# Patient Record
Sex: FEMALE | Race: WHITE | NOT HISPANIC OR LATINO | Employment: OTHER | URBAN - METROPOLITAN AREA
[De-identification: names, ages, dates, MRNs, and addresses within clinical notes are randomized per-mention and may not be internally consistent; named-entity substitution may affect disease eponyms.]

---

## 2018-10-22 ENCOUNTER — TELEPHONE (OUTPATIENT)
Dept: INTERNAL MEDICINE | Facility: CLINIC | Age: 58
End: 2018-10-22

## 2018-10-22 NOTE — TELEPHONE ENCOUNTER
Can you let the patient know I can't do blood work on her until I have seen her.  I wouldn't know what needed to be drawn.

## 2018-10-22 NOTE — TELEPHONE ENCOUNTER
Pt stated she has an appointment and wants to know if she can come in earlier for blood work. Explained that she is a new patient and Maryellen would not know what blood work she needed until the visit was over. Pt stated that she is 58 years old, and did not want to come in twice if she didn't have too. Wants you or Maryellen to call her back.    Thank you

## 2018-10-25 ENCOUNTER — RESULTS ENCOUNTER (OUTPATIENT)
Dept: INTERNAL MEDICINE | Facility: CLINIC | Age: 58
End: 2018-10-25

## 2018-10-25 ENCOUNTER — OFFICE VISIT (OUTPATIENT)
Dept: INTERNAL MEDICINE | Facility: CLINIC | Age: 58
End: 2018-10-25

## 2018-10-25 VITALS
SYSTOLIC BLOOD PRESSURE: 134 MMHG | RESPIRATION RATE: 20 BRPM | HEIGHT: 62 IN | OXYGEN SATURATION: 99 % | DIASTOLIC BLOOD PRESSURE: 78 MMHG | BODY MASS INDEX: 26.5 KG/M2 | HEART RATE: 89 BPM | WEIGHT: 144 LBS

## 2018-10-25 DIAGNOSIS — Z23 NEED FOR TDAP VACCINATION: ICD-10-CM

## 2018-10-25 DIAGNOSIS — Z23 NEED FOR INFLUENZA VACCINATION: ICD-10-CM

## 2018-10-25 DIAGNOSIS — Z00.00 ANNUAL PHYSICAL EXAM: Primary | ICD-10-CM

## 2018-10-25 DIAGNOSIS — Z11.59 NEED FOR HEPATITIS C SCREENING TEST: ICD-10-CM

## 2018-10-25 DIAGNOSIS — Z72.0 TOBACCO ABUSE: ICD-10-CM

## 2018-10-25 DIAGNOSIS — R79.89 ELEVATED TSH: ICD-10-CM

## 2018-10-25 DIAGNOSIS — Z00.00 ANNUAL PHYSICAL EXAM: ICD-10-CM

## 2018-10-25 DIAGNOSIS — Z12.31 SCREENING MAMMOGRAM, ENCOUNTER FOR: Primary | ICD-10-CM

## 2018-10-25 PROBLEM — K21.9 GASTROESOPHAGEAL REFLUX DISEASE WITHOUT ESOPHAGITIS: Status: ACTIVE | Noted: 2018-10-25

## 2018-10-25 LAB
25(OH)D3 SERPL-MCNC: <4.2 NG/ML
ALBUMIN SERPL-MCNC: 4.55 G/DL (ref 3.2–4.8)
ALBUMIN/GLOB SERPL: 2.1 G/DL (ref 1.5–2.5)
ALP SERPL-CCNC: 105 U/L (ref 25–100)
ALT SERPL W P-5'-P-CCNC: 16 U/L (ref 7–40)
ANION GAP SERPL CALCULATED.3IONS-SCNC: 9 MMOL/L (ref 3–11)
ARTICHOKE IGE QN: 154 MG/DL (ref 0–130)
AST SERPL-CCNC: 21 U/L (ref 0–33)
BASOPHILS # BLD AUTO: 0.06 10*3/MM3 (ref 0–0.2)
BASOPHILS NFR BLD AUTO: 0.7 % (ref 0–1)
BILIRUB SERPL-MCNC: 0.3 MG/DL (ref 0.3–1.2)
BUN BLD-MCNC: 15 MG/DL (ref 9–23)
BUN/CREAT SERPL: 16.9 (ref 7–25)
CALCIUM SPEC-SCNC: 9.1 MG/DL (ref 8.7–10.4)
CHLORIDE SERPL-SCNC: 105 MMOL/L (ref 99–109)
CHOLEST SERPL-MCNC: 239 MG/DL (ref 0–200)
CO2 SERPL-SCNC: 26 MMOL/L (ref 20–31)
CREAT BLD-MCNC: 0.89 MG/DL (ref 0.6–1.3)
DEPRECATED RDW RBC AUTO: 48.1 FL (ref 37–54)
EOSINOPHIL # BLD AUTO: 0.16 10*3/MM3 (ref 0–0.3)
EOSINOPHIL NFR BLD AUTO: 1.8 % (ref 0–3)
ERYTHROCYTE [DISTWIDTH] IN BLOOD BY AUTOMATED COUNT: 14.2 % (ref 11.3–14.5)
GFR SERPL CREATININE-BSD FRML MDRD: 65 ML/MIN/1.73
GLOBULIN UR ELPH-MCNC: 2.2 GM/DL
GLUCOSE BLD-MCNC: 102 MG/DL (ref 70–100)
HBA1C MFR BLD: 6.2 % (ref 4.8–5.6)
HCT VFR BLD AUTO: 43.6 % (ref 34.5–44)
HCV AB SER DONR QL: NORMAL
HDLC SERPL-MCNC: 79 MG/DL (ref 40–60)
HGB BLD-MCNC: 14.2 G/DL (ref 11.5–15.5)
IMM GRANULOCYTES # BLD: 0.04 10*3/MM3 (ref 0–0.03)
IMM GRANULOCYTES NFR BLD: 0.5 % (ref 0–0.6)
LYMPHOCYTES # BLD AUTO: 2.63 10*3/MM3 (ref 0.6–4.8)
LYMPHOCYTES NFR BLD AUTO: 29.9 % (ref 24–44)
MCH RBC QN AUTO: 30.1 PG (ref 27–31)
MCHC RBC AUTO-ENTMCNC: 32.6 G/DL (ref 32–36)
MCV RBC AUTO: 92.4 FL (ref 80–99)
MONOCYTES # BLD AUTO: 0.68 10*3/MM3 (ref 0–1)
MONOCYTES NFR BLD AUTO: 7.7 % (ref 0–12)
NEUTROPHILS # BLD AUTO: 5.26 10*3/MM3 (ref 1.5–8.3)
NEUTROPHILS NFR BLD AUTO: 59.9 % (ref 41–71)
PLATELET # BLD AUTO: 385 10*3/MM3 (ref 150–450)
PMV BLD AUTO: 9.9 FL (ref 6–12)
POTASSIUM BLD-SCNC: 4.5 MMOL/L (ref 3.5–5.5)
PROT SERPL-MCNC: 6.7 G/DL (ref 5.7–8.2)
RBC # BLD AUTO: 4.72 10*6/MM3 (ref 3.89–5.14)
SODIUM BLD-SCNC: 140 MMOL/L (ref 132–146)
TRIGL SERPL-MCNC: 89 MG/DL (ref 0–150)
TSH SERPL DL<=0.05 MIU/L-ACNC: 18.2 MIU/ML (ref 0.35–5.35)
VIT B12 BLD-MCNC: 445 PG/ML (ref 211–911)
WBC NRBC COR # BLD: 8.79 10*3/MM3 (ref 3.5–10.8)

## 2018-10-25 PROCEDURE — 80053 COMPREHEN METABOLIC PANEL: CPT | Performed by: NURSE PRACTITIONER

## 2018-10-25 PROCEDURE — 90686 IIV4 VACC NO PRSV 0.5 ML IM: CPT | Performed by: NURSE PRACTITIONER

## 2018-10-25 PROCEDURE — 84439 ASSAY OF FREE THYROXINE: CPT | Performed by: NURSE PRACTITIONER

## 2018-10-25 PROCEDURE — 84443 ASSAY THYROID STIM HORMONE: CPT | Performed by: NURSE PRACTITIONER

## 2018-10-25 PROCEDURE — 82607 VITAMIN B-12: CPT | Performed by: NURSE PRACTITIONER

## 2018-10-25 PROCEDURE — 86803 HEPATITIS C AB TEST: CPT | Performed by: NURSE PRACTITIONER

## 2018-10-25 PROCEDURE — 99386 PREV VISIT NEW AGE 40-64: CPT | Performed by: NURSE PRACTITIONER

## 2018-10-25 PROCEDURE — 90471 IMMUNIZATION ADMIN: CPT | Performed by: NURSE PRACTITIONER

## 2018-10-25 PROCEDURE — 83036 HEMOGLOBIN GLYCOSYLATED A1C: CPT | Performed by: NURSE PRACTITIONER

## 2018-10-25 PROCEDURE — 85025 COMPLETE CBC W/AUTO DIFF WBC: CPT | Performed by: NURSE PRACTITIONER

## 2018-10-25 PROCEDURE — 80061 LIPID PANEL: CPT | Performed by: NURSE PRACTITIONER

## 2018-10-25 PROCEDURE — 82306 VITAMIN D 25 HYDROXY: CPT | Performed by: NURSE PRACTITIONER

## 2018-10-25 RX ORDER — CLONAZEPAM 1 MG/1
1 TABLET ORAL 2 TIMES DAILY PRN
COMMUNITY
Start: 2018-10-24

## 2018-10-25 RX ORDER — ZOLPIDEM TARTRATE 10 MG/1
10 TABLET ORAL
Refills: 2 | COMMUNITY
Start: 2018-10-09

## 2018-10-25 NOTE — PROGRESS NOTES
Subjective   Sydnie Gamble is a 58 y.o. female here today for annual physical.    Chief Complaint   Patient presents with   • Annual Exam     Her overall health is: good  She exercises by: not really  Last colon screening was colonoscopy colo  Immunizations are overdue- fear of needles  She does see his dentist - has dentures- ill fitting- going to go get them fixed  Herdiet is poor- a lot of junk  She describes his alcohol intake as none  Her cardiovascular risk is: high- family history, poor diet, smoker  She is not sexually active.   She does wear a seatbelt regularly.  She does not wear sunscreen regularly when outdoors.  PAP- not had since she was 28- due refuses today  Review of Systems   Constitutional: Negative for chills, fever, unexpected weight gain and unexpected weight loss.   HENT: Negative.    Eyes: Negative.    Respiratory: Positive for shortness of breath and wheezing. Negative for cough.    Cardiovascular: Negative for chest pain and palpitations.   Gastrointestinal: Negative for blood in stool, diarrhea, nausea and vomiting.   Endocrine: Negative for polydipsia, polyphagia and polyuria.   Genitourinary: Negative.    Musculoskeletal: Negative for arthralgias and myalgias.   Skin: Negative for rash and skin lesions.   Allergic/Immunologic: Negative.    Neurological: Negative for dizziness, seizures, weakness, headache, memory problem and confusion.   Psychiatric/Behavioral: Negative for self-injury, sleep disturbance, suicidal ideas and depressed mood. The patient is not nervous/anxious.        Past Medical History:   Diagnosis Date   • Acid reflux    • Acid reflux    • Ovarian cyst    • Ovarian cyst      Family History   Problem Relation Age of Onset   • Cancer Mother    • Hypertension Mother    • Mental illness Mother    • Cancer Father    • Heart attack Maternal Grandfather      Past Surgical History:   Procedure Laterality Date   • OOPHORECTOMY       Social History     Social History   • Marital  "status:      Spouse name: N/A   • Number of children: N/A   • Years of education: N/A     Occupational History   • Not on file.     Social History Main Topics   • Smoking status: Current Every Day Smoker     Packs/day: 1.50   • Smokeless tobacco: Never Used   • Alcohol use No   • Drug use: No   • Sexual activity: Not on file     Other Topics Concern   • Not on file     Social History Narrative   • No narrative on file         Current Outpatient Prescriptions:   •  clonazePAM (KlonoPIN) 1 MG tablet, Take 1 mg by mouth At Night As Needed., Disp: , Rfl:   •  zolpidem (AMBIEN) 10 MG tablet, TAKE 1 TABLET BY MOUTH EVERYDAY AT BEDTIME, Disp: , Rfl: 2  •  Tdap (ADACEL) 5-2-15.5 LF-MCG/0.5 injection, Inject 0.5 mL into the appropriate muscle as directed by prescriber 1 (One) Time for 1 dose., Disp: 0.5 mL, Rfl: 0    Objective   Vitals:    10/25/18 1031   BP: 134/78   Pulse: 89   Resp: 20   SpO2: 99%   Weight: 65.3 kg (144 lb)   Height: 157.5 cm (62\")     Body mass index is 26.34 kg/m².    Physical Exam   Constitutional: She is oriented to person, place, and time. She appears well-developed and well-nourished. She is cooperative. No distress.   HENT:   Head: Normocephalic.   Right Ear: Tympanic membrane and external ear normal.   Left Ear: Tympanic membrane and external ear normal.   Nose: Nose normal. Right sinus exhibits no maxillary sinus tenderness and no frontal sinus tenderness. Left sinus exhibits no maxillary sinus tenderness and no frontal sinus tenderness.   Mouth/Throat: Oropharynx is clear and moist and mucous membranes are normal. No oropharyngeal exudate.   Eyes: Pupils are equal, round, and reactive to light. Conjunctivae and EOM are normal. No scleral icterus.   Neck: Normal range of motion. Neck supple. Carotid bruit is not present. No neck rigidity. No tracheal deviation present. No thyroid mass and no thyromegaly present.   Cardiovascular: Normal rate, regular rhythm, normal heart sounds and intact " distal pulses.  Exam reveals no gallop and no friction rub.    No murmur heard.  Pulmonary/Chest: Effort normal and breath sounds normal. No respiratory distress. She has no wheezes. She has no rales.   Abdominal: Soft. Normal appearance and bowel sounds are normal. She exhibits no distension and no mass. There is no hepatosplenomegaly. There is no tenderness. There is no rebound and no guarding. No hernia.   Musculoskeletal: Normal range of motion. She exhibits no edema, tenderness or deformity.   ROM normal with all major joints   Lymphadenopathy:        Head (right side): No submental, no submandibular, no tonsillar, no preauricular, no posterior auricular and no occipital adenopathy present.        Head (left side): No submental, no submandibular, no tonsillar, no preauricular, no posterior auricular and no occipital adenopathy present.     She has no cervical adenopathy.        Right cervical: No superficial cervical, no deep cervical and no posterior cervical adenopathy present.       Left cervical: No superficial cervical, no deep cervical and no posterior cervical adenopathy present.   Neurological: She is alert and oriented to person, place, and time. She displays no atrophy and no tremor. No cranial nerve deficit or sensory deficit. She exhibits normal muscle tone. Coordination and gait normal. GCS eye subscore is 4. GCS verbal subscore is 5. GCS motor subscore is 6.   Skin: Skin is warm and dry. Capillary refill takes 2 to 3 seconds. No rash noted. She is not diaphoretic. No cyanosis. Nails show no clubbing.   Psychiatric: She has a normal mood and affect. Her speech is normal and behavior is normal. Judgment and thought content normal. Cognition and memory are normal.   Nursing note and vitals reviewed.      Assessment/Plan   Problem List Items Addressed This Visit     Tobacco abuse    Relevant Orders    Fluarix/Fluzone/Afluria Quad/FluLaval Quad    Spirometry With Bronchodilator      Other Visit  Diagnoses     Annual physical exam    -  Primary    Relevant Orders    Mammo Screening Digital Tomosynthesis Bilateral With CAD    Cologuard - Stool, Per Rectum    Hemoglobin A1c    Comprehensive Metabolic Panel    CBC & Differential    TSH    Vitamin B12    Vitamin D 25 Hydroxy    Lipid Panel    Spirometry With Bronchodilator    CBC Auto Differential    Need for influenza vaccination        Relevant Orders    Fluarix/Fluzone/Afluria Quad/FluLaval Quad    Need for Tdap vaccination        Relevant Medications    Tdap (ADACEL) 5-2-15.5 LF-MCG/0.5 injection          Sydnie was seen today for annual exam.    Diagnoses and all orders for this visit:    Annual physical exam  -     Mammo Screening Digital Tomosynthesis Bilateral With CAD; Future  -     Cologuard - Stool, Per Rectum; Future  -     Hemoglobin A1c  -     Comprehensive Metabolic Panel  -     CBC & Differential  -     TSH  -     Vitamin B12  -     Vitamin D 25 Hydroxy  -     Lipid Panel  -     Spirometry With Bronchodilator  -     CBC Auto Differential    Need for influenza vaccination  -     Fluarix/Fluzone/Afluria Quad/FluLaval Quad    Tobacco abuse  -     Fluarix/Fluzone/Afluria Quad/FluLaval Quad  -     Spirometry With Bronchodilator    Need for Tdap vaccination  -     Tdap (ADACEL) 5-2-15.5 LF-MCG/0.5 injection; Inject 0.5 mL into the appropriate muscle as directed by prescriber 1 (One) Time for 1 dose.      Counseled regarding: age-appropriate screening labs and tests, wearing seatbelt and sunscreen regularly, need for regular screenings stressed  Discussed: regular exercise and diet changes to promote weight loss, seeing a dermatologist for annual skin exams         Plan of care reviewed with the patient at the conclusion of today's visit.  Education was provided regarding diagnosis, management, and any prescribed or recommended OTC medications.  Patient verbalized understanding of and agreement with management plan.     Return in about 4 weeks (around  11/22/2018), or for pap.      Maryellen Mendosa, APRN

## 2018-10-26 ENCOUNTER — TELEPHONE (OUTPATIENT)
Dept: INTERNAL MEDICINE | Facility: CLINIC | Age: 58
End: 2018-10-26

## 2018-10-26 DIAGNOSIS — E03.9 ACQUIRED HYPOTHYROIDISM: ICD-10-CM

## 2018-10-26 DIAGNOSIS — R79.89 ELEVATED TSH: Primary | ICD-10-CM

## 2018-10-26 DIAGNOSIS — E55.9 VITAMIN D DEFICIENCY: ICD-10-CM

## 2018-10-26 DIAGNOSIS — E03.9 HYPOTHYROIDISM, UNSPECIFIED TYPE: Primary | ICD-10-CM

## 2018-10-26 PROBLEM — R73.03 PREDIABETES: Status: ACTIVE | Noted: 2018-10-26

## 2018-10-26 LAB — T4 FREE SERPL-MCNC: 0.87 NG/DL (ref 0.89–1.76)

## 2018-10-26 RX ORDER — LEVOTHYROXINE SODIUM 0.03 MG/1
25 TABLET ORAL DAILY
Qty: 30 TABLET | Refills: 2 | Status: SHIPPED | OUTPATIENT
Start: 2018-10-26 | End: 2018-12-13 | Stop reason: SDUPTHER

## 2018-10-26 RX ORDER — ERGOCALCIFEROL 1.25 MG/1
50000 CAPSULE ORAL WEEKLY
Qty: 12 CAPSULE | Refills: 0 | Status: SHIPPED | OUTPATIENT
Start: 2018-10-26 | End: 2019-01-17 | Stop reason: SDUPTHER

## 2018-10-26 NOTE — TELEPHONE ENCOUNTER
Discussed in detail lab results and necessary dietary changes.  Synthroid initiated and nature of disease and treatment discussed.  Vid called to pharm.  Recheck levels at scheduled FU

## 2018-11-02 ENCOUNTER — OFFICE VISIT (OUTPATIENT)
Dept: PULMONOLOGY | Facility: CLINIC | Age: 58
End: 2018-11-02

## 2018-11-02 DIAGNOSIS — R06.02 SHORTNESS OF BREATH: Primary | ICD-10-CM

## 2018-11-02 PROCEDURE — 94060 EVALUATION OF WHEEZING: CPT | Performed by: INTERNAL MEDICINE

## 2018-11-02 PROCEDURE — 94729 DIFFUSING CAPACITY: CPT | Performed by: INTERNAL MEDICINE

## 2018-11-02 PROCEDURE — 94726 PLETHYSMOGRAPHY LUNG VOLUMES: CPT | Performed by: INTERNAL MEDICINE

## 2018-11-02 RX ORDER — ALBUTEROL SULFATE 90 UG/1
4 AEROSOL, METERED RESPIRATORY (INHALATION) ONCE
Status: COMPLETED | OUTPATIENT
Start: 2018-11-02 | End: 2018-11-02

## 2018-11-02 RX ADMIN — ALBUTEROL SULFATE 4 PUFF: 90 AEROSOL, METERED RESPIRATORY (INHALATION) at 15:15

## 2018-11-02 NOTE — PROGRESS NOTES
Out patient Complete PFT with Bronchodilator. Patient was administered 4 puffs Ventolin HFA. Patient tolerated procedure well. Ordered by Maryellen Mendosa.

## 2018-12-03 ENCOUNTER — TELEPHONE (OUTPATIENT)
Dept: INTERNAL MEDICINE | Facility: CLINIC | Age: 58
End: 2018-12-03

## 2018-12-05 ENCOUNTER — APPOINTMENT (OUTPATIENT)
Dept: MAMMOGRAPHY | Facility: HOSPITAL | Age: 58
End: 2018-12-05
Attending: NURSE PRACTITIONER

## 2018-12-12 ENCOUNTER — OFFICE VISIT (OUTPATIENT)
Dept: INTERNAL MEDICINE | Facility: CLINIC | Age: 58
End: 2018-12-12

## 2018-12-12 VITALS
WEIGHT: 140 LBS | DIASTOLIC BLOOD PRESSURE: 72 MMHG | BODY MASS INDEX: 25.61 KG/M2 | HEART RATE: 89 BPM | SYSTOLIC BLOOD PRESSURE: 120 MMHG | RESPIRATION RATE: 20 BRPM | OXYGEN SATURATION: 96 % | TEMPERATURE: 98.2 F

## 2018-12-12 DIAGNOSIS — F41.9 ANXIETY: ICD-10-CM

## 2018-12-12 DIAGNOSIS — J41.0 SIMPLE CHRONIC BRONCHITIS (HCC): ICD-10-CM

## 2018-12-12 DIAGNOSIS — Z72.0 TOBACCO ABUSE: ICD-10-CM

## 2018-12-12 DIAGNOSIS — K21.9 GASTROESOPHAGEAL REFLUX DISEASE WITHOUT ESOPHAGITIS: Primary | ICD-10-CM

## 2018-12-12 DIAGNOSIS — E03.9 ACQUIRED HYPOTHYROIDISM: ICD-10-CM

## 2018-12-12 DIAGNOSIS — R73.03 PREDIABETES: ICD-10-CM

## 2018-12-12 LAB
ANION GAP SERPL CALCULATED.3IONS-SCNC: 6 MMOL/L (ref 3–11)
BUN BLD-MCNC: 12 MG/DL (ref 9–23)
BUN/CREAT SERPL: 13.3 (ref 7–25)
CALCIUM SPEC-SCNC: 9.7 MG/DL (ref 8.7–10.4)
CHLORIDE SERPL-SCNC: 104 MMOL/L (ref 99–109)
CO2 SERPL-SCNC: 29 MMOL/L (ref 20–31)
CREAT BLD-MCNC: 0.9 MG/DL (ref 0.6–1.3)
GFR SERPL CREATININE-BSD FRML MDRD: 64 ML/MIN/1.73
GLUCOSE BLD-MCNC: 99 MG/DL (ref 70–100)
POTASSIUM BLD-SCNC: 4.4 MMOL/L (ref 3.5–5.5)
SODIUM BLD-SCNC: 139 MMOL/L (ref 132–146)
T4 FREE SERPL-MCNC: 1.14 NG/DL (ref 0.89–1.76)
TSH SERPL DL<=0.05 MIU/L-ACNC: 7.49 MIU/ML (ref 0.35–5.35)

## 2018-12-12 PROCEDURE — 99214 OFFICE O/P EST MOD 30 MIN: CPT | Performed by: NURSE PRACTITIONER

## 2018-12-12 PROCEDURE — 84443 ASSAY THYROID STIM HORMONE: CPT | Performed by: NURSE PRACTITIONER

## 2018-12-12 PROCEDURE — 84439 ASSAY OF FREE THYROXINE: CPT | Performed by: NURSE PRACTITIONER

## 2018-12-12 PROCEDURE — 80048 BASIC METABOLIC PNL TOTAL CA: CPT | Performed by: NURSE PRACTITIONER

## 2018-12-12 RX ORDER — PANTOPRAZOLE SODIUM 40 MG/1
40 TABLET, DELAYED RELEASE ORAL DAILY
Qty: 30 TABLET | Refills: 2 | Status: SHIPPED | OUTPATIENT
Start: 2018-12-12 | End: 2019-02-22 | Stop reason: SDUPTHER

## 2018-12-12 RX ORDER — ALBUTEROL SULFATE 90 UG/1
2 AEROSOL, METERED RESPIRATORY (INHALATION) EVERY 4 HOURS PRN
Qty: 1 INHALER | Refills: 4 | Status: SHIPPED | OUTPATIENT
Start: 2018-12-12 | End: 2020-03-23 | Stop reason: SDUPTHER

## 2018-12-12 RX ORDER — ESCITALOPRAM OXALATE 5 MG/1
5 TABLET ORAL DAILY
Qty: 30 TABLET | Refills: 2 | Status: SHIPPED | OUTPATIENT
Start: 2018-12-12 | End: 2019-02-22 | Stop reason: SDUPTHER

## 2018-12-12 NOTE — PROGRESS NOTES
Subjective   Sydnie Gamble is a 58 y.o. female here today for hypothyroidism.    Chief Complaint   Patient presents with   • Follow-up     Sydnie is here today for follow-up on hypothyroidism.  She has been compliant with her Synthroid and denies adverse side effects.     Sydnie sees a psychiatrist for her severe anxiety.  She is curious about alternative methods to deal with her anxiety.  She reports her anxiety symptoms are mostly excessive worry and always thinking of worse case scenario.  Driving leaving the house cause her excessive anxiety.  She also reports having a very  little appetite all during the day and eating junk at night.  She was found last visit to be prediabetic.      Sydnie is also very heavy smoker and complains of orthopnea and a chronic cough.  She is interested in quitting smoking and would like to try Chantix once her anxiety is under control.   Review of Systems   Constitutional: Negative for chills, fever, unexpected weight gain and unexpected weight loss.   HENT: Negative.    Eyes: Negative.    Respiratory: Positive for shortness of breath and wheezing. Negative for cough.    Cardiovascular: Negative for chest pain and palpitations.   Gastrointestinal: Positive for GERD. Negative for blood in stool, diarrhea, nausea and vomiting.   Endocrine: Negative for polydipsia, polyphagia and polyuria.   Genitourinary: Negative.    Musculoskeletal: Negative for arthralgias and myalgias.   Skin: Negative for rash and skin lesions.   Allergic/Immunologic: Negative.    Neurological: Negative for dizziness, seizures, weakness, headache, memory problem and confusion.   Psychiatric/Behavioral: Negative for self-injury, sleep disturbance, suicidal ideas and depressed mood. The patient is nervous/anxious.        Past Medical History:   Diagnosis Date   • Acid reflux    • Acid reflux    • Anxiety 1976   • COPD (chronic obstructive pulmonary disease) (CMS/[facility]) breathing problems   • Hypothyroidism    • Ovarian  cyst    • Ovarian cyst    • Visual impairment corrected with glasses     Family History   Problem Relation Age of Onset   • Cancer Mother    • Hypertension Mother    • Mental illness Mother    • Anxiety disorder Mother    • Asthma Mother    • COPD Mother    • Depression Mother    • Cancer Father    • Heart attack Maternal Grandfather      Past Surgical History:   Procedure Laterality Date   • OOPHORECTOMY     • PARTIAL HYSTERECTOMY  1987?    ovary/fallopian tube removed     Social History     Socioeconomic History   • Marital status:      Spouse name: Not on file   • Number of children: Not on file   • Years of education: Not on file   • Highest education level: Not on file   Social Needs   • Financial resource strain: Not on file   • Food insecurity - worry: Not on file   • Food insecurity - inability: Not on file   • Transportation needs - medical: Not on file   • Transportation needs - non-medical: Not on file   Occupational History   • Not on file   Tobacco Use   • Smoking status: Current Every Day Smoker     Packs/day: 1.50     Years: 45.00     Pack years: 67.50   • Smokeless tobacco: Never Used   Substance and Sexual Activity   • Alcohol use: No   • Drug use: No   • Sexual activity: No   Other Topics Concern   • Not on file   Social History Narrative   • Not on file         Current Outpatient Medications:   •  clonazePAM (KlonoPIN) 1 MG tablet, Take 1 mg by mouth At Night As Needed., Disp: , Rfl:   •  vitamin D (ERGOCALCIFEROL) 66120 units capsule capsule, Take 1 capsule by mouth 1 (One) Time Per Week., Disp: 12 capsule, Rfl: 0  •  zolpidem (AMBIEN) 10 MG tablet, TAKE 1 TABLET BY MOUTH EVERYDAY AT BEDTIME, Disp: , Rfl: 2  •  albuterol 108 (90 Base) MCG/ACT inhaler, Inhale 2 puffs Every 4 (Four) Hours As Needed for Wheezing., Disp: 1 inhaler, Rfl: 4  •  escitalopram (LEXAPRO) 5 MG tablet, Take 1 tablet by mouth Daily., Disp: 30 tablet, Rfl: 2  •  levothyroxine (SYNTHROID, LEVOTHROID) 50 MCG tablet, Take  1 tablet by mouth Daily., Disp: 30 tablet, Rfl: 2  •  pantoprazole (PROTONIX) 40 MG EC tablet, Take 1 tablet by mouth Daily., Disp: 30 tablet, Rfl: 2  •  tiotropium bromide-olodaterol (STIOLTO RESPIMAT) 2.5-2.5 MCG/ACT aerosol solution inhaler, Inhale 1 puff Daily., Disp: 1 inhaler, Rfl: 5    Objective   Vitals:    12/12/18 1500   BP: 120/72   BP Location: Left arm   Patient Position: Sitting   Cuff Size: Adult   Pulse: 89   Resp: 20   Temp: 98.2 °F (36.8 °C)   TempSrc: Temporal   SpO2: 96%   Weight: 63.5 kg (140 lb)   PainSc: 0-No pain     Body mass index is 25.61 kg/m².  Physical Exam   Constitutional: She is oriented to person, place, and time. She appears well-developed and well-nourished. No distress.   Eyes: Pupils are equal, round, and reactive to light.   Neck: Neck supple. No thyromegaly present.   Cardiovascular: Normal rate and regular rhythm.   Pulmonary/Chest: Effort normal and breath sounds normal.   Neurological: She is alert and oriented to person, place, and time.   Skin: Skin is warm and dry. Capillary refill takes 2 to 3 seconds. She is not diaphoretic.   Psychiatric: She has a normal mood and affect. Her behavior is normal. Judgment and thought content normal.   Nursing note and vitals reviewed.      Assessment/Plan   Problem List Items Addressed This Visit     Tobacco abuse    Relevant Orders    Basic Metabolic Panel (Completed)    Gastroesophageal reflux disease without esophagitis - Primary    Relevant Medications    pantoprazole (PROTONIX) 40 MG EC tablet    Other Relevant Orders    Basic Metabolic Panel (Completed)    Acquired hypothyroidism    Relevant Medications    levothyroxine (SYNTHROID, LEVOTHROID) 50 MCG tablet    Other Relevant Orders    TSH (Completed)    T4, Free (Completed)    Basic Metabolic Panel (Completed)    Prediabetes    Relevant Orders    Basic Metabolic Panel (Completed)      Other Visit Diagnoses     Simple chronic bronchitis (CMS/HCC)        Relevant Medications     albuterol 108 (90 Base) MCG/ACT inhaler    tiotropium bromide-olodaterol (STIOLTO RESPIMAT) 2.5-2.5 MCG/ACT aerosol solution inhaler    Anxiety        Relevant Medications    escitalopram (LEXAPRO) 5 MG tablet        Sydnie was seen today for follow-up.    Diagnoses and all orders for this visit:    Gastroesophageal reflux disease without esophagitis  -     pantoprazole (PROTONIX) 40 MG EC tablet; Take 1 tablet by mouth Daily.  -     Basic Metabolic Panel        -     Lack of appetite may be related to acid reflux- will do a trial of protonix and FU in 2 weeks for a recheck  Acquired hypothyroidism  -     TSH  -     T4, Free  -     Basic Metabolic Panel           Prediabetes  -     Basic Metabolic Panel       -     Patient reports she has made many dietary changes, nutritional information discussed and hand out given  Tobacco abuse  -     Basic Metabolic Panel        -   Once anxiety improved- will consider wellbutrin or chantix  Simple chronic bronchitis (CMS/HCC)  -     albuterol 108 (90 Base) MCG/ACT inhaler; Inhale 2 puffs Every 4 (Four) Hours As Needed for Wheezing.  -     tiotropium bromide-olodaterol (STIOLTO RESPIMAT) 2.5-2.5 MCG/ACT aerosol solution inhaler; Inhale 1 puff Daily.        -    FU in 2 weeks for a recheck  Anxiety  -     escitalopram (LEXAPRO) 5 MG tablet; Take 1 tablet by mouth Daily. Dicussed deep breathing exercises and referred to counseling.  Patient is unsure if she wants to take a daily medication for this will speak with her  and rtc in 2 weeks for a recheck        -      FU in 2 weeks for  recheck           The patient was counseled regarding diagnostic results, impressions, prognosis, instructions for management, risk factor reductions, education, and importance of treatment compliance.  The patient verbalized understanding of and agreement with the plan of care.    Advised patient to call with any further questions and any new or worsening symptoms.     No Follow-up on  file.      Maryellen Mendosa, APRN

## 2018-12-13 ENCOUNTER — TELEPHONE (OUTPATIENT)
Dept: INTERNAL MEDICINE | Facility: CLINIC | Age: 58
End: 2018-12-13

## 2018-12-13 DIAGNOSIS — E03.9 HYPOTHYROIDISM, UNSPECIFIED TYPE: ICD-10-CM

## 2018-12-13 RX ORDER — LEVOTHYROXINE SODIUM 0.05 MG/1
50 TABLET ORAL DAILY
Qty: 90 TABLET | Refills: 0 | Status: SHIPPED | OUTPATIENT
Start: 2018-12-13 | End: 2019-02-25 | Stop reason: SDUPTHER

## 2018-12-13 RX ORDER — LEVOTHYROXINE SODIUM 0.05 MG/1
50 TABLET ORAL DAILY
Qty: 30 TABLET | Refills: 2 | Status: SHIPPED | OUTPATIENT
Start: 2018-12-13 | End: 2018-12-13 | Stop reason: SDUPTHER

## 2018-12-13 NOTE — TELEPHONE ENCOUNTER
PT LEVOTHYROXINE NEEDS TO BE RESENT TO THE [facility].   PT IS ALSO WANTING TO KNOW IF SHE CAN JUST KEEP THE 25MG AND TAKE TWO PILLS INSTEAD OF GETTING THE 50 MG?

## 2019-01-17 DIAGNOSIS — E55.9 VITAMIN D DEFICIENCY: ICD-10-CM

## 2019-01-17 RX ORDER — ERGOCALCIFEROL 1.25 MG/1
50000 CAPSULE ORAL WEEKLY
Qty: 12 CAPSULE | Refills: 0 | Status: SHIPPED | OUTPATIENT
Start: 2019-01-17 | End: 2019-02-22 | Stop reason: SDUPTHER

## 2019-01-18 ENCOUNTER — HOSPITAL ENCOUNTER (OUTPATIENT)
Dept: MAMMOGRAPHY | Facility: HOSPITAL | Age: 59
Discharge: HOME | End: 2019-01-18
Attending: NURSE PRACTITIONER | Admitting: NURSE PRACTITIONER

## 2019-01-18 DIAGNOSIS — Z12.31 SCREENING MAMMOGRAM, ENCOUNTER FOR: ICD-10-CM

## 2019-01-18 PROCEDURE — 77063 BREAST TOMOSYNTHESIS BI: CPT | Performed by: RADIOLOGY

## 2019-01-18 PROCEDURE — 77063 BREAST TOMOSYNTHESIS BI: CPT

## 2019-01-18 PROCEDURE — 77067 SCR MAMMO BI INCL CAD: CPT

## 2019-01-18 PROCEDURE — 77067 SCR MAMMO BI INCL CAD: CPT | Performed by: RADIOLOGY

## 2019-02-22 ENCOUNTER — OFFICE VISIT (OUTPATIENT)
Dept: INTERNAL MEDICINE | Facility: CLINIC | Age: 59
End: 2019-02-22

## 2019-02-22 VITALS
HEART RATE: 90 BPM | TEMPERATURE: 98.7 F | DIASTOLIC BLOOD PRESSURE: 80 MMHG | OXYGEN SATURATION: 99 % | SYSTOLIC BLOOD PRESSURE: 118 MMHG | HEIGHT: 62 IN | RESPIRATION RATE: 18 BRPM | WEIGHT: 139 LBS | BODY MASS INDEX: 25.58 KG/M2

## 2019-02-22 DIAGNOSIS — R73.03 PREDIABETES: ICD-10-CM

## 2019-02-22 DIAGNOSIS — F17.210 CIGARETTE SMOKER: ICD-10-CM

## 2019-02-22 DIAGNOSIS — E55.9 VITAMIN D DEFICIENCY: ICD-10-CM

## 2019-02-22 DIAGNOSIS — E03.9 ACQUIRED HYPOTHYROIDISM: Primary | ICD-10-CM

## 2019-02-22 DIAGNOSIS — J42 CHRONIC BRONCHITIS, UNSPECIFIED CHRONIC BRONCHITIS TYPE (HCC): ICD-10-CM

## 2019-02-22 DIAGNOSIS — K21.9 GASTROESOPHAGEAL REFLUX DISEASE WITHOUT ESOPHAGITIS: ICD-10-CM

## 2019-02-22 DIAGNOSIS — F41.1 GAD (GENERALIZED ANXIETY DISORDER): ICD-10-CM

## 2019-02-22 DIAGNOSIS — J41.0 SIMPLE CHRONIC BRONCHITIS (HCC): ICD-10-CM

## 2019-02-22 PROBLEM — J44.9 COPD (CHRONIC OBSTRUCTIVE PULMONARY DISEASE) (HCC): Status: ACTIVE | Noted: 2019-02-22

## 2019-02-22 LAB
25(OH)D3 SERPL-MCNC: 71.7 NG/ML
ALBUMIN SERPL-MCNC: 4.72 G/DL (ref 3.2–4.8)
ALBUMIN/GLOB SERPL: 2.1 G/DL (ref 1.5–2.5)
ALP SERPL-CCNC: 115 U/L (ref 25–100)
ALT SERPL W P-5'-P-CCNC: 14 U/L (ref 7–40)
ANION GAP SERPL CALCULATED.3IONS-SCNC: 8 MMOL/L (ref 3–11)
AST SERPL-CCNC: 20 U/L (ref 0–33)
BASOPHILS # BLD AUTO: 0.06 10*3/MM3 (ref 0–0.2)
BASOPHILS NFR BLD AUTO: 0.7 % (ref 0–1)
BILIRUB SERPL-MCNC: 0.3 MG/DL (ref 0.3–1.2)
BUN BLD-MCNC: 19 MG/DL (ref 9–23)
BUN/CREAT SERPL: 20.7 (ref 7–25)
CALCIUM SPEC-SCNC: 9.6 MG/DL (ref 8.7–10.4)
CHLORIDE SERPL-SCNC: 105 MMOL/L (ref 99–109)
CO2 SERPL-SCNC: 26 MMOL/L (ref 20–31)
CREAT BLD-MCNC: 0.92 MG/DL (ref 0.6–1.3)
DEPRECATED RDW RBC AUTO: 45.7 FL (ref 37–54)
EOSINOPHIL # BLD AUTO: 0.11 10*3/MM3 (ref 0–0.3)
EOSINOPHIL NFR BLD AUTO: 1.3 % (ref 0–3)
ERYTHROCYTE [DISTWIDTH] IN BLOOD BY AUTOMATED COUNT: 13.9 % (ref 11.3–14.5)
GFR SERPL CREATININE-BSD FRML MDRD: 62 ML/MIN/1.73
GLOBULIN UR ELPH-MCNC: 2.3 GM/DL
GLUCOSE BLD-MCNC: 106 MG/DL (ref 70–100)
HBA1C MFR BLD: 5.7 % (ref 4.8–5.6)
HCT VFR BLD AUTO: 42.4 % (ref 34.5–44)
HGB BLD-MCNC: 13.9 G/DL (ref 11.5–15.5)
IMM GRANULOCYTES # BLD AUTO: 0.02 10*3/MM3 (ref 0–0.05)
IMM GRANULOCYTES NFR BLD AUTO: 0.2 % (ref 0–0.6)
LYMPHOCYTES # BLD AUTO: 2.64 10*3/MM3 (ref 0.6–4.8)
LYMPHOCYTES NFR BLD AUTO: 31.7 % (ref 24–44)
MCH RBC QN AUTO: 29.8 PG (ref 27–31)
MCHC RBC AUTO-ENTMCNC: 32.8 G/DL (ref 32–36)
MCV RBC AUTO: 91 FL (ref 80–99)
MONOCYTES # BLD AUTO: 0.67 10*3/MM3 (ref 0–1)
MONOCYTES NFR BLD AUTO: 8 % (ref 0–12)
NEUTROPHILS # BLD AUTO: 4.83 10*3/MM3 (ref 1.5–8.3)
NEUTROPHILS NFR BLD AUTO: 58.1 % (ref 41–71)
PLATELET # BLD AUTO: 391 10*3/MM3 (ref 150–450)
PMV BLD AUTO: 9.6 FL (ref 6–12)
POTASSIUM BLD-SCNC: 4.3 MMOL/L (ref 3.5–5.5)
PROT SERPL-MCNC: 7 G/DL (ref 5.7–8.2)
RBC # BLD AUTO: 4.66 10*6/MM3 (ref 3.89–5.14)
SODIUM BLD-SCNC: 139 MMOL/L (ref 132–146)
TSH SERPL DL<=0.05 MIU/L-ACNC: 6.22 MIU/ML (ref 0.35–5.35)
WBC NRBC COR # BLD: 8.33 10*3/MM3 (ref 3.5–10.8)

## 2019-02-22 PROCEDURE — 99214 OFFICE O/P EST MOD 30 MIN: CPT | Performed by: NURSE PRACTITIONER

## 2019-02-22 PROCEDURE — 84443 ASSAY THYROID STIM HORMONE: CPT | Performed by: NURSE PRACTITIONER

## 2019-02-22 PROCEDURE — 80053 COMPREHEN METABOLIC PANEL: CPT | Performed by: NURSE PRACTITIONER

## 2019-02-22 PROCEDURE — 83036 HEMOGLOBIN GLYCOSYLATED A1C: CPT | Performed by: NURSE PRACTITIONER

## 2019-02-22 PROCEDURE — 85025 COMPLETE CBC W/AUTO DIFF WBC: CPT | Performed by: NURSE PRACTITIONER

## 2019-02-22 PROCEDURE — 82306 VITAMIN D 25 HYDROXY: CPT | Performed by: NURSE PRACTITIONER

## 2019-02-22 RX ORDER — ESCITALOPRAM OXALATE 10 MG/1
10 TABLET ORAL DAILY
Qty: 30 TABLET | Refills: 2 | Status: SHIPPED | OUTPATIENT
Start: 2019-02-22 | End: 2019-04-08 | Stop reason: SDUPTHER

## 2019-02-22 RX ORDER — ERGOCALCIFEROL 1.25 MG/1
50000 CAPSULE ORAL WEEKLY
Qty: 12 CAPSULE | Refills: 0 | Status: SHIPPED | OUTPATIENT
Start: 2019-02-22 | End: 2019-07-08

## 2019-02-22 RX ORDER — PANTOPRAZOLE SODIUM 40 MG/1
40 TABLET, DELAYED RELEASE ORAL DAILY
Qty: 30 TABLET | Refills: 2 | Status: SHIPPED | OUTPATIENT
Start: 2019-02-22 | End: 2019-05-30 | Stop reason: SDUPTHER

## 2019-02-22 NOTE — PROGRESS NOTES
Subjective   Sydnie Gamble is a 58 y.o. female here today for hypothyroidism    Chief Complaint   Patient presents with   • Anxiety   • Heartburn     Sydnie is here today to follow-up on her hypothyroidism, COPD, ALESIA, and vit d def. she has been compliant with her Synthroid and vitamin D.  She has been making dietary changes and cutting back on concentrated sweets.  She has lost a pound.  She is curious to see if she is still prediabetic.      She reports when she got home she threw away all of her information from our visit and has been confused about how to use her inhalers.  She has been using this Stiolto for rescue on accident.    Anxiety: reports she is not sure if the medication is working or not but denies adverse SA.       She is still working on smoking cessation.  She is looking into hypnosis programs.  She has not done the CT Low dose cancer screen- would like it reordered.  Cologuard is at her house - she just needs to send it in.  Review of Systems   Constitutional: Negative for chills, fever, unexpected weight gain and unexpected weight loss.   HENT: Negative.    Eyes: Negative.    Respiratory: Positive for shortness of breath and wheezing. Negative for cough.    Cardiovascular: Negative for chest pain and palpitations.   Gastrointestinal: Negative for blood in stool, diarrhea, nausea, vomiting and GERD.   Endocrine: Negative for polydipsia, polyphagia and polyuria.   Genitourinary: Negative.    Musculoskeletal: Negative for arthralgias and myalgias.   Skin: Negative for rash and skin lesions.   Allergic/Immunologic: Negative.    Neurological: Negative for dizziness, seizures, weakness, headache, memory problem and confusion.   Psychiatric/Behavioral: Negative for self-injury, sleep disturbance, suicidal ideas and depressed mood. The patient is nervous/anxious.        Past Medical History:   Diagnosis Date   • Acid reflux    • Acid reflux    • Anxiety 1976   • COPD (chronic obstructive pulmonary  disease) (CMS/HCC) breathing problems   • Hypothyroidism    • Ovarian cyst    • Ovarian cyst    • Visual impairment corrected with glasses     Family History   Problem Relation Age of Onset   • Cancer Mother    • Hypertension Mother    • Mental illness Mother    • Anxiety disorder Mother    • Asthma Mother    • COPD Mother    • Depression Mother    • Cancer Father    • Heart attack Maternal Grandfather    • Breast cancer Neg Hx    • Ovarian cancer Neg Hx      History reviewed. No pertinent surgical history.  Social History     Socioeconomic History   • Marital status:      Spouse name: Not on file   • Number of children: Not on file   • Years of education: Not on file   • Highest education level: Not on file   Social Needs   • Financial resource strain: Not on file   • Food insecurity - worry: Not on file   • Food insecurity - inability: Not on file   • Transportation needs - medical: Not on file   • Transportation needs - non-medical: Not on file   Occupational History   • Not on file   Tobacco Use   • Smoking status: Current Every Day Smoker     Packs/day: 1.50     Years: 45.00     Pack years: 67.50   • Smokeless tobacco: Never Used   Substance and Sexual Activity   • Alcohol use: No   • Drug use: No   • Sexual activity: No   Other Topics Concern   • Not on file   Social History Narrative   • Not on file         Current Outpatient Medications:   •  albuterol 108 (90 Base) MCG/ACT inhaler, Inhale 2 puffs Every 4 (Four) Hours As Needed for Wheezing., Disp: 1 inhaler, Rfl: 4  •  clonazePAM (KlonoPIN) 1 MG tablet, Take 1 mg by mouth At Night As Needed., Disp: , Rfl:   •  escitalopram (LEXAPRO) 10 MG tablet, Take 1 tablet by mouth Daily., Disp: 30 tablet, Rfl: 2  •  levothyroxine (SYNTHROID, LEVOTHROID) 50 MCG tablet, Take 1 tablet by mouth Daily., Disp: 90 tablet, Rfl: 0  •  pantoprazole (PROTONIX) 40 MG EC tablet, Take 1 tablet by mouth Daily., Disp: 30 tablet, Rfl: 2  •  tiotropium bromide-olodaterol (STIOLTO  "RESPIMAT) 2.5-2.5 MCG/ACT aerosol solution inhaler, Inhale 1 puff Daily., Disp: 1 inhaler, Rfl: 5  •  vitamin D (ERGOCALCIFEROL) 67668 units capsule capsule, Take 1 capsule by mouth 1 (One) Time Per Week., Disp: 12 capsule, Rfl: 0  •  zolpidem (AMBIEN) 10 MG tablet, TAKE 1 TABLET BY MOUTH EVERYDAY AT BEDTIME, Disp: , Rfl: 2    Objective   Vitals:    02/22/19 1521   BP: 118/80   Pulse: 90   Resp: 18   Temp: 98.7 °F (37.1 °C)   TempSrc: Temporal   SpO2: 99%   Weight: 63 kg (139 lb)   Height: 157.5 cm (62\")     Body mass index is 25.42 kg/m².  Physical Exam   Constitutional: She is oriented to person, place, and time. She appears well-developed and well-nourished. No distress.   Eyes: Pupils are equal, round, and reactive to light.   Neck: Neck supple. No thyromegaly present.   Cardiovascular: Normal rate and regular rhythm.   Pulmonary/Chest: Effort normal. She has decreased breath sounds. She has no wheezes. She has no rhonchi.   Neurological: She is alert and oriented to person, place, and time.   Skin: Skin is warm and dry. Capillary refill takes 2 to 3 seconds. She is not diaphoretic.   Psychiatric: She has a normal mood and affect. Her behavior is normal. Judgment and thought content normal.   Nursing note and vitals reviewed.      Assessment/Plan   Problem List Items Addressed This Visit        Respiratory    COPD (chronic obstructive pulmonary disease) (CMS/[facility])    Relevant Medications    albuterol 108 (90 Base) MCG/ACT inhaler    tiotropium bromide-olodaterol (STIOLTO RESPIMAT) 2.5-2.5 MCG/ACT aerosol solution inhaler    Other Relevant Orders    CBC & Differential    Comprehensive Metabolic Panel       Digestive    Gastroesophageal reflux disease without esophagitis    Relevant Medications    pantoprazole (PROTONIX) 40 MG EC tablet    Vitamin D deficiency    Relevant Medications    vitamin D (ERGOCALCIFEROL) 65873 units capsule capsule    Other Relevant Orders    Vitamin D 25 Hydroxy       Endocrine    " Acquired hypothyroidism - Primary    Relevant Medications    levothyroxine (SYNTHROID, LEVOTHROID) 50 MCG tablet    Other Relevant Orders    CBC & Differential    Comprehensive Metabolic Panel    TSH       Other    Prediabetes    Relevant Orders    CBC & Differential    Hemoglobin A1c    ALESIA (generalized anxiety disorder)    Relevant Medications    zolpidem (AMBIEN) 10 MG tablet    escitalopram (LEXAPRO) 10 MG tablet      Other Visit Diagnoses     Cigarette smoker        Relevant Orders    CT Chest Low Dose Wo        Sydnie was seen today for anxiety and heartburn.    Diagnoses and all orders for this visit:    Acquired hypothyroidism  -     CBC & Differential  -     Comprehensive Metabolic Panel  -     TSH          Prediabetes  -     CBC & Differential  -     Hemoglobin A1c         -  Has made some positive dietary changes  Vitamin D deficiency  -     Vitamin D 25 Hydroxy  -     vitamin D (ERGOCALCIFEROL) 91423 units capsule capsule; Take 1 capsule by mouth 1 (One) Time Per Week.    Gastroesophageal reflux disease without esophagitis  -     pantoprazole (PROTONIX) 40 MG EC tablet; Take 1 tablet by mouth Daily.  -     Stable, continue current treatment plan  Simple chronic bronchitis (CMS/HCC)  -     tiotropium bromide-olodaterol (STIOLTO RESPIMAT) 2.5-2.5 MCG/ACT aerosol solution inhaler; Inhale 1 puff Daily.  Chronic bronchitis, unspecified chronic bronchitis type (CMS/HCC)  -     CBC & Differential  -     Comprehensive Metabolic Panel      -     Re - demonstrated proper inhaler use, Pt verbalized understanding and denied further questions at this time  ALESIA (generalized anxiety disorder)  -     escitalopram (LEXAPRO) 10 MG tablet; Take 1 tablet by mouth Daily.       -    Increase Lexapro to 10 mg.    Cigarette smoker  -     CT Chest Low Dose Wo; Future    Other orders  -     CBC Auto Differential             The patient was counseled regarding diagnostic results, impressions, prognosis, instructions for management,  risk factor reductions, education, and importance of treatment compliance.  The patient verbalized understanding of and agreement with the plan of care.    Advised patient to call with any further questions and any new or worsening symptoms.     Return for Physical w/Next Visit with pap.      DAYSI Aceves

## 2019-02-23 DIAGNOSIS — E03.9 HYPOTHYROIDISM, UNSPECIFIED TYPE: ICD-10-CM

## 2019-02-23 RX ORDER — LEVOTHYROXINE SODIUM 0.05 MG/1
TABLET ORAL
Qty: 90 TABLET | Refills: 0 | Status: CANCELLED | OUTPATIENT
Start: 2019-02-23

## 2019-02-25 ENCOUNTER — TELEPHONE (OUTPATIENT)
Dept: INTERNAL MEDICINE | Facility: CLINIC | Age: 59
End: 2019-02-25

## 2019-02-25 DIAGNOSIS — E03.9 HYPOTHYROIDISM, UNSPECIFIED TYPE: ICD-10-CM

## 2019-02-25 DIAGNOSIS — E03.9 ACQUIRED HYPOTHYROIDISM: ICD-10-CM

## 2019-02-25 DIAGNOSIS — R73.03 PREDIABETES: Primary | ICD-10-CM

## 2019-02-25 DIAGNOSIS — E55.9 VITAMIN D DEFICIENCY: ICD-10-CM

## 2019-02-25 RX ORDER — LEVOTHYROXINE SODIUM 0.07 MG/1
75 TABLET ORAL DAILY
Qty: 30 TABLET | Refills: 1 | Status: SHIPPED | OUTPATIENT
Start: 2019-02-25 | End: 2019-04-08 | Stop reason: SDUPTHER

## 2019-02-25 NOTE — TELEPHONE ENCOUNTER
Left voicemail to return call.  Thyroid function still off.  Increased Synthroid.  No longer prediabetic.  Vitamin D now normal.

## 2019-03-07 ENCOUNTER — HOSPITAL ENCOUNTER (OUTPATIENT)
Dept: CT IMAGING | Facility: HOSPITAL | Age: 59
Discharge: HOME | End: 2019-03-07
Admitting: NURSE PRACTITIONER

## 2019-03-07 DIAGNOSIS — F17.210 CIGARETTE SMOKER: ICD-10-CM

## 2019-03-07 PROCEDURE — G0297 LDCT FOR LUNG CA SCREEN: HCPCS

## 2019-03-08 ENCOUNTER — TELEPHONE (OUTPATIENT)
Dept: INTERNAL MEDICINE | Facility: CLINIC | Age: 59
End: 2019-03-08

## 2019-03-08 DIAGNOSIS — R91.1 LUNG NODULE: Primary | ICD-10-CM

## 2019-03-08 NOTE — TELEPHONE ENCOUNTER
Discussed CT results with patient.  Advised importance of FU and discussed 5-15% chance of malignancy.  Will refer to pulmonary. Pt verbalized understanding and denied further questions at this time.

## 2019-03-11 DIAGNOSIS — K21.9 GASTROESOPHAGEAL REFLUX DISEASE WITHOUT ESOPHAGITIS: ICD-10-CM

## 2019-03-11 DIAGNOSIS — F41.9 ANXIETY: ICD-10-CM

## 2019-03-11 RX ORDER — PANTOPRAZOLE SODIUM 40 MG/1
TABLET, DELAYED RELEASE ORAL
Qty: 30 TABLET | Refills: 2 | OUTPATIENT
Start: 2019-03-11

## 2019-03-11 RX ORDER — ESCITALOPRAM OXALATE 5 MG/1
TABLET ORAL
Qty: 30 TABLET | Refills: 2 | OUTPATIENT
Start: 2019-03-11

## 2019-03-12 ENCOUNTER — OFFICE VISIT (OUTPATIENT)
Dept: PULMONOLOGY | Facility: CLINIC | Age: 59
End: 2019-03-12

## 2019-03-12 ENCOUNTER — DOCUMENTATION (OUTPATIENT)
Dept: ONCOLOGY | Facility: CLINIC | Age: 59
End: 2019-03-12

## 2019-03-12 VITALS
WEIGHT: 141 LBS | OXYGEN SATURATION: 94 % | HEART RATE: 82 BPM | TEMPERATURE: 98.1 F | HEIGHT: 62 IN | SYSTOLIC BLOOD PRESSURE: 114 MMHG | DIASTOLIC BLOOD PRESSURE: 62 MMHG | BODY MASS INDEX: 25.95 KG/M2

## 2019-03-12 DIAGNOSIS — R91.1 LUNG NODULE: Primary | ICD-10-CM

## 2019-03-12 DIAGNOSIS — Z87.891 PERSONAL HISTORY OF TOBACCO USE, PRESENTING HAZARDS TO HEALTH: ICD-10-CM

## 2019-03-12 DIAGNOSIS — R59.0 HILAR ADENOPATHY: ICD-10-CM

## 2019-03-12 DIAGNOSIS — J42 CHRONIC BRONCHITIS, UNSPECIFIED CHRONIC BRONCHITIS TYPE (HCC): ICD-10-CM

## 2019-03-12 PROBLEM — R91.8 MULTIPLE LUNG NODULES ON CT: Status: ACTIVE | Noted: 2019-03-08

## 2019-03-12 PROBLEM — F17.213 CIGARETTE NICOTINE DEPENDENCE WITH WITHDRAWAL: Status: ACTIVE | Noted: 2019-03-12

## 2019-03-12 PROCEDURE — 94726 PLETHYSMOGRAPHY LUNG VOLUMES: CPT | Performed by: INTERNAL MEDICINE

## 2019-03-12 PROCEDURE — 94729 DIFFUSING CAPACITY: CPT | Performed by: INTERNAL MEDICINE

## 2019-03-12 PROCEDURE — 94060 EVALUATION OF WHEEZING: CPT | Performed by: INTERNAL MEDICINE

## 2019-03-12 PROCEDURE — 99204 OFFICE O/P NEW MOD 45 MIN: CPT | Performed by: INTERNAL MEDICINE

## 2019-03-12 RX ORDER — ALBUTEROL SULFATE 90 UG/1
4 AEROSOL, METERED RESPIRATORY (INHALATION) ONCE
Status: COMPLETED | OUTPATIENT
Start: 2019-03-12 | End: 2019-03-12

## 2019-03-12 RX ADMIN — ALBUTEROL SULFATE 4 PUFF: 90 AEROSOL, METERED RESPIRATORY (INHALATION) at 14:14

## 2019-03-12 NOTE — PROGRESS NOTES
"Initial Pulmonary Consult Note    Subjective   Reason for consultation: Lung nodules    Sydnie Gamble is a 58 y.o. female is being seen for consultation today at the request of DAYSI Aceves    History of Present Illness   58 y.o.female smoker who reports a nocturnal cough for the past year, exertional dyspnea had a screening CT chest which showed several right side sub-centimeter nodules.  She is here for further evaluation.  She denies hemoptysis, weight loss, night sweats or lymphadenopathy.  She does report an \"aching feeling\" in her right upper neck for the past three months.     The patient has a history of COPD and has been treated with Stiolto.  She reports she has only been taking 1 puff daily instead of 2.  She has not used her rescue inhaler.  She denies any significant bronchitis.  Her cough is productive at times of some clear sputum.    She reports she has been trying to cut back on cigarettes using a nicotine patch and is down to 10 cigarettes/day.  She started cutting back when she found out about her CT scan.    Patient has a strong family history of lung cancer.    The following portions of the patient's history were reviewed and updated as appropriate: allergies, current medications, past family history, past medical history, past social history, past surgical history and problem list.    Active Ambulatory Problems     Diagnosis Date Noted   • Tobacco abuse 10/25/2018   • Gastroesophageal reflux disease without esophagitis 10/25/2018   • Acquired hypothyroidism 10/26/2018   • Vitamin D deficiency 10/26/2018   • Prediabetes 10/26/2018   • COPD (chronic obstructive pulmonary disease) (CMS/[facility]) 02/22/2019   • ALESIA (generalized anxiety disorder) 02/22/2019   • Lung nodule 03/08/2019     Resolved Ambulatory Problems     Diagnosis Date Noted   • No Resolved Ambulatory Problems     Past Medical History:   Diagnosis Date   • Acid reflux    • Acid reflux    • Anxiety 1976   • COPD (chronic " obstructive pulmonary disease) (CMS/HCC) breathing problems   • Hypothyroidism    • Ovarian cyst    • Ovarian cyst    • Visual impairment corrected with glasses       No past surgical history on file.    Family History   Problem Relation Age of Onset   • Cancer Mother    • Hypertension Mother    • Mental illness Mother    • Anxiety disorder Mother    • Asthma Mother    • COPD Mother    • Depression Mother    • Cancer Father    • Heart attack Maternal Grandfather    • Breast cancer Neg Hx    • Ovarian cancer Neg Hx        Social History     Socioeconomic History   • Marital status:      Spouse name: Not on file   • Number of children: Not on file   • Years of education: Not on file   • Highest education level: Not on file   Social Needs   • Financial resource strain: Not on file   • Food insecurity - worry: Not on file   • Food insecurity - inability: Not on file   • Transportation needs - medical: Not on file   • Transportation needs - non-medical: Not on file   Occupational History   • Not on file   Tobacco Use   • Smoking status: Current Every Day Smoker     Packs/day: 1.50     Years: 45.00     Pack years: 67.50   • Smokeless tobacco: Never Used   Substance and Sexual Activity   • Alcohol use: No   • Drug use: No   • Sexual activity: No   Other Topics Concern   • Not on file   Social History Narrative   • Not on file       No Known Allergies    Current Outpatient Medications   Medication Sig Dispense Refill   • albuterol 108 (90 Base) MCG/ACT inhaler Inhale 2 puffs Every 4 (Four) Hours As Needed for Wheezing. 1 inhaler 4   • clonazePAM (KlonoPIN) 1 MG tablet Take 1 mg by mouth At Night As Needed.     • escitalopram (LEXAPRO) 10 MG tablet Take 1 tablet by mouth Daily. 30 tablet 2   • levothyroxine (SYNTHROID, LEVOTHROID) 75 MCG tablet Take 1 tablet by mouth Daily. 30 tablet 1   • pantoprazole (PROTONIX) 40 MG EC tablet Take 1 tablet by mouth Daily. 30 tablet 2   • tiotropium bromide-olodaterol (STIOLTO  RESPIMAT) 2.5-2.5 MCG/ACT aerosol solution inhaler Inhale 1 puff Daily. 1 inhaler 5   • vitamin D (ERGOCALCIFEROL) 76368 units capsule capsule Take 1 capsule by mouth 1 (One) Time Per Week. 12 capsule 0   • zolpidem (AMBIEN) 10 MG tablet TAKE 1 TABLET BY MOUTH EVERYDAY AT BEDTIME  2     No current facility-administered medications for this visit.        Review of Systems   Constitutional: Positive for activity change, appetite change (increased) and fatigue. Negative for chills, diaphoresis, fever and unexpected weight change.   HENT: Positive for tinnitus. Negative for congestion, dental problem, ear pain, hearing loss, nosebleeds, postnasal drip, rhinorrhea and sinus pressure.    Eyes: Negative for pain, discharge, redness and visual disturbance.   Respiratory: Positive for cough, shortness of breath and wheezing. Negative for apnea, choking, chest tightness and stridor.    Cardiovascular: Negative for chest pain, palpitations and leg swelling.   Gastrointestinal: Negative for abdominal distention, abdominal pain, blood in stool, constipation, diarrhea, nausea and vomiting.   Endocrine: Positive for cold intolerance and polydipsia. Negative for heat intolerance and polyuria.   Genitourinary: Negative for dysuria, frequency, hematuria and urgency.   Musculoskeletal: Positive for joint swelling and neck pain. Negative for arthralgias and myalgias.   Skin: Negative for color change, rash and wound.   Allergic/Immunologic: Negative for environmental allergies and immunocompromised state.   Neurological: Negative for dizziness, syncope, weakness, light-headedness, numbness and headaches.   Psychiatric/Behavioral: Positive for sleep disturbance. Negative for suicidal ideas. The patient is nervous/anxious.    All other systems reviewed and are negative.    All other systems were reviewed and are negative.  Exceptions are included in the HPI or as otherwise noted above.     Objective   Blood pressure 114/62, pulse 82,  "temperature 98.1 °F (36.7 °C), height 157.5 cm (62\"), weight 64 kg (141 lb), SpO2 94 %.  Physical Exam   Constitutional: She is oriented to person, place, and time. She appears well-developed and well-nourished.   HENT:   Head: Normocephalic and atraumatic.   Right Ear: External ear normal.   Left Ear: External ear normal.   Nose: Nose normal.   Mouth/Throat: Oropharynx is clear and moist. No oropharyngeal exudate.   Eyes: Conjunctivae and EOM are normal. Pupils are equal, round, and reactive to light. Right eye exhibits no discharge. Left eye exhibits no discharge. No scleral icterus.   Neck: Normal range of motion. Neck supple. No JVD present. No tracheal deviation present. No thyromegaly present.   Cardiovascular: Normal rate, regular rhythm, normal heart sounds and intact distal pulses. Exam reveals no gallop and no friction rub.   No murmur heard.  Pulmonary/Chest: Effort normal. No accessory muscle usage or stridor. No apnea, no tachypnea and no bradypnea. No respiratory distress. She has no decreased breath sounds. She has no wheezes. She has no rhonchi. She has no rales. Chest wall is not dull to percussion. She exhibits no tenderness, no bony tenderness, no crepitus and no deformity.   Abdominal: Soft. Bowel sounds are normal. She exhibits no distension. There is no tenderness.   Musculoskeletal: Normal range of motion. She exhibits no edema, tenderness or deformity.   Lymphadenopathy:     She has no cervical adenopathy.   Neurological: She is alert and oriented to person, place, and time. No cranial nerve deficit. She exhibits normal muscle tone. Coordination normal.   Skin: Skin is warm and dry. No rash noted. No erythema. No pallor.   Psychiatric: She has a normal mood and affect. Her behavior is normal. Judgment and thought content normal.   Vitals reviewed.      PFTs:  Full pulmonary function testing was done today.  Please see scanned PFT report for details.  FVC was 2.45 L or 80% predicted.  FEV1 was " 1.50 L or 64% predicted.  FEV1 to FVC ratio 61%.  Postbronchodilator FEV1 1.57 L or 67% predicted, a 4% increase from prebronchodilator.  Total lung capacity was 4.23 L or 94% predicted.    Interpretation: Moderate obstruction without significant response to bronchodilator.  Low maximal voluntary ventilation.  No restriction.  No air trapping or hyperinflation.  Low DLCO which corrects when adjusted for alveolar ventilated volumes.  DLCO is likely underestimated on this study.  No prior studies available for comparison.  Study is consistent with stage II COPD.    Imaging:  Low-dose CT scan of the chest from March 7, 2019 was reviewed.  It shows multiple subcentimeter noncalcified lung nodules within the right upper and right lower lobe measuring up to 9 mm with a sub-solid groundglass appearance.  This is felt to be lung RADS category 4A by radiology with recommended follow-up in 3 months.    On review of the images, I am somewhat concerned about the possibility of a nodule or mass obscured by blood vessels in the area of the takeoff of the right middle lobe.  I think this would be better evaluated with a contrast exam.      Assessment/Plan   Problems Addressed this Visit        Respiratory    COPD (chronic obstructive pulmonary disease) (CMS/HCC)    Multiple lung nodules on CT - Primary    Relevant Medications    albuterol sulfate HFA (PROVENTIL HFA;VENTOLIN HFA;PROAIR HFA) inhaler 4 puff (Completed)       Immune and Lymphatic    Hilar adenopathy    Relevant Orders    CT Chest With Contrast       Other    Personal history of tobacco use, presenting hazards to health          Discussion:  58-year-old female here for right sided subcentimeter lung nodules on screening CT scan.  She is a smoker with stage II COPD based on pulmonary function testing done today.    Review of her recent CT scan shows multiple subcentimeter nodules within the right lung.  I also am a little concerned about an area at the secondary onel  leading to the right middle lobe which seems to be more full than it ought to be.  This may just represent dilation of the pulmonary vasculature in this area although a lung nodule in this area would appear similar.  Therefore I feel a contrast evaluation is indicated.  I considered waiting 3 months to obtain the contrast evaluation however the patient is extremely anxious about the situation, especially given her family history of lung cancer and her ongoing smoking and would rather get the evaluation done sooner rather than later.    Plan:  1.  CT chest with contrast now to evaluate possibility of right hilar adenopathy in the setting of multiple right-sided lung nodules and ongoing tobacco abuse.  2.  Pulmonary function testing (done).  3.  Continue Stiolto for maintenance of COPD.  4.  Continue albuterol rescue inhaler as needed.  5.  Smoking cessation counseling.  6.  If CT scan shows no evidence of hilar adenopathy or nodule, will plan to get a repeat CT scan in 3 months per lung RADS guidelines for follow-up of multiple lung nodules.  7.  If CT scan with contrast does show evidence of lymphadenopathy or nodule, patient will require additional diagnostic evaluation.  8.  Follow-up in 3 months or earlier if indicated.         I personally spent over half of a total 45 minutes face to face with the patient in counseling and discussion and/or coordination of care as described above.   Electronically signed by:  Toni Mcclelland MD  03/12/19  2:44 PM

## 2019-03-14 NOTE — PROGRESS NOTES
Met patient in lung nodule clinic with Dr. Mcclelland. Patient is lifetime smoker with significant family history for lung cancer and other cancers. She has remarkable anxiety r/t nodule. She denies weight loss, hemoptysis or other new respiratory concerns. Scans and PFT's reviewed. Per Dr. Mcclelland CT chest with contrast now to better examine an area of right hilar fullness. If this is unremarkable then repeat CT chest x3mo as recommended in LDCT report. Introduced lung navigator role and provided contact information. She v/u and agreeable to plan. She knows to call with questions or concerns.

## 2019-03-22 ENCOUNTER — DOCUMENTATION (OUTPATIENT)
Dept: PULMONOLOGY | Facility: CLINIC | Age: 59
End: 2019-03-22

## 2019-03-22 NOTE — PROGRESS NOTES
CT Chest with Contrast denied. Requested Peer to Peer and decision was reversed. Authorization # [id number], good from 3/18/19-4/16/19.

## 2019-03-25 ENCOUNTER — HOSPITAL ENCOUNTER (OUTPATIENT)
Dept: CT IMAGING | Facility: HOSPITAL | Age: 59
Discharge: HOME | End: 2019-03-25
Admitting: INTERNAL MEDICINE

## 2019-03-25 DIAGNOSIS — R59.0 HILAR ADENOPATHY: ICD-10-CM

## 2019-03-25 DIAGNOSIS — R91.1 LUNG NODULE: ICD-10-CM

## 2019-03-25 LAB — CREAT BLDA-MCNC: 0.9 MG/DL (ref 0.6–1.3)

## 2019-03-25 PROCEDURE — 71260 CT THORAX DX C+: CPT

## 2019-03-25 PROCEDURE — 82565 ASSAY OF CREATININE: CPT

## 2019-03-25 PROCEDURE — 25010000002 IOPAMIDOL 61 % SOLUTION: Performed by: INTERNAL MEDICINE

## 2019-03-25 RX ADMIN — IOPAMIDOL 60 ML: 612 INJECTION, SOLUTION INTRAVENOUS at 13:42

## 2019-03-27 ENCOUNTER — TELEPHONE (OUTPATIENT)
Dept: PULMONOLOGY | Facility: CLINIC | Age: 59
End: 2019-03-27

## 2019-03-27 DIAGNOSIS — R91.8 MULTIPLE LUNG NODULES ON CT: Primary | ICD-10-CM

## 2019-03-27 NOTE — TELEPHONE ENCOUNTER
I had a long conversation with the patient reviewing her CT scan of the chest results with her.  She has stable semisolid nodules.  I do not see any evidence of progression of these nodules on the repeat CT scan done with contrast.  I also do not see any definitive evidence of lymphadenopathy based on the repeat scan although, in my mind, an area adjacent to the right middle lobe bronchus will still need to be watched closely for any development of adenopathy.    The patient is extremely anxious about her lung nodules and concerned that she may have metastatic cancer.  I assured her that there was no progression on the current CT scan, and the recommendation for follow-up was either a PET/CT scan or a 3-month follow-up CT scan.  The patient prefers to obtain a PET/CT scan now for further evaluation.  I do not see any nodule that would be amenable to either a percutaneous or transbronchial biopsy.    My plan at this point will be to obtain a PET/CT scan.  If this is unable to be performed, I will obtain a repeat CT scan of the chest in 3 months (again, with contrast).    I again recommended that the patient quit smoking altogether.  She states that she has cut back significantly.

## 2019-04-02 ENCOUNTER — HOSPITAL ENCOUNTER (OUTPATIENT)
Dept: PET IMAGING | Facility: HOSPITAL | Age: 59
Discharge: HOME | End: 2019-04-02

## 2019-04-02 ENCOUNTER — HOSPITAL ENCOUNTER (OUTPATIENT)
Dept: PET IMAGING | Facility: HOSPITAL | Age: 59
Discharge: HOME | End: 2019-04-02
Admitting: INTERNAL MEDICINE

## 2019-04-02 DIAGNOSIS — R91.8 MULTIPLE LUNG NODULES ON CT: ICD-10-CM

## 2019-04-02 LAB — GLUCOSE BLDC GLUCOMTR-MCNC: 98 MG/DL (ref 70–130)

## 2019-04-02 PROCEDURE — 82962 GLUCOSE BLOOD TEST: CPT

## 2019-04-02 PROCEDURE — 0 FLUDEOXYGLUCOSE F18 SOLUTION: Performed by: INTERNAL MEDICINE

## 2019-04-02 PROCEDURE — 78816 PET IMAGE W/CT FULL BODY: CPT

## 2019-04-02 PROCEDURE — A9552 F18 FDG: HCPCS | Performed by: INTERNAL MEDICINE

## 2019-04-02 RX ADMIN — FLUDEOXYGLUCOSE F18 1 DOSE: 300 INJECTION INTRAVENOUS at 14:36

## 2019-04-03 ENCOUNTER — TELEPHONE (OUTPATIENT)
Dept: PULMONOLOGY | Facility: CLINIC | Age: 59
End: 2019-04-03

## 2019-04-03 ENCOUNTER — TELEPHONE (OUTPATIENT)
Dept: INTERNAL MEDICINE | Facility: CLINIC | Age: 59
End: 2019-04-03

## 2019-04-04 NOTE — TELEPHONE ENCOUNTER
You can let her know her PET had no evidence of cancer cells, but she needs to follow up with [facility] next week and will need a follow up CT in 6 month.

## 2019-04-08 DIAGNOSIS — E03.9 HYPOTHYROIDISM, UNSPECIFIED TYPE: ICD-10-CM

## 2019-04-08 DIAGNOSIS — F41.1 GAD (GENERALIZED ANXIETY DISORDER): ICD-10-CM

## 2019-04-08 RX ORDER — LEVOTHYROXINE SODIUM 0.07 MG/1
TABLET ORAL
Qty: 90 TABLET | Refills: 0 | Status: SHIPPED | OUTPATIENT
Start: 2019-04-08 | End: 2019-04-10 | Stop reason: SDUPTHER

## 2019-04-08 RX ORDER — ESCITALOPRAM OXALATE 10 MG/1
10 TABLET ORAL DAILY
Qty: 90 TABLET | Refills: 0 | Status: SHIPPED | OUTPATIENT
Start: 2019-04-08 | End: 2019-04-09 | Stop reason: SDUPTHER

## 2019-04-09 ENCOUNTER — OFFICE VISIT (OUTPATIENT)
Dept: INTERNAL MEDICINE | Facility: CLINIC | Age: 59
End: 2019-04-09

## 2019-04-09 VITALS
SYSTOLIC BLOOD PRESSURE: 116 MMHG | DIASTOLIC BLOOD PRESSURE: 80 MMHG | BODY MASS INDEX: 26.13 KG/M2 | WEIGHT: 142 LBS | HEIGHT: 62 IN | TEMPERATURE: 99 F | RESPIRATION RATE: 16 BRPM | OXYGEN SATURATION: 98 % | HEART RATE: 68 BPM

## 2019-04-09 DIAGNOSIS — Z23 NEED FOR PNEUMOCOCCAL VACCINATION: ICD-10-CM

## 2019-04-09 DIAGNOSIS — Z00.00 ANNUAL PHYSICAL EXAM: Primary | ICD-10-CM

## 2019-04-09 DIAGNOSIS — F41.1 GAD (GENERALIZED ANXIETY DISORDER): ICD-10-CM

## 2019-04-09 DIAGNOSIS — Z72.0 TOBACCO ABUSE: ICD-10-CM

## 2019-04-09 PROBLEM — E78.5 HLD (HYPERLIPIDEMIA): Status: ACTIVE | Noted: 2019-04-09

## 2019-04-09 LAB
CHOLEST SERPL-MCNC: 229 MG/DL (ref 0–200)
HDLC SERPL-MCNC: 77 MG/DL (ref 40–60)
LDLC SERPL CALC-MCNC: 128 MG/DL (ref 0–100)
LDLC/HDLC SERPL: 1.66 {RATIO}
TRIGL SERPL-MCNC: 119 MG/DL (ref 0–150)
TSH SERPL DL<=0.05 MIU/L-ACNC: 1.15 MIU/ML (ref 0.27–4.2)
VLDLC SERPL-MCNC: 23.8 MG/DL (ref 5–40)

## 2019-04-09 PROCEDURE — 99396 PREV VISIT EST AGE 40-64: CPT | Performed by: NURSE PRACTITIONER

## 2019-04-09 PROCEDURE — 90471 IMMUNIZATION ADMIN: CPT | Performed by: NURSE PRACTITIONER

## 2019-04-09 PROCEDURE — 84443 ASSAY THYROID STIM HORMONE: CPT | Performed by: NURSE PRACTITIONER

## 2019-04-09 PROCEDURE — 80061 LIPID PANEL: CPT | Performed by: NURSE PRACTITIONER

## 2019-04-09 PROCEDURE — 90732 PPSV23 VACC 2 YRS+ SUBQ/IM: CPT | Performed by: NURSE PRACTITIONER

## 2019-04-09 RX ORDER — NICOTINE 21 MG/24HR
1 PATCH, TRANSDERMAL 24 HOURS TRANSDERMAL EVERY 24 HOURS
Qty: 28 EACH | Refills: 2 | Status: SHIPPED | OUTPATIENT
Start: 2019-04-09 | End: 2019-07-08

## 2019-04-09 RX ORDER — NICOTINE 21 MG/24HR
1 PATCH, TRANSDERMAL 24 HOURS TRANSDERMAL EVERY 24 HOURS
Qty: 21 EACH | Refills: 0 | Status: SHIPPED | OUTPATIENT
Start: 2019-04-09 | End: 2019-04-09 | Stop reason: SDUPTHER

## 2019-04-09 RX ORDER — POLYETHYLENE GLYCOL 3350 17 G
2 POWDER IN PACKET (EA) ORAL AS NEEDED
Qty: 27 EACH | Refills: 4 | Status: SHIPPED | OUTPATIENT
Start: 2019-04-09 | End: 2019-07-08

## 2019-04-09 RX ORDER — NICOTINE 21 MG/24HR
PATCH, TRANSDERMAL 24 HOURS TRANSDERMAL
COMMUNITY
End: 2019-04-09 | Stop reason: SDUPTHER

## 2019-04-09 RX ORDER — NICOTINE 21 MG/24HR
1 PATCH, TRANSDERMAL 24 HOURS TRANSDERMAL EVERY 24 HOURS
Qty: 21 EACH | Refills: 1 | Status: SHIPPED | OUTPATIENT
Start: 2019-04-09 | End: 2019-07-08

## 2019-04-09 RX ORDER — POLYETHYLENE GLYCOL 3350 17 G
POWDER IN PACKET (EA) ORAL
COMMUNITY
End: 2019-04-09 | Stop reason: SDUPTHER

## 2019-04-09 RX ORDER — ESCITALOPRAM OXALATE 10 MG/1
10 TABLET ORAL DAILY
Qty: 90 TABLET | Refills: 1 | Status: SHIPPED | OUTPATIENT
Start: 2019-04-09 | End: 2019-07-08 | Stop reason: SDUPTHER

## 2019-04-09 RX ORDER — POLYETHYLENE GLYCOL 3350 17 G
2 POWDER IN PACKET (EA) ORAL AS NEEDED
Qty: 27 EACH | Refills: 4 | Status: SHIPPED | OUTPATIENT
Start: 2019-04-09 | End: 2019-04-09 | Stop reason: SDUPTHER

## 2019-04-09 RX ORDER — NICOTINE 21 MG/24HR
1 PATCH, TRANSDERMAL 24 HOURS TRANSDERMAL EVERY 24 HOURS
Qty: 28 EACH | Refills: 1 | Status: SHIPPED | OUTPATIENT
Start: 2019-04-09 | End: 2019-04-09 | Stop reason: SDUPTHER

## 2019-04-09 NOTE — PROGRESS NOTES
Subjective   Sydnie Gamble is a 58 y.o. female here today for physical .    Chief Complaint   Patient presents with   • Annual Exam     Her overall health is: improving  She exercises by: less exercise than normal  Last colon screening was: cologuard at home  Immunizations are: needs pna shot today.    LMP: 12 years ago  PAP:- refused today- will reschedule   Mammogram: UTD  She is sexually active. No concern for STDs  Calcium/ Vit D:  She does see his dentist  Her diet is described as: having a lot of sugar cravings related to tobacco abuse  She describes her alcohol intake as none  She does not use tobacco- quit 11 days ago- needs refills of patches  Her cardiovascular risk is: elevated- tobacco abuse, hypothyroidism, prediabetes  She does wear a seatbelt regularly.  She does wear sunscreen regularly when outdoors.  Depression screening positive- has been out of her lexapro    Review of Systems   Constitutional: Negative for chills, fever, unexpected weight gain and unexpected weight loss.   HENT: Negative.    Eyes: Negative.    Respiratory: Positive for shortness of breath and wheezing. Negative for cough.         Improved since quitting smoking   Cardiovascular: Negative for chest pain and palpitations.   Gastrointestinal: Negative for blood in stool, diarrhea, nausea, vomiting and GERD.   Endocrine: Negative for polydipsia, polyphagia and polyuria.   Genitourinary: Negative.    Musculoskeletal: Negative for arthralgias and myalgias.   Skin: Negative for rash and skin lesions.   Allergic/Immunologic: Negative.    Neurological: Negative for dizziness, seizures, weakness, headache, memory problem and confusion.   Psychiatric/Behavioral: Negative for self-injury, sleep disturbance, suicidal ideas and depressed mood. The patient is nervous/anxious.        Past Medical History:   Diagnosis Date   • Acid reflux    • Acid reflux    • Anxiety 1976   • COPD (chronic obstructive pulmonary disease) (CMS/[facility]) breathing  problems   • Hypothyroidism    • Ovarian cyst    • Ovarian cyst    • Visual impairment corrected with glasses     Family History   Problem Relation Age of Onset   • Cancer Mother    • Hypertension Mother    • Mental illness Mother    • Anxiety disorder Mother    • Asthma Mother    • COPD Mother    • Depression Mother    • Cancer Father    • Heart attack Maternal Grandfather    • Breast cancer Neg Hx    • Ovarian cancer Neg Hx      History reviewed. No pertinent surgical history.  Social History     Socioeconomic History   • Marital status:      Spouse name: Not on file   • Number of children: Not on file   • Years of education: Not on file   • Highest education level: Not on file   Tobacco Use   • Smoking status: Current Every Day Smoker     Packs/day: 1.50     Years: 45.00     Pack years: 67.50   • Smokeless tobacco: Never Used   Substance and Sexual Activity   • Alcohol use: No   • Drug use: No   • Sexual activity: No         Current Outpatient Medications:   •  albuterol 108 (90 Base) MCG/ACT inhaler, Inhale 2 puffs Every 4 (Four) Hours As Needed for Wheezing., Disp: 1 inhaler, Rfl: 4  •  clonazePAM (KlonoPIN) 1 MG tablet, Take 1 mg by mouth At Night As Needed., Disp: , Rfl:   •  escitalopram (LEXAPRO) 10 MG tablet, Take 1 tablet by mouth Daily., Disp: 90 tablet, Rfl: 1  •  levothyroxine (SYNTHROID, LEVOTHROID) 75 MCG tablet, TAKE 1 TABLET DAILY, Disp: 90 tablet, Rfl: 0  •  nicotine ([employer] NICOTINE TRANSDERMAL SYS) 14 MG/24HR patch, Place 1 patch on the skin as directed by provider Daily., Disp: 21 each, Rfl: 1  •  nicotine polacrilex ([employer] NICOTINE) 2 MG lozenge, Dissolve 1 lozenge in the mouth As Needed for Smoking Cessation., Disp: 27 each, Rfl: 4  •  pantoprazole (PROTONIX) 40 MG EC tablet, Take 1 tablet by mouth Daily., Disp: 30 tablet, Rfl: 2  •  tiotropium bromide-olodaterol (STIOLTO RESPIMAT) 2.5-2.5 MCG/ACT aerosol solution inhaler, Inhale 1 puff Daily., Disp: 1 inhaler, Rfl: 5  •  vitamin D  "(ERGOCALCIFEROL) 24933 units capsule capsule, Take 1 capsule by mouth 1 (One) Time Per Week., Disp: 12 capsule, Rfl: 0  •  zolpidem (AMBIEN) 10 MG tablet, TAKE 1 TABLET BY MOUTH EVERYDAY AT BEDTIME, Disp: , Rfl: 2  •  nicotine (NICODERM CQ) 21 MG/24HR patch, Place 1 patch on the skin as directed by provider Daily., Disp: 28 each, Rfl: 2    Objective   Vitals:    04/09/19 1126   BP: 116/80   Pulse: 68   Resp: 16   Temp: 99 °F (37.2 °C)   TempSrc: Temporal   SpO2: 98%   Weight: 64.4 kg (142 lb)   Height: 157.5 cm (62\")   PainSc: 0-No pain     Body mass index is 25.97 kg/m².  Physical Exam   Constitutional: She is oriented to person, place, and time. She appears well-developed and well-nourished. She is cooperative. No distress.   HENT:   Head: Normocephalic.   Right Ear: Tympanic membrane and external ear normal.   Left Ear: Tympanic membrane and external ear normal.   Nose: Nose normal. Right sinus exhibits no maxillary sinus tenderness and no frontal sinus tenderness. Left sinus exhibits no maxillary sinus tenderness and no frontal sinus tenderness.   Mouth/Throat: Oropharynx is clear and moist and mucous membranes are normal. No oropharyngeal exudate.   Eyes: Conjunctivae and EOM are normal. Pupils are equal, round, and reactive to light. No scleral icterus.   Neck: Normal range of motion. Neck supple. Carotid bruit is not present. No neck rigidity. No tracheal deviation present. No thyroid mass and no thyromegaly present.   Cardiovascular: Normal rate, regular rhythm, normal heart sounds and intact distal pulses. Exam reveals no gallop and no friction rub.   No murmur heard.  Pulmonary/Chest: Effort normal and breath sounds normal. No respiratory distress. She has no wheezes. She has no rales.   Abdominal: Soft. Normal appearance and bowel sounds are normal. She exhibits no distension and no mass. There is no hepatosplenomegaly. There is no tenderness. There is no rebound and no guarding. No hernia. "   Musculoskeletal: Normal range of motion. She exhibits no edema, tenderness or deformity.   ROM normal with all major joints   Lymphadenopathy:        Head (right side): No submental, no submandibular, no tonsillar, no preauricular, no posterior auricular and no occipital adenopathy present.        Head (left side): No submental, no submandibular, no tonsillar, no preauricular, no posterior auricular and no occipital adenopathy present.     She has no cervical adenopathy.        Right cervical: No superficial cervical, no deep cervical and no posterior cervical adenopathy present.       Left cervical: No superficial cervical, no deep cervical and no posterior cervical adenopathy present.   Neurological: She is alert and oriented to person, place, and time. She displays no atrophy and no tremor. No cranial nerve deficit or sensory deficit. She exhibits normal muscle tone. Coordination and gait normal. GCS eye subscore is 4. GCS verbal subscore is 5. GCS motor subscore is 6.   Skin: Skin is warm and dry. Capillary refill takes 2 to 3 seconds. No rash noted. She is not diaphoretic. No cyanosis. Nails show no clubbing.   Psychiatric: She has a normal mood and affect. Her speech is normal and behavior is normal. Judgment and thought content normal. Cognition and memory are normal.   Nursing note and vitals reviewed.      Assessment/Plan   Problem List Items Addressed This Visit        Other    ALESIA (generalized anxiety disorder)    Relevant Medications    zolpidem (AMBIEN) 10 MG tablet    escitalopram (LEXAPRO) 10 MG tablet    nicotine polacrilex ([employer] NICOTINE) 2 MG lozenge    nicotine (NICODERM CQ) 21 MG/24HR patch    nicotine ([employer] NICOTINE TRANSDERMAL SYS) 14 MG/24HR patch      Other Visit Diagnoses     Annual physical exam    -  Primary    Relevant Orders    Lipid Panel    TSH    Tobacco abuse        Relevant Medications    nicotine polacrilex ([employer]S NICOTINE) 2 MG lozenge    nicotine (NICODERM CQ) 21 MG/24HR patch     nicotine ([employer] NICOTINE TRANSDERMAL SYS) 14 MG/24HR patch    Need for pneumococcal vaccination        Relevant Orders    Pneumococcal Polysaccharide Vaccine 23-Valent (PPSV23) Greater Than or Equal To 1yo Subcutaneous / IM        Sydnie was seen today for annual exam.    Diagnoses and all orders for this visit:    Annual physical exam  -     Lipid Panel  -     TSH  Counseled regarding: age-appropriate screening labs and tests, wearing seatbelt and sunscreen regularly  Discussed: regular exercise and diet changes to promote weight loss, smoking cessation and vit d.    ALESIA (generalized anxiety disorder)  -     escitalopram (LEXAPRO) 10 MG tablet; Take 1 tablet by mouth Daily.    Tobacco abuse  -     Discontinue: nicotine ([employer] NICOTINE TRANSDERMAL SYS) 14 MG/24HR patch; Place 1 patch on the skin as directed by provider Daily.  -     Discontinue: nicotine polacrilex ([employer]S NICOTINE) 2 MG lozenge; Dissolve 1 lozenge in the mouth As Needed for Smoking Cessation.  -     Discontinue: nicotine (NICODERM CQ) 21 MG/24HR patch; Place 1 patch on the skin as directed by provider Daily.  -     nicotine polacrilex ([employer]S NICOTINE) 2 MG lozenge; Dissolve 1 lozenge in the mouth As Needed for Smoking Cessation.  -     nicotine (NICODERM CQ) 21 MG/24HR patch; Place 1 patch on the skin as directed by provider Daily.  -     nicotine ([employer]S NICOTINE TRANSDERMAL SYS) 14 MG/24HR patch; Place 1 patch on the skin as directed by provider Daily.  I advised the patient of the risks in continuing to use tobacco, and I provided this patient with smoking cessation educational materials.  I also discussed how to quit smoking and the patient has expressed the willingness to quit.      During this visit, I spent 4 mintues counseling the patient regarding smoking cessation.         Need for pneumococcal vaccination  -     Pneumococcal Polysaccharide Vaccine 23-Valent (PPSV23) Greater Than or Equal To 1yo Subcutaneous / IM             The patient was counseled  regarding diagnostic results, impressions, prognosis, instructions for management, risk factor reductions, education, and importance of treatment compliance.  The patient verbalized understanding of and agreement with the plan of care.    Advised patient to call with any further questions and any new or worsening symptoms.     Return in about 3 months (around 7/9/2019).      Maryellen Mendosa, APRN

## 2019-04-10 ENCOUNTER — OFFICE VISIT (OUTPATIENT)
Dept: PULMONOLOGY | Facility: CLINIC | Age: 59
End: 2019-04-10

## 2019-04-10 ENCOUNTER — TELEPHONE (OUTPATIENT)
Dept: INTERNAL MEDICINE | Facility: CLINIC | Age: 59
End: 2019-04-10

## 2019-04-10 VITALS
BODY MASS INDEX: 26.31 KG/M2 | HEIGHT: 62 IN | SYSTOLIC BLOOD PRESSURE: 120 MMHG | DIASTOLIC BLOOD PRESSURE: 62 MMHG | TEMPERATURE: 99.9 F | RESPIRATION RATE: 18 BRPM | HEART RATE: 76 BPM | WEIGHT: 143 LBS | OXYGEN SATURATION: 97 %

## 2019-04-10 DIAGNOSIS — E03.9 HYPOTHYROIDISM, UNSPECIFIED TYPE: ICD-10-CM

## 2019-04-10 DIAGNOSIS — R59.0 HILAR ADENOPATHY: ICD-10-CM

## 2019-04-10 DIAGNOSIS — R91.8 MULTIPLE LUNG NODULES ON CT: Primary | ICD-10-CM

## 2019-04-10 DIAGNOSIS — Z87.891 PERSONAL HISTORY OF TOBACCO USE, PRESENTING HAZARDS TO HEALTH: ICD-10-CM

## 2019-04-10 DIAGNOSIS — J42 CHRONIC BRONCHITIS, UNSPECIFIED CHRONIC BRONCHITIS TYPE (HCC): ICD-10-CM

## 2019-04-10 DIAGNOSIS — K21.9 GASTROESOPHAGEAL REFLUX DISEASE WITHOUT ESOPHAGITIS: ICD-10-CM

## 2019-04-10 PROCEDURE — 99213 OFFICE O/P EST LOW 20 MIN: CPT | Performed by: NURSE PRACTITIONER

## 2019-04-10 RX ORDER — LEVOTHYROXINE SODIUM 0.07 MG/1
75 TABLET ORAL DAILY
Qty: 90 TABLET | Refills: 1 | Status: SHIPPED | OUTPATIENT
Start: 2019-04-10 | End: 2019-07-09 | Stop reason: SDUPTHER

## 2019-04-10 NOTE — TELEPHONE ENCOUNTER
Advised of normal thyroid function- will repeat in 3 months.  HLD improved with dietary changes- continue current treatment plan

## 2019-04-10 NOTE — PROGRESS NOTES
[facility]monary Follow up    CHIEF COMPLAINT    PET follow-up    HISTORY OF PRESENT ILLNESS    Sydnie Gamble is a 58 y.o.female here today for follow-up after having a PET scan performed on 4/2 for multiple pulmonary nodules.  She was last seen in our office in March by Dr. Mcclelland.  She was referred to our office for multiple lung nodules that was found on a CT screening.  At that time it was felt that we could postpone her CT scan for 3 months however the patient is extremely anxious and wanted to have the CT scan performed sooner rather than later.  Dr. Mcclelland had ordered a CT scan with contrast to rule out any hilar adenopathy as well.  CT scan with contrast recommended either a 3-month follow-up CT scan or a PET scan based on her personal family history of cancer and smoking history the patient wanted to proceed with a PET scan.  She is here today for the results.    She remains on Stiolto 2 puffs daily for her COPD.  Since using this inhaler she has not used her rescue inhaler at all.  She denies any difficulties with this inhaler.  She states that her shortness of breath has improved since she quit smoking.  She has not been smoking for 11 days now.  She is using the nicotine patches and lozenges currently to aid in smoking cessation.    She denies fever, chills, sputum production, hemoptysis, night sweats, weight loss, chest pain or palpitations.  She denies any lower extremity edema.  She denies any difficulty with allergy symptoms.  She takes Protonix daily and denies reflux symptoms currently.    She is up-to-date on her current vaccinations.  Patient Active Problem List   Diagnosis   • Personal history of tobacco use, presenting hazards to health   • Gastroesophageal reflux disease without esophagitis   • Acquired hypothyroidism   • Vitamin D deficiency   • Prediabetes   • COPD (chronic obstructive pulmonary disease) (CMS/[facility])   • ALESIA (generalized anxiety disorder)   • Multiple lung nodules on CT   •  Cigarette nicotine dependence with withdrawal   • Hilar adenopathy   • HLD (hyperlipidemia)       No Known Allergies    Current Outpatient Medications:   •  albuterol 108 (90 Base) MCG/ACT inhaler, Inhale 2 puffs Every 4 (Four) Hours As Needed for Wheezing., Disp: 1 inhaler, Rfl: 4  •  clonazePAM (KlonoPIN) 1 MG tablet, Take 1 mg by mouth At Night As Needed., Disp: , Rfl:   •  escitalopram (LEXAPRO) 10 MG tablet, Take 1 tablet by mouth Daily., Disp: 90 tablet, Rfl: 1  •  levothyroxine (SYNTHROID, LEVOTHROID) 75 MCG tablet, Take 1 tablet by mouth Daily., Disp: 90 tablet, Rfl: 1  •  nicotine ([employer]S NICOTINE TRANSDERMAL SYS) 14 MG/24HR patch, Place 1 patch on the skin as directed by provider Daily., Disp: 21 each, Rfl: 1  •  nicotine (NICODERM CQ) 21 MG/24HR patch, Place 1 patch on the skin as directed by provider Daily., Disp: 28 each, Rfl: 2  •  nicotine polacrilex ([employer]S NICOTINE) 2 MG lozenge, Dissolve 1 lozenge in the mouth As Needed for Smoking Cessation., Disp: 27 each, Rfl: 4  •  pantoprazole (PROTONIX) 40 MG EC tablet, Take 1 tablet by mouth Daily., Disp: 30 tablet, Rfl: 2  •  tiotropium bromide-olodaterol (STIOLTO RESPIMAT) 2.5-2.5 MCG/ACT aerosol solution inhaler, Inhale 1 puff Daily., Disp: 1 inhaler, Rfl: 5  •  vitamin D (ERGOCALCIFEROL) 17488 units capsule capsule, Take 1 capsule by mouth 1 (One) Time Per Week., Disp: 12 capsule, Rfl: 0  •  zolpidem (AMBIEN) 10 MG tablet, TAKE 1 TABLET BY MOUTH EVERYDAY AT BEDTIME, Disp: , Rfl: 2  MEDICATION LIST AND ALLERGIES REVIEWED.    Social History     Tobacco Use   • Smoking status: Former Smoker     Packs/day: 1.50     Years: 45.00     Pack years: 67.50     Last attempt to quit: 3/30/2019     Years since quittin.0   • Smokeless tobacco: Never Used   Substance Use Topics   • Alcohol use: No   • Drug use: No       FAMILY AND SOCIAL HISTORY REVIEWED.    Review of Systems   Constitutional: Negative for activity change, appetite change, fatigue, fever and unexpected  "weight change.   HENT: Negative for congestion, postnasal drip, rhinorrhea, sinus pressure, sore throat and voice change.    Eyes: Negative for visual disturbance.   Respiratory: Negative for cough, chest tightness, shortness of breath and wheezing.    Cardiovascular: Negative for chest pain, palpitations and leg swelling.   Gastrointestinal: Negative for abdominal distention, abdominal pain, nausea and vomiting.   Endocrine: Negative for cold intolerance and heat intolerance.   Genitourinary: Negative for difficulty urinating and urgency.   Musculoskeletal: Negative for arthralgias, back pain and neck pain.   Skin: Negative for color change and pallor.   Allergic/Immunologic: Negative for environmental allergies and food allergies.   Neurological: Negative for dizziness, syncope, weakness and light-headedness.   Hematological: Negative for adenopathy. Does not bruise/bleed easily.   Psychiatric/Behavioral: Negative for agitation and behavioral problems.   .    /62 (BP Location: Left arm, Patient Position: Sitting, Cuff Size: Adult)   Pulse 76   Temp 99.9 °F (37.7 °C)   Resp 18   Ht 157.5 cm (62\")   Wt 64.9 kg (143 lb)   SpO2 97% Comment: room air at rest  BMI 26.16 kg/m²     Immunization History   Administered Date(s) Administered   • FLUARIX/FLUZONE/AFLURIA/FLULAVAL QUAD 10/25/2018   • Pneumococcal Polysaccharide (PPSV23) 04/09/2019       Physical Exam   Constitutional: She is oriented to person, place, and time. She appears well-developed and well-nourished.   HENT:   Head: Normocephalic and atraumatic.   Eyes: Pupils are equal, round, and reactive to light.   Neck: Normal range of motion. Neck supple. No thyromegaly present.   Cardiovascular: Normal rate, regular rhythm, normal heart sounds and intact distal pulses. Exam reveals no gallop and no friction rub.   No murmur heard.  Pulmonary/Chest: Effort normal and breath sounds normal. No respiratory distress. She has no wheezes. She has no rales. " She exhibits no tenderness.   Abdominal: Soft. Bowel sounds are normal. There is no tenderness.   Musculoskeletal: Normal range of motion.   Lymphadenopathy:     She has no cervical adenopathy.   Neurological: She is alert and oriented to person, place, and time.   Skin: Skin is warm and dry. Capillary refill takes less than 2 seconds. She is not diaphoretic.   Psychiatric: She has a normal mood and affect. Her behavior is normal.   Nursing note and vitals reviewed.        RESULTS    Nm Pet Whole Body    Result Date: 4/3/2019  3 subcentimeter nodules in the right lung have been detected on a CT scan dated 03/25/2019. None of these exhibit increased metabolic activity. Nevertheless, due to the small size of the nodules the possibility of a false-negative examination exists. Therefore a six-month follow-up CT scan of the chest is recommended. Lastly there is slightly increased metabolic activity in the right hilum but this is thought to be related to isotope in the vascular structures.  D:  04/02/2019 E:  04/02/2019    This report was finalized on 4/3/2019 8:36 AM by Dr. Mitchell Pereyra MD.      PROBLEM LIST    Problem List Items Addressed This Visit        Respiratory    COPD (chronic obstructive pulmonary disease) (CMS/HCC)    Multiple lung nodules on CT - Primary    Overview     Right sided sub-centimeter lung nodules.          Relevant Orders    CT Chest Without Contrast       Digestive    Gastroesophageal reflux disease without esophagitis       Immune and Lymphatic    Hilar adenopathy       Other    Personal history of tobacco use, presenting hazards to health            DISCUSSION    Ms. Gamble was here for follow-up after her PET scan earlier this month.  We reviewed the details of the ex exam today in the office.  All of the nodules showed any metabolic activity.  However they did recommend his follow-up CT scan in 6 months.  I will order this CT scan to be performed in October 2019.  We will call her closer to  this date to get this scheduled.  She is very anxious about this being cancerous.  I did try to calm her anxiety today in the office.  Did advise her that if she had any new symptoms that we would scan her sooner than the 6-month miri if she develops them.  She had no new symptoms today that suggested that she had any new developments with these nodules.  I did advise her that these nodules could have been there for a long time.    She will continue Protonix daily for her GERD.    I stressed the importance of smoking cessation in the office today.  She is hopeful that she will not resume smoking.  She will continue the nicotine patches and lozenges as needed.    She will follow-up in October after her CT scan with Dr. Mcclelland or sooner if her symptoms worsen.  I spent 15 minutes with the patient. I spent > 50% percent of this time counseling and discussing diagnosis, prognosis, diagnostic testing, evaluation, current status, treatment options and management.    DAYSI Torres  04/10/89475:46 PM  Electronically signed     Please note that portions of this note were completed with a voice recognition program. Efforts were made to edit the dictations, but occasionally words are mistranscribed.      CC: Maryellen Mendosa APRN

## 2019-04-12 DIAGNOSIS — E55.9 VITAMIN D DEFICIENCY: ICD-10-CM

## 2019-04-15 RX ORDER — ERGOCALCIFEROL 1.25 MG/1
50000 CAPSULE ORAL WEEKLY
Qty: 12 CAPSULE | Refills: 0 | Status: SHIPPED | OUTPATIENT
Start: 2019-04-15 | End: 2020-08-16

## 2019-05-30 DIAGNOSIS — K21.9 GASTROESOPHAGEAL REFLUX DISEASE WITHOUT ESOPHAGITIS: ICD-10-CM

## 2019-05-30 RX ORDER — PANTOPRAZOLE SODIUM 40 MG/1
TABLET, DELAYED RELEASE ORAL
Qty: 30 TABLET | Refills: 5 | Status: SHIPPED | OUTPATIENT
Start: 2019-05-30 | End: 2019-07-08 | Stop reason: SDUPTHER

## 2019-07-08 ENCOUNTER — OFFICE VISIT (OUTPATIENT)
Dept: INTERNAL MEDICINE | Facility: CLINIC | Age: 59
End: 2019-07-08

## 2019-07-08 VITALS
WEIGHT: 143 LBS | RESPIRATION RATE: 18 BRPM | TEMPERATURE: 98.4 F | DIASTOLIC BLOOD PRESSURE: 72 MMHG | HEART RATE: 77 BPM | SYSTOLIC BLOOD PRESSURE: 124 MMHG | BODY MASS INDEX: 26.16 KG/M2 | OXYGEN SATURATION: 97 %

## 2019-07-08 DIAGNOSIS — K21.9 GASTROESOPHAGEAL REFLUX DISEASE WITHOUT ESOPHAGITIS: ICD-10-CM

## 2019-07-08 DIAGNOSIS — E03.9 ACQUIRED HYPOTHYROIDISM: ICD-10-CM

## 2019-07-08 DIAGNOSIS — J42 CHRONIC BRONCHITIS, UNSPECIFIED CHRONIC BRONCHITIS TYPE (HCC): ICD-10-CM

## 2019-07-08 DIAGNOSIS — E55.9 VITAMIN D DEFICIENCY: ICD-10-CM

## 2019-07-08 DIAGNOSIS — F41.1 GAD (GENERALIZED ANXIETY DISORDER): Primary | ICD-10-CM

## 2019-07-08 DIAGNOSIS — J41.0 SIMPLE CHRONIC BRONCHITIS (HCC): ICD-10-CM

## 2019-07-08 DIAGNOSIS — R73.03 PREDIABETES: ICD-10-CM

## 2019-07-08 LAB
25(OH)D3 SERPL-MCNC: 34.4 NG/ML (ref 30–100)
ALBUMIN SERPL-MCNC: 4 G/DL (ref 3.5–5.2)
ALBUMIN/GLOB SERPL: 1.2 G/DL
ALP SERPL-CCNC: 91 U/L (ref 39–117)
ALT SERPL W P-5'-P-CCNC: 11 U/L (ref 1–33)
ANION GAP SERPL CALCULATED.3IONS-SCNC: 12.7 MMOL/L (ref 5–15)
AST SERPL-CCNC: 19 U/L (ref 1–32)
BASOPHILS # BLD AUTO: 0.05 10*3/MM3 (ref 0–0.2)
BASOPHILS NFR BLD AUTO: 0.8 % (ref 0–1.5)
BILIRUB SERPL-MCNC: 0.2 MG/DL (ref 0.2–1.2)
BUN BLD-MCNC: 12 MG/DL (ref 8–23)
BUN/CREAT SERPL: 13.8 (ref 7–25)
CALCIUM SPEC-SCNC: 9.3 MG/DL (ref 8.6–10.5)
CHLORIDE SERPL-SCNC: 103 MMOL/L (ref 98–107)
CO2 SERPL-SCNC: 24.3 MMOL/L (ref 22–29)
CREAT BLD-MCNC: 0.87 MG/DL (ref 0.57–1)
DEPRECATED RDW RBC AUTO: 46 FL (ref 37–54)
EOSINOPHIL # BLD AUTO: 0.11 10*3/MM3 (ref 0–0.4)
EOSINOPHIL NFR BLD AUTO: 1.7 % (ref 0.3–6.2)
ERYTHROCYTE [DISTWIDTH] IN BLOOD BY AUTOMATED COUNT: 13.3 % (ref 12.3–15.4)
GFR SERPL CREATININE-BSD FRML MDRD: 66 ML/MIN/1.73
GLOBULIN UR ELPH-MCNC: 3.3 GM/DL
GLUCOSE BLD-MCNC: 102 MG/DL (ref 65–99)
HBA1C MFR BLD: 5.75 % (ref 4.8–5.6)
HCT VFR BLD AUTO: 40.1 % (ref 34–46.6)
HGB BLD-MCNC: 12.7 G/DL (ref 12–15.9)
IMM GRANULOCYTES # BLD AUTO: 0.02 10*3/MM3 (ref 0–0.05)
IMM GRANULOCYTES NFR BLD AUTO: 0.3 % (ref 0–0.5)
LYMPHOCYTES # BLD AUTO: 1.77 10*3/MM3 (ref 0.7–3.1)
LYMPHOCYTES NFR BLD AUTO: 27.2 % (ref 19.6–45.3)
MCH RBC QN AUTO: 29.3 PG (ref 26.6–33)
MCHC RBC AUTO-ENTMCNC: 31.7 G/DL (ref 31.5–35.7)
MCV RBC AUTO: 92.4 FL (ref 79–97)
MONOCYTES # BLD AUTO: 0.4 10*3/MM3 (ref 0.1–0.9)
MONOCYTES NFR BLD AUTO: 6.2 % (ref 5–12)
NEUTROPHILS # BLD AUTO: 4.15 10*3/MM3 (ref 1.7–7)
NEUTROPHILS NFR BLD AUTO: 63.8 % (ref 42.7–76)
NRBC BLD AUTO-RTO: 0 /100 WBC (ref 0–0.2)
PLATELET # BLD AUTO: 359 10*3/MM3 (ref 140–450)
PMV BLD AUTO: 9.9 FL (ref 6–12)
POTASSIUM BLD-SCNC: 4.3 MMOL/L (ref 3.5–5.2)
PROT SERPL-MCNC: 7.3 G/DL (ref 6–8.5)
RBC # BLD AUTO: 4.34 10*6/MM3 (ref 3.77–5.28)
SODIUM BLD-SCNC: 140 MMOL/L (ref 136–145)
TSH SERPL DL<=0.05 MIU/L-ACNC: 0.25 MIU/ML (ref 0.27–4.2)
WBC NRBC COR # BLD: 6.5 10*3/MM3 (ref 3.4–10.8)

## 2019-07-08 PROCEDURE — 82306 VITAMIN D 25 HYDROXY: CPT | Performed by: NURSE PRACTITIONER

## 2019-07-08 PROCEDURE — 80053 COMPREHEN METABOLIC PANEL: CPT | Performed by: NURSE PRACTITIONER

## 2019-07-08 PROCEDURE — 85025 COMPLETE CBC W/AUTO DIFF WBC: CPT | Performed by: NURSE PRACTITIONER

## 2019-07-08 PROCEDURE — 84443 ASSAY THYROID STIM HORMONE: CPT | Performed by: NURSE PRACTITIONER

## 2019-07-08 PROCEDURE — 99214 OFFICE O/P EST MOD 30 MIN: CPT | Performed by: NURSE PRACTITIONER

## 2019-07-08 PROCEDURE — 83036 HEMOGLOBIN GLYCOSYLATED A1C: CPT | Performed by: NURSE PRACTITIONER

## 2019-07-08 RX ORDER — ESCITALOPRAM OXALATE 20 MG/1
20 TABLET ORAL DAILY
Qty: 90 TABLET | Refills: 1 | Status: SHIPPED | OUTPATIENT
Start: 2019-07-08 | End: 2019-10-09

## 2019-07-08 RX ORDER — PANTOPRAZOLE SODIUM 40 MG/1
40 TABLET, DELAYED RELEASE ORAL DAILY
Qty: 90 TABLET | Refills: 1 | Status: SHIPPED | OUTPATIENT
Start: 2019-07-08 | End: 2019-10-09 | Stop reason: SDUPTHER

## 2019-07-08 NOTE — PROGRESS NOTES
Subjective   Sydnie Gamble is a 59 y.o. female here today for ALESIA/ Vit D/ GERD/ prediabetes.    Chief Complaint   Patient presents with   • Follow-up   Sydnie for follow-up on her chronic conditions.  She has been compliant with her medications and denies adverse side effects.  She continues to have anxiety but it is improved with the Lexapro.  She does still shake when she is nervous and has phobias regarding driving and storms.  Has always been nervous. She thinks she would benefit from changes counselors.  She is at goal with her symptoms with her COPD on her Stiolto- has quit smoking but is vaping now (she reports she knows that she needs to stop).  Does not have to use albuterol often. No longer wheezing. She continues to modify her diet and cut back on concentrated sweets.  She has had some episodes of heat sensitivity and palpitations- needs thyroid checked today    Health maintenance- still has Cologuard at home- reports she will do this soon.  Does not want TDAP today.  Would like to set up with OBGYN for pap.      Review of Systems   Constitutional: Negative for activity change, appetite change, fatigue, unexpected weight gain and unexpected weight loss.   Respiratory: Negative for cough, chest tightness, shortness of breath and wheezing.    Cardiovascular: Negative for chest pain, palpitations and leg swelling.   Gastrointestinal: Positive for GERD and indigestion. Negative for abdominal pain, blood in stool and nausea.   Endocrine: Positive for heat intolerance, polydipsia and polyuria. Negative for polyphagia.   Skin: Positive for dry skin.   Neurological: Negative for memory problem and confusion.   Psychiatric/Behavioral: Positive for stress. Negative for dysphoric mood, self-injury, sleep disturbance, suicidal ideas, negative for hyperactivity and depressed mood. The patient is nervous/anxious.        Past Medical History:   Diagnosis Date   • Acid reflux    • Acid reflux    • Anxiety 1976   •  Hypothyroidism    • Ovarian cyst    • Ovarian cyst    • Visual impairment corrected with glasses     Family History   Problem Relation Age of Onset   • Cancer Mother    • Hypertension Mother    • Mental illness Mother    • Anxiety disorder Mother    • Asthma Mother    • COPD Mother    • Depression Mother    • Cancer Father    • Heart attack Maternal Grandfather    • Breast cancer Neg Hx    • Ovarian cancer Neg Hx      History reviewed. No pertinent surgical history.  Social History     Socioeconomic History   • Marital status:      Spouse name: Not on file   • Number of children: Not on file   • Years of education: Not on file   • Highest education level: Not on file   Tobacco Use   • Smoking status: Former Smoker     Packs/day: 1.50     Years: 45.00     Pack years: 67.50     Last attempt to quit: 3/30/2019     Years since quittin.2   • Smokeless tobacco: Never Used   Substance and Sexual Activity   • Alcohol use: No   • Drug use: No   • Sexual activity: No         Current Outpatient Medications:   •  albuterol 108 (90 Base) MCG/ACT inhaler, Inhale 2 puffs Every 4 (Four) Hours As Needed for Wheezing., Disp: 1 inhaler, Rfl: 4  •  clonazePAM (KlonoPIN) 1 MG tablet, Take 1 mg by mouth At Night As Needed., Disp: , Rfl:   •  escitalopram (LEXAPRO) 20 MG tablet, Take 1 tablet by mouth Daily., Disp: 90 tablet, Rfl: 1  •  levothyroxine (SYNTHROID, LEVOTHROID) 75 MCG tablet, Take 1 tablet by mouth Daily., Disp: 90 tablet, Rfl: 1  •  pantoprazole (PROTONIX) 40 MG EC tablet, Take 1 tablet by mouth Daily., Disp: 90 tablet, Rfl: 1  •  tiotropium bromide-olodaterol (STIOLTO RESPIMAT) 2.5-2.5 MCG/ACT aerosol solution inhaler, Inhale 1 puff Daily., Disp: 1 inhaler, Rfl: 5  •  vitamin D (ERGOCALCIFEROL) 20579 units capsule capsule, TAKE 1 CAPSULE BY MOUTH 1 (ONE) TIME PER WEEK., Disp: 12 capsule, Rfl: 0  •  zolpidem (AMBIEN) 10 MG tablet, TAKE 1 TABLET BY MOUTH EVERYDAY AT BEDTIME, Disp: , Rfl: 2    Objective   Vitals:     07/08/19 1432   BP: 124/72   BP Location: Right arm   Patient Position: Sitting   Cuff Size: Adult   Pulse: 77   Resp: 18   Temp: 98.4 °F (36.9 °C)   TempSrc: Temporal   SpO2: 97%   Weight: 64.9 kg (143 lb)   PainSc: 0-No pain     Body mass index is 26.16 kg/m².  Physical Exam   Constitutional: She is oriented to person, place, and time. She appears well-developed and well-nourished. No distress.   Eyes: Pupils are equal, round, and reactive to light.   Neck: Neck supple. No thyromegaly present.   Cardiovascular: Normal rate and regular rhythm.   Pulmonary/Chest: Effort normal and breath sounds normal.   Neurological: She is alert and oriented to person, place, and time.   Skin: Skin is warm and dry. Capillary refill takes 2 to 3 seconds. She is not diaphoretic.   Psychiatric: She has a normal mood and affect. Her behavior is normal. Judgment and thought content normal.   Nursing note and vitals reviewed.      Assessment/Plan   Problem List Items Addressed This Visit        Respiratory    COPD (chronic obstructive pulmonary disease) (CMS/[facility])    Relevant Medications    albuterol 108 (90 Base) MCG/ACT inhaler    tiotropium bromide-olodaterol (STIOLTO RESPIMAT) 2.5-2.5 MCG/ACT aerosol solution inhaler    Other Relevant Orders    CBC & Differential    CBC Auto Differential       Digestive    Gastroesophageal reflux disease without esophagitis    Relevant Medications    pantoprazole (PROTONIX) 40 MG EC tablet    Other Relevant Orders    CBC & Differential    Comprehensive Metabolic Panel    CBC Auto Differential    Vitamin D deficiency    Relevant Medications    vitamin D (ERGOCALCIFEROL) 24052 units capsule capsule    Other Relevant Orders    Vitamin D 25 Hydroxy       Endocrine    Acquired hypothyroidism    Relevant Medications    levothyroxine (SYNTHROID, LEVOTHROID) 75 MCG tablet    Other Relevant Orders    CBC & Differential    Comprehensive Metabolic Panel    TSH    CBC Auto Differential       Other    Prediabetes     Relevant Orders    CBC & Differential    Comprehensive Metabolic Panel    Hemoglobin A1c    CBC Auto Differential    ALESIA (generalized anxiety disorder) - Primary    Relevant Medications    zolpidem (AMBIEN) 10 MG tablet    escitalopram (LEXAPRO) 20 MG tablet    Other Relevant Orders    CBC & Differential    Comprehensive Metabolic Panel    CBC Auto Differential        Sydnie was seen today for follow-up.    Diagnoses and all orders for this visit:    ALESIA (generalized anxiety disorder)  -     CBC & Differential  -     Comprehensive Metabolic Panel  -     escitalopram (LEXAPRO) 20 MG tablet; Take 1 tablet by mouth Daily.  -     CBC Auto Differential  We will increase Lexapro to 20 mg and follow-up in 3 months.    Gastroesophageal reflux disease without esophagitis  -     CBC & Differential  -     Comprehensive Metabolic Panel  -     pantoprazole (PROTONIX) 40 MG EC tablet; Take 1 tablet by mouth Daily.  -     CBC Auto Differential  Patient reports she still has intermittent heartburn and is benefiting from pantoprazole.  Continue current treatment plan  Vitamin D deficiency  -     Vitamin D 25 Hydroxy    Acquired hypothyroidism  -     CBC & Differential  -     Comprehensive Metabolic Panel  -     TSH  -     CBC Auto Differential  Patient having palpitations and sensitivity to heat.  Recheck today before refilling  Prediabetes  -     CBC & Differential  -     Comprehensive Metabolic Panel  -     Hemoglobin A1c  -     CBC Auto Differential  She does report polyuria and polydipsia.  Recheck today    Simple chronic bronchitis (CMS/HCC)  -     tiotropium bromide-olodaterol (STIOLTO RESPIMAT) 2.5-2.5 MCG/ACT aerosol solution inhaler; Inhale 1 puff Daily.  Stable at this time, advised smoking cessation including vaping.  Has repeat low-dose CT in October.           The patient was counseled regarding diagnostic results, impressions, prognosis, instructions for management, risk factor reductions, education, and importance  of treatment compliance.  The patient verbalized understanding of and agreement with the plan of care.    Advised patient to call with any further questions and any new or worsening symptoms.     Return in about 3 months (around 10/8/2019).      DAYSI Aceves    Please note that portions of this note were completed with a voice recognition program. Efforts were made to edit the dictations, but occasionally words are mistranscribed.

## 2019-07-09 ENCOUNTER — TELEPHONE (OUTPATIENT)
Dept: INTERNAL MEDICINE | Facility: CLINIC | Age: 59
End: 2019-07-09

## 2019-07-09 DIAGNOSIS — E03.9 HYPOTHYROIDISM, UNSPECIFIED TYPE: ICD-10-CM

## 2019-07-09 RX ORDER — LEVOTHYROXINE SODIUM 0.05 MG/1
50 TABLET ORAL DAILY
Qty: 90 TABLET | Refills: 1 | Status: SHIPPED | OUTPATIENT
Start: 2019-07-09 | End: 2019-10-10 | Stop reason: SDUPTHER

## 2019-07-09 NOTE — TELEPHONE ENCOUNTER
Discussed lab results- decreased synthroid and reducing concentrated sweets and carbohydrates. Pt verbalized understanding and denied further questions at this time.

## 2019-09-03 DIAGNOSIS — E55.9 VITAMIN D DEFICIENCY: ICD-10-CM

## 2019-09-03 RX ORDER — ERGOCALCIFEROL 1.25 MG/1
50000 CAPSULE ORAL WEEKLY
Qty: 12 CAPSULE | Refills: 0 | OUTPATIENT
Start: 2019-09-03

## 2019-09-30 ENCOUNTER — HOSPITAL ENCOUNTER (OUTPATIENT)
Dept: CT IMAGING | Facility: HOSPITAL | Age: 59
Discharge: HOME | End: 2019-09-30
Admitting: NURSE PRACTITIONER

## 2019-09-30 ENCOUNTER — TRANSCRIBE ORDERS (OUTPATIENT)
Dept: PULMONOLOGY | Facility: CLINIC | Age: 59
End: 2019-09-30

## 2019-09-30 DIAGNOSIS — R91.8 MULTIPLE LUNG NODULES ON CT: ICD-10-CM

## 2019-09-30 PROCEDURE — 71250 CT THORAX DX C-: CPT

## 2019-10-01 DIAGNOSIS — R91.8 MULTIPLE LUNG NODULES ON CT: Primary | ICD-10-CM

## 2019-10-01 NOTE — PROGRESS NOTES
I notified Ms. Gamble of her CT scan results.  It shows a redemonstration of a part solid right upper lobe and superior segment right lower lobe nodules some of which appear less solid when compared to her March CT scan.  I did advise her that we would do a six-month follow-up which will be due in March 2020.  She is agreeable to have this CT scan performed.  She has a follow-up scheduled with Dr. Mcclelland in November.  I did advise her to keep this follow-up.

## 2019-10-09 ENCOUNTER — OFFICE VISIT (OUTPATIENT)
Dept: INTERNAL MEDICINE | Facility: CLINIC | Age: 59
End: 2019-10-09

## 2019-10-09 VITALS
RESPIRATION RATE: 20 BRPM | HEIGHT: 62 IN | BODY MASS INDEX: 27.23 KG/M2 | TEMPERATURE: 97 F | WEIGHT: 148 LBS | DIASTOLIC BLOOD PRESSURE: 72 MMHG | HEART RATE: 77 BPM | SYSTOLIC BLOOD PRESSURE: 118 MMHG

## 2019-10-09 DIAGNOSIS — E03.9 ACQUIRED HYPOTHYROIDISM: ICD-10-CM

## 2019-10-09 DIAGNOSIS — K21.9 GASTROESOPHAGEAL REFLUX DISEASE WITHOUT ESOPHAGITIS: ICD-10-CM

## 2019-10-09 DIAGNOSIS — J41.0 SIMPLE CHRONIC BRONCHITIS (HCC): ICD-10-CM

## 2019-10-09 DIAGNOSIS — F41.1 GAD (GENERALIZED ANXIETY DISORDER): ICD-10-CM

## 2019-10-09 DIAGNOSIS — J42 CHRONIC BRONCHITIS, UNSPECIFIED CHRONIC BRONCHITIS TYPE (HCC): Primary | ICD-10-CM

## 2019-10-09 DIAGNOSIS — R73.03 PREDIABETES: ICD-10-CM

## 2019-10-09 DIAGNOSIS — E55.9 VITAMIN D DEFICIENCY: ICD-10-CM

## 2019-10-09 DIAGNOSIS — Z23 NEED FOR INFLUENZA VACCINATION: ICD-10-CM

## 2019-10-09 PROCEDURE — 90674 CCIIV4 VAC NO PRSV 0.5 ML IM: CPT | Performed by: NURSE PRACTITIONER

## 2019-10-09 PROCEDURE — 85027 COMPLETE CBC AUTOMATED: CPT | Performed by: NURSE PRACTITIONER

## 2019-10-09 PROCEDURE — 82306 VITAMIN D 25 HYDROXY: CPT | Performed by: NURSE PRACTITIONER

## 2019-10-09 PROCEDURE — 84443 ASSAY THYROID STIM HORMONE: CPT | Performed by: NURSE PRACTITIONER

## 2019-10-09 PROCEDURE — 90471 IMMUNIZATION ADMIN: CPT | Performed by: NURSE PRACTITIONER

## 2019-10-09 PROCEDURE — 99214 OFFICE O/P EST MOD 30 MIN: CPT | Performed by: NURSE PRACTITIONER

## 2019-10-09 PROCEDURE — 83036 HEMOGLOBIN GLYCOSYLATED A1C: CPT | Performed by: NURSE PRACTITIONER

## 2019-10-09 PROCEDURE — 80048 BASIC METABOLIC PNL TOTAL CA: CPT | Performed by: NURSE PRACTITIONER

## 2019-10-09 RX ORDER — PANTOPRAZOLE SODIUM 40 MG/1
40 TABLET, DELAYED RELEASE ORAL DAILY
Qty: 90 TABLET | Refills: 1 | Status: SHIPPED | OUTPATIENT
Start: 2019-10-09 | End: 2020-06-15

## 2019-10-09 RX ORDER — FLUOXETINE HYDROCHLORIDE 40 MG/1
40 CAPSULE ORAL DAILY
Qty: 30 CAPSULE | Refills: 5 | Status: SHIPPED | OUTPATIENT
Start: 2019-10-09 | End: 2019-11-07 | Stop reason: SDUPTHER

## 2019-10-09 NOTE — PROGRESS NOTES
Subjective   Sydnie Gamble is a 59 y.o. female here today for anxiety, hypothyroidism.    Chief Complaint   Patient presents with   • Anxiety     stomach issue with lexapro taking half a tab   Sydnie is here today for follow-up on acid reflux, vitamin D deficiency, hypothyroidism, COPD, generalized anxiety disorder.  She has been doing well with her breathing.  Feels that she has not breathed this well in years.  Recently had repeat low-dose CT which was stable.  Compliant with inhaler.  She has been feeling slightly do depressed.  She also notices ridges on her nails.  Due for thyroid recheck.  Compliant with medication.  She also reports abdominal pain when taking 20 mg of Lexapro.  She stopped taking this for a few days and the abdominal pain resolved.  She is now taking half a tablet but it is not controlling her depression.  Acid reflux controlled with pantoprazole.  Still not eating regular meals.    Has cologuard at home- needs to do this    Review of Systems   Constitutional: Positive for fatigue. Negative for activity change, appetite change, unexpected weight gain and unexpected weight loss.   Respiratory: Negative for cough, chest tightness, shortness of breath and wheezing.    Cardiovascular: Negative for chest pain, palpitations and leg swelling.   Gastrointestinal: Positive for GERD and indigestion. Negative for abdominal pain, blood in stool and nausea.   Endocrine: Positive for heat intolerance. Negative for polydipsia, polyphagia and polyuria.   Skin: Positive for dry skin.   Neurological: Negative for memory problem and confusion.   Psychiatric/Behavioral: Positive for dysphoric mood and stress. Negative for self-injury, sleep disturbance, suicidal ideas, negative for hyperactivity and depressed mood. The patient is nervous/anxious.        Past Medical History:   Diagnosis Date   • Acid reflux    • Acid reflux    • Anxiety 1976   • Hypothyroidism    • Ovarian cyst    • Ovarian cyst    • Visual  impairment corrected with glasses     Family History   Problem Relation Age of Onset   • Cancer Mother    • Hypertension Mother    • Mental illness Mother    • Anxiety disorder Mother    • Asthma Mother    • COPD Mother    • Depression Mother    • Cancer Father    • Heart attack Maternal Grandfather    • Breast cancer Neg Hx    • Ovarian cancer Neg Hx      History reviewed. No pertinent surgical history.  Social History     Socioeconomic History   • Marital status:      Spouse name: Not on file   • Number of children: Not on file   • Years of education: Not on file   • Highest education level: Not on file   Tobacco Use   • Smoking status: Former Smoker     Packs/day: 1.50     Years: 45.00     Pack years: 67.50     Last attempt to quit: 3/30/2019     Years since quittin.5   • Smokeless tobacco: Never Used   Substance and Sexual Activity   • Alcohol use: No   • Drug use: No   • Sexual activity: No         Current Outpatient Medications:   •  albuterol 108 (90 Base) MCG/ACT inhaler, Inhale 2 puffs Every 4 (Four) Hours As Needed for Wheezing., Disp: 1 inhaler, Rfl: 4  •  clonazePAM (KlonoPIN) 1 MG tablet, Take 1 mg by mouth At Night As Needed., Disp: , Rfl:   •  levothyroxine (SYNTHROID, LEVOTHROID) 50 MCG tablet, Take 1 tablet by mouth Daily., Disp: 90 tablet, Rfl: 1  •  pantoprazole (PROTONIX) 40 MG EC tablet, Take 1 tablet by mouth Daily., Disp: 90 tablet, Rfl: 1  •  tiotropium bromide-olodaterol (STIOLTO RESPIMAT) 2.5-2.5 MCG/ACT aerosol solution inhaler, Inhale 1 puff Daily., Disp: 1 inhaler, Rfl: 5  •  vitamin D (ERGOCALCIFEROL) 95541 units capsule capsule, TAKE 1 CAPSULE BY MOUTH 1 (ONE) TIME PER WEEK., Disp: 12 capsule, Rfl: 0  •  zolpidem (AMBIEN) 10 MG tablet, TAKE 1 TABLET BY MOUTH EVERYDAY AT BEDTIME, Disp: , Rfl: 2  •  FLUoxetine (PROzac) 40 MG capsule, Take 1 capsule by mouth Daily., Disp: 30 capsule, Rfl: 5    Objective   Vitals:    10/09/19 1339   BP: 118/72   Pulse: 77   Resp: 20   Temp: 97  "°F (36.1 °C)   TempSrc: Temporal   Weight: 67.1 kg (148 lb)   Height: 157.5 cm (62\")     Body mass index is 27.07 kg/m².  Physical Exam   Constitutional: She is oriented to person, place, and time. She appears well-developed and well-nourished. No distress.   Eyes: Pupils are equal, round, and reactive to light.   Neck: Neck supple. No thyromegaly present.   Cardiovascular: Normal rate and regular rhythm.   Pulmonary/Chest: Effort normal and breath sounds normal.   Neurological: She is alert and oriented to person, place, and time.   Skin: Skin is warm and dry. Capillary refill takes 2 to 3 seconds. She is not diaphoretic.   Psychiatric: She has a normal mood and affect. Her behavior is normal. Judgment and thought content normal.   Nursing note and vitals reviewed.      Assessment/Plan   Problem List Items Addressed This Visit        Respiratory    COPD (chronic obstructive pulmonary disease) (CMS/[facility]) - Primary    Relevant Medications    albuterol 108 (90 Base) MCG/ACT inhaler    tiotropium bromide-olodaterol (STIOLTO RESPIMAT) 2.5-2.5 MCG/ACT aerosol solution inhaler    Other Relevant Orders    Basic Metabolic Panel    CBC (No Diff)       Digestive    Gastroesophageal reflux disease without esophagitis    Relevant Medications    pantoprazole (PROTONIX) 40 MG EC tablet    Other Relevant Orders    Basic Metabolic Panel    CBC (No Diff)    Vitamin D deficiency    Relevant Medications    vitamin D (ERGOCALCIFEROL) 52452 units capsule capsule    Other Relevant Orders    Vitamin D 25 Hydroxy       Endocrine    Acquired hypothyroidism    Relevant Medications    levothyroxine (SYNTHROID, LEVOTHROID) 50 MCG tablet    Other Relevant Orders    TSH       Other    Prediabetes    Relevant Orders    Basic Metabolic Panel    CBC (No Diff)    Hemoglobin A1c    ALESIA (generalized anxiety disorder)    Relevant Medications    zolpidem (AMBIEN) 10 MG tablet    FLUoxetine (PROzac) 40 MG capsule    Other Relevant Orders    Basic " Metabolic Panel    CBC (No Diff)    TSH      Other Visit Diagnoses     Need for influenza vaccination        Relevant Orders    Flucelvax Quad=>4Years (9590-0948) (Completed)        Sydnie was seen today for anxiety.    Diagnoses and all orders for this visit:    Chronic bronchitis, unspecified chronic bronchitis type (CMS/HCC)  -     Basic Metabolic Panel  -     CBC (No Diff)      Simple chronic bronchitis (CMS/HCC)  -     tiotropium bromide-olodaterol (STIOLTO RESPIMAT) 2.5-2.5 MCG/ACT aerosol solution inhaler; Inhale 1 puff Daily.  -     Stable, continue current treatment plan    Gastroesophageal reflux disease without esophagitis  -     pantoprazole (PROTONIX) 40 MG EC tablet; Take 1 tablet by mouth Daily.  -     Basic Metabolic Panel  -     CBC (No Diff)    Prediabetes  -     Basic Metabolic Panel  -     CBC (No Diff)  -     Hemoglobin A1c  Recheck today  ALESIA (generalized anxiety disorder)  -     FLUoxetine (PROzac) 40 MG capsule; Take 1 capsule by mouth Daily.  -     Basic Metabolic Panel  -     CBC (No Diff)  -     TSH   will switch to prozac from lexapro due to side effects and increase dose- FU in 4 weeks- may cancel if doing well  Acquired hypothyroidism  -     TSH    - recheck today  Vitamin D deficiency  -     Vitamin D 25 Hydroxy    Need for influenza vaccination  -     Flucelvax Quad=>4Years (2549-4320)             The patient was counseled regarding diagnostic results, impressions, prognosis, instructions for management, risk factor reductions, education, and importance of treatment compliance.  The patient verbalized understanding of and agreement with the plan of care.    Advised patient to call with any further questions and any new or worsening symptoms.     Return in about 4 weeks (around 11/6/2019).      DAYSI Aceves    Please note that portions of this note were completed with a voice recognition program. Efforts were made to edit the dictations, but occasionally words are  mistranscribed.

## 2019-10-10 DIAGNOSIS — E03.9 HYPOTHYROIDISM, UNSPECIFIED TYPE: ICD-10-CM

## 2019-10-10 LAB
25(OH)D3 SERPL-MCNC: 36.4 NG/ML (ref 30–100)
ANION GAP SERPL CALCULATED.3IONS-SCNC: 14.5 MMOL/L (ref 5–15)
BUN BLD-MCNC: 14 MG/DL (ref 8–23)
BUN/CREAT SERPL: 14.6 (ref 7–25)
CALCIUM SPEC-SCNC: 9.4 MG/DL (ref 8.6–10.5)
CHLORIDE SERPL-SCNC: 100 MMOL/L (ref 98–107)
CO2 SERPL-SCNC: 26.5 MMOL/L (ref 22–29)
CREAT BLD-MCNC: 0.96 MG/DL (ref 0.57–1)
DEPRECATED RDW RBC AUTO: 43.8 FL (ref 37–54)
ERYTHROCYTE [DISTWIDTH] IN BLOOD BY AUTOMATED COUNT: 13.3 % (ref 12.3–15.4)
GFR SERPL CREATININE-BSD FRML MDRD: 59 ML/MIN/1.73
GLUCOSE BLD-MCNC: 86 MG/DL (ref 65–99)
HBA1C MFR BLD: 5.9 % (ref 4.8–5.6)
HCT VFR BLD AUTO: 39.5 % (ref 34–46.6)
HGB BLD-MCNC: 12.8 G/DL (ref 12–15.9)
MCH RBC QN AUTO: 28.7 PG (ref 26.6–33)
MCHC RBC AUTO-ENTMCNC: 32.4 G/DL (ref 31.5–35.7)
MCV RBC AUTO: 88.6 FL (ref 79–97)
PLATELET # BLD AUTO: 343 10*3/MM3 (ref 140–450)
PMV BLD AUTO: 9.5 FL (ref 6–12)
POTASSIUM BLD-SCNC: 4.3 MMOL/L (ref 3.5–5.2)
RBC # BLD AUTO: 4.46 10*6/MM3 (ref 3.77–5.28)
SODIUM BLD-SCNC: 141 MMOL/L (ref 136–145)
TSH SERPL DL<=0.05 MIU/L-ACNC: 16.1 UIU/ML (ref 0.27–4.2)
WBC NRBC COR # BLD: 5.98 10*3/MM3 (ref 3.4–10.8)

## 2019-10-10 RX ORDER — LEVOTHYROXINE SODIUM 88 UG/1
88 TABLET ORAL DAILY
Qty: 90 TABLET | Refills: 1 | Status: SHIPPED | OUTPATIENT
Start: 2019-10-10 | End: 2020-04-06

## 2019-11-06 ENCOUNTER — OFFICE VISIT (OUTPATIENT)
Dept: INTERNAL MEDICINE | Facility: CLINIC | Age: 59
End: 2019-11-06

## 2019-11-06 VITALS
DIASTOLIC BLOOD PRESSURE: 76 MMHG | BODY MASS INDEX: 27.05 KG/M2 | HEIGHT: 62 IN | RESPIRATION RATE: 14 BRPM | HEART RATE: 76 BPM | WEIGHT: 147 LBS | TEMPERATURE: 96.9 F | SYSTOLIC BLOOD PRESSURE: 128 MMHG | OXYGEN SATURATION: 99 %

## 2019-11-06 DIAGNOSIS — R25.1 TREMOR: ICD-10-CM

## 2019-11-06 DIAGNOSIS — E03.9 HYPOTHYROIDISM, UNSPECIFIED TYPE: Primary | ICD-10-CM

## 2019-11-06 DIAGNOSIS — F41.1 GAD (GENERALIZED ANXIETY DISORDER): ICD-10-CM

## 2019-11-06 DIAGNOSIS — R00.2 PALPITATIONS: ICD-10-CM

## 2019-11-06 LAB — TSH SERPL DL<=0.05 MIU/L-ACNC: 1.51 UIU/ML (ref 0.27–4.2)

## 2019-11-06 PROCEDURE — 99214 OFFICE O/P EST MOD 30 MIN: CPT | Performed by: NURSE PRACTITIONER

## 2019-11-06 PROCEDURE — 84443 ASSAY THYROID STIM HORMONE: CPT | Performed by: NURSE PRACTITIONER

## 2019-11-06 NOTE — PROGRESS NOTES
Subjective   Sydnie Gamble is a 59 y.o. female here today for hypothyroidism/ anxiety.    Chief Complaint   Patient presents with   • Hypothyroidism     Having tremors and palpatations since levothy. was increased .   Sydnie is here today for follow-up on hypothyroidism and anxiety.  Last visit her Lexapro was switched to Prozac and increased dose.  Her TSH was also 16 and her Synthroid was increased.  She has been compliant with her medication but since this visit she has been having tremor, brain fog, and palpitations.  She does feel that her anxiety is better but she has uncontrollable tremor now.    Review of Systems   Constitutional: Positive for chills. Negative for activity change, appetite change, unexpected weight gain and unexpected weight loss.   Respiratory: Negative for cough, chest tightness, shortness of breath and wheezing.    Cardiovascular: Positive for palpitations. Negative for chest pain and leg swelling.   Gastrointestinal: Positive for GERD and indigestion. Negative for abdominal pain, blood in stool and nausea.   Endocrine: Positive for cold intolerance. Negative for heat intolerance, polydipsia, polyphagia and polyuria.   Skin: Positive for dry skin.   Neurological: Positive for tremors. Negative for memory problem and confusion.   Psychiatric/Behavioral: Positive for stress. Negative for dysphoric mood, self-injury, sleep disturbance, suicidal ideas, negative for hyperactivity and depressed mood. The patient is nervous/anxious.        Past Medical History:   Diagnosis Date   • Acid reflux    • Acid reflux    • Anxiety 1976   • Hypothyroidism    • Ovarian cyst    • Ovarian cyst    • Visual impairment corrected with glasses     Family History   Problem Relation Age of Onset   • Cancer Mother    • Hypertension Mother    • Mental illness Mother    • Anxiety disorder Mother    • Asthma Mother    • COPD Mother    • Depression Mother    • Cancer Father    • Heart attack Maternal Grandfather    •  "Breast cancer Neg Hx    • Ovarian cancer Neg Hx      History reviewed. No pertinent surgical history.  Social History     Socioeconomic History   • Marital status:      Spouse name: Not on file   • Number of children: Not on file   • Years of education: Not on file   • Highest education level: Not on file   Tobacco Use   • Smoking status: Former Smoker     Packs/day: 1.50     Years: 45.00     Pack years: 67.50     Last attempt to quit: 3/30/2019     Years since quittin.6   • Smokeless tobacco: Never Used   Substance and Sexual Activity   • Alcohol use: No   • Drug use: No   • Sexual activity: No         Current Outpatient Medications:   •  albuterol 108 (90 Base) MCG/ACT inhaler, Inhale 2 puffs Every 4 (Four) Hours As Needed for Wheezing., Disp: 1 inhaler, Rfl: 4  •  clonazePAM (KlonoPIN) 1 MG tablet, Take 1 mg by mouth At Night As Needed., Disp: , Rfl:   •  FLUoxetine (PROzac) 40 MG capsule, Take 1 capsule by mouth Daily., Disp: 30 capsule, Rfl: 5  •  levothyroxine (SYNTHROID, LEVOTHROID) 88 MCG tablet, Take 1 tablet by mouth Daily., Disp: 90 tablet, Rfl: 1  •  pantoprazole (PROTONIX) 40 MG EC tablet, Take 1 tablet by mouth Daily., Disp: 90 tablet, Rfl: 1  •  tiotropium bromide-olodaterol (STIOLTO RESPIMAT) 2.5-2.5 MCG/ACT aerosol solution inhaler, Inhale 1 puff Daily., Disp: 1 inhaler, Rfl: 5  •  vitamin D (ERGOCALCIFEROL) 31094 units capsule capsule, TAKE 1 CAPSULE BY MOUTH 1 (ONE) TIME PER WEEK., Disp: 12 capsule, Rfl: 0  •  zolpidem (AMBIEN) 10 MG tablet, TAKE 1 TABLET BY MOUTH EVERYDAY AT BEDTIME, Disp: , Rfl: 2    Objective   Vitals:    19 1352   BP: 128/76   Pulse: 76   Resp: 14   Temp: 96.9 °F (36.1 °C)   TempSrc: Temporal   SpO2: 99%   Weight: 66.7 kg (147 lb)   Height: 157.5 cm (62\")     Body mass index is 26.89 kg/m².  Physical Exam   Constitutional: She is oriented to person, place, and time. She appears well-developed and well-nourished. No distress.   Eyes: Pupils are equal, round, " and reactive to light.   Neck: Neck supple. No thyromegaly present.   Cardiovascular: Normal rate and regular rhythm.   Pulmonary/Chest: Effort normal and breath sounds normal.   Neurological: She is alert and oriented to person, place, and time.   Skin: Skin is warm and dry. Capillary refill takes 2 to 3 seconds. She is not diaphoretic.   Psychiatric: Her behavior is normal. Judgment and thought content normal. Her mood appears anxious.   Nursing note and vitals reviewed.      Assessment/Plan   Problem List Items Addressed This Visit        Other    ALESIA (generalized anxiety disorder)    Relevant Medications    zolpidem (AMBIEN) 10 MG tablet    FLUoxetine (PROzac) 40 MG capsule      Other Visit Diagnoses     Hypothyroidism, unspecified type    -  Primary    Relevant Orders    TSH    Tremor        Palpitations            Sydnie was seen today for hypothyroidism.    Diagnoses and all orders for this visit:    Hypothyroidism, unspecified type  -     TSH  Tremor and palpitations likely secondary to overcorrection of thyroid but may be side effect of Prozac.  Will get TSH and determine plan of care.  ALESIA (generalized anxiety disorder)  Anxiety has improved with Prozac, may consider lowering dose if no improvement of tremor with thyroid adjustment  Tremor  Will avoid beta-blocker for now secondary to COPD  Palpitations             The patient was counseled regarding diagnostic results, impressions, prognosis, instructions for management, risk factor reductions, education, and importance of treatment compliance.  The patient verbalized understanding of and agreement with the plan of care.    Advised patient to call with any further questions and any new or worsening symptoms.     Return for Next scheduled follow up.      DAYIS Aceves    Please note that portions of this note were completed with a voice recognition program. Efforts were made to edit the dictations, but occasionally words are mistranscribed.

## 2019-11-07 DIAGNOSIS — F41.1 GAD (GENERALIZED ANXIETY DISORDER): ICD-10-CM

## 2019-11-07 RX ORDER — FLUOXETINE HYDROCHLORIDE 20 MG/1
20 CAPSULE ORAL DAILY
Qty: 30 CAPSULE | Refills: 5 | Status: SHIPPED | OUTPATIENT
Start: 2019-11-07 | End: 2020-09-17

## 2019-11-27 ENCOUNTER — OFFICE VISIT (OUTPATIENT)
Dept: PULMONOLOGY | Facility: CLINIC | Age: 59
End: 2019-11-27

## 2019-11-27 VITALS
HEART RATE: 74 BPM | DIASTOLIC BLOOD PRESSURE: 80 MMHG | SYSTOLIC BLOOD PRESSURE: 120 MMHG | BODY MASS INDEX: 27.57 KG/M2 | OXYGEN SATURATION: 98 % | WEIGHT: 149.8 LBS | HEIGHT: 62 IN | TEMPERATURE: 97.9 F

## 2019-11-27 DIAGNOSIS — R91.8 MULTIPLE LUNG NODULES ON CT: Primary | ICD-10-CM

## 2019-11-27 DIAGNOSIS — J42 CHRONIC BRONCHITIS, UNSPECIFIED CHRONIC BRONCHITIS TYPE (HCC): ICD-10-CM

## 2019-11-27 DIAGNOSIS — F17.219 CIGARETTE NICOTINE DEPENDENCE WITH NICOTINE-INDUCED DISORDER: ICD-10-CM

## 2019-11-27 PROCEDURE — 99213 OFFICE O/P EST LOW 20 MIN: CPT | Performed by: INTERNAL MEDICINE

## 2019-11-27 NOTE — PROGRESS NOTES
"Pulmonary Follow-up Note    Subjective   Reason for consultation: Lung nodules    Sydnie Gamble is a 59 y.o. female is being seen for consultation today at the request of DAYSI Aceves    History of Present Illness   59 y.o.female smoker who reports a nocturnal cough for the past year, exertional dyspnea had a screening CT chest which showed several right side sub-centimeter nodules.  She is here for further evaluation.  She denies hemoptysis, weight loss, night sweats or lymphadenopathy.  She does report an \"aching feeling\" in her right upper neck for the past three months.     The patient has a history of COPD and has been treated with Stiolto.  She reports she has only been taking 1 puff daily instead of 2.  She has not used her rescue inhaler.  She denies any significant bronchitis.  Her cough is productive at times of some clear sputum.    She reports she has been trying to cut back on cigarettes using a nicotine patch and is down to 10 cigarettes/day.  She started cutting back when she found out about her CT scan.    Patient has a strong family history of lung cancer.    Interval history:  Patient had a CT scan in September that was essentially stable.  She quit smoking in March.  She vaping currently.  No cough.  She had some fevers and chills recently that she felt was due to changes in her thyroid medication and antidepressant.  No breathing issues although she does report some dyspnea when lying in bed with head on a pillow watching TV.  She doesn't use her Stiolto recently.     The following portions of the patient's history were reviewed and updated as appropriate: allergies, current medications, past family history, past medical history, past social history, past surgical history and problem list.    No Known Allergies    Current Outpatient Medications   Medication Sig Dispense Refill   • albuterol 108 (90 Base) MCG/ACT inhaler Inhale 2 puffs Every 4 (Four) Hours As Needed for Wheezing. 1 " inhaler 4   • clonazePAM (KlonoPIN) 1 MG tablet Take 1 mg by mouth At Night As Needed.     • FLUoxetine (PROzac) 20 MG capsule Take 1 capsule by mouth Daily. 30 capsule 5   • levothyroxine (SYNTHROID, LEVOTHROID) 88 MCG tablet Take 1 tablet by mouth Daily. 90 tablet 1   • pantoprazole (PROTONIX) 40 MG EC tablet Take 1 tablet by mouth Daily. 90 tablet 1   • tiotropium bromide-olodaterol (STIOLTO RESPIMAT) 2.5-2.5 MCG/ACT aerosol solution inhaler Inhale 1 puff Daily. 1 inhaler 5   • vitamin D (ERGOCALCIFEROL) 05469 units capsule capsule TAKE 1 CAPSULE BY MOUTH 1 (ONE) TIME PER WEEK. 12 capsule 0   • zolpidem (AMBIEN) 10 MG tablet TAKE 1 TABLET BY MOUTH EVERYDAY AT BEDTIME  2     No current facility-administered medications for this visit.        Review of Systems   Constitutional: Positive for chills and diaphoresis. Negative for activity change, appetite change (increased), fatigue, fever and unexpected weight change.   HENT: Positive for tinnitus. Negative for congestion, dental problem, ear pain, hearing loss, nosebleeds, postnasal drip, rhinorrhea and sinus pressure.    Eyes: Negative for pain, discharge, redness and visual disturbance.   Respiratory: Negative for apnea, cough, choking, chest tightness, shortness of breath, wheezing and stridor.    Cardiovascular: Positive for palpitations. Negative for chest pain and leg swelling.   Gastrointestinal: Negative for abdominal distention, abdominal pain, blood in stool, constipation, diarrhea, nausea and vomiting.   Endocrine: Positive for cold intolerance, heat intolerance and polydipsia. Negative for polyuria.   Genitourinary: Negative for dysuria, frequency, hematuria and urgency.   Musculoskeletal: Negative for arthralgias, joint swelling, myalgias and neck pain.   Skin: Negative for color change, rash and wound.   Allergic/Immunologic: Negative for environmental allergies and immunocompromised state.   Neurological: Negative for dizziness, syncope, weakness,  "light-headedness, numbness and headaches.   Psychiatric/Behavioral: Positive for dysphoric mood. Negative for suicidal ideas. The patient is nervous/anxious.    All other systems reviewed and are negative.    All other systems were reviewed and are negative.  Exceptions are included in the HPI or as otherwise noted above.     Objective   Blood pressure 120/80, pulse 74, temperature 97.9 °F (36.6 °C), height 157.5 cm (62\"), weight 67.9 kg (149 lb 12.8 oz), SpO2 98 %, not currently breastfeeding.  Physical Exam   Constitutional: She is oriented to person, place, and time. She appears well-developed and well-nourished.   HENT:   Head: Normocephalic and atraumatic.   Right Ear: External ear normal.   Left Ear: External ear normal.   Nose: Nose normal.   Mouth/Throat: Oropharynx is clear and moist. No oropharyngeal exudate.   Eyes: Conjunctivae and EOM are normal. Pupils are equal, round, and reactive to light. Right eye exhibits no discharge. Left eye exhibits no discharge. No scleral icterus.   Neck: Normal range of motion. Neck supple. No JVD present. No tracheal deviation present. No thyromegaly present.   Cardiovascular: Normal rate, regular rhythm, normal heart sounds and intact distal pulses. Exam reveals no gallop and no friction rub.   No murmur heard.  Pulmonary/Chest: Effort normal. No accessory muscle usage or stridor. No apnea, no tachypnea and no bradypnea. No respiratory distress. She has no decreased breath sounds. She has no wheezes. She has no rhonchi. She has no rales. Chest wall is not dull to percussion. She exhibits no tenderness, no bony tenderness, no crepitus and no deformity.   Abdominal: Soft. Bowel sounds are normal. She exhibits no distension. There is no tenderness.   Musculoskeletal: Normal range of motion. She exhibits no edema, tenderness or deformity.   Lymphadenopathy:     She has no cervical adenopathy.   Neurological: She is alert and oriented to person, place, and time. No cranial " nerve deficit. She exhibits normal muscle tone. Coordination normal.   Skin: Skin is warm and dry. No rash noted. No erythema. No pallor.   Psychiatric: She has a normal mood and affect. Her behavior is normal. Judgment and thought content normal.   Vitals reviewed.      PFTs:    Full pulmonary function testing was done on 3/12/2019.  Please see scanned PFT report for details.  FVC was 2.45 L or 80% predicted.  FEV1 was 1.50 L or 64% predicted.  FEV1 to FVC ratio 61%.  Postbronchodilator FEV1 1.57 L or 67% predicted, a 4% increase from prebronchodilator.  Total lung capacity was 4.23 L or 94% predicted.    Interpretation: Moderate obstruction without significant response to bronchodilator.  Low maximal voluntary ventilation.  No restriction.  No air trapping or hyperinflation.  Low DLCO which corrects when adjusted for alveolar ventilated volumes.  DLCO is likely underestimated on this study.  No prior studies available for comparison.  Study is consistent with stage II COPD.    Imaging:   CT scan of the chest from 9/30/2019 was reviewed.  It should stable part solid upper lobe and superior segment right lower lobe pulmonary nodules compared to 3/25/2019 exam.  I reviewed images and report with the patient.      Assessment/Plan   Problems Addressed this Visit        Respiratory    COPD (chronic obstructive pulmonary disease) (CMS/HCC)    Multiple lung nodules on CT - Primary       Nervous and Auditory    Cigarette nicotine dependence with nicotine-induced disorder - currently vaping          Discussion:  58-year-old female here for right sided subcentimeter lung nodules on screening CT scan.  She is a smoker with stage II COPD based on pulmonary function testing done today.    Patient has had stable subcentimeter right lung nodules.  She is currently vaping but not using cigarettes.  Plan is to continue follow-up of the nodules with serial CT scan imaging.    I counseled the patient on smoking cessation including  the fact that vaping is not considered a safe alternative to smoking.  Plan at this point will be to follow-up after repeat CT scan in March.    Plan:  1.  Follow-up CT scan in late February as ordered.  2.  Follow-up after repeat scan.  3.  Recommended she continue Stiolto for maintenance of COPD.  She is not currently compliant with this and does not seem that she will be.  4.  Continue albuterol rescue inhaler as needed.  5.  Smoking cessation counseling done.       Electronically signed by:  Toni Mcclelland MD  11/27/19  4:03 PM

## 2020-02-28 ENCOUNTER — HOSPITAL ENCOUNTER (OUTPATIENT)
Dept: CT IMAGING | Facility: HOSPITAL | Age: 60
Discharge: HOME | End: 2020-02-28
Admitting: NURSE PRACTITIONER

## 2020-02-28 DIAGNOSIS — R91.8 MULTIPLE LUNG NODULES ON CT: ICD-10-CM

## 2020-02-28 PROCEDURE — 71250 CT THORAX DX C-: CPT

## 2020-03-14 ENCOUNTER — TRANSCRIBE ORDERS (OUTPATIENT)
Dept: PULMONOLOGY | Facility: CLINIC | Age: 60
End: 2020-03-14

## 2020-03-23 DIAGNOSIS — J41.0 SIMPLE CHRONIC BRONCHITIS (HCC): ICD-10-CM

## 2020-03-23 RX ORDER — ALBUTEROL SULFATE 90 UG/1
2 AEROSOL, METERED RESPIRATORY (INHALATION) EVERY 4 HOURS PRN
Qty: 1 INHALER | Refills: 4 | Status: SHIPPED | OUTPATIENT
Start: 2020-03-23 | End: 2021-07-14

## 2020-03-27 ENCOUNTER — TELEMEDICINE (OUTPATIENT)
Dept: PULMONOLOGY | Facility: CLINIC | Age: 60
End: 2020-03-27

## 2020-03-27 DIAGNOSIS — J44.9 CHRONIC OBSTRUCTIVE PULMONARY DISEASE, UNSPECIFIED COPD TYPE (HCC): Primary | ICD-10-CM

## 2020-03-27 DIAGNOSIS — K21.9 GASTROESOPHAGEAL REFLUX DISEASE WITHOUT ESOPHAGITIS: ICD-10-CM

## 2020-03-27 DIAGNOSIS — F17.219 CIGARETTE NICOTINE DEPENDENCE WITH NICOTINE-INDUCED DISORDER: ICD-10-CM

## 2020-03-27 DIAGNOSIS — R91.8 MULTIPLE LUNG NODULES ON CT: ICD-10-CM

## 2020-03-27 PROCEDURE — 99214 OFFICE O/P EST MOD 30 MIN: CPT | Performed by: NURSE PRACTITIONER

## 2020-03-27 NOTE — PROGRESS NOTES
[facility] Pulmonary Follow up    CHIEF COMPLAINT    COPD    HISTORY OF PRESENT ILLNESS    Sydnie Gamble is a 59 y.o.female here today for a follow-up of her COPD.  She was last seen in the office by Dr. Mcclelland in November.  She denies any respiratory illnesses or exacerbations since her last appointment.  She states for the most part she has been doing fairly well since her last appointment.    She continues to use Stiolto sparingly for her COPD.  She only uses it every other day or so.  She also has a rescue inhaler but has not used it in quite some time.  She did get these recently refilled at the pharmacy because she was afraid she would run out.  She denies any sputum production.  She has occasional shortness of breath with activity but does recover quickly at rest.    She had a follow-up CT scan in February performed has reviewed her results on my chart.  She has been followed in the past for multiple pulmonary nodules and had a negative PET in April 2019.    She denies a fever, chills, sputum production, hemoptysis, night sweats, weight loss, chest pain or palpitations.  She denies any lower extremity edema or calf tenderness.  She denies any sinus or allergy symptoms currently.  She denies reflux symptoms and remains on Protonix daily.    She continues to vape multiple times throughout the day.  She is trying to cut back.  She quit smoking in 2019 and has a 45-pack-year history.    She is up-to-date on her current vaccinations.    Patient Active Problem List   Diagnosis   • Personal history of tobacco use, presenting hazards to health   • Gastroesophageal reflux disease without esophagitis   • Acquired hypothyroidism   • Vitamin D deficiency   • Prediabetes   • COPD (chronic obstructive pulmonary disease) (CMS/Former[facility])   • ALESIA (generalized anxiety disorder)   • Multiple lung nodules on CT   • Cigarette nicotine dependence with nicotine-induced disorder - currently vaping   • Hilar adenopathy   • HLD  (hyperlipidemia)       No Known Allergies    Current Outpatient Medications:   •  albuterol sulfate  (90 Base) MCG/ACT inhaler, Inhale 2 puffs Every 4 (Four) Hours As Needed for Wheezing., Disp: 1 inhaler, Rfl: 4  •  clonazePAM (KlonoPIN) 1 MG tablet, Take 1 mg by mouth At Night As Needed., Disp: , Rfl:   •  FLUoxetine (PROzac) 20 MG capsule, Take 1 capsule by mouth Daily., Disp: 30 capsule, Rfl: 5  •  levothyroxine (SYNTHROID, LEVOTHROID) 88 MCG tablet, Take 1 tablet by mouth Daily., Disp: 90 tablet, Rfl: 1  •  pantoprazole (PROTONIX) 40 MG EC tablet, Take 1 tablet by mouth Daily., Disp: 90 tablet, Rfl: 1  •  tiotropium bromide-olodaterol (Stiolto Respimat) 2.5-2.5 MCG/ACT aerosol solution inhaler, Inhale 1 puff Daily., Disp: 1 inhaler, Rfl: 5  •  vitamin D (ERGOCALCIFEROL) 17388 units capsule capsule, TAKE 1 CAPSULE BY MOUTH 1 (ONE) TIME PER WEEK., Disp: 12 capsule, Rfl: 0  •  zolpidem (AMBIEN) 10 MG tablet, TAKE 1 TABLET BY MOUTH EVERYDAY AT BEDTIME, Disp: , Rfl: 2  MEDICATION LIST AND ALLERGIES REVIEWED.    Social History     Tobacco Use   • Smoking status: Former Smoker     Packs/day: 1.50     Years: 45.00     Pack years: 67.50     Types: Electronic Cigarette     Last attempt to quit: 3/30/2019     Years since quittin.9   • Smokeless tobacco: Never Used   • Tobacco comment: quit analog cigs in march, still vapes daily   Substance Use Topics   • Alcohol use: No   • Drug use: No       FAMILY AND SOCIAL HISTORY REVIEWED.    Review of Systems   Constitutional: Negative for activity change, appetite change, fatigue, fever and unexpected weight change.   HENT: Negative for congestion, postnasal drip, rhinorrhea, sinus pressure, sore throat and voice change.    Eyes: Negative for visual disturbance.   Respiratory: Positive for cough and shortness of breath. Negative for chest tightness and wheezing.    Cardiovascular: Negative for chest pain, palpitations and leg swelling.   Gastrointestinal: Negative for  abdominal distention, abdominal pain, nausea and vomiting.   Endocrine: Negative for cold intolerance and heat intolerance.   Genitourinary: Negative for difficulty urinating and urgency.   Musculoskeletal: Negative for arthralgias, back pain and neck pain.   Skin: Negative for color change and pallor.   Allergic/Immunologic: Negative for environmental allergies and food allergies.   Neurological: Negative for dizziness, syncope, weakness and light-headedness.   Hematological: Negative for adenopathy. Does not bruise/bleed easily.   Psychiatric/Behavioral: Negative for agitation and behavioral problems.   .    LMP  (LMP Unknown)     Immunization History   Administered Date(s) Administered   • FLUARIX/FLUZONE/AFLURIA/FLULAVAL QUAD 10/25/2018   • Pneumococcal Polysaccharide (PPSV23) 04/09/2019   • flucelvax quad pfs =>4 YRS 10/09/2019       Physical Exam    Unable to complete physical exam due to video visit today.  Patient appears to be in no respiratory distress and communicated easily throughout visit.    RESULTS    Ct Chest Without Contrast    Result Date: 3/2/2020  Stable nodular densities identified within the right upper lobe and right lower lobe. Findings are unchanged in appearance with moderate to severe emphysematous and chronic changes seen throughout the lung fields bilaterally. No superimposed infectious process. No significant change in the interval. Continued follow-up is recommended as indicated.  DICTATED:   02/29/2020 EDITED/ls :   02/29/2020  This report was finalized on 3/2/2020 6:37 PM by Dr. Elizabeth Shepard MD.      PROBLEM LIST    Problem List Items Addressed This Visit        Respiratory    COPD (chronic obstructive pulmonary disease) (CMS/HCC) - Primary    Multiple lung nodules on CT    Overview     Right sided sub-centimeter lung nodules.          Relevant Orders    CT Chest Without Contrast       Digestive    Gastroesophageal reflux disease without esophagitis       Nervous and Auditory     Cigarette nicotine dependence with nicotine-induced disorder - currently vaping            DISCUSSION    Ms. Gamble did a video conference visit with me today.  She seems to be doing well from a pulmonary standpoint.  She will continue Stiolto 2 puffs daily for her COPD.  I did encourage her to use this every day.  I did state that this would help with her shortness of breath with activity.  I did encourage her to have her albuterol on hand to use if she were to develop shortness of breath.  She is agreeable to use these inhalers.    We reviewed her CT scan that was performed on February 29.  Her CT scan did show stable nodular densities in the right upper lobe and right lower lobe.  She will need a repeat CT scan in 6 months and we will follow these for 2-year stability.  I will go ahead and place the order today for the CT scan to be performed in August 2020.  They will call her and have this arranged and she will follow-up in the office after the CT scan has been performed.    She will continue Protonix daily for GERD.  We also discussed reflux precaution such as elevating the head of the bed at night, not eating 2 to 3 hours before bed and avoiding foods that trigger reflux symptoms.    She will follow-up in 6 months or sooner if her symptoms worsen.  She will call with any additional concerns or questions.  I spent 25 minutes with the patient. I spent > 50% percent of this time counseling and discussing diagnosis, prognosis, diagnostic testing, evaluation, current status, treatment options and management.    DAYSI Torres  03/27/20202:39 PM  Electronically signed     Please note that portions of this note were completed with a voice recognition program. Efforts were made to edit the dictations, but occasionally words are mistranscribed.      CC: Maryellen Mendosa, DAYSI

## 2020-04-05 DIAGNOSIS — E03.9 HYPOTHYROIDISM, UNSPECIFIED TYPE: ICD-10-CM

## 2020-04-06 RX ORDER — LEVOTHYROXINE SODIUM 88 UG/1
TABLET ORAL
Qty: 90 TABLET | Refills: 1 | Status: SHIPPED | OUTPATIENT
Start: 2020-04-06 | End: 2020-09-17

## 2020-05-15 DIAGNOSIS — J41.0 SIMPLE CHRONIC BRONCHITIS (HCC): ICD-10-CM

## 2020-05-21 ENCOUNTER — TELEPHONE (OUTPATIENT)
Dept: INTERNAL MEDICINE | Facility: CLINIC | Age: 60
End: 2020-05-21

## 2020-05-21 DIAGNOSIS — N95.0 POSTMENOPAUSAL BLEEDING: Primary | ICD-10-CM

## 2020-05-21 NOTE — TELEPHONE ENCOUNTER
PATIENT STATES THAT SHE IS SPOTTING/BLEEDING AND WOULD LIKE A REFERRAL FOR A FEMALE OB/GYN   SHE DOES NOT WANT THE PHYSICIAN'S OFFICE TO BE IN THE HOSPITAL BECAUSE SHE DOES NOT FEEL COMFORTABLE GOING IN THERE WITH COVID 19 RIGHT NOT.  PLEASE ADVISE     PATIENT CALL BACK [phone]    PATIENT ALSO WOULD LIKE TO COME IN AND GET BLOOD WORK DONE IF YOU NEED HER TO. PLEASE LET HER KNOW WHEN THE ORDER CAN BE PUT IN

## 2020-06-13 DIAGNOSIS — K21.9 GASTROESOPHAGEAL REFLUX DISEASE WITHOUT ESOPHAGITIS: ICD-10-CM

## 2020-06-15 RX ORDER — PANTOPRAZOLE SODIUM 40 MG/1
40 TABLET, DELAYED RELEASE ORAL DAILY
Qty: 30 TABLET | Refills: 0 | Status: SHIPPED | OUTPATIENT
Start: 2020-06-15 | End: 2020-07-15

## 2020-07-15 DIAGNOSIS — K21.9 GASTROESOPHAGEAL REFLUX DISEASE WITHOUT ESOPHAGITIS: ICD-10-CM

## 2020-07-15 RX ORDER — PANTOPRAZOLE SODIUM 40 MG/1
40 TABLET, DELAYED RELEASE ORAL DAILY
Qty: 30 TABLET | Refills: 2 | Status: SHIPPED | OUTPATIENT
Start: 2020-07-15 | End: 2020-09-17

## 2020-08-05 ENCOUNTER — TELEPHONE (OUTPATIENT)
Dept: INTERNAL MEDICINE | Facility: CLINIC | Age: 60
End: 2020-08-05

## 2020-08-05 NOTE — TELEPHONE ENCOUNTER
Patient is supposed to have a 3 month follow up for bloodwork and it has been over 9 months,  Patient would like to know if she needs an appointment or if he can walk in and have it done.  Patient would like a call back.... Please advise    Sydnie Gamble call back... [phone]

## 2020-08-07 PROBLEM — R68.89 SUSPECTED CERVICAL CANCER: Status: ACTIVE | Noted: 2020-08-07

## 2020-08-10 ENCOUNTER — OFFICE VISIT (OUTPATIENT)
Dept: GYNECOLOGIC ONCOLOGY | Facility: CLINIC | Age: 60
End: 2020-08-10

## 2020-08-10 VITALS
TEMPERATURE: 98 F | DIASTOLIC BLOOD PRESSURE: 70 MMHG | WEIGHT: 169 LBS | HEART RATE: 92 BPM | OXYGEN SATURATION: 95 % | BODY MASS INDEX: 30.91 KG/M2 | RESPIRATION RATE: 18 BRPM | SYSTOLIC BLOOD PRESSURE: 140 MMHG

## 2020-08-10 DIAGNOSIS — R68.89 SUSPECTED CERVICAL CANCER: Primary | ICD-10-CM

## 2020-08-10 PROCEDURE — 88305 TISSUE EXAM BY PATHOLOGIST: CPT | Performed by: OBSTETRICS & GYNECOLOGY

## 2020-08-10 PROCEDURE — 99245 OFF/OP CONSLTJ NEW/EST HI 55: CPT | Performed by: OBSTETRICS & GYNECOLOGY

## 2020-08-10 PROCEDURE — 57500 BIOPSY OF CERVIX: CPT | Performed by: OBSTETRICS & GYNECOLOGY

## 2020-08-10 PROCEDURE — 99406 BEHAV CHNG SMOKING 3-10 MIN: CPT | Performed by: OBSTETRICS & GYNECOLOGY

## 2020-08-10 RX ORDER — ACETAMINOPHEN 325 MG/1
650 TABLET ORAL ONCE
Status: CANCELLED | OUTPATIENT
Start: 2020-08-10

## 2020-08-10 RX ORDER — SODIUM CHLORIDE, SODIUM LACTATE, POTASSIUM CHLORIDE, CALCIUM CHLORIDE 600; 310; 30; 20 MG/100ML; MG/100ML; MG/100ML; MG/100ML
100 INJECTION, SOLUTION INTRAVENOUS CONTINUOUS
Status: CANCELLED | OUTPATIENT
Start: 2020-08-10

## 2020-08-10 RX ORDER — OXYCODONE HYDROCHLORIDE 5 MG/1
5 TABLET ORAL ONCE
Status: CANCELLED | OUTPATIENT
Start: 2020-08-10

## 2020-08-10 RX ORDER — GABAPENTIN 100 MG/1
300 CAPSULE ORAL ONCE
Status: CANCELLED | OUTPATIENT
Start: 2020-08-10

## 2020-08-10 RX ORDER — CELECOXIB 100 MG/1
200 CAPSULE ORAL ONCE
Status: CANCELLED | OUTPATIENT
Start: 2020-08-10

## 2020-08-10 RX ORDER — HEPARIN SODIUM 1000 [USP'U]/ML
5000 INJECTION, SOLUTION INTRAVENOUS; SUBCUTANEOUS ONCE
Status: CANCELLED | OUTPATIENT
Start: 2020-08-10 | End: 2020-08-10

## 2020-08-10 NOTE — PROGRESS NOTES
New Patient Office Visit      Patient Name: Sydnie Gamble  : 1960   MRN: [id number]     Referring Physician: Elissa Lee DO    Chief Complaint:    Chief Complaint   Patient presents with   • Cervical Cancer       History of Present Illness: Sdynie Gamble is a 60 y.o. female who presents today as a consultation for a Pap test that was suspicious for cervical cancer.  She reports that she had not had gynecologic care for the past 30 years.  She developed vaginal bleeding approximately 2 months ago this is since tapered off to pink spotting.  She also reports a little bit of pelvic pain denies any shooting pains down her legs.  She does report back pain but this is her baseline.  She does have some vaginal pain but this is minimal and does not require any pain medication.  She does report a 35 pound weight gain over the past several years but has a history of hypothyroidism and has not been compliant.  She also reports some shortness of breath and has history of lung nodules for which she is been followed by pulmonology for.  She denies any chest pain.  Does intermittently have palpitations that she attributes to anxiety.  She is tolerating regular diet and denies any changes to her bowels or bladder.    Oncologic History:     Suspected cervical cancer    2020 Initial Diagnosis     Suspected cervical cancer  Referred by Dr. Elissa Lee    2020: Seen for complaints of postmenopausal bleeding and found to have abnormal appearing cervix. Pap test suspicious for squamous cell carcinoma.          Past Medical History:   Past Medical History:   Diagnosis Date   • Acid reflux    • Acid reflux    • Anxiety    • Arthritis    • Emphysema of lung (CMS/HCC)    • Hypothyroidism    • Lung nodule    • Ovarian cyst    • Ovarian cyst    • Visual impairment corrected with glasses       Past Surgical History: History reviewed. No pertinent surgical history.    Family History:   Family History   Problem  Relation Age of Onset   • Cancer Mother    • Hypertension Mother    • Mental illness Mother    • Anxiety disorder Mother    • Asthma Mother    • COPD Mother    • Depression Mother    • Emphysema Mother    • Cancer Father    • Heart attack Maternal Grandfather    • Cancer Maternal Aunt    • Cancer Maternal Aunt    • Cancer Maternal Grandmother    • Breast cancer Neg Hx    • Ovarian cancer Neg Hx        Social History:   Social History     Socioeconomic History   • Marital status:      Spouse name: Not on file   • Number of children: Not on file   • Years of education: Not on file   • Highest education level: Not on file   Tobacco Use   • Smoking status: Former Smoker     Packs/day: 1.50     Years: 45.00     Pack years: 67.50     Types: Electronic Cigarette     Last attempt to quit: 3/30/2019     Years since quittin.3   • Smokeless tobacco: Never Used   • Tobacco comment: quit analog cigs in march, still vapes daily   Substance and Sexual Activity   • Alcohol use: No   • Drug use: No   • Sexual activity: Never       Past OB/GYN History:   OB History    Para Term  AB Living   1 1 1         SAB TAB Ectopic Molar Multiple Live Births                    # Outcome Date GA Lbr [facility]/2nd Weight Sex Delivery Anes PTL Lv   1 Term                 Health Maintenance:    Mammogram: Date: 2019 Results: normal per patient  Colonoscopy: Never had    Medications:     Current Outpatient Medications:   •  albuterol sulfate  (90 Base) MCG/ACT inhaler, Inhale 2 puffs Every 4 (Four) Hours As Needed for Wheezing., Disp: 1 inhaler, Rfl: 4  •  clonazePAM (KlonoPIN) 1 MG tablet, Take 1 mg by mouth At Night As Needed., Disp: , Rfl:   •  FLUoxetine (PROzac) 20 MG capsule, Take 1 capsule by mouth Daily., Disp: 30 capsule, Rfl: 5  •  levothyroxine (SYNTHROID, LEVOTHROID) 88 MCG tablet, TAKE 1 TABLET BY MOUTH EVERY DAY, Disp: 90 tablet, Rfl: 1  •  pantoprazole (PROTONIX) 40 MG EC tablet, TAKE 1 TABLET BY MOUTH  DAILY. PATIENT NEEDS APPOINTMENT PRIOR TO NEXT REFILL., Disp: 30 tablet, Rfl: 2  •  tiotropium bromide-olodaterol (Stiolto Respimat) 2.5-2.5 MCG/ACT aerosol solution inhaler, Inhale 1 puff Daily., Disp: 1 inhaler, Rfl: 5  •  vitamin D (ERGOCALCIFEROL) 10688 units capsule capsule, TAKE 1 CAPSULE BY MOUTH 1 (ONE) TIME PER WEEK., Disp: 12 capsule, Rfl: 0  •  zolpidem (AMBIEN) 10 MG tablet, TAKE 1 TABLET BY MOUTH EVERYDAY AT BEDTIME, Disp: , Rfl: 2    Allergies:   No Known Allergies    Review of Systems:   Review of Systems   Constitutional: Positive for activity change, diaphoresis and unexpected weight change. Negative for appetite change, chills, fatigue and fever.   HENT: Negative for congestion, hearing loss, sneezing, sore throat and tinnitus.    Eyes: Negative for visual disturbance.   Respiratory: Positive for shortness of breath. Negative for cough and wheezing.    Cardiovascular: Positive for palpitations. Negative for chest pain and leg swelling.   Gastrointestinal: Positive for diarrhea. Negative for abdominal distention, abdominal pain, constipation, nausea and vomiting.   Genitourinary: Positive for pelvic pain and vaginal bleeding. Negative for dyspareunia, dysuria, frequency, hematuria and urgency.   Musculoskeletal: Positive for arthralgias and back pain. Negative for myalgias.   Skin: Negative for color change, pallor and rash.   Neurological: Negative for dizziness, light-headedness, numbness and headaches.   Hematological: Negative for adenopathy. Does not bruise/bleed easily.   Psychiatric/Behavioral: Negative for dysphoric mood. The patient is nervous/anxious.         Objective     Physical Exam:  Vital Signs:   Vitals:    08/10/20 1002   BP: 140/70   Pulse: 92   Resp: 18   Temp: 98 °F (36.7 °C)   TempSrc: Temporal   SpO2: 95%   Weight: 76.7 kg (169 lb)   PainSc: 0-No pain     BMI: Body mass index is 30.91 kg/m².     Physical Exam   Constitutional: She appears well-developed and well-nourished. No  distress.   HENT:   Head: Normocephalic and atraumatic.   Right Ear: External ear normal.   Left Ear: External ear normal.   Nose: Nose normal.   Eyes: Conjunctivae are normal. Right eye exhibits no discharge. Left eye exhibits no discharge. No scleral icterus.   Neck: Normal range of motion. Neck supple. No thyromegaly present.   Cardiovascular: Normal rate and regular rhythm.   No murmur heard.  Pulmonary/Chest: Effort normal and breath sounds normal. No respiratory distress. She has no wheezes.   Abdominal: Soft. Bowel sounds are normal. She exhibits no distension and no mass. There is no tenderness. There is no guarding. No hernia.   Genitourinary:   Genitourinary Comments: External genitalia normal. Vagina with blood in vault. Cervix with 1.5 cm erythematous lesion almost replacing posterior lip of cervix with several areas of leukoplakia. No vaginal fornix involvement. Uterus normal and mobile. No adnexal masses. Rectovaginal septum smooth and without lesions. Cervical biopsy performed today.   Musculoskeletal: She exhibits no edema, tenderness or deformity.   Lymphadenopathy:     She has no cervical adenopathy.   Neurological: She is alert.   Skin: Skin is warm and dry. She is not diaphoretic. No erythema.   Psychiatric: She has a normal mood and affect. Her behavior is normal. Thought content normal.   Nursing note and vitals reviewed.    Cervical Biopsy Procedure:   Urine pregnancy test N/A     Procedure Detail(s):  A speculum was placed into the vagina and a cervical biopsy of the posterior lip was taken using the Tischler forceps.  Silver nitrate was applied to the area for hemostasis.  At the end of the procedure there was no additional bleeding.    Complications: The patient tolerated the procedure well with no complications.     Findings: See above     Biopsy/Specimens: Labeled and sent to pathology.      Assessment / Plan      Assessment/Plan:   Sydnie was seen today for suspected cervical cancer.  A  cervical biopsy was obtained today to confirm diagnosis.  Please see the procedure note for more details.  My exam today is consistent stage IB1 (FIGO 2018 staging) cervical cancer (pending final pathologic results). I counseled her that she is amenable to surgical intervention. Based on her early stage disease, I recommend a radical hysterectomy, unilateral salpingo-oophorectomy (prior LSO), pelvic +/- common iliac/para-aortic lymph node dissection. I have explained to the patient and her family that it may be necessary to abort the hysterectomy aspect of the procedure (plan to proceed with lymph node staging regardless) depending on the findings intra-operatively. We discussed the recovery period and the possible need for adjuvant therapy (chemoradiation) if high risk or advanced disease is encountered. I have discussed the risk of the procedure to include but not limited to infection, wound breakdown, bleeding requiring blood transfusion with a risk of chalino viral infections; blood clots (PE/DVT/CVA/MI); injury to bowel, bladder, ureters, fistulas possibly requiring additional or future surgeries; bladder retention (even rarely permanent) that will require catheterization at home (self cath or indwelling) and/or rectal dysfunction/constipation possibly requiring long term treatment; nerve injury resulting in permanent numbness, lower extremity weakness; lymphedema with the potential for long term impact on quality of life; possibly even death.  I have also discussed the potential need for post- operative additional treatment to include radiation and/or chemotherapy. Verbal informed consent was obtained with the the patient and family verbalized understanding and asked appropriate questions.     Patient does have a history of known pulmonary nodules that is being followed with pulmonology.  Given this history we will obtain a CT chest, abdomen, and pelvis prior to surgery to rule out metastatic disease.  An  EKG was also ordered given patient's reported history of palpitations.  If the patient's imaging demonstrates extracervical disease or if the biopsy performed today is non-diagnostic, I will call the patient to discuss next steps.  However given my extremely high concern for early stage cervical cancer we will proceed with preparing for radical hysterectomy.    Hypothyroidism: Patient with poorly controlled hypothyroidism.  Has not had thyroid checked in 9 months.  She will have a TSH drawn today with an order provided by her PCP.    Prediabetes: We will obtain preop hemoglobin A1c.  Patient has order from PCP.    Tobacco use (vaping): She is aware of the risks of tobacco use such as increased cancer risks, heart disease/medical co-morbidities, and increased risk of complications from surgery/treatment.  We also extensively discussed risk of vaping. She has been encouraged to quit and was offered resources including smoking cessation counseling, nicotine gum, lozenges, and patches, as well as pharmaceutical assistance (bupropion and/or varenicline). At this time, she wishes to consider quitting and understands additional resources are available.  Approximately 5 minutes spent on counseling.    Diagnoses and all orders for this visit:    Suspected cervical cancer  -     Case Request; Standing  -     Case Request  -     Tissue Pathology Exam; Future  -     CBC and Differential; Future  -     Comprehensive metabolic panel; Future  -     Type and screen; Future  -     ECG 12 Lead; Future  -     CT Abdomen Pelvis With Contrast; Future  -     CT Chest With Contrast; Future  -     Tissue Pathology Exam    Other orders  -     SCANNED PATHOLOGY  -     Follow anesthesia standing orders.; Future  -     Obtain informed consent  -     Provide NPO Instructions to Patient; Future  -     Chlorhexidine Skin Prep; Future         Follow Up:   She will follow-up to 3 weeks postoperatively.    ANNIA Jimenez MD  Gynecologic Oncology      Please note that portions of this note may have been completed with a voice recognition program.

## 2020-08-10 NOTE — H&P (VIEW-ONLY)
New Patient Office Visit      Patient Name: Sydnie Gamble  : 1960   MRN: [id number]     Referring Physician: Elissa Lee DO    Chief Complaint:    Chief Complaint   Patient presents with   • Cervical Cancer       History of Present Illness: Sydnie Gamble is a 60 y.o. female who presents today as a consultation for a Pap test that was suspicious for cervical cancer.  She reports that she had not had gynecologic care for the past 30 years.  She developed vaginal bleeding approximately 2 months ago this is since tapered off to pink spotting.  She also reports a little bit of pelvic pain denies any shooting pains down her legs.  She does report back pain but this is her baseline.  She does have some vaginal pain but this is minimal and does not require any pain medication.  She does report a 35 pound weight gain over the past several years but has a history of hypothyroidism and has not been compliant.  She also reports some shortness of breath and has history of lung nodules for which she is been followed by pulmonology for.  She denies any chest pain.  Does intermittently have palpitations that she attributes to anxiety.  She is tolerating regular diet and denies any changes to her bowels or bladder.    Oncologic History:     Suspected cervical cancer    2020 Initial Diagnosis     Suspected cervical cancer  Referred by Dr. Elissa Lee    2020: Seen for complaints of postmenopausal bleeding and found to have abnormal appearing cervix. Pap test suspicious for squamous cell carcinoma.          Past Medical History:   Past Medical History:   Diagnosis Date   • Acid reflux    • Acid reflux    • Anxiety    • Arthritis    • Emphysema of lung (CMS/HCC)    • Hypothyroidism    • Lung nodule    • Ovarian cyst    • Ovarian cyst    • Visual impairment corrected with glasses       Past Surgical History: History reviewed. No pertinent surgical history.    Family History:   Family History   Problem  Relation Age of Onset   • Cancer Mother    • Hypertension Mother    • Mental illness Mother    • Anxiety disorder Mother    • Asthma Mother    • COPD Mother    • Depression Mother    • Emphysema Mother    • Cancer Father    • Heart attack Maternal Grandfather    • Cancer Maternal Aunt    • Cancer Maternal Aunt    • Cancer Maternal Grandmother    • Breast cancer Neg Hx    • Ovarian cancer Neg Hx        Social History:   Social History     Socioeconomic History   • Marital status:      Spouse name: Not on file   • Number of children: Not on file   • Years of education: Not on file   • Highest education level: Not on file   Tobacco Use   • Smoking status: Former Smoker     Packs/day: 1.50     Years: 45.00     Pack years: 67.50     Types: Electronic Cigarette     Last attempt to quit: 3/30/2019     Years since quittin.3   • Smokeless tobacco: Never Used   • Tobacco comment: quit analog cigs in march, still vapes daily   Substance and Sexual Activity   • Alcohol use: No   • Drug use: No   • Sexual activity: Never       Past OB/GYN History:   OB History    Para Term  AB Living   1 1 1         SAB TAB Ectopic Molar Multiple Live Births                    # Outcome Date GA Lbr [facility]/2nd Weight Sex Delivery Anes PTL Lv   1 Term                 Health Maintenance:    Mammogram: Date: 2019 Results: normal per patient  Colonoscopy: Never had    Medications:     Current Outpatient Medications:   •  albuterol sulfate  (90 Base) MCG/ACT inhaler, Inhale 2 puffs Every 4 (Four) Hours As Needed for Wheezing., Disp: 1 inhaler, Rfl: 4  •  clonazePAM (KlonoPIN) 1 MG tablet, Take 1 mg by mouth At Night As Needed., Disp: , Rfl:   •  FLUoxetine (PROzac) 20 MG capsule, Take 1 capsule by mouth Daily., Disp: 30 capsule, Rfl: 5  •  levothyroxine (SYNTHROID, LEVOTHROID) 88 MCG tablet, TAKE 1 TABLET BY MOUTH EVERY DAY, Disp: 90 tablet, Rfl: 1  •  pantoprazole (PROTONIX) 40 MG EC tablet, TAKE 1 TABLET BY MOUTH  DAILY. PATIENT NEEDS APPOINTMENT PRIOR TO NEXT REFILL., Disp: 30 tablet, Rfl: 2  •  tiotropium bromide-olodaterol (Stiolto Respimat) 2.5-2.5 MCG/ACT aerosol solution inhaler, Inhale 1 puff Daily., Disp: 1 inhaler, Rfl: 5  •  vitamin D (ERGOCALCIFEROL) 61111 units capsule capsule, TAKE 1 CAPSULE BY MOUTH 1 (ONE) TIME PER WEEK., Disp: 12 capsule, Rfl: 0  •  zolpidem (AMBIEN) 10 MG tablet, TAKE 1 TABLET BY MOUTH EVERYDAY AT BEDTIME, Disp: , Rfl: 2    Allergies:   No Known Allergies    Review of Systems:   Review of Systems   Constitutional: Positive for activity change, diaphoresis and unexpected weight change. Negative for appetite change, chills, fatigue and fever.   HENT: Negative for congestion, hearing loss, sneezing, sore throat and tinnitus.    Eyes: Negative for visual disturbance.   Respiratory: Positive for shortness of breath. Negative for cough and wheezing.    Cardiovascular: Positive for palpitations. Negative for chest pain and leg swelling.   Gastrointestinal: Positive for diarrhea. Negative for abdominal distention, abdominal pain, constipation, nausea and vomiting.   Genitourinary: Positive for pelvic pain and vaginal bleeding. Negative for dyspareunia, dysuria, frequency, hematuria and urgency.   Musculoskeletal: Positive for arthralgias and back pain. Negative for myalgias.   Skin: Negative for color change, pallor and rash.   Neurological: Negative for dizziness, light-headedness, numbness and headaches.   Hematological: Negative for adenopathy. Does not bruise/bleed easily.   Psychiatric/Behavioral: Negative for dysphoric mood. The patient is nervous/anxious.         Objective     Physical Exam:  Vital Signs:   Vitals:    08/10/20 1002   BP: 140/70   Pulse: 92   Resp: 18   Temp: 98 °F (36.7 °C)   TempSrc: Temporal   SpO2: 95%   Weight: 76.7 kg (169 lb)   PainSc: 0-No pain     BMI: Body mass index is 30.91 kg/m².     Physical Exam   Constitutional: She appears well-developed and well-nourished. No  distress.   HENT:   Head: Normocephalic and atraumatic.   Right Ear: External ear normal.   Left Ear: External ear normal.   Nose: Nose normal.   Eyes: Conjunctivae are normal. Right eye exhibits no discharge. Left eye exhibits no discharge. No scleral icterus.   Neck: Normal range of motion. Neck supple. No thyromegaly present.   Cardiovascular: Normal rate and regular rhythm.   No murmur heard.  Pulmonary/Chest: Effort normal and breath sounds normal. No respiratory distress. She has no wheezes.   Abdominal: Soft. Bowel sounds are normal. She exhibits no distension and no mass. There is no tenderness. There is no guarding. No hernia.   Genitourinary:   Genitourinary Comments: External genitalia normal. Vagina with blood in vault. Cervix with 1.5 cm erythematous lesion almost replacing posterior lip of cervix with several areas of leukoplakia. No vaginal fornix involvement. Uterus normal and mobile. No adnexal masses. Rectovaginal septum smooth and without lesions. Cervical biopsy performed today.   Musculoskeletal: She exhibits no edema, tenderness or deformity.   Lymphadenopathy:     She has no cervical adenopathy.   Neurological: She is alert.   Skin: Skin is warm and dry. She is not diaphoretic. No erythema.   Psychiatric: She has a normal mood and affect. Her behavior is normal. Thought content normal.   Nursing note and vitals reviewed.    Cervical Biopsy Procedure:   Urine pregnancy test N/A     Procedure Detail(s):  A speculum was placed into the vagina and a cervical biopsy of the posterior lip was taken using the Tischler forceps.  Silver nitrate was applied to the area for hemostasis.  At the end of the procedure there was no additional bleeding.    Complications: The patient tolerated the procedure well with no complications.     Findings: See above     Biopsy/Specimens: Labeled and sent to pathology.      Assessment / Plan      Assessment/Plan:   Sydnie was seen today for suspected cervical cancer.  A  cervical biopsy was obtained today to confirm diagnosis.  Please see the procedure note for more details.  My exam today is consistent stage IB1 (FIGO 2018 staging) cervical cancer (pending final pathologic results). I counseled her that she is amenable to surgical intervention. Based on her early stage disease, I recommend a radical hysterectomy, unilateral salpingo-oophorectomy (prior LSO), pelvic +/- common iliac/para-aortic lymph node dissection. I have explained to the patient and her family that it may be necessary to abort the hysterectomy aspect of the procedure (plan to proceed with lymph node staging regardless) depending on the findings intra-operatively. We discussed the recovery period and the possible need for adjuvant therapy (chemoradiation) if high risk or advanced disease is encountered. I have discussed the risk of the procedure to include but not limited to infection, wound breakdown, bleeding requiring blood transfusion with a risk of chalino viral infections; blood clots (PE/DVT/CVA/MI); injury to bowel, bladder, ureters, fistulas possibly requiring additional or future surgeries; bladder retention (even rarely permanent) that will require catheterization at home (self cath or indwelling) and/or rectal dysfunction/constipation possibly requiring long term treatment; nerve injury resulting in permanent numbness, lower extremity weakness; lymphedema with the potential for long term impact on quality of life; possibly even death.  I have also discussed the potential need for post- operative additional treatment to include radiation and/or chemotherapy. Verbal informed consent was obtained with the the patient and family verbalized understanding and asked appropriate questions.     Patient does have a history of known pulmonary nodules that is being followed with pulmonology.  Given this history we will obtain a CT chest, abdomen, and pelvis prior to surgery to rule out metastatic disease.  An  EKG was also ordered given patient's reported history of palpitations.  If the patient's imaging demonstrates extracervical disease or if the biopsy performed today is non-diagnostic, I will call the patient to discuss next steps.  However given my extremely high concern for early stage cervical cancer we will proceed with preparing for radical hysterectomy.    Hypothyroidism: Patient with poorly controlled hypothyroidism.  Has not had thyroid checked in 9 months.  She will have a TSH drawn today with an order provided by her PCP.    Prediabetes: We will obtain preop hemoglobin A1c.  Patient has order from PCP.    Tobacco use (vaping): She is aware of the risks of tobacco use such as increased cancer risks, heart disease/medical co-morbidities, and increased risk of complications from surgery/treatment.  We also extensively discussed risk of vaping. She has been encouraged to quit and was offered resources including smoking cessation counseling, nicotine gum, lozenges, and patches, as well as pharmaceutical assistance (bupropion and/or varenicline). At this time, she wishes to consider quitting and understands additional resources are available.  Approximately 5 minutes spent on counseling.    Diagnoses and all orders for this visit:    Suspected cervical cancer  -     Case Request; Standing  -     Case Request  -     Tissue Pathology Exam; Future  -     CBC and Differential; Future  -     Comprehensive metabolic panel; Future  -     Type and screen; Future  -     ECG 12 Lead; Future  -     CT Abdomen Pelvis With Contrast; Future  -     CT Chest With Contrast; Future  -     Tissue Pathology Exam    Other orders  -     SCANNED PATHOLOGY  -     Follow anesthesia standing orders.; Future  -     Obtain informed consent  -     Provide NPO Instructions to Patient; Future  -     Chlorhexidine Skin Prep; Future         Follow Up:   She will follow-up to 3 weeks postoperatively.    ANNIA Jimenez MD  Gynecologic Oncology      Please note that portions of this note may have been completed with a voice recognition program.

## 2020-08-12 ENCOUNTER — TELEPHONE (OUTPATIENT)
Dept: GYNECOLOGIC ONCOLOGY | Facility: CLINIC | Age: 60
End: 2020-08-12

## 2020-08-12 ENCOUNTER — PATIENT EDUCATION (SURGERY INSTRUCTIONS) (OUTPATIENT)
Dept: GYNECOLOGIC ONCOLOGY | Facility: CLINIC | Age: 60
End: 2020-08-12

## 2020-08-12 LAB
CYTO UR: NORMAL
LAB AP CASE REPORT: NORMAL
LAB AP CLINICAL INFORMATION: NORMAL
LAB AP DIAGNOSIS COMMENT: NORMAL
PATH REPORT.FINAL DX SPEC: NORMAL
PATH REPORT.GROSS SPEC: NORMAL

## 2020-08-12 NOTE — TELEPHONE ENCOUNTER
Returned patient's call.  Reviewed biopsy results confirming diagnosis of squamous cell carcinoma of the cervix.  Patient is scheduled for a CT scan on Friday.  If imaging does not show any extra cervical disease we will plan on proceeding with open radical hysterectomy, bilateral salpingo-oophorectomy, and pelvic lymph node dissection.  I am trying to get this on the schedule on Tuesday, August 18 but will call the patient once a surgery date is confirmed.    ANNIA Jimenez MD

## 2020-08-12 NOTE — PATIENT INSTRUCTIONS
Inpatient Surgery Instructions        Sydnie Gamble  1960  [id number]        SURGEON:  Laura Jimenez MD    Surgery Coordinator: Lily   If you have any questions before or after surgery please call.  [phone]         Appointment     1.You have Covid testing at 08/16/2020 at 12:00 pm. This is at [address] on the corner of [facility].     2.You have Pat ( Pre-Admission testing at 01:00 pm following that appointment. That is here at the [facility] on the first floor. You will need to go to [facility] registration.     3.  Your surgery has been scheduled on 08/18/2020 You will need to be at the [address] [place] [address] second floor surgery registration on that day at 8:00 am.     The Day(s) Before Surgery       · Plan to have someone take you home from the hospital.    · Do not use any products that contain nicotine or tobacco, such as cigarettes and e-cigarettes. These can delay healing after surgery. If you need help quitting, ask your health care provider.    ·  Do not take vitamins or aspirin one week before surgery ( if applicable).  On the morning of your surgery, you may take you prescription medications with a sip of water, unless told otherwise by your provider.  Bring them with you to the hospital (Diabetic patient should bring insulin if instructed by the managing physician). If you are taking a blood thinner ( Eliquis, Coumadin, Xarelto, Lovenox, Asprin, Heparin, etc.) please have the provider that manages this instruct you on when to stop taking prior to surgery.     · If you are feeling sick, have a fever or cough and have seen your PCP let our office know 48 hours prior to surgery. It may be subject to rescheduling.       Eating and drinking restrictions             NO MILK, CREAM, OR ORANGE JUICE.  The morning of surgery you may have small sips of water.          What happens after the procedure?  · You will be given pain medicine as needed.  · Your blood pressure, heart rate, breathing rate,  and blood oxygen level will be monitored until the medicines you were given have worn off.  · You will need to stay in the hospital to recover for one to two days.  · You may have a liquid diet at first. You will most likely return to your usual diet the day after surgery.  · You will still have the urinary catheter in place. It will likely be removed the day after surgery.  · You may have to wear compression stockings. These stockings help to prevent blood clots and reduce swelling in your legs.  · You will be encouraged to walk as soon as possible. You will also use a device or do breathing exercises to keep your lungs clear.  · You may need to use a maxi-pad for vaginal discharge/bleeding.      Post Surgical Care Instructions.     • You may be discharged from surgery with an abdominal binder, this is given to you for your comfort only. You do not need to wear it unless you feel that it helps with discomfort after your surgery.   • You will have a large abdominal incision, this will sometimes have glue or staples. If you have staples a one week appointment will be made to have them removed during a nurse visit. Sutures will dissolve on their own and glue will wear off over time. Please do not pick the glue.   • Keep incisions clean and dry. Do not use antibacterial washes or ointments on your incisions.   • Small amount of clear or pink tinged drainage from incisions is normal.  • You may have light vaginal bleeding and spotting up to 6 weeks after surgery.  • You will need to continue a bowel regimen after surgery to prevent constipation or bowel obstruction. Take your stool softener as prescribed. If you do not produce a bowel movent in 24 hours after surgery take a laxative ( milk of magnesia or miralax). Narcotics cause constipation, please take them as directed, try alternating ibuprofen and tylenol before taking the narcotic ( oxycodone, hydrocodone, etc.).

## 2020-08-13 ENCOUNTER — TELEPHONE (OUTPATIENT)
Dept: GYNECOLOGIC ONCOLOGY | Facility: CLINIC | Age: 60
End: 2020-08-13

## 2020-08-14 ENCOUNTER — HOSPITAL ENCOUNTER (OUTPATIENT)
Dept: CT IMAGING | Facility: HOSPITAL | Age: 60
Discharge: HOME | End: 2020-08-14
Admitting: OBSTETRICS & GYNECOLOGY

## 2020-08-14 DIAGNOSIS — R68.89 SUSPECTED CERVICAL CANCER: ICD-10-CM

## 2020-08-14 LAB — CREAT BLDA-MCNC: 1 MG/DL (ref 0.6–1.3)

## 2020-08-14 PROCEDURE — 25010000002 IOPAMIDOL 61 % SOLUTION: Performed by: OBSTETRICS & GYNECOLOGY

## 2020-08-14 PROCEDURE — 0 DIATRIZOATE MEGLUMINE & SODIUM PER 1 ML: Performed by: OBSTETRICS & GYNECOLOGY

## 2020-08-14 PROCEDURE — 74177 CT ABD & PELVIS W/CONTRAST: CPT

## 2020-08-14 PROCEDURE — 71260 CT THORAX DX C+: CPT

## 2020-08-14 PROCEDURE — 82565 ASSAY OF CREATININE: CPT

## 2020-08-14 RX ADMIN — DIATRIZOATE MEGLUMINE AND DIATRIZOATE SODIUM 15 ML: 660; 100 LIQUID ORAL; RECTAL at 08:10

## 2020-08-14 RX ADMIN — IOPAMIDOL 85 ML: 612 INJECTION, SOLUTION INTRAVENOUS at 09:20

## 2020-08-16 ENCOUNTER — APPOINTMENT (OUTPATIENT)
Dept: PREADMISSION TESTING | Facility: HOSPITAL | Age: 60
End: 2020-08-16

## 2020-08-16 VITALS — BODY MASS INDEX: 31.88 KG/M2 | HEIGHT: 61 IN | WEIGHT: 168.87 LBS

## 2020-08-16 DIAGNOSIS — R68.89 SUSPECTED CERVICAL CANCER: ICD-10-CM

## 2020-08-16 LAB
ABO GROUP BLD: NORMAL
ALBUMIN SERPL-MCNC: 4.5 G/DL (ref 3.5–5.2)
ALBUMIN/GLOB SERPL: 1.6 G/DL
ALP SERPL-CCNC: 133 U/L (ref 39–117)
ALT SERPL W P-5'-P-CCNC: 12 U/L (ref 1–33)
ANION GAP SERPL CALCULATED.3IONS-SCNC: 11 MMOL/L (ref 5–15)
AST SERPL-CCNC: 18 U/L (ref 1–32)
BASOPHILS # BLD AUTO: 0.06 10*3/MM3 (ref 0–0.2)
BASOPHILS NFR BLD AUTO: 0.7 % (ref 0–1.5)
BILIRUB SERPL-MCNC: 0.2 MG/DL (ref 0–1.2)
BLD GP AB SCN SERPL QL: NEGATIVE
BUN SERPL-MCNC: 19 MG/DL (ref 8–23)
BUN/CREAT SERPL: 18.8 (ref 7–25)
CALCIUM SPEC-SCNC: 9.7 MG/DL (ref 8.6–10.5)
CHLORIDE SERPL-SCNC: 103 MMOL/L (ref 98–107)
CO2 SERPL-SCNC: 26 MMOL/L (ref 22–29)
CREAT SERPL-MCNC: 1.01 MG/DL (ref 0.57–1)
DEPRECATED RDW RBC AUTO: 42.3 FL (ref 37–54)
EOSINOPHIL # BLD AUTO: 0.22 10*3/MM3 (ref 0–0.4)
EOSINOPHIL NFR BLD AUTO: 2.6 % (ref 0.3–6.2)
ERYTHROCYTE [DISTWIDTH] IN BLOOD BY AUTOMATED COUNT: 13.2 % (ref 12.3–15.4)
GFR SERPL CREATININE-BSD FRML MDRD: 56 ML/MIN/1.73
GLOBULIN UR ELPH-MCNC: 2.9 GM/DL
GLUCOSE SERPL-MCNC: 107 MG/DL (ref 65–99)
HBA1C MFR BLD: 6.1 % (ref 4.8–5.6)
HCT VFR BLD AUTO: 42.6 % (ref 34–46.6)
HGB BLD-MCNC: 13.7 G/DL (ref 12–15.9)
IMM GRANULOCYTES # BLD AUTO: 0.04 10*3/MM3 (ref 0–0.05)
IMM GRANULOCYTES NFR BLD AUTO: 0.5 % (ref 0–0.5)
LYMPHOCYTES # BLD AUTO: 2.75 10*3/MM3 (ref 0.7–3.1)
LYMPHOCYTES NFR BLD AUTO: 32 % (ref 19.6–45.3)
MCH RBC QN AUTO: 28.2 PG (ref 26.6–33)
MCHC RBC AUTO-ENTMCNC: 32.2 G/DL (ref 31.5–35.7)
MCV RBC AUTO: 87.7 FL (ref 79–97)
MONOCYTES # BLD AUTO: 0.73 10*3/MM3 (ref 0.1–0.9)
MONOCYTES NFR BLD AUTO: 8.5 % (ref 5–12)
NEUTROPHILS NFR BLD AUTO: 4.79 10*3/MM3 (ref 1.7–7)
NEUTROPHILS NFR BLD AUTO: 55.7 % (ref 42.7–76)
NRBC BLD AUTO-RTO: 0 /100 WBC (ref 0–0.2)
PLATELET # BLD AUTO: 379 10*3/MM3 (ref 140–450)
PMV BLD AUTO: 9.2 FL (ref 6–12)
POTASSIUM SERPL-SCNC: 4.7 MMOL/L (ref 3.5–5.2)
PROT SERPL-MCNC: 7.4 G/DL (ref 6–8.5)
RBC # BLD AUTO: 4.86 10*6/MM3 (ref 3.77–5.28)
RH BLD: POSITIVE
SODIUM SERPL-SCNC: 140 MMOL/L (ref 136–145)
T&S EXPIRATION DATE: NORMAL
WBC # BLD AUTO: 8.59 10*3/MM3 (ref 3.4–10.8)

## 2020-08-16 PROCEDURE — 85025 COMPLETE CBC W/AUTO DIFF WBC: CPT | Performed by: OBSTETRICS & GYNECOLOGY

## 2020-08-16 PROCEDURE — U0002 COVID-19 LAB TEST NON-CDC: HCPCS

## 2020-08-16 PROCEDURE — 86901 BLOOD TYPING SEROLOGIC RH(D): CPT | Performed by: OBSTETRICS & GYNECOLOGY

## 2020-08-16 PROCEDURE — 86900 BLOOD TYPING SEROLOGIC ABO: CPT | Performed by: OBSTETRICS & GYNECOLOGY

## 2020-08-16 PROCEDURE — C9803 HOPD COVID-19 SPEC COLLECT: HCPCS

## 2020-08-16 PROCEDURE — 86850 RBC ANTIBODY SCREEN: CPT | Performed by: OBSTETRICS & GYNECOLOGY

## 2020-08-16 PROCEDURE — 80053 COMPREHEN METABOLIC PANEL: CPT | Performed by: OBSTETRICS & GYNECOLOGY

## 2020-08-16 PROCEDURE — 36415 COLL VENOUS BLD VENIPUNCTURE: CPT

## 2020-08-16 PROCEDURE — 83036 HEMOGLOBIN GLYCOSYLATED A1C: CPT | Performed by: OBSTETRICS & GYNECOLOGY

## 2020-08-16 PROCEDURE — 93005 ELECTROCARDIOGRAM TRACING: CPT

## 2020-08-16 PROCEDURE — 93010 ELECTROCARDIOGRAM REPORT: CPT | Performed by: INTERNAL MEDICINE

## 2020-08-17 ENCOUNTER — ANESTHESIA EVENT (OUTPATIENT)
Dept: PERIOP | Facility: HOSPITAL | Age: 60
End: 2020-08-17

## 2020-08-17 LAB
REF LAB TEST METHOD: NORMAL
SARS-COV-2 RNA RESP QL NAA+PROBE: NOT DETECTED

## 2020-08-17 RX ORDER — FAMOTIDINE 10 MG/ML
20 INJECTION, SOLUTION INTRAVENOUS ONCE
Status: CANCELLED | OUTPATIENT
Start: 2020-08-17 | End: 2020-08-17

## 2020-08-18 ENCOUNTER — HOSPITAL ENCOUNTER (INPATIENT)
Facility: HOSPITAL | Age: 60
LOS: 3 days | Discharge: HOME | End: 2020-08-21
Attending: OBSTETRICS & GYNECOLOGY | Admitting: OBSTETRICS & GYNECOLOGY

## 2020-08-18 ENCOUNTER — ANESTHESIA (OUTPATIENT)
Dept: PERIOP | Facility: HOSPITAL | Age: 60
End: 2020-08-18

## 2020-08-18 DIAGNOSIS — R68.89 SUSPECTED CERVICAL CANCER: ICD-10-CM

## 2020-08-18 DIAGNOSIS — C53.1 MALIGNANT NEOPLASM OF EXOCERVIX (HCC): Primary | ICD-10-CM

## 2020-08-18 PROBLEM — C53.9 CERVICAL CANCER: Status: ACTIVE | Noted: 2020-08-07

## 2020-08-18 PROBLEM — C53.9 CERVICAL CANCER: Status: ACTIVE | Noted: 2020-08-18

## 2020-08-18 LAB
ABO GROUP BLD: NORMAL
RH BLD: POSITIVE

## 2020-08-18 PROCEDURE — 25010000002 DEXAMETHASONE SODIUM PHOSPHATE 10 MG/ML SOLUTION: Performed by: NURSE ANESTHETIST, CERTIFIED REGISTERED

## 2020-08-18 PROCEDURE — 0UT90ZZ RESECTION OF UTERUS, OPEN APPROACH: ICD-10-PCS | Performed by: OBSTETRICS & GYNECOLOGY

## 2020-08-18 PROCEDURE — 25010000002 PHENYLEPHRINE PER 1 ML: Performed by: NURSE ANESTHETIST, CERTIFIED REGISTERED

## 2020-08-18 PROCEDURE — 25010000002 MIDAZOLAM PER 1 MG: Performed by: ANESTHESIOLOGY

## 2020-08-18 PROCEDURE — 88307 TISSUE EXAM BY PATHOLOGIST: CPT | Performed by: OBSTETRICS & GYNECOLOGY

## 2020-08-18 PROCEDURE — 25010000002 ALBUMIN HUMAN 5% PER 50 ML: Performed by: NURSE ANESTHETIST, CERTIFIED REGISTERED

## 2020-08-18 PROCEDURE — 0UT60ZZ RESECTION OF LEFT FALLOPIAN TUBE, OPEN APPROACH: ICD-10-PCS | Performed by: OBSTETRICS & GYNECOLOGY

## 2020-08-18 PROCEDURE — P9041 ALBUMIN (HUMAN),5%, 50ML: HCPCS | Performed by: NURSE ANESTHETIST, CERTIFIED REGISTERED

## 2020-08-18 PROCEDURE — 25010000002 HEPARIN (PORCINE) PER 1000 UNITS: Performed by: OBSTETRICS & GYNECOLOGY

## 2020-08-18 PROCEDURE — 25010000002 PROPOFOL 10 MG/ML EMULSION: Performed by: NURSE ANESTHETIST, CERTIFIED REGISTERED

## 2020-08-18 PROCEDURE — 25010000002 NEOSTIGMINE 10 MG/10ML SOLUTION: Performed by: NURSE ANESTHETIST, CERTIFIED REGISTERED

## 2020-08-18 PROCEDURE — 86901 BLOOD TYPING SEROLOGIC RH(D): CPT

## 2020-08-18 PROCEDURE — 0UT10ZZ RESECTION OF LEFT OVARY, OPEN APPROACH: ICD-10-PCS | Performed by: OBSTETRICS & GYNECOLOGY

## 2020-08-18 PROCEDURE — 25010000002 CEFOXITIN PER 1 G: Performed by: OBSTETRICS & GYNECOLOGY

## 2020-08-18 PROCEDURE — 25010000002 FENTANYL CITRATE (PF) 100 MCG/2ML SOLUTION: Performed by: NURSE ANESTHETIST, CERTIFIED REGISTERED

## 2020-08-18 PROCEDURE — 25010000003 LIDOCAINE 1 % SOLUTION: Performed by: NURSE ANESTHETIST, CERTIFIED REGISTERED

## 2020-08-18 PROCEDURE — 88305 TISSUE EXAM BY PATHOLOGIST: CPT | Performed by: OBSTETRICS & GYNECOLOGY

## 2020-08-18 PROCEDURE — 25010000002 ONDANSETRON PER 1 MG: Performed by: NURSE ANESTHETIST, CERTIFIED REGISTERED

## 2020-08-18 PROCEDURE — 58210 EXTENSIVE HYSTERECTOMY: CPT | Performed by: OBSTETRICS & GYNECOLOGY

## 2020-08-18 PROCEDURE — 88309 TISSUE EXAM BY PATHOLOGIST: CPT | Performed by: OBSTETRICS & GYNECOLOGY

## 2020-08-18 PROCEDURE — 86900 BLOOD TYPING SEROLOGIC ABO: CPT

## 2020-08-18 PROCEDURE — 25010000002 BUPRENORPHINE PER 0.1 MG: Performed by: NURSE ANESTHETIST, CERTIFIED REGISTERED

## 2020-08-18 PROCEDURE — 07BC0ZX EXCISION OF PELVIS LYMPHATIC, OPEN APPROACH, DIAGNOSTIC: ICD-10-PCS | Performed by: OBSTETRICS & GYNECOLOGY

## 2020-08-18 RX ORDER — SODIUM CHLORIDE, SODIUM LACTATE, POTASSIUM CHLORIDE, CALCIUM CHLORIDE 600; 310; 30; 20 MG/100ML; MG/100ML; MG/100ML; MG/100ML
9 INJECTION, SOLUTION INTRAVENOUS CONTINUOUS
Status: DISCONTINUED | OUTPATIENT
Start: 2020-08-18 | End: 2020-08-18

## 2020-08-18 RX ORDER — DEXAMETHASONE SODIUM PHOSPHATE 10 MG/ML
INJECTION, SOLUTION INTRAMUSCULAR; INTRAVENOUS
Status: COMPLETED | OUTPATIENT
Start: 2020-08-18 | End: 2020-08-18

## 2020-08-18 RX ORDER — NALOXONE HCL 0.4 MG/ML
0.1 VIAL (ML) INJECTION
Status: DISCONTINUED | OUTPATIENT
Start: 2020-08-18 | End: 2020-08-21 | Stop reason: HOSPADM

## 2020-08-18 RX ORDER — ALBUTEROL SULFATE 90 UG/1
2 AEROSOL, METERED RESPIRATORY (INHALATION) EVERY 4 HOURS PRN
Status: DISCONTINUED | OUTPATIENT
Start: 2020-08-18 | End: 2020-08-21 | Stop reason: HOSPADM

## 2020-08-18 RX ORDER — HYDROMORPHONE HYDROCHLORIDE 1 MG/ML
0.5 INJECTION, SOLUTION INTRAMUSCULAR; INTRAVENOUS; SUBCUTANEOUS
Status: DISCONTINUED | OUTPATIENT
Start: 2020-08-18 | End: 2020-08-18

## 2020-08-18 RX ORDER — LIDOCAINE HYDROCHLORIDE 10 MG/ML
INJECTION, SOLUTION INFILTRATION; PERINEURAL AS NEEDED
Status: DISCONTINUED | OUTPATIENT
Start: 2020-08-18 | End: 2020-08-18 | Stop reason: SURG

## 2020-08-18 RX ORDER — BUPRENORPHINE HYDROCHLORIDE 0.32 MG/ML
INJECTION INTRAMUSCULAR; INTRAVENOUS
Status: COMPLETED | OUTPATIENT
Start: 2020-08-18 | End: 2020-08-18

## 2020-08-18 RX ORDER — PROMETHAZINE HYDROCHLORIDE 25 MG/1
25 SUPPOSITORY RECTAL ONCE AS NEEDED
Status: DISCONTINUED | OUTPATIENT
Start: 2020-08-18 | End: 2020-08-18

## 2020-08-18 RX ORDER — PROPOFOL 10 MG/ML
VIAL (ML) INTRAVENOUS AS NEEDED
Status: DISCONTINUED | OUTPATIENT
Start: 2020-08-18 | End: 2020-08-18 | Stop reason: SURG

## 2020-08-18 RX ORDER — CLONAZEPAM 1 MG/1
1 TABLET ORAL 2 TIMES DAILY PRN
Status: DISCONTINUED | OUTPATIENT
Start: 2020-08-18 | End: 2020-08-21 | Stop reason: HOSPADM

## 2020-08-18 RX ORDER — ZOLPIDEM TARTRATE 5 MG/1
10 TABLET ORAL NIGHTLY
Status: DISCONTINUED | OUTPATIENT
Start: 2020-08-18 | End: 2020-08-21 | Stop reason: HOSPADM

## 2020-08-18 RX ORDER — PROMETHAZINE HYDROCHLORIDE 12.5 MG/1
12.5 SUPPOSITORY RECTAL EVERY 6 HOURS PRN
Status: DISCONTINUED | OUTPATIENT
Start: 2020-08-18 | End: 2020-08-21 | Stop reason: HOSPADM

## 2020-08-18 RX ORDER — ACETAMINOPHEN 325 MG/1
650 TABLET ORAL ONCE
Status: COMPLETED | OUTPATIENT
Start: 2020-08-18 | End: 2020-08-18

## 2020-08-18 RX ORDER — DOCUSATE SODIUM 100 MG/1
100 CAPSULE, LIQUID FILLED ORAL 2 TIMES DAILY
Status: DISCONTINUED | OUTPATIENT
Start: 2020-08-18 | End: 2020-08-21 | Stop reason: HOSPADM

## 2020-08-18 RX ORDER — LEVOTHYROXINE SODIUM 88 UG/1
88 TABLET ORAL
Status: DISCONTINUED | OUTPATIENT
Start: 2020-08-18 | End: 2020-08-21 | Stop reason: HOSPADM

## 2020-08-18 RX ORDER — PROMETHAZINE HYDROCHLORIDE 25 MG/ML
12.5 INJECTION, SOLUTION INTRAMUSCULAR; INTRAVENOUS EVERY 6 HOURS PRN
Status: DISCONTINUED | OUTPATIENT
Start: 2020-08-18 | End: 2020-08-21 | Stop reason: HOSPADM

## 2020-08-18 RX ORDER — OXYCODONE HYDROCHLORIDE 5 MG/1
5 TABLET ORAL ONCE
Status: COMPLETED | OUTPATIENT
Start: 2020-08-18 | End: 2020-08-18

## 2020-08-18 RX ORDER — GABAPENTIN 300 MG/1
300 CAPSULE ORAL ONCE
Status: COMPLETED | OUTPATIENT
Start: 2020-08-18 | End: 2020-08-18

## 2020-08-18 RX ORDER — BUPIVACAINE HYDROCHLORIDE 2.5 MG/ML
INJECTION, SOLUTION EPIDURAL; INFILTRATION; INTRACAUDAL
Status: COMPLETED | OUTPATIENT
Start: 2020-08-18 | End: 2020-08-18

## 2020-08-18 RX ORDER — BISACODYL 5 MG/1
10 TABLET, DELAYED RELEASE ORAL DAILY PRN
Status: DISCONTINUED | OUTPATIENT
Start: 2020-08-18 | End: 2020-08-21 | Stop reason: HOSPADM

## 2020-08-18 RX ORDER — OXYCODONE HYDROCHLORIDE 5 MG/1
5 TABLET ORAL EVERY 4 HOURS PRN
Status: DISCONTINUED | OUTPATIENT
Start: 2020-08-18 | End: 2020-08-21 | Stop reason: HOSPADM

## 2020-08-18 RX ORDER — HEPARIN SODIUM 5000 [USP'U]/ML
5000 INJECTION, SOLUTION INTRAVENOUS; SUBCUTANEOUS ONCE
Status: COMPLETED | OUTPATIENT
Start: 2020-08-18 | End: 2020-08-18

## 2020-08-18 RX ORDER — PROMETHAZINE HYDROCHLORIDE 12.5 MG/1
12.5 TABLET ORAL EVERY 6 HOURS PRN
Status: DISCONTINUED | OUTPATIENT
Start: 2020-08-18 | End: 2020-08-21 | Stop reason: HOSPADM

## 2020-08-18 RX ORDER — ACETAMINOPHEN 325 MG/1
650 TABLET ORAL EVERY 4 HOURS
Status: DISCONTINUED | OUTPATIENT
Start: 2020-08-18 | End: 2020-08-21 | Stop reason: HOSPADM

## 2020-08-18 RX ORDER — PANTOPRAZOLE SODIUM 40 MG/1
40 TABLET, DELAYED RELEASE ORAL DAILY
Status: DISCONTINUED | OUTPATIENT
Start: 2020-08-18 | End: 2020-08-21 | Stop reason: HOSPADM

## 2020-08-18 RX ORDER — ALBUMIN, HUMAN INJ 5% 5 %
SOLUTION INTRAVENOUS CONTINUOUS PRN
Status: DISCONTINUED | OUTPATIENT
Start: 2020-08-18 | End: 2020-08-18 | Stop reason: SURG

## 2020-08-18 RX ORDER — LIDOCAINE HYDROCHLORIDE 20 MG/ML
JELLY TOPICAL AS NEEDED
Status: DISCONTINUED | OUTPATIENT
Start: 2020-08-18 | End: 2020-08-18 | Stop reason: SURG

## 2020-08-18 RX ORDER — FENTANYL CITRATE 50 UG/ML
INJECTION, SOLUTION INTRAMUSCULAR; INTRAVENOUS AS NEEDED
Status: DISCONTINUED | OUTPATIENT
Start: 2020-08-18 | End: 2020-08-18 | Stop reason: SURG

## 2020-08-18 RX ORDER — MAGNESIUM HYDROXIDE 1200 MG/15ML
LIQUID ORAL AS NEEDED
Status: DISCONTINUED | OUTPATIENT
Start: 2020-08-18 | End: 2020-08-18 | Stop reason: HOSPADM

## 2020-08-18 RX ORDER — SODIUM CHLORIDE 0.9 % (FLUSH) 0.9 %
10 SYRINGE (ML) INJECTION EVERY 12 HOURS SCHEDULED
Status: DISCONTINUED | OUTPATIENT
Start: 2020-08-18 | End: 2020-08-18 | Stop reason: HOSPADM

## 2020-08-18 RX ORDER — FENTANYL CITRATE 50 UG/ML
50 INJECTION, SOLUTION INTRAMUSCULAR; INTRAVENOUS
Status: DISCONTINUED | OUTPATIENT
Start: 2020-08-18 | End: 2020-08-18

## 2020-08-18 RX ORDER — SODIUM CHLORIDE 0.9 % (FLUSH) 0.9 %
10 SYRINGE (ML) INJECTION AS NEEDED
Status: DISCONTINUED | OUTPATIENT
Start: 2020-08-18 | End: 2020-08-18 | Stop reason: HOSPADM

## 2020-08-18 RX ORDER — CELECOXIB 200 MG/1
200 CAPSULE ORAL ONCE
Status: COMPLETED | OUTPATIENT
Start: 2020-08-18 | End: 2020-08-18

## 2020-08-18 RX ORDER — PROMETHAZINE HYDROCHLORIDE 25 MG/1
25 TABLET ORAL ONCE AS NEEDED
Status: DISCONTINUED | OUTPATIENT
Start: 2020-08-18 | End: 2020-08-18

## 2020-08-18 RX ORDER — OXYCODONE HYDROCHLORIDE 5 MG/1
10 TABLET ORAL EVERY 4 HOURS PRN
Status: DISCONTINUED | OUTPATIENT
Start: 2020-08-18 | End: 2020-08-21 | Stop reason: HOSPADM

## 2020-08-18 RX ORDER — ONDANSETRON 2 MG/ML
4 INJECTION INTRAMUSCULAR; INTRAVENOUS ONCE AS NEEDED
Status: DISCONTINUED | OUTPATIENT
Start: 2020-08-18 | End: 2020-08-18

## 2020-08-18 RX ORDER — MIDAZOLAM HYDROCHLORIDE 1 MG/ML
1 INJECTION INTRAMUSCULAR; INTRAVENOUS
Status: DISCONTINUED | OUTPATIENT
Start: 2020-08-18 | End: 2020-08-18 | Stop reason: HOSPADM

## 2020-08-18 RX ORDER — ONDANSETRON 2 MG/ML
INJECTION INTRAMUSCULAR; INTRAVENOUS AS NEEDED
Status: DISCONTINUED | OUTPATIENT
Start: 2020-08-18 | End: 2020-08-18 | Stop reason: SURG

## 2020-08-18 RX ORDER — PROMETHAZINE HYDROCHLORIDE 25 MG/ML
6.25 INJECTION, SOLUTION INTRAMUSCULAR; INTRAVENOUS ONCE AS NEEDED
Status: DISCONTINUED | OUTPATIENT
Start: 2020-08-18 | End: 2020-08-18

## 2020-08-18 RX ORDER — BISACODYL 10 MG
10 SUPPOSITORY, RECTAL RECTAL DAILY PRN
Status: DISCONTINUED | OUTPATIENT
Start: 2020-08-18 | End: 2020-08-21 | Stop reason: HOSPADM

## 2020-08-18 RX ORDER — NEOSTIGMINE METHYLSULFATE 1 MG/ML
INJECTION, SOLUTION INTRAVENOUS AS NEEDED
Status: DISCONTINUED | OUTPATIENT
Start: 2020-08-18 | End: 2020-08-18 | Stop reason: SURG

## 2020-08-18 RX ORDER — CEFOXITIN 2 G/1
2 INJECTION, POWDER, FOR SOLUTION INTRAVENOUS EVERY 6 HOURS
Status: DISCONTINUED | OUTPATIENT
Start: 2020-08-18 | End: 2020-08-18

## 2020-08-18 RX ORDER — GLYCOPYRROLATE 0.2 MG/ML
INJECTION INTRAMUSCULAR; INTRAVENOUS AS NEEDED
Status: DISCONTINUED | OUTPATIENT
Start: 2020-08-18 | End: 2020-08-18 | Stop reason: SURG

## 2020-08-18 RX ORDER — SIMETHICONE 80 MG
80 TABLET,CHEWABLE ORAL 4 TIMES DAILY PRN
Status: DISCONTINUED | OUTPATIENT
Start: 2020-08-18 | End: 2020-08-21 | Stop reason: HOSPADM

## 2020-08-18 RX ORDER — LIDOCAINE HYDROCHLORIDE 10 MG/ML
0.5 INJECTION, SOLUTION EPIDURAL; INFILTRATION; INTRACAUDAL; PERINEURAL ONCE AS NEEDED
Status: COMPLETED | OUTPATIENT
Start: 2020-08-18 | End: 2020-08-18

## 2020-08-18 RX ORDER — HEPARIN SODIUM 1000 [USP'U]/ML
5000 INJECTION, SOLUTION INTRAVENOUS; SUBCUTANEOUS ONCE
Status: DISCONTINUED | OUTPATIENT
Start: 2020-08-18 | End: 2020-08-18

## 2020-08-18 RX ORDER — FAMOTIDINE 20 MG/1
20 TABLET, FILM COATED ORAL ONCE
Status: COMPLETED | OUTPATIENT
Start: 2020-08-18 | End: 2020-08-18

## 2020-08-18 RX ORDER — ATRACURIUM BESYLATE 10 MG/ML
INJECTION, SOLUTION INTRAVENOUS AS NEEDED
Status: DISCONTINUED | OUTPATIENT
Start: 2020-08-18 | End: 2020-08-18 | Stop reason: SURG

## 2020-08-18 RX ORDER — SODIUM CHLORIDE, SODIUM LACTATE, POTASSIUM CHLORIDE, CALCIUM CHLORIDE 600; 310; 30; 20 MG/100ML; MG/100ML; MG/100ML; MG/100ML
100 INJECTION, SOLUTION INTRAVENOUS CONTINUOUS
Status: DISCONTINUED | OUTPATIENT
Start: 2020-08-18 | End: 2020-08-19

## 2020-08-18 RX ORDER — FLUOXETINE HYDROCHLORIDE 20 MG/1
20 CAPSULE ORAL DAILY
Status: DISCONTINUED | OUTPATIENT
Start: 2020-08-18 | End: 2020-08-21 | Stop reason: HOSPADM

## 2020-08-18 RX ADMIN — LIDOCAINE HYDROCHLORIDE 50 MG: 10 INJECTION, SOLUTION INFILTRATION; PERINEURAL at 07:27

## 2020-08-18 RX ADMIN — FENTANYL CITRATE 50 MCG: 50 INJECTION, SOLUTION INTRAMUSCULAR; INTRAVENOUS at 07:27

## 2020-08-18 RX ADMIN — DOCUSATE SODIUM 100 MG: 100 CAPSULE, LIQUID FILLED ORAL at 15:18

## 2020-08-18 RX ADMIN — GLYCOPYRROLATE 0.6 MG: 0.2 INJECTION INTRAMUSCULAR; INTRAVENOUS at 11:06

## 2020-08-18 RX ADMIN — NEOSTIGMINE 4 MG: 1 INJECTION INTRAVENOUS at 11:06

## 2020-08-18 RX ADMIN — BUPIVACAINE HYDROCHLORIDE 60 ML: 2.5 INJECTION, SOLUTION EPIDURAL; INFILTRATION; INTRACAUDAL; PERINEURAL at 07:34

## 2020-08-18 RX ADMIN — Medication 2 G: at 09:39

## 2020-08-18 RX ADMIN — ONDANSETRON 4 MG: 2 INJECTION INTRAMUSCULAR; INTRAVENOUS at 10:32

## 2020-08-18 RX ADMIN — PANTOPRAZOLE SODIUM 40 MG: 40 TABLET, DELAYED RELEASE ORAL at 15:17

## 2020-08-18 RX ADMIN — DEXAMETHASONE SODIUM PHOSPHATE 6 MG: 10 INJECTION INTRAMUSCULAR; INTRAVENOUS at 07:44

## 2020-08-18 RX ADMIN — HEPARIN SODIUM 5000 UNITS: 5000 INJECTION INTRAVENOUS; SUBCUTANEOUS at 07:03

## 2020-08-18 RX ADMIN — GABAPENTIN 300 MG: 300 CAPSULE ORAL at 06:58

## 2020-08-18 RX ADMIN — METOPROLOL TARTRATE 1 MG: 5 INJECTION, SOLUTION INTRAVENOUS at 08:45

## 2020-08-18 RX ADMIN — SODIUM CHLORIDE, POTASSIUM CHLORIDE, SODIUM LACTATE AND CALCIUM CHLORIDE: 600; 310; 30; 20 INJECTION, SOLUTION INTRAVENOUS at 08:28

## 2020-08-18 RX ADMIN — ACETAMINOPHEN 650 MG: 325 TABLET, FILM COATED ORAL at 22:13

## 2020-08-18 RX ADMIN — PHENYLEPHRINE HYDROCHLORIDE 80 MCG: 10 INJECTION INTRAVENOUS at 07:27

## 2020-08-18 RX ADMIN — SODIUM CHLORIDE, POTASSIUM CHLORIDE, SODIUM LACTATE AND CALCIUM CHLORIDE 9 ML/HR: 600; 310; 30; 20 INJECTION, SOLUTION INTRAVENOUS at 06:54

## 2020-08-18 RX ADMIN — BUPRENORPHINE HYDROCHLORIDE 0.3 MG: 0.32 INJECTION INTRAMUSCULAR; INTRAVENOUS at 07:34

## 2020-08-18 RX ADMIN — ALBUMIN HUMAN: 0.05 INJECTION, SOLUTION INTRAVENOUS at 10:50

## 2020-08-18 RX ADMIN — METOPROLOL TARTRATE 1 MG: 5 INJECTION, SOLUTION INTRAVENOUS at 08:30

## 2020-08-18 RX ADMIN — ACETAMINOPHEN 650 MG: 325 TABLET, FILM COATED ORAL at 18:41

## 2020-08-18 RX ADMIN — ATRACURIUM BESYLATE 10 MG: 10 INJECTION, SOLUTION INTRAVENOUS at 08:45

## 2020-08-18 RX ADMIN — FENTANYL CITRATE 50 MCG: 50 INJECTION, SOLUTION INTRAMUSCULAR; INTRAVENOUS at 08:08

## 2020-08-18 RX ADMIN — LEVOTHYROXINE SODIUM 88 MCG: 88 TABLET ORAL at 15:17

## 2020-08-18 RX ADMIN — PHENYLEPHRINE HYDROCHLORIDE 80 MCG: 10 INJECTION INTRAVENOUS at 07:42

## 2020-08-18 RX ADMIN — PHENYLEPHRINE HYDROCHLORIDE 80 MCG: 10 INJECTION INTRAVENOUS at 10:18

## 2020-08-18 RX ADMIN — ALBUMIN HUMAN: 0.05 INJECTION, SOLUTION INTRAVENOUS at 11:03

## 2020-08-18 RX ADMIN — PROPOFOL 150 MG: 10 INJECTION, EMULSION INTRAVENOUS at 07:27

## 2020-08-18 RX ADMIN — DEXAMETHASONE SODIUM PHOSPHATE 4 MG: 10 INJECTION INTRAMUSCULAR; INTRAVENOUS at 07:34

## 2020-08-18 RX ADMIN — ATRACURIUM BESYLATE 10 MG: 10 INJECTION, SOLUTION INTRAVENOUS at 09:10

## 2020-08-18 RX ADMIN — DOCUSATE SODIUM 100 MG: 100 CAPSULE, LIQUID FILLED ORAL at 22:13

## 2020-08-18 RX ADMIN — MIDAZOLAM 1 MG: 1 INJECTION INTRAMUSCULAR; INTRAVENOUS at 07:13

## 2020-08-18 RX ADMIN — OXYCODONE 5 MG: 5 TABLET ORAL at 20:11

## 2020-08-18 RX ADMIN — ACETAMINOPHEN 650 MG: 325 TABLET ORAL at 06:58

## 2020-08-18 RX ADMIN — Medication 2 G: at 07:23

## 2020-08-18 RX ADMIN — ATRACURIUM BESYLATE 10 MG: 10 INJECTION, SOLUTION INTRAVENOUS at 08:08

## 2020-08-18 RX ADMIN — OXYCODONE 5 MG: 5 TABLET ORAL at 06:58

## 2020-08-18 RX ADMIN — FLUOXETINE HYDROCHLORIDE 20 MG: 20 CAPSULE ORAL at 15:18

## 2020-08-18 RX ADMIN — ATRACURIUM BESYLATE 40 MG: 10 INJECTION, SOLUTION INTRAVENOUS at 07:27

## 2020-08-18 RX ADMIN — CELECOXIB 200 MG: 200 CAPSULE ORAL at 06:58

## 2020-08-18 RX ADMIN — ZOLPIDEM TARTRATE 10 MG: 5 TABLET ORAL at 22:13

## 2020-08-18 RX ADMIN — FAMOTIDINE 20 MG: 20 TABLET, FILM COATED ORAL at 06:58

## 2020-08-18 RX ADMIN — ATRACURIUM BESYLATE 10 MG: 10 INJECTION, SOLUTION INTRAVENOUS at 10:06

## 2020-08-18 RX ADMIN — ACETAMINOPHEN 650 MG: 325 TABLET, FILM COATED ORAL at 15:17

## 2020-08-18 RX ADMIN — SODIUM CHLORIDE, POTASSIUM CHLORIDE, SODIUM LACTATE AND CALCIUM CHLORIDE: 600; 310; 30; 20 INJECTION, SOLUTION INTRAVENOUS at 11:08

## 2020-08-18 RX ADMIN — FENTANYL CITRATE 50 MCG: 0.05 INJECTION, SOLUTION INTRAMUSCULAR; INTRAVENOUS at 11:35

## 2020-08-18 RX ADMIN — PROPOFOL 25 MCG/KG/MIN: 10 INJECTION, EMULSION INTRAVENOUS at 07:34

## 2020-08-18 RX ADMIN — LIDOCAINE HYDROCHLORIDE 3 ML: 20 JELLY TOPICAL at 07:32

## 2020-08-18 RX ADMIN — LIDOCAINE HYDROCHLORIDE 0.5 ML: 10 INJECTION, SOLUTION EPIDURAL; INFILTRATION; INTRACAUDAL; PERINEURAL at 06:54

## 2020-08-18 NOTE — OP NOTE
Operative Note     Patient Name: Sydnie Gamble  : 1960   MRN: [id number]   Date: 20  Time: 10:59    Date of Service:  20  Time of Service:  11:09    Surgical Staff: Surgeon(s) and Role:     * Laura Jimenez MD - Primary     * Juliane Quiñones MD - Assisting       Pre-operative diagnosis(es): Pre-Op Diagnosis Codes:     Cervical cancer     Post-operative diagnosis(es): Post-Op Diagnosis Codes:     Cervical cancer   Procedure(s): Procedure(s):  TOTAL RADICAL HYSTERECTOMY PELVIC NODE DISSECTION     Antibiotics: cefoxitin (Mefoxin) ordered on call to OR     Anesthesia: Type: General with Block  ASA:  III     Objective      Operative findings: Cervix with approximately 1.5 cm friable tumor. No evidence of parametrial or vaginal involvement. Two additional vaginal margins taken due to close margins. Normal appearing uterus and left adnexa. Right adnexal surgically absent. No evidence of extrauterine disease. No lymphadenopathy.   Specimens removed: ID Type Source Tests Collected by Time   A (Not marked as sent) : RIGHT PELVIC LYMPH NODE Tissue Lymph Node TISSUE PATHOLOGY EXAM Laura Jimenez MD 2020 0845   B (Not marked as sent) : RIGHT COMMON LYMPH NODE Tissue Lymph Node TISSUE PATHOLOGY EXAM Laura Jimenez MD 2020 0846   C (Not marked as sent) : LEFT PELVIC LYMPH NODE Tissue Lymph Node TISSUE PATHOLOGY EXAM Laura Jimenez MD 2020 0846   D (Not marked as sent) :  Tissue Uterus, Cervix, L Fallopian Tube, L Ovary TISSUE PATHOLOGY EXAM Laura Jimenez MD 2020 1007   E (Not marked as sent) : RIGHT VAGINAL MARGIN STITCH PROXIMAL EDGE #1 Tissue Vagina TISSUE PATHOLOGY EXAM Laura Jimenez MD 2020 1025   F (Not marked as sent) : RIGHT VAGINAL MARGIN STITCH PROXIMAL EDGE #2 Tissue Vagina TISSUE PATHOLOGY EXAM Laura Jimenez MD 2020 1028      Fluid Intake and Output: I/O this shift:  In: 1000 [I.V.:1000]  Out: 800 [Urine:200; Blood:600]   Blood products  used: No   Drains: Urethral Catheter Silicone 16 Fr. (Active)      Implant Information: Nothing was implanted during the procedure   Complications: None   Intraoperative consult(s): None   Condition: stable   Disposition: to PACU and then admit to  medical - surgical floor       INDICATIONS:   This patient had stage IB1 squamous cell carcinoma of the cervix. She was counseled regarding her options, including radiation therapy and the decision was made to proceed with surgery. The expected outcomes and the risks of the procedure were explained to the patient and she signed the operative consent after all of her questions were answered.     PROCEDURE IN DETAIL: The patient was met in the patient receiving area on the day of surgery. The indications, risks, benefits and alternatives of the procedure were again reviewed in detail. The patient was taken to the operating room where general anesthesia was obtained without difficulty. A time-out procedure for safety was performed to confirm patient name, medical record number, procedure being performed and other safety issues, including allergies. Antibiotics were administered preoperatively She was positioned supine. Examination under anesthesia revealed the findings as described above. The patient was then prepped and draped in the usual sterile fashion. A English catheter was inserted under sterile conditions.     Attention was turned to the abdominal incision. A transverse skin incision was made. This incision was carried down to the underlying layer of fascia. The fascia was incised and extended bilaterally. The superior aspect of the incision was grasped with Kocher clamps, elevated and the underlying rectus muscles dissected free. The inferior aspect of the incision was grasped with Kochers, elevated and the rectus muscles dissected free. The peritoneum was then entered bluntly and the peritoneal incision extended. A Bookwalter self-retaining retractor was then placed,  with attention paid to minimize the risk of lower extremity neurologic injury. The abdomen was then explored including palpation of the aortic lymph nodes, running of the small bowel run from the ileocecal valve to the Ligament of Treitz as well as the entire colon. The other major abdominal organs were also palpated.  There was no evidence of metastatic disease that would preclude proceeding with the radical hysterectomy. Thus the bowels were gently packed into the upper abdomen.     Attention was then turned to the pelvic lymphadenectomy. The pararectal and paravesical spaces were opened bilaterally. The parametria were inspected and palpated with no evidence of involvement by tumor. The ureters were identified bilaterally and dissected off the medial leaf of the broad ligament. A complete pelvic lymphadenectomy was then performed by removing lymph nodes between the circumflex iliac veins inferiorly and common iliac arteries superiorly around the external iliac artery and vein. The obturator spaces were then opened and the obturator nerve kept out of harm's way. The lymph nodes above and below the obturator nerve were then raked free. The ureters at all times were displaced medially out of the surgical field.  The common iliac and aortic nodes were clinically assessed and were negative.     We then began the radical hysterectomy portion of the procedure. The right adnexa was surgically absent. The left infundibulopelvic ligaments were isolated, clamped, cut, and ligated with a tie of 0 polysorb. The medial leaf of the broad ligament was then incised, elevating the ovary out of the cul de sac and further dropping the ureter down.  The round ligaments were then transected with cautery.  The superior vesicle arteries were then isolated and followed cephalad to their origin off the uterine artery. Once isolated, the uterine arteries were doubly suture ligated with 0 polysorb and transected with the Bovie.. The ureters  were then unroofed from Wertheim's canal the ureters were lifted out of the canal and retracted laterally and the parametrium was clamped, cut and ligated laterally.     The posterior uterine dissection was then carried out with completion of skeletonization of the peritoneum off the ureter and uterosacral ligaments and development of the upper rectovaginal septum and the uterosacral ligaments were divided about 3 cm from the cervix. The bladder was then dissected off the lower uterine segment, cervix and upper portion of the vagina down to the endopelvic fascia.  The ureters were identified and skeletonized below the ureteric tunnel.     At that point, the radical hysterectomy specimen was completely isolated. A right angle Zepplin clamps placed across the upper portion of the vagina so as to assure adequate surgical margins. The vagina was then transected with Leslie scissors and the specimen handed off to be sent to pathology for permanent processing. There was a close margin noted on the right side of the specimen. Therefore two additional vaginal margins were taken. The vaginal cuff angles were closed with Favian stitches of 0 polysorb. The vagina was then closed using interrupted figure-of-eight sutures of 0 polysorb. The abdomen and pelvis were irrigated with both saline and water and hemostasis was confirmed.    After confirmation of hemostasis the packs and retractors were removed. Exploration of the abdominal wound was performed and no retained sponges or objects were identified. All sponge, lap, needle, and instrument counts were correct. The fascia was closed using 1-0 looped PDS from both corners. There was an area of the fascia where the PDS had torn through. This was reinforced with 0 vicryl.. The extrafascial tissues were irrigated and made hemostatic with Bovie cautery. The skin was closed with 4-0 monocryl.  Skin glue were applied. The patient tolerated the procedure well. The patient's  anesthesia was reversed without difficulty. The patient was taken to recovery room in stable condition.    ANNIA Jimenez MD  8/18/2020  11:09

## 2020-08-18 NOTE — ANESTHESIA POSTPROCEDURE EVALUATION
Patient: Sydnie Gamble    Procedure Summary     Date:  08/18/20 Room / Location:   [facility]  [facility]    Anesthesia Start:  0723 Anesthesia Stop:  1124    Procedure:  TOTAL RADICAL HYSTERECTOMY PELVIC NODE DISSECTION (N/A Abdomen) Diagnosis:       Cervical cancer (CMS/HCC)      (Cervical cancer)    Surgeon:  Laura Jimenez MD Provider:  Sanchez Chand MD    Anesthesia Type:  general with block ASA Status:  3          Anesthesia Type: general with block    Vitals  Vitals Value Taken Time   /81 8/18/2020 11:24 AM   Temp 97.9 °F (36.6 °C) 8/18/2020 11:24 AM   Pulse 91 8/18/2020 11:24 AM   Resp 18 8/18/2020 11:24 AM   SpO2 100 % 8/18/2020 11:24 AM           Post Anesthesia Care and Evaluation    Patient location during evaluation: PACU  Patient participation: waiting for patient participation  Level of consciousness: sleepy but conscious  Pain score: 0  Pain management: adequate  Airway patency: patent  Anesthetic complications: No anesthetic complications  PONV Status: none  Cardiovascular status: hemodynamically stable and acceptable  Respiratory status: nonlabored ventilation, acceptable, nasal cannula and oral airway  Hydration status: acceptable

## 2020-08-18 NOTE — INTERVAL H&P NOTE
Chief complaint: Abnormal vaginal bleeding    HPI:    Sydnie Gamble is a 60 y.o. female who presents today with stage IB1 (FIGO 2018 staging) cervical cancer (pending final pathologic results).  She reports that she had not had gynecologic care for the past 30 years.  She developed vaginal bleeding approximately 2 months ago this is since tapered off to pink spotting.  She also reports a little bit of pelvic pain denies any shooting pains down her legs.  She does report back pain but this is her baseline.  She does have some vaginal pain but this is minimal and does not require any pain medication.  She does report a 35 pound weight gain over the past several years but has a history of hypothyroidism and has not been compliant.  She also reports some shortness of breath and has history of lung nodules for which she is been followed by pulmonology for.  She denies any chest pain.  Does intermittently have palpitations that she attributes to anxiety.  She is tolerating regular diet and denies any changes to her bowels or bladder.      CT abdomen and pelvis was performed with findings that demonstrate stable nodularity identified at the right upper lobe, stable  1 cm lymph node in the right hilar region. No evidence of progression of disease. The abdomen and pelvis is also stable and unremarkable with several cysts throughout the liver.    She presents today for a total radical hysterectomy, unilateral salpingo-oophorectomy with pelvic node dissection    Review of Systems:  General ROS:  no fever, chills, rashes.  No change since last office visit.  No recent sick exposure  Cardiovascular ROS: no chest pain or dyspnea on exertion  Respiratory ROS: no cough, shortness of breath, or wheezing    Meds:    No current facility-administered medications on file prior to encounter.      Current Outpatient Medications on File Prior to Encounter   Medication Sig Dispense Refill   • clonazePAM (KlonoPIN) 1 MG tablet Take 1 mg  "by mouth 2 (Two) Times a Day As Needed for Anxiety.     • FLUoxetine (PROzac) 20 MG capsule Take 1 capsule by mouth Daily. 30 capsule 5   • levothyroxine (SYNTHROID, LEVOTHROID) 88 MCG tablet TAKE 1 TABLET BY MOUTH EVERY DAY (Patient taking differently: Take 88 mcg by mouth Daily.) 90 tablet 1   • pantoprazole (PROTONIX) 40 MG EC tablet TAKE 1 TABLET BY MOUTH DAILY. PATIENT NEEDS APPOINTMENT PRIOR TO NEXT REFILL. 30 tablet 2   • tiotropium bromide-olodaterol (Stiolto Respimat) 2.5-2.5 MCG/ACT aerosol solution inhaler Inhale 1 puff Daily. 1 inhaler 5   • zolpidem (AMBIEN) 10 MG tablet Take 10 mg by mouth.  2   • albuterol sulfate  (90 Base) MCG/ACT inhaler Inhale 2 puffs Every 4 (Four) Hours As Needed for Wheezing. (Patient taking differently: Inhale 2 puffs Every 4 (Four) Hours As Needed for Wheezing or Shortness of Air.) 1 inhaler 4       Vital Signs:  /89 (BP Location: Right arm, Patient Position: Lying)   Pulse 95   Temp 97.6 °F (36.4 °C) (Temporal)   Resp 18   Ht 154.9 cm (61\")   Wt 76.2 kg (168 lb)   LMP  (LMP Unknown)   SpO2 96%   BMI 31.74 kg/m²     Physical Exam:    CV:  S1S2 regular rate and rhythm, no murmur               Resp:  Clear to auscultation; respirations regular, even and unlabored    Results Review:     Lab Results   Component Value Date    WBC 8.59 08/16/2020    HGB 13.7 08/16/2020    HCT 42.6 08/16/2020    MCV 87.7 08/16/2020     08/16/2020    NEUTROABS 4.79 08/16/2020    GLUCOSE 107 (H) 08/16/2020    BUN 19 08/16/2020    CREATININE 1.01 (H) 08/16/2020    EGFRIFNONA 56 (L) 08/16/2020     08/16/2020    K 4.7 08/16/2020     08/16/2020    CO2 26.0 08/16/2020    CALCIUM 9.7 08/16/2020    ALBUMIN 4.50 08/16/2020    AST 18 08/16/2020    ALT 12 08/16/2020    BILITOT 0.2 08/16/2020        I reviewed the patient's new clinical results.      Assessment:  Cervical cancer    Plan:  Total radical hysterectomy, unilateral salpingo-oophorectomy, pelvic lymph node " dissection    Nanda Wells PA-C  8/18/2020   07:00

## 2020-08-18 NOTE — PLAN OF CARE
Problem: Patient Care Overview  Goal: Plan of Care Review  Flowsheets  Taken 8/18/2020 7300  Progress: improving  Outcome Summary: VSS. Pt alert and oriented. Recovered from anesthesia. No c/o pain.  Taken 8/18/2020 1400  Plan of Care Reviewed With: patient;spouse

## 2020-08-18 NOTE — ANESTHESIA PROCEDURE NOTES
Peripheral Block      Patient reassessed immediately prior to procedure    Patient location during procedure: OR  Reason for block: at surgeon's request and post-op pain management  Performed by  Anesthesiologist: Sanchez Chand MD  Preanesthetic Checklist  Completed: patient identified, site marked, surgical consent, pre-op evaluation, timeout performed, IV checked, risks and benefits discussed and monitors and equipment checked  Prep:  Pt Position: supine  Sterile barriers:cap, gloves, sterile barriers and mask  Prep: ChloraPrep  Patient monitoring: blood pressure monitoring, continuous pulse oximetry and EKG  Procedure  Sedation:yes  Performed under: general  Guidance:ultrasound guided  Images:still images not obtained    Laterality:Bilateral  Block Type:TAP  Injection Technique:single-shot  Needle Type:short-bevel and echogenic  Needle Gauge:20 G  Resistance on Injection: none    Medications Used: buprenorphine (BUPRENEX) injection, 0.3 mg  dexamethasone sodium phosphate injection, 4 mg  bupivacaine PF (MARCAINE) 0.25 % injection, 60 mL  Med admintered at 8/18/2020 7:34 AM      Medications  Comment:Block Injection:  LA dose divided between Right and Left block        Post Assessment  Injection Assessment: negative aspiration for heme, incremental injection and no paresthesia on injection  Patient Tolerance:comfortable throughout block  Complications:no  Additional Notes      Under Ultrasound guidance, a BBraun 4inch 360 degree needle was advanced with Normal Saline hydro dissection of tissue.  The Internal Oblique and Transversus Abdominus muscles where visualized.  At or before the aponeurosis of Internal Oblique, local anesthetic spread was visualized in the Transversus Abdominus Plane. Injection was made incrementally with aspiration every 5 mls.  There was no  intravascular injection,  injection pressure was normal, there was no neural injection, and the procedure was completed without difficulty.  Thank  You.

## 2020-08-18 NOTE — ANESTHESIA PREPROCEDURE EVALUATION
Anesthesia Evaluation     Patient summary reviewed and Nursing notes reviewed   NPO Solid Status: > 8 hours  NPO Liquid Status: > 8 hours           Airway   Mallampati: I  TM distance: >3 FB  Neck ROM: full  No difficulty expected  Dental    (+) edentulous, upper dentures and lower dentures    Pulmonary    (+) a smoker (vapes ) Former, COPD (albuterol ) moderate,   (-) shortness of breath, recent URI, pulmonary embolism, no home oxygen    ROS comment: Lung nodules CT   Hilar Lymphadenothay   Emphysema   Cardiovascular     ECG reviewed    (+) hyperlipidemia,   (-) hypertension, past MI, dysrhythmias, angina    ROS comment: ECG NSR     Neuro/Psych  (-) seizures, CVA, psychiatric history (prozac klonopin )  GI/Hepatic/Renal/Endo    (+)  GERD,  thyroid problem hypothyroidism  (-) no renal disease, diabetes    Musculoskeletal     Abdominal    Substance History      OB/GYN          Other   arthritis,                    Anesthesia Plan    ASA 3     general with block   (TAPs Propofol Infusion as part of Anti PONV tech )  intravenous induction       Plan discussed with CRNA.

## 2020-08-19 LAB
ANION GAP SERPL CALCULATED.3IONS-SCNC: 12 MMOL/L (ref 5–15)
BASOPHILS # BLD AUTO: 0.04 10*3/MM3 (ref 0–0.2)
BASOPHILS NFR BLD AUTO: 0.4 % (ref 0–1.5)
BUN SERPL-MCNC: 13 MG/DL (ref 8–23)
BUN/CREAT SERPL: 14 (ref 7–25)
CA-I SERPL ISE-MCNC: 1.31 MMOL/L (ref 1.12–1.32)
CALCIUM SPEC-SCNC: 8.6 MG/DL (ref 8.6–10.5)
CHLORIDE SERPL-SCNC: 103 MMOL/L (ref 98–107)
CO2 SERPL-SCNC: 23 MMOL/L (ref 22–29)
CREAT SERPL-MCNC: 0.93 MG/DL (ref 0.57–1)
DEPRECATED RDW RBC AUTO: 44.4 FL (ref 37–54)
EOSINOPHIL # BLD AUTO: 0.03 10*3/MM3 (ref 0–0.4)
EOSINOPHIL NFR BLD AUTO: 0.3 % (ref 0.3–6.2)
ERYTHROCYTE [DISTWIDTH] IN BLOOD BY AUTOMATED COUNT: 13.4 % (ref 12.3–15.4)
GFR SERPL CREATININE-BSD FRML MDRD: 61 ML/MIN/1.73
GLUCOSE SERPL-MCNC: 110 MG/DL (ref 65–99)
HCT VFR BLD AUTO: 29.7 % (ref 34–46.6)
HGB BLD-MCNC: 9.4 G/DL (ref 12–15.9)
IMM GRANULOCYTES # BLD AUTO: 0.04 10*3/MM3 (ref 0–0.05)
IMM GRANULOCYTES NFR BLD AUTO: 0.4 % (ref 0–0.5)
LYMPHOCYTES # BLD AUTO: 1.84 10*3/MM3 (ref 0.7–3.1)
LYMPHOCYTES NFR BLD AUTO: 18 % (ref 19.6–45.3)
MAGNESIUM SERPL-MCNC: 1.8 MG/DL (ref 1.6–2.4)
MCH RBC QN AUTO: 28.4 PG (ref 26.6–33)
MCHC RBC AUTO-ENTMCNC: 31.6 G/DL (ref 31.5–35.7)
MCV RBC AUTO: 89.7 FL (ref 79–97)
MONOCYTES # BLD AUTO: 1.14 10*3/MM3 (ref 0.1–0.9)
MONOCYTES NFR BLD AUTO: 11.1 % (ref 5–12)
NEUTROPHILS NFR BLD AUTO: 69.8 % (ref 42.7–76)
NEUTROPHILS NFR BLD AUTO: 7.14 10*3/MM3 (ref 1.7–7)
NRBC BLD AUTO-RTO: 0 /100 WBC (ref 0–0.2)
PHOSPHATE SERPL-MCNC: 2.8 MG/DL (ref 2.5–4.5)
PLAT MORPH BLD: NORMAL
PLATELET # BLD AUTO: 304 10*3/MM3 (ref 140–450)
PMV BLD AUTO: 9.9 FL (ref 6–12)
POTASSIUM SERPL-SCNC: 3.8 MMOL/L (ref 3.5–5.2)
RBC # BLD AUTO: 3.31 10*6/MM3 (ref 3.77–5.28)
RBC MORPH BLD: NORMAL
SODIUM SERPL-SCNC: 138 MMOL/L (ref 136–145)
WBC # BLD AUTO: 10.23 10*3/MM3 (ref 3.4–10.8)
WBC MORPH BLD: NORMAL

## 2020-08-19 PROCEDURE — 82330 ASSAY OF CALCIUM: CPT | Performed by: OBSTETRICS & GYNECOLOGY

## 2020-08-19 PROCEDURE — 80048 BASIC METABOLIC PNL TOTAL CA: CPT | Performed by: OBSTETRICS & GYNECOLOGY

## 2020-08-19 PROCEDURE — 85025 COMPLETE CBC W/AUTO DIFF WBC: CPT | Performed by: OBSTETRICS & GYNECOLOGY

## 2020-08-19 PROCEDURE — 84100 ASSAY OF PHOSPHORUS: CPT | Performed by: OBSTETRICS & GYNECOLOGY

## 2020-08-19 PROCEDURE — 83735 ASSAY OF MAGNESIUM: CPT | Performed by: OBSTETRICS & GYNECOLOGY

## 2020-08-19 PROCEDURE — 63710000001 PROMETHAZINE PER 12.5 MG: Performed by: OBSTETRICS & GYNECOLOGY

## 2020-08-19 PROCEDURE — 85007 BL SMEAR W/DIFF WBC COUNT: CPT | Performed by: OBSTETRICS & GYNECOLOGY

## 2020-08-19 PROCEDURE — 25010000002 ENOXAPARIN PER 10 MG: Performed by: OBSTETRICS & GYNECOLOGY

## 2020-08-19 RX ADMIN — ACETAMINOPHEN 650 MG: 325 TABLET, FILM COATED ORAL at 08:11

## 2020-08-19 RX ADMIN — OXYCODONE 5 MG: 5 TABLET ORAL at 05:12

## 2020-08-19 RX ADMIN — OXYCODONE 5 MG: 5 TABLET ORAL at 20:56

## 2020-08-19 RX ADMIN — OXYCODONE 10 MG: 5 TABLET ORAL at 12:27

## 2020-08-19 RX ADMIN — PANTOPRAZOLE SODIUM 40 MG: 40 TABLET, DELAYED RELEASE ORAL at 08:11

## 2020-08-19 RX ADMIN — ACETAMINOPHEN 650 MG: 325 TABLET, FILM COATED ORAL at 02:48

## 2020-08-19 RX ADMIN — DOCUSATE SODIUM 100 MG: 100 CAPSULE, LIQUID FILLED ORAL at 08:11

## 2020-08-19 RX ADMIN — ENOXAPARIN SODIUM 40 MG: 40 INJECTION SUBCUTANEOUS at 08:11

## 2020-08-19 RX ADMIN — LEVOTHYROXINE SODIUM 88 MCG: 88 TABLET ORAL at 05:12

## 2020-08-19 RX ADMIN — DOCUSATE SODIUM 100 MG: 100 CAPSULE, LIQUID FILLED ORAL at 20:56

## 2020-08-19 RX ADMIN — ACETAMINOPHEN 650 MG: 325 TABLET, FILM COATED ORAL at 05:12

## 2020-08-19 RX ADMIN — FLUOXETINE HYDROCHLORIDE 20 MG: 20 CAPSULE ORAL at 08:11

## 2020-08-19 RX ADMIN — ACETAMINOPHEN 650 MG: 325 TABLET, FILM COATED ORAL at 21:00

## 2020-08-19 RX ADMIN — ACETAMINOPHEN 650 MG: 325 TABLET, FILM COATED ORAL at 14:38

## 2020-08-19 RX ADMIN — PROMETHAZINE HYDROCHLORIDE 12.5 MG: 12.5 TABLET ORAL at 08:13

## 2020-08-19 RX ADMIN — SODIUM CHLORIDE, POTASSIUM CHLORIDE, SODIUM LACTATE AND CALCIUM CHLORIDE 100 ML/HR: 600; 310; 30; 20 INJECTION, SOLUTION INTRAVENOUS at 08:06

## 2020-08-19 RX ADMIN — ACETAMINOPHEN 650 MG: 325 TABLET, FILM COATED ORAL at 18:06

## 2020-08-19 RX ADMIN — ZOLPIDEM TARTRATE 10 MG: 5 TABLET ORAL at 20:56

## 2020-08-19 NOTE — PROGRESS NOTES
Discharge Planning Assessment  [facility]     Patient Name: Sydnie Gamble  MRN: [id number]  Today's Date: 8/19/2020    Admit Date: 8/18/2020    Discharge Needs Assessment     Row Name 08/19/20 1046       Living Environment    Lives With  spouse    Name(s) of Who Lives With Patient   Jose Francisco @ [phone]    Current Living Arrangements  home/apartment/condo    Primary Care Provided by  self    Provides Primary Care For  no one    Family Caregiver if Needed  spouse    Family Caregiver Names   Jose Francisco     Quality of Family Relationships  involved;supportive    Able to Return to Prior Arrangements  yes    Living Arrangement Comments  Plan home at discharge        Resource/Environmental Concerns    Resource/Environmental Concerns  none    Transportation Concerns  car, none       Transition Planning    Patient/Family Anticipates Transition to  home with family    Patient/Family Anticipated Services at Transition  none    Transportation Anticipated  family or friend will provide       Discharge Needs Assessment    Readmission Within the Last 30 Days  no previous admission in last 30 days    Equipment Currently Used at Home  none    Anticipated Changes Related to Illness  none    Equipment Needed After Discharge  none    Discharge Coordination/Progress  Plan home at discharge         Discharge Plan     Row Name 08/19/20 1105       Plan    Plan  Home    Patient/Family in Agreement with Plan  yes    Plan Comments  I talked with patient at bedside. Patient lives in [place] with . Independent PTA. No DME.  plans to take off work to be there when she is discharged. Elisabet @ 2301    Final Discharge Disposition Code  01 - home or self-care        Destination      Coordination has not been started for this encounter.      Durable Medical Equipment      Coordination has not been started for this encounter.      Dialysis/Infusion      Coordination has not been started for this encounter.      Home Medical  Care      Coordination has not been started for this encounter.      Therapy      Coordination has not been started for this encounter.      Community Resources      Coordination has not been started for this encounter.          Demographic Summary     Row Name 08/19/20 1043       General Information    Admission Type  inpatient    Referral Source  admission list    Reason for Consult  discharge planning    Preferred Language  English     Used During This Interaction  no       Contact Information    Permission Granted to Share Info With      Contact Information Obtained for      Contact Information Comments  PCP: Maryellen Mendosa, confirmed by patient.         Functional Status     Row Name 08/19/20 1045       Functional Status    Usual Activity Tolerance  good    Current Activity Tolerance  moderate       Functional Status, IADL    Medications  independent    Meal Preparation  independent    Housekeeping  independent    Laundry  independent    Shopping  independent    IADL Comments  Has coverage with her [facility] for medications        Employment/    Employment Status  retired        Psychosocial    No documentation.       Abuse/Neglect    No documentation.       Legal    No documentation.       Substance Abuse    No documentation.       Patient Forms    No documentation.           Valorie Mc, RN

## 2020-08-19 NOTE — PROGRESS NOTES
Gynecologic Oncology In-Patient Progress Note      Patient Name: Sydnie Gamble  : 1960   MRN: [id number]       Subjective   Sydnie Gamble is a 60 y.o. who is POD#1 from an open radical hysterectomy, LSO and pelvic lymph node dissection for stage I B1 cervical cancer.    She is currently doing well. Pain is well controlled with therapy medications. She is tolerating a regular diet. Voiding via English.  Denies flatus/denies BM. She is ambulating without difficulty. Denies any CP, SOB, or lower extremity swelling.  Noted blurry vision after surgery but this is improved  back to baseline.    Review of Systems:   Review of Systems   Constitutional: Negative for chills, fatigue and fever.   HENT: Positive for congestion.    Eyes: Positive for visual disturbance.   Respiratory: Negative for shortness of breath and wheezing.    Cardiovascular: Negative for chest pain and leg swelling.   Gastrointestinal: Positive for abdominal pain. Negative for nausea and vomiting.   Genitourinary: Negative for vaginal bleeding.   Neurological: Negative for dizziness and headaches.        Objective     Physical Exam:  Vital Signs:   Vitals:    20 1926 20 0010 20 0300 20 0742   BP: 122/80 129/87 107/64 131/74   BP Location:       Patient Position: Lying      Pulse: 83 93 88 108   Resp:    Temp: 98.2 °F (36.8 °C) 98.4 °F (36.9 °C) 98.1 °F (36.7 °C) 99.4 °F (37.4 °C)   TempSrc: Oral Oral Oral Oral   SpO2: 92%  92% 92%   Weight:       Height:         BMI: Body mass index is 31.74 kg/m².   Weight:   Wt Readings from Last 3 Encounters:   20 76.2 kg (168 lb)   20 76.6 kg (168 lb 14 oz)   08/10/20 76.7 kg (169 lb)     I&O: I/O last 3 completed shifts:  In: 2860 [P.O.:360; I.V.:; IV Piggyback:500]  Out: 2610 [Urine:; Blood:600]    Physical Exam   Constitutional: She is oriented to person, place, and time. She appears well-developed and well-nourished.   HENT:   Head: Normocephalic  and atraumatic.   Right Ear: External ear normal.   Left Ear: External ear normal.   Nose: Nose normal.   Eyes: Conjunctivae are normal. Right eye exhibits no discharge. Left eye exhibits no discharge. No scleral icterus.   Cardiovascular: Normal rate and regular rhythm. Exam reveals no gallop and no friction rub.   No murmur heard.  Pulmonary/Chest: Effort normal and breath sounds normal. No stridor. She has no wheezes. She has no rales.   Abdominal: Soft.   Appropriately tender to palpation. Hypoactive bowel sounds. Pfannenstiel incision with dermabond.    Musculoskeletal: She exhibits no edema, tenderness or deformity.   Neurological: She is alert and oriented to person, place, and time.   Skin: Skin is warm and dry. She is not diaphoretic.   Psychiatric: She has a normal mood and affect. Judgment and thought content normal.   Nursing note and vitals reviewed.      Inpatient Medications:     Current Facility-Administered Medications:   •  acetaminophen (TYLENOL) tablet 650 mg, 650 mg, Oral, Q4H, Laura Jimenez MD, 650 mg at 08/19/20 0811  •  albuterol sulfate HFA (PROVENTIL HFA;VENTOLIN HFA;PROAIR HFA) inhaler 2 puff, 2 puff, Inhalation, Q4H PRN, Laura Jimenez MD  •  bisacodyl (DULCOLAX) EC tablet 10 mg, 10 mg, Oral, Daily PRN, Laura Jimenez MD  •  bisacodyl (DULCOLAX) suppository 10 mg, 10 mg, Rectal, Daily PRN, Laura Jimenez MD  •  clonazePAM (KlonoPIN) tablet 1 mg, 1 mg, Oral, BID PRN, Laura Jimenez MD  •  docusate sodium (COLACE) capsule 100 mg, 100 mg, Oral, BID, Laura Jimenez MD, 100 mg at 08/19/20 0811  •  enoxaparin (LOVENOX) syringe 40 mg, 40 mg, Subcutaneous, Daily, Laura Jimenez MD, 40 mg at 08/19/20 0811  •  FLUoxetine (PROzac) capsule 20 mg, 20 mg, Oral, Daily, Laura Jimenez MD, 20 mg at 08/19/20 0811  •  HYDROmorphone (DILAUDID) injection 0.5 mg, 0.5 mg, Intravenous, Q2H PRN **AND** naloxone (NARCAN) injection 0.1 mg, 0.1 mg, Intravenous, Q5 Min PRN, Laura Jimenez,  MD  •  lactated ringers infusion, 100 mL/hr, Intravenous, Continuous, Laura Jimenez MD, Last Rate: 100 mL/hr at 08/19/20 0806, 100 mL/hr at 08/19/20 0806  •  levothyroxine (SYNTHROID, LEVOTHROID) tablet 88 mcg, 88 mcg, Oral, Q AM, Laura Jimenez MD, 88 mcg at 08/19/20 0512  •  oxyCODONE (ROXICODONE) immediate release tablet 10 mg, 10 mg, Oral, Q4H PRN, Laura Jimenez MD  •  oxyCODONE (ROXICODONE) immediate release tablet 5 mg, 5 mg, Oral, Q4H PRN, Laura Jimenez MD, 5 mg at 08/19/20 0512  •  pantoprazole (PROTONIX) EC tablet 40 mg, 40 mg, Oral, Daily, Laura Jimenez MD, 40 mg at 08/19/20 0811  •  promethazine (PHENERGAN) injection 12.5 mg, 12.5 mg, Intravenous, Q6H PRN **OR** promethazine (PHENERGAN) injection 12.5 mg, 12.5 mg, Intramuscular, Q6H PRN **OR** promethazine (PHENERGAN) suppository 12.5 mg, 12.5 mg, Rectal, Q6H PRN **OR** promethazine (PHENERGAN) tablet 12.5 mg, 12.5 mg, Oral, Q6H PRN, Laura Jimenez MD, 12.5 mg at 08/19/20 0813  •  simethicone (MYLICON) chewable tablet 80 mg, 80 mg, Oral, 4x Daily PRN, Laura Jimenez MD  •  zolpidem (AMBIEN) tablet 10 mg, 10 mg, Oral, Nightly, Laura Jimenez MD, 10 mg at 08/18/20 2213     Results:   Lab Results   Component Value Date    WBC 10.23 08/19/2020    HGB 9.4 (L) 08/19/2020    HCT 29.7 (L) 08/19/2020    MCV 89.7 08/19/2020     08/19/2020       Lab Results   Component Value Date    GLUCOSE 110 (H) 08/19/2020    CALCIUM 8.6 08/19/2020     08/19/2020    K 3.8 08/19/2020    CO2 23.0 08/19/2020     08/19/2020    BUN 13 08/19/2020    CREATININE 0.93 08/19/2020    EGFRIFNONA 61 08/19/2020    BCR 14.0 08/19/2020    ANIONGAP 12.0 08/19/2020        Assessment / Plan    This is a 60 y.o. female who is POD#1 from a open radical hysterectomy, LSO, and pelvic lymph node dissection for stage I B1 cervical cancer.    1.Post operative care   - Pain: s/p TAP block in OR; ERAS medications ordered -  tylenol, gabapentin, and oxycodone;  ibuprofen and Toradol currently held given patient's creatinine of 1 preoperatively.   - FEN: IV fluids at 100 cc/hr -> will DC, regular diet ordered   - GI: bowel regimen and antiemetics ordered   - : adequate UOP, voiding aia Cordova- will discontinue Cordova this morning and plan for 2 post voiding residuals via straight cath   - Heme: preop hgb 13.7,  cc, postop hgb bernard 9.7; ppx lovenox ordered to start today    Plan for 30 days of postoperative lovenox - yes   - Gen: encourage ambulation, IS use   - Tubes/lines/drains: cordova, IV   - Code status: full    2. Medical co-morbidities   -Hypothyroid: Home Synthroid 88 MCG daily ordered   -Emphysema/reactive airway: Home albuterol ordered, Stiolto inhaler not on formulary here   -Reflux: Home Protonix ordered   -Anxiety: Home fluoxetine 20 mg daily, clonazepam 1 mg tablet twice daily as needed, and Ambien 10 mg at bedtime ordered    3. Dispo:      - Anticipate discharge to home on POD#2-4 pending milestones      ANNIA Jimenez MD  Gynecologic Oncology   Date: 08/19/20  Time: 9:41 AM    Please note that portions of this note may have been completed with a voice recognition program.

## 2020-08-19 NOTE — PLAN OF CARE
English reinserted after two failed attempts to empty bladder.  Pain well controlled and she has ambulated in hallway.

## 2020-08-19 NOTE — PLAN OF CARE
Problem: Patient Care Overview  Goal: Plan of Care Review  Outcome: Ongoing (interventions implemented as appropriate)  Flowsheets (Taken 8/19/2020 0973)  Progress: improving  Plan of Care Reviewed With: patient  Outcome Summary: VSS. Minimal c/o pain. Incision CDI. UOP adequate.

## 2020-08-20 LAB
ANION GAP SERPL CALCULATED.3IONS-SCNC: 9 MMOL/L (ref 5–15)
BUN SERPL-MCNC: 14 MG/DL (ref 8–23)
BUN/CREAT SERPL: 14.9 (ref 7–25)
CALCIUM SPEC-SCNC: 8.7 MG/DL (ref 8.6–10.5)
CHLORIDE SERPL-SCNC: 103 MMOL/L (ref 98–107)
CO2 SERPL-SCNC: 26 MMOL/L (ref 22–29)
CREAT SERPL-MCNC: 0.94 MG/DL (ref 0.57–1)
DEPRECATED RDW RBC AUTO: 46.6 FL (ref 37–54)
ERYTHROCYTE [DISTWIDTH] IN BLOOD BY AUTOMATED COUNT: 13.8 % (ref 12.3–15.4)
GFR SERPL CREATININE-BSD FRML MDRD: 60 ML/MIN/1.73
GLUCOSE SERPL-MCNC: 111 MG/DL (ref 65–99)
HCT VFR BLD AUTO: 27 % (ref 34–46.6)
HGB BLD-MCNC: 8.5 G/DL (ref 12–15.9)
MCH RBC QN AUTO: 28.9 PG (ref 26.6–33)
MCHC RBC AUTO-ENTMCNC: 31.5 G/DL (ref 31.5–35.7)
MCV RBC AUTO: 91.8 FL (ref 79–97)
PLATELET # BLD AUTO: 266 10*3/MM3 (ref 140–450)
PMV BLD AUTO: 9.9 FL (ref 6–12)
POTASSIUM SERPL-SCNC: 3.6 MMOL/L (ref 3.5–5.2)
RBC # BLD AUTO: 2.94 10*6/MM3 (ref 3.77–5.28)
SODIUM SERPL-SCNC: 138 MMOL/L (ref 136–145)
WBC # BLD AUTO: 8.27 10*3/MM3 (ref 3.4–10.8)

## 2020-08-20 PROCEDURE — 94799 UNLISTED PULMONARY SVC/PX: CPT

## 2020-08-20 PROCEDURE — 94640 AIRWAY INHALATION TREATMENT: CPT

## 2020-08-20 PROCEDURE — 80048 BASIC METABOLIC PNL TOTAL CA: CPT | Performed by: OBSTETRICS & GYNECOLOGY

## 2020-08-20 PROCEDURE — 25010000002 ENOXAPARIN PER 10 MG: Performed by: OBSTETRICS & GYNECOLOGY

## 2020-08-20 PROCEDURE — 25010000002 PROMETHAZINE PER 50 MG: Performed by: OBSTETRICS & GYNECOLOGY

## 2020-08-20 PROCEDURE — 85027 COMPLETE CBC AUTOMATED: CPT | Performed by: OBSTETRICS & GYNECOLOGY

## 2020-08-20 RX ORDER — OXYCODONE HYDROCHLORIDE 10 MG/1
10 TABLET ORAL EVERY 4 HOURS PRN
Qty: 20 TABLET | Refills: 0 | Status: SHIPPED | OUTPATIENT
Start: 2020-08-20 | End: 2020-08-28 | Stop reason: SDUPTHER

## 2020-08-20 RX ORDER — ARFORMOTEROL TARTRATE 15 UG/2ML
15 SOLUTION RESPIRATORY (INHALATION)
Status: DISCONTINUED | OUTPATIENT
Start: 2020-08-20 | End: 2020-08-21 | Stop reason: HOSPADM

## 2020-08-20 RX ORDER — ACETAMINOPHEN 325 MG/1
650 TABLET ORAL EVERY 4 HOURS PRN
Qty: 30 TABLET | Refills: 1 | Status: SHIPPED | OUTPATIENT
Start: 2020-08-20 | End: 2020-12-07

## 2020-08-20 RX ORDER — PSEUDOEPHEDRINE HCL 30 MG
100 TABLET ORAL 2 TIMES DAILY
Qty: 60 EACH | Refills: 1 | Status: SHIPPED | OUTPATIENT
Start: 2020-08-20 | End: 2020-12-07

## 2020-08-20 RX ADMIN — ARFORMOTEROL TARTRATE 15 MCG: 15 SOLUTION RESPIRATORY (INHALATION) at 20:53

## 2020-08-20 RX ADMIN — IPRATROPIUM BROMIDE 0.5 MG: 0.5 SOLUTION RESPIRATORY (INHALATION) at 14:10

## 2020-08-20 RX ADMIN — ACETAMINOPHEN 650 MG: 325 TABLET, FILM COATED ORAL at 15:34

## 2020-08-20 RX ADMIN — FLUOXETINE HYDROCHLORIDE 20 MG: 20 CAPSULE ORAL at 08:44

## 2020-08-20 RX ADMIN — DOCUSATE SODIUM 100 MG: 100 CAPSULE, LIQUID FILLED ORAL at 21:12

## 2020-08-20 RX ADMIN — OXYCODONE 10 MG: 5 TABLET ORAL at 18:08

## 2020-08-20 RX ADMIN — LEVOTHYROXINE SODIUM 88 MCG: 88 TABLET ORAL at 05:48

## 2020-08-20 RX ADMIN — IPRATROPIUM BROMIDE 0.5 MG: 0.5 SOLUTION RESPIRATORY (INHALATION) at 20:53

## 2020-08-20 RX ADMIN — ACETAMINOPHEN 650 MG: 325 TABLET, FILM COATED ORAL at 02:31

## 2020-08-20 RX ADMIN — PANTOPRAZOLE SODIUM 40 MG: 40 TABLET, DELAYED RELEASE ORAL at 08:44

## 2020-08-20 RX ADMIN — ACETAMINOPHEN 650 MG: 325 TABLET, FILM COATED ORAL at 21:12

## 2020-08-20 RX ADMIN — PROMETHAZINE HYDROCHLORIDE 12.5 MG: 25 INJECTION INTRAMUSCULAR; INTRAVENOUS at 12:00

## 2020-08-20 RX ADMIN — ENOXAPARIN SODIUM 40 MG: 40 INJECTION SUBCUTANEOUS at 08:44

## 2020-08-20 RX ADMIN — ACETAMINOPHEN 650 MG: 325 TABLET, FILM COATED ORAL at 05:48

## 2020-08-20 RX ADMIN — OXYCODONE 10 MG: 5 TABLET ORAL at 04:39

## 2020-08-20 RX ADMIN — OXYCODONE 5 MG: 5 TABLET ORAL at 13:41

## 2020-08-20 RX ADMIN — DOCUSATE SODIUM 100 MG: 100 CAPSULE, LIQUID FILLED ORAL at 08:44

## 2020-08-20 RX ADMIN — ACETAMINOPHEN 650 MG: 325 TABLET, FILM COATED ORAL at 10:25

## 2020-08-20 RX ADMIN — ARFORMOTEROL TARTRATE 15 MCG: 15 SOLUTION RESPIRATORY (INHALATION) at 14:11

## 2020-08-20 RX ADMIN — ZOLPIDEM TARTRATE 10 MG: 5 TABLET ORAL at 21:12

## 2020-08-20 NOTE — PROGRESS NOTES
"     Gynecologic Oncology In-Patient Progress Note      Patient Name: Sydnie Gamble  : 1960   MRN: [id number]       Subjective   Sydnie Gamble is a 60 y.o. who is POD#2 from an open radical hysterectomy, LSO and pelvic lymph node dissection for stage I B1 cervical cancer.    She is currently doing well. Pain is well controlled with oral medications. She is tolerating a regular diet.  English replaced yesterday due to inability to void.  Patient like to go home with this.  Passing flatus flatus/denies BM.  She reported ambulating yesterday morning but states that she really has not been out of bed or in the halls since her English was replaced yesterday since it was \"too emotional for her \". Denies any CP, SOB, or lower extremity swelling.  Of note she did have an oxygen desaturation while she was resting this morning.  She has not been using her I-S as instructed.    Review of Systems:   Review of Systems   Constitutional: Negative for chills, fatigue and fever.   HENT: Negative for congestion.    Eyes: Negative for visual disturbance.   Respiratory: Negative for shortness of breath and wheezing.    Cardiovascular: Negative for chest pain and leg swelling.   Gastrointestinal: Positive for abdominal pain. Negative for nausea and vomiting.   Genitourinary: Negative for vaginal bleeding.   Neurological: Negative for dizziness and headaches.        Objective     Physical Exam:  Vital Signs:   Vitals:    20 2300 20 0300 20 0700 20 0820   BP: 110/68 113/66 94/47    BP Location: Right arm Left arm Left arm    Patient Position: Lying Lying     Pulse: 85 98 85    Resp:  16 18    Temp: 98.6 °F (37 °C) 99 °F (37.2 °C) 98.5 °F (36.9 °C)    TempSrc: Oral Oral Oral    SpO2: 90% 93% (!) 83% 91%   Weight:       Height:         BMI: Body mass index is 31.74 kg/m².   Weight:   Wt Readings from Last 3 Encounters:   20 76.2 kg (168 lb)   20 76.6 kg (168 lb 14 oz)   08/10/20 76.7 kg (169 lb) "     I&O: I/O last 3 completed shifts:  In: 1810 [P.O.:810; I.V.:1000]  Out: 4550 [Urine:4550]    Physical Exam   Constitutional: She is oriented to person, place, and time. She appears well-developed and well-nourished.   HENT:   Head: Normocephalic and atraumatic.   Right Ear: External ear normal.   Left Ear: External ear normal.   Nose: Nose normal.   Eyes: Conjunctivae are normal. Right eye exhibits no discharge. Left eye exhibits no discharge. No scleral icterus.   Cardiovascular: Normal rate and regular rhythm. Exam reveals no gallop and no friction rub.   No murmur heard.  Pulmonary/Chest: Effort normal and breath sounds normal. No stridor. She has no wheezes. She has no rales.   Abdominal: Soft.   Appropriately tender to palpation. Normoactive bowel sounds. Pfannenstiel incision with dermabond.    Musculoskeletal: She exhibits no edema, tenderness or deformity.   Neurological: She is alert and oriented to person, place, and time.   Skin: Skin is warm and dry. She is not diaphoretic.   Psychiatric: She has a normal mood and affect. Judgment and thought content normal.   Nursing note and vitals reviewed.      Inpatient Medications:     Current Facility-Administered Medications:   •  acetaminophen (TYLENOL) tablet 650 mg, 650 mg, Oral, Q4H, Laura Jimenez MD, 650 mg at 08/20/20 0548  •  albuterol sulfate HFA (PROVENTIL HFA;VENTOLIN HFA;PROAIR HFA) inhaler 2 puff, 2 puff, Inhalation, Q4H PRN, Laura Jimenez MD  •  bisacodyl (DULCOLAX) EC tablet 10 mg, 10 mg, Oral, Daily PRN, Laura Jimenez MD  •  bisacodyl (DULCOLAX) suppository 10 mg, 10 mg, Rectal, Daily PRN, Laura Jimenez MD  •  clonazePAM (KlonoPIN) tablet 1 mg, 1 mg, Oral, BID PRN, Laura Jimenez MD  •  docusate sodium (COLACE) capsule 100 mg, 100 mg, Oral, BID, Laura Jimenez MD, 100 mg at 08/20/20 0844  •  enoxaparin (LOVENOX) syringe 40 mg, 40 mg, Subcutaneous, Daily, Laura Jimenez MD, 40 mg at 08/20/20 0844  •  FLUoxetine (PROzac)  capsule 20 mg, 20 mg, Oral, Daily, Laura Jimenez MD, 20 mg at 08/20/20 0844  •  HYDROmorphone (DILAUDID) injection 0.5 mg, 0.5 mg, Intravenous, Q2H PRN **AND** naloxone (NARCAN) injection 0.1 mg, 0.1 mg, Intravenous, Q5 Min PRN, Laura Jimenez MD  •  levothyroxine (SYNTHROID, LEVOTHROID) tablet 88 mcg, 88 mcg, Oral, Q AM, Laura Jimenez MD, 88 mcg at 08/20/20 0548  •  oxyCODONE (ROXICODONE) immediate release tablet 10 mg, 10 mg, Oral, Q4H PRN, Laura Jimenez MD, 10 mg at 08/20/20 0439  •  oxyCODONE (ROXICODONE) immediate release tablet 5 mg, 5 mg, Oral, Q4H PRN, Laura Jimenez MD, 5 mg at 08/19/20 2056  •  pantoprazole (PROTONIX) EC tablet 40 mg, 40 mg, Oral, Daily, Laura Jimenez MD, 40 mg at 08/20/20 0844  •  promethazine (PHENERGAN) injection 12.5 mg, 12.5 mg, Intravenous, Q6H PRN **OR** promethazine (PHENERGAN) injection 12.5 mg, 12.5 mg, Intramuscular, Q6H PRN **OR** promethazine (PHENERGAN) suppository 12.5 mg, 12.5 mg, Rectal, Q6H PRN **OR** promethazine (PHENERGAN) tablet 12.5 mg, 12.5 mg, Oral, Q6H PRN, Laura Jimenez MD, 12.5 mg at 08/19/20 0813  •  simethicone (MYLICON) chewable tablet 80 mg, 80 mg, Oral, 4x Daily PRN, Laura Jimenez MD  •  zolpidem (AMBIEN) tablet 10 mg, 10 mg, Oral, Nightly, Laura Jimenez MD, 10 mg at 08/19/20 2056     Results:   Lab Results   Component Value Date    WBC 10.23 08/19/2020    HGB 9.4 (L) 08/19/2020    HCT 29.7 (L) 08/19/2020    MCV 89.7 08/19/2020     08/19/2020       Lab Results   Component Value Date    GLUCOSE 110 (H) 08/19/2020    CALCIUM 8.6 08/19/2020     08/19/2020    K 3.8 08/19/2020    CO2 23.0 08/19/2020     08/19/2020    BUN 13 08/19/2020    CREATININE 0.93 08/19/2020    EGFRIFNONA 61 08/19/2020    BCR 14.0 08/19/2020    ANIONGAP 12.0 08/19/2020        Assessment / Plan    This is a 60 y.o. female who is POD#2 from a open radical hysterectomy, LSO, and pelvic lymph node dissection for stage I B1 cervical  cancer.    1.Post operative care   - Pain: s/p TAP block in OR; ERAS medications ordered -  tylenol, gabapentin, and oxycodone; ibuprofen and Toradol currently held given patient's creatinine of 1 preoperatively.   - FEN: regular diet ordered   - GI: bowel regimen and antiemetics ordered   - : adequate UOP, voiding via Cordova   - Heme: preop hgb 13.7,  cc, postop hgb bernard 8.7; ppx lovenox    Plan for 30 days of postoperative lovenox - yes   - Gen: encourage ambulation, IS use   - Tubes/lines/drains: cordova, IV   - Code status: full    2.  New oxygen requirement   -Patient with desat while sleeping this morning requiring nasal cannula    -Not tachycardic and currently on prophylactic Lovenox   -Suspect secondary to atelectasis and known chronic lung disease as patient not using her IS as instructed   -We will try to wean oxygen today.  Patient encouraged to use incentive spirometer 10 times per hour.   -Formulation of home Stiolto inhaler ordered this AM    3. Medical co-morbidities   -Hypothyroid: Home Synthroid 88 MCG daily ordered   -Emphysema/reactive airway: Home albuterol ordered, Stiolto inhaler not on formulary here - formulation ordered   -Reflux: Home Protonix ordered   -Anxiety: Home fluoxetine 20 mg daily, clonazepam 1 mg tablet twice daily as needed, and Ambien 10 mg at bedtime ordered    4. Dispo:      -Possible discharge home this afternoon if patient able to wean oxygen.      ANNIA Jimenez MD  Gynecologic Oncology   Date: 08/20/20  Time: 9:41 AM    Please note that portions of this note may have been completed with a voice recognition program.

## 2020-08-20 NOTE — PLAN OF CARE
Problem: Patient Care Overview  Goal: Plan of Care Review  Outcome: Ongoing (interventions implemented as appropriate)  Note:   Oxygen saturation upper 80s today.  On 2L/NC.  Some prns needed for pain and nausea.  Resting well.  Will continue to monitor.  Likely dc tomorrow.

## 2020-08-20 NOTE — PLAN OF CARE
Problem: Patient Care Overview  Goal: Plan of Care Review  Outcome: Ongoing (interventions implemented as appropriate)  Flowsheets (Taken 8/20/2020 0354)  Progress: improving  Plan of Care Reviewed With: patient  Outcome Summary: VSS. Resting well on shift. English remains with adequate output.

## 2020-08-21 ENCOUNTER — READMISSION MANAGEMENT (OUTPATIENT)
Dept: CALL CENTER | Facility: HOSPITAL | Age: 60
End: 2020-08-21

## 2020-08-21 VITALS
WEIGHT: 168 LBS | SYSTOLIC BLOOD PRESSURE: 112 MMHG | HEART RATE: 104 BPM | OXYGEN SATURATION: 93 % | TEMPERATURE: 97.6 F | HEIGHT: 61 IN | DIASTOLIC BLOOD PRESSURE: 67 MMHG | BODY MASS INDEX: 31.72 KG/M2 | RESPIRATION RATE: 18 BRPM

## 2020-08-21 LAB
ANION GAP SERPL CALCULATED.3IONS-SCNC: 10 MMOL/L (ref 5–15)
BASOPHILS # BLD AUTO: 0.02 10*3/MM3 (ref 0–0.2)
BASOPHILS NFR BLD AUTO: 0.3 % (ref 0–1.5)
BUN SERPL-MCNC: 11 MG/DL (ref 8–23)
BUN/CREAT SERPL: 11.8 (ref 7–25)
CALCIUM SPEC-SCNC: 8.6 MG/DL (ref 8.6–10.5)
CHLORIDE SERPL-SCNC: 104 MMOL/L (ref 98–107)
CO2 SERPL-SCNC: 26 MMOL/L (ref 22–29)
CREAT SERPL-MCNC: 0.93 MG/DL (ref 0.57–1)
DEPRECATED RDW RBC AUTO: 47.4 FL (ref 37–54)
EOSINOPHIL # BLD AUTO: 0.21 10*3/MM3 (ref 0–0.4)
EOSINOPHIL NFR BLD AUTO: 2.7 % (ref 0.3–6.2)
ERYTHROCYTE [DISTWIDTH] IN BLOOD BY AUTOMATED COUNT: 14 % (ref 12.3–15.4)
GFR SERPL CREATININE-BSD FRML MDRD: 61 ML/MIN/1.73
GLUCOSE SERPL-MCNC: 117 MG/DL (ref 65–99)
HCT VFR BLD AUTO: 28.1 % (ref 34–46.6)
HGB BLD-MCNC: 8.5 G/DL (ref 12–15.9)
IMM GRANULOCYTES # BLD AUTO: 0.06 10*3/MM3 (ref 0–0.05)
IMM GRANULOCYTES NFR BLD AUTO: 0.8 % (ref 0–0.5)
LYMPHOCYTES # BLD AUTO: 2.27 10*3/MM3 (ref 0.7–3.1)
LYMPHOCYTES NFR BLD AUTO: 28.8 % (ref 19.6–45.3)
MCH RBC QN AUTO: 28.1 PG (ref 26.6–33)
MCHC RBC AUTO-ENTMCNC: 30.2 G/DL (ref 31.5–35.7)
MCV RBC AUTO: 92.7 FL (ref 79–97)
MONOCYTES # BLD AUTO: 0.66 10*3/MM3 (ref 0.1–0.9)
MONOCYTES NFR BLD AUTO: 8.4 % (ref 5–12)
NEUTROPHILS NFR BLD AUTO: 4.65 10*3/MM3 (ref 1.7–7)
NEUTROPHILS NFR BLD AUTO: 59 % (ref 42.7–76)
NRBC BLD AUTO-RTO: 0 /100 WBC (ref 0–0.2)
PLATELET # BLD AUTO: 311 10*3/MM3 (ref 140–450)
PMV BLD AUTO: 9.5 FL (ref 6–12)
POTASSIUM SERPL-SCNC: 3.9 MMOL/L (ref 3.5–5.2)
RBC # BLD AUTO: 3.03 10*6/MM3 (ref 3.77–5.28)
SODIUM SERPL-SCNC: 140 MMOL/L (ref 136–145)
WBC # BLD AUTO: 7.87 10*3/MM3 (ref 3.4–10.8)

## 2020-08-21 PROCEDURE — 63710000001 PROMETHAZINE PER 12.5 MG: Performed by: OBSTETRICS & GYNECOLOGY

## 2020-08-21 PROCEDURE — 25010000002 ENOXAPARIN PER 10 MG: Performed by: OBSTETRICS & GYNECOLOGY

## 2020-08-21 PROCEDURE — 80048 BASIC METABOLIC PNL TOTAL CA: CPT | Performed by: OBSTETRICS & GYNECOLOGY

## 2020-08-21 PROCEDURE — 99024 POSTOP FOLLOW-UP VISIT: CPT | Performed by: OBSTETRICS & GYNECOLOGY

## 2020-08-21 PROCEDURE — 94799 UNLISTED PULMONARY SVC/PX: CPT

## 2020-08-21 PROCEDURE — 85025 COMPLETE CBC W/AUTO DIFF WBC: CPT | Performed by: OBSTETRICS & GYNECOLOGY

## 2020-08-21 RX ORDER — PROMETHAZINE HYDROCHLORIDE 12.5 MG/1
12.5 TABLET ORAL EVERY 6 HOURS PRN
Qty: 20 TABLET | Refills: 0 | Status: SHIPPED | OUTPATIENT
Start: 2020-08-21 | End: 2020-12-07

## 2020-08-21 RX ADMIN — IPRATROPIUM BROMIDE 0.5 MG: 0.5 SOLUTION RESPIRATORY (INHALATION) at 08:06

## 2020-08-21 RX ADMIN — ACETAMINOPHEN 650 MG: 325 TABLET, FILM COATED ORAL at 02:23

## 2020-08-21 RX ADMIN — OXYCODONE 5 MG: 5 TABLET ORAL at 10:00

## 2020-08-21 RX ADMIN — PANTOPRAZOLE SODIUM 40 MG: 40 TABLET, DELAYED RELEASE ORAL at 08:54

## 2020-08-21 RX ADMIN — ARFORMOTEROL TARTRATE 15 MCG: 15 SOLUTION RESPIRATORY (INHALATION) at 08:06

## 2020-08-21 RX ADMIN — LEVOTHYROXINE SODIUM 88 MCG: 88 TABLET ORAL at 05:43

## 2020-08-21 RX ADMIN — DOCUSATE SODIUM 100 MG: 100 CAPSULE, LIQUID FILLED ORAL at 08:54

## 2020-08-21 RX ADMIN — ENOXAPARIN SODIUM 40 MG: 40 INJECTION SUBCUTANEOUS at 09:29

## 2020-08-21 RX ADMIN — ACETAMINOPHEN 650 MG: 325 TABLET, FILM COATED ORAL at 10:00

## 2020-08-21 RX ADMIN — ACETAMINOPHEN 650 MG: 325 TABLET, FILM COATED ORAL at 05:43

## 2020-08-21 RX ADMIN — FLUOXETINE HYDROCHLORIDE 20 MG: 20 CAPSULE ORAL at 08:54

## 2020-08-21 RX ADMIN — PROMETHAZINE HYDROCHLORIDE 12.5 MG: 12.5 TABLET ORAL at 11:19

## 2020-08-21 NOTE — PROGRESS NOTES
Gynecologic Oncology In-Patient Progress Note      Patient Name: Sydnie Gamble  : 1960   MRN: [id number]       Subjective   Sydnie Gamble is a 60 y.o. who is POD#3 from an open radical hysterectomy, LSO and pelvic lymph node dissection for stage IB1 cervical cancer.    She is currently doing well. Pain is well controlled with oral medications. She is tolerating a regular diet.  English in place. Passing flatus flatus/denies BM.  Ambulating without difficulty. Denies any CP, SOB, or lower extremity swelling.  Not on oxygen today.    Review of Systems:   Review of Systems   Constitutional: Negative for chills, fatigue and fever.   HENT: Negative for congestion.    Eyes: Negative for visual disturbance.   Respiratory: Negative for shortness of breath and wheezing.    Cardiovascular: Negative for chest pain and leg swelling.   Gastrointestinal: Positive for abdominal pain. Negative for nausea and vomiting.   Genitourinary: Negative for vaginal bleeding.   Neurological: Negative for dizziness and headaches.        Objective     Physical Exam:  Vital Signs:   Vitals:    20 1605 20 1900 20 2300 20 0300   BP:  124/71 95/53 123/88   BP Location:  Right arm Right arm Right arm   Patient Position:  Lying Lying Lying   Pulse:  95 81 91   Resp:  17 16 17   Temp:  98.3 °F (36.8 °C) 98.7 °F (37.1 °C) 98.8 °F (37.1 °C)   TempSrc:  Oral Oral Oral   SpO2: 93% 93% 95% 91%   Weight:       Height:         BMI: Body mass index is 31.74 kg/m².   Weight:   Wt Readings from Last 3 Encounters:   20 76.2 kg (168 lb)   20 76.6 kg (168 lb 14 oz)   08/10/20 76.7 kg (169 lb)     I&O: I/O last 3 completed shifts:  In: 480 [P.O.:480]  Out: 4000 [Urine:4000]    Physical Exam   Constitutional: She is oriented to person, place, and time. She appears well-developed and well-nourished.   HENT:   Head: Normocephalic and atraumatic.   Right Ear: External ear normal.   Left Ear: External ear normal.   Nose:  Nose normal.   Eyes: Conjunctivae are normal. Right eye exhibits no discharge. Left eye exhibits no discharge. No scleral icterus.   Cardiovascular: Normal rate and regular rhythm. Exam reveals no gallop and no friction rub.   No murmur heard.  Pulmonary/Chest: Effort normal and breath sounds normal. No stridor. She has no wheezes. She has no rales.   Abdominal: Soft.   Minimally tender to palpation. Normoactive bowel sounds. Pfannenstiel incision with dermabond.    Musculoskeletal: She exhibits no edema, tenderness or deformity.   Neurological: She is alert and oriented to person, place, and time.   Skin: Skin is warm and dry. She is not diaphoretic.   Psychiatric: She has a normal mood and affect. Judgment and thought content normal.   Nursing note and vitals reviewed.      Inpatient Medications:     Current Facility-Administered Medications:   •  acetaminophen (TYLENOL) tablet 650 mg, 650 mg, Oral, Q4H, Laura Jimenez MD, 650 mg at 08/21/20 0543  •  albuterol sulfate HFA (PROVENTIL HFA;VENTOLIN HFA;PROAIR HFA) inhaler 2 puff, 2 puff, Inhalation, Q4H PRN, Laura Jimenez MD  •  arformoterol (BROVANA) nebulizer solution 15 mcg, 15 mcg, Nebulization, BID - RT, Laura Jimenez MD, 15 mcg at 08/21/20 0806  •  bisacodyl (DULCOLAX) EC tablet 10 mg, 10 mg, Oral, Daily PRN, Laura Jimenez MD  •  bisacodyl (DULCOLAX) suppository 10 mg, 10 mg, Rectal, Daily PRN, Laura Jimenez MD  •  clonazePAM (KlonoPIN) tablet 1 mg, 1 mg, Oral, BID PRN, Laura Jimenez MD  •  docusate sodium (COLACE) capsule 100 mg, 100 mg, Oral, BID, Laura Jimenez MD, 100 mg at 08/20/20 2112  •  enoxaparin (LOVENOX) syringe 40 mg, 40 mg, Subcutaneous, Daily, Laura Jimenez MD, 40 mg at 08/20/20 0844  •  FLUoxetine (PROzac) capsule 20 mg, 20 mg, Oral, Daily, Laura Jimenez MD, 20 mg at 08/20/20 0844  •  HYDROmorphone (DILAUDID) injection 0.5 mg, 0.5 mg, Intravenous, Q2H PRN **AND** naloxone (NARCAN) injection 0.1 mg, 0.1 mg,  Intravenous, Q5 Min PRN, Laura Jimenez MD  •  ipratropium (ATROVENT) nebulizer solution 0.5 mg, 0.5 mg, Nebulization, 4x Daily - RT, Laura Jimenez MD, 0.5 mg at 08/21/20 0806  •  levothyroxine (SYNTHROID, LEVOTHROID) tablet 88 mcg, 88 mcg, Oral, Q AM, Laura Jimenez MD, 88 mcg at 08/21/20 0543  •  oxyCODONE (ROXICODONE) immediate release tablet 10 mg, 10 mg, Oral, Q4H PRN, Laura Jimenez MD, 10 mg at 08/20/20 1808  •  oxyCODONE (ROXICODONE) immediate release tablet 5 mg, 5 mg, Oral, Q4H PRN, Laura Jimenez MD, 5 mg at 08/20/20 1341  •  pantoprazole (PROTONIX) EC tablet 40 mg, 40 mg, Oral, Daily, Laura Jimenez MD, 40 mg at 08/20/20 0844  •  promethazine (PHENERGAN) injection 12.5 mg, 12.5 mg, Intravenous, Q6H PRN, 12.5 mg at 08/20/20 1200 **OR** promethazine (PHENERGAN) injection 12.5 mg, 12.5 mg, Intramuscular, Q6H PRN **OR** promethazine (PHENERGAN) suppository 12.5 mg, 12.5 mg, Rectal, Q6H PRN **OR** promethazine (PHENERGAN) tablet 12.5 mg, 12.5 mg, Oral, Q6H PRN, Laura Jimenez MD, 12.5 mg at 08/19/20 0813  •  simethicone (MYLICON) chewable tablet 80 mg, 80 mg, Oral, 4x Daily PRN, Laura Jimenez MD  •  zolpidem (AMBIEN) tablet 10 mg, 10 mg, Oral, Nightly, Laura Jimenez MD, 10 mg at 08/20/20 2112     Results:   Lab Results   Component Value Date    WBC 8.27 08/20/2020    HGB 8.5 (L) 08/20/2020    HCT 27.0 (L) 08/20/2020    MCV 91.8 08/20/2020     08/20/2020       Lab Results   Component Value Date    GLUCOSE 111 (H) 08/20/2020    CALCIUM 8.7 08/20/2020     08/20/2020    K 3.6 08/20/2020    CO2 26.0 08/20/2020     08/20/2020    BUN 14 08/20/2020    CREATININE 0.94 08/20/2020    EGFRIFNONA 60 (L) 08/20/2020    BCR 14.9 08/20/2020    ANIONGAP 9.0 08/20/2020        Assessment / Plan    This is a 60 y.o. female who is POD#3 from a open radical hysterectomy, LSO, and pelvic lymph node dissection for stage I B1 cervical cancer.    1.Post operative care   - Pain: s/p TAP  block in OR; ERAS medications ordered -  tylenol, gabapentin, and oxycodone; ibuprofen and Toradol currently held given patient's creatinine of 1 preoperatively.   - FEN: regular diet ordered   - GI: bowel regimen and antiemetics ordered   - : adequate UOP, cordova to remain for home going.   - Heme: preop hgb 13.7,  cc, postop hgb bernard 8.7    Plan for 30 days of postoperative lovenox - yes   - Gen: encourage ambulation, IS use   - Tubes/lines/drains: cordova, IV   - Code status: full    2. Medical co-morbidities   -Hypothyroid: Home Synthroid 88 MCG daily ordered   -Emphysema/reactive airway: Home albuterol ordered, Stiolto inhaler not on formulary here - formulation ordered   -Reflux: Home Protonix ordered   -Anxiety: Home fluoxetine 20 mg daily, clonazepam 1 mg tablet twice daily as needed, and Ambien 10 mg at bedtime ordered    3. Dispo:      -Discharge home today      ANNIA Jimenez MD  Gynecologic Oncology   Date: 08/21/20  Time: 9:41 AM    Please note that portions of this note may have been completed with a voice recognition program.

## 2020-08-21 NOTE — DISCHARGE SUMMARY
Date of Discharge:  8/21/2020    Discharge Diagnosis:   Cervical cancer    Problem List:  Active Hospital Problems    Diagnosis  POA   • **Cervical cancer (CMS/[facility]) [C53.9]  Yes     Priority: High      Resolved Hospital Problems   No resolved problems to display.       Presenting Problem/History of Present Illness  Cervical cancer    Hospital Course  Patient is a 60 y.o. female who underwent radical hysterectomy, bilateral salpingo-oophorectomy, pelvic lymph nodes dissection for clinical IB1 cervical cancer. Her procedure was uncomplicated. Her hospital problem list was as follows:    1. Post-operative: Her pain was well controlled with oral pain medications. She tolerated a regular diet without issue. She was unable to void following cordova removal so this was replaced per patient request. She will follow up on Monday for voiding trial. Her pre-operative hemoglobin was 13.7. EBL was 600 cc and her post-operative hemoglobin was 8.5.  Pathology was pending at time of discharge.    At time of discharge, the patient was in good condition. She was tolerating good oral intake, and her pain was well controlled. She was given appropriate prescriptions prior to discharge. All questions were answered and written discharge instructions were given to the patient. She was to follow up with me as listed below.          Procedures Performed    Procedure(s):  TOTAL RADICAL HYSTERECTOMY PELVIC NODE DISSECTION  -------------------       Consults:   Consults     No orders found from 7/20/2020 to 8/19/2020.          Pertinent Test Results: none    Condition on Discharge:  good    Vital Signs  Temp:  [97.6 °F (36.4 °C)-98.8 °F (37.1 °C)] 97.6 °F (36.4 °C)  Heart Rate:  [] 104  Resp:  [16-18] 18  BP: ()/(53-88) 112/67    Discharge Disposition  Home or Self Care    Discharge Medications     Discharge Medications      New Medications      Instructions Start Date   acetaminophen 325 MG tablet  Commonly known as:  TYLENOL   650  mg, Oral, Every 4 Hours PRN      docusate sodium 100 MG capsule   100 mg, Oral, 2 Times Daily      enoxaparin 40 MG/0.4ML solution syringe  Commonly known as:  LOVENOX   40 mg, Subcutaneous, Daily      oxyCODONE 10 MG tablet  Commonly known as:  ROXICODONE   10 mg, Oral, Every 4 Hours PRN      promethazine 12.5 MG tablet  Commonly known as:  PHENERGAN   12.5 mg, Oral, Every 6 Hours PRN         Changes to Medications      Instructions Start Date   albuterol sulfate  (90 Base) MCG/ACT inhaler  Commonly known as:  PROVENTIL HFA;VENTOLIN HFA;PROAIR HFA  What changed:  reasons to take this   2 puffs, Inhalation, Every 4 Hours PRN         Continue These Medications      Instructions Start Date   clonazePAM 1 MG tablet  Commonly known as:  KlonoPIN   1 mg, Oral, 2 Times Daily PRN      FLUoxetine 20 MG capsule  Commonly known as:  PROzac   20 mg, Oral, Daily      levothyroxine 88 MCG tablet  Commonly known as:  SYNTHROID, LEVOTHROID   TAKE 1 TABLET BY MOUTH EVERY DAY      pantoprazole 40 MG EC tablet  Commonly known as:  PROTONIX   40 mg, Oral, Daily, Patient needs appointment prior to next refill.      tiotropium bromide-olodaterol 2.5-2.5 MCG/ACT aerosol solution inhaler  Commonly known as:  Stiolto Respimat   1 puff, Inhalation, Daily - RT      zolpidem 10 MG tablet  Commonly known as:  AMBIEN   Take 10 mg by mouth.             Discharge Diet   Regular     Activity at Discharge  As tolerated. Pelvic rest for 6 weeks. No heavy lifting > 10 pounds.    Follow-up Appointments  No future appointments.      Test Results Pending at Discharge   Order Current Status    Tissue Pathology Exam In process          Time: Discharge 45 min

## 2020-08-21 NOTE — PROGRESS NOTES
Continued Stay Note   [place]     Patient Name: Sydnie Gamble  MRN: [id number]  Today's Date: 8/21/2020    Admit Date: 8/18/2020    Discharge Plan     Row Name 08/21/20 1100       Plan    Plan  Home/     Patient/Family in Agreement with Plan  yes    Plan Comments  I spoke with patient and  at bedside.  here to take patient home. Patient in pain calling out for pain medication. No discharge needs noted at this time. Elisabet @ [id number]        Discharge Codes    No documentation.             Valorie Mc RN

## 2020-08-22 NOTE — OUTREACH NOTE
Prep Survey      Responses   [facility] patient discharged from?  [place]   Is LACE score < 7 ?  No   Eligibility  [facility]   Date of Admission  08/18/20   Date of Discharge  08/21/20   Discharge Disposition  Home or Self Care   Discharge diagnosis  HYSTERECTOMY PELVIC NODE DISSECTION  Cervical cancer    COVID-19 Test Status  Negative   Does the patient have one of the following disease processes/diagnoses(primary or secondary)?  General Surgery   Does the patient have Home health ordered?  No   Is there a DME ordered?  No   Prep survey completed?  Yes          Nichol Ceballos RN

## 2020-08-24 ENCOUNTER — CLINICAL SUPPORT (OUTPATIENT)
Dept: GYNECOLOGIC ONCOLOGY | Facility: CLINIC | Age: 60
End: 2020-08-24

## 2020-08-24 ENCOUNTER — TRANSITIONAL CARE MANAGEMENT TELEPHONE ENCOUNTER (OUTPATIENT)
Dept: CALL CENTER | Facility: HOSPITAL | Age: 60
End: 2020-08-24

## 2020-08-24 VITALS
HEIGHT: 61 IN | RESPIRATION RATE: 22 BRPM | SYSTOLIC BLOOD PRESSURE: 141 MMHG | WEIGHT: 171 LBS | TEMPERATURE: 96.9 F | BODY MASS INDEX: 32.28 KG/M2 | DIASTOLIC BLOOD PRESSURE: 80 MMHG | HEART RATE: 116 BPM | OXYGEN SATURATION: 93 %

## 2020-08-24 DIAGNOSIS — Z98.890 POST-OPERATIVE STATE: Primary | ICD-10-CM

## 2020-08-24 LAB
CYTO UR: NORMAL
LAB AP CASE REPORT: NORMAL
LAB AP CLINICAL INFORMATION: NORMAL
PATH REPORT.FINAL DX SPEC: NORMAL
PATH REPORT.GROSS SPEC: NORMAL

## 2020-08-24 PROCEDURE — 99024 POSTOP FOLLOW-UP VISIT: CPT | Performed by: NURSE PRACTITIONER

## 2020-08-24 NOTE — PROGRESS NOTES
English catheter removed by RN. Voiding trial successful, patient spontaneously voided 200 mL. Patient allowed to return home. Precautions given.

## 2020-08-24 NOTE — OUTREACH NOTE
Call Center TCM Note      Responses   [facility] patient discharged from?  [place]   Does the patient have one of the following disease processes/diagnoses(primary or secondary)?  General Surgery   TCM attempt successful?  Yes   Call start time  1535   Call end time  1537   Discharge diagnosis  HYSTERECTOMY PELVIC NODE DISSECTION  Cervical cancer    Person spoke with today (if not patient) and relationship  Ojse Francisco-spouse    Meds reviewed with patient/caregiver?  Yes   Is the patient having any side effects they believe may be caused by any medication additions or changes?  No   Does the patient have all medications related to this admission filled (includes all antibiotics, pain medications, etc.)  Yes   Is the patient taking all medications as directed (includes completed medication regime)?  Yes   Does the patient have a follow up appointment scheduled with their surgeon?  Yes [8/24/20]   Has the patient kept scheduled appointments due by today?  Yes   Comments   did not want to schedule appt. He reports he will call to schedule appt later.    Psychosocial issues?  No   Did the patient receive a copy of their discharge instructions?  Yes   Nursing interventions  Reviewed instructions with patient   What is the patient's perception of their health status since discharge?  Improving   Nursing interventions  Nurse provided patient education   Is the patient/caregiver able to teach back signs and symptoms of incisional infection?  Fever   Is the patient/caregiver able to teach back steps to recovery at home?  Rest and rebuild strength, gradually increase activity   If the patient is a current smoker, are they able to teach back resources for cessation?  -- [Nonsmoker]   Is the patient/caregiver able to teach back the hierarchy of who to call/visit for symptoms/problems? PCP, Specialist, Home health nurse, Urgent Care, ED, 911  Yes   TCM call completed?  Yes          Latasha Mason RN    8/24/2020,  15:38

## 2020-08-25 ENCOUNTER — APPOINTMENT (OUTPATIENT)
Dept: CT IMAGING | Facility: HOSPITAL | Age: 60
End: 2020-08-25

## 2020-08-28 DIAGNOSIS — C53.1 MALIGNANT NEOPLASM OF EXOCERVIX (HCC): ICD-10-CM

## 2020-08-28 RX ORDER — OXYCODONE HYDROCHLORIDE 10 MG/1
5 TABLET ORAL EVERY 4 HOURS PRN
Qty: 10 TABLET | Refills: 0 | Status: SHIPPED | OUTPATIENT
Start: 2020-08-28 | End: 2020-09-06 | Stop reason: SDUPTHER

## 2020-09-01 ENCOUNTER — TELEPHONE (OUTPATIENT)
Dept: GYNECOLOGIC ONCOLOGY | Facility: CLINIC | Age: 60
End: 2020-09-01

## 2020-09-01 DIAGNOSIS — R39.9 UTI SYMPTOMS: Primary | ICD-10-CM

## 2020-09-02 ENCOUNTER — OFFICE VISIT (OUTPATIENT)
Dept: GYNECOLOGIC ONCOLOGY | Facility: CLINIC | Age: 60
End: 2020-09-02

## 2020-09-02 ENCOUNTER — LAB (OUTPATIENT)
Dept: LAB | Facility: HOSPITAL | Age: 60
End: 2020-09-02

## 2020-09-02 VITALS
RESPIRATION RATE: 18 BRPM | DIASTOLIC BLOOD PRESSURE: 74 MMHG | HEART RATE: 105 BPM | WEIGHT: 166 LBS | SYSTOLIC BLOOD PRESSURE: 170 MMHG | BODY MASS INDEX: 31.34 KG/M2 | HEIGHT: 61 IN | TEMPERATURE: 97.2 F

## 2020-09-02 DIAGNOSIS — R39.9 UTI SYMPTOMS: ICD-10-CM

## 2020-09-02 DIAGNOSIS — C53.1 MALIGNANT NEOPLASM OF EXOCERVIX (HCC): Primary | ICD-10-CM

## 2020-09-02 LAB
BACTERIA UR QL AUTO: ABNORMAL /HPF
BILIRUB UR QL STRIP: NEGATIVE
CLARITY UR: CLEAR
COLOR UR: YELLOW
GLUCOSE UR STRIP-MCNC: NEGATIVE MG/DL
HGB UR QL STRIP.AUTO: NEGATIVE
HYALINE CASTS UR QL AUTO: ABNORMAL /LPF
KETONES UR QL STRIP: NEGATIVE
LEUKOCYTE ESTERASE UR QL STRIP.AUTO: ABNORMAL
NITRITE UR QL STRIP: NEGATIVE
PH UR STRIP.AUTO: 6.5 [PH] (ref 5–8)
PROT UR QL STRIP: NEGATIVE
RBC # UR: ABNORMAL /HPF
REF LAB TEST METHOD: ABNORMAL
SP GR UR STRIP: 1.01 (ref 1–1.03)
SQUAMOUS #/AREA URNS HPF: ABNORMAL /HPF
UROBILINOGEN UR QL STRIP: ABNORMAL
WBC UR QL AUTO: ABNORMAL /HPF

## 2020-09-02 PROCEDURE — 81001 URINALYSIS AUTO W/SCOPE: CPT

## 2020-09-02 PROCEDURE — 87086 URINE CULTURE/COLONY COUNT: CPT

## 2020-09-02 PROCEDURE — 99215 OFFICE O/P EST HI 40 MIN: CPT | Performed by: OBSTETRICS & GYNECOLOGY

## 2020-09-02 NOTE — PROGRESS NOTES
Post Operative Office Visit      Patient Name: Sydnie Gamble  : 1960   MRN: [id number]     Chief Complaint:    Chief Complaint   Patient presents with   • Post-op     Cervical Cancer       History of Present Illness: Sydnie Gamble is a 60 y.o. female who is status post open radical hysterectomy, LSO, and pelvic lymph node dissection for stage IIa cervix cancer on 2020.  She had been doing well until yesterday when she developed right flank pain that radiated down to her groin.  Pain was worse with Valsalva and trying to void.  She reports that she was able to void.  She also noticed some urinary frequency.  She required several doses of pain medication yesterday which are abnormal for her.  She states this morning however, she is feeling much better.  Her abdominal pain is back to her baseline.  She reports that she intermittently has sharp pain but this is tolerable and has not interfered with her activity to be as of daily living.  She was able to empty her bladder this morning completely though she did notice a change in her urinary stream.  Denies any blood in her urine or any sediment.  She we did have a bowel movement yesterday.  Did report that this was slightly painful due to abdominal pain that she was having.  Today she is passing gas without difficulty.  She is tolerating a regular diet she has not required any narcotic pain medication today.    Oncologic History:     Cervical cancer (CMS/[facility])    2020 Initial Diagnosis     Suspected cervical cancer  Referred by Dr. Elissa Lee    2020: Seen for complaints of postmenopausal bleeding and found to have abnormal appearing cervix. Pap test suspicious for squamous cell carcinoma.      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 Lymph nodes negative.    [facility] Jimenez [facility]        9/2/2020 Cancer Staged     Staging form: Cervix Uteri, AJCC 8th Edition  - Clinical stage from 9/2/2020: FIGO Stage IIA1 (cT2a1, cN0, cM0) - Signed by Laura Jimenez MD on 9/2/2020        Medical, surgical and family history reviewed.     Subjective      Review of Systems:   Review of Systems   Constitutional: Negative for activity change, appetite change, chills, fatigue and fever (Patient reports one-time temp of 100.).   HENT: Negative for congestion, sore throat and tinnitus.    Respiratory: Negative for cough, shortness of breath and wheezing.    Cardiovascular: Negative for chest pain, palpitations and leg swelling.   Gastrointestinal: Positive for abdominal pain (Improved today) and nausea (Resolved today). Negative for constipation, diarrhea and vomiting.   Genitourinary: Positive for frequency (Improved today). Negative for dysuria and hematuria.   Musculoskeletal: Negative for arthralgias and myalgias.   Skin: Negative for color change, pallor, rash and wound (Denies any problem with incisions.).   Neurological: Negative for headaches.   Psychiatric/Behavioral: Negative for dysphoric mood. The patient is nervous/anxious.           Objective     Physical Exam:  Vital Signs:   Vitals:    09/02/20 0910   BP: 170/74   Pulse: 105   Resp: 18   Temp: 97.2 °F (36.2 °C)   TempSrc: Temporal   Weight: 75.3 kg (166 lb)   Height: 154.9 cm (60.98\")   PainSc:   9   PainLoc: Groin     BMI: Body mass index is 31.38 kg/m².     ECOG performance status 1    Physical Exam   Constitutional: She is oriented to person, place, and time. She appears well-developed and well-nourished. No distress.   HENT:   Head: Normocephalic and atraumatic.   Right Ear: External ear normal.   Left Ear: External ear normal.   Nose: Nose normal.   Eyes: Conjunctivae are normal. Right eye exhibits no discharge. Left eye exhibits no discharge. No scleral icterus. "   Cardiovascular: Normal rate, regular rhythm and normal heart sounds.   No murmur heard.  Pulmonary/Chest: Effort normal and breath sounds normal. No respiratory distress. She has no wheezes. She has no rales.   Abdominal: Soft. Bowel sounds are normal. She exhibits no distension. There is no tenderness. There is no guarding.   Minimally tender to palpation.  Pfannenstiel incision healing well without evidence of infection.  No erythema or induration.   Genitourinary:   Genitourinary Comments: External genitalia normal.  Vagina without discharge.  Vaginal cuff intact.  Confirmed on bimanual exam.  Minimal tenderness to palpation on bimanual.   Musculoskeletal: She exhibits no edema, tenderness or deformity.   Neurological: She is alert and oriented to person, place, and time.   Skin: Skin is warm and dry. She is not diaphoretic.   Psychiatric: She has a normal mood and affect. Judgment and thought content normal.   Nursing note and vitals reviewed.      Pathology:  Operative findings again reviewed. Pathology report discussed.   UTERINE CERVIX  TYPE OF SPECIMEN/PROCEDURE (ALSO OTHER ORGANS PRESENT): Total radical abdominal hysterectomy with bilateral salpingo-oophorectomy and extended vaginal margin  TUMOR SIZE: 3.2 x 3.0 x 1.1 cm  TUMOR SITE: Uterine cervix  HISTOLOGIC TYPE: Keratinizing squamous cell carcinoma  HISTOLOGIC stGstRstAstDstEst:st st1st STROMAL INVASION: Present  DEPTH OF STROMAL INVASION MM: 11 m  LONGITUDINAL HORIZONTAL EXTENT/LENGTH OF STROMAL INVASION: 32 mm  CIRCUMFERENTIAL HORIZONTAL EXTENT/WIDTH OF STROMAL INVASION: 30 mm  OTHER TISSUE/ORGAN INVOLVEMENT: Vagina  VAGINAL MARGIN: Final margin negative  RADIAL (CIRCUMFERENTIAL) MARGIN: Negative  LYMPHOVASCULAR INVASION: Present, extensive  REGIONAL LYMPH NODES: Submitted  NUMBER OF LYMPH NODES INVOLVED BY NEOPLASM: 0  TOTAL NUMBER OF LYMPH NODES EXAMINED: 6  NUMBER OF SENTINEL NODES EXAMINED: 0  ADDITIONAL PATHOLOGIC FINDINGS: Leiomyoma, adenomyosis  ADDITIONAL  STUDIES: Available upon request.    Assessment / Plan    Ms Sydnie Gamble underwent an open radical hysterectomy, bilateral salpingo-oophorectomy, and pelvic/common iliac lymph node dissection on 8/18/2020 and is recovering well from surgery. We discussed further recovery and avoiding lifting anything over 10 lbs for 6 weeks postoperatively.     Final pathology showed a moderately differentiated squamous cell carcinoma of the cervix with 11 mm stromal invasion (outer third), extensive lymphovascular space invasion and negative lymph nodes.  She did have vaginal involvement but a negative vaginal margin.  Parametria had lymphovascular space invasion but negative margins. This makes her a FIGO stage IIA squamous cell carcinoma of the cervix and  high-intermediate risk of recurrence based on the Sedlis criteria from GOG 92. I estimate that her risk of recurrence is between 15-25% without additional therapy. Most of those recurrences would occur locally. I recommend pelvic radiation as GOG 92 demonstrated a decreased risk in recurrence and improvement in survival with adjuvant radiation. A referral to radiation oncology was placed. I will see her back in the office every 3 months for the first 2 years and every 6 months thereafter. She knows to notify us if she develops any new symptoms such as vaginal bleeding, persistent abdominal or pelvic pain, bloating, or changes in bowel or bladder habits or persistent cough. I will see her back in 3 months for follow up and she will call me in case she changes her mind about adjuvant therapy.     I spent approximately 45 minutes with the patient in face-to-face discussion and counseling regarding the significance of the pathology report, future treatment options, and surveillance for her newly diagnosed cancer. This is above and beyond her routine post-operative care and separate from the procedure performed.    Abdominal pain: Improved at today's visit.  Clinical description  sounds consistent with a possible kidney stone. Exam unremarkable.  Urinalysis sent and is unremarkable.  Patient instructed to continue alternating Tylenol and ibuprofen and to use oxycodone sparingly.  She was instructed to call the office if the abdominal pain she experienced yesterday returned at which time we may obtain imaging.    Assessment/Plan:   Sydnie was seen today for post-op.    Diagnoses and all orders for this visit:    Malignant neoplasm of exocervix (CMS/HCC)  -     Ambulatory Referral to Radiation Oncology         Follow Up:   In 3 months for survivorship visit.  Patient instructed to call if abdominal pain returns or she develops additional symptoms.    ANNIA Jimenez MD  Gynecologic Oncology     Please note that portions of this note may have been completed with a voice recognition program.

## 2020-09-03 LAB — BACTERIA SPEC AEROBE CULT: NORMAL

## 2020-09-06 DIAGNOSIS — C53.1 MALIGNANT NEOPLASM OF EXOCERVIX (HCC): ICD-10-CM

## 2020-09-08 RX ORDER — OXYCODONE HYDROCHLORIDE 10 MG/1
5 TABLET ORAL EVERY 4 HOURS PRN
Qty: 10 TABLET | Refills: 0 | Status: SHIPPED | OUTPATIENT
Start: 2020-09-08 | End: 2020-12-07

## 2020-09-09 ENCOUNTER — HOSPITAL ENCOUNTER (OUTPATIENT)
Dept: RADIATION ONCOLOGY | Facility: HOSPITAL | Age: 60
Setting detail: RADIATION/ONCOLOGY SERIES
Discharge: HOME | End: 2020-09-09

## 2020-09-09 ENCOUNTER — OFFICE VISIT (OUTPATIENT)
Dept: RADIATION ONCOLOGY | Facility: HOSPITAL | Age: 60
End: 2020-09-09

## 2020-09-09 VITALS
DIASTOLIC BLOOD PRESSURE: 79 MMHG | SYSTOLIC BLOOD PRESSURE: 136 MMHG | RESPIRATION RATE: 18 BRPM | HEIGHT: 61 IN | TEMPERATURE: 97.3 F | OXYGEN SATURATION: 97 % | WEIGHT: 164 LBS | HEART RATE: 91 BPM | BODY MASS INDEX: 30.96 KG/M2

## 2020-09-09 DIAGNOSIS — C53.1 MALIGNANT NEOPLASM OF EXOCERVIX (HCC): ICD-10-CM

## 2020-09-09 PROCEDURE — G0463 HOSPITAL OUTPT CLINIC VISIT: HCPCS | Performed by: RADIOLOGY

## 2020-09-09 NOTE — PROGRESS NOTES
CONSULTATION NOTE      :                                                          1960  DATE OF CONSULTATION:                       2020   REQUESTING PHYSICIAN:                   Laura Jimenez MD  REASON FOR CONSULTATION:           Cervical cancer (CMS/[facility])  - FIGO Stage IIA1 (cT2a1, cN0, cM0)    Thank you for requesting my services in evaluation of this pleasant individual.  I am seeing them in outpatient consultation regarding a diagnosis of cervical cancer.     BRIEF HISTORY:  The patient is a very pleasant 60 y.o. female  with a past medical history significant for reflux, COPD, and hypothyroidism, who recently presented with a 2 month history of vaginal bleeding.  She reportedly has not had a Pap test in over 30 years, and her recent Pap test was suspicious for cancer.  She was found to have 3 cm cervical tumor that was biopsy-proven to be an invasive moderately differentiated squamous cell carcinoma.  She underwent a radical hysterectomy on  which again confirmed a moderately differentiated squamous cell carcinoma.  There was deep stromal invasion, lymphovascular space invasion particularly with LVSI and the parametria, however her lymph nodes and final surgical margins were negative.  She has since recovered well from her surgery other than some occasional soreness and abdominal pain.  She recently underwent CT scans that showed no other evidence of regional or distant disease, and she is being referred to my clinic today for consideration of adjuvant radiation therapy.  She otherwise denies any urinary symptoms, reports normal bowel functions, and denies any vaginal bleeding.    Allergy: No Known Allergies    Social History:   Social History     Socioeconomic History   • Marital status:      Spouse name: Not on file   • Number of children: Not on file   • Years of education: Not on file   • Highest education level: Not on file   Tobacco Use   • Smoking status: Former Smoker      "Packs/day: 1.50     Years: 45.00     Pack years: 67.50     Types: Electronic Cigarette     Last attempt to quit: 3/30/2019     Years since quittin.4   • Smokeless tobacco: Never Used   • Tobacco comment: quit analog cigs in march, still vapes daily   Substance and Sexual Activity   • Alcohol use: No   • Drug use: No   • Sexual activity: Never       Past Medical History:   Past Medical History:   Diagnosis Date   • Acid reflux    • Acid reflux    • Anxiety    • Arthritis    • Cervical cancer (CMS/HCC)    • Emphysema of lung (CMS/HCC)    • Hypothyroidism    • Lung nodule    • Ovarian cyst    • Visual impairment corrected with glasses   • Wears dentures    • Wears glasses        Family History: family history includes Anxiety disorder in her mother; Asthma in her mother; COPD in her mother; Cancer in her father, maternal aunt, maternal aunt, maternal grandmother, and mother; Depression in her mother; Emphysema in her mother; Heart attack in her maternal grandfather; Hypertension in her mother; Mental illness in her mother.     Surgical History:   Past Surgical History:   Procedure Laterality Date   • TOTAL ABDOMINAL HYSTERECTOMY PELVIC NODE DISSECTION N/A 2020    Procedure: TOTAL RADICAL HYSTERECTOMY PELVIC NODE DISSECTION;  Surgeon: Laura Jimenez MD;  Location: [facility];  Service: Gynecology Oncology;  Laterality: N/A;   • TUBAL ABDOMINAL LIGATION      right with ovarian dissection         Review of Systems:   Review of Systems   Psychiatric/Behavioral: Positive for depression and sleep disturbance. The patient is nervous/anxious.    All other systems reviewed and are negative.          Objective   VITAL SIGNS:   Vitals:    20 1504   BP: 136/79   Pulse: 91   Resp: 18   Temp: 97.3 °F (36.3 °C)   TempSrc: Temporal   SpO2: 97%  Comment: RA   Weight: 74.4 kg (164 lb)   Height: 154.9 cm (61\")   PainSc:   3   PainLoc: Abdomen  Comment: s/p hysterectomy        Karnofsky score: 80      Physical " Exam:   Physical Exam   Constitutional: She is oriented to person, place, and time. She appears well-developed and well-nourished. No distress.   HENT:   Head: Normocephalic and atraumatic.   Mouth/Throat: Oropharynx is clear and moist.   Eyes: Pupils are equal, round, and reactive to light. Conjunctivae and EOM are normal.   Neck: Normal range of motion. Neck supple.   Cardiovascular: Normal rate and regular rhythm. Exam reveals no friction rub.   No murmur heard.  Pulmonary/Chest: Effort normal and breath sounds normal. She has no wheezes.   Abdominal: Soft. Bowel sounds are normal. She exhibits no distension and no mass. There is no tenderness.   Transverse lower abdominal incision is clean, dry, and intact.  Appropriately tender to palpation.   Genitourinary:   Genitourinary Comments: Well-healed vaginal cuff   Musculoskeletal: Normal range of motion. She exhibits no edema.   Lymphadenopathy:     She has no cervical adenopathy.   Neurological: She is alert and oriented to person, place, and time.   Skin: Skin is warm and dry.   Psychiatric: She has a normal mood and affect. Her behavior is normal. Judgment and thought content normal.   Nursing note and vitals reviewed.    PATHOLOGY  Radical Hysterectomy and Lymph Node Staging 8/18/2020:  1. RIGHT PELVIC LYMPH NODE:  Single lymph node negative for metastatic carcinoma (0/1).  2. RIGHT COMMON ILIAC LYMPH NODE:  Single lymph node negative for metastatic carcinoma (0/1).  3. LEFT PELVIC LYMPH NODES:  Four lymph nodes negative for metastatic carcinoma (0/4).  4. UTERUS WITH CERVIX, VAGINAL MUCOSA, AND BILATERAL FALLOPIAN TUBES AND OVARIES{  Invasive moderately differentiated keratinizing squamous cell carcinoma of cervix/endocervix  Gross tumor size 3.2 x 3.0 x 1.1 cm  Tumor extends to right vaginal margin  Extensive lymphovascular invasion identified  Myometrium with leiomyoma and adenomyosis  Parametrium with lymphovascular invasion by neoplasm only; no direct  extension by tumor  Bilateral fallopian tubes and ovaries with no additional pathologic abnormalities  5. EXTENDED RIGHT VAGINAL MARGIN #1, EXCISION:  Involved by moderately differentiated keratinizing squamous cell carcinoma  Distal margin positive for invasive neoplasm  6. EXTENDED RIGHT VAGINAL MARGIN #2, EXCISION:  Focal involvement by moderately differentiated keratinizing squamous cell carcinoma  Margins negative for neoplasm    Cervical Biopsy 8/10/2020:  Moderately differentiated squamous cell carcinoma    IMAGING  I have personally reviewed the relevant imaging studies, as follows:  Ct Chest With Contrast    Result Date: 8/14/2020  Stable nodularity identified at the right upper lobe. Stable 1 cm lymph node in the right hilar region. No evidence of progression of disease. The abdomen and pelvis is also stable and unremarkable with several cysts throughout the liver.  D:  08/14/2020 E:  08/14/2020  This report was finalized on 8/14/2020 2:41 PM by Dr. Elizabeth Shepard MD.      Ct Abdomen Pelvis With Contrast    Result Date: 8/14/2020  Stable nodularity identified at the right upper lobe. Stable 1 cm lymph node in the right hilar region. No evidence of progression of disease. The abdomen and pelvis is also stable and unremarkable with several cysts throughout the liver.  D:  08/14/2020 E:  08/14/2020  This report was finalized on 8/14/2020 2:41 PM by Dr. Elizabeth Shepard MD.         The following portions of the patient's history were reviewed and updated as appropriate: allergies, current medications, past family history, past medical history, past social history, past surgical history and problem list.    Assessment:   Assessment  Mrs. Gamble is a 60-year-old female with a FIGO grade IIA1 squamous cell carcinoma of the cervix.  She has underwent a radical hysterectomy and has intermediate risk factors including lymphovascular space invasion, vaginal involvement, and the cervical stromal involvement.   She will benefit from a course of adjuvant radiation therapy alone, which will consist of sternal beam radiation therapy to a dose of 50.4 Gy in 28 fractions, and followed by 3 intracavitary brachytherapy cylinders to the upper vagina using iridium-192 sources.  I plan to use intensity modulated radiation therapy in an effort to limit the dose to the uninvolved bladder, rectum, and small bowel which will be important as treatment will entail additional boosts to both the primary tumor and the parametria which will result in these normal organs receiving greater than 45 Gy.  I spent approximately 45 minutes today with the patient risks and benefits she was agreeable and signed informed consent.  I scheduled her to return to my clinic in approximately 1 week for a radiation setup and simulation session.  We will aim to begin treatments approximately 1 to 2 weeks thereafter pending insurance authorization.      RECOMMENDATIONS:    Pelvic radiation therapy to 50.4 Gardner followed by 3 intracavitary vaginal cylinders delivering an additional 15 Gray to the vaginal surface.    Follow Up:   Return in about 1 week (around 9/16/2020) for Radiation Simulation.  Sydnie was seen today for cervical cancer.    Diagnoses and all orders for this visit:    Malignant neoplasm of exocervix (CMS/HCC)    Thank you for allowing me to participate in the care of this individual.    Sincerely,       Cliff Montana MD

## 2020-09-15 ENCOUNTER — HOSPITAL ENCOUNTER (OUTPATIENT)
Dept: RADIATION ONCOLOGY | Facility: HOSPITAL | Age: 60
Discharge: HOME | End: 2020-09-15

## 2020-09-17 DIAGNOSIS — F41.1 GAD (GENERALIZED ANXIETY DISORDER): ICD-10-CM

## 2020-09-17 DIAGNOSIS — E03.9 HYPOTHYROIDISM, UNSPECIFIED TYPE: ICD-10-CM

## 2020-09-17 DIAGNOSIS — K21.9 GASTROESOPHAGEAL REFLUX DISEASE WITHOUT ESOPHAGITIS: ICD-10-CM

## 2020-09-17 RX ORDER — FLUOXETINE HYDROCHLORIDE 20 MG/1
CAPSULE ORAL
Qty: 60 CAPSULE | Refills: 0 | Status: SHIPPED | OUTPATIENT
Start: 2020-09-17 | End: 2020-09-25 | Stop reason: SDUPTHER

## 2020-09-17 RX ORDER — LEVOTHYROXINE SODIUM 88 UG/1
TABLET ORAL
Qty: 30 TABLET | Refills: 0 | Status: SHIPPED | OUTPATIENT
Start: 2020-09-17 | End: 2020-09-28 | Stop reason: SDUPTHER

## 2020-09-17 RX ORDER — PANTOPRAZOLE SODIUM 40 MG/1
40 TABLET, DELAYED RELEASE ORAL DAILY
Qty: 30 TABLET | Refills: 0 | Status: SHIPPED | OUTPATIENT
Start: 2020-09-17 | End: 2020-09-25 | Stop reason: SDUPTHER

## 2020-09-22 PROCEDURE — 77301 RADIOTHERAPY DOSE PLAN IMRT: CPT | Performed by: RADIOLOGY

## 2020-09-22 PROCEDURE — 77300 RADIATION THERAPY DOSE PLAN: CPT | Performed by: RADIOLOGY

## 2020-09-22 PROCEDURE — 77338 DESIGN MLC DEVICE FOR IMRT: CPT | Performed by: RADIOLOGY

## 2020-09-25 ENCOUNTER — LAB (OUTPATIENT)
Dept: LAB | Facility: HOSPITAL | Age: 60
End: 2020-09-25

## 2020-09-25 ENCOUNTER — OFFICE VISIT (OUTPATIENT)
Dept: INTERNAL MEDICINE | Facility: CLINIC | Age: 60
End: 2020-09-25

## 2020-09-25 VITALS
DIASTOLIC BLOOD PRESSURE: 68 MMHG | HEIGHT: 61 IN | RESPIRATION RATE: 18 BRPM | BODY MASS INDEX: 30.21 KG/M2 | TEMPERATURE: 97 F | OXYGEN SATURATION: 98 % | WEIGHT: 160 LBS | HEART RATE: 86 BPM | SYSTOLIC BLOOD PRESSURE: 112 MMHG

## 2020-09-25 DIAGNOSIS — E03.9 ACQUIRED HYPOTHYROIDISM: Primary | ICD-10-CM

## 2020-09-25 DIAGNOSIS — Z23 NEED FOR INFLUENZA VACCINATION: ICD-10-CM

## 2020-09-25 DIAGNOSIS — J41.0 SIMPLE CHRONIC BRONCHITIS (HCC): ICD-10-CM

## 2020-09-25 DIAGNOSIS — E55.9 VITAMIN D DEFICIENCY: ICD-10-CM

## 2020-09-25 DIAGNOSIS — K21.9 GASTROESOPHAGEAL REFLUX DISEASE WITHOUT ESOPHAGITIS: ICD-10-CM

## 2020-09-25 DIAGNOSIS — R73.03 PREDIABETES: ICD-10-CM

## 2020-09-25 DIAGNOSIS — C53.1 MALIGNANT NEOPLASM OF EXOCERVIX (HCC): ICD-10-CM

## 2020-09-25 DIAGNOSIS — E78.2 MIXED HYPERLIPIDEMIA: ICD-10-CM

## 2020-09-25 DIAGNOSIS — F41.1 GAD (GENERALIZED ANXIETY DISORDER): ICD-10-CM

## 2020-09-25 PROCEDURE — 90471 IMMUNIZATION ADMIN: CPT | Performed by: NURSE PRACTITIONER

## 2020-09-25 PROCEDURE — 99214 OFFICE O/P EST MOD 30 MIN: CPT | Performed by: NURSE PRACTITIONER

## 2020-09-25 PROCEDURE — 90686 IIV4 VACC NO PRSV 0.5 ML IM: CPT | Performed by: NURSE PRACTITIONER

## 2020-09-25 PROCEDURE — 80053 COMPREHEN METABOLIC PANEL: CPT | Performed by: NURSE PRACTITIONER

## 2020-09-25 PROCEDURE — 82306 VITAMIN D 25 HYDROXY: CPT | Performed by: NURSE PRACTITIONER

## 2020-09-25 PROCEDURE — 80061 LIPID PANEL: CPT | Performed by: NURSE PRACTITIONER

## 2020-09-25 PROCEDURE — 85025 COMPLETE CBC W/AUTO DIFF WBC: CPT | Performed by: NURSE PRACTITIONER

## 2020-09-25 PROCEDURE — 84443 ASSAY THYROID STIM HORMONE: CPT | Performed by: NURSE PRACTITIONER

## 2020-09-25 PROCEDURE — 83036 HEMOGLOBIN GLYCOSYLATED A1C: CPT | Performed by: NURSE PRACTITIONER

## 2020-09-25 RX ORDER — PANTOPRAZOLE SODIUM 40 MG/1
40 TABLET, DELAYED RELEASE ORAL DAILY
Qty: 90 TABLET | Refills: 1 | Status: SHIPPED | OUTPATIENT
Start: 2020-09-25 | End: 2020-10-19 | Stop reason: SDUPTHER

## 2020-09-25 RX ORDER — OXYCODONE HYDROCHLORIDE 10 MG/1
5 TABLET ORAL EVERY 4 HOURS PRN
Qty: 10 TABLET | Refills: 0 | OUTPATIENT
Start: 2020-09-25

## 2020-09-25 RX ORDER — FLUOXETINE HYDROCHLORIDE 20 MG/1
20 CAPSULE ORAL DAILY
Qty: 90 CAPSULE | Refills: 1 | Status: SHIPPED | OUTPATIENT
Start: 2020-09-25 | End: 2021-03-25

## 2020-09-25 RX ORDER — TIOTROPIUM BROMIDE AND OLODATEROL 3.124; 2.736 UG/1; UG/1
1 SPRAY, METERED RESPIRATORY (INHALATION)
Qty: 1 INHALER | Refills: 5 | Status: SHIPPED | OUTPATIENT
Start: 2020-09-25

## 2020-09-25 NOTE — PROGRESS NOTES
"Subjective   Sydnie Gamble is a 60 y.o. female here today for hypothyroidism.    Chief Complaint   Patient presents with   • Hypothyroidism   Sydnie is here today for a FU on hypothyroidism and COPD.  Breathing stable.  Mood is \"ok\".  Undergoing treatment for cervical cancer-stage 2.  Still having post-op pain.  Worse at night.  Will start radiation treatment soon.      Review of Systems   Constitutional: Negative for activity change, appetite change, chills, unexpected weight gain and unexpected weight loss.   Respiratory: Negative for cough, chest tightness, shortness of breath and wheezing.    Cardiovascular: Positive for palpitations. Negative for chest pain and leg swelling.   Gastrointestinal: Positive for GERD and indigestion. Negative for abdominal pain, blood in stool and nausea.   Endocrine: Positive for cold intolerance. Negative for heat intolerance, polydipsia, polyphagia and polyuria.   Genitourinary: Positive for pelvic pain.   Skin: Positive for dry skin.   Neurological: Positive for tremors. Negative for memory problem and confusion.   Psychiatric/Behavioral: Positive for stress. Negative for dysphoric mood, self-injury, sleep disturbance, suicidal ideas, negative for hyperactivity and depressed mood. The patient is nervous/anxious.        Past Medical History:   Diagnosis Date   • Acid reflux    • Acid reflux    • Anxiety 1976   • Arthritis    • Cervical cancer (CMS/HCC)    • Emphysema of lung (CMS/HCC)    • Hypothyroidism    • Lung nodule    • Ovarian cyst 1980s   • Visual impairment corrected with glasses   • Wears dentures    • Wears glasses      Family History   Problem Relation Age of Onset   • Cancer Mother    • Hypertension Mother    • Mental illness Mother    • Anxiety disorder Mother    • Asthma Mother    • COPD Mother    • Depression Mother    • Emphysema Mother    • Cancer Father    • Heart attack Maternal Grandfather    • Cancer Maternal Aunt    • Cancer Maternal Aunt    • Cancer " Maternal Grandmother    • Breast cancer Neg Hx    • Ovarian cancer Neg Hx      Past Surgical History:   Procedure Laterality Date   • TOTAL ABDOMINAL HYSTERECTOMY PELVIC NODE DISSECTION N/A 2020    Procedure: TOTAL RADICAL HYSTERECTOMY PELVIC NODE DISSECTION;  Surgeon: Laura Jimenez MD;  Location: [facility];  Service: Gynecology Oncology;  Laterality: N/A;   • TUBAL ABDOMINAL LIGATION      right with ovarian dissection      Social History     Socioeconomic History   • Marital status:      Spouse name: Not on file   • Number of children: Not on file   • Years of education: Not on file   • Highest education level: Not on file   Tobacco Use   • Smoking status: Former Smoker     Packs/day: 1.50     Years: 45.00     Pack years: 67.50     Types: Electronic Cigarette     Quit date: 3/30/2019     Years since quittin.4   • Smokeless tobacco: Never Used   • Tobacco comment: quit analog cigs in march, still vapes daily   Substance and Sexual Activity   • Alcohol use: No   • Drug use: No   • Sexual activity: Never         Current Outpatient Medications:   •  acetaminophen (TYLENOL) 325 MG tablet, Take 2 tablets by mouth Every 4 (Four) Hours As Needed for Mild Pain ., Disp: 30 tablet, Rfl: 1  •  albuterol sulfate  (90 Base) MCG/ACT inhaler, Inhale 2 puffs Every 4 (Four) Hours As Needed for Wheezing. (Patient taking differently: Inhale 2 puffs Every 4 (Four) Hours As Needed for Wheezing or Shortness of Air.), Disp: 1 inhaler, Rfl: 4  •  clonazePAM (KlonoPIN) 1 MG tablet, Take 1 mg by mouth 2 (Two) Times a Day As Needed for Anxiety., Disp: , Rfl:   •  docusate sodium 100 MG capsule, Take 100 mg by mouth 2 (Two) Times a Day., Disp: 60 each, Rfl: 1  •  FLUoxetine (PROzac) 20 MG capsule, Take 1 capsule by mouth Daily., Disp: 90 capsule, Rfl: 1  •  levothyroxine (SYNTHROID, LEVOTHROID) 88 MCG tablet, TAKE 1 TABLET BY MOUTH EVERY DAY, Disp: 30 tablet, Rfl: 0  •  oxyCODONE (ROXICODONE) 10 MG tablet, Take 0.5  "tablets by mouth Every 4 (Four) Hours As Needed for Severe Pain ., Disp: 10 tablet, Rfl: 0  •  pantoprazole (PROTONIX) 40 MG EC tablet, Take 1 tablet by mouth Daily. Patient needs appointment prior to next refill., Disp: 90 tablet, Rfl: 1  •  promethazine (PHENERGAN) 12.5 MG tablet, Take 1 tablet by mouth Every 6 (Six) Hours As Needed for Nausea or Vomiting., Disp: 20 tablet, Rfl: 0  •  tiotropium bromide-olodaterol (Stiolto Respimat) 2.5-2.5 MCG/ACT aerosol solution inhaler, Inhale 1 puff Daily., Disp: 1 inhaler, Rfl: 5  •  zolpidem (AMBIEN) 10 MG tablet, Take 10 mg by mouth., Disp: , Rfl: 2    Objective   Vitals:    09/25/20 1446   BP: 112/68   Pulse: 86   Resp: 18   Temp: 97 °F (36.1 °C)   TempSrc: Temporal   SpO2: 98%   Weight: 72.6 kg (160 lb)   Height: 154.9 cm (61\")     Body mass index is 30.23 kg/m².  Physical Exam  Vitals signs and nursing note reviewed.   Constitutional:       General: She is not in acute distress.     Appearance: She is well-developed and overweight. She is not diaphoretic.   Eyes:      Pupils: Pupils are equal, round, and reactive to light.   Neck:      Musculoskeletal: Neck supple.      Thyroid: No thyromegaly.   Cardiovascular:      Rate and Rhythm: Normal rate and regular rhythm.   Pulmonary:      Effort: Pulmonary effort is normal.      Breath sounds: Normal breath sounds.   Skin:     General: Skin is warm and dry.      Capillary Refill: Capillary refill takes 2 to 3 seconds.      Coloration: Skin is pale.   Neurological:      Mental Status: She is alert and oriented to person, place, and time.   Psychiatric:         Attention and Perception: Attention normal.         Mood and Affect: Mood is anxious.         Speech: Speech normal.         Behavior: Behavior normal.         Thought Content: Thought content normal.         Cognition and Memory: Cognition normal.         Judgment: Judgment normal.         Assessment/Plan   Problem List Items Addressed This Visit        Cardiovascular " and Mediastinum    HLD (hyperlipidemia)    Relevant Orders    Lipid Panel       Respiratory    COPD (chronic obstructive pulmonary disease) (CMS/[facility])    Relevant Medications    albuterol sulfate  (90 Base) MCG/ACT inhaler    promethazine (PHENERGAN) 12.5 MG tablet    tiotropium bromide-olodaterol (Stiolto Respimat) 2.5-2.5 MCG/ACT aerosol solution inhaler       Digestive    Gastroesophageal reflux disease without esophagitis    Relevant Medications    pantoprazole (PROTONIX) 40 MG EC tablet    Other Relevant Orders    CBC & Differential    Comprehensive Metabolic Panel    CBC Auto Differential    Vitamin D deficiency    Relevant Orders    Vitamin D 25 Hydroxy       Endocrine    Acquired hypothyroidism - Primary    Relevant Medications    levothyroxine (SYNTHROID, LEVOTHROID) 88 MCG tablet    Other Relevant Orders    TSH Rfx On Abnormal To Free T4    Prediabetes    Relevant Orders    CBC & Differential    Comprehensive Metabolic Panel    Hemoglobin A1c    CBC Auto Differential       Other    ALESIA (generalized anxiety disorder)    Relevant Medications    zolpidem (AMBIEN) 10 MG tablet    FLUoxetine (PROzac) 20 MG capsule      Other Visit Diagnoses     Need for influenza vaccination        Relevant Orders    Fluarix Quad >6 Months ([id number]) (Completed)        Sydnie was seen today for hypothyroidism.    Diagnoses and all orders for this visit:    Acquired hypothyroidism  -     TSH Rfx On Abnormal To Free T4  Labs today  Prediabetes  -     CBC & Differential  -     Comprehensive Metabolic Panel  -     Hemoglobin A1c  -     CBC Auto Differential    Gastroesophageal reflux disease without esophagitis  -     CBC & Differential  -     Comprehensive Metabolic Panel  -     CBC Auto Differential  -     pantoprazole (PROTONIX) 40 MG EC tablet; Take 1 tablet by mouth Daily. Patient needs appointment prior to next refill.    Mixed hyperlipidemia  -     Lipid Panel    ALESIA (generalized anxiety disorder)  -     FLUoxetine  (PROzac) 20 MG capsule; Take 1 capsule by mouth Daily.  -     Stable, continue current treatment plan    Vitamin D deficiency  -     Vitamin D 25 Hydroxy    Simple chronic bronchitis (CMS/HCC)  -     tiotropium bromide-olodaterol (Stiolto Respimat) 2.5-2.5 MCG/ACT aerosol solution inhaler; Inhale 1 puff Daily.  - followed by Pulm for nodule.   Need for influenza vaccination  -     Fluarix Quad >6 Months ([id number])             The patient was counseled regarding diagnostic results, impressions, prognosis, instructions for management, risk factor reductions, education, and importance of treatment compliance.  The patient verbalized understanding of and agreement with the plan of care.    Advised patient to call with any further questions and any new or worsening symptoms.     Return in about 3 months (around 12/25/2020), or if symptoms worsen or fail to improve.      Maryellen Mendosa, APRN    Please note that portions of this note were completed with a voice recognition program. Efforts were made to edit the dictations, but occasionally words are mistranscribed.

## 2020-09-26 LAB
25(OH)D3 SERPL-MCNC: 22.2 NG/ML (ref 30–100)
ALBUMIN SERPL-MCNC: 4.1 G/DL (ref 3.5–5.2)
ALBUMIN/GLOB SERPL: 1.1 G/DL
ALP SERPL-CCNC: 131 U/L (ref 39–117)
ALT SERPL W P-5'-P-CCNC: 9 U/L (ref 1–33)
ANION GAP SERPL CALCULATED.3IONS-SCNC: 13.6 MMOL/L (ref 5–15)
AST SERPL-CCNC: 11 U/L (ref 1–32)
BASOPHILS # BLD AUTO: 0.09 10*3/MM3 (ref 0–0.2)
BASOPHILS NFR BLD AUTO: 1 % (ref 0–1.5)
BILIRUB SERPL-MCNC: <0.2 MG/DL (ref 0–1.2)
BUN SERPL-MCNC: 10 MG/DL (ref 8–23)
BUN/CREAT SERPL: 8.9 (ref 7–25)
CALCIUM SPEC-SCNC: 10.3 MG/DL (ref 8.6–10.5)
CHLORIDE SERPL-SCNC: 99 MMOL/L (ref 98–107)
CHOLEST SERPL-MCNC: 199 MG/DL (ref 0–200)
CO2 SERPL-SCNC: 25.4 MMOL/L (ref 22–29)
CREAT SERPL-MCNC: 1.12 MG/DL (ref 0.57–1)
DEPRECATED RDW RBC AUTO: 39.1 FL (ref 37–54)
EOSINOPHIL # BLD AUTO: 0.27 10*3/MM3 (ref 0–0.4)
EOSINOPHIL NFR BLD AUTO: 2.9 % (ref 0.3–6.2)
ERYTHROCYTE [DISTWIDTH] IN BLOOD BY AUTOMATED COUNT: 12.7 % (ref 12.3–15.4)
GFR SERPL CREATININE-BSD FRML MDRD: 49 ML/MIN/1.73
GLOBULIN UR ELPH-MCNC: 3.9 GM/DL
GLUCOSE SERPL-MCNC: 111 MG/DL (ref 65–99)
HBA1C MFR BLD: 5.76 % (ref 4.8–5.6)
HCT VFR BLD AUTO: 36.1 % (ref 34–46.6)
HDLC SERPL-MCNC: 56 MG/DL (ref 40–60)
HGB BLD-MCNC: 11.7 G/DL (ref 12–15.9)
IMM GRANULOCYTES # BLD AUTO: 0.07 10*3/MM3 (ref 0–0.05)
IMM GRANULOCYTES NFR BLD AUTO: 0.7 % (ref 0–0.5)
LDLC SERPL CALC-MCNC: 115 MG/DL (ref 0–100)
LDLC/HDLC SERPL: 2.05 {RATIO}
LYMPHOCYTES # BLD AUTO: 2.14 10*3/MM3 (ref 0.7–3.1)
LYMPHOCYTES NFR BLD AUTO: 22.6 % (ref 19.6–45.3)
MCH RBC QN AUTO: 27.6 PG (ref 26.6–33)
MCHC RBC AUTO-ENTMCNC: 32.4 G/DL (ref 31.5–35.7)
MCV RBC AUTO: 85.1 FL (ref 79–97)
MONOCYTES # BLD AUTO: 0.57 10*3/MM3 (ref 0.1–0.9)
MONOCYTES NFR BLD AUTO: 6 % (ref 5–12)
NEUTROPHILS NFR BLD AUTO: 6.32 10*3/MM3 (ref 1.7–7)
NEUTROPHILS NFR BLD AUTO: 66.8 % (ref 42.7–76)
NRBC BLD AUTO-RTO: 0 /100 WBC (ref 0–0.2)
PLATELET # BLD AUTO: 586 10*3/MM3 (ref 140–450)
PMV BLD AUTO: 9.5 FL (ref 6–12)
POTASSIUM SERPL-SCNC: 4.4 MMOL/L (ref 3.5–5.2)
PROT SERPL-MCNC: 8 G/DL (ref 6–8.5)
RBC # BLD AUTO: 4.24 10*6/MM3 (ref 3.77–5.28)
SODIUM SERPL-SCNC: 138 MMOL/L (ref 136–145)
TRIGL SERPL-MCNC: 141 MG/DL (ref 0–150)
TSH SERPL DL<=0.05 MIU/L-ACNC: 2.44 UIU/ML (ref 0.27–4.2)
VLDLC SERPL-MCNC: 28.2 MG/DL (ref 5–40)
WBC # BLD AUTO: 9.46 10*3/MM3 (ref 3.4–10.8)

## 2020-09-28 ENCOUNTER — HOSPITAL ENCOUNTER (OUTPATIENT)
Dept: RADIATION ONCOLOGY | Facility: HOSPITAL | Age: 60
Discharge: HOME | End: 2020-09-28

## 2020-09-28 DIAGNOSIS — E03.9 HYPOTHYROIDISM, UNSPECIFIED TYPE: ICD-10-CM

## 2020-09-28 PROCEDURE — 77386: CPT | Performed by: RADIOLOGY

## 2020-09-28 RX ORDER — LEVOTHYROXINE SODIUM 88 UG/1
88 TABLET ORAL DAILY
Qty: 30 TABLET | Refills: 5 | Status: SHIPPED | OUTPATIENT
Start: 2020-09-28 | End: 2021-04-12

## 2020-09-29 ENCOUNTER — HOSPITAL ENCOUNTER (OUTPATIENT)
Dept: RADIATION ONCOLOGY | Facility: HOSPITAL | Age: 60
Discharge: HOME | End: 2020-09-29

## 2020-09-29 VITALS — BODY MASS INDEX: 30.63 KG/M2 | WEIGHT: 162.1 LBS

## 2020-09-29 DIAGNOSIS — C53.1 MALIGNANT NEOPLASM OF EXOCERVIX (HCC): Primary | ICD-10-CM

## 2020-09-29 PROCEDURE — 77386: CPT | Performed by: RADIOLOGY

## 2020-09-29 RX ORDER — ONDANSETRON 4 MG/1
4 TABLET, ORALLY DISINTEGRATING ORAL EVERY 8 HOURS PRN
Qty: 30 TABLET | Refills: 2 | Status: SHIPPED | OUTPATIENT
Start: 2020-09-29 | End: 2023-01-19 | Stop reason: SDUPTHER

## 2020-09-30 ENCOUNTER — HOSPITAL ENCOUNTER (OUTPATIENT)
Dept: RADIATION ONCOLOGY | Facility: HOSPITAL | Age: 60
Discharge: HOME | End: 2020-09-30

## 2020-09-30 PROCEDURE — 77386: CPT | Performed by: RADIOLOGY

## 2020-10-01 ENCOUNTER — HOSPITAL ENCOUNTER (OUTPATIENT)
Dept: RADIATION ONCOLOGY | Facility: HOSPITAL | Age: 60
Setting detail: RADIATION/ONCOLOGY SERIES
Discharge: HOME | End: 2020-10-01

## 2020-10-01 ENCOUNTER — HOSPITAL ENCOUNTER (OUTPATIENT)
Dept: RADIATION ONCOLOGY | Facility: HOSPITAL | Age: 60
Discharge: HOME | End: 2020-10-01

## 2020-10-01 PROCEDURE — 77386: CPT | Performed by: RADIOLOGY

## 2020-10-01 PROCEDURE — 77336 RADIATION PHYSICS CONSULT: CPT | Performed by: RADIOLOGY

## 2020-10-02 ENCOUNTER — HOSPITAL ENCOUNTER (OUTPATIENT)
Dept: RADIATION ONCOLOGY | Facility: HOSPITAL | Age: 60
Discharge: HOME | End: 2020-10-02

## 2020-10-02 PROCEDURE — 77386: CPT | Performed by: RADIOLOGY

## 2020-10-05 ENCOUNTER — HOSPITAL ENCOUNTER (OUTPATIENT)
Dept: RADIATION ONCOLOGY | Facility: HOSPITAL | Age: 60
Discharge: HOME | End: 2020-10-05

## 2020-10-05 PROCEDURE — 77386: CPT | Performed by: RADIOLOGY

## 2020-10-06 ENCOUNTER — HOSPITAL ENCOUNTER (OUTPATIENT)
Dept: RADIATION ONCOLOGY | Facility: HOSPITAL | Age: 60
Discharge: HOME | End: 2020-10-06

## 2020-10-06 VITALS — WEIGHT: 162.3 LBS | BODY MASS INDEX: 30.67 KG/M2

## 2020-10-06 PROCEDURE — 77386: CPT | Performed by: RADIOLOGY

## 2020-10-07 ENCOUNTER — HOSPITAL ENCOUNTER (OUTPATIENT)
Dept: RADIATION ONCOLOGY | Facility: HOSPITAL | Age: 60
Discharge: HOME | End: 2020-10-07

## 2020-10-07 ENCOUNTER — DOCUMENTATION (OUTPATIENT)
Dept: NUTRITION | Facility: HOSPITAL | Age: 60
End: 2020-10-07

## 2020-10-07 PROCEDURE — 77386: CPT | Performed by: RADIOLOGY

## 2020-10-07 NOTE — PROGRESS NOTES
Oncology Nutrition Screening    Patient Name:  Sydnie Gamble  YOB: 1960  MRN: [id number]  Date:  10/07/20  Physician: Dr. Cliff Montana /     Type of Cancer Treatment:   Surgery:  Radical hysterectomy August 18, 2020  Radiation: Adjuvant radiation therapy alone, which will consist of sternal beam radiation therapy to a dose of 50.4 Gy in 28 fractions, and followed by 3 intracavitary brachytherapy cylinders to the upper vagina using iridium-192 sources    Patient Active Problem List   Diagnosis   • Personal history of tobacco use, presenting hazards to health   • Gastroesophageal reflux disease without esophagitis   • Acquired hypothyroidism   • Vitamin D deficiency   • Prediabetes   • COPD (chronic obstructive pulmonary disease) (CMS/HCC)   • ALESIA (generalized anxiety disorder)   • Multiple lung nodules on CT   • Cigarette nicotine dependence with nicotine-induced disorder - currently vaping   • Hilar adenopathy   • HLD (hyperlipidemia)   • Cervical cancer (CMS/[facility])   Stage IIA1 Cervical Cancer / moderately differentiated squamous cell    Current Outpatient Medications   Medication Sig Dispense Refill   • acetaminophen (TYLENOL) 325 MG tablet Take 2 tablets by mouth Every 4 (Four) Hours As Needed for Mild Pain . 30 tablet 1   • albuterol sulfate  (90 Base) MCG/ACT inhaler Inhale 2 puffs Every 4 (Four) Hours As Needed for Wheezing. (Patient taking differently: Inhale 2 puffs Every 4 (Four) Hours As Needed for Wheezing or Shortness of Air.) 1 inhaler 4   • clonazePAM (KlonoPIN) 1 MG tablet Take 1 mg by mouth 2 (Two) Times a Day As Needed for Anxiety.     • docusate sodium 100 MG capsule Take 100 mg by mouth 2 (Two) Times a Day. 60 each 1   • FLUoxetine (PROzac) 20 MG capsule Take 1 capsule by mouth Daily. 90 capsule 1   • levothyroxine (SYNTHROID, LEVOTHROID) 88 MCG tablet Take 1 tablet by mouth Daily. 30 tablet 5   • ondansetron ODT (ZOFRAN-ODT) 4 MG disintegrating tablet Place 1 tablet  on the tongue Every 8 (Eight) Hours As Needed for Nausea or Vomiting. 30 tablet 2   • oxyCODONE (ROXICODONE) 10 MG tablet Take 0.5 tablets by mouth Every 4 (Four) Hours As Needed for Severe Pain . 10 tablet 0   • pantoprazole (PROTONIX) 40 MG EC tablet Take 1 tablet by mouth Daily. Patient needs appointment prior to next refill. 90 tablet 1   • promethazine (PHENERGAN) 12.5 MG tablet Take 1 tablet by mouth Every 6 (Six) Hours As Needed for Nausea or Vomiting. 20 tablet 0   • tiotropium bromide-olodaterol (Stiolto Respimat) 2.5-2.5 MCG/ACT aerosol solution inhaler Inhale 1 puff Daily. 1 inhaler 5   • zolpidem (AMBIEN) 10 MG tablet Take 10 mg by mouth.  2     No current facility-administered medications for this visit.        Glycemic Risk:   NA    Weight:   Height: 61 inches  Weight: 162.3 lbs.  Usual Body Weight: 132 lbs.   BMI: 30.67  Overweight  Weight increase of 30 lbs which patient contributes to COVID confinement    Oral Food Intake:  Regular Diet - No Restrictions    Hydration Status:   How many 8 ounce glass of water of fluid do you drink per day?  Goal 80 ounces per day    Enteral Feeding:   NA    Nutrition Symptoms:   Long standing history of GERD    Activity:   Not my normal self, but able to be up and about with fairly normal activities     reports that she quit smoking about 18 months ago. Her smoking use included electronic cigarette. She has a 67.50 pack-year smoking history. She has never used smokeless tobacco. She reports that she does not drink alcohol or use drugs.    Evaluation of Nutritional Risk:   Patient is not identified at nutritional risk for malnutrition.    Consulted with patient as she progresses with radiation treatment.  She states that she is experiencing almost constant nausea throughout the day.  In review of her nutritional status, she has a long standing history of GERD, treated with Protonix.  She is eating only one meal per day and that is generally a larger meal in the  evening.  She states that she has been snacking on animal crackers during the day to help with the nausea.    With history of GERD, strongly recommended that patient eat small frequent meals/snacks throughout the day; avoid foods and beverages that would be GI irritants with GERD.  Discussed tips for management of nausea.    Will continue to follow.          Electronically signed by:  Yin Fang RD  10:10 EDT

## 2020-10-08 ENCOUNTER — HOSPITAL ENCOUNTER (OUTPATIENT)
Dept: RADIATION ONCOLOGY | Facility: HOSPITAL | Age: 60
Discharge: HOME | End: 2020-10-08

## 2020-10-08 PROCEDURE — 77386: CPT | Performed by: RADIOLOGY

## 2020-10-09 ENCOUNTER — HOSPITAL ENCOUNTER (OUTPATIENT)
Dept: RADIATION ONCOLOGY | Facility: HOSPITAL | Age: 60
Discharge: HOME | End: 2020-10-09

## 2020-10-09 PROCEDURE — 77386: CPT | Performed by: RADIOLOGY

## 2020-10-09 PROCEDURE — 77336 RADIATION PHYSICS CONSULT: CPT | Performed by: RADIOLOGY

## 2020-10-12 ENCOUNTER — HOSPITAL ENCOUNTER (OUTPATIENT)
Dept: RADIATION ONCOLOGY | Facility: HOSPITAL | Age: 60
Discharge: HOME | End: 2020-10-12

## 2020-10-12 PROCEDURE — 77386: CPT | Performed by: RADIOLOGY

## 2020-10-14 ENCOUNTER — HOSPITAL ENCOUNTER (OUTPATIENT)
Dept: RADIATION ONCOLOGY | Facility: HOSPITAL | Age: 60
Discharge: HOME | End: 2020-10-14

## 2020-10-14 VITALS — BODY MASS INDEX: 30.1 KG/M2 | WEIGHT: 159.3 LBS

## 2020-10-14 PROCEDURE — 77386: CPT | Performed by: RADIOLOGY

## 2020-10-15 ENCOUNTER — HOSPITAL ENCOUNTER (OUTPATIENT)
Dept: RADIATION ONCOLOGY | Facility: HOSPITAL | Age: 60
Discharge: HOME | End: 2020-10-15

## 2020-10-15 PROCEDURE — 77386: CPT | Performed by: RADIOLOGY

## 2020-10-16 ENCOUNTER — HOSPITAL ENCOUNTER (OUTPATIENT)
Dept: RADIATION ONCOLOGY | Facility: HOSPITAL | Age: 60
Discharge: HOME | End: 2020-10-16

## 2020-10-16 PROCEDURE — 77336 RADIATION PHYSICS CONSULT: CPT | Performed by: RADIOLOGY

## 2020-10-16 PROCEDURE — 77386: CPT | Performed by: RADIOLOGY

## 2020-10-19 ENCOUNTER — HOSPITAL ENCOUNTER (OUTPATIENT)
Dept: RADIATION ONCOLOGY | Facility: HOSPITAL | Age: 60
Discharge: HOME | End: 2020-10-19

## 2020-10-19 DIAGNOSIS — K21.9 GASTROESOPHAGEAL REFLUX DISEASE WITHOUT ESOPHAGITIS: ICD-10-CM

## 2020-10-19 PROCEDURE — 77386: CPT | Performed by: RADIOLOGY

## 2020-10-19 RX ORDER — PANTOPRAZOLE SODIUM 40 MG/1
40 TABLET, DELAYED RELEASE ORAL DAILY
Qty: 90 TABLET | Refills: 1 | Status: SHIPPED | OUTPATIENT
Start: 2020-10-19 | End: 2021-03-29

## 2020-10-20 ENCOUNTER — HOSPITAL ENCOUNTER (OUTPATIENT)
Dept: RADIATION ONCOLOGY | Facility: HOSPITAL | Age: 60
Discharge: HOME | End: 2020-10-20

## 2020-10-20 VITALS — WEIGHT: 159.3 LBS | BODY MASS INDEX: 30.1 KG/M2

## 2020-10-20 DIAGNOSIS — F41.9 ANXIETY: Primary | ICD-10-CM

## 2020-10-20 PROCEDURE — 77386: CPT | Performed by: RADIOLOGY

## 2020-10-20 RX ORDER — DIAZEPAM 5 MG/1
5 TABLET ORAL 2 TIMES DAILY PRN
Qty: 10 TABLET | Refills: 0 | Status: SHIPPED | OUTPATIENT
Start: 2020-10-20 | End: 2021-03-29

## 2020-10-21 ENCOUNTER — DOCUMENTATION (OUTPATIENT)
Dept: NUTRITION | Facility: HOSPITAL | Age: 60
End: 2020-10-21

## 2020-10-21 ENCOUNTER — HOSPITAL ENCOUNTER (OUTPATIENT)
Dept: RADIATION ONCOLOGY | Facility: HOSPITAL | Age: 60
Discharge: HOME | End: 2020-10-21

## 2020-10-21 PROCEDURE — 77386: CPT | Performed by: RADIOLOGY

## 2020-10-21 NOTE — PROGRESS NOTES
ONC Nutrition    Diagnosis: Stage IIA1 Cervical Cancer / moderately differentiated squamous cell  Surgery:  Radical hysterectomy August 18, 2020  Radiation: Adjuvant radiation therapy alone, which will consist of sternal beam radiation therapy to a dose of 50.4 Gy in 28 fractions, and followed by 3 intracavitary brachytherapy cylinders to the upper vagina using iridium-192 sources    Weight 159.3 lbs    Follow up with patient during RAD ONC status checks.  Patient states that she continues with nausea.  Difficult to ascertain bowel situation as she says that she will experience diarrhea, but then have normal BMs.  Again, reviewed diet modification for diarrhea and use of Imodium, but patient has not implemented either.  Will be available as needed.

## 2020-10-22 ENCOUNTER — HOSPITAL ENCOUNTER (OUTPATIENT)
Dept: RADIATION ONCOLOGY | Facility: HOSPITAL | Age: 60
Discharge: HOME | End: 2020-10-22

## 2020-10-22 PROCEDURE — 77386: CPT | Performed by: RADIOLOGY

## 2020-10-23 ENCOUNTER — HOSPITAL ENCOUNTER (OUTPATIENT)
Dept: RADIATION ONCOLOGY | Facility: HOSPITAL | Age: 60
Discharge: HOME | End: 2020-10-23

## 2020-10-23 PROCEDURE — 77386: CPT | Performed by: RADIOLOGY

## 2020-10-23 PROCEDURE — 77336 RADIATION PHYSICS CONSULT: CPT | Performed by: RADIOLOGY

## 2020-10-26 ENCOUNTER — HOSPITAL ENCOUNTER (OUTPATIENT)
Dept: RADIATION ONCOLOGY | Facility: HOSPITAL | Age: 60
Discharge: HOME | End: 2020-10-26

## 2020-10-26 PROCEDURE — 77386: CPT | Performed by: RADIOLOGY

## 2020-10-27 ENCOUNTER — HOSPITAL ENCOUNTER (OUTPATIENT)
Dept: RADIATION ONCOLOGY | Facility: HOSPITAL | Age: 60
Discharge: HOME | End: 2020-10-27

## 2020-10-27 VITALS — WEIGHT: 158 LBS | BODY MASS INDEX: 29.85 KG/M2

## 2020-10-27 PROCEDURE — 77386: CPT | Performed by: RADIOLOGY

## 2020-10-28 ENCOUNTER — HOSPITAL ENCOUNTER (OUTPATIENT)
Dept: RADIATION ONCOLOGY | Facility: HOSPITAL | Age: 60
Discharge: HOME | End: 2020-10-28

## 2020-10-28 PROCEDURE — 77386: CPT | Performed by: RADIOLOGY

## 2020-10-29 ENCOUNTER — HOSPITAL ENCOUNTER (OUTPATIENT)
Dept: RADIATION ONCOLOGY | Facility: HOSPITAL | Age: 60
Discharge: HOME | End: 2020-10-29

## 2020-10-29 PROCEDURE — 77386: CPT | Performed by: RADIOLOGY

## 2020-10-29 PROCEDURE — 77336 RADIATION PHYSICS CONSULT: CPT | Performed by: RADIOLOGY

## 2020-10-30 ENCOUNTER — HOSPITAL ENCOUNTER (OUTPATIENT)
Dept: RADIATION ONCOLOGY | Facility: HOSPITAL | Age: 60
Discharge: HOME | End: 2020-10-30

## 2020-10-30 PROCEDURE — 77386: CPT | Performed by: RADIOLOGY

## 2020-11-02 ENCOUNTER — HOSPITAL ENCOUNTER (OUTPATIENT)
Dept: RADIATION ONCOLOGY | Facility: HOSPITAL | Age: 60
Setting detail: RADIATION/ONCOLOGY SERIES
Discharge: HOME | End: 2020-11-02

## 2020-11-02 ENCOUNTER — HOSPITAL ENCOUNTER (OUTPATIENT)
Dept: RADIATION ONCOLOGY | Facility: HOSPITAL | Age: 60
Discharge: HOME | End: 2020-11-02

## 2020-11-02 PROCEDURE — 77386: CPT | Performed by: RADIOLOGY

## 2020-11-03 ENCOUNTER — HOSPITAL ENCOUNTER (OUTPATIENT)
Dept: RADIATION ONCOLOGY | Facility: HOSPITAL | Age: 60
Discharge: HOME | End: 2020-11-03

## 2020-11-03 VITALS — BODY MASS INDEX: 29.65 KG/M2 | WEIGHT: 156.9 LBS

## 2020-11-03 PROCEDURE — 77386: CPT | Performed by: RADIOLOGY

## 2020-11-04 ENCOUNTER — HOSPITAL ENCOUNTER (OUTPATIENT)
Dept: RADIATION ONCOLOGY | Facility: HOSPITAL | Age: 60
Discharge: HOME | End: 2020-11-04

## 2020-11-04 PROCEDURE — 77386: CPT | Performed by: RADIOLOGY

## 2020-11-05 ENCOUNTER — HOSPITAL ENCOUNTER (OUTPATIENT)
Dept: RADIATION ONCOLOGY | Facility: HOSPITAL | Age: 60
Discharge: HOME | End: 2020-11-05

## 2020-11-05 PROCEDURE — 77386: CPT | Performed by: RADIOLOGY

## 2020-11-12 ENCOUNTER — HOSPITAL ENCOUNTER (OUTPATIENT)
Dept: RADIATION ONCOLOGY | Facility: HOSPITAL | Age: 60
Discharge: HOME | End: 2020-11-12

## 2020-11-12 PROCEDURE — 77290 THER RAD SIMULAJ FIELD CPLX: CPT | Performed by: RADIOLOGY

## 2020-11-12 PROCEDURE — 77470 SPECIAL RADIATION TREATMENT: CPT | Performed by: RADIOLOGY

## 2020-11-13 PROCEDURE — 77295 3-D RADIOTHERAPY PLAN: CPT | Performed by: RADIOLOGY

## 2020-11-16 ENCOUNTER — HOSPITAL ENCOUNTER (OUTPATIENT)
Dept: RADIATION ONCOLOGY | Facility: HOSPITAL | Age: 60
Discharge: HOME | End: 2020-11-16

## 2020-11-16 PROCEDURE — C1717 BRACHYTX, NON-STR,HDR IR-192: HCPCS | Performed by: RADIOLOGY

## 2020-11-16 PROCEDURE — 77770 HDR RDNCL NTRSTL/ICAV BRCHTX: CPT | Performed by: RADIOLOGY

## 2020-11-16 PROCEDURE — 57156 INS VAG BRACHYTX DEVICE: CPT | Performed by: RADIOLOGY

## 2020-11-16 NOTE — PROGRESS NOTES
11/16/2020  Sydnie Gamble    HDR#   1  Patient presents for cylinder brachytherapy.  She has no complaints of urinary discomfort, diarrhea or vaginal irritation.  The cylinder was inserted and treatment of 5 Gy to the surface of the upper vaginal mucosa was delivered. Patient tolerated procedure well with no complications.  Physics survey was negative with no residual radioactivity.  Discharged to home in good condition.  She will return for next treatment.

## 2020-11-18 ENCOUNTER — HOSPITAL ENCOUNTER (OUTPATIENT)
Dept: RADIATION ONCOLOGY | Facility: HOSPITAL | Age: 60
Discharge: HOME | End: 2020-11-18

## 2020-11-18 PROCEDURE — C1717 BRACHYTX, NON-STR,HDR IR-192: HCPCS | Performed by: RADIOLOGY

## 2020-11-18 PROCEDURE — 77770 HDR RDNCL NTRSTL/ICAV BRCHTX: CPT | Performed by: RADIOLOGY

## 2020-11-18 PROCEDURE — 57156 INS VAG BRACHYTX DEVICE: CPT | Performed by: RADIOLOGY

## 2020-11-18 NOTE — PROGRESS NOTES
11/18/2020  Sydnie Gamble    HDR#   2  Patient presents for cylinder brachytherapy.  She has no complaints of urinary discomfort, diarrhea or vaginal irritation.  The cylinder was inserted and treatment of 5 Gy to the surface of the upper vaginal mucosa was delivered. Patient tolerated procedure well with no complications.  Physics survey was negative with no residual radioactivity.  Discharged to home in good condition.  She will return for next treatment.

## 2020-11-20 ENCOUNTER — HOSPITAL ENCOUNTER (OUTPATIENT)
Dept: RADIATION ONCOLOGY | Facility: HOSPITAL | Age: 60
Discharge: HOME | End: 2020-11-20

## 2020-11-20 PROCEDURE — 77770 HDR RDNCL NTRSTL/ICAV BRCHTX: CPT | Performed by: RADIOLOGY

## 2020-11-20 PROCEDURE — 57156 INS VAG BRACHYTX DEVICE: CPT | Performed by: RADIOLOGY

## 2020-11-20 PROCEDURE — 77336 RADIATION PHYSICS CONSULT: CPT | Performed by: RADIOLOGY

## 2020-11-20 PROCEDURE — C1717 BRACHYTX, NON-STR,HDR IR-192: HCPCS | Performed by: RADIOLOGY

## 2020-11-20 NOTE — RADIATION COMPLETION NOTES
NAME    Sydnie Gamble  DATE OF BIRTH  1960  MRN    [id number]  REFERRING   Laura Jimenez MD  DIAGNOSIS   Cancer Staging  FIGO Stage IIA1 (cT2a1, cN0, cM0)    DATE OF COMPLETION: 11/20/2020      Sydnie Gamble completed a course of radiation therapy in our department today.    In short, Sydnie Gamble has been diagnosed with a primary cervical cancer.  For management of her disease she undewent a radical hysterectomy and was found to have high risk features including depth of invasion and parametrial involvement of LVSI.  She completed a course of radiation therapy to the pelvis followed by intracavitary brachytherapy.  The details of her treatments are as detailed below:    DATES OF THERAPY: 9/28/2020 - 11/20/2020  TOTAL NUMBER OF ELAPSED DAYS: 53  TREATMENT SITE: Pelvis  TECHNIQUE:  External Beam Radiation Therapy  She received 45 Gy to the pelvis in 25 fractions of 180 cGy delivered daily. Intensity modulated radiation therapy was used in an effort to spare her small intestines and bone marrow to reduce toxicity.    A boost was performed to the bilateral pelvic side walls targeting the remnants of the parametria, which consisted of 5.4 Gy in 3 fractions of 1.8 Gy each.    Brachytherapy  Intracavitary brachytherapy was performed, and she received her first treatment 1 week following radiation therapy, and she completed them on an every-other-day schedule.  This consisted of 3 separate insertions using a 2.5cm single channel vaginal cylinder.   Calculations were performed to deliver a dose of 5 Gy to the vaginal surface with an active length of 3cm.      TREATMENT COURSE AND TOLERANCE: She developed the expected acute toxicities including fatigue, grade 1 loose stool/diarrhea that was managed with Imodium and dietary changes.  These progressed and were at their peak during the fourth and fifth weeks of combined chemoradiation therapy, and were improving as she completed intracavitary  brachytherapy.    FOLLOW-UP PLANS: 3-4 weeks    CC: Laura Jimenez MD

## 2020-11-20 NOTE — PROGRESS NOTES
11/20/2020  Sydnie Gamble    HDR#   3  Patient presents for cylinder brachytherapy.  She has no complaints of urinary discomfort, diarrhea or vaginal irritation.  The cylinder was inserted and treatment of 5 Gy to the surface of the upper vaginal mucosa was delivered. Patient tolerated procedure well with no complications.  Physics survey was negative with no residual radioactivity.  Discharged to home in good condition.  She will return to Radiation Oncology in the next few weeks for follow up.

## 2020-12-04 NOTE — PROGRESS NOTES
GYN ONCOLOGY CANCER SURVIVORSHIP VISIT    Sydnie Gamble  [id number]  1960    Chief Complaint: Follow-up (no complaints)        History of present illness:  Sydnie Gamble is a 60 y.o. year old female who is here today for her Cancer Survivorship visit, see oncology history below. She reports she is feeling very well today and has no complaints. She denies vaginal bleeding, pelvic pain, and changes in bowel or bladder function. She has been very pleased with her care. She is scheduled for radiation follow-up later this month with pelvic exam, she declines additional pelvic today. She is using her vaginal dilator as instructed.        Cancer History:   Oncology/Hematology History   Cervical cancer (CMS/HCC)   8/7/2020 Initial Diagnosis    Suspected cervical cancer  Referred by Dr. Elissa Lee    7/31/2020: Seen for complaints of postmenopausal bleeding and found to have abnormal appearing cervix. Pap test suspicious for squamous cell carcinoma.     8/14/2020 Imaging    CT chest, abdomen, pelvis:  Stable nodularity identified at the right upper lobe. Stable 1 cm lymph node in the right hilar region. No evidence of progression of disease. The abdomen and pelvis is also stable and unremarkable with several cysts throughout the liver.     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 Lymph nodes negative.    [facility]       9/2/2020 Cancer Staged    Staging form: Cervix Uteri, AJCC 8th Edition  - Clinical stage from 9/2/2020: FIGO Stage IIA1 (cT2a1, cN0, cM0) - Signed by Laura Jimenez MD on 9/2/2020 9/28/2020 - 11/5/2020 Radiation    Radiation OncologyTreatment Course:  ySdnie Gamble received 5040 cGy in 28 fractions to cervix via External Beam Radiation - EBRT.      11/16/2020 - 11/20/2020 Radiation    Radiation OncologyTreatment Course:  Sydnie Gamble received 1500 cGy in 3 fractions to cervix via High Dose Radiation - HDR.     12/7/2020 Survivorship    Survivorship Care Plan completed and discussed with patient.  Copy of Survivorship Care Plan provided to patient and primary care provider.         Past Medical History:   Diagnosis Date   • Acid reflux    • Acid reflux    • Anxiety 1976   • Arthritis    • Cervical cancer (CMS/HCC)    • Emphysema of lung (CMS/HCC)    • Hypothyroidism    • Lung nodule    • Ovarian cyst 1980s   • Visual impairment corrected with glasses   • Wears dentures    • Wears glasses        Past Surgical History:   Procedure Laterality Date   • TOTAL ABDOMINAL HYSTERECTOMY PELVIC NODE DISSECTION N/A 8/18/2020    Procedure: TOTAL RADICAL HYSTERECTOMY PELVIC NODE DISSECTION;  Surgeon: Laura Jimenez MD;  Location: [facility];  Service: Gynecology Oncology;  Laterality: N/A;   • TUBAL ABDOMINAL LIGATION      right with ovarian dissection        MEDICATIONS: The current medication list was reviewed and reconciled.     Allergies:  has No Known Allergies.    Family History   Problem Relation Age of Onset   • Cancer Mother    • Hypertension Mother    • Mental illness Mother    • Anxiety disorder Mother    • Asthma Mother    • COPD Mother    • Depression Mother    • Emphysema Mother    • Cancer Father    • Heart attack Maternal Grandfather    • Cancer Maternal Aunt    • Cancer Maternal Aunt    • Cancer Maternal Grandmother    • Breast cancer Neg Hx    • Ovarian cancer Neg Hx        Last imaging study was CT chest, abdomen, pelvis 8/14/2020.     Review of Systems   Constitutional: Negative for appetite change, chills, fatigue, fever and unexpected weight change.   Respiratory: Negative for cough, shortness of breath and wheezing.    Cardiovascular: Negative for chest pain, palpitations and leg swelling.   Gastrointestinal: Positive for diarrhea (occ loose  "stools, mild). Negative for abdominal distention, abdominal pain, blood in stool, constipation, nausea and vomiting.   Endocrine: Negative.    Genitourinary: Negative for dyspareunia, dysuria, frequency, genital sores, hematuria, pelvic pain, urgency, vaginal bleeding, vaginal discharge and vaginal pain.   Musculoskeletal: Negative for arthralgias, gait problem and joint swelling.   Neurological: Negative for dizziness, seizures, syncope, weakness, light-headedness, numbness and headaches.   Hematological: Negative for adenopathy.   Psychiatric/Behavioral: Negative.          Physical Exam  Vital Signs: /69   Pulse 89   Temp 97.5 °F (36.4 °C) (Temporal)   Resp 18   Ht 154.9 cm (61\")   Wt 68.5 kg (151 lb)   LMP  (LMP Unknown)   BMI 28.53 kg/m²   Vitals:    12/07/20 1255   PainSc: 0-No pain           General Appearance:  alert, cooperative, no apparent distress and appears stated age   Neurologic/Psychiatric: A&O x 3, gait steady, appropriate affect   HEENT:  Normocephalic, without obvious abnormality, mucous membranes moist   Neck: Supple, symmetrical, trachea midline, no adenopathy;  No thyromegaly, masses, or tenderness   Back:   Symmetric, no curvature, ROM normal, no CVA tenderness   Lungs:   Clear to auscultation bilaterally; respirations regular, even, and unlabored bilaterally   Heart:  Regular rate and rhythm, no murmurs appreciated   Breasts:  deferred   Abdomen:   Soft, non-tender, non-distended and no organomegaly   Extremities: Normal, atraumatic; no clubbing, cyanosis, or edema    Pelvic: deferred     ECOG Performance Status: (0) Fully Active - Able to Carry On All Pre-disease Performance Without Restriction      Survivorship Needs Assessment:  PT/Rehab  Health history, treatment course, and current status. The patient is not in need of physical therapy or rehabilitation services.    Behavioral Health  A post-treatment depression screening has been completed. PHQ-9 results show 0 (No " Depression). The patient has not previously established mental health care. A referral is not recommended at this time.       Procedure Note:  No notes on file      Assessment and Plan:  Diagnoses and all orders for this visit:    1. Malignant neoplasm of cervix, unspecified site (CMS/HCC) (Primary)        The patient and I have reviewed her Survivorship Care Plan in detail. We discussed her diagnosis, pathology, histology, all treatments, and ongoing surveillance recommendations. All questions were answered to her satisfaction. She is in agreement with our plan for ongoing surveillance as outlined in her plan. She will follow-up with Dr. Jimenez every 3 months. Visits may be alternated between gyn oncology and radiation oncology at the provider's discretion. A copy of this document was provided to her at the completion of our visit.  A copy has also been sent to her primary care provider.    Pain assessment was performed today as a part of patient’s care. For patients with pain related to surgery, gynecologic malignancy or cancer treatment, the plan is as noted in the assessment/plan.  For patients with pain not related to these issues, they are to seek any further needed care from a more appropriate provider, such as PCP.      This was a 15 minute direct face to face visit spent entirely in review of her Survivorship Care Plan.      Return to clinic in 3 months for ongoing cancer surveillance with Dr. Jimenez.      Electronically signed by DAYSI Frost on 12/14/20 at 09:25 EST             I will STOP taking the medications listed below when I get home from the hospital:  None

## 2020-12-07 ENCOUNTER — CLINICAL SUPPORT (OUTPATIENT)
Dept: GYNECOLOGIC ONCOLOGY | Facility: CLINIC | Age: 60
End: 2020-12-07

## 2020-12-07 VITALS
WEIGHT: 151 LBS | RESPIRATION RATE: 18 BRPM | DIASTOLIC BLOOD PRESSURE: 69 MMHG | HEIGHT: 61 IN | TEMPERATURE: 97.5 F | HEART RATE: 89 BPM | BODY MASS INDEX: 28.51 KG/M2 | SYSTOLIC BLOOD PRESSURE: 131 MMHG

## 2020-12-07 DIAGNOSIS — C53.9 MALIGNANT NEOPLASM OF CERVIX, UNSPECIFIED SITE (HCC): Primary | ICD-10-CM

## 2020-12-07 PROCEDURE — 99213 OFFICE O/P EST LOW 20 MIN: CPT | Performed by: NURSE PRACTITIONER

## 2020-12-07 RX ORDER — ERGOCALCIFEROL (VITAMIN D2) 10 MCG
400 TABLET ORAL DAILY
COMMUNITY
End: 2021-03-08

## 2020-12-28 ENCOUNTER — OFFICE VISIT (OUTPATIENT)
Dept: RADIATION ONCOLOGY | Facility: HOSPITAL | Age: 60
End: 2020-12-28

## 2020-12-28 ENCOUNTER — HOSPITAL ENCOUNTER (OUTPATIENT)
Dept: RADIATION ONCOLOGY | Facility: HOSPITAL | Age: 60
Setting detail: RADIATION/ONCOLOGY SERIES
Discharge: HOME | End: 2020-12-28

## 2020-12-28 VITALS
HEART RATE: 85 BPM | TEMPERATURE: 96.3 F | RESPIRATION RATE: 16 BRPM | HEIGHT: 61 IN | DIASTOLIC BLOOD PRESSURE: 68 MMHG | OXYGEN SATURATION: 97 % | WEIGHT: 154 LBS | BODY MASS INDEX: 29.07 KG/M2 | SYSTOLIC BLOOD PRESSURE: 144 MMHG

## 2020-12-28 DIAGNOSIS — C53.9 MALIGNANT NEOPLASM OF CERVIX, UNSPECIFIED SITE (HCC): Primary | ICD-10-CM

## 2020-12-28 PROCEDURE — G0463 HOSPITAL OUTPT CLINIC VISIT: HCPCS

## 2020-12-28 NOTE — PROGRESS NOTES
FOLLOW UP NOTE    PATIENT:                                                      Sydnie Gamble  MEDICAL RECORD #:                        [id number]  :                                                          1960  COMPLETION DATE:   2020  DIAGNOSIS:     Cervical cancer (CMS/HCC)  - FIGO Stage IIA1 (cT2a1, cN0, cM0)      BRIEF HISTORY:   Mrs. Gamble is a 60 y.o. female returning for initial follow-up of her primary cervical cancer.  She underwent a radical hysterectomy and was found to have high risk features including depth of invasion and parametrial involvement of LVSI.  She therefore underwent a course of adjuvant radiation to the pelvis to a dose of 50.4 Gy in 20 fractions, followed by intracavitary brachytherapy of an additional 15 Gy to the vaginal cuff.  She tolerated treatment well.  Acute posttreatment fatigue has continued to improve.  About 2 weeks ago, she developed dysuria and acute lower abdominal pain.  This prompted an ER visit to [facility], whereby CT abdomen/pelvis, labwork, and urinalysis were all reportedly negative.  Symptoms resolved after about 2 hours with anxiety medication and GI cocktail.  She has had no similar symptoms since that time.  Bowels have largely returned to normal, and she otherwise denies GI or genitourinary complaints.  She specifically denies vaginal bleeding, discharge, or pelvic/abdominal pain.  She has not yet resumed sexual activity.  She has no acute concerns today.      MEDICATIONS: Medication reconciliation for the patient was reviewed and confirmed in the electronic medical record.    Review of Systems   Constitutional: Positive for fatigue.   Psychiatric/Behavioral: Positive for depression. The patient is nervous/anxious.    All other systems reviewed and are negative.      KPS 90%    Physical Exam  Vitals signs and nursing note reviewed.   Constitutional:       General: She is not in acute distress.     Appearance: Normal appearance.  "She is well-developed.   HENT:      Head: Normocephalic and atraumatic.   Eyes:      Conjunctiva/sclera: Conjunctivae normal.      Pupils: Pupils are equal, round, and reactive to light.   Neck:      Musculoskeletal: Normal range of motion and neck supple.   Cardiovascular:      Rate and Rhythm: Normal rate and regular rhythm.      Heart sounds: No murmur. No friction rub.   Pulmonary:      Effort: Pulmonary effort is normal.      Breath sounds: Normal breath sounds. No wheezing.   Abdominal:      General: Bowel sounds are normal. There is no distension.      Palpations: Abdomen is soft. There is no mass.      Tenderness: There is no abdominal tenderness.      Comments: Well-healed transverse surgical incision   Genitourinary:     Comments: External genitalia are free from lesions.  There is foreshortening of the vagina and narrow introitus.  The vaginal cuff is intact, smooth, without visible or palpable masses.  Musculoskeletal: Normal range of motion.   Lymphadenopathy:      Cervical: No cervical adenopathy.   Skin:     General: Skin is warm and dry.   Neurological:      Mental Status: She is alert and oriented to person, place, and time.   Psychiatric:         Behavior: Behavior normal.         Thought Content: Thought content normal.         Judgment: Judgment normal.         VITAL SIGNS:   Vitals:    12/28/20 1745   BP: 144/68   Pulse: 85   Resp: 16   Temp: 96.3 °F (35.7 °C)   TempSrc: Temporal   SpO2: 97%   Weight: 69.9 kg (154 lb)   Height: 154.9 cm (61\")   PainSc: 0-No pain       The following portions of the patient's history were reviewed and updated as appropriate: allergies, current medications, past family history, past medical history, past social history, past surgical history and problem list.      LABORATORY ([facility] 12/19/2020):  UA negative (reviewed)  CBC, CMP, Lipase unremarkable (reviewed)     IMAGING ([facility] 12/19/2020):  CT abdomen/pelvis (report " only):  Findings:   Abdomen: The gallbladder is absent.  The solid abdominal organs and ureters are unremarkable.  There are expected postoperative changes of gastric bypass surgery.  There is no bowel obstruction or mesenteric volvulus.  The GI tract is unremarkable, with no signs of appendicitis.    Pelvis: There is no pelvic or abdominal ascites, or adenopathy or acute osseous abnormality.  Impression: Expected postoperative findings.  Authenticated by Hever Emery MD on 12/19/2020 11:38:14 PM Eastern       Diagnoses and all orders for this visit:    1. Malignant neoplasm of cervix, unspecified site (CMS/HCC) (Primary)         IMPRESSION:  Mrs. Gamble is a 60 y.o. female with a FIGO grade IIA1 squamous cell carcinoma of the cervix.  Following a radical hysterectomy, she underwent external beam radiotherapy to the pelvis followed by intracavitary brachytherapy which she completed 1 month ago.  She tolerated treatment well and has had near resolution of the expected acute radiation-related toxicities.  Regarding her isolated episode of pain, this may have been related to gas or other non-pathologic cause.  I would not expect abdominal pain to be related to our radiation treatments.  I have reviewed UA, labwork, and official report of CT abdomen[facility]pelvis from [facility], which are unremarkable.  I have educated her about signs/symptoms which may warrant closer follow-up and/or imaging.  She does not appear to have any evidence of disease on today's exam.  We discussed resuming intercourse and use of vaginal dilator, which was provided along with printed instructions.  We also reviewed follow-up intervals with clinical exams and cytology screening.  She is scheduled to continue GYN oncology surveillance with Dr. Jimenez in March 2021.      RECOMMENDATIONS:  Mrs. Gamble continues routine follow-up with Dr. Jimenez.  I will schedule her to return to our clinic about 3 months thereafter, in June  2021.    Return in about 5 months (around 6/10/2021) for Office Visit.    Jacinda De La Rosa, APRN

## 2021-03-08 ENCOUNTER — OFFICE VISIT (OUTPATIENT)
Dept: GYNECOLOGIC ONCOLOGY | Facility: CLINIC | Age: 61
End: 2021-03-08

## 2021-03-08 VITALS
WEIGHT: 155 LBS | DIASTOLIC BLOOD PRESSURE: 56 MMHG | SYSTOLIC BLOOD PRESSURE: 128 MMHG | RESPIRATION RATE: 12 BRPM | HEART RATE: 82 BPM | TEMPERATURE: 96.6 F | BODY MASS INDEX: 29.29 KG/M2

## 2021-03-08 DIAGNOSIS — Z85.41 HISTORY OF CERVICAL CANCER: Primary | ICD-10-CM

## 2021-03-08 PROCEDURE — 99214 OFFICE O/P EST MOD 30 MIN: CPT | Performed by: OBSTETRICS & GYNECOLOGY

## 2021-03-08 RX ORDER — ESTRADIOL 0.1 MG/G
2 CREAM VAGINAL DAILY
Qty: 42.5 G | Refills: 12 | Status: SHIPPED | OUTPATIENT
Start: 2021-03-08

## 2021-03-08 NOTE — PROGRESS NOTES
Office Visit      Patient Name: Sydnie Gamble  : 1960  MRN: [id number]    Chief Complaint:    Chief Complaint   Patient presents with   • Post-op     Cervical Cancer       History of Present Illness: Sydnie Gamble is a 60 y.o. female who is status post open radical hysterectomy, LSO, and pelvic lymph node dissection for stage IIa cervix cancer on 2020.  She completed pelvic radiation in 2020. Since that time she reports she has been doing well overall. Her biggest complaint today is daily loose stools. She went to the [facility] ER last month for abdominal pain/diarrhea and had a negative workup at that time. She denies any recent illness or antibiotic use. She is voiding without difficulty. She is using the vaginal dilator each time she showers with lubricating jelly and reports considerable discomfort with this. She is not currently sexually active with her  and she has no plans to resume intercourse at this time.     Oncologic History:     Cervical cancer (CMS/[facility])    2020 Initial Diagnosis     Suspected cervical cancer  Referred by Dr. Elissa Lee    2020: Seen for complaints of postmenopausal bleeding and found to have abnormal appearing cervix. Pap test suspicious for squamous cell carcinoma.      2020 Surgery     Open radical hysterectomy, left salpingo-oophorectomy, and pelvic lymph node dissection demonstrates stage IIA moderately differentiated squamous cell carcinoma of the cervix with 3.2 cm tumor, outer 1/3 stromal invasion and + LVSI. Lymph nodes negative.    [facility]        2020 Cancer Staged     Staging form: Cervix Uteri, AJCC 8th Edition  - Clinical stage from 2020: FIGO Stage IIA1 (cT2a1, cN0, cM0) - Signed by Laura Jimenez MD on 2020        Medical, surgical and family history reviewed.     Subjective      Review of Systems:   Review of Systems   Constitutional: Negative for activity change,  "appetite change, chills, fatigue and fever.   HENT: Negative for congestion, sore throat and tinnitus.    Respiratory: Negative for cough, shortness of breath and wheezing.    Cardiovascular: Negative for chest pain, palpitations and leg swelling.   Gastrointestinal: Positive for diarrhea. Negative for constipation, abdominal pain, nausea and vomiting.   Genitourinary: Positive for frequency. Negative for dysuria and hematuria.   Musculoskeletal: Negative for arthralgias and myalgias.   Skin: Negative for color change, pallor, rash and wound.   Neurological: Negative for headaches.   Psychiatric/Behavioral: Negative for dysphoric mood. The patient is nervous/anxious.         Objective     Physical Exam:  Vital Signs:   Vitals:    09/02/20 0910   BP: 170/74   Pulse: 105   Resp: 18   Temp: 97.2 °F (36.2 °C)   TempSrc: Temporal   Weight: 75.3 kg (166 lb)   Height: 154.9 cm (60.98\")   PainSc:   9   PainLoc: Groin     BMI: Body mass index is 31.38 kg/m².     ECOG performance status 1    Physical Exam   Constitutional: She is oriented to person, place, and time. She appears well-developed and well-nourished. No distress.   HENT:   Head: Normocephalic and atraumatic.   Right Ear: External ear normal.   Left Ear: External ear normal.   Nose: Nose normal.   Eyes: Conjunctivae are normal. Right eye exhibits no discharge. Left eye exhibits no discharge. No scleral icterus.   Cardiovascular: Normal rate, regular rhythm and normal heart sounds.   No murmur heard.  Pulmonary/Chest: Effort normal and breath sounds normal. No respiratory distress. She has no wheezes. She has no rales.   Abdominal: Soft. Bowel sounds are normal. She exhibits no distension. There is no tenderness. There is no guarding.   Minimally tender to palpation.  Pfannenstiel incision well-healed.   Genitourinary:   Genitourinary Comments: External genitalia normal.  Vagina without discharge.  Vaginal cuff intact.  Confirmed on bimanual exam.  Minimal " tenderness to palpation on bimanual.   Musculoskeletal: She exhibits no edema, tenderness or deformity.   Neurological: She is alert and oriented to person, place, and time.   Skin: Skin is warm and dry. She is not diaphoretic.   Psychiatric: She has a normal mood and affect. Judgment and thought content normal.   Nursing note and vitals reviewed.     Assessment / Plan    Ms Sydnie Gamble underwent an open radical hysterectomy, bilateral salpingo-oophorectomy, and pelvic/common iliac lymph node dissection on 8/18/2020 and has been undergoing pelvic radiation with Dr. Montana.     #FIGO stage IIA SCC of the cervix  - High-intermediate risk of recurrence based on the Sedlis criteria from GOG 92  - Completed pelvic radation with Dr. Montana; continues surveillance visits with him  - She knows to notify us if she develops any new symptoms such as vaginal bleeding, persistent abdominal or pelvic pain, bloating, or changes in bowel or bladder habits or persistent cough  - Will continue surveillance visits q 3 months for 2 years    # Vaginal atrophy  # Pelvic radiation  - Using vaginal dilator and lubricant; encouraged use of vaginal estrogen, discussed it's safety in the setting of prior cervical cancer and have e-prescribed this    # Radiation colitis  - Discussed using Immodium/Lomotil PRN and increasing daily fiber intake to help with current loose stools    Follow Up:   In 3 months for cancer surveillance.  Patient instructed to call if abdominal pain returns or she develops additional symptoms.    Cameron Ann MD     =========================================================================  I saw and evaluated the patient with Dr. Ann,  resident. Discussed with resident and agree with resident’s findings and plan as documented in the resident’s note with the following additions:    Sydnie Gamble is a 60 y.o. female who has a history of a Stage IB1 cervical cancer s/p radical hysterectomy followed by  pelvic radiation due to presence of high-intermediate risk factors per GOG 92 (treatment completed in 11/2020). She is currently without evidence of disease. Signs of recurrent disease, such as vaginal bleeding, abdominopelvic pain, urinary or bowel changes, and shortness of breath were reviewed with the patient. She was advised to follow up immediately if she develops any of the above symptoms. She was also reminded to maintain a healthy lifestyle with a well balanced diet, calcium and vitamin D for osteoporosis prevention, and exercise as well as continue with recommended health and cancer screening guidelines.    She will follow-up in in 3 months with Dr. Montana and in 6 months with me.    Electronically signed by Laura Jimenez MD, 03/09/21, 9:34 AM EST.

## 2021-03-25 DIAGNOSIS — F41.1 GAD (GENERALIZED ANXIETY DISORDER): ICD-10-CM

## 2021-03-25 RX ORDER — FLUOXETINE HYDROCHLORIDE 20 MG/1
CAPSULE ORAL
Qty: 90 CAPSULE | Refills: 1 | Status: SHIPPED | OUTPATIENT
Start: 2021-03-25 | End: 2021-03-29

## 2021-03-29 ENCOUNTER — LAB (OUTPATIENT)
Dept: LAB | Facility: HOSPITAL | Age: 61
End: 2021-03-29

## 2021-03-29 ENCOUNTER — OFFICE VISIT (OUTPATIENT)
Dept: INTERNAL MEDICINE | Facility: CLINIC | Age: 61
End: 2021-03-29

## 2021-03-29 VITALS
OXYGEN SATURATION: 98 % | BODY MASS INDEX: 29.07 KG/M2 | RESPIRATION RATE: 16 BRPM | TEMPERATURE: 96.8 F | HEART RATE: 79 BPM | WEIGHT: 154 LBS | DIASTOLIC BLOOD PRESSURE: 70 MMHG | HEIGHT: 61 IN | SYSTOLIC BLOOD PRESSURE: 128 MMHG

## 2021-03-29 DIAGNOSIS — Z00.00 ANNUAL PHYSICAL EXAM: Primary | ICD-10-CM

## 2021-03-29 DIAGNOSIS — F33.1 MODERATE EPISODE OF RECURRENT MAJOR DEPRESSIVE DISORDER (HCC): ICD-10-CM

## 2021-03-29 DIAGNOSIS — E03.9 ACQUIRED HYPOTHYROIDISM: ICD-10-CM

## 2021-03-29 DIAGNOSIS — K21.9 GERD WITHOUT ESOPHAGITIS: ICD-10-CM

## 2021-03-29 DIAGNOSIS — Z12.31 BREAST CANCER SCREENING BY MAMMOGRAM: ICD-10-CM

## 2021-03-29 LAB
25(OH)D3 SERPL-MCNC: 23.6 NG/ML
ALBUMIN SERPL-MCNC: 4.2 G/DL (ref 3.5–5.2)
ALBUMIN/GLOB SERPL: 1.5 G/DL
ALP SERPL-CCNC: 136 U/L (ref 39–117)
ALT SERPL W P-5'-P-CCNC: 6 U/L (ref 1–33)
ANION GAP SERPL CALCULATED.3IONS-SCNC: 9.3 MMOL/L (ref 5–15)
AST SERPL-CCNC: 14 U/L (ref 1–32)
BILIRUB SERPL-MCNC: 0.2 MG/DL (ref 0–1.2)
BUN SERPL-MCNC: 12 MG/DL (ref 8–23)
BUN/CREAT SERPL: 11.9 (ref 7–25)
CALCIUM SPEC-SCNC: 9.1 MG/DL (ref 8.6–10.5)
CHLORIDE SERPL-SCNC: 103 MMOL/L (ref 98–107)
CHOLEST SERPL-MCNC: 218 MG/DL (ref 0–200)
CO2 SERPL-SCNC: 26.7 MMOL/L (ref 22–29)
CREAT SERPL-MCNC: 1.01 MG/DL (ref 0.57–1)
DEPRECATED RDW RBC AUTO: 43.1 FL (ref 37–54)
ERYTHROCYTE [DISTWIDTH] IN BLOOD BY AUTOMATED COUNT: 13.7 % (ref 12.3–15.4)
GFR SERPL CREATININE-BSD FRML MDRD: 56 ML/MIN/1.73
GLOBULIN UR ELPH-MCNC: 2.8 GM/DL
GLUCOSE SERPL-MCNC: 109 MG/DL (ref 65–99)
HBA1C MFR BLD: 5.91 % (ref 4.8–5.6)
HCT VFR BLD AUTO: 38.3 % (ref 34–46.6)
HDLC SERPL-MCNC: 74 MG/DL (ref 40–60)
HGB BLD-MCNC: 12.4 G/DL (ref 12–15.9)
LDLC SERPL CALC-MCNC: 125 MG/DL (ref 0–100)
LDLC/HDLC SERPL: 1.66 {RATIO}
MCH RBC QN AUTO: 27.7 PG (ref 26.6–33)
MCHC RBC AUTO-ENTMCNC: 32.4 G/DL (ref 31.5–35.7)
MCV RBC AUTO: 85.5 FL (ref 79–97)
PLATELET # BLD AUTO: 369 10*3/MM3 (ref 140–450)
PMV BLD AUTO: 9.6 FL (ref 6–12)
POTASSIUM SERPL-SCNC: 3.6 MMOL/L (ref 3.5–5.2)
PROT SERPL-MCNC: 7 G/DL (ref 6–8.5)
RBC # BLD AUTO: 4.48 10*6/MM3 (ref 3.77–5.28)
SODIUM SERPL-SCNC: 139 MMOL/L (ref 136–145)
T4 FREE SERPL-MCNC: 1.58 NG/DL (ref 0.93–1.7)
TRIGL SERPL-MCNC: 107 MG/DL (ref 0–150)
TSH SERPL DL<=0.05 MIU/L-ACNC: 0.11 UIU/ML (ref 0.27–4.2)
VIT B12 BLD-MCNC: 410 PG/ML (ref 211–946)
VLDLC SERPL-MCNC: 19 MG/DL (ref 5–40)
WBC # BLD AUTO: 5.91 10*3/MM3 (ref 3.4–10.8)

## 2021-03-29 PROCEDURE — 80061 LIPID PANEL: CPT | Performed by: INTERNAL MEDICINE

## 2021-03-29 PROCEDURE — 83036 HEMOGLOBIN GLYCOSYLATED A1C: CPT | Performed by: INTERNAL MEDICINE

## 2021-03-29 PROCEDURE — 85027 COMPLETE CBC AUTOMATED: CPT | Performed by: INTERNAL MEDICINE

## 2021-03-29 PROCEDURE — 84443 ASSAY THYROID STIM HORMONE: CPT | Performed by: INTERNAL MEDICINE

## 2021-03-29 PROCEDURE — 80053 COMPREHEN METABOLIC PANEL: CPT | Performed by: INTERNAL MEDICINE

## 2021-03-29 PROCEDURE — 99396 PREV VISIT EST AGE 40-64: CPT | Performed by: INTERNAL MEDICINE

## 2021-03-29 PROCEDURE — 99213 OFFICE O/P EST LOW 20 MIN: CPT | Performed by: INTERNAL MEDICINE

## 2021-03-29 PROCEDURE — 84439 ASSAY OF FREE THYROXINE: CPT | Performed by: INTERNAL MEDICINE

## 2021-03-29 PROCEDURE — 82306 VITAMIN D 25 HYDROXY: CPT | Performed by: INTERNAL MEDICINE

## 2021-03-29 PROCEDURE — 82607 VITAMIN B-12: CPT | Performed by: INTERNAL MEDICINE

## 2021-03-29 RX ORDER — BUPROPION HYDROCHLORIDE 150 MG/1
150 TABLET ORAL DAILY
Qty: 30 TABLET | Refills: 1 | Status: SHIPPED | OUTPATIENT
Start: 2021-03-29 | End: 2021-05-07

## 2021-03-29 RX ORDER — OMEPRAZOLE 40 MG/1
40 CAPSULE, DELAYED RELEASE ORAL DAILY
Qty: 30 CAPSULE | Refills: 5 | Status: SHIPPED | OUTPATIENT
Start: 2021-03-29 | End: 2021-07-20 | Stop reason: SDUPTHER

## 2021-03-29 NOTE — PROGRESS NOTES
Office Note      Date: 2021  Patient Name: Sydnie Gamble  MRN: [id number]  : 1960    Chief Complaint   Patient presents with   • Annual Exam       History of Present Illness: Sydnie Gamble is a 60 y.o. female who presents for Annual Exam.   Has fatigue. Has recent RYLEY and has constipation. Started taking fiber gummies. Has worsening of her GERD. Feels that protonix is not working.      Has weight gain.   Feels sluggish. No hx of seizure. On prozac 20mg and ambien prescribed by psych. Not sleeping well, has irregular sleep pattern. Taking synthroid daily.   Subjective      Review of Systems:   Pertinent review of systems per HPI.    Review of Systems   Constitutional: Positive for fatigue. Negative for activity change, appetite change, chills, diaphoresis, fever and unexpected weight change.   HENT: Negative for congestion, dental problem, drooling, ear discharge, ear pain, facial swelling, hearing loss and mouth sores.    Eyes: Negative for pain, discharge and itching.   Respiratory: Negative for apnea, cough, choking, chest tightness and shortness of breath.    Cardiovascular: Negative for chest pain, palpitations and leg swelling.   Gastrointestinal: Positive for constipation. Negative for abdominal distention, abdominal pain, blood in stool and diarrhea.   Endocrine: Negative for cold intolerance, heat intolerance, polydipsia and polyuria.   Genitourinary: Negative for difficulty urinating, dysuria, frequency and hematuria.   Skin: Negative for color change, pallor, rash and wound.   Allergic/Immunologic: Negative for environmental allergies, food allergies and immunocompromised state.   Neurological: Negative for dizziness, weakness and light-headedness.   Psychiatric/Behavioral: Negative for agitation, behavioral problems, confusion, decreased concentration and self-injury. The patient is not nervous/anxious.    All other systems reviewed and are negative.    No Known Allergies    Objective  "    Physical Exam:  Vital Signs:   Vitals:    03/29/21 1437   BP: 128/70   Pulse: 79   Resp: 16   Temp: 96.8 °F (36 °C)   TempSrc: Temporal   SpO2: 98%   Weight: 69.9 kg (154 lb)   Height: 154.9 cm (61\")   PainSc: 0-No pain      Body mass index is 29.1 kg/m².    Physical Exam  Vitals and nursing note reviewed.   Constitutional:       General: She is not in acute distress.     Appearance: She is well-developed.   HENT:      Head: Normocephalic and atraumatic.      Right Ear: External ear normal.      Left Ear: External ear normal.   Eyes:      General: No scleral icterus.        Right eye: No discharge.         Left eye: No discharge.      Conjunctiva/sclera: Conjunctivae normal.   Cardiovascular:      Rate and Rhythm: Normal rate and regular rhythm.      Heart sounds: Normal heart sounds. No murmur heard.   No friction rub. No gallop.    Pulmonary:      Effort: Pulmonary effort is normal. No respiratory distress.      Breath sounds: Normal breath sounds. No wheezing or rales.   Skin:     General: Skin is warm and dry.      Coloration: Skin is not pale.         Assessment / Plan      Assessment & Plan:    1. Annual physical exam  Counseled on:  Mammo starting at age 40  Pap smear q5 years if normal pap cytology with neg HPV, otherwise q3 years  Colonoscopy at 45-49 y/o  PNA vaccinations starting at age 65  shingrix at age 50  Td/Tdap q 10 years  Wear seatbelt when driving  Flu shot annually    - CBC (No Diff)  - Comprehensive Metabolic Panel  - TSH Rfx On Abnormal To Free T4  - Lipid Panel  - Vitamin B12  - Vitamin D 25 Hydroxy  - Hemoglobin A1c  - Ferritin; Future    2. GERD without esophagitis  likley worsened from constipation. Will try prilosec 40mg. Stop protonix    3. Breast cancer screening by mammogram    - Mammo Screening Digital Tomosynthesis Bilateral With CAD; Future    4. Moderate episode of recurrent major depressive disorder (CMS/HCC)  Stop prozac, start on wellbutrin as it is more activity and weight " neutral. F/u in 1 month with psych.    5. Hypothyroidism  Checking tsh.     Ronna Arrington MD  03/29/2021     Please note that portions of this note may have been completed with a voice recognition program. Efforts were made to edit the dictations, but occasionally words are mistranscribed.

## 2021-04-12 DIAGNOSIS — E03.9 HYPOTHYROIDISM, UNSPECIFIED TYPE: ICD-10-CM

## 2021-04-12 RX ORDER — LEVOTHYROXINE SODIUM 88 UG/1
TABLET ORAL
Qty: 90 TABLET | Refills: 1 | Status: SHIPPED | OUTPATIENT
Start: 2021-04-12 | End: 2021-10-25

## 2021-05-07 RX ORDER — BUPROPION HYDROCHLORIDE 150 MG/1
TABLET ORAL
Qty: 30 TABLET | Refills: 2 | Status: SHIPPED | OUTPATIENT
Start: 2021-05-07 | End: 2021-07-20

## 2021-06-07 ENCOUNTER — TELEPHONE (OUTPATIENT)
Dept: RADIATION ONCOLOGY | Facility: HOSPITAL | Age: 61
End: 2021-06-07

## 2021-06-14 ENCOUNTER — HOSPITAL ENCOUNTER (OUTPATIENT)
Dept: RADIATION ONCOLOGY | Facility: HOSPITAL | Age: 61
Setting detail: RADIATION/ONCOLOGY SERIES
Discharge: HOME | End: 2021-06-14

## 2021-07-13 DIAGNOSIS — J41.0 SIMPLE CHRONIC BRONCHITIS (HCC): ICD-10-CM

## 2021-07-14 RX ORDER — ALBUTEROL SULFATE 90 UG/1
AEROSOL, METERED RESPIRATORY (INHALATION)
Qty: 18 G | Refills: 5 | Status: SHIPPED | OUTPATIENT
Start: 2021-07-14 | End: 2022-01-04 | Stop reason: SDUPTHER

## 2021-07-20 ENCOUNTER — OFFICE VISIT (OUTPATIENT)
Dept: INTERNAL MEDICINE | Facility: CLINIC | Age: 61
End: 2021-07-20

## 2021-07-20 ENCOUNTER — LAB (OUTPATIENT)
Dept: LAB | Facility: HOSPITAL | Age: 61
End: 2021-07-20

## 2021-07-20 VITALS
OXYGEN SATURATION: 97 % | BODY MASS INDEX: 29.07 KG/M2 | WEIGHT: 154 LBS | SYSTOLIC BLOOD PRESSURE: 124 MMHG | HEART RATE: 63 BPM | DIASTOLIC BLOOD PRESSURE: 72 MMHG | TEMPERATURE: 96.6 F | HEIGHT: 61 IN | RESPIRATION RATE: 16 BRPM

## 2021-07-20 DIAGNOSIS — E03.9 ACQUIRED HYPOTHYROIDISM: Primary | ICD-10-CM

## 2021-07-20 DIAGNOSIS — Z12.11 COLON CANCER SCREENING: ICD-10-CM

## 2021-07-20 DIAGNOSIS — R73.03 PRE-DIABETES: ICD-10-CM

## 2021-07-20 DIAGNOSIS — F32.0 CURRENT MILD EPISODE OF MAJOR DEPRESSIVE DISORDER WITHOUT PRIOR EPISODE (HCC): ICD-10-CM

## 2021-07-20 DIAGNOSIS — R10.84 GENERALIZED ABDOMINAL PAIN: ICD-10-CM

## 2021-07-20 LAB — HBA1C MFR BLD: 5.98 % (ref 4.8–5.6)

## 2021-07-20 PROCEDURE — 83036 HEMOGLOBIN GLYCOSYLATED A1C: CPT | Performed by: INTERNAL MEDICINE

## 2021-07-20 PROCEDURE — 84443 ASSAY THYROID STIM HORMONE: CPT | Performed by: INTERNAL MEDICINE

## 2021-07-20 PROCEDURE — 99214 OFFICE O/P EST MOD 30 MIN: CPT | Performed by: INTERNAL MEDICINE

## 2021-07-20 RX ORDER — AMOXICILLIN 250 MG
1 CAPSULE ORAL DAILY
Qty: 60 TABLET | Refills: 2 | Status: SHIPPED | OUTPATIENT
Start: 2021-07-20 | End: 2022-07-18

## 2021-07-20 RX ORDER — BUPROPION HYDROCHLORIDE 300 MG/1
300 TABLET ORAL EVERY MORNING
Qty: 30 TABLET | Refills: 2 | Status: SHIPPED | OUTPATIENT
Start: 2021-07-20 | End: 2021-10-10 | Stop reason: SDUPTHER

## 2021-07-20 RX ORDER — OMEPRAZOLE 40 MG/1
40 CAPSULE, DELAYED RELEASE ORAL 2 TIMES DAILY
Qty: 60 CAPSULE | Refills: 5 | Status: SHIPPED | OUTPATIENT
Start: 2021-07-20 | End: 2021-09-14

## 2021-07-20 NOTE — PATIENT INSTRUCTIONS
Gastroesophageal Reflux Disease, Adult  Gastroesophageal reflux (ED) happens when acid from the stomach flows up into the tube that connects the mouth and the stomach (esophagus). Normally, food travels down the esophagus and stays in the stomach to be digested. With ED, food and stomach acid sometimes move back up into the esophagus. You may have a disease called gastroesophageal reflux disease (GERD) if the reflux:  · Happens often.  · Causes frequent or very bad symptoms.  · Causes problems such as damage to the esophagus.  When this happens, the esophagus becomes sore and swollen (inflamed). Over time, GERD can make small holes (ulcers) in the lining of the esophagus.  What are the causes?  This condition is caused by a problem with the muscle between the esophagus and the stomach. When this muscle is weak or not normal, it does not close properly to keep food and acid from coming back up from the stomach. The muscle can be weak because of:  · Tobacco use.  · Pregnancy.  · Having a certain type of hernia (hiatal hernia).  · Alcohol use.  · Certain foods and drinks, such as coffee, chocolate, onions, and peppermint.  What increases the risk?  You are more likely to develop this condition if you:  · Are overweight.  · Have a disease that affects your connective tissue.  · Use NSAID medicines.  What are the signs or symptoms?  Symptoms of this condition include:  · Heartburn.  · Difficult or painful swallowing.  · The feeling of having a lump in the throat.  · A bitter taste in the mouth.  · Bad breath.  · Having a lot of saliva.  · Having an upset or bloated stomach.  · Belching.  · Chest pain. Different conditions can cause chest pain. Make sure you see your doctor if you have chest pain.  · Shortness of breath or noisy breathing (wheezing).  · Ongoing (chronic) cough or a cough at night.  · Wearing away of the surface of teeth (tooth enamel).  · Weight loss.  How is this treated?  Treatment will depend on how  bad your symptoms are. Your doctor may suggest:  · Changes to your diet.  · Medicine.  · Surgery.  Follow these instructions at home:  Eating and drinking    · Follow a diet as told by your doctor. You may need to avoid foods and drinks such as:  ? Coffee and tea (with or without caffeine).  ? Drinks that contain alcohol.  ? Energy drinks and sports drinks.  ? Bubbly (carbonated) drinks or sodas.  ? Chocolate and cocoa.  ? Peppermint and mint flavorings.  ? Garlic and onions.  ? Horseradish.  ? Spicy and acidic foods. These include peppers, chili powder, enriquez powder, vinegar, hot sauces, and BBQ sauce.  ? Citrus fruit juices and citrus fruits, such as oranges, margarita, and limes.  ? Tomato-based foods. These include red sauce, chili, salsa, and pizza with red sauce.  ? Fried and fatty foods. These include donuts, french fries, potato chips, and high-fat dressings.  ? High-fat meats. These include hot dogs, rib eye steak, sausage, ham, and sigala.  ? High-fat dairy items, such as whole milk, butter, and cream cheese.  · Eat small meals often. Avoid eating large meals.  · Avoid drinking large amounts of liquid with your meals.  · Avoid eating meals during the 2-3 hours before bedtime.  · Avoid lying down right after you eat.  · Do not exercise right after you eat.  Lifestyle    · Do not use any products that contain nicotine or tobacco. These include cigarettes, e-cigarettes, and chewing tobacco. If you need help quitting, ask your doctor.  · Try to lower your stress. If you need help doing this, ask your doctor.  · If you are overweight, lose an amount of weight that is healthy for you. Ask your doctor about a safe weight loss goal.  General instructions  · Pay attention to any changes in your symptoms.  · Take over-the-counter and prescription medicines only as told by your doctor. Do not take aspirin, ibuprofen, or other NSAIDs unless your doctor says it is okay.  · Wear loose clothes. Do not wear anything tight  around your waist.  · Raise (elevate) the head of your bed about 6 inches (15 cm).  · Avoid bending over if this makes your symptoms worse.  · Keep all follow-up visits as told by your doctor. This is important.  Contact a doctor if:  · You have new symptoms.  · You lose weight and you do not know why.  · You have trouble swallowing or it hurts to swallow.  · You have wheezing or a cough that keeps happening.  · Your symptoms do not get better with treatment.  · You have a hoarse voice.  Get help right away if:  · You have pain in your arms, neck, jaw, teeth, or back.  · You feel sweaty, dizzy, or light-headed.  · You have chest pain or shortness of breath.  · You throw up (vomit) and your throw-up looks like blood or coffee grounds.  · You pass out (faint).  · Your poop (stool) is bloody or black.  · You cannot swallow, drink, or eat.  Summary  · If a person has gastroesophageal reflux disease (GERD), food and stomach acid move back up into the esophagus and cause symptoms or problems such as damage to the esophagus.  · Treatment will depend on how bad your symptoms are.  · Follow a diet as told by your doctor.  · Take all medicines only as told by your doctor.  This information is not intended to replace advice given to you by your health care provider. Make sure you discuss any questions you have with your health care provider.  Document Revised: 06/26/2019 Document Reviewed: 06/26/2019  [employer] Patient Education © 2021 [employer]

## 2021-07-20 NOTE — PROGRESS NOTES
Office Note      Date: 2021  Patient Name: Sydnie Gamble  MRN: [id number]  : 1960    Chief Complaint   Patient presents with   • Nausea   • Abdominal Pain       History of Present Illness: Sydnie Gamble is a 61 y.o. female who presents for Nausea and Abdominal Pain. GERD improved w/ prilosec initially but stopped working. Hx of radiation proctitis and told to take fiber. Has inadquate BM that are hard.   Has never had colonoscopy. Has quit sodas and coffee. Does not take NSAIDs. Hs voice hoarseness.   Started on wellbutrin and liked it but has felt tearful the past few days b/c she has not been able to visit her grandkids.   Subjective      Review of Systems:   Pertinent review of systems per HPI.    Review of Systems   Constitutional: Negative for activity change, appetite change, chills, diaphoresis, fatigue, fever and unexpected weight change.   HENT: Positive for voice change. Negative for congestion, dental problem, drooling, ear discharge, ear pain, facial swelling, hearing loss and mouth sores.    Eyes: Negative for pain, discharge and itching.   Respiratory: Negative for apnea, cough, choking, chest tightness and shortness of breath.    Cardiovascular: Negative for chest pain, palpitations and leg swelling.   Gastrointestinal: Positive for abdominal distention, abdominal pain and constipation. Negative for blood in stool and diarrhea.   Endocrine: Negative for cold intolerance, heat intolerance, polydipsia and polyuria.   Genitourinary: Negative for difficulty urinating, dysuria, frequency and hematuria.   Skin: Negative for color change, pallor, rash and wound.   Allergic/Immunologic: Negative for environmental allergies, food allergies and immunocompromised state.   Neurological: Negative for dizziness, weakness and light-headedness.   Psychiatric/Behavioral: Positive for dysphoric mood. Negative for agitation, behavioral problems, confusion, decreased concentration and self-injury.  "The patient is not nervous/anxious.    All other systems reviewed and are negative.    No Known Allergies    Objective     Physical Exam:  Vital Signs:   Vitals:    07/20/21 1425   BP: 124/72   Pulse: 63   Resp: 16   Temp: 96.6 °F (35.9 °C)   TempSrc: Temporal   SpO2: 97%   Weight: 69.9 kg (154 lb)   Height: 154.9 cm (61\")      Body mass index is 29.1 kg/m².    Physical Exam  Vitals and nursing note reviewed.   Constitutional:       General: She is not in acute distress.     Appearance: She is well-developed.   HENT:      Head: Normocephalic and atraumatic.      Right Ear: External ear normal.      Left Ear: External ear normal.   Eyes:      General: No scleral icterus.        Right eye: No discharge.         Left eye: No discharge.      Conjunctiva/sclera: Conjunctivae normal.   Cardiovascular:      Rate and Rhythm: Normal rate and regular rhythm.      Heart sounds: Normal heart sounds. No murmur heard.   No friction rub. No gallop.    Pulmonary:      Effort: Pulmonary effort is normal. No respiratory distress.      Breath sounds: Normal breath sounds. No wheezing or rales.   Abdominal:      General: Abdomen is flat. Bowel sounds are normal. There is no distension.      Palpations: Abdomen is soft. There is no mass.   Skin:     General: Skin is warm and dry.      Coloration: Skin is not pale.         Assessment / Plan      Assessment & Plan:    1. Acquired hypothyroidism  Repeat ths  - TSH Rfx On Abnormal To Free T4    2. Pre-diabetes  Repeat a1c  - Hemoglobin A1c    3. Generalized abdominal pain  Likely from GERD/anxiety/depression/constipation. Increase prilosec to BID    4. Colon cancer screening    - Cologuard - Stool, Per Rectum; Future    5. Current mild episode of major depressive disorder without prior episode (CMS/HCC)  Increase wellbutrin to 300mg daily. F/u in 4 weeks.       Ronna Arrington MD  07/20/2021     Please note that portions of this note may have been completed with a voice recognition program. " Efforts were made to edit the dictations, but occasionally words are mistranscribed.

## 2021-07-21 LAB — TSH SERPL DL<=0.05 MIU/L-ACNC: 0.63 UIU/ML (ref 0.27–4.2)

## 2021-09-14 RX ORDER — OMEPRAZOLE 40 MG/1
CAPSULE, DELAYED RELEASE ORAL
Qty: 90 CAPSULE | Refills: 1 | Status: SHIPPED | OUTPATIENT
Start: 2021-09-14 | End: 2022-03-12

## 2021-09-24 ENCOUNTER — TELEPHONE (OUTPATIENT)
Dept: INTERNAL MEDICINE | Facility: CLINIC | Age: 61
End: 2021-09-24

## 2021-09-24 NOTE — TELEPHONE ENCOUNTER
Caller: Sydnie Gamble    Relationship: Self    Best call back number:[phone]     What orders are you requesting (i.e. lab or imaging): COVIID BOOSTER VACCINE    In what timeframe would the patient need to come in: AS SOON AS POSSIBLE    Where will you receive your lab/imaging services: [facility] [place]

## 2021-09-24 NOTE — TELEPHONE ENCOUNTER
Advised on vm we don't have the vaccine at this time check with pharmacy to see if she is eligible for booster.

## 2021-10-11 RX ORDER — BUPROPION HYDROCHLORIDE 300 MG/1
300 TABLET ORAL EVERY MORNING
Qty: 30 TABLET | Refills: 1 | Status: SHIPPED | OUTPATIENT
Start: 2021-10-11 | End: 2021-10-14 | Stop reason: SDUPTHER

## 2021-10-14 RX ORDER — BUPROPION HYDROCHLORIDE 300 MG/1
300 TABLET ORAL EVERY MORNING
Qty: 30 TABLET | Refills: 1 | Status: SHIPPED | OUTPATIENT
Start: 2021-10-14 | End: 2021-10-15

## 2021-10-14 NOTE — TELEPHONE ENCOUNTER
Caller: Sydnie Gamble    Relationship: Self      Medication requested (name and dosage):  buPROPion XL (Wellbutrin XL) 300 MG 24 hr tablet    Pharmacy where request should be sent:   [facility]/pharmacy #[id number] - [place], [place] - [address] AT NEXT TO [facility] - [phone]  - [phone] FX      Additional details provided by patient: REQUESTING A REFILL AND A 90 DAY SUPPLY    Best call back number: [phone]    Does the patient have less than a 3 day supply:  [] Yes  [x] No    Tip Smith Rep   10/14/21 12:44 EDT

## 2021-10-15 ENCOUNTER — TELEPHONE (OUTPATIENT)
Dept: INTERNAL MEDICINE | Facility: CLINIC | Age: 61
End: 2021-10-15

## 2021-10-15 RX ORDER — BUPROPION HYDROCHLORIDE 300 MG/1
300 TABLET ORAL EVERY MORNING
Qty: 90 TABLET | Refills: 1 | Status: SHIPPED | OUTPATIENT
Start: 2021-10-15 | End: 2022-04-11

## 2021-10-15 NOTE — TELEPHONE ENCOUNTER
Caller: Sydnie Gamble    Relationship: Self    Best call back number:  [phone]     Who are you requesting to speak with (clinical staff, provider,  specific staff member):  CLINICAL     What was the call regarding: PATIENT STATES THE PHARMACY HAD THE BUPROPION XL BUT IT WAS ONLY FOR A 30 DAY SUPPLY AND SHE NORMALLY GET A 90 DAY SUPPLY   PLEASE ADVISE     Do you require a callback: YES

## 2021-10-25 DIAGNOSIS — E03.9 HYPOTHYROIDISM, UNSPECIFIED TYPE: ICD-10-CM

## 2021-10-25 RX ORDER — LEVOTHYROXINE SODIUM 88 UG/1
TABLET ORAL
Qty: 90 TABLET | Refills: 0 | Status: SHIPPED | OUTPATIENT
Start: 2021-10-25 | End: 2022-02-18

## 2022-01-04 DIAGNOSIS — J41.0 SIMPLE CHRONIC BRONCHITIS: ICD-10-CM

## 2022-01-07 RX ORDER — ALBUTEROL SULFATE 90 UG/1
2 AEROSOL, METERED RESPIRATORY (INHALATION) EVERY 4 HOURS PRN
Qty: 18 G | Refills: 5 | Status: SHIPPED | OUTPATIENT
Start: 2022-01-07 | End: 2022-07-18 | Stop reason: SDUPTHER

## 2022-02-18 DIAGNOSIS — E03.9 HYPOTHYROIDISM, UNSPECIFIED TYPE: ICD-10-CM

## 2022-02-18 RX ORDER — LEVOTHYROXINE SODIUM 88 UG/1
TABLET ORAL
Qty: 90 TABLET | Refills: 0 | Status: SHIPPED | OUTPATIENT
Start: 2022-02-18 | End: 2022-05-17

## 2022-03-12 DIAGNOSIS — K21.9 GASTROESOPHAGEAL REFLUX DISEASE WITHOUT ESOPHAGITIS: Primary | ICD-10-CM

## 2022-03-12 RX ORDER — OMEPRAZOLE 40 MG/1
CAPSULE, DELAYED RELEASE ORAL
Qty: 90 CAPSULE | Refills: 1 | Status: SHIPPED | OUTPATIENT
Start: 2022-03-12 | End: 2022-07-18 | Stop reason: SDUPTHER

## 2022-04-11 RX ORDER — BUPROPION HYDROCHLORIDE 300 MG/1
TABLET ORAL
Qty: 90 TABLET | Refills: 1 | Status: SHIPPED | OUTPATIENT
Start: 2022-04-11 | End: 2022-10-13

## 2022-05-17 DIAGNOSIS — E03.9 HYPOTHYROIDISM, UNSPECIFIED TYPE: ICD-10-CM

## 2022-05-17 RX ORDER — LEVOTHYROXINE SODIUM 88 UG/1
TABLET ORAL
Qty: 90 TABLET | Refills: 0 | Status: SHIPPED | OUTPATIENT
Start: 2022-05-17 | End: 2022-07-19 | Stop reason: SDUPTHER

## 2022-07-01 NOTE — TELEPHONE ENCOUNTER
Caller: HarithaJose Francisco mckeon    Relationship: Emergency Contact    Best call back number: [phone]     What is the best time to reach you: ANYTIME     Who are you requesting to speak with (clinical staff, provider,  specific staff member):  CLINICAL STAFF    Do you know the name of the person who called: CAREN HORTON    What was the call regarding: PATIENT IS ASKING FOR A TEMPORARY PRESCRIPTION TO GET HER THROUGH THE LONG WEEKEND UNTIL HER PSYCHIATRIC NURSE [facility] IS IN THE OFFICE AND SHE CAN PRESCRIBE    PATIENT HAS BEEN ON THESE FOR 15 YEARS AND FEARS BEING WITH OUT THEM.   SHE IS AND HAS BEEN OUT OF THESE FOR A COUPLE DAYS BUT HER PROVIDER IS NOT IN AND RETURNING HER CALLS.         Do you require a callback: CALL BACK       MEDICATION IS    clonazePAM (KlonoPIN) 1 MG tablet       [id number] - [place], [place] - [address] AT [address]EXT TO [facility] - [phone]  - [phone]   [phone]

## 2022-07-05 RX ORDER — CLONAZEPAM 1 MG/1
1 TABLET ORAL 2 TIMES DAILY PRN
OUTPATIENT
Start: 2022-07-05

## 2022-07-18 ENCOUNTER — OFFICE VISIT (OUTPATIENT)
Dept: INTERNAL MEDICINE | Facility: CLINIC | Age: 62
End: 2022-07-18

## 2022-07-18 ENCOUNTER — LAB (OUTPATIENT)
Dept: LAB | Facility: HOSPITAL | Age: 62
End: 2022-07-18

## 2022-07-18 VITALS
DIASTOLIC BLOOD PRESSURE: 80 MMHG | OXYGEN SATURATION: 98 % | HEART RATE: 76 BPM | RESPIRATION RATE: 18 BRPM | SYSTOLIC BLOOD PRESSURE: 130 MMHG | WEIGHT: 149 LBS | BODY MASS INDEX: 28.13 KG/M2 | TEMPERATURE: 98.3 F | HEIGHT: 61 IN

## 2022-07-18 DIAGNOSIS — Z12.11 COLON CANCER SCREENING: ICD-10-CM

## 2022-07-18 DIAGNOSIS — Z00.00 ANNUAL PHYSICAL EXAM: ICD-10-CM

## 2022-07-18 DIAGNOSIS — R73.03 PRE-DIABETES: ICD-10-CM

## 2022-07-18 DIAGNOSIS — E55.9 VITAMIN D DEFICIENCY: ICD-10-CM

## 2022-07-18 DIAGNOSIS — Z12.31 BREAST CANCER SCREENING BY MAMMOGRAM: Primary | ICD-10-CM

## 2022-07-18 DIAGNOSIS — K21.9 GASTROESOPHAGEAL REFLUX DISEASE WITHOUT ESOPHAGITIS: ICD-10-CM

## 2022-07-18 DIAGNOSIS — J41.0 SIMPLE CHRONIC BRONCHITIS: ICD-10-CM

## 2022-07-18 DIAGNOSIS — E03.9 ACQUIRED HYPOTHYROIDISM: ICD-10-CM

## 2022-07-18 LAB
ALBUMIN SERPL-MCNC: 4.4 G/DL (ref 3.5–5.2)
ALBUMIN/GLOB SERPL: 1.5 G/DL
ALP SERPL-CCNC: 155 U/L (ref 39–117)
ALT SERPL W P-5'-P-CCNC: 9 U/L (ref 1–33)
ANION GAP SERPL CALCULATED.3IONS-SCNC: 12 MMOL/L (ref 5–15)
AST SERPL-CCNC: 14 U/L (ref 1–32)
BILIRUB SERPL-MCNC: 0.2 MG/DL (ref 0–1.2)
BUN SERPL-MCNC: 16 MG/DL (ref 8–23)
BUN/CREAT SERPL: 15.2 (ref 7–25)
CALCIUM SPEC-SCNC: 9.8 MG/DL (ref 8.6–10.5)
CHLORIDE SERPL-SCNC: 105 MMOL/L (ref 98–107)
CHOLEST SERPL-MCNC: 215 MG/DL (ref 0–200)
CO2 SERPL-SCNC: 23 MMOL/L (ref 22–29)
CREAT SERPL-MCNC: 1.05 MG/DL (ref 0.57–1)
DEPRECATED RDW RBC AUTO: 39.2 FL (ref 37–54)
EGFRCR SERPLBLD CKD-EPI 2021: 59.5 ML/MIN/1.73
ERYTHROCYTE [DISTWIDTH] IN BLOOD BY AUTOMATED COUNT: 12.9 % (ref 12.3–15.4)
GLOBULIN UR ELPH-MCNC: 3 GM/DL
GLUCOSE SERPL-MCNC: 101 MG/DL (ref 65–99)
HCT VFR BLD AUTO: 39.7 % (ref 34–46.6)
HDLC SERPL-MCNC: 68 MG/DL (ref 40–60)
HGB BLD-MCNC: 13.3 G/DL (ref 12–15.9)
LDLC SERPL CALC-MCNC: 124 MG/DL (ref 0–100)
LDLC/HDLC SERPL: 1.77 {RATIO}
MCH RBC QN AUTO: 28.1 PG (ref 26.6–33)
MCHC RBC AUTO-ENTMCNC: 33.5 G/DL (ref 31.5–35.7)
MCV RBC AUTO: 83.9 FL (ref 79–97)
PLATELET # BLD AUTO: 432 10*3/MM3 (ref 140–450)
PMV BLD AUTO: 9.4 FL (ref 6–12)
POTASSIUM SERPL-SCNC: 4 MMOL/L (ref 3.5–5.2)
PROT SERPL-MCNC: 7.4 G/DL (ref 6–8.5)
RBC # BLD AUTO: 4.73 10*6/MM3 (ref 3.77–5.28)
SODIUM SERPL-SCNC: 140 MMOL/L (ref 136–145)
TRIGL SERPL-MCNC: 133 MG/DL (ref 0–150)
TSH SERPL DL<=0.05 MIU/L-ACNC: 0.03 UIU/ML (ref 0.27–4.2)
VLDLC SERPL-MCNC: 23 MG/DL (ref 5–40)
WBC NRBC COR # BLD: 7.33 10*3/MM3 (ref 3.4–10.8)

## 2022-07-18 PROCEDURE — 84439 ASSAY OF FREE THYROXINE: CPT | Performed by: INTERNAL MEDICINE

## 2022-07-18 PROCEDURE — 83036 HEMOGLOBIN GLYCOSYLATED A1C: CPT | Performed by: INTERNAL MEDICINE

## 2022-07-18 PROCEDURE — 82306 VITAMIN D 25 HYDROXY: CPT | Performed by: INTERNAL MEDICINE

## 2022-07-18 PROCEDURE — 80050 GENERAL HEALTH PANEL: CPT | Performed by: INTERNAL MEDICINE

## 2022-07-18 PROCEDURE — 80061 LIPID PANEL: CPT | Performed by: INTERNAL MEDICINE

## 2022-07-18 PROCEDURE — 99396 PREV VISIT EST AGE 40-64: CPT | Performed by: INTERNAL MEDICINE

## 2022-07-18 PROCEDURE — 99214 OFFICE O/P EST MOD 30 MIN: CPT | Performed by: INTERNAL MEDICINE

## 2022-07-18 PROCEDURE — 82607 VITAMIN B-12: CPT | Performed by: INTERNAL MEDICINE

## 2022-07-18 RX ORDER — ALBUTEROL SULFATE 90 UG/1
2 AEROSOL, METERED RESPIRATORY (INHALATION) EVERY 4 HOURS PRN
Qty: 18 G | Refills: 5 | Status: SHIPPED | OUTPATIENT
Start: 2022-07-18

## 2022-07-18 RX ORDER — OMEPRAZOLE 40 MG/1
40 CAPSULE, DELAYED RELEASE ORAL 2 TIMES DAILY
Qty: 180 CAPSULE | Refills: 1 | Status: SHIPPED | OUTPATIENT
Start: 2022-07-18 | End: 2022-09-20

## 2022-07-18 NOTE — PROGRESS NOTES
Office Note      Date: 2022  Patient Name: Sydnie Gamble  MRN: [id number]  : 1960    Chief Complaint   Patient presents with   • Prediabetes   • Hypothyroidism       History of Present Illness: Sydnie Gamble is a 62 y.o. female who presents for Prediabetes and Hypothyroidism. 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 Has been lost to f/u w/ gyn onc and radiation.   Would like annual exam today. Having lower abd pain.       Subjective      Review of Systems:   Pertinent review of systems per HPI.    Review of Systems   Constitutional: Negative for activity change, appetite change, chills, diaphoresis, fatigue, fever and unexpected weight change.   HENT: Negative for congestion, dental problem, drooling, ear discharge, ear pain, facial swelling, hearing loss and mouth sores.    Eyes: Negative for pain, discharge and itching.   Respiratory: Negative for apnea, cough, choking, chest tightness and shortness of breath.    Cardiovascular: Negative for chest pain, palpitations and leg swelling.   Gastrointestinal: Positive for abdominal pain. Negative for abdominal distention, blood in stool, constipation and diarrhea.   Endocrine: Negative for cold intolerance, heat intolerance, polydipsia and polyuria.   Genitourinary: Negative for difficulty urinating, dysuria, frequency and hematuria.   Skin: Negative for color change, pallor, rash and wound.   Allergic/Immunologic: Negative for environmental allergies, food allergies and immunocompromised state.   Neurological: Negative for dizziness, weakness and light-headedness.   Psychiatric/Behavioral: Negative for agitation, behavioral problems, confusion, decreased concentration and self-injury. The patient is not nervous/anxious.    All other systems reviewed and are negative.  Answers for HPI/ROS submitted by the patient on 2022  Please describe your symptoms.: Would like blood work for current thyroid and pre-diabetes  "values., Daily stomach upset after eating. Omeprazole doesn't seem to be helping.  Have you had these symptoms before?: Yes  How long have you been having these symptoms?: Greater than 2 weeks  Please list any medications you are currently taking for this condition.: Omeprazole DR 40MG, OTC antacids, Zofran on occasion left over from radiation treatments  Please describe any probable cause for these symptoms. : GERD? Weight?  What is the primary reason for your visit?: Other      No Known Allergies    Objective     Physical Exam:  Vital Signs:   Vitals:    07/18/22 1604   BP: 130/80   Pulse: 76   Resp: 18   Temp: 98.3 °F (36.8 °C)   TempSrc: Temporal   SpO2: 98%   Weight: 67.6 kg (149 lb)   Height: 154.9 cm (61\")      Body mass index is 28.15 kg/m².    Physical Exam  Vitals and nursing note reviewed.   Constitutional:       General: She is not in acute distress.     Appearance: She is well-developed.   HENT:      Head: Normocephalic and atraumatic.      Right Ear: External ear normal.      Left Ear: External ear normal.   Eyes:      General: No scleral icterus.        Right eye: No discharge.         Left eye: No discharge.      Conjunctiva/sclera: Conjunctivae normal.   Cardiovascular:      Rate and Rhythm: Normal rate and regular rhythm.      Heart sounds: Normal heart sounds. No murmur heard.    No friction rub. No gallop.   Pulmonary:      Effort: Pulmonary effort is normal. No respiratory distress.      Breath sounds: Normal breath sounds. No wheezing or rales.   Skin:     General: Skin is warm and dry.      Coloration: Skin is not pale.         Assessment / Plan      Assessment & Plan:    1. Breast cancer screening by mammogram  - Mammo Screening Digital Tomosynthesis Bilateral With CAD; Future    2. Acquired hypothyroidism  TSH low, decrease synthroid dose  - TSH Rfx On Abnormal To Free T4  - T4, Free  - levothyroxine (SYNTHROID, LEVOTHROID) 75 MCG tablet; Take 1 tablet by mouth Daily.  Dispense: 30 tablet; " Refill: 3    3. Colon cancer screening    - Cologuard - Stool, Per Rectum; Future    4. Pre-diabetes  a1c stable, counseled on healthy diet  - Hemoglobin A1c    5. Annual physical exam  Counseled on:  Mammo starting at age 40  Pap smear q5 years if normal pap cytology with neg HPV, otherwise q3 years  Colonoscopy at 44 y/o  PNA vaccinations starting at age 65  shingrix at age 50  Td/Tdap q 10 years  Wear seatbelt when driving  Flu shot annually    - CBC (No Diff)  - Comprehensive Metabolic Panel  - Lipid Panel  - Vitamin B12  ]  7. Simple chronic bronchitis (HCC)    - albuterol sulfate  (90 Base) MCG/ACT inhaler; Inhale 2 puffs Every 4 (Four) Hours As Needed for Wheezing.  Dispense: 18 g; Refill: 5    8. Vitamin D deficiency  Vit d low today. rx for vit d  - Vitamin D 25 Hydroxy  - vitamin D (ERGOCALCIFEROL) 1.25 MG (45079 UT) capsule capsule; Take 1 capsule by mouth 1 (One) Time Per Week.  Dispense: 12 capsule; Refill: 0    9. Gastroesophageal reflux disease without esophagitis  Likely cause of her abd pain, inc prilosec to BID. Also advised to f/u with ob/onc and radiation for routine surveillance.     - omeprazole (priLOSEC) 40 MG capsule; Take 1 capsule by mouth 2 (Two) Times a Day.  Dispense: 180 capsule; Refill: 1      Ronna Arrington MD  07/18/2022

## 2022-07-19 LAB
25(OH)D3 SERPL-MCNC: 18.8 NG/ML (ref 30–100)
HBA1C MFR BLD: 5.9 % (ref 4.8–5.6)
T4 FREE SERPL-MCNC: 1.49 NG/DL (ref 0.93–1.7)
VIT B12 BLD-MCNC: 428 PG/ML (ref 211–946)

## 2022-07-19 RX ORDER — ERGOCALCIFEROL 1.25 MG/1
50000 CAPSULE ORAL WEEKLY
Qty: 12 CAPSULE | Refills: 0 | Status: SHIPPED | OUTPATIENT
Start: 2022-07-19

## 2022-07-19 RX ORDER — LEVOTHYROXINE SODIUM 0.07 MG/1
75 TABLET ORAL DAILY
Qty: 30 TABLET | Refills: 3 | Status: SHIPPED | OUTPATIENT
Start: 2022-07-19 | End: 2022-09-20 | Stop reason: SDUPTHER

## 2022-07-20 ENCOUNTER — TELEPHONE (OUTPATIENT)
Dept: INTERNAL MEDICINE | Facility: CLINIC | Age: 62
End: 2022-07-20

## 2022-07-20 NOTE — TELEPHONE ENCOUNTER
LVM for patient to return call or check portal    HUB TO READ: Please let patient know vitamin D is low. Prescription sent to pharmacy for Vit D 50,000 IU once weekly for 12 weeks. Tsh (thyroid) low, rx for synthroid 75mcg sent to pharm, f/u in 2-3 months to recheck thyroid and vitamin d levels.

## 2022-07-20 NOTE — TELEPHONE ENCOUNTER
----- Message from Ronna Arrington MD sent at 7/19/2022  3:54 PM EDT -----  Please let patient's vitamin D was low. Prescription sent to pharmacy for Vit D 50,000 IU once weekly for 12 weeks. Follow up in clinic for repeat vitamin D level.     Tsh low, rx for synthroid 75mcg sent to pharm, f/u in 2-3 months

## 2022-07-20 NOTE — TELEPHONE ENCOUNTER
I RELAYED MESSAGE TO PATIENT. PATIENT UNDERSTOOD AND DOESN'T HAVE ANY QUESTIONS AT THIS TIME.    HUB

## 2022-07-24 ENCOUNTER — E-VISIT (OUTPATIENT)
Dept: ADMINISTRATIVE | Facility: OTHER | Age: 62
End: 2022-07-24

## 2022-07-24 NOTE — E-VISIT ESCALATED
Chief Complaint: Coronavirus (COVID-19), cold, sinus pain, allergy, or flu   Patient was shown the following escalation message:   Some conditions need a visit with a healthcare provider   Because your cold is making your COPD worse, you should speak with a provider to get care.   If you start to have trouble breathing, go directly to the nearest emergency room.   Otherwise, select an option below.   ----------   Patient Interview Transcript:   Why are you getting care through eVisit today? We can't guarantee a specific treatment or test. Your provider will decide what's best for you. Select all that apply.    None of the above   Not selected:    I want to know if I have a cold or something more serious    I want to know if I need to be seen by a provider    I need a doctor's note    I want to be tested for COVID-19    I want to get the COVID-19 vaccine    I think I'm having side effects from the COVID-19 vaccine    I just want to feel better!   COVID-19 symptoms are similar to symptoms of other illnesses. This makes it difficult to diagnose. Please carefully consider each question and answer as best you can. This will help your provider make the right diagnosis. Which of these symptoms are bothering you? Select all that apply.    Cough    Fever    Stuffed-up nose or sinuses    Runny nose    Sore throat    Headache    Muscle or body aches    Fatigue or tiredness   Not selected:    Shortness of breath    Itchy or watery eyes    Itchy nose or sneezing    Loss of smell or taste    Hoarse voice or loss of voice    Sweats    Chills    Nausea or vomiting    Diarrhea    I don't have any of these symptoms   Do you have any of these conditions? These conditions can put you at increased risk for severe disease if you're infected with COVID-19. Select all that apply.    Chronic lung disease, such as cystic fibrosis or interstitial fibrosis   Not selected:    Heart disease, such as congenital heart disease, congestive heart  failure, or coronary artery disease    Disorder of the brain, spinal cord, or nerves and muscles, such as dementia, cerebral palsy, epilepsy, muscular dystrophy, or developmental delay    Metabolic disorder or mitochondrial disease    Cerebrovascular disease, such as stroke or another condition affecting the blood vessels or blood supply to the brain    Down syndrome    Mood disorder, including depression or schizophrenia spectrum disorders    Substance use disorder, such as alcohol, opioid, or cocaine use disorder    Tuberculosis    None of the above   Do you live in a group care setting? Examples include: - Nursing home - Residential care - Psychiatric treatment facility - Group home - [facility] - [facility] and care home - Homeless shelter - Foster care setting Select one.    No   Not selected:    Yes   Have you ever been tested for COVID-19? Select one.    Yes   Not selected:    No   When was your most recent COVID-19 test? Select one.    Within the last week (specify date as MM/DD/YY): 07/22/2022   Not selected:    7 to 14 days ago    15 to 30 days ago    1 to 3 months ago    More than 3 months ago   What type of COVID-19 test did you most recently have? There are two types of COVID-19 tests: - Viral tests check if you're currently infected with COVID-19. For these tests, a nose swab or saliva sample is taken. Viral tests include self-tests and tests done at a doctor's office, lab, or testing site. - Antibody tests check if you've been infected in the past. For these tests, your blood is drawn. Antibody tests can only be done at a doctor's office, lab, or testing site. Select one.    Viral self-test   Not selected:    Viral test at a doctor's office, lab, or testing site    Antibody test   What was the result of your most recent COVID-19 test? Select one.    Positive   Not selected:    Negative    I'm not sure   Have you gotten the COVID-19 vaccine? Select one.    Yes   Not selected:    No   How many doses of the  COVID-19 vaccine have you gotten? This includes boosters as well as third or fourth doses for those who are immunocompromised. Select one.    3 doses   Not selected:    1 dose    2 doses    4 doses   1st dose    Pfizer   Not selected:    J&J/Garret    Moderna   2nd dose    Pfizer   Not selected:    J&J/Garret    Moderna   3rd dose    Pfizer   Not selected:    J&J/Garret    Moderna   When did you get your most recent dose of the COVID-19 vaccine?    More than 14 days ago   Not selected:    Less than 48 hours (2 days) ago    48 to 72 hours (3 days) ago    3 to 5 days ago    5 to 7 days ago    7 to 14 days ago   When did your current symptoms start? Select one.    Less than 48 hours ago   Not selected:    3 to 5 days ago    6 to 9 days ago    10 to 14 days ago    2 to 4 weeks ago    More than a month ago   Do you know the exact date your symptoms started? If so, enter the date as MM/DD/YY. Select one.    Yes (specify): 07/22/2022   Not selected:    No   Did your symptoms come on suddenly or gradually? Select one.    Suddenly   Not selected:    Gradually    I'm not sure   You mentioned having a fever. Do you have a fever now? Select one.    No, it's gone now   Not selected:    Yes, and I've had one since my symptoms started    Yes, but I didn't have one when my symptoms started    I'm not sure   Did you take your temperature with a thermometer? Select one.    Yes   Not selected:    No, but it felt mild    No, but it felt high   What was the highest reading on the thermometer? Select one.    Above 103.0F   Not selected:    Below 100.4F    100.4 to 101.5F    101.6 to 101.9F    102.0 to 103.0F   You mentioned having a headache. On a scale of 1 to 10, how severe is your headache pain? Select one.    Mild (1 to 3)   Not selected:    Moderate (4 to 6)    Severe (7 to 9)    Unbearable (10)    The worst headache of my life (10+)   Do you cough so hard that it's made you gag or vomit? By gag, we mean has your coughing made  "you choke or dry heave? Select all that apply.    No   Not selected:    Yes, my coughing has made me gag    Yes, my coughing has made me vomit   When is your cough the worst? Select all that apply.    I haven't noticed a difference depending on time of day   Not selected:    In the morning, or when I wake up    During the day    At nighttime, or while I'm sleeping   Are you coughing up mucus or phlegm? Select one.    Yes, a lot   Not selected:    No, my cough is dry    Yes, a little   What color is most of the mucus or phlegm that you're coughing up? Select one.    I'm not sure   Not selected:    Clear    White/frothy    Yellow    Green    Red or pink   You mentioned having a stuffy nose or sinus congestion. Do you feel pain or pressure in your sinuses?    Yes   Not selected:    No    I'm not sure   Where do you feel sinus pain or pressure?    Around my eyes    Behind my nose   Not selected:    In my forehead    In my cheeks    In my upper teeth or jaw    I'm not sure   When did you first notice your sinus pain or pressure? Select one.    Less than 5 days ago   Not selected:    5 to 9 days ago    10 to 14 days ago    2 to 4 weeks ago    1 month ago or longer   Does coughing, sneezing, or leaning forward make your sinuses feel worse? Select one.    Yes   Not selected:    No    I'm not sure   What color is your nasal drainage? Select one.    I'm not sure   Not selected:    Clear    White    Yellow    Green    My nose is stuffed but not draining or running   Is your nasal drainage thick or thin? Select one.    I'm not sure   Not selected:    Thick    Thin   Is there any drainage (mucus) going down the back of your throat? This kind of drainage is also called \"postnasal drip.\" Select one.    Yes   Not selected:    No    I'm not sure   Can you swallow liquids and solid foods? A sore throat may be painful when swallowing, but it shouldn't prevent you from swallowing. Select one.    Yes, with ease   Not selected:    Yes, " but it's uncomfortable    Yes, but it's painful    It's hard to swallow anything because it feels like liquids and food get stuck in my throat    No, I can't swallow anything, liquid or solid foods   Since your symptoms started, have you felt dizzy? Select one.    Yes, but I can continue with my regular daily activities   Not selected:    Yes, and it makes it hard to stand, walk, or do daily activities    No   Do you have chest pain? You might also feel it as discomfort, aching, tightness, or squeezing in the chest. Select one.    Yes   Not selected:    No   Which of these is true of your chest pain? Select one.    My chest hurts only when I cough   Not selected:    My chest hurts even when I'm not coughing   Have you urinated at least 3 times in the last 24 hours? Select one.    Yes   Not selected:    No    I'm not sure   Changes in alertness or awareness may mean you need emergency care. Since your symptoms started, have you had any of these? Select all that apply.    None of the above   Not selected:    Confusion    Slurred speech    Not knowing where you are or what day it is    Difficulty staying conscious    Fainting or passing out   Do your symptoms include a whistling sound, or wheezing, when you breathe? Select one.    I'm not sure   Not selected:    Yes    No   Do you have any of these symptoms in your ear(s)? Select all that apply.    Pain    Pressure    Fullness    Crackling or popping    Plugged or blocked sensation   Not selected:    None of the above   Can you move your chin toward your chest?    Yes   Not selected:    No, my neck is too stiff   Are your tonsils larger than usual?    I'm not sure   Not selected:    Yes    No    I've had my tonsils removed   Is there any white or yellow pus on your tonsils?    I'm not sure   Not selected:    Yes    No   Are there red spots on the roof of your mouth or the back of your throat?    No   Not selected:    Yes    I'm not sure   Are your glands/lymph nodes  swollen, or does it hurt when you touch them?    I'm not sure   Not selected:    Yes    No   People with a very high body mass index (BMI) are at higher risk for developing complications from the flu and severe illness from COVID-19. To determine your BMI, we need to know your weight and height. Please enter your weight (in pounds).    Weight   Please enter your height.    Height   Have you ever been diagnosed with asthma? Select one.    No   Not selected:    Yes   Have you ever been prescribed albuterol to use for wheezing, cough, or shortness of breath caused by a cold, bronchitis, or pneumonia? Albuterol (ProAir, Proventil, Ventolin) is prescribed as an inhaler or a solution to be used with a nebulizer machine. Select one.    Yes   Not selected:    No    I'm not sure   Have you ever been prescribed a steroid inhaler to use for wheezing, cough, or shortness of breath caused by a cold, bronchitis, or pneumonia? Some examples of steroid inhalers include Pulmicort, Flovent, Qvar, and Alvesco. Select one.    Yes   Not selected:    No    I'm not sure   Have you used albuterol for your current symptoms? This includes both albuterol inhalers and albuterol solution in a nebulizer machine. Select one.    Yes   Not selected:    No, I don't have any, or it's     No, I don't feel like I need it   How often are you using albuterol? If you're using albuterol very frequently, you'll need to be seen in person. Select one.    Every 6 hours or longer   Not selected:    More than once an hour    Every 1 to 2 hours    Every 2 to 3 hours    Every 3 to 4 hours    Every 4 to 6 hours   Would you like a prescription for albuterol? Select one.    No   Not selected:    Yes   Have you ever been diagnosed with chronic obstructive pulmonary disease (COPD)? Select one.    Yes   Not selected:    No    I'm not sure   Think about your usual COPD symptoms. Have you noticed any of these? If your cold is making your COPD worse, we'll need to  get you in for a physical exam. Select all that apply.    A cough that is worse than usual    Coughing more often than usual    Coughing up more phlegm or mucus than usual   Not selected:    Phlegm or mucus that looks different    Increased trouble breathing    None of the above   ----------   Medical history   Medical history data does not currently exist for this patient.

## 2022-07-25 ENCOUNTER — TELEPHONE (OUTPATIENT)
Dept: INTERNAL MEDICINE | Facility: CLINIC | Age: 62
End: 2022-07-25

## 2022-07-25 NOTE — TELEPHONE ENCOUNTER
Symptoms started on Friday and tested positive on Friday    Still having symptoms today and has COPD

## 2022-07-25 NOTE — TELEPHONE ENCOUNTER
PATIENT TESTED POSITIVE Friday FOR COVID.  IS IT TO LATE TO GET ANTIVIRAL?    PLEASE CALL [phone]

## 2022-07-26 NOTE — TELEPHONE ENCOUNTER
Symptoms are mild, advised her to take over-the-counter cough lozenges and Mucinex.    Dr. Arrington

## 2022-07-28 ENCOUNTER — TELEPHONE (OUTPATIENT)
Dept: INTERNAL MEDICINE | Facility: CLINIC | Age: 62
End: 2022-07-28

## 2022-07-28 NOTE — TELEPHONE ENCOUNTER
Caller: Sydnie Gamble    Relationship: Self    Best call back number: [phone]    What form or medical record are you requesting: ALL MEDICAL RECORDS.  PATIENT IS SWITCHING PROVIDERS.    Who is requesting this form or medical record from you: DR JAI PERALTA    How would you like to receive the form or medical records (pick-up, mail, fax): FAX    If fax, what is the fax number: [phone]    Timeframe paperwork needed: AS SOON AS POSSIBLE

## 2022-08-24 DIAGNOSIS — E03.9 HYPOTHYROIDISM, UNSPECIFIED TYPE: ICD-10-CM

## 2022-08-24 RX ORDER — LEVOTHYROXINE SODIUM 88 UG/1
TABLET ORAL
Qty: 90 TABLET | Refills: 0 | OUTPATIENT
Start: 2022-08-24

## 2022-09-19 ENCOUNTER — TELEPHONE (OUTPATIENT)
Dept: INTERNAL MEDICINE | Facility: CLINIC | Age: 62
End: 2022-09-19

## 2022-09-19 NOTE — TELEPHONE ENCOUNTER
Caller: Sydnie Gamble    Relationship to patient: Self    Best call back number: [phone]    Chief complaint: LABS ARE NEEDED AND MEDICATION REVIEW    Type of visit: FOLLOW UP FOR LABS AND MEDICATIONS    Requested date: SOONEST AVAILABLE- WILL BE OUT OF THE THYROID MEDICATION SOON    Additional notes: DOES NOT FEEL LIKE DR LAL IS A GOOD FIT- ASKING TO CHANGE TO ANOTHER FEMALE PROVIDER    PLEASE CALL TO SCHEDULE

## 2022-09-20 DIAGNOSIS — E03.9 ACQUIRED HYPOTHYROIDISM: ICD-10-CM

## 2022-09-20 DIAGNOSIS — K21.9 GASTROESOPHAGEAL REFLUX DISEASE WITHOUT ESOPHAGITIS: ICD-10-CM

## 2022-09-20 RX ORDER — OMEPRAZOLE 40 MG/1
CAPSULE, DELAYED RELEASE ORAL
Qty: 90 CAPSULE | Refills: 1 | Status: SHIPPED | OUTPATIENT
Start: 2022-09-20

## 2022-09-20 RX ORDER — LEVOTHYROXINE SODIUM 0.07 MG/1
75 TABLET ORAL DAILY
Qty: 90 TABLET | Refills: 0 | Status: SHIPPED | OUTPATIENT
Start: 2022-09-20 | End: 2023-01-10

## 2022-09-20 NOTE — TELEPHONE ENCOUNTER
Rx Refill Note  Requested Prescriptions     Pending Prescriptions Disp Refills   • omeprazole (priLOSEC) 40 MG capsule [Pharmacy Med Name: OMEPRAZOLE DR 40 MG CAPSULE] 90 capsule 1     Sig: TAKE 1 CAPSULE BY MOUTH EVERY DAY      Last office visit with prescribing clinician: 7/18/2022      Next office visit with prescribing clinician: Visit date not found            Serjio Pacheco MA  09/20/22, 13:16 EDT

## 2022-10-13 DIAGNOSIS — E03.9 HYPOTHYROIDISM, UNSPECIFIED TYPE: ICD-10-CM

## 2022-10-13 RX ORDER — BUPROPION HYDROCHLORIDE 300 MG/1
TABLET ORAL
Qty: 90 TABLET | Refills: 1 | Status: SHIPPED | OUTPATIENT
Start: 2022-10-13 | End: 2022-10-14

## 2022-10-14 RX ORDER — LEVOTHYROXINE SODIUM 88 UG/1
TABLET ORAL
Qty: 90 TABLET | Refills: 0 | OUTPATIENT
Start: 2022-10-14

## 2022-10-14 RX ORDER — BUPROPION HYDROCHLORIDE 150 MG/1
300 TABLET ORAL EVERY MORNING
Qty: 60 TABLET | Refills: 0 | Status: SHIPPED | OUTPATIENT
Start: 2022-10-14 | End: 2022-10-17 | Stop reason: SDUPTHER

## 2022-10-17 RX ORDER — BUPROPION HYDROCHLORIDE 150 MG/1
300 TABLET ORAL EVERY MORNING
Qty: 180 TABLET | Refills: 0 | Status: SHIPPED | OUTPATIENT
Start: 2022-10-17 | End: 2023-01-19

## 2022-10-18 ENCOUNTER — OFFICE VISIT (OUTPATIENT)
Dept: INTERNAL MEDICINE | Facility: CLINIC | Age: 62
End: 2022-10-18

## 2022-10-18 ENCOUNTER — LAB (OUTPATIENT)
Dept: LAB | Facility: HOSPITAL | Age: 62
End: 2022-10-18

## 2022-10-18 VITALS
TEMPERATURE: 98.2 F | WEIGHT: 154 LBS | HEIGHT: 61 IN | SYSTOLIC BLOOD PRESSURE: 138 MMHG | BODY MASS INDEX: 29.07 KG/M2 | DIASTOLIC BLOOD PRESSURE: 80 MMHG | HEART RATE: 84 BPM | RESPIRATION RATE: 16 BRPM | OXYGEN SATURATION: 98 %

## 2022-10-18 DIAGNOSIS — R73.03 PREDIABETES: ICD-10-CM

## 2022-10-18 DIAGNOSIS — E03.9 ACQUIRED HYPOTHYROIDISM: Primary | ICD-10-CM

## 2022-10-18 DIAGNOSIS — E55.9 VITAMIN D DEFICIENCY: ICD-10-CM

## 2022-10-18 DIAGNOSIS — Z85.41 HISTORY OF CERVICAL CANCER: ICD-10-CM

## 2022-10-18 DIAGNOSIS — R91.8 MULTIPLE LUNG NODULES ON CT: ICD-10-CM

## 2022-10-18 DIAGNOSIS — K76.89 LIVER CYST: ICD-10-CM

## 2022-10-18 PROCEDURE — 83036 HEMOGLOBIN GLYCOSYLATED A1C: CPT | Performed by: NURSE PRACTITIONER

## 2022-10-18 PROCEDURE — 99214 OFFICE O/P EST MOD 30 MIN: CPT | Performed by: NURSE PRACTITIONER

## 2022-10-18 PROCEDURE — 82306 VITAMIN D 25 HYDROXY: CPT | Performed by: NURSE PRACTITIONER

## 2022-10-18 PROCEDURE — 80050 GENERAL HEALTH PANEL: CPT | Performed by: NURSE PRACTITIONER

## 2022-10-18 RX ORDER — ESCITALOPRAM OXALATE 20 MG/1
20 TABLET ORAL DAILY
COMMUNITY
Start: 2022-10-04 | End: 2022-12-13

## 2022-10-18 NOTE — PROGRESS NOTES
Chief Complaint   Patient presents with   • Hypothyroidism     F/u      Sydnie Gamble is a 62 y.o. female.     The patient is here today for follow-up on hypothyroidism. The patient switched to this provider from a different PCP within this office. Reviewing patient's chart, she was diagnosed with cervical cancer in  and throughout imaging, she had incidental findings of several cysts throughout the liver and pulmonary nodules and there was also a right hilar lymph node of 1 cm none of this has been reimaged since 2020.     Hypothyroidism  The patient states that she is tired but she do not know if it is her age. The patient states that her thyroid was decreased from 88 mcg to 75 mcg by Dr. Arrington.    Hypertension  The patient's blood pressure is good today.     Prediabetes  The patient states that she has been gaining weight and it is unstable. She states that she has been eating processed food and junk food.    Vitamin D deficiency  The patient states that she was taking vitamin D once a week for a month.    COPD  The patient states that her COPD has improved since she quit smoking. She states that she is vaping. She states that she used to be coughing and hacking. She states that she had lung nodules. She reports not having a chest CT scan since 2020. Denies shortness of breath.     History of cervical cancer  The patient states that she had radiation when she had her hysterectomy and lymph node dissection. She states that she is scared of radiation. She states that she had a follow-up to check her incision and then an internal, but she has not had any imaging done to make sure it did not spread. She states that her father  of cancer, her mother  of cancer, and her grandmother  of cancer.  I have reviewed the following portions of the patient's history and confirmed they are accurate: allergies, current medications, past family history, past medical history, past social history, past surgical  history, and problem list    I have personally completed the patient's review of systems.    Review of Systems   Constitutional: Positive for fatigue. Negative for activity change, appetite change, chills, diaphoresis, fever, unexpected weight gain and unexpected weight loss.   HENT: Negative for ear discharge, ear pain, mouth sores, nosebleeds, sinus pressure, sneezing and sore throat.    Eyes: Negative for pain, discharge and itching.   Respiratory: Negative for cough, chest tightness, shortness of breath and wheezing.    Cardiovascular: Negative for chest pain, palpitations and leg swelling.   Gastrointestinal: Negative for abdominal pain, constipation, diarrhea, nausea and vomiting.   Endocrine: Negative for heat intolerance, polydipsia and polyphagia.   Genitourinary: Negative for dysuria, flank pain, frequency, hematuria and urgency.   Musculoskeletal: Negative for arthralgias, back pain, gait problem, joint swelling, myalgias, neck pain and neck stiffness.   Skin: Negative for color change, pallor and rash.   Allergic/Immunologic: Negative for immunocompromised state.   Neurological: Negative for seizures, speech difficulty, weakness and numbness.   Hematological: Negative for adenopathy.   Psychiatric/Behavioral: Positive for sleep disturbance and depressed mood. Negative for agitation, decreased concentration and suicidal ideas. The patient is nervous/anxious.        Current Outpatient Medications on File Prior to Visit   Medication Sig   • albuterol sulfate  (90 Base) MCG/ACT inhaler Inhale 2 puffs Every 4 (Four) Hours As Needed for Wheezing.   • buPROPion XL (WELLBUTRIN XL) 150 MG 24 hr tablet Take 2 tablets by mouth Every Morning.   • clonazePAM (KlonoPIN) 1 MG tablet Take 1 mg by mouth 2 (Two) Times a Day As Needed for Anxiety.   • estradiol (ESTRACE VAGINAL) 0.1 MG/GM vaginal cream Insert 2 g into the vagina Daily.   • levothyroxine (SYNTHROID, LEVOTHROID) 75 MCG tablet Take 1 tablet by mouth  "Daily.   • omeprazole (priLOSEC) 40 MG capsule TAKE 1 CAPSULE BY MOUTH EVERY DAY   • ondansetron ODT (ZOFRAN-ODT) 4 MG disintegrating tablet Place 1 tablet on the tongue Every 8 (Eight) Hours As Needed for Nausea or Vomiting.   • tiotropium bromide-olodaterol (Stiolto Respimat) 2.5-2.5 MCG/ACT aerosol solution inhaler Inhale 1 puff Daily.   • vitamin D (ERGOCALCIFEROL) 1.25 MG (28982 UT) capsule capsule Take 1 capsule by mouth 1 (One) Time Per Week.   • zolpidem (AMBIEN) 10 MG tablet Take 10 mg by mouth.   • escitalopram (LEXAPRO) 20 MG tablet Take 1 tablet by mouth Daily.     No current facility-administered medications on file prior to visit.       Objective   Vitals:    10/18/22 1416   BP: 138/80   Pulse: 84   Resp: 16   Temp: 98.2 °F (36.8 °C)   TempSrc: Tympanic   SpO2: 98%   Weight: 69.9 kg (154 lb)   Height: 154.9 cm (61\")     Body mass index is 29.1 kg/m².    Physical Exam  Vitals reviewed.   Constitutional:       Appearance: Normal appearance. She is well-developed.   HENT:      Head: Normocephalic and atraumatic.      Nose: Nose normal.   Eyes:      General: Lids are normal.      Conjunctiva/sclera: Conjunctivae normal.      Pupils: Pupils are equal, round, and reactive to light.   Neck:      Thyroid: No thyromegaly.      Trachea: Trachea normal.   Cardiovascular:      Rate and Rhythm: Normal rate and regular rhythm.      Heart sounds: Normal heart sounds.   Pulmonary:      Effort: Pulmonary effort is normal. No respiratory distress.      Breath sounds: Normal breath sounds.   Skin:     General: Skin is warm and dry.   Neurological:      Mental Status: She is alert and oriented to person, place, and time.      GCS: GCS eye subscore is 4. GCS verbal subscore is 5. GCS motor subscore is 6.   Psychiatric:         Attention and Perception: Attention normal.         Mood and Affect: Mood normal.         Speech: Speech normal.         Behavior: Behavior normal. Behavior is cooperative.         Thought Content: " Thought content normal.         Assessment & Plan   Problem List Items Addressed This Visit        Endocrine and Metabolic    Acquired hypothyroidism - Primary    Relevant Orders    TSH Rfx On Abnormal To Free T4 (Completed)    Vitamin D deficiency    Relevant Orders    Vitamin D 25 Hydroxy (Completed)    Prediabetes    Relevant Orders    CBC Auto Differential (Completed)    Comprehensive Metabolic Panel (Completed)    Hemoglobin A1c (Completed)       Pulmonary and Pneumonias    Multiple lung nodules on CT    Overview     Right sided sub-centimeter lung nodules.          Relevant Orders    CT Chest Without Contrast   Other Visit Diagnoses     Liver cyst        Relevant Orders    US Liver    History of cervical cancer        Relevant Orders    CBC Auto Differential (Completed)             Current Outpatient Medications:   •  albuterol sulfate  (90 Base) MCG/ACT inhaler, Inhale 2 puffs Every 4 (Four) Hours As Needed for Wheezing., Disp: 18 g, Rfl: 5  •  buPROPion XL (WELLBUTRIN XL) 150 MG 24 hr tablet, Take 2 tablets by mouth Every Morning., Disp: 180 tablet, Rfl: 0  •  clonazePAM (KlonoPIN) 1 MG tablet, Take 1 mg by mouth 2 (Two) Times a Day As Needed for Anxiety., Disp: , Rfl:   •  estradiol (ESTRACE VAGINAL) 0.1 MG/GM vaginal cream, Insert 2 g into the vagina Daily., Disp: 42.5 g, Rfl: 12  •  levothyroxine (SYNTHROID, LEVOTHROID) 75 MCG tablet, Take 1 tablet by mouth Daily., Disp: 90 tablet, Rfl: 0  •  omeprazole (priLOSEC) 40 MG capsule, TAKE 1 CAPSULE BY MOUTH EVERY DAY, Disp: 90 capsule, Rfl: 1  •  ondansetron ODT (ZOFRAN-ODT) 4 MG disintegrating tablet, Place 1 tablet on the tongue Every 8 (Eight) Hours As Needed for Nausea or Vomiting., Disp: 30 tablet, Rfl: 2  •  tiotropium bromide-olodaterol (Stiolto Respimat) 2.5-2.5 MCG/ACT aerosol solution inhaler, Inhale 1 puff Daily., Disp: 1 inhaler, Rfl: 5  •  vitamin D (ERGOCALCIFEROL) 1.25 MG (87662 UT) capsule capsule, Take 1 capsule by mouth 1 (One) Time  Per Week., Disp: 12 capsule, Rfl: 0  •  zolpidem (AMBIEN) 10 MG tablet, Take 10 mg by mouth., Disp: , Rfl: 2  •  escitalopram (LEXAPRO) 20 MG tablet, Take 1 tablet by mouth Daily., Disp: , Rfl:       Acquired hypothyroidism.  - Chronic, unstable.  - Repeating TSH today.  - Continue Synthroid.  - We will adjust dose based on results.     Prediabetes.  - Chronic, stable.  - Repeating A1c.  - Follow diabetic diet.     Multiple lung nodules on CT.  - Chronic, unstable.  - Repeating CT scan of chest to get updated imaging.    Liver cyst.  - Chronic, unstable  - Repeating liver ultrasound to get updated imaging.     Vitamin D deficiency.  - Chronic, unstable  - Repeating vitamin D labs today.  - We will restart vitamin D supplement based on results.    History of cervical cancer.  - Completing labs today including CBC.  - Encouraged patient to contact Dr. Quiñones 's office and schedule follow-up ASAP.      Plan of care reviewed with the patient at the conclusion of today's visit.  Education was provided regarding diagnosis, management, and any prescribed or recommended OTC medications.  Patient verbalized understanding of and agreement with management plan.     Return in about 3 months (around 1/18/2023), or if symptoms worsen or fail to improve.      Transcribed from ambient dictation for DAYSI Cordero by Radha Goode  10/18/22   15:00 EDT    Patient or patient representative verbalized consent to the visit recording.  I have personally performed the services described in this document as transcribed by the above individual, and it is both accurate and complete.

## 2022-10-19 LAB
25(OH)D3 SERPL-MCNC: 41 NG/ML (ref 30–100)
ALBUMIN SERPL-MCNC: 4.6 G/DL (ref 3.5–5.2)
ALBUMIN/GLOB SERPL: 1.7 G/DL
ALP SERPL-CCNC: 141 U/L (ref 39–117)
ALT SERPL W P-5'-P-CCNC: 10 U/L (ref 1–33)
ANION GAP SERPL CALCULATED.3IONS-SCNC: 11.3 MMOL/L (ref 5–15)
AST SERPL-CCNC: 21 U/L (ref 1–32)
BASOPHILS # BLD AUTO: 0.05 10*3/MM3 (ref 0–0.2)
BASOPHILS NFR BLD AUTO: 0.8 % (ref 0–1.5)
BILIRUB SERPL-MCNC: <0.2 MG/DL (ref 0–1.2)
BUN SERPL-MCNC: 11 MG/DL (ref 8–23)
BUN/CREAT SERPL: 9.8 (ref 7–25)
CALCIUM SPEC-SCNC: 9.7 MG/DL (ref 8.6–10.5)
CHLORIDE SERPL-SCNC: 102 MMOL/L (ref 98–107)
CO2 SERPL-SCNC: 27.7 MMOL/L (ref 22–29)
CREAT SERPL-MCNC: 1.12 MG/DL (ref 0.57–1)
DEPRECATED RDW RBC AUTO: 41.6 FL (ref 37–54)
EGFRCR SERPLBLD CKD-EPI 2021: 55 ML/MIN/1.73
EOSINOPHIL # BLD AUTO: 0.28 10*3/MM3 (ref 0–0.4)
EOSINOPHIL NFR BLD AUTO: 4.4 % (ref 0.3–6.2)
ERYTHROCYTE [DISTWIDTH] IN BLOOD BY AUTOMATED COUNT: 13.3 % (ref 12.3–15.4)
GLOBULIN UR ELPH-MCNC: 2.7 GM/DL
GLUCOSE SERPL-MCNC: 87 MG/DL (ref 65–99)
HBA1C MFR BLD: 5.9 % (ref 4.8–5.6)
HCT VFR BLD AUTO: 38.4 % (ref 34–46.6)
HGB BLD-MCNC: 12.5 G/DL (ref 12–15.9)
IMM GRANULOCYTES # BLD AUTO: 0.02 10*3/MM3 (ref 0–0.05)
IMM GRANULOCYTES NFR BLD AUTO: 0.3 % (ref 0–0.5)
LYMPHOCYTES # BLD AUTO: 1.77 10*3/MM3 (ref 0.7–3.1)
LYMPHOCYTES NFR BLD AUTO: 27.6 % (ref 19.6–45.3)
MCH RBC QN AUTO: 28.2 PG (ref 26.6–33)
MCHC RBC AUTO-ENTMCNC: 32.6 G/DL (ref 31.5–35.7)
MCV RBC AUTO: 86.7 FL (ref 79–97)
MONOCYTES # BLD AUTO: 0.49 10*3/MM3 (ref 0.1–0.9)
MONOCYTES NFR BLD AUTO: 7.6 % (ref 5–12)
NEUTROPHILS NFR BLD AUTO: 3.81 10*3/MM3 (ref 1.7–7)
NEUTROPHILS NFR BLD AUTO: 59.3 % (ref 42.7–76)
NRBC BLD AUTO-RTO: 0 /100 WBC (ref 0–0.2)
PLATELET # BLD AUTO: 345 10*3/MM3 (ref 140–450)
PMV BLD AUTO: 9.6 FL (ref 6–12)
POTASSIUM SERPL-SCNC: 3.8 MMOL/L (ref 3.5–5.2)
PROT SERPL-MCNC: 7.3 G/DL (ref 6–8.5)
RBC # BLD AUTO: 4.43 10*6/MM3 (ref 3.77–5.28)
SODIUM SERPL-SCNC: 141 MMOL/L (ref 136–145)
TSH SERPL DL<=0.05 MIU/L-ACNC: 0.76 UIU/ML (ref 0.27–4.2)
WBC NRBC COR # BLD: 6.42 10*3/MM3 (ref 3.4–10.8)

## 2022-10-20 ENCOUNTER — HOSPITAL ENCOUNTER (OUTPATIENT)
Dept: MAMMOGRAPHY | Facility: HOSPITAL | Age: 62
Discharge: HOME | End: 2022-10-20
Admitting: NURSE PRACTITIONER

## 2022-10-20 DIAGNOSIS — Z12.31 BREAST CANCER SCREENING BY MAMMOGRAM: ICD-10-CM

## 2022-10-20 PROCEDURE — 77063 BREAST TOMOSYNTHESIS BI: CPT

## 2022-10-20 PROCEDURE — 77063 BREAST TOMOSYNTHESIS BI: CPT | Performed by: RADIOLOGY

## 2022-10-20 PROCEDURE — 77067 SCR MAMMO BI INCL CAD: CPT

## 2022-10-20 PROCEDURE — 77067 SCR MAMMO BI INCL CAD: CPT | Performed by: RADIOLOGY

## 2022-11-11 ENCOUNTER — HOSPITAL ENCOUNTER (OUTPATIENT)
Dept: ULTRASOUND IMAGING | Facility: HOSPITAL | Age: 62
Discharge: HOME | End: 2022-11-11

## 2022-11-11 ENCOUNTER — HOSPITAL ENCOUNTER (OUTPATIENT)
Dept: CT IMAGING | Facility: HOSPITAL | Age: 62
Discharge: HOME | End: 2022-11-11

## 2022-11-11 DIAGNOSIS — R91.8 MULTIPLE LUNG NODULES ON CT: ICD-10-CM

## 2022-11-11 DIAGNOSIS — K76.89 LIVER CYST: ICD-10-CM

## 2022-11-11 PROCEDURE — 71250 CT THORAX DX C-: CPT

## 2022-11-11 PROCEDURE — 76705 ECHO EXAM OF ABDOMEN: CPT

## 2022-12-04 DIAGNOSIS — R91.8 MULTIPLE LUNG NODULES ON CT: Primary | ICD-10-CM

## 2022-12-13 ENCOUNTER — OFFICE VISIT (OUTPATIENT)
Dept: PULMONOLOGY | Facility: CLINIC | Age: 62
End: 2022-12-13

## 2022-12-13 VITALS
WEIGHT: 153.8 LBS | OXYGEN SATURATION: 95 % | TEMPERATURE: 94.8 F | HEART RATE: 110 BPM | BODY MASS INDEX: 29.04 KG/M2 | SYSTOLIC BLOOD PRESSURE: 132 MMHG | HEIGHT: 61 IN | DIASTOLIC BLOOD PRESSURE: 72 MMHG

## 2022-12-13 DIAGNOSIS — C53.9 MALIGNANT NEOPLASM OF CERVIX, UNSPECIFIED SITE: ICD-10-CM

## 2022-12-13 DIAGNOSIS — R91.8 MULTIPLE LUNG NODULES ON CT: ICD-10-CM

## 2022-12-13 DIAGNOSIS — J41.1 MUCOPURULENT CHRONIC BRONCHITIS: Primary | ICD-10-CM

## 2022-12-13 PROCEDURE — 99215 OFFICE O/P EST HI 40 MIN: CPT | Performed by: INTERNAL MEDICINE

## 2022-12-13 NOTE — PROGRESS NOTES
New Patient Pulmonary Office Visit      Patient Name: Sydnie Gamble    Referring Physician: Jennifer Elena*    Chief Complaint:    Chief Complaint   Patient presents with   • COPD     F/u        History of Present Illness: Sydnie Gamble is a 62 y.o. female who is here today to establish care with Pulmonary.  Patient's past medical history significant for cervical cancer, hypothyroidism, GERD, and anxiety.  He was referred to pulmonary for multiple pulmonary nodules.  Patient has history of cervical cancer treated about a year ago.  Also has a longstanding history of tobacco use.  Was getting a CT scan of the chest for lung cancer screening purposes and cancer follow-up saw that there was multiple pulmonary nodules which has been present back in 2019 but they were slightly larger and then a new area of groundglass nodularity was noted in the right lower lobe.  Patient is she has quit smoking for about 3 years now but does use a e-cigarette.  She also utilizes marijuana.  She denies any chest pain, nausea, fever, chills, or abdominal pain.  No other acute complaints.    Review of Systems:   Review of Systems   Constitutional: Negative for activity change, appetite change, chills and diaphoresis.   HENT: Negative for congestion, postnasal drip, sinus pressure and voice change.    Eyes: Negative for blurred vision.   Respiratory: Negative for cough, shortness of breath and wheezing.    Cardiovascular: Negative for chest pain.   Gastrointestinal: Negative for abdominal pain.   Musculoskeletal: Negative for myalgias.   Skin: Negative for color change and dry skin.   Allergic/Immunologic: Negative for environmental allergies.   Neurological: Negative for weakness and confusion.   Hematological: Negative for adenopathy.   Psychiatric/Behavioral: Negative for sleep disturbance and depressed mood.       Past Medical History:   Past Medical History:   Diagnosis Date   • Acid reflux    • Acid reflux    • Anxiety  1976   • Arthritis    • Cervical cancer (HCC)    • Emphysema of lung (HCC)    • History of radiation therapy 11/20/2020    pelvis + intracavitary brachytherapy   • Hypothyroidism    • Lung nodule    • Ovarian cyst 1980s   • Visual impairment corrected with glasses   • Wears dentures    • Wears glasses        Past Surgical History:   Past Surgical History:   Procedure Laterality Date   • TOTAL ABDOMINAL HYSTERECTOMY PELVIC NODE DISSECTION N/A 08/18/2020    Procedure: TOTAL RADICAL HYSTERECTOMY PELVIC NODE DISSECTION;  Surgeon: Laura Jimenez MD;  Location: [facility];  Service: Gynecology Oncology;  Laterality: N/A;   • TUBAL ABDOMINAL LIGATION      right with ovarian dissection        Family History:   Family History   Problem Relation Age of Onset   • Cancer Mother    • Hypertension Mother    • Mental illness Mother    • Anxiety disorder Mother    • Asthma Mother    • COPD Mother    • Depression Mother    • Emphysema Mother    • Cancer Father    • Heart attack Maternal Grandfather    • Cancer Maternal Aunt    • Cancer Maternal Aunt    • Cancer Maternal Grandmother    • Breast cancer Neg Hx    • Ovarian cancer Neg Hx        Social History:   Social History     Socioeconomic History   • Marital status:    Tobacco Use   • Smoking status: Former     Packs/day: 1.50     Years: 45.00     Pack years: 67.50     Types: Electronic Cigarette, Cigarettes     Quit date: 3/30/2019     Years since quitting: 3.7   • Smokeless tobacco: Never   • Tobacco comments:     quit analog cigs in march, still vapes daily   Substance and Sexual Activity   • Alcohol use: No   • Drug use: No   • Sexual activity: Never       Medications:     Current Outpatient Medications:   •  albuterol sulfate  (90 Base) MCG/ACT inhaler, Inhale 2 puffs Every 4 (Four) Hours As Needed for Wheezing., Disp: 18 g, Rfl: 5  •  buPROPion XL (WELLBUTRIN XL) 150 MG 24 hr tablet, Take 2 tablets by mouth Every Morning. (Patient taking differently: Take 150  "mg by mouth Every Morning.), Disp: 180 tablet, Rfl: 0  •  clonazePAM (KlonoPIN) 1 MG tablet, Take 1 mg by mouth 2 (Two) Times a Day As Needed for Anxiety., Disp: , Rfl:   •  estradiol (ESTRACE VAGINAL) 0.1 MG/GM vaginal cream, Insert 2 g into the vagina Daily., Disp: 42.5 g, Rfl: 12  •  levothyroxine (SYNTHROID, LEVOTHROID) 75 MCG tablet, Take 1 tablet by mouth Daily., Disp: 90 tablet, Rfl: 0  •  omeprazole (priLOSEC) 40 MG capsule, TAKE 1 CAPSULE BY MOUTH EVERY DAY, Disp: 90 capsule, Rfl: 1  •  ondansetron ODT (ZOFRAN-ODT) 4 MG disintegrating tablet, Place 1 tablet on the tongue Every 8 (Eight) Hours As Needed for Nausea or Vomiting., Disp: 30 tablet, Rfl: 2  •  tiotropium bromide-olodaterol (Stiolto Respimat) 2.5-2.5 MCG/ACT aerosol solution inhaler, Inhale 1 puff Daily., Disp: 1 inhaler, Rfl: 5  •  zolpidem (AMBIEN) 10 MG tablet, Take 10 mg by mouth., Disp: , Rfl: 2  •  vitamin D (ERGOCALCIFEROL) 1.25 MG (34732 UT) capsule capsule, Take 1 capsule by mouth 1 (One) Time Per Week., Disp: 12 capsule, Rfl: 0    Allergies:   No Known Allergies    Physical Exam:  Vital Signs:   Vitals:    12/13/22 1346   BP: 132/72   BP Location: Right arm   Patient Position: Sitting   Cuff Size: Adult   Pulse: 110   Temp: 94.8 °F (34.9 °C)   TempSrc: Infrared   SpO2: 95%  Comment: RESTING ROOM AIR   Weight: 69.8 kg (153 lb 12.8 oz)   Height: 154.9 cm (60.98\")       Physical Exam  Vitals and nursing note reviewed.   Constitutional:       General: She is not in acute distress.     Appearance: She is well-developed and normal weight. She is not ill-appearing or toxic-appearing.   HENT:      Head: Normocephalic and atraumatic.   Cardiovascular:      Rate and Rhythm: Normal rate and regular rhythm.      Pulses: Normal pulses.      Heart sounds: Normal heart sounds. No murmur heard.    No friction rub. No gallop.   Pulmonary:      Effort: Pulmonary effort is normal. No respiratory distress.      Breath sounds: Normal breath sounds. No " wheezing, rhonchi or rales.   Musculoskeletal:      Right lower leg: No edema.      Left lower leg: No edema.   Skin:     General: Skin is warm and dry.   Neurological:      Mental Status: She is alert and oriented to person, place, and time.         Immunization History   Administered Date(s) Administered   • COVID-19 (MODERNA) 1st, 2nd, 3rd Dose Only 03/12/2021, 04/02/2021   • COVID-19 (PFIZER) BIVALENT BOOSTER 12+YRS 10/20/2022   • COVID-19 (PFIZER) PURPLE CAP 03/12/2021, 04/02/2021, 10/02/2021   • Covid-19 (Pfizer) Gray Cap 05/29/2022   • FluLaval/Fluzone >6mos 10/25/2018, 09/25/2020   • Influenza, Unspecified 10/02/2021, 10/20/2022   • Pneumococcal Polysaccharide (PPSV23) 04/09/2019   • flucelvax quad pfs =>4 YRS 10/09/2019       Results Review:   - I personally reviewed the pts imaging from the CT scan of the chest from 11/11/2022 and compared this to the CT scan of the chest from 2020.  She has multiple small groundglass nodules.  Most of which are either stable or slightly progressed compared to 2020.  There does appear to be a new nodule though in the right lower lobe that was not present back in 2020 currently measuring 11 mm in the more medial position of the right lower lobe.  - I personally reviewed the pts PFT from 3/12/2019 which showed moderate obstruction without restriction and a moderately reduced DLCO.  - I personally reviewed the pts chart with regards to evaluation by her PCP     Assessment / Plan:   Diagnoses and all orders for this visit:    1. Mucopurulent chronic bronchitis (HCC) (Primary)  -Patient has gold stage 3 class B COPD  -Continue Stiolto and albuterol for medication management  -latest PFTs as noted above  -Patient counseled on monitoring for COPD exacerbation and treatment plan  -Recommend 30 minutes, back exercise per day    2. Multiple lung nodules on CT  3. Malignant neoplasm of cervix, unspecified site (HCC)  -Nodules have been present since 2019 ultimately.  There are some  mild changes in size and then the right lower lobe more medial nodule is new compared to 2020 reaspirated the patient that there can be variations from CT scan to CT scan regarding minor variations in size specially groundglass nodule.  But the fact that the right lower lobe medial lesion is entirely new could be concerning for some form of a primary malignancy alveolar carcinoma would be the most common form of cancer that would appear this at least of a lung cancer standpoint.  This imaging would be very odd for metastatic spread.  I informed the patient I would just recommend that we do a follow-up CT scan in 6 months to see if there is any change.  She verbalized understanding of this.    Level of service justified based on 45 minutes spent in patient care on this date of service including, but not limited to: preparing to see the patient, obtaining and/or reviewing history, performing medically appropriate examination, ordering tests/medicine/procedures, independently interpreting results, documenting clinical information in EHR, and counseling/education of patient/family/caregiver (Excluding time spent on other separate services such as performing procedures or test interpretation, if applicable). (Level 4 30-39 minutes; Level 5 40-54 minutes)    Follow Up:   Return in about 6 months (around 6/13/2023) for CT Chest with next visit.     KAMLESH Mckeon, DO  Pulmonary and Critical Care Medicine  Note Electronically Signed    Part of this note may be an electronic transcription/translation of spoken language to printed text using the Dragon Dictation System.

## 2023-01-10 DIAGNOSIS — E03.9 ACQUIRED HYPOTHYROIDISM: ICD-10-CM

## 2023-01-10 RX ORDER — LEVOTHYROXINE SODIUM 0.07 MG/1
TABLET ORAL
Qty: 90 TABLET | Refills: 0 | Status: SHIPPED | OUTPATIENT
Start: 2023-01-10

## 2023-01-19 ENCOUNTER — LAB (OUTPATIENT)
Dept: LAB | Facility: HOSPITAL | Age: 63
End: 2023-01-19
Payer: COMMERCIAL

## 2023-01-19 ENCOUNTER — OFFICE VISIT (OUTPATIENT)
Dept: INTERNAL MEDICINE | Facility: CLINIC | Age: 63
End: 2023-01-19
Payer: COMMERCIAL

## 2023-01-19 VITALS
RESPIRATION RATE: 16 BRPM | SYSTOLIC BLOOD PRESSURE: 128 MMHG | WEIGHT: 153 LBS | OXYGEN SATURATION: 98 % | BODY MASS INDEX: 28.89 KG/M2 | HEIGHT: 61 IN | DIASTOLIC BLOOD PRESSURE: 76 MMHG | TEMPERATURE: 97.8 F | HEART RATE: 79 BPM

## 2023-01-19 DIAGNOSIS — R73.03 PREDIABETES: ICD-10-CM

## 2023-01-19 DIAGNOSIS — R11.0 NAUSEA: ICD-10-CM

## 2023-01-19 DIAGNOSIS — F41.9 ANXIETY AND DEPRESSION: Primary | ICD-10-CM

## 2023-01-19 DIAGNOSIS — E55.9 VITAMIN D DEFICIENCY: ICD-10-CM

## 2023-01-19 DIAGNOSIS — F32.A ANXIETY AND DEPRESSION: Primary | ICD-10-CM

## 2023-01-19 DIAGNOSIS — E03.9 ACQUIRED HYPOTHYROIDISM: ICD-10-CM

## 2023-01-19 DIAGNOSIS — Z79.899 MEDICATION MANAGEMENT: ICD-10-CM

## 2023-01-19 LAB — HBA1C MFR BLD: 6.2 % (ref 4.8–5.6)

## 2023-01-19 PROCEDURE — 99214 OFFICE O/P EST MOD 30 MIN: CPT | Performed by: NURSE PRACTITIONER

## 2023-01-19 PROCEDURE — 82306 VITAMIN D 25 HYDROXY: CPT | Performed by: NURSE PRACTITIONER

## 2023-01-19 PROCEDURE — 84443 ASSAY THYROID STIM HORMONE: CPT | Performed by: NURSE PRACTITIONER

## 2023-01-19 PROCEDURE — 83036 HEMOGLOBIN GLYCOSYLATED A1C: CPT | Performed by: NURSE PRACTITIONER

## 2023-01-19 PROCEDURE — 80053 COMPREHEN METABOLIC PANEL: CPT | Performed by: NURSE PRACTITIONER

## 2023-01-19 RX ORDER — ONDANSETRON 4 MG/1
4 TABLET, ORALLY DISINTEGRATING ORAL EVERY 8 HOURS PRN
Qty: 30 TABLET | Refills: 2 | Status: SHIPPED | OUTPATIENT
Start: 2023-01-19

## 2023-01-20 LAB
25(OH)D3 SERPL-MCNC: 30.4 NG/ML (ref 30–100)
ALBUMIN SERPL-MCNC: 4.6 G/DL (ref 3.5–5.2)
ALBUMIN/GLOB SERPL: 1.5 G/DL
ALP SERPL-CCNC: 160 U/L (ref 39–117)
ALT SERPL W P-5'-P-CCNC: 14 U/L (ref 1–33)
ANION GAP SERPL CALCULATED.3IONS-SCNC: 10 MMOL/L (ref 5–15)
AST SERPL-CCNC: 21 U/L (ref 1–32)
BILIRUB SERPL-MCNC: 0.2 MG/DL (ref 0–1.2)
BUN SERPL-MCNC: 19 MG/DL (ref 8–23)
BUN/CREAT SERPL: 17.9 (ref 7–25)
CALCIUM SPEC-SCNC: 9.9 MG/DL (ref 8.6–10.5)
CHLORIDE SERPL-SCNC: 102 MMOL/L (ref 98–107)
CO2 SERPL-SCNC: 27 MMOL/L (ref 22–29)
CREAT SERPL-MCNC: 1.06 MG/DL (ref 0.57–1)
EGFRCR SERPLBLD CKD-EPI 2021: 58.8 ML/MIN/1.73
GLOBULIN UR ELPH-MCNC: 3.1 GM/DL
GLUCOSE SERPL-MCNC: 107 MG/DL (ref 65–99)
POTASSIUM SERPL-SCNC: 4.3 MMOL/L (ref 3.5–5.2)
PROT SERPL-MCNC: 7.7 G/DL (ref 6–8.5)
SODIUM SERPL-SCNC: 139 MMOL/L (ref 136–145)
TSH SERPL DL<=0.05 MIU/L-ACNC: 0.43 UIU/ML (ref 0.27–4.2)

## 2023-01-31 LAB — DRUGS UR: NORMAL

## 2023-04-08 DIAGNOSIS — E03.9 ACQUIRED HYPOTHYROIDISM: ICD-10-CM

## 2023-04-10 RX ORDER — LEVOTHYROXINE SODIUM 0.07 MG/1
TABLET ORAL
Qty: 90 TABLET | Refills: 0 | Status: SHIPPED | OUTPATIENT
Start: 2023-04-10

## 2023-05-05 ENCOUNTER — OFFICE VISIT (OUTPATIENT)
Dept: INTERNAL MEDICINE | Facility: CLINIC | Age: 63
End: 2023-05-05
Payer: COMMERCIAL

## 2023-05-05 ENCOUNTER — LAB (OUTPATIENT)
Dept: LAB | Facility: HOSPITAL | Age: 63
End: 2023-05-05
Payer: COMMERCIAL

## 2023-05-05 VITALS
TEMPERATURE: 98.4 F | BODY MASS INDEX: 27.56 KG/M2 | WEIGHT: 146 LBS | DIASTOLIC BLOOD PRESSURE: 78 MMHG | OXYGEN SATURATION: 94 % | RESPIRATION RATE: 16 BRPM | SYSTOLIC BLOOD PRESSURE: 130 MMHG | HEART RATE: 87 BPM | HEIGHT: 61 IN

## 2023-05-05 DIAGNOSIS — N28.9 FUNCTION KIDNEY DECREASED: ICD-10-CM

## 2023-05-05 DIAGNOSIS — F41.9 ANXIETY AND DEPRESSION: ICD-10-CM

## 2023-05-05 DIAGNOSIS — F32.A ANXIETY AND DEPRESSION: ICD-10-CM

## 2023-05-05 DIAGNOSIS — E03.9 ACQUIRED HYPOTHYROIDISM: Primary | ICD-10-CM

## 2023-05-05 DIAGNOSIS — R73.03 BORDERLINE DIABETES: ICD-10-CM

## 2023-05-05 LAB
ALBUMIN SERPL-MCNC: 4.4 G/DL (ref 3.5–5.2)
ALBUMIN/GLOB SERPL: 1.5 G/DL
ALP SERPL-CCNC: 133 U/L (ref 39–117)
ALT SERPL W P-5'-P-CCNC: 10 U/L (ref 1–33)
ANION GAP SERPL CALCULATED.3IONS-SCNC: 9.9 MMOL/L (ref 5–15)
AST SERPL-CCNC: 18 U/L (ref 1–32)
BILIRUB SERPL-MCNC: <0.2 MG/DL (ref 0–1.2)
BUN SERPL-MCNC: 19 MG/DL (ref 8–23)
BUN/CREAT SERPL: 18.8 (ref 7–25)
CALCIUM SPEC-SCNC: 9.8 MG/DL (ref 8.6–10.5)
CHLORIDE SERPL-SCNC: 105 MMOL/L (ref 98–107)
CO2 SERPL-SCNC: 25.1 MMOL/L (ref 22–29)
CREAT SERPL-MCNC: 1.01 MG/DL (ref 0.57–1)
EGFRCR SERPLBLD CKD-EPI 2021: 61.9 ML/MIN/1.73
GLOBULIN UR ELPH-MCNC: 3 GM/DL
GLUCOSE SERPL-MCNC: 93 MG/DL (ref 65–99)
HBA1C MFR BLD: 5.9 % (ref 4.8–5.6)
POTASSIUM SERPL-SCNC: 4.1 MMOL/L (ref 3.5–5.2)
PROT SERPL-MCNC: 7.4 G/DL (ref 6–8.5)
SODIUM SERPL-SCNC: 140 MMOL/L (ref 136–145)
TSH SERPL DL<=0.05 MIU/L-ACNC: 0.21 UIU/ML (ref 0.27–4.2)

## 2023-05-05 PROCEDURE — 84439 ASSAY OF FREE THYROXINE: CPT | Performed by: NURSE PRACTITIONER

## 2023-05-05 PROCEDURE — 99214 OFFICE O/P EST MOD 30 MIN: CPT | Performed by: NURSE PRACTITIONER

## 2023-05-05 PROCEDURE — 84443 ASSAY THYROID STIM HORMONE: CPT | Performed by: NURSE PRACTITIONER

## 2023-05-05 PROCEDURE — 83036 HEMOGLOBIN GLYCOSYLATED A1C: CPT | Performed by: NURSE PRACTITIONER

## 2023-05-05 PROCEDURE — 80053 COMPREHEN METABOLIC PANEL: CPT | Performed by: NURSE PRACTITIONER

## 2023-05-05 NOTE — PROGRESS NOTES
Subjective   Chief Complaint   Patient presents with   • Diabetes   • Hypothyroidism      Sydnie Gamble is a 63 y.o. female.  Following up on diabetes and hypothyroidism.     HPI  Dr. 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    Hypothyroidism  She is unsure how she is supposed to feel regarding her thyroid and she has not noticed any difference. She denies any heart palpitations. She has always had shakiness. She has gained a lot of weight. She has no energy. She is getting back to her community now. She does not have bad anxiety. She had a hysterectomy in the past. She is not sleeping well.    Diabetes  Last a1c was 6.2. she's trying to follow diabetic diet.     I have reviewed the following portions of the patient's history and confirmed they are accurate: allergies, current medications, past family history, past medical history, past social history, past surgical history, and problem list    I have personally completed the patient's review of systems.    Review of Systems   Constitutional: Positive for fatigue. Negative for activity change, appetite change, chills, diaphoresis, fever, unexpected weight gain and unexpected weight loss.   HENT: Negative for ear discharge, ear pain, mouth sores, nosebleeds, sinus pressure, sneezing and sore throat.    Eyes: Negative for pain, discharge and itching.   Respiratory: Negative for cough, chest tightness, shortness of breath and wheezing.    Cardiovascular: Negative for chest pain, palpitations and leg swelling.   Gastrointestinal: Negative for abdominal pain, constipation, diarrhea, nausea and vomiting.   Endocrine: Negative for heat intolerance, polydipsia and polyphagia.   Genitourinary: Negative for dysuria, flank pain, frequency, hematuria and urgency.   Musculoskeletal: Negative for arthralgias, back pain, gait problem, joint swelling, myalgias, neck pain and neck stiffness.   Skin: Negative for color change, pallor and rash.   Allergic/Immunologic: Negative for  "immunocompromised state.   Neurological: Negative for seizures, speech difficulty, weakness and numbness.   Hematological: Negative for adenopathy.   Psychiatric/Behavioral: Positive for sleep disturbance. Negative for agitation, decreased concentration, suicidal ideas and depressed mood.       Current Outpatient Medications on File Prior to Visit   Medication Sig   • albuterol sulfate  (90 Base) MCG/ACT inhaler Inhale 2 puffs Every 4 (Four) Hours As Needed for Wheezing.   • clonazePAM (KlonoPIN) 1 MG tablet Take 1 tablet by mouth 2 (Two) Times a Day As Needed for Anxiety.   • estradiol (ESTRACE VAGINAL) 0.1 MG/GM vaginal cream Insert 2 g into the vagina Daily.   • levothyroxine (SYNTHROID, LEVOTHROID) 75 MCG tablet TAKE 1 TABLET BY MOUTH EVERY DAY   • omeprazole (priLOSEC) 40 MG capsule TAKE 1 CAPSULE BY MOUTH EVERY DAY   • ondansetron ODT (ZOFRAN-ODT) 4 MG disintegrating tablet Place 1 tablet on the tongue Every 8 (Eight) Hours As Needed for Nausea or Vomiting.   • tiotropium bromide-olodaterol (Stiolto Respimat) 2.5-2.5 MCG/ACT aerosol solution inhaler Inhale 1 puff Daily.   • vitamin D (ERGOCALCIFEROL) 1.25 MG (97554 UT) capsule capsule Take 1 capsule by mouth 1 (One) Time Per Week.   • zolpidem (AMBIEN) 10 MG tablet Take 1 tablet by mouth.     No current facility-administered medications on file prior to visit.       Objective   Vitals:    05/05/23 1520   BP: 130/78   Pulse: 87   Resp: 16   Temp: 98.4 °F (36.9 °C)   TempSrc: Tympanic   SpO2: 94%   Weight: 66.2 kg (146 lb)   Height: 154.9 cm (60.98\")     Body mass index is 27.6 kg/m².    Physical Exam  Vitals reviewed.   Constitutional:       Appearance: Normal appearance. She is well-developed.   HENT:      Head: Normocephalic and atraumatic.      Nose: Nose normal.   Eyes:      General: Lids are normal.      Conjunctiva/sclera: Conjunctivae normal.      Pupils: Pupils are equal, round, and reactive to light.   Neck:      Thyroid: No thyromegaly.      " Trachea: Trachea normal.   Pulmonary:      Effort: Pulmonary effort is normal. No respiratory distress.   Skin:     General: Skin is warm and dry.   Neurological:      Mental Status: She is alert and oriented to person, place, and time.      GCS: GCS eye subscore is 4. GCS verbal subscore is 5. GCS motor subscore is 6.   Psychiatric:         Attention and Perception: Attention normal.         Mood and Affect: Mood normal.         Speech: Speech normal.         Behavior: Behavior normal. Behavior is cooperative.         Assessment & Plan   Problem List Items Addressed This Visit        Endocrine and Metabolic    Acquired hypothyroidism - Primary    Relevant Orders    Comprehensive Metabolic Panel (Completed)    TSH Rfx On Abnormal To Free T4 (Completed)    T4, Free (Completed)   Other Visit Diagnoses     Borderline diabetes        Relevant Orders    Comprehensive Metabolic Panel (Completed)    Hemoglobin A1c (Completed)    Anxiety and depression        Function kidney decreased                 Current Outpatient Medications:   •  albuterol sulfate  (90 Base) MCG/ACT inhaler, Inhale 2 puffs Every 4 (Four) Hours As Needed for Wheezing., Disp: 18 g, Rfl: 5  •  clonazePAM (KlonoPIN) 1 MG tablet, Take 1 tablet by mouth 2 (Two) Times a Day As Needed for Anxiety., Disp: , Rfl:   •  estradiol (ESTRACE VAGINAL) 0.1 MG/GM vaginal cream, Insert 2 g into the vagina Daily., Disp: 42.5 g, Rfl: 12  •  levothyroxine (SYNTHROID, LEVOTHROID) 75 MCG tablet, TAKE 1 TABLET BY MOUTH EVERY DAY, Disp: 90 tablet, Rfl: 0  •  omeprazole (priLOSEC) 40 MG capsule, TAKE 1 CAPSULE BY MOUTH EVERY DAY, Disp: 90 capsule, Rfl: 1  •  ondansetron ODT (ZOFRAN-ODT) 4 MG disintegrating tablet, Place 1 tablet on the tongue Every 8 (Eight) Hours As Needed for Nausea or Vomiting., Disp: 30 tablet, Rfl: 2  •  tiotropium bromide-olodaterol (Stiolto Respimat) 2.5-2.5 MCG/ACT aerosol solution inhaler, Inhale 1 puff Daily., Disp: 1 inhaler, Rfl: 5  •   vitamin D (ERGOCALCIFEROL) 1.25 MG (10161 UT) capsule capsule, Take 1 capsule by mouth 1 (One) Time Per Week., Disp: 12 capsule, Rfl: 0  •  zolpidem (AMBIEN) 10 MG tablet, Take 1 tablet by mouth., Disp: , Rfl: 2      1. Acquired hypothyroidism.  - Chronic, unstable due to slight overcorrection.  - Completing thyroid-stimulating hormone. Based on results, we will adjust patient's Synthroid dose.  - Have her come back and repeat labs in 8 weeks.  - Her vitamin D was stable and her TSH was 0.4 mIu/L about 1 year ago. Her dose was reduced and that has improved some, but she is still a bit overcorrected.     2. Anxiety with depression.  - Chronic, stable.  - Continue Klonopin and Ambien prescribed by psychiatry.    3. Borderline diabetes.  - Chronic, unstable.  - Repeating A1c today.  - Patient will follow low carb, low sugar diet.  - The patient's last labs showed an increased hemoglobin A1c of 6.2 percent    4. Kidney function decreased.  - Chronic, unstable.  - Patient will drink at a quite amount of water.  - Refrain from NSAIDs.  - Rechecking CMP today.  - Her kidney function is a bit low and has been there for a while with a GFR of 58 and a slightly increased creatinine of 1.06 mg/dL.                  Plan of care reviewed with the patient at the conclusion of today's visit.  Education was provided regarding diagnosis, management, and any prescribed or recommended OTC medications.  Patient verbalized understanding of and agreement with management plan.     Return in about 3 months (around 8/5/2023), or if symptoms worsen or fail to improve, for Follow-up.      Transcribed from ambient dictation for DAYSI Cordero by Magnolia Rios.  05/05/23   16:55 EDT    Patient or patient representative verbalized consent to the visit recording.  I have personally performed the services described in this document as transcribed by the above individual, and it is both accurate and complete.

## 2023-05-06 LAB — T4 FREE SERPL-MCNC: 1.8 NG/DL (ref 0.93–1.7)

## 2023-06-09 ENCOUNTER — HOSPITAL ENCOUNTER (OUTPATIENT)
Dept: CT IMAGING | Facility: HOSPITAL | Age: 63
Discharge: HOME | End: 2023-06-09
Payer: COMMERCIAL

## 2023-06-09 DIAGNOSIS — R91.8 MULTIPLE LUNG NODULES ON CT: ICD-10-CM

## 2023-06-09 PROCEDURE — 71250 CT THORAX DX C-: CPT

## 2023-07-10 NOTE — ANESTHESIA PROCEDURE NOTES
Airway  Urgency: elective    Date/Time: 8/18/2020 7:32 AM  Airway not difficult    General Information and Staff    Patient location during procedure: OR  CRNA: Rosa Acuna CRNA    Indications and Patient Condition  Indications for airway management: airway protection    Preoxygenated: yes  MILS not maintained throughout  Mask difficulty assessment: 1 - vent by mask    Final Airway Details  Final airway type: endotracheal airway      Successful airway: ETT  Cuffed: yes   Successful intubation technique: direct laryngoscopy  Facilitating devices/methods: intubating stylet  Endotracheal tube insertion site: oral  Blade: Lindsey  Blade size: 3  ETT size (mm): 7.0  Cormack-Lehane Classification: grade I - full view of glottis  Placement verified by: chest auscultation and capnometry   Measured from: lips  ETT/EBT  to lips (cm): 20  Number of attempts at approach: 1  Assessment: lips, teeth, and gum same as pre-op and atraumatic intubation    Additional Comments  Symmetric chest rise and fall. +ETCO2 +BBS.              Area L Indication Text: Tumors in this location are included in Area L (trunk and extremities).  Mohs surgery is indicated for larger tumors, or tumors with aggressive histologic features, in these anatomic locations.

## 2023-08-04 ENCOUNTER — LAB (OUTPATIENT)
Dept: INTERNAL MEDICINE | Facility: CLINIC | Age: 63
End: 2023-08-04
Payer: COMMERCIAL

## 2023-08-04 ENCOUNTER — OFFICE VISIT (OUTPATIENT)
Dept: INTERNAL MEDICINE | Facility: CLINIC | Age: 63
End: 2023-08-04
Payer: COMMERCIAL

## 2023-08-04 VITALS
OXYGEN SATURATION: 96 % | HEART RATE: 109 BPM | TEMPERATURE: 97.9 F | BODY MASS INDEX: 28.32 KG/M2 | SYSTOLIC BLOOD PRESSURE: 134 MMHG | HEIGHT: 61 IN | WEIGHT: 150 LBS | RESPIRATION RATE: 16 BRPM | DIASTOLIC BLOOD PRESSURE: 80 MMHG

## 2023-08-04 DIAGNOSIS — Z13.29 THYROID DISORDER SCREENING: ICD-10-CM

## 2023-08-04 DIAGNOSIS — Z13.220 LIPID SCREENING: ICD-10-CM

## 2023-08-04 DIAGNOSIS — R73.03 BORDERLINE DIABETES: ICD-10-CM

## 2023-08-04 DIAGNOSIS — Z13.21 ENCOUNTER FOR VITAMIN DEFICIENCY SCREENING: ICD-10-CM

## 2023-08-04 DIAGNOSIS — E55.9 VITAMIN D DEFICIENCY: ICD-10-CM

## 2023-08-04 DIAGNOSIS — Z13.0 SCREENING FOR BLOOD DISEASE: ICD-10-CM

## 2023-08-04 DIAGNOSIS — E03.9 ACQUIRED HYPOTHYROIDISM: Primary | ICD-10-CM

## 2023-08-04 DIAGNOSIS — E03.9 ACQUIRED HYPOTHYROIDISM: ICD-10-CM

## 2023-08-04 DIAGNOSIS — F51.01 PRIMARY INSOMNIA: ICD-10-CM

## 2023-08-04 DIAGNOSIS — F33.1 MAJOR DEPRESSIVE DISORDER, RECURRENT EPISODE, MODERATE DEGREE: Primary | ICD-10-CM

## 2023-08-04 LAB
ALBUMIN SERPL-MCNC: 4.1 G/DL (ref 3.5–5.2)
ALBUMIN/GLOB SERPL: 1.5 G/DL
ALP SERPL-CCNC: 131 U/L (ref 39–117)
ALT SERPL W P-5'-P-CCNC: 12 U/L (ref 1–33)
ANION GAP SERPL CALCULATED.3IONS-SCNC: 13.1 MMOL/L (ref 5–15)
AST SERPL-CCNC: 20 U/L (ref 1–32)
BILIRUB SERPL-MCNC: 0.2 MG/DL (ref 0–1.2)
BUN SERPL-MCNC: 15 MG/DL (ref 8–23)
BUN/CREAT SERPL: 13.9 (ref 7–25)
CALCIUM SPEC-SCNC: 9.4 MG/DL (ref 8.6–10.5)
CHLORIDE SERPL-SCNC: 103 MMOL/L (ref 98–107)
CHOLEST SERPL-MCNC: 200 MG/DL (ref 0–200)
CO2 SERPL-SCNC: 21.9 MMOL/L (ref 22–29)
CREAT SERPL-MCNC: 1.08 MG/DL (ref 0.57–1)
EGFRCR SERPLBLD CKD-EPI 2021: 57.1 ML/MIN/1.73
GLOBULIN UR ELPH-MCNC: 2.8 GM/DL
GLUCOSE SERPL-MCNC: 105 MG/DL (ref 65–99)
HDLC SERPL-MCNC: 64 MG/DL (ref 40–60)
LDLC SERPL CALC-MCNC: 120 MG/DL (ref 0–100)
LDLC/HDLC SERPL: 1.84 {RATIO}
POTASSIUM SERPL-SCNC: 3.8 MMOL/L (ref 3.5–5.2)
PROT SERPL-MCNC: 6.9 G/DL (ref 6–8.5)
SODIUM SERPL-SCNC: 138 MMOL/L (ref 136–145)
T3FREE SERPL-MCNC: 2.63 PG/ML (ref 2–4.4)
T4 FREE SERPL-MCNC: 1.57 NG/DL (ref 0.93–1.7)
TRIGL SERPL-MCNC: 92 MG/DL (ref 0–150)
TSH SERPL DL<=0.05 MIU/L-ACNC: 0.47 UIU/ML (ref 0.27–4.2)
VLDLC SERPL-MCNC: 16 MG/DL (ref 5–40)

## 2023-08-04 PROCEDURE — 82607 VITAMIN B-12: CPT | Performed by: NURSE PRACTITIONER

## 2023-08-04 PROCEDURE — 86376 MICROSOMAL ANTIBODY EACH: CPT | Performed by: NURSE PRACTITIONER

## 2023-08-04 PROCEDURE — 80050 GENERAL HEALTH PANEL: CPT | Performed by: NURSE PRACTITIONER

## 2023-08-04 PROCEDURE — 84481 FREE ASSAY (FT-3): CPT | Performed by: NURSE PRACTITIONER

## 2023-08-04 PROCEDURE — 84439 ASSAY OF FREE THYROXINE: CPT | Performed by: NURSE PRACTITIONER

## 2023-08-04 PROCEDURE — 80061 LIPID PANEL: CPT | Performed by: NURSE PRACTITIONER

## 2023-08-04 PROCEDURE — 82306 VITAMIN D 25 HYDROXY: CPT | Performed by: NURSE PRACTITIONER

## 2023-08-04 PROCEDURE — 86800 THYROGLOBULIN ANTIBODY: CPT | Performed by: NURSE PRACTITIONER

## 2023-08-04 PROCEDURE — 82746 ASSAY OF FOLIC ACID SERUM: CPT | Performed by: NURSE PRACTITIONER

## 2023-08-04 PROCEDURE — 83036 HEMOGLOBIN GLYCOSYLATED A1C: CPT | Performed by: NURSE PRACTITIONER

## 2023-08-04 PROCEDURE — 36415 COLL VENOUS BLD VENIPUNCTURE: CPT | Performed by: NURSE PRACTITIONER

## 2023-08-04 PROCEDURE — 99214 OFFICE O/P EST MOD 30 MIN: CPT | Performed by: NURSE PRACTITIONER

## 2023-08-04 NOTE — PROGRESS NOTES
Subjective   Chief Complaint   Patient presents with    Hypothyroidism      Sydnie Gamble is a 63 y.o. female.     The patient is here today for a follow-up.    The patient admits to feeling anxious, irritable, and depressed. Her thyroid medication was adjusted 3 to 4 times in 05/2023. She has never had her thyroid antibodies checked. She has never been told she has Hashimoto's. She loses her voice easily. She has had a CT scan of her chest.    The patient has severe insomnia. After dinner, she will take a 2-hour nap and then she is up until 6:00 AM when her  leaves for work. She will take a 4-hour nap or sleep for 4 hours. She is not getting 8 hours of sleep. When she wakes up, she is freezing cold and shaking. She takes Ambien, but she is afraid not to take it. Melatonin makes her drowsy. Klonopin makes her drowsy. She got her last prescription for clonazepam filled on 07/25/2023, but she has not started it because she is only taking 1.5 tablets.     She has not left her house since she got her CT scan. She has tried everything for depression and anxiety. She has taken Paxil all of her life. She has tried Wellbutrin, Prozac, and Paxil. Her daughter has ADHD. She has not tried Vraylar. She has tried Zoloft. She does not get manic. She used to have panic attacks frequently, but she does not have panic attacks at all anymore. Lexapro can give her really bad nausea.    I have reviewed the following portions of the patient's history and confirmed they are accurate: allergies, current medications, past family history, past medical history, past social history, past surgical history, and problem list    Review of Systems  Pertinent items are noted in HPI.     Current Outpatient Medications on File Prior to Visit   Medication Sig    albuterol sulfate  (90 Base) MCG/ACT inhaler Inhale 2 puffs Every 4 (Four) Hours As Needed for Wheezing.    clonazePAM (KlonoPIN) 1 MG tablet Take 1 tablet by mouth 2 (Two)  "Times a Day As Needed for Anxiety.    levothyroxine (SYNTHROID, LEVOTHROID) 75 MCG tablet TAKE 1 TABLET BY MOUTH EVERY DAY    omeprazole (priLOSEC) 40 MG capsule TAKE 1 CAPSULE BY MOUTH EVERY DAY    ondansetron ODT (ZOFRAN-ODT) 4 MG disintegrating tablet Place 1 tablet on the tongue Every 8 (Eight) Hours As Needed for Nausea or Vomiting.    tiotropium bromide-olodaterol (Stiolto Respimat) 2.5-2.5 MCG/ACT aerosol solution inhaler Inhale 1 puff Daily.    zolpidem (AMBIEN) 10 MG tablet Take 1 tablet by mouth.    [DISCONTINUED] estradiol (ESTRACE VAGINAL) 0.1 MG/GM vaginal cream Insert 2 g into the vagina Daily.    [DISCONTINUED] vitamin D (ERGOCALCIFEROL) 1.25 MG (10107 UT) capsule capsule Take 1 capsule by mouth 1 (One) Time Per Week.     No current facility-administered medications on file prior to visit.       Objective   Vitals:    08/04/23 1418   BP: 134/80   Pulse: 109   Resp: 16   Temp: 97.9 øF (36.6 øC)   TempSrc: Tympanic   SpO2: 96%   Weight: 68 kg (150 lb)   Height: 154.9 cm (60.98\")     Body mass index is 28.36 kg/mý.    Physical Exam  Vitals reviewed.   Constitutional:       Appearance: Normal appearance. She is well-developed.   HENT:      Head: Normocephalic and atraumatic.      Nose: Nose normal.   Eyes:      General: Lids are normal.      Conjunctiva/sclera: Conjunctivae normal.      Pupils: Pupils are equal, round, and reactive to light.   Neck:      Thyroid: No thyromegaly.      Trachea: Trachea normal.   Pulmonary:      Effort: Pulmonary effort is normal. No respiratory distress.   Skin:     General: Skin is warm and dry.   Neurological:      Mental Status: She is alert and oriented to person, place, and time.      GCS: GCS eye subscore is 4. GCS verbal subscore is 5. GCS motor subscore is 6.   Psychiatric:         Attention and Perception: Attention normal.         Mood and Affect: Mood normal.         Speech: Speech normal.         Behavior: Behavior normal. Behavior is cooperative. "       Assessment & Plan   Problem List Items Addressed This Visit          Endocrine and Metabolic    Acquired hypothyroidism    Relevant Orders    T4, Free    T3, Free    TSH    Thyroid Antibodies    Vitamin D deficiency    Relevant Orders    Vitamin D,25-Hydroxy     Other Visit Diagnoses       Major depressive disorder, recurrent episode, moderate degree    -  Primary    Relevant Medications    Cariprazine HCl (VRAYLAR) 1.5 MG capsule capsule    Primary insomnia        Borderline diabetes        Relevant Orders    Hemoglobin A1c    Comprehensive Metabolic Panel    Screening for blood disease        Relevant Orders    CBC (No Diff)    Lipid Panel    Vitamin B12 & Folate    Vitamin D,25-Hydroxy    Thyroid disorder screening        Lipid screening        Relevant Orders    Lipid Panel    Encounter for vitamin deficiency screening        Relevant Orders    Vitamin B12 & Folate    Vitamin D,25-Hydroxy               Current Outpatient Medications:     albuterol sulfate  (90 Base) MCG/ACT inhaler, Inhale 2 puffs Every 4 (Four) Hours As Needed for Wheezing., Disp: 18 g, Rfl: 5    clonazePAM (KlonoPIN) 1 MG tablet, Take 1 tablet by mouth 2 (Two) Times a Day As Needed for Anxiety., Disp: , Rfl:     levothyroxine (SYNTHROID, LEVOTHROID) 75 MCG tablet, TAKE 1 TABLET BY MOUTH EVERY DAY, Disp: 90 tablet, Rfl: 0    omeprazole (priLOSEC) 40 MG capsule, TAKE 1 CAPSULE BY MOUTH EVERY DAY, Disp: 90 capsule, Rfl: 1    ondansetron ODT (ZOFRAN-ODT) 4 MG disintegrating tablet, Place 1 tablet on the tongue Every 8 (Eight) Hours As Needed for Nausea or Vomiting., Disp: 30 tablet, Rfl: 2    tiotropium bromide-olodaterol (Stiolto Respimat) 2.5-2.5 MCG/ACT aerosol solution inhaler, Inhale 1 puff Daily., Disp: 1 inhaler, Rfl: 5    zolpidem (AMBIEN) 10 MG tablet, Take 1 tablet by mouth., Disp: , Rfl: 2    Cariprazine HCl (VRAYLAR) 1.5 MG capsule capsule, Take 1 capsule by mouth Daily., Disp: 30 capsule, Rfl: 2         1. Major  depressive disorder.  - Chronic, unstable.  - Start Vraylar.  - Will refer to [facility] for consult.  - Continue Klonopin and for now we will develop a weaning plan later if needed.  - I discussed with patient that she can start weaning or cutting her Ambien in half.    2. Insomnia.  - Chronic, unstable.  - Continue with melatonin supplement.  - I discussed with patient since Ambien has not been effective in cutting her dose in half and see if she tolerates this okay.  - We will discuss this in 2 weeks.    3. Hypothyroidism.  - Chronic, unstable.  - Checking TSH and thyroid antibodies.  - Completing thyroid panel today.  - We will adjust Synthroid based on results.    4. Borderline diabetes.  - Chronic, unstable.  - Rechecking hemoglobin A1c.        Plan of care reviewed with the patient at the conclusion of today's visit.  Education was provided regarding diagnosis, management, and any prescribed or recommended OTC medications.  Patient verbalized understanding of and agreement with management plan.     Return in about 2 weeks (around 8/18/2023), or if symptoms worsen or fail to improve, for Follow-up.        Transcribed from ambient dictation for DAYSI Cordero by Magnolia Rios.  08/04/23   15:55 EDT    Patient or patient representative verbalized consent to the visit recording.  I have personally performed the services described in this document as transcribed by the above individual, and it is both accurate and complete.

## 2023-08-05 LAB
25(OH)D3 SERPL-MCNC: 22.4 NG/ML (ref 30–100)
DEPRECATED RDW RBC AUTO: 39.8 FL (ref 37–54)
ERYTHROCYTE [DISTWIDTH] IN BLOOD BY AUTOMATED COUNT: 13 % (ref 12.3–15.4)
FOLATE SERPL-MCNC: 11.9 NG/ML (ref 4.78–24.2)
HBA1C MFR BLD: 6.1 % (ref 4.8–5.6)
HCT VFR BLD AUTO: 39.9 % (ref 34–46.6)
HGB BLD-MCNC: 13.4 G/DL (ref 12–15.9)
MCH RBC QN AUTO: 28.5 PG (ref 26.6–33)
MCHC RBC AUTO-ENTMCNC: 33.6 G/DL (ref 31.5–35.7)
MCV RBC AUTO: 84.7 FL (ref 79–97)
PLATELET # BLD AUTO: 345 10*3/MM3 (ref 140–450)
PMV BLD AUTO: 9.7 FL (ref 6–12)
RBC # BLD AUTO: 4.71 10*6/MM3 (ref 3.77–5.28)
VIT B12 BLD-MCNC: 470 PG/ML (ref 211–946)
WBC NRBC COR # BLD: 7.96 10*3/MM3 (ref 3.4–10.8)

## 2023-08-07 LAB
THYROGLOB AB SERPL-ACNC: 5 IU/ML (ref 0–0.9)
THYROPEROXIDASE AB SERPL-ACNC: 223 IU/ML (ref 0–34)

## 2023-08-21 ENCOUNTER — TELEMEDICINE (OUTPATIENT)
Dept: INTERNAL MEDICINE | Facility: CLINIC | Age: 63
End: 2023-08-21
Payer: COMMERCIAL

## 2023-08-21 DIAGNOSIS — R76.8 THYROID ANTIBODY POSITIVE: ICD-10-CM

## 2023-08-21 DIAGNOSIS — R49.9 VOICE DISTURBANCE: ICD-10-CM

## 2023-08-21 DIAGNOSIS — E06.3 HYPOTHYROIDISM DUE TO HASHIMOTO'S THYROIDITIS: Primary | ICD-10-CM

## 2023-08-21 DIAGNOSIS — F41.9 ANXIETY: ICD-10-CM

## 2023-08-21 DIAGNOSIS — E03.8 HYPOTHYROIDISM DUE TO HASHIMOTO'S THYROIDITIS: Primary | ICD-10-CM

## 2023-08-21 DIAGNOSIS — F51.01 PRIMARY INSOMNIA: ICD-10-CM

## 2023-08-21 PROCEDURE — 1159F MED LIST DOCD IN RCRD: CPT | Performed by: NURSE PRACTITIONER

## 2023-08-21 PROCEDURE — 1160F RVW MEDS BY RX/DR IN RCRD: CPT | Performed by: NURSE PRACTITIONER

## 2023-08-21 PROCEDURE — 99214 OFFICE O/P EST MOD 30 MIN: CPT | Performed by: NURSE PRACTITIONER

## 2023-08-21 RX ORDER — LEVOTHYROXINE SODIUM 0.05 MG/1
50 TABLET ORAL DAILY
Qty: 30 TABLET | Refills: 1 | Status: SHIPPED | OUTPATIENT
Start: 2023-08-21

## 2023-08-21 RX ORDER — TRAZODONE HYDROCHLORIDE 50 MG/1
TABLET ORAL
Qty: 45 TABLET | Refills: 2 | Status: SHIPPED | OUTPATIENT
Start: 2023-08-21

## 2023-08-21 NOTE — PROGRESS NOTES
Subjective   No chief complaint on file.     Sydnie Gamble is a 63 y.o. female.     The patient is here today for ***.    Taking less of the klonopin, and decreased 5mg ambien    I have reviewed the following portions of the patient's history and confirmed they are accurate: allergies, current medications, past family history, past medical history, past social history, past surgical history, and problem list    Review of Systems  Pertinent items are noted in HPI.     Current Outpatient Medications on File Prior to Visit   Medication Sig    albuterol sulfate  (90 Base) MCG/ACT inhaler Inhale 2 puffs Every 4 (Four) Hours As Needed for Wheezing.    clonazePAM (KlonoPIN) 1 MG tablet Take 1 tablet by mouth 2 (Two) Times a Day As Needed for Anxiety.    omeprazole (priLOSEC) 40 MG capsule TAKE 1 CAPSULE BY MOUTH EVERY DAY    ondansetron ODT (ZOFRAN-ODT) 4 MG disintegrating tablet Place 1 tablet on the tongue Every 8 (Eight) Hours As Needed for Nausea or Vomiting.    sertraline (Zoloft) 50 MG tablet Take 1/2 tablet by mouth daily for one week then increase to once tablet by mouth daily.    tiotropium bromide-olodaterol (Stiolto Respimat) 2.5-2.5 MCG/ACT aerosol solution inhaler Inhale 1 puff Daily.    zolpidem (AMBIEN) 10 MG tablet Take 1 tablet by mouth.    [DISCONTINUED] Cariprazine HCl (VRAYLAR) 1.5 MG capsule capsule Take 1 capsule by mouth Daily.    [DISCONTINUED] levothyroxine (SYNTHROID, LEVOTHROID) 75 MCG tablet TAKE 1 TABLET BY MOUTH EVERY DAY     No current facility-administered medications on file prior to visit.       Objective   There were no vitals filed for this visit.  There is no height or weight on file to calculate BMI.    Physical Exam    Assessment & Plan   Problem List Items Addressed This Visit    None  Visit Diagnoses       Hypothyroidism due to Hashimoto's thyroiditis    -  Primary    Relevant Medications    levothyroxine (Synthroid) 50 MCG tablet    Other Relevant Orders    US Thyroid     "Primary insomnia        Anxiety        Thyroid antibody positive        Relevant Orders    US Thyroid    Voice disturbance        Relevant Orders    US Thyroid               Current Outpatient Medications:     albuterol sulfate  (90 Base) MCG/ACT inhaler, Inhale 2 puffs Every 4 (Four) Hours As Needed for Wheezing., Disp: 18 g, Rfl: 5    clonazePAM (KlonoPIN) 1 MG tablet, Take 1 tablet by mouth 2 (Two) Times a Day As Needed for Anxiety., Disp: , Rfl:     levothyroxine (Synthroid) 50 MCG tablet, Take 1 tablet by mouth Daily., Disp: 30 tablet, Rfl: 1    omeprazole (priLOSEC) 40 MG capsule, TAKE 1 CAPSULE BY MOUTH EVERY DAY, Disp: 90 capsule, Rfl: 1    ondansetron ODT (ZOFRAN-ODT) 4 MG disintegrating tablet, Place 1 tablet on the tongue Every 8 (Eight) Hours As Needed for Nausea or Vomiting., Disp: 30 tablet, Rfl: 2    sertraline (Zoloft) 50 MG tablet, Take 1/2 tablet by mouth daily for one week then increase to once tablet by mouth daily., Disp: 30 tablet, Rfl: 0    tiotropium bromide-olodaterol (Stiolto Respimat) 2.5-2.5 MCG/ACT aerosol solution inhaler, Inhale 1 puff Daily., Disp: 1 inhaler, Rfl: 5    traZODone (DESYREL) 50 MG tablet, Take 1-2 tablets one hour before bedtime as needed for sleep., Disp: 45 tablet, Rfl: 2    zolpidem (AMBIEN) 10 MG tablet, Take 1 tablet by mouth., Disp: , Rfl: 2       Plan of care reviewed with the patient at the conclusion of today's visit.  Education was provided regarding diagnosis, management, and any prescribed or recommended OTC medications.  Patient verbalized understanding of and agreement with management plan.     Return in about 2 months (around 10/21/2023), or if symptoms worsen or fail to improve.      Transcribed from ambient dictation for DAYSI Cordero by DAYSI Cordero.  08/21/23   15:20 EDT    {BENJI Provider Statement:50014::\"Patient or patient representative verbalized consent to the visit recording.\",\"I have personally performed " "the services described in this document as transcribed by the above individual, and it is both accurate and complete.\"}    "

## 2023-09-05 NOTE — PROGRESS NOTES
Subjective   Chief Complaint   Patient presents with    Anxiety    Insomnia    Hypothyroidism      This is video visit.  You have chosen to receive care through a video visit today. Do you consent to use a video visit for your medical care today? Yes    Sydnie Gamble is a 63 y.o. female here today for anxiety, insomnia, and hypothyroidism. Patient's hoarse voice has become worse and throat pressure. She has hypothyroidism due to hashimoto's with elevated thyroid antibodies. She is agreeable to thyroid ultrasound. She is not sleeping well. Has difficulty falling asleep and wakes up frequently. Anxiety is worse recently. Has tried and failed paxil, wellbutrin, and prozac. Her last TSH was low and this could be contributing to her insomnia and anxiety.     I have reviewed the following portions of the patient's history and confirmed they are accurate: allergies, current medications, past family history, past medical history, past social history, past surgical history, and problem list    Review of Systems  Pertinent items are noted in HPI.       Current Outpatient Medications on File Prior to Visit   Medication Sig    albuterol sulfate  (90 Base) MCG/ACT inhaler Inhale 2 puffs Every 4 (Four) Hours As Needed for Wheezing.    clonazePAM (KlonoPIN) 1 MG tablet Take 1 tablet by mouth 2 (Two) Times a Day As Needed for Anxiety.    omeprazole (priLOSEC) 40 MG capsule TAKE 1 CAPSULE BY MOUTH EVERY DAY    ondansetron ODT (ZOFRAN-ODT) 4 MG disintegrating tablet Place 1 tablet on the tongue Every 8 (Eight) Hours As Needed for Nausea or Vomiting.    sertraline (Zoloft) 50 MG tablet Take 1/2 tablet by mouth daily for one week then increase to once tablet by mouth daily.    tiotropium bromide-olodaterol (Stiolto Respimat) 2.5-2.5 MCG/ACT aerosol solution inhaler Inhale 1 puff Daily.    zolpidem (AMBIEN) 10 MG tablet Take 1 tablet by mouth.     No current facility-administered medications on file prior to visit.        Objective   There were no vitals filed for this visit.  There is no height or weight on file to calculate BMI.    Physical Exam  Constitutional:       Appearance: Normal appearance. She is well-developed.   HENT:      Head: Normocephalic and atraumatic.      Nose: Nose normal.   Eyes:      General: Lids are normal.      Conjunctiva/sclera: Conjunctivae normal.   Neck:      Trachea: Trachea normal.   Pulmonary:      Effort: No respiratory distress.   Neurological:      Mental Status: She is alert and oriented to person, place, and time.      GCS: GCS eye subscore is 4. GCS verbal subscore is 5. GCS motor subscore is 6.   Psychiatric:         Attention and Perception: Attention normal.         Mood and Affect: Mood normal.         Speech: Speech normal.         Behavior: Behavior normal. Behavior is cooperative.         Thought Content: Thought content normal.       Assessment & Plan   Problem List Items Addressed This Visit    None  Visit Diagnoses       Hypothyroidism due to Hashimoto's thyroiditis    -  Primary  Chronic unstable.   Decrease synthroid to 50mcg daily. Take on empty stomach. Repeat labs in 8 weeks. Thyroid US ordered.       Relevant Medications    levothyroxine (Synthroid) 50 MCG tablet    Other Relevant Orders    US Thyroid    Primary insomnia      New unstable.  Start trazodone.      Anxiety      Chronic unstable  Start zoloft.       Thyroid antibody positive        Relevant Orders    US Thyroid    Voice disturbance        Relevant Orders    US Thyroid               Current Outpatient Medications:     albuterol sulfate  (90 Base) MCG/ACT inhaler, Inhale 2 puffs Every 4 (Four) Hours As Needed for Wheezing., Disp: 18 g, Rfl: 5    clonazePAM (KlonoPIN) 1 MG tablet, Take 1 tablet by mouth 2 (Two) Times a Day As Needed for Anxiety., Disp: , Rfl:     levothyroxine (Synthroid) 50 MCG tablet, Take 1 tablet by mouth Daily., Disp: 30 tablet, Rfl: 1    omeprazole (priLOSEC) 40 MG capsule, TAKE 1  CAPSULE BY MOUTH EVERY DAY, Disp: 90 capsule, Rfl: 1    ondansetron ODT (ZOFRAN-ODT) 4 MG disintegrating tablet, Place 1 tablet on the tongue Every 8 (Eight) Hours As Needed for Nausea or Vomiting., Disp: 30 tablet, Rfl: 2    sertraline (Zoloft) 50 MG tablet, Take 1/2 tablet by mouth daily for one week then increase to once tablet by mouth daily., Disp: 30 tablet, Rfl: 0    tiotropium bromide-olodaterol (Stiolto Respimat) 2.5-2.5 MCG/ACT aerosol solution inhaler, Inhale 1 puff Daily., Disp: 1 inhaler, Rfl: 5    traZODone (DESYREL) 50 MG tablet, Take 1-2 tablets one hour before bedtime as needed for sleep., Disp: 45 tablet, Rfl: 2    zolpidem (AMBIEN) 10 MG tablet, Take 1 tablet by mouth., Disp: , Rfl: 2       Plan of care reviewed with the patient at the conclusion of today's visit.  Education was provided regarding diagnosis, management, and any prescribed or recommended OTC medications.  Patient verbalized understanding of and agreement with management plan.     Return in about 2 months (around 10/21/2023), or if symptoms worsen or fail to improve.     [place] in [place], [place] in [place]. I spent 25 minutes in medical discussion with patient during this visit.     Dee Parnell, DAYSI

## 2023-09-11 ENCOUNTER — TELEPHONE (OUTPATIENT)
Dept: INTERNAL MEDICINE | Facility: CLINIC | Age: 63
End: 2023-09-11
Payer: COMMERCIAL

## 2023-09-11 NOTE — TELEPHONE ENCOUNTER
THE PATIENT IS ASKING FOR A RETURN CALL TODAY, DUE TO HER SHAKING.  SHE IS VERY CONCERNED. SHE DOES NOT KNOW IF THIS IS DUE TO THE DECREASE IN HER MEDICATION OR THYROID.  THE PATIENT ASK THAT SOMEONE  CALL HER BACK TODAY.  [phone]

## 2023-09-11 NOTE — TELEPHONE ENCOUNTER
Spoke with patient, she states that the shaking has been going on for a couple of weeks on and off But today the shaking is worse.

## 2023-09-11 NOTE — TELEPHONE ENCOUNTER
Patient was started on zoloft and trazodone during last appt. Please ask if shaking started or became worse after starting one or both of these medications. I do not think the adjustment in her thyroid medication could cause this.

## 2023-09-12 NOTE — TELEPHONE ENCOUNTER
Pt stated that she does not believe it is the Zoloft or trazodone. She started zoloft at the beginning of August and the shaking started 3 weeks ago. She did start trazodone 3 weeks ago but she says that she hasn't taken it in 2 days and the shaking has not gotten any better.

## 2023-09-13 DIAGNOSIS — E03.8 HYPOTHYROIDISM DUE TO HASHIMOTO'S THYROIDITIS: ICD-10-CM

## 2023-09-13 DIAGNOSIS — E06.3 HYPOTHYROIDISM DUE TO HASHIMOTO'S THYROIDITIS: ICD-10-CM

## 2023-09-13 RX ORDER — LEVOTHYROXINE SODIUM 0.05 MG/1
TABLET ORAL
Qty: 30 TABLET | Refills: 1 | Status: SHIPPED | OUTPATIENT
Start: 2023-09-13

## 2023-09-13 NOTE — TELEPHONE ENCOUNTER
Contact patient and ask she schedule an appt with me. I need to speak with her to figure out what is happening. Ok to put in one of my same day spots. Ok to do telehealth if she cannot come into the office but let her know I may need to do blood work to figure out what is wrong.

## 2023-09-15 ENCOUNTER — OFFICE VISIT (OUTPATIENT)
Dept: INTERNAL MEDICINE | Facility: CLINIC | Age: 63
End: 2023-09-15
Payer: COMMERCIAL

## 2023-09-15 ENCOUNTER — LAB (OUTPATIENT)
Dept: INTERNAL MEDICINE | Facility: CLINIC | Age: 63
End: 2023-09-15
Payer: COMMERCIAL

## 2023-09-15 VITALS
SYSTOLIC BLOOD PRESSURE: 124 MMHG | WEIGHT: 151 LBS | HEART RATE: 92 BPM | TEMPERATURE: 97.4 F | HEIGHT: 61 IN | OXYGEN SATURATION: 94 % | BODY MASS INDEX: 28.51 KG/M2 | DIASTOLIC BLOOD PRESSURE: 76 MMHG

## 2023-09-15 DIAGNOSIS — R25.1 TREMORS OF NERVOUS SYSTEM: Primary | ICD-10-CM

## 2023-09-15 DIAGNOSIS — F51.01 PRIMARY INSOMNIA: ICD-10-CM

## 2023-09-15 DIAGNOSIS — F41.8 DEPRESSION WITH ANXIETY: ICD-10-CM

## 2023-09-15 DIAGNOSIS — E03.9 ACQUIRED HYPOTHYROIDISM: Primary | ICD-10-CM

## 2023-09-15 DIAGNOSIS — E03.9 ACQUIRED HYPOTHYROIDISM: ICD-10-CM

## 2023-09-15 LAB
ALBUMIN SERPL-MCNC: 4.4 G/DL (ref 3.5–5.2)
ALBUMIN/GLOB SERPL: 1.5 G/DL
ALP SERPL-CCNC: 137 U/L (ref 39–117)
ALT SERPL W P-5'-P-CCNC: 12 U/L (ref 1–33)
ANION GAP SERPL CALCULATED.3IONS-SCNC: 13 MMOL/L (ref 5–15)
AST SERPL-CCNC: 18 U/L (ref 1–32)
BASOPHILS # BLD AUTO: 0.06 10*3/MM3 (ref 0–0.2)
BASOPHILS NFR BLD AUTO: 0.9 % (ref 0–1.5)
BILIRUB SERPL-MCNC: 0.2 MG/DL (ref 0–1.2)
BUN SERPL-MCNC: 16 MG/DL (ref 8–23)
BUN/CREAT SERPL: 14.2 (ref 7–25)
CALCIUM SPEC-SCNC: 9.4 MG/DL (ref 8.6–10.5)
CHLORIDE SERPL-SCNC: 103 MMOL/L (ref 98–107)
CO2 SERPL-SCNC: 23 MMOL/L (ref 22–29)
CREAT SERPL-MCNC: 1.13 MG/DL (ref 0.57–1)
DEPRECATED RDW RBC AUTO: 41.1 FL (ref 37–54)
EGFRCR SERPLBLD CKD-EPI 2021: 54.1 ML/MIN/1.73
EOSINOPHIL # BLD AUTO: 0.21 10*3/MM3 (ref 0–0.4)
EOSINOPHIL NFR BLD AUTO: 3.2 % (ref 0.3–6.2)
ERYTHROCYTE [DISTWIDTH] IN BLOOD BY AUTOMATED COUNT: 13.1 % (ref 12.3–15.4)
GLOBULIN UR ELPH-MCNC: 3 GM/DL
GLUCOSE SERPL-MCNC: 102 MG/DL (ref 65–99)
HCT VFR BLD AUTO: 40.6 % (ref 34–46.6)
HGB BLD-MCNC: 13.6 G/DL (ref 12–15.9)
IMM GRANULOCYTES # BLD AUTO: 0.02 10*3/MM3 (ref 0–0.05)
IMM GRANULOCYTES NFR BLD AUTO: 0.3 % (ref 0–0.5)
LYMPHOCYTES # BLD AUTO: 1.72 10*3/MM3 (ref 0.7–3.1)
LYMPHOCYTES NFR BLD AUTO: 26 % (ref 19.6–45.3)
MCH RBC QN AUTO: 29.2 PG (ref 26.6–33)
MCHC RBC AUTO-ENTMCNC: 33.5 G/DL (ref 31.5–35.7)
MCV RBC AUTO: 87.3 FL (ref 79–97)
MONOCYTES # BLD AUTO: 0.61 10*3/MM3 (ref 0.1–0.9)
MONOCYTES NFR BLD AUTO: 9.2 % (ref 5–12)
NEUTROPHILS NFR BLD AUTO: 4 10*3/MM3 (ref 1.7–7)
NEUTROPHILS NFR BLD AUTO: 60.4 % (ref 42.7–76)
NRBC BLD AUTO-RTO: 0 /100 WBC (ref 0–0.2)
PLATELET # BLD AUTO: 306 10*3/MM3 (ref 140–450)
PMV BLD AUTO: 10 FL (ref 6–12)
POTASSIUM SERPL-SCNC: 4.3 MMOL/L (ref 3.5–5.2)
PROT SERPL-MCNC: 7.4 G/DL (ref 6–8.5)
RBC # BLD AUTO: 4.65 10*6/MM3 (ref 3.77–5.28)
SODIUM SERPL-SCNC: 139 MMOL/L (ref 136–145)
T4 FREE SERPL-MCNC: 0.98 NG/DL (ref 0.93–1.7)
TSH SERPL DL<=0.05 MIU/L-ACNC: 5.02 UIU/ML (ref 0.27–4.2)
WBC NRBC COR # BLD: 6.62 10*3/MM3 (ref 3.4–10.8)

## 2023-09-15 PROCEDURE — 84439 ASSAY OF FREE THYROXINE: CPT | Performed by: NURSE PRACTITIONER

## 2023-09-15 PROCEDURE — 84481 FREE ASSAY (FT-3): CPT | Performed by: NURSE PRACTITIONER

## 2023-09-15 PROCEDURE — 86800 THYROGLOBULIN ANTIBODY: CPT | Performed by: NURSE PRACTITIONER

## 2023-09-15 PROCEDURE — 99214 OFFICE O/P EST MOD 30 MIN: CPT | Performed by: NURSE PRACTITIONER

## 2023-09-15 PROCEDURE — 36415 COLL VENOUS BLD VENIPUNCTURE: CPT | Performed by: NURSE PRACTITIONER

## 2023-09-15 PROCEDURE — 80050 GENERAL HEALTH PANEL: CPT | Performed by: NURSE PRACTITIONER

## 2023-09-15 PROCEDURE — 86376 MICROSOMAL ANTIBODY EACH: CPT | Performed by: NURSE PRACTITIONER

## 2023-09-15 RX ORDER — SERTRALINE HYDROCHLORIDE 25 MG/1
25 TABLET, FILM COATED ORAL DAILY
Qty: 30 TABLET | Refills: 1 | Status: SHIPPED | OUTPATIENT
Start: 2023-09-15

## 2023-09-15 RX ORDER — ZOLPIDEM TARTRATE 5 MG/1
5 TABLET ORAL NIGHTLY PRN
Qty: 30 TABLET | Refills: 2
Start: 2023-09-15

## 2023-09-15 RX ORDER — CLONAZEPAM 1 MG/1
1.5 TABLET ORAL DAILY
Qty: 45 TABLET | Refills: 0
Start: 2023-09-15

## 2023-09-15 NOTE — PROGRESS NOTES
Subjective   Chief Complaint   Patient presents with    Excessive Sweating    Hypothyroidism      Sydnie Gamble is a 63 y.o. female presents today for follow-up. The patient is having tremors. She was recently started on Zoloft and trazodone.    The patient has been experiencing tremors in her hands for the past 2 weeks. She thought it was a panic attack because she was taking clonazepam. She did not take the trazodone for a couple of days because she was up all night. She has been up all day today. If she would have a panic attack, she would get a detached feeling and her heart pounding. She has never experienced anything like it and it was jarring movements and her chest was fluttering.     She did not take the trazodone for a couple of days because she did not want to be drowsy during the day. She would try to go to sleep, but she would be feeling not too good all day. When she took 1 trazodone, she was not shaking. It has been a couple of days since she had an Ambien.     She has been taking a half of a Ambien. She went from 2 to 1.5 clonazepam. She went for about a week to 1 clonazepam and she started getting upset stomach and shaking. She started taking 1.5 again. She has been taking them for 1.5 to 10 days. The shaking started really bad. She cut them in half about a month ago. She still has 2 refills. She has not seen her in 4 to 5 months. She cut her Klonopin down to 1 about 4 to 6 weeks ago. She started feeling sick to her stomach. She started taking 1.5 again and she had 6 to 7 days and then she started getting the shakes. She has not cut out any other medications.    The patient's thyroid was a bit overcorrected on last set of labs. Her Synthroid was decreased from 75 mcg to 50 mcg and that was done about a month ago.    Provider's notes:  Ambien 8/4, then stopped 9/13  About 6 weeks ago cut klonopin down to 1 for 3 weeks, then went back to 1.5mg for past 3 weeks. Shaking got better than got worse.  "  Started zoloft 8/11 - going to decrease 25mg      I have reviewed the following portions of the patient's history and confirmed they are accurate: allergies, current medications, past family history, past medical history, past social history, past surgical history, and problem list    Review of Systems  Pertinent items are noted in HPI.     Current Outpatient Medications on File Prior to Visit   Medication Sig    albuterol sulfate  (90 Base) MCG/ACT inhaler Inhale 2 puffs Every 4 (Four) Hours As Needed for Wheezing.    levothyroxine (SYNTHROID, LEVOTHROID) 50 MCG tablet TAKE 1 TABLET BY MOUTH EVERY DAY    omeprazole (priLOSEC) 40 MG capsule TAKE 1 CAPSULE BY MOUTH EVERY DAY    ondansetron ODT (ZOFRAN-ODT) 4 MG disintegrating tablet Place 1 tablet on the tongue Every 8 (Eight) Hours As Needed for Nausea or Vomiting.    tiotropium bromide-olodaterol (Stiolto Respimat) 2.5-2.5 MCG/ACT aerosol solution inhaler Inhale 1 puff Daily.    traZODone (DESYREL) 50 MG tablet Take 1-2 tablets one hour before bedtime as needed for sleep.    [DISCONTINUED] clonazePAM (KlonoPIN) 1 MG tablet Take 1 tablet by mouth 2 (Two) Times a Day As Needed for Anxiety.    [DISCONTINUED] sertraline (Zoloft) 50 MG tablet Take 1 tablet by mouth Daily.    [DISCONTINUED] zolpidem (AMBIEN) 10 MG tablet Take 1 tablet by mouth. (Patient not taking: Reported on 9/15/2023)     No current facility-administered medications on file prior to visit.       Objective   Vitals:    09/15/23 1443   BP: 124/76   Pulse: 92   Temp: 97.4 °F (36.3 °C)   TempSrc: Tympanic   SpO2: 94%   Weight: 68.5 kg (151 lb)   Height: 154.9 cm (60.98\")     Body mass index is 28.55 kg/m².    Physical Exam  Vitals reviewed.   Constitutional:       Appearance: Normal appearance. She is well-developed.   HENT:      Head: Normocephalic and atraumatic.      Nose: Nose normal.   Eyes:      General: Lids are normal.      Conjunctiva/sclera: Conjunctivae normal.      Pupils: Pupils are " equal, round, and reactive to light.   Neck:      Thyroid: No thyromegaly.      Trachea: Trachea normal.   Pulmonary:      Effort: Pulmonary effort is normal. No respiratory distress.   Skin:     General: Skin is warm and dry.   Neurological:      Mental Status: She is alert and oriented to person, place, and time.      GCS: GCS eye subscore is 4. GCS verbal subscore is 5. GCS motor subscore is 6.      Motor: Tremor present.      Comments: Tremors of hands bilaterally.    Psychiatric:         Attention and Perception: Attention normal.         Mood and Affect: Mood normal. Mood is anxious.         Speech: Speech normal.         Behavior: Behavior normal. Behavior is cooperative.       Assessment & Plan   Problem List Items Addressed This Visit          Endocrine and Metabolic    Acquired hypothyroidism    Relevant Orders    T4, Free    T3, Free    TSH    Thyroid Antibodies     Other Visit Diagnoses       Tremors of nervous system    -  Primary    Relevant Orders    CBC Auto Differential    Comprehensive Metabolic Panel    T4, Free    T3, Free    TSH    Thyroid Antibodies    Depression with anxiety        Relevant Medications    sertraline (Zoloft) 25 MG tablet    zolpidem (AMBIEN) 5 MG tablet    clonazePAM (KlonoPIN) 1 MG tablet    Primary insomnia        Relevant Medications    zolpidem (AMBIEN) 5 MG tablet               Current Outpatient Medications:     albuterol sulfate  (90 Base) MCG/ACT inhaler, Inhale 2 puffs Every 4 (Four) Hours As Needed for Wheezing., Disp: 18 g, Rfl: 5    clonazePAM (KlonoPIN) 1 MG tablet, Take 1.5 tablets by mouth Daily., Disp: 45 tablet, Rfl: 0    levothyroxine (SYNTHROID, LEVOTHROID) 50 MCG tablet, TAKE 1 TABLET BY MOUTH EVERY DAY, Disp: 30 tablet, Rfl: 1    omeprazole (priLOSEC) 40 MG capsule, TAKE 1 CAPSULE BY MOUTH EVERY DAY, Disp: 90 capsule, Rfl: 1    ondansetron ODT (ZOFRAN-ODT) 4 MG disintegrating tablet, Place 1 tablet on the tongue Every 8 (Eight) Hours As Needed for  Nausea or Vomiting., Disp: 30 tablet, Rfl: 2    tiotropium bromide-olodaterol (Stiolto Respimat) 2.5-2.5 MCG/ACT aerosol solution inhaler, Inhale 1 puff Daily., Disp: 1 inhaler, Rfl: 5    traZODone (DESYREL) 50 MG tablet, Take 1-2 tablets one hour before bedtime as needed for sleep., Disp: 45 tablet, Rfl: 2    sertraline (Zoloft) 25 MG tablet, Take 1 tablet by mouth Daily., Disp: 30 tablet, Rfl: 1    zolpidem (AMBIEN) 5 MG tablet, Take 1 tablet by mouth At Night As Needed for Sleep., Disp: 30 tablet, Rfl: 2       1. Tremors of nervous system.  - New, unstable.  - Most likely associated with the decrease in the benzodiazepines and possibly from the Zoloft.  - Patient will continue with clonazepam 1.5 mg once daily.  - She will restart Ambien 5 mg nightly.  - She can continue trazodone as needed.  - She will decrease Zoloft to 25 mg daily.  - Follow up in 2 weeks for recheck.  - She is getting labs completed for TSH.    2. Hypothyroidism.  - Chronic, unstable.  - Continue Synthroid.  - Rechecking TSH.    3. Depression with anxiety.  - Chronic, unstable.  - Continue clonazepam 1.5 mg daily.  - Restart Ambien 5 mg.  - Decrease Zoloft to 25 mg daily.  - Continue trazodone as needed.    4. Primary insomnia.  - Chronic, unstable.  - Restart Ambien 5 mg.  - Continue trazodone as needed.      Plan of care reviewed with the patient at the conclusion of today's visit.  Education was provided regarding diagnosis, management, and any prescribed or recommended OTC medications.  Patient verbalized understanding of and agreement with management plan.     Return in about 2 weeks (around 9/29/2023), or if symptoms worsen or fail to improve, for Follow-up, telehealth ok.      Transcribed from ambient dictation for DAYSI Cordero by Magnolia Rios.  09/15/23   16:42 EDT    Patient or patient representative verbalized consent to the visit recording.  I have personally performed the services described in this document as  transcribed by the above individual, and it is both accurate and complete.

## 2023-09-16 LAB — T3FREE SERPL-MCNC: 1.89 PG/ML (ref 2–4.4)

## 2023-09-18 LAB
THYROGLOB AB SERPL-ACNC: 3.4 IU/ML (ref 0–0.9)
THYROPEROXIDASE AB SERPL-ACNC: 181 IU/ML (ref 0–34)

## 2023-09-20 RX ORDER — LEVOTHYROXINE SODIUM 0.07 MG/1
75 TABLET ORAL DAILY
Qty: 90 TABLET | Refills: 1 | Status: SHIPPED | OUTPATIENT
Start: 2023-09-20

## 2023-09-29 ENCOUNTER — TELEMEDICINE (OUTPATIENT)
Dept: INTERNAL MEDICINE | Facility: CLINIC | Age: 63
End: 2023-09-29
Payer: COMMERCIAL

## 2023-09-29 DIAGNOSIS — F41.8 DEPRESSION WITH ANXIETY: ICD-10-CM

## 2023-09-29 DIAGNOSIS — R25.1 TREMORS OF NERVOUS SYSTEM: Primary | ICD-10-CM

## 2023-09-29 DIAGNOSIS — F51.01 PRIMARY INSOMNIA: ICD-10-CM

## 2023-09-29 DIAGNOSIS — E03.9 ACQUIRED HYPOTHYROIDISM: ICD-10-CM

## 2023-09-29 PROCEDURE — 99214 OFFICE O/P EST MOD 30 MIN: CPT | Performed by: NURSE PRACTITIONER

## 2023-09-29 NOTE — PROGRESS NOTES
Subjective   Chief Complaint   Patient presents with    Anxiety    Depression    Tremors      This is video visit.  You have chosen to receive care through a video visit today. Do you consent to use a video visit for your medical care today? Yes    Sydnie Gamble is a 63 y.o. female here today for anxiety, depression, hypothyroidism, and tremors. During last appt patient was having tremors but had multiple medication changes. She had discontinued her ambien and decreased klonopin. She was started on zoloft and her synthroid had been decreased. Since this appt she restarted ambien, went back to her regular dose of klonopin, decreased zoloft dose, and went back to regular dose of synthroid. She reports feeing much better and tremors have resolved. She feels good overall. Depression and anxiety are well managed on current doses. She is sleeping better.     I have reviewed the following portions of the patient's history and confirmed they are accurate: allergies, current medications, past family history, past medical history, past social history, past surgical history, and problem list    Review of Systems  Pertinent items are noted in HPI.       Current Outpatient Medications on File Prior to Visit   Medication Sig    albuterol sulfate  (90 Base) MCG/ACT inhaler Inhale 2 puffs Every 4 (Four) Hours As Needed for Wheezing.    clonazePAM (KlonoPIN) 1 MG tablet Take 1.5 tablets by mouth Daily.    levothyroxine (Synthroid) 75 MCG tablet Take 1 tablet by mouth Daily.    omeprazole (priLOSEC) 40 MG capsule TAKE 1 CAPSULE BY MOUTH EVERY DAY    ondansetron ODT (ZOFRAN-ODT) 4 MG disintegrating tablet Place 1 tablet on the tongue Every 8 (Eight) Hours As Needed for Nausea or Vomiting.    tiotropium bromide-olodaterol (Stiolto Respimat) 2.5-2.5 MCG/ACT aerosol solution inhaler Inhale 1 puff Daily.    traZODone (DESYREL) 50 MG tablet Take 1-2 tablets one hour before bedtime as needed for sleep.    zolpidem (AMBIEN) 5 MG  tablet Take 1 tablet by mouth At Night As Needed for Sleep.     No current facility-administered medications on file prior to visit.       Objective   There were no vitals filed for this visit.  There is no height or weight on file to calculate BMI.    Physical Exam  Constitutional:       Appearance: Normal appearance. She is well-developed.   HENT:      Head: Normocephalic and atraumatic.      Nose: Nose normal.   Eyes:      General: Lids are normal.      Conjunctiva/sclera: Conjunctivae normal.   Neck:      Trachea: Trachea normal.   Pulmonary:      Effort: No respiratory distress.   Neurological:      Mental Status: She is alert and oriented to person, place, and time.      GCS: GCS eye subscore is 4. GCS verbal subscore is 5. GCS motor subscore is 6.   Psychiatric:         Attention and Perception: Attention normal.         Mood and Affect: Mood normal.         Speech: Speech normal.         Behavior: Behavior normal. Behavior is cooperative.         Thought Content: Thought content normal.         Assessment & Plan   Problem List Items Addressed This Visit       Acquired hypothyroidism  Chronic unstable due to elevated TSH. Continue current synthroid dose and give time for levels to stabilize. Follow up in 2 months for recheck of labs.        Other Visit Diagnoses       Tremors of nervous system    -  Primary  New stable. Continue current doses of all medications. If has recurrence of tremors consider brain MRI and referral to neurology.       Depression with anxiety      Chronic stable. Continue zoloft, ambien, and klonopin.       Primary insomnia      Chronic stable. Continue ambien and trazodone.                Current Outpatient Medications:     albuterol sulfate  (90 Base) MCG/ACT inhaler, Inhale 2 puffs Every 4 (Four) Hours As Needed for Wheezing., Disp: 18 g, Rfl: 5    clonazePAM (KlonoPIN) 1 MG tablet, Take 1.5 tablets by mouth Daily., Disp: 45 tablet, Rfl: 0    levothyroxine (Synthroid) 75 MCG  tablet, Take 1 tablet by mouth Daily., Disp: 90 tablet, Rfl: 1    omeprazole (priLOSEC) 40 MG capsule, TAKE 1 CAPSULE BY MOUTH EVERY DAY, Disp: 90 capsule, Rfl: 1    ondansetron ODT (ZOFRAN-ODT) 4 MG disintegrating tablet, Place 1 tablet on the tongue Every 8 (Eight) Hours As Needed for Nausea or Vomiting., Disp: 30 tablet, Rfl: 2    sertraline (Zoloft) 25 MG tablet, Take 1 tablet by mouth Daily., Disp: 30 tablet, Rfl: 1    tiotropium bromide-olodaterol (Stiolto Respimat) 2.5-2.5 MCG/ACT aerosol solution inhaler, Inhale 1 puff Daily., Disp: 1 inhaler, Rfl: 5    traZODone (DESYREL) 50 MG tablet, Take 1-2 tablets one hour before bedtime as needed for sleep., Disp: 45 tablet, Rfl: 2    zolpidem (AMBIEN) 5 MG tablet, Take 1 tablet by mouth At Night As Needed for Sleep., Disp: 30 tablet, Rfl: 2       Plan of care reviewed with the patient at the conclusion of today's visit.  Education was provided regarding diagnosis, management, and any prescribed or recommended OTC medications.  Patient verbalized understanding of and agreement with management plan.     Return in about 2 months (around 11/29/2023), or if symptoms worsen or fail to improve.     [place] in [place], [place] in [place]. I spent 25 minutes in medical discussion with patient during this visit.     Dee Parnell, DAYSI

## 2023-10-09 RX ORDER — SERTRALINE HYDROCHLORIDE 25 MG/1
25 TABLET, FILM COATED ORAL DAILY
Qty: 30 TABLET | Refills: 1 | Status: SHIPPED | OUTPATIENT
Start: 2023-10-09

## 2023-10-25 DIAGNOSIS — F41.8 DEPRESSION WITH ANXIETY: ICD-10-CM

## 2023-10-25 DIAGNOSIS — F51.01 PRIMARY INSOMNIA: ICD-10-CM

## 2023-10-25 RX ORDER — TRAZODONE HYDROCHLORIDE 50 MG/1
TABLET ORAL
Qty: 135 TABLET | Refills: 0 | Status: SHIPPED | OUTPATIENT
Start: 2023-10-25

## 2023-10-25 NOTE — TELEPHONE ENCOUNTER
"    Caller: Sydnie Gamble \"Sydnie Tye\"    Relationship: Self    Best call back number: [phone]     Requested Prescriptions:   Requested Prescriptions     Pending Prescriptions Disp Refills    traZODone (DESYREL) 50 MG tablet 45 tablet 2     Sig: Take 1-2 tablets one hour before bedtime as needed for sleep.        Pharmacy where request should be sent: [facility]Y #[id number] - [place], [place] - [facility] [address]EXT TO [facility] [phone] [facility] [phone]      Last office visit with prescribing clinician: 9/15/2023   Last telemedicine visit with prescribing clinician: 9/29/2023   Next office visit with prescribing clinician: 11/16/2023     Additional details provided by patient: NEEDS THIS TO BE A 90 DAY SUPPLY FOR INSURANCE TO COVER IT PLEASE CHANGE TO 90 DAY     Does the patient have less than a 3 day supply:  [] Yes  [x] No    Would you like a call back once the refill request has been completed: [] Yes [x] No    If the office needs to give you a call back, can they leave a voicemail: [] Yes [x] No    Tip Solorio Rep   10/25/23 15:59 EDT   "

## 2023-10-25 NOTE — TELEPHONE ENCOUNTER
"Caller: Sydnie Gamble \"Sydnie Gamble\"    Relationship: Self    Best call back number: [phone]    What orders are you requesting (i.e. lab or imaging): LAB WORK    In what timeframe would the patient need to come in: ASAP    Where will you receive your lab/imaging services: IN OFFICE    Additional notes: PATIENT HA APPOINTMENT IN SEPTEMBER AND WAS TOLD TO GET LABS DRAWN IN 3 WEEKS HOWEVER THERE ARE NO LAB ORDERS. PLEASE ADVISE          "

## 2023-10-25 NOTE — TELEPHONE ENCOUNTER
"Caller: GambleJaceen E \"Sydnie Gamble\"    Relationship: Self    Best call back number: [phone]    Requested Prescriptions:   Requested Prescriptions     Pending Prescriptions Disp Refills    clonazePAM (KlonoPIN) 1 MG tablet       Sig: Take 1.5 tablets by mouth Daily.    zolpidem (AMBIEN) 5 MG tablet       Sig: Take 1 tablet by mouth At Night As Needed for Sleep.        Pharmacy where request should be sent:    [facility] [phone]  Last office visit with prescribing clinician: 9/15/2023   Last telemedicine visit with prescribing clinician: 9/29/2023   Next office visit with prescribing clinician: 11/16/2023     Additional details provided by patient: PATIENT WILL RUN OUT BEFORE NOVEMBER APPOINTMENT.     Does the patient have less than a 3 day supply:  [] Yes  [x] No    Would you like a call back once the refill request has been completed: [x] Yes [] No    If the office needs to give you a call back, can they leave a voicemail: [x] Yes [] No    Tip Woodward Rep   10/25/23 15:39 EDT         "

## 2023-10-26 RX ORDER — CLONAZEPAM 1 MG/1
1.5 TABLET ORAL DAILY
Qty: 45 TABLET | Refills: 2 | Status: SHIPPED | OUTPATIENT
Start: 2023-10-26

## 2023-10-26 RX ORDER — ZOLPIDEM TARTRATE 5 MG/1
5 TABLET ORAL NIGHTLY PRN
Qty: 30 TABLET | Refills: 2 | Status: SHIPPED | OUTPATIENT
Start: 2023-10-26

## 2023-10-26 NOTE — TELEPHONE ENCOUNTER
They were no print because I was putting them back in her chart as meds taken but she had plenty at the time. Got both sent for her. Thank you!

## 2023-11-10 RX ORDER — SERTRALINE HYDROCHLORIDE 25 MG/1
25 TABLET, FILM COATED ORAL DAILY
Qty: 90 TABLET | Refills: 0 | Status: SHIPPED | OUTPATIENT
Start: 2023-11-10

## 2023-11-16 ENCOUNTER — OFFICE VISIT (OUTPATIENT)
Dept: INTERNAL MEDICINE | Facility: CLINIC | Age: 63
End: 2023-11-16
Payer: MEDICARE

## 2023-11-16 ENCOUNTER — LAB (OUTPATIENT)
Dept: INTERNAL MEDICINE | Facility: CLINIC | Age: 63
End: 2023-11-16
Payer: MEDICARE

## 2023-11-16 VITALS
DIASTOLIC BLOOD PRESSURE: 84 MMHG | BODY MASS INDEX: 28.89 KG/M2 | WEIGHT: 153 LBS | HEART RATE: 80 BPM | RESPIRATION RATE: 20 BRPM | TEMPERATURE: 97.1 F | SYSTOLIC BLOOD PRESSURE: 120 MMHG | HEIGHT: 61 IN

## 2023-11-16 DIAGNOSIS — F51.01 PRIMARY INSOMNIA: ICD-10-CM

## 2023-11-16 DIAGNOSIS — F41.8 DEPRESSION WITH ANXIETY: ICD-10-CM

## 2023-11-16 DIAGNOSIS — R06.09 DYSPNEA ON EXERTION: ICD-10-CM

## 2023-11-16 DIAGNOSIS — E03.9 ACQUIRED HYPOTHYROIDISM: Primary | ICD-10-CM

## 2023-11-16 DIAGNOSIS — E55.9 VITAMIN D DEFICIENCY: ICD-10-CM

## 2023-11-16 LAB
BASOPHILS # BLD AUTO: 0.05 10*3/MM3 (ref 0–0.2)
BASOPHILS NFR BLD AUTO: 0.8 % (ref 0–1.5)
DEPRECATED RDW RBC AUTO: 41.7 FL (ref 37–54)
EOSINOPHIL # BLD AUTO: 0.19 10*3/MM3 (ref 0–0.4)
EOSINOPHIL NFR BLD AUTO: 3 % (ref 0.3–6.2)
ERYTHROCYTE [DISTWIDTH] IN BLOOD BY AUTOMATED COUNT: 13.1 % (ref 12.3–15.4)
HCT VFR BLD AUTO: 39.1 % (ref 34–46.6)
HGB BLD-MCNC: 13.1 G/DL (ref 12–15.9)
IMM GRANULOCYTES # BLD AUTO: 0.03 10*3/MM3 (ref 0–0.05)
IMM GRANULOCYTES NFR BLD AUTO: 0.5 % (ref 0–0.5)
LYMPHOCYTES # BLD AUTO: 1.39 10*3/MM3 (ref 0.7–3.1)
LYMPHOCYTES NFR BLD AUTO: 21.6 % (ref 19.6–45.3)
MCH RBC QN AUTO: 29 PG (ref 26.6–33)
MCHC RBC AUTO-ENTMCNC: 33.5 G/DL (ref 31.5–35.7)
MCV RBC AUTO: 86.7 FL (ref 79–97)
MONOCYTES # BLD AUTO: 0.62 10*3/MM3 (ref 0.1–0.9)
MONOCYTES NFR BLD AUTO: 9.6 % (ref 5–12)
NEUTROPHILS NFR BLD AUTO: 4.16 10*3/MM3 (ref 1.7–7)
NEUTROPHILS NFR BLD AUTO: 64.5 % (ref 42.7–76)
NRBC BLD AUTO-RTO: 0 /100 WBC (ref 0–0.2)
NT-PROBNP SERPL-MCNC: <36 PG/ML (ref 0–900)
PLATELET # BLD AUTO: 317 10*3/MM3 (ref 140–450)
PMV BLD AUTO: 9.9 FL (ref 6–12)
RBC # BLD AUTO: 4.51 10*6/MM3 (ref 3.77–5.28)
WBC NRBC COR # BLD AUTO: 6.44 10*3/MM3 (ref 3.4–10.8)

## 2023-11-16 PROCEDURE — 1159F MED LIST DOCD IN RCRD: CPT | Performed by: NURSE PRACTITIONER

## 2023-11-16 PROCEDURE — 83880 ASSAY OF NATRIURETIC PEPTIDE: CPT | Performed by: NURSE PRACTITIONER

## 2023-11-16 PROCEDURE — 82306 VITAMIN D 25 HYDROXY: CPT | Performed by: NURSE PRACTITIONER

## 2023-11-16 PROCEDURE — 80053 COMPREHEN METABOLIC PANEL: CPT | Performed by: NURSE PRACTITIONER

## 2023-11-16 PROCEDURE — 36415 COLL VENOUS BLD VENIPUNCTURE: CPT | Performed by: NURSE PRACTITIONER

## 2023-11-16 PROCEDURE — 84443 ASSAY THYROID STIM HORMONE: CPT | Performed by: NURSE PRACTITIONER

## 2023-11-16 PROCEDURE — 1160F RVW MEDS BY RX/DR IN RCRD: CPT | Performed by: NURSE PRACTITIONER

## 2023-11-16 PROCEDURE — 99214 OFFICE O/P EST MOD 30 MIN: CPT | Performed by: NURSE PRACTITIONER

## 2023-11-16 PROCEDURE — 93000 ELECTROCARDIOGRAM COMPLETE: CPT | Performed by: NURSE PRACTITIONER

## 2023-11-16 PROCEDURE — 85025 COMPLETE CBC W/AUTO DIFF WBC: CPT | Performed by: NURSE PRACTITIONER

## 2023-11-16 RX ORDER — ZOLPIDEM TARTRATE 10 MG/1
10 TABLET ORAL NIGHTLY PRN
Qty: 90 TABLET | Refills: 0 | Status: SHIPPED | OUTPATIENT
Start: 2023-11-16

## 2023-11-16 NOTE — PROGRESS NOTES
Subjective   Chief Complaint   Patient presents with    Depression    Hypothyroidism    Follow-up      Sydnie Gamble is a 63 y.o. female.     The patient is here today for a follow-up on depression, hypothyroidism, and insomnia.    The patient has been depressed lately, possibly because of the seasons changing. Her tremors are still improving. She has been getting them again, but not nearly as bad as before. She has not changed any medications. She started on Zoloft 50 mg and switched it to 25 mg. She would like to increase back to the Zoloft 50 mg. She is sleeping with the Ambien, but not long enough. She has been taking 2 of the trazodone. Her psychiatric nurse had given her trazodone 100 mg 1 to 2 years ago, but she never took it. She takes Ambien, trazodone, and melatonin at night. She purchased vitamin D gummies but has never used them. She feels like she is not getting enough clonazepam. She takes 1.5 tablets of clonazepam at bedtime. The patient takes her Zoloft and thyroid medication first thing in the morning. She would like to start using a pill dispenser again. She has gained weight.     The patient is concerned about her shortness of breath. It happens when ambulating short distances, such as doing laundry or walking from her car to the house. She thinks it is because she is inactive. Her daughter advised her to do the Happy Walk from [employer]. She is afraid that she will have a heart attack. She is having mild chest pain. Her hands have been puffy. She is nervous about her lungs, pertaining to her shortness of breath. The patient is being monitored by pulmonology for nodules in her right lung. She was told that the other ones have slightly grown, but her doctor was more concerned about the newest one. She was told to follow up in 1 year. Her most recent CT scan was on 06/2023 and will have a repeat. Her last echocardiogram was in 2020. She would like to see Dr. Mcclelland for pulmonology.    I have  "reviewed the following portions of the patient's history and confirmed they are accurate: allergies, current medications, past family history, past medical history, past social history, past surgical history, and problem list    Review of Systems  Pertinent items are noted in HPI.     Current Outpatient Medications on File Prior to Visit   Medication Sig    albuterol sulfate  (90 Base) MCG/ACT inhaler Inhale 2 puffs Every 4 (Four) Hours As Needed for Wheezing.    clonazePAM (KlonoPIN) 1 MG tablet Take 1.5 tablets by mouth Daily.    levothyroxine (Synthroid) 75 MCG tablet Take 1 tablet by mouth Daily.    omeprazole (priLOSEC) 40 MG capsule TAKE 1 CAPSULE BY MOUTH EVERY DAY    ondansetron ODT (ZOFRAN-ODT) 4 MG disintegrating tablet Place 1 tablet on the tongue Every 8 (Eight) Hours As Needed for Nausea or Vomiting.    tiotropium bromide-olodaterol (Stiolto Respimat) 2.5-2.5 MCG/ACT aerosol solution inhaler Inhale 1 puff Daily.    traZODone (DESYREL) 50 MG tablet Take 1-2 tablets one hour before bedtime as needed for sleep.     No current facility-administered medications on file prior to visit.       Objective   Vitals:    11/16/23 1529   BP: 120/84   BP Location: Left arm   Patient Position: Sitting   Cuff Size: Adult   Pulse: 80   Resp: 20   Temp: 97.1 °F (36.2 °C)   TempSrc: Tympanic   Weight: 69.4 kg (153 lb)   Height: 154.9 cm (61\")     Body mass index is 28.91 kg/m².    Physical Exam  Vitals reviewed.   Constitutional:       Appearance: Normal appearance. She is well-developed.   HENT:      Head: Normocephalic and atraumatic.      Nose: Nose normal.   Eyes:      General: Lids are normal.      Conjunctiva/sclera: Conjunctivae normal.      Pupils: Pupils are equal, round, and reactive to light.   Neck:      Thyroid: No thyromegaly.      Trachea: Trachea normal.   Cardiovascular:      Rate and Rhythm: Normal rate and regular rhythm.      Heart sounds: Normal heart sounds.   Pulmonary:      Effort: Pulmonary " effort is normal. No respiratory distress.      Breath sounds: Normal breath sounds.   Skin:     General: Skin is warm and dry.   Neurological:      Mental Status: She is alert and oriented to person, place, and time.      GCS: GCS eye subscore is 4. GCS verbal subscore is 5. GCS motor subscore is 6.   Psychiatric:         Attention and Perception: Attention normal.         Mood and Affect: Mood normal.         Speech: Speech normal.         Behavior: Behavior normal. Behavior is cooperative.         Thought Content: Thought content normal.         ECG 12 Lead    Date/Time: 11/16/2023 3:44 PM  Performed by: Dee Elena APRN    Authorized by: Dee Elena APRN  Comparison: compared with previous ECG from 8/10/2020  Rhythm: sinus rhythm  Conduction comments: Possible right ventricular conduction delay  ST Segments: ST segments normal  T Waves: T waves normal    Clinical impression: abnormal EKG  Comments: No acute findings.       Results:  EKG from 11/16/2023  Shows a possible conduction delay, but overall, no acute concerns.     The patient's prior TSH was abnormal, and her thyroid medication was adjusted.    Assessment & Plan   Problem List Items Addressed This Visit       Acquired hypothyroidism - Primary    Relevant Orders    TSH Rfx On Abnormal To Free T4 (Completed)    Vitamin D deficiency    Relevant Orders    Vitamin D,25-Hydroxy (Completed)     Other Visit Diagnoses       Dyspnea on exertion        Relevant Orders    CBC Auto Differential (Completed)    Comprehensive Metabolic Panel (Completed)    proBNP (Completed)    ECG 12 Lead    Adult Transthoracic Echo Complete W/ Cont if Necessary Per Protocol    Depression with anxiety        Relevant Medications    sertraline (Zoloft) 50 MG tablet    zolpidem (AMBIEN) 10 MG tablet    Primary insomnia        Relevant Medications    zolpidem (AMBIEN) 10 MG tablet               Current Outpatient Medications:     albuterol sulfate  (90  Base) MCG/ACT inhaler, Inhale 2 puffs Every 4 (Four) Hours As Needed for Wheezing., Disp: 18 g, Rfl: 5    clonazePAM (KlonoPIN) 1 MG tablet, Take 1.5 tablets by mouth Daily., Disp: 45 tablet, Rfl: 2    levothyroxine (Synthroid) 75 MCG tablet, Take 1 tablet by mouth Daily., Disp: 90 tablet, Rfl: 1    omeprazole (priLOSEC) 40 MG capsule, TAKE 1 CAPSULE BY MOUTH EVERY DAY, Disp: 90 capsule, Rfl: 1    ondansetron ODT (ZOFRAN-ODT) 4 MG disintegrating tablet, Place 1 tablet on the tongue Every 8 (Eight) Hours As Needed for Nausea or Vomiting., Disp: 30 tablet, Rfl: 2    tiotropium bromide-olodaterol (Stiolto Respimat) 2.5-2.5 MCG/ACT aerosol solution inhaler, Inhale 1 puff Daily., Disp: 1 inhaler, Rfl: 5    traZODone (DESYREL) 50 MG tablet, Take 1-2 tablets one hour before bedtime as needed for sleep., Disp: 135 tablet, Rfl: 0    sertraline (Zoloft) 50 MG tablet, Take 1 tablet by mouth Daily., Disp: 90 tablet, Rfl: 1    zolpidem (AMBIEN) 10 MG tablet, Take 1 tablet by mouth At Night As Needed for Sleep., Disp: 90 tablet, Rfl: 0     Plan:    Hypothyroidism  - Chronic, unstable.  - Checking TSH.  - Continue Synthroid.    Dyspnea on exertion  - New, unstable.  - EKG showed no acute concerns.  - Echocardiogram ordered.  - Completing CBC, CMP, and BNP.  - If no cause can be determined, consider encouraging the patient to follow up with pulmonology earlier.    Depression with anxiety  - Chronic, unstable.  - Increase Zoloft.    Insomnia  - Chronic, unstable.  - Increase Ambien.  - Continue trazodone.    Plan of care reviewed with the patient at the conclusion of today's visit.  Education was provided regarding diagnosis, management, and any prescribed or recommended OTC medications.  Patient verbalized understanding of and agreement with management plan.     Return in about 1 month (around 12/16/2023), or if symptoms worsen or fail to improve, for Follow-up.      Transcribed from ambient dictation for Dee Parnell  APRN by Rae Herbert.  11/16/23   15:43 EST    Patient or patient representative verbalized consent to the visit recording.  I have personally performed the services described in this document as transcribed by the above individual, and it is both accurate and complete.

## 2023-11-17 LAB
25(OH)D3 SERPL-MCNC: 25.9 NG/ML (ref 30–100)
ALBUMIN SERPL-MCNC: 4.2 G/DL (ref 3.5–5.2)
ALBUMIN/GLOB SERPL: 1.4 G/DL
ALP SERPL-CCNC: 129 U/L (ref 39–117)
ALT SERPL W P-5'-P-CCNC: 14 U/L (ref 1–33)
ANION GAP SERPL CALCULATED.3IONS-SCNC: 10.6 MMOL/L (ref 5–15)
AST SERPL-CCNC: 23 U/L (ref 1–32)
BILIRUB SERPL-MCNC: <0.2 MG/DL (ref 0–1.2)
BUN SERPL-MCNC: 18 MG/DL (ref 8–23)
BUN/CREAT SERPL: 15.7 (ref 7–25)
CALCIUM SPEC-SCNC: 9.4 MG/DL (ref 8.6–10.5)
CHLORIDE SERPL-SCNC: 105 MMOL/L (ref 98–107)
CO2 SERPL-SCNC: 23.4 MMOL/L (ref 22–29)
CREAT SERPL-MCNC: 1.15 MG/DL (ref 0.57–1)
EGFRCR SERPLBLD CKD-EPI 2021: 53 ML/MIN/1.73
GLOBULIN UR ELPH-MCNC: 3 GM/DL
GLUCOSE SERPL-MCNC: 84 MG/DL (ref 65–99)
POTASSIUM SERPL-SCNC: 4.3 MMOL/L (ref 3.5–5.2)
PROT SERPL-MCNC: 7.2 G/DL (ref 6–8.5)
SODIUM SERPL-SCNC: 139 MMOL/L (ref 136–145)
TSH SERPL DL<=0.05 MIU/L-ACNC: 0.74 UIU/ML (ref 0.27–4.2)

## 2023-12-03 RX ORDER — ERGOCALCIFEROL 1.25 MG/1
50000 CAPSULE ORAL WEEKLY
Qty: 4 CAPSULE | Refills: 5 | Status: SHIPPED | OUTPATIENT
Start: 2023-12-03

## 2023-12-14 ENCOUNTER — HOSPITAL ENCOUNTER (OUTPATIENT)
Dept: ULTRASOUND IMAGING | Facility: HOSPITAL | Age: 63
Discharge: HOME | End: 2023-12-14
Admitting: NURSE PRACTITIONER
Payer: COMMERCIAL

## 2023-12-14 DIAGNOSIS — E03.8 HYPOTHYROIDISM DUE TO HASHIMOTO'S THYROIDITIS: ICD-10-CM

## 2023-12-14 DIAGNOSIS — R49.9 VOICE DISTURBANCE: ICD-10-CM

## 2023-12-14 DIAGNOSIS — E06.3 HYPOTHYROIDISM DUE TO HASHIMOTO'S THYROIDITIS: ICD-10-CM

## 2023-12-14 DIAGNOSIS — R76.8 THYROID ANTIBODY POSITIVE: ICD-10-CM

## 2023-12-14 PROCEDURE — 76536 US EXAM OF HEAD AND NECK: CPT

## 2024-01-12 ENCOUNTER — HOSPITAL ENCOUNTER (OUTPATIENT)
Dept: CARDIOLOGY | Facility: HOSPITAL | Age: 64
Setting detail: HOSPITAL OUTPATIENT SURGERY
Discharge: HOME | End: 2024-01-12
Payer: COMMERCIAL

## 2024-01-12 VITALS — BODY MASS INDEX: 28.89 KG/M2 | HEIGHT: 61 IN | WEIGHT: 153 LBS

## 2024-01-12 DIAGNOSIS — J41.0 SIMPLE CHRONIC BRONCHITIS: ICD-10-CM

## 2024-01-12 DIAGNOSIS — R06.09 DYSPNEA ON EXERTION: ICD-10-CM

## 2024-01-12 LAB
BH CV ECHO MEAS - AO MAX PG: 7.5 MMHG
BH CV ECHO MEAS - AO MEAN PG: 3 MMHG
BH CV ECHO MEAS - AO ROOT DIAM: 2.6 CM
BH CV ECHO MEAS - AO V2 MAX: 137 CM/SEC
BH CV ECHO MEAS - AO V2 VTI: 27.5 CM
BH CV ECHO MEAS - AVA(I,D): 2.18 CM2
BH CV ECHO MEAS - EDV(CUBED): 53.6 ML
BH CV ECHO MEAS - EDV(MOD-SP2): 70 ML
BH CV ECHO MEAS - EDV(MOD-SP4): 64 ML
BH CV ECHO MEAS - EF(MOD-BP): 57 %
BH CV ECHO MEAS - EF(MOD-SP2): 55.7 %
BH CV ECHO MEAS - EF(MOD-SP4): 57.8 %
BH CV ECHO MEAS - ESV(CUBED): 13.3 ML
BH CV ECHO MEAS - ESV(MOD-SP2): 31 ML
BH CV ECHO MEAS - ESV(MOD-SP4): 27 ML
BH CV ECHO MEAS - FS: 37.1 %
BH CV ECHO MEAS - IVS/LVPW: 0.94 CM
BH CV ECHO MEAS - IVSD: 0.67 CM
BH CV ECHO MEAS - LA DIMENSION: 3.6 CM
BH CV ECHO MEAS - LV DIASTOLIC VOL/BSA (35-75): 38 CM2
BH CV ECHO MEAS - LV MASS(C)D: 69.9 GRAMS
BH CV ECHO MEAS - LV MAX PG: 3.1 MMHG
BH CV ECHO MEAS - LV MEAN PG: 2 MMHG
BH CV ECHO MEAS - LV SYSTOLIC VOL/BSA (12-30): 16 CM2
BH CV ECHO MEAS - LV V1 MAX: 87.7 CM/SEC
BH CV ECHO MEAS - LV V1 VTI: 19.1 CM
BH CV ECHO MEAS - LVIDD: 3.8 CM
BH CV ECHO MEAS - LVIDS: 2.37 CM
BH CV ECHO MEAS - LVOT AREA: 3.1 CM2
BH CV ECHO MEAS - LVOT DIAM: 2 CM
BH CV ECHO MEAS - LVPWD: 0.71 CM
BH CV ECHO MEAS - MV A MAX VEL: 86.6 CM/SEC
BH CV ECHO MEAS - MV DEC TIME: 0.19 SEC
BH CV ECHO MEAS - MV E MAX VEL: 71.3 CM/SEC
BH CV ECHO MEAS - MV E/A: 0.82
BH CV ECHO MEAS - PA ACC SLOPE: 832 CM/SEC2
BH CV ECHO MEAS - PA ACC TIME: 0.06 SEC
BH CV ECHO MEAS - RAP SYSTOLE: 3 MMHG
BH CV ECHO MEAS - RVSP: 14 MMHG
BH CV ECHO MEAS - SI(MOD-SP2): 23.1 ML/M2
BH CV ECHO MEAS - SI(MOD-SP4): 21.9 ML/M2
BH CV ECHO MEAS - SV(LVOT): 60 ML
BH CV ECHO MEAS - SV(MOD-SP2): 39 ML
BH CV ECHO MEAS - SV(MOD-SP4): 37 ML
BH CV ECHO MEAS - TAPSE (>1.6): 1.6 CM
BH CV ECHO MEAS - TR MAX PG: 11.4 MMHG
BH CV ECHO MEAS - TR MAX VEL: 168.5 CM/SEC
BH CV VAS BP LEFT ARM: NORMAL MMHG
BH CV XLRA - RV BASE: 3.5 CM
BH CV XLRA - RV LENGTH: 7.1 CM
BH CV XLRA - RV MID: 2.9 CM
BH CV XLRA - TDI S': 9.43 CM/SEC
LEFT ATRIUM VOLUME INDEX: 23.1 ML/M2

## 2024-01-12 PROCEDURE — 93306 TTE W/DOPPLER COMPLETE: CPT

## 2024-01-12 RX ORDER — TRAZODONE HYDROCHLORIDE 50 MG/1
TABLET ORAL
Qty: 135 TABLET | Refills: 0 | Status: SHIPPED | OUTPATIENT
Start: 2024-01-12

## 2024-01-12 RX ORDER — TIOTROPIUM BROMIDE AND OLODATEROL 3.124; 2.736 UG/1; UG/1
1 SPRAY, METERED RESPIRATORY (INHALATION)
Qty: 1 EACH | Refills: 5 | OUTPATIENT
Start: 2024-01-12

## 2024-01-12 NOTE — TELEPHONE ENCOUNTER
Rx Refill Note  Pending Prescriptions:                       Disp   Refills    tiotropium bromide-olodaterol (Stiolto Res*1 each 5        Sig: Inhale 1 puff Daily.    Last office visit with prescribing clinician: Visit date not found   Last telemedicine visit with prescribing clinician: 9/29/2023   Next office visit with prescribing clinician: Visit date not found         Brenda Bustamante MA  01/12/24, 08:03 EST

## 2024-01-26 DIAGNOSIS — F41.8 DEPRESSION WITH ANXIETY: ICD-10-CM

## 2024-01-26 DIAGNOSIS — F51.01 PRIMARY INSOMNIA: ICD-10-CM

## 2024-01-26 RX ORDER — ZOLPIDEM TARTRATE 5 MG/1
5 TABLET ORAL NIGHTLY PRN
Qty: 30 TABLET | Refills: 0 | Status: SHIPPED | OUTPATIENT
Start: 2024-01-26

## 2024-01-26 RX ORDER — CLONAZEPAM 1 MG/1
TABLET ORAL
Qty: 45 TABLET | Refills: 0 | Status: SHIPPED | OUTPATIENT
Start: 2024-01-26

## 2024-02-09 RX ORDER — SERTRALINE HYDROCHLORIDE 25 MG/1
25 TABLET, FILM COATED ORAL DAILY
Qty: 90 TABLET | Refills: 0 | OUTPATIENT
Start: 2024-02-09

## 2024-02-16 ENCOUNTER — OFFICE VISIT (OUTPATIENT)
Dept: INTERNAL MEDICINE | Facility: CLINIC | Age: 64
End: 2024-02-16
Payer: COMMERCIAL

## 2024-02-16 ENCOUNTER — LAB (OUTPATIENT)
Dept: INTERNAL MEDICINE | Facility: CLINIC | Age: 64
End: 2024-02-16
Payer: COMMERCIAL

## 2024-02-16 VITALS
SYSTOLIC BLOOD PRESSURE: 126 MMHG | WEIGHT: 152 LBS | DIASTOLIC BLOOD PRESSURE: 80 MMHG | BODY MASS INDEX: 28.74 KG/M2 | HEART RATE: 102 BPM | OXYGEN SATURATION: 94 % | TEMPERATURE: 96 F

## 2024-02-16 DIAGNOSIS — R91.8 MULTIPLE LUNG NODULES ON CT: ICD-10-CM

## 2024-02-16 DIAGNOSIS — R76.8 THYROID ANTIBODY POSITIVE: ICD-10-CM

## 2024-02-16 DIAGNOSIS — J44.9 CHRONIC OBSTRUCTIVE PULMONARY DISEASE, UNSPECIFIED COPD TYPE: ICD-10-CM

## 2024-02-16 DIAGNOSIS — E55.9 VITAMIN D DEFICIENCY: ICD-10-CM

## 2024-02-16 DIAGNOSIS — K76.89 LIVER CYST: ICD-10-CM

## 2024-02-16 DIAGNOSIS — K21.00 GASTROESOPHAGEAL REFLUX DISEASE WITH ESOPHAGITIS WITHOUT HEMORRHAGE: Primary | ICD-10-CM

## 2024-02-16 DIAGNOSIS — Z80.0 FAMILY HISTORY- STOMACH CANCER: ICD-10-CM

## 2024-02-16 DIAGNOSIS — R73.03 BORDERLINE DIABETES: ICD-10-CM

## 2024-02-16 DIAGNOSIS — R06.02 SHORTNESS OF BREATH: ICD-10-CM

## 2024-02-16 DIAGNOSIS — E03.9 ACQUIRED HYPOTHYROIDISM: ICD-10-CM

## 2024-02-16 DIAGNOSIS — K21.9 GASTROESOPHAGEAL REFLUX DISEASE WITHOUT ESOPHAGITIS: Primary | ICD-10-CM

## 2024-02-16 LAB
25(OH)D3 SERPL-MCNC: 34.2 NG/ML (ref 30–100)
ALBUMIN SERPL-MCNC: 4.3 G/DL (ref 3.5–5.2)
ALBUMIN/GLOB SERPL: 1.4 G/DL
ALP SERPL-CCNC: 115 U/L (ref 39–117)
ALT SERPL W P-5'-P-CCNC: 14 U/L (ref 1–33)
ANION GAP SERPL CALCULATED.3IONS-SCNC: 12.8 MMOL/L (ref 5–15)
AST SERPL-CCNC: 18 U/L (ref 1–32)
BASOPHILS # BLD AUTO: 0.05 10*3/MM3 (ref 0–0.2)
BASOPHILS NFR BLD AUTO: 0.8 % (ref 0–1.5)
BILIRUB SERPL-MCNC: 0.2 MG/DL (ref 0–1.2)
BUN SERPL-MCNC: 20 MG/DL (ref 8–23)
BUN/CREAT SERPL: 17.2 (ref 7–25)
CALCIUM SPEC-SCNC: 9.5 MG/DL (ref 8.6–10.5)
CHLORIDE SERPL-SCNC: 103 MMOL/L (ref 98–107)
CO2 SERPL-SCNC: 23.2 MMOL/L (ref 22–29)
CREAT SERPL-MCNC: 1.16 MG/DL (ref 0.57–1)
DEPRECATED RDW RBC AUTO: 38.5 FL (ref 37–54)
EGFRCR SERPLBLD CKD-EPI 2021: 52.4 ML/MIN/1.73
EOSINOPHIL # BLD AUTO: 0.21 10*3/MM3 (ref 0–0.4)
EOSINOPHIL NFR BLD AUTO: 3.5 % (ref 0.3–6.2)
ERYTHROCYTE [DISTWIDTH] IN BLOOD BY AUTOMATED COUNT: 12.6 % (ref 12.3–15.4)
EXPIRATION DATE: ABNORMAL
GLOBULIN UR ELPH-MCNC: 3.1 GM/DL
GLUCOSE SERPL-MCNC: 100 MG/DL (ref 65–99)
HBA1C MFR BLD: 5.8 % (ref 4.5–5.7)
HCT VFR BLD AUTO: 40 % (ref 34–46.6)
HGB BLD-MCNC: 13.2 G/DL (ref 12–15.9)
IMM GRANULOCYTES # BLD AUTO: 0.02 10*3/MM3 (ref 0–0.05)
IMM GRANULOCYTES NFR BLD AUTO: 0.3 % (ref 0–0.5)
LYMPHOCYTES # BLD AUTO: 1.34 10*3/MM3 (ref 0.7–3.1)
LYMPHOCYTES NFR BLD AUTO: 22.6 % (ref 19.6–45.3)
Lab: ABNORMAL
MCH RBC QN AUTO: 27.8 PG (ref 26.6–33)
MCHC RBC AUTO-ENTMCNC: 33 G/DL (ref 31.5–35.7)
MCV RBC AUTO: 84.4 FL (ref 79–97)
MONOCYTES # BLD AUTO: 0.46 10*3/MM3 (ref 0.1–0.9)
MONOCYTES NFR BLD AUTO: 7.8 % (ref 5–12)
NEUTROPHILS NFR BLD AUTO: 3.84 10*3/MM3 (ref 1.7–7)
NEUTROPHILS NFR BLD AUTO: 65 % (ref 42.7–76)
NRBC BLD AUTO-RTO: 0 /100 WBC (ref 0–0.2)
PLATELET # BLD AUTO: 336 10*3/MM3 (ref 140–450)
PMV BLD AUTO: 9.6 FL (ref 6–12)
POTASSIUM SERPL-SCNC: 4.6 MMOL/L (ref 3.5–5.2)
PROT SERPL-MCNC: 7.4 G/DL (ref 6–8.5)
RBC # BLD AUTO: 4.74 10*6/MM3 (ref 3.77–5.28)
SODIUM SERPL-SCNC: 139 MMOL/L (ref 136–145)
TSH SERPL DL<=0.05 MIU/L-ACNC: 0.86 UIU/ML (ref 0.27–4.2)
WBC NRBC COR # BLD AUTO: 5.92 10*3/MM3 (ref 3.4–10.8)

## 2024-02-16 PROCEDURE — 82306 VITAMIN D 25 HYDROXY: CPT | Performed by: NURSE PRACTITIONER

## 2024-02-16 PROCEDURE — 86376 MICROSOMAL ANTIBODY EACH: CPT | Performed by: NURSE PRACTITIONER

## 2024-02-16 PROCEDURE — 86800 THYROGLOBULIN ANTIBODY: CPT | Performed by: NURSE PRACTITIONER

## 2024-02-16 PROCEDURE — 80050 GENERAL HEALTH PANEL: CPT | Performed by: NURSE PRACTITIONER

## 2024-02-16 PROCEDURE — 36415 COLL VENOUS BLD VENIPUNCTURE: CPT | Performed by: NURSE PRACTITIONER

## 2024-02-16 RX ORDER — FAMOTIDINE 20 MG/1
TABLET, FILM COATED ORAL
Qty: 60 TABLET | Refills: 5 | Status: SHIPPED | OUTPATIENT
Start: 2024-02-16

## 2024-02-16 RX ORDER — TIOTROPIUM BROMIDE AND OLODATEROL 3.124; 2.736 UG/1; UG/1
1 SPRAY, METERED RESPIRATORY (INHALATION)
Qty: 1 EACH | Refills: 5 | Status: SHIPPED | OUTPATIENT
Start: 2024-02-16

## 2024-02-16 NOTE — PROGRESS NOTES
Subjective   Chief Complaint   Patient presents with    Med Refill    Shortness of Breath      Sydnie Gamble is a 63 y.o. female.     The patient is here today for a follow-up and shortness of breath.    The patient complains of dyspnea with any exertion, and increased heart rate, which has been present for approximately 5 months, she denies illness. She needs to see the pulmonologist for a pulmonary function test. The patient has seen Toni Mcclelland MD in the past and would like to go back to him. She was seen by Dr. Mckeon in  but did not like him. Dr. Mckeon had told the patient she had a new lung nodule. The patient is also complaining of voice changes, which she attributes to coughing 10 to 12 times daily with wheezing and constantly clearing her throat. She denies coughing that wakes her up at night and inquires if her symptoms could be related to seasonal/environmental allergies. She has a history of lung nodules and COPD and is requesting a refill of her inhalers. Currently she is using an old,  inhaler as well as her 's inhaler, which is also , every night and is out of her Stiolto. The patient admits to difficulty breathing at night. She admits to being overweight and does not exercise enough. Her last CT chest was in 2023. She does not attribute her voice change to her thyroid, as her thyroid is small in size, although she does have a history of hypothyroidism on Synthroid.     The patient has no desire to leave her house with agoraphobic type tendencies. She used to be extremely social and served on community boards, but now does not participate in anything.     She was unable to get a refill for Prilosec. She continues to experience GERD lasting for 2 to 3 hours even though she eats smaller portions, avoids acidic foods, eats slower, and only eats 1 meal a day. She purchased over the counter famotidine, which was extremely beneficial, taking it 1 hour before eating.      The patient has a history of liver cysts, and her last ultrasound was in 2022.     A previous physician had prescribed her Trazodone 100 mg, twice a day, which she never took and brings them to the office today for proper disposal.     She denies abdominal pain.     I have reviewed the following portions of the patient's history and confirmed they are accurate: allergies, current medications, past family history, past medical history, past social history, past surgical history, and problem list    She is  and has 4 cats. The patient is a former smoker, but now vapes.     Family history is positive for stomach cancer in her father, who passed away and recurrent metastatic melanoma in her mother.     She has not been taking her vitamin D regularly.     She plans to get the RSV vaccine at her pharmacy and inquires if this is okay.    Review of Systems  Pertinent items are noted in HPI.     Current Outpatient Medications on File Prior to Visit   Medication Sig    albuterol sulfate  (90 Base) MCG/ACT inhaler Inhale 2 puffs Every 4 (Four) Hours As Needed for Wheezing.    levothyroxine (Synthroid) 75 MCG tablet Take 1 tablet by mouth Daily.    omeprazole (priLOSEC) 40 MG capsule TAKE 1 CAPSULE BY MOUTH EVERY DAY    ondansetron ODT (ZOFRAN-ODT) 4 MG disintegrating tablet Place 1 tablet on the tongue Every 8 (Eight) Hours As Needed for Nausea or Vomiting.    traZODone (DESYREL) 50 MG tablet Take 1-2 tablets one hour before bedtime as needed for sleep.    vitamin D (ERGOCALCIFEROL) 1.25 MG (74930 UT) capsule capsule Take 1 capsule by mouth 1 (One) Time Per Week.     No current facility-administered medications on file prior to visit.       Objective   Vitals:    02/16/24 1442   BP: 126/80   BP Location: Left arm   Patient Position: Sitting   Cuff Size: Adult   Pulse: 102   Temp: 96 °F (35.6 °C)   SpO2: 94%   Weight: 68.9 kg (152 lb)     Body mass index is 28.74 kg/m².    Physical Exam  Vitals reviewed.    Constitutional:       Appearance: Normal appearance. She is well-developed.   HENT:      Head: Normocephalic and atraumatic.      Nose: Nose normal.   Eyes:      General: Lids are normal.      Conjunctiva/sclera: Conjunctivae normal.      Pupils: Pupils are equal, round, and reactive to light.   Neck:      Thyroid: No thyromegaly.      Trachea: Trachea normal.   Cardiovascular:      Rate and Rhythm: Normal rate and regular rhythm.      Heart sounds: Normal heart sounds.   Pulmonary:      Effort: Pulmonary effort is normal. No respiratory distress.      Breath sounds: Decreased breath sounds present.   Skin:     General: Skin is warm and dry.   Neurological:      Mental Status: She is alert and oriented to person, place, and time.      GCS: GCS eye subscore is 4. GCS verbal subscore is 5. GCS motor subscore is 6.   Psychiatric:         Attention and Perception: Attention normal.         Mood and Affect: Mood normal.         Speech: Speech normal.         Behavior: Behavior normal. Behavior is cooperative.         Thought Content: Thought content normal.         Assessment & Plan   Problem List Items Addressed This Visit       Acquired hypothyroidism    Current Assessment & Plan     - Chronic, stable.   - Continue Synthroid.   - Checking TSH.   - Checking thyroid antibodies due to increase in the past.           Relevant Orders    TSH Rfx On Abnormal To Free T4 (Completed)    Thyroid Antibodies (Completed)    Vitamin D deficiency    Current Assessment & Plan     - Chronic, unstable.   - Continue vitamin D supplement.   - Checking vitamin D labs.         Relevant Orders    Comprehensive Metabolic Panel (Completed)    Vitamin D,25-Hydroxy (Completed)    COPD (chronic obstructive pulmonary disease)    Current Assessment & Plan     - Chronic, unstable.   - Referring to pulmonology so that she can go back to seeing Dr. Toni Mcclelland.   - PFTs and CT of the chest ordered.   - Restart Stiolto.   - Continue albuterol as  needed.  - Checking CBC and CMP.            Relevant Medications    tiotropium bromide-olodaterol (Stiolto Respimat) 2.5-2.5 MCG/ACT aerosol solution inhaler    Other Relevant Orders    Ambulatory Referral to Pulmonology (Completed)    Pulmonary Function Test    COVID PRE-OP / PRE-PROCEDURE SCREENING ORDER (NO ISOLATION) - Swab, Nasopharynx    CT Chest Without Contrast    Multiple lung nodules on CT    Overview     Right sided sub-centimeter lung nodules.          Relevant Medications    tiotropium bromide-olodaterol (Stiolto Respimat) 2.5-2.5 MCG/ACT aerosol solution inhaler    Other Relevant Orders    Ambulatory Referral to Pulmonology (Completed)    Pulmonary Function Test    COVID PRE-OP / PRE-PROCEDURE SCREENING ORDER (NO ISOLATION) - Swab, Nasopharynx    CT Chest Without Contrast     Other Visit Diagnoses       Gastroesophageal reflux disease with esophagitis without hemorrhage    -  Primary    Relevant Medications    famotidine (PEPCID) 20 MG tablet    Other Relevant Orders    Ambulatory Referral to Gastroenterology (Completed)    Liver cyst        Relevant Orders    Comprehensive Metabolic Panel (Completed)    US Liver    Ambulatory Referral to Gastroenterology (Completed)    Borderline diabetes        Relevant Orders    POC Glycosylated Hemoglobin (Hb A1C) (Completed)    Shortness of breath        Relevant Orders    Ambulatory Referral to Pulmonology (Completed)    Pulmonary Function Test    COVID PRE-OP / PRE-PROCEDURE SCREENING ORDER (NO ISOLATION) - Swab, Nasopharynx    CT Chest Without Contrast    CBC Auto Differential (Completed)    Comprehensive Metabolic Panel (Completed)    Thyroid antibody positive        Relevant Orders    TSH Rfx On Abnormal To Free T4 (Completed)    Thyroid Antibodies (Completed)    Family history- stomach cancer        Relevant Orders    Ambulatory Referral to Gastroenterology (Completed)        GERD.   - Chronic, unstable.   - Start famotidine.   - Continue Prilosec.   -  Referring to gastroenterology for consultation for EGD due to worsening of esophagitis symptoms.   - Checking CBC and CMP.      Liver cyst.   - Chronic, stable.   - Completing liver ultrasound.    Borderline diabetes.   - Chronic, stable.   - Follow a low carbohydrates low sugar diet.   - Recheck A1c in 3 months.  - The patient's A1c today was 5.8%.      Current Outpatient Medications:     albuterol sulfate  (90 Base) MCG/ACT inhaler, Inhale 2 puffs Every 4 (Four) Hours As Needed for Wheezing., Disp: 18 g, Rfl: 5    levothyroxine (Synthroid) 75 MCG tablet, Take 1 tablet by mouth Daily., Disp: 90 tablet, Rfl: 1    omeprazole (priLOSEC) 40 MG capsule, TAKE 1 CAPSULE BY MOUTH EVERY DAY, Disp: 90 capsule, Rfl: 1    ondansetron ODT (ZOFRAN-ODT) 4 MG disintegrating tablet, Place 1 tablet on the tongue Every 8 (Eight) Hours As Needed for Nausea or Vomiting., Disp: 30 tablet, Rfl: 2    tiotropium bromide-olodaterol (Stiolto Respimat) 2.5-2.5 MCG/ACT aerosol solution inhaler, Inhale 1 puff Daily., Disp: 1 each, Rfl: 5    traZODone (DESYREL) 50 MG tablet, Take 1-2 tablets one hour before bedtime as needed for sleep., Disp: 135 tablet, Rfl: 0    vitamin D (ERGOCALCIFEROL) 1.25 MG (77603 UT) capsule capsule, Take 1 capsule by mouth 1 (One) Time Per Week., Disp: 4 capsule, Rfl: 5    clonazePAM (KlonoPIN) 1 MG tablet, Take 1 and 1/2 tablets daily by mouth, Disp: 45 tablet, Rfl: 0    famotidine (PEPCID) 20 MG tablet, Take 1 tablet by mouth twice daily OR take 2 tablets by mouth once daily as needed for heartburn, Disp: 60 tablet, Rfl: 5    sertraline (Zoloft) 50 MG tablet, Take 1 tablet by mouth Daily., Disp: 90 tablet, Rfl: 1    zolpidem (AMBIEN) 5 MG tablet, Take 1 tablet by mouth At Night As Needed for Sleep., Disp: 30 tablet, Rfl: 0       Plan of care reviewed with the patient at the conclusion of today's visit.  Education was provided regarding diagnosis, management, and any prescribed or recommended OTC medications.   Patient verbalized understanding of and agreement with management plan.     Return in about 3 months (around 5/16/2024), or if symptoms worsen or fail to improve, for Follow-up.      Transcribed from ambient dictation for DAYSI Cordero by Jay Lomeli 02/16/24 15:15 EST    Patient or patient representative verbalized consent to the visit recording.  I have personally performed the services described in this document as transcribed by the above individual, and it is both accurate and complete.

## 2024-02-19 NOTE — ASSESSMENT & PLAN NOTE
- Chronic, stable.   - Continue Synthroid.   - Checking TSH.   - Checking thyroid antibodies due to increase in the past.

## 2024-02-19 NOTE — ASSESSMENT & PLAN NOTE
- Chronic, unstable.   - Referring to pulmonology so that she can go back to seeing Dr. Toni Mcclelland.   - PFTs and CT of the chest ordered.   - Restart Stiolto.   - Continue albuterol as needed.  - Checking CBC and CMP.

## 2024-02-20 LAB
THYROGLOB AB SERPL-ACNC: 4.9 IU/ML (ref 0–0.9)
THYROPEROXIDASE AB SERPL-ACNC: 180 IU/ML (ref 0–34)

## 2024-02-26 DIAGNOSIS — F41.8 DEPRESSION WITH ANXIETY: ICD-10-CM

## 2024-02-26 DIAGNOSIS — F51.01 PRIMARY INSOMNIA: ICD-10-CM

## 2024-02-27 RX ORDER — CLONAZEPAM 1 MG/1
TABLET ORAL
Qty: 45 TABLET | Refills: 0 | Status: SHIPPED | OUTPATIENT
Start: 2024-02-27

## 2024-02-27 RX ORDER — ZOLPIDEM TARTRATE 5 MG/1
5 TABLET ORAL NIGHTLY PRN
Qty: 30 TABLET | Refills: 0 | Status: SHIPPED | OUTPATIENT
Start: 2024-02-27

## 2024-02-27 NOTE — TELEPHONE ENCOUNTER
"Caller: Jace Gamblesmitha RAI \"Sydnie Gamble\"    Relationship: Self    Best call back number: [phone]    Requested Prescriptions:   Requested Prescriptions     Pending Prescriptions Disp Refills    clonazePAM (KlonoPIN) 1 MG tablet 45 tablet 0     Sig: Take 1 and 1/2 tablets daily by mouth    zolpidem (AMBIEN) 5 MG tablet 30 tablet 0     Sig: Take 1 tablet by mouth At Night As Needed for Sleep.    sertraline (Zoloft) 50 MG tablet 90 tablet 1     Sig: Take 1 tablet by mouth Daily.     Signed Prescriptions Disp Refills    sertraline (Zoloft) 50 MG tablet 90 tablet 1     Sig: Take 1 tablet by mouth Daily.     Authorizing Provider: ARNAUD KANG     Ordering User: KAVITA ANSARI        Pharmacy where request should be sent: [id number] - [place], [place] - [address] AT NEXT TO [facility] - [phone] [facility] [phone]      Last office visit with prescribing clinician: 2/16/2024   Last telemedicine visit with prescribing clinician: 9/29/2023   Next office visit with prescribing clinician: 2/26/2024     Additional details provided by patient: PATIENT HAS 1 DOSE LEFT. SHE NEEDS ASAP. WOULD LIKE TO  TODAY 02/27/2024    Does the patient have less than a 3 day supply:  [x] Yes  [] No    Would you like a call back once the refill request has been completed: [x] Yes [] No    If the office needs to give you a call back, can they leave a voicemail: [x] Yes [] No    Tip Monterroso Rep   02/27/24 14:00 EST         "

## 2024-03-18 ENCOUNTER — HOSPITAL ENCOUNTER (OUTPATIENT)
Dept: ULTRASOUND IMAGING | Facility: HOSPITAL | Age: 64
Discharge: HOME | End: 2024-03-18
Admitting: NURSE PRACTITIONER
Payer: COMMERCIAL

## 2024-03-18 DIAGNOSIS — K76.89 LIVER CYST: ICD-10-CM

## 2024-03-18 PROCEDURE — 76705 ECHO EXAM OF ABDOMEN: CPT

## 2024-03-22 ENCOUNTER — HOSPITAL ENCOUNTER (OUTPATIENT)
Dept: CT IMAGING | Facility: HOSPITAL | Age: 64
Discharge: HOME | End: 2024-03-22
Admitting: NURSE PRACTITIONER
Payer: COMMERCIAL

## 2024-03-22 DIAGNOSIS — R91.8 MULTIPLE LUNG NODULES ON CT: ICD-10-CM

## 2024-03-22 DIAGNOSIS — R06.02 SHORTNESS OF BREATH: ICD-10-CM

## 2024-03-22 DIAGNOSIS — J44.9 CHRONIC OBSTRUCTIVE PULMONARY DISEASE, UNSPECIFIED COPD TYPE: ICD-10-CM

## 2024-03-22 PROCEDURE — 71250 CT THORAX DX C-: CPT

## 2024-03-25 ENCOUNTER — OFFICE VISIT (OUTPATIENT)
Dept: PULMONOLOGY | Facility: CLINIC | Age: 64
End: 2024-03-25
Payer: COMMERCIAL

## 2024-03-25 VITALS
WEIGHT: 152 LBS | HEIGHT: 61 IN | SYSTOLIC BLOOD PRESSURE: 138 MMHG | OXYGEN SATURATION: 97 % | BODY MASS INDEX: 28.7 KG/M2 | DIASTOLIC BLOOD PRESSURE: 88 MMHG | HEART RATE: 88 BPM

## 2024-03-25 DIAGNOSIS — J41.0 SIMPLE CHRONIC BRONCHITIS: ICD-10-CM

## 2024-03-25 DIAGNOSIS — F17.219 CIGARETTE NICOTINE DEPENDENCE WITH NICOTINE-INDUCED DISORDER: ICD-10-CM

## 2024-03-25 DIAGNOSIS — R91.8 MULTIPLE LUNG NODULES ON CT: Primary | ICD-10-CM

## 2024-03-25 DIAGNOSIS — Z87.891 PERSONAL HISTORY OF TOBACCO USE, PRESENTING HAZARDS TO HEALTH: ICD-10-CM

## 2024-03-25 DIAGNOSIS — F41.8 DEPRESSION WITH ANXIETY: ICD-10-CM

## 2024-03-25 PROBLEM — I25.10 ATHEROSCLEROTIC CARDIOVASCULAR DISEASE: Status: ACTIVE | Noted: 2024-03-25

## 2024-03-25 PROCEDURE — 94726 PLETHYSMOGRAPHY LUNG VOLUMES: CPT | Performed by: NURSE PRACTITIONER

## 2024-03-25 PROCEDURE — 99214 OFFICE O/P EST MOD 30 MIN: CPT | Performed by: NURSE PRACTITIONER

## 2024-03-25 PROCEDURE — 94729 DIFFUSING CAPACITY: CPT | Performed by: NURSE PRACTITIONER

## 2024-03-25 PROCEDURE — 94375 RESPIRATORY FLOW VOLUME LOOP: CPT | Performed by: NURSE PRACTITIONER

## 2024-03-25 RX ORDER — ATORVASTATIN CALCIUM 10 MG/1
10 TABLET, FILM COATED ORAL NIGHTLY
Qty: 90 TABLET | Refills: 1 | Status: SHIPPED | OUTPATIENT
Start: 2024-03-25

## 2024-03-25 RX ORDER — CLONAZEPAM 1 MG/1
TABLET ORAL
Qty: 45 TABLET | Refills: 0 | Status: SHIPPED | OUTPATIENT
Start: 2024-03-25

## 2024-03-25 RX ORDER — ALBUTEROL SULFATE 90 UG/1
2 AEROSOL, METERED RESPIRATORY (INHALATION) EVERY 4 HOURS PRN
Qty: 18 G | Refills: 5 | Status: SHIPPED | OUTPATIENT
Start: 2024-03-25

## 2024-03-25 RX ORDER — TRAZODONE HYDROCHLORIDE 50 MG/1
TABLET ORAL
Qty: 135 TABLET | Refills: 0 | Status: SHIPPED | OUTPATIENT
Start: 2024-03-25

## 2024-03-25 RX ORDER — ASPIRIN 81 MG/1
81 TABLET ORAL DAILY
Qty: 90 TABLET | Refills: 1 | Status: SHIPPED | OUTPATIENT
Start: 2024-03-25

## 2024-03-26 PROBLEM — F17.219 CIGARETTE NICOTINE DEPENDENCE WITH NICOTINE-INDUCED DISORDER: Status: RESOLVED | Noted: 2019-03-12 | Resolved: 2024-03-26

## 2024-03-26 NOTE — PROGRESS NOTES
[facility] Pulmonary Follow up    CHIEF COMPLAINT    Shortness of breath    HISTORY OF PRESENT ILLNESS    Sydnie Gamble is a 63 y.o.female here today for follow-up.  She was last seen in the office by Dr. Mckeon in 2022.  She had been seen in the past by Dr. Mcclleland in 2019.  She was originally referred to our office for pulmonary nodules from a CT lung cancer screening.  She had a PET scan in April 2019 that were negative for hypermetabolic activity.  Her last CT scan was in March 2024.    She continues to be short of breath.  She states that she is short of breath with any exertion.  She does not regularly exercise.    She continues use Stiolto 2 puffs daily.  She also has albuterol to use as needed for shortness of breath.    She denies any sputum production or hemoptysis.  She denies any fever, chills or night sweats.    She denies any reflux symptoms.  She takes omeprazole daily.    She denies any chest pain or palpitations.  She denies any lower extremity edema or calf tenderness.    She quit smoking 4 years ago and has a 68-pack-year history.    Patient Active Problem List   Diagnosis    Personal history of tobacco use, presenting hazards to health    Gastroesophageal reflux disease without esophagitis    Acquired hypothyroidism    Vitamin D deficiency    Prediabetes    COPD (chronic obstructive pulmonary disease)    ALESIA (generalized anxiety disorder)    Multiple lung nodules on CT    Hilar adenopathy    HLD (hyperlipidemia)    Cervical cancer    Atherosclerotic cardiovascular disease       No Known Allergies    Current Outpatient Medications:     albuterol sulfate  (90 Base) MCG/ACT inhaler, Inhale 2 puffs Every 4 (Four) Hours As Needed for Wheezing., Disp: 18 g, Rfl: 5    clonazePAM (KlonoPIN) 1 MG tablet, Take 1 and 1/2 tablets daily by mouth, Disp: 45 tablet, Rfl: 0    famotidine (PEPCID) 20 MG tablet, Take 1 tablet by mouth twice daily OR take 2 tablets by mouth once daily as needed for heartburn,  Disp: 60 tablet, Rfl: 5    levothyroxine (Synthroid) 75 MCG tablet, Take 1 tablet by mouth Daily., Disp: 90 tablet, Rfl: 1    omeprazole (priLOSEC) 40 MG capsule, TAKE 1 CAPSULE BY MOUTH EVERY DAY, Disp: 90 capsule, Rfl: 1    ondansetron ODT (ZOFRAN-ODT) 4 MG disintegrating tablet, Place 1 tablet on the tongue Every 8 (Eight) Hours As Needed for Nausea or Vomiting., Disp: 30 tablet, Rfl: 2    sertraline (Zoloft) 50 MG tablet, Take 1 tablet by mouth Daily., Disp: 90 tablet, Rfl: 1    tiotropium bromide-olodaterol (Stiolto Respimat) 2.5-2.5 MCG/ACT aerosol solution inhaler, Inhale 1 puff Daily., Disp: 1 each, Rfl: 5    traZODone (DESYREL) 50 MG tablet, Take 1-2 tablets one hour before bedtime as needed for sleep., Disp: 135 tablet, Rfl: 0    vitamin D (ERGOCALCIFEROL) 1.25 MG (24386 UT) capsule capsule, Take 1 capsule by mouth 1 (One) Time Per Week., Disp: 4 capsule, Rfl: 5    zolpidem (AMBIEN) 5 MG tablet, Take 1 tablet by mouth At Night As Needed for Sleep., Disp: 30 tablet, Rfl: 0    aspirin 81 MG EC tablet, Take 1 tablet by mouth Daily., Disp: 90 tablet, Rfl: 1    atorvastatin (LIPITOR) 10 MG tablet, Take 1 tablet by mouth Every Night., Disp: 90 tablet, Rfl: 1  MEDICATION LIST AND ALLERGIES REVIEWED.    Social History     Tobacco Use    Smoking status: Former     Current packs/day: 0.00     Average packs/day: 1.5 packs/day for 45.0 years (67.5 ttl pk-yrs)     Types: Electronic Cigarette, Cigarettes     Start date:      Quit date: 2020     Years since quittin.2    Smokeless tobacco: Never    Tobacco comments:     quit analog cigs in march, still vapes daily   Vaping Use    Vaping status: Every Day    Substances: Nicotine   Substance Use Topics    Alcohol use: No    Drug use: No       FAMILY AND SOCIAL HISTORY REVIEWED.    Review of Systems   Constitutional:  Negative for activity change, appetite change, fatigue, fever and unexpected weight change.   HENT:  Negative for congestion, postnasal drip,  "rhinorrhea, sinus pressure, sore throat and voice change.    Eyes:  Negative for visual disturbance.   Respiratory:  Positive for cough and shortness of breath. Negative for chest tightness and wheezing.    Cardiovascular:  Negative for chest pain, palpitations and leg swelling.   Gastrointestinal:  Negative for abdominal distention, abdominal pain, nausea and vomiting.   Endocrine: Negative for cold intolerance and heat intolerance.   Genitourinary:  Negative for difficulty urinating and urgency.   Musculoskeletal:  Negative for arthralgias, back pain and neck pain.   Skin:  Negative for color change and pallor.   Allergic/Immunologic: Negative for environmental allergies and food allergies.   Neurological:  Negative for dizziness, syncope, weakness and light-headedness.   Hematological:  Negative for adenopathy. Does not bruise/bleed easily.   Psychiatric/Behavioral:  Negative for agitation and behavioral problems.    .    /88 (BP Location: Left arm, Patient Position: Sitting, Cuff Size: Adult)   Pulse 88   Ht 154.9 cm (60.98\")   Wt 68.9 kg (152 lb)   LMP  (LMP Unknown)   SpO2 97%   BMI 28.74 kg/m²     Immunization History   Administered Date(s) Administered    COVID-19 (MODERNA) 1st,2nd,3rd Dose Monovalent 03/12/2021, 04/02/2021    COVID-19 (PFIZER) BIVALENT 12+YRS 10/20/2022    COVID-19 (PFIZER) Purple Cap Monovalent 03/12/2021, 04/02/2021, 10/02/2021    COVID-19 F23 (PFIZER) 12YRS+ (COMIRNATY) 10/14/2023    Covid-19 (Pfizer) Gray Cap Monovalent 05/29/2022    Fluad Quad 65+ 10/14/2023    Fluzone (or Fluarix & Flulaval for VFC) >6mos 10/25/2018, 09/25/2020, 10/02/2021, 10/20/2022    Influenza, Unspecified 10/02/2021, 10/20/2022    Pneumococcal Polysaccharide (PPSV23) 04/09/2019    flucelvax quad pfs =>4 YRS 10/09/2019       Physical Exam  Vitals and nursing note reviewed.   Constitutional:       Appearance: She is well-developed. She is not diaphoretic.   HENT:      Head: Normocephalic and atraumatic. "   Eyes:      Pupils: Pupils are equal, round, and reactive to light.   Neck:      Thyroid: No thyromegaly.   Cardiovascular:      Rate and Rhythm: Normal rate and regular rhythm.      Heart sounds: Normal heart sounds. No murmur heard.     No friction rub. No gallop.   Pulmonary:      Effort: Pulmonary effort is normal. No respiratory distress.      Breath sounds: Normal breath sounds. No wheezing or rales.   Chest:      Chest wall: No tenderness.   Abdominal:      General: Bowel sounds are normal.      Palpations: Abdomen is soft.      Tenderness: There is no abdominal tenderness.   Musculoskeletal:         General: No swelling. Normal range of motion.      Cervical back: Normal range of motion and neck supple.   Lymphadenopathy:      Cervical: No cervical adenopathy.   Skin:     General: Skin is warm and dry.      Capillary Refill: Capillary refill takes less than 2 seconds.   Neurological:      Mental Status: She is alert and oriented to person, place, and time.   Psychiatric:         Mood and Affect: Mood normal.         Behavior: Behavior normal.           RESULTS    Spirometry Interpretation: FVC 2.06 71% predicted, FEV1 1.07 48% predicted, FEV1/FVC 52% predicted, TLC 4.14 93% predicted, DLCO 68% predicted, moderate obstruction, no restriction and reduced DLCO.    CT Chest Without Contrast Diagnostic    Result Date: 3/25/2024  Impression: 1.New 8 mm right lower lobe nodule subjacent to an area of groundglass attenuation may represent progressive chronic infectious/inflammatory process or neoplasm. See below follow-up recommendations. 2.Other stable chronic findings. *The Fleischner society pulmonary nodule recommendations are for the follow-up and management of pulmonary nodules smaller than 8 mm detected incidentally in patients >35 years on non-screening CT. The initial guidelines for the management of solid nodules were released in 2005 1, and guidelines for the management of subsolid nodules were released  "in 2013 2. New revised 2017 recommendations for both solid and subsolid have since been released 4. 2017 guidelines Solid nodules Solitary nodule size: <6 mm *low risk patients: no follow-up needed *high risk patients: optional CT at 12 months Solitary nodule size: 6-8 mm *low risk patients: follow-up at 6-12 months, then consider further follow-up at 18-24 months *high risk patients: initial follow-up CT at 6-12 months and then at 18-24 months if no change Solitary nodule size: >8 mm *either low or high risk patients *consider follow-up CT at 3 months, and/or CT-PET, and/or biopsy Multiple nodules size: <6 mm *low risk patients: no routine follow-up *high risk patients: optional CT at 12 months Multiple nodules size: 6-8 mm *low risk patients: follow-up at 3-6 months, then consider further follow-up at 18-24 months *high risk patients: follow-up at 3-6 months, then at 18-24 months if no change Multiple nodules size: >8 mm *low risk patients: follow-up at 3-6 months, then consider further follow-up at 18-24 months *high risk patients: follow-up at 3-6 months, then at 18-24 months if no change * Note: newly detected indeterminate nodule in persons 35 years of age or older. *low risk patients: minimal or absent history of smoking and or other known risk factors *high risk patients: history of smoking or of other known risk factors (e.g. first degree relative with lung cancer, or exposure to asbestos, radon, uranium) *if a nodule up to 8 mm is partly solid or is ground glass further follow-up is required after 24 months to exclude possible slow growing adenocarcinoma (YISSEL) Subsolid nodules Solitary pure ground-glass nodule *nodule size <6mm *no CT follow-up required *nodule size \"e6mm *follow up CT at 6-12 months, then every 2 years until 5 years Solitary part-solid nodule *nodule size <6mm *no CT follow-up required *nodule size \"e6mm *follow-up CT at 3-6 months *if unchanged, and solid component remains <6mm, then " "annual follow-up for 5 years Multiple subsolid nodules *nodule size <6mm *follow-up CT at 3-6 months *consider further follow-up at 2 and 4 years if stable *nodule size \"e6mm *follow-up CT at 3-6 months *subsequent management based on the most suspicious nodule(s) Electronically Signed: Fabricio Tena MD  3/25/2024 12:11 PM EDT  Workstation ID: [id number]    PROBLEM LIST    Problem List Items Addressed This Visit          Pulmonary and Pneumonias    COPD (chronic obstructive pulmonary disease)    Relevant Medications    albuterol sulfate  (90 Base) MCG/ACT inhaler    Multiple lung nodules on CT - Primary    Overview     Right sided sub-centimeter lung nodules.          Relevant Medications    albuterol sulfate  (90 Base) MCG/ACT inhaler    Other Relevant Orders    CT Chest Without Contrast    Breathing Capacity Test (Completed)       Tobacco    Personal history of tobacco use, presenting hazards to health    RESOLVED: Cigarette nicotine dependence with nicotine-induced disorder - currently vaping         DISCUSSION    Ms. Gamble was here for follow-up.  She seems to doing fairly well from a pulmonary standpoint.  She will continue to use the Stiolto 2 puffs daily.  We did review her PFTs in the office today and she continues to have a moderate obstruction.  I did encourage her to use the albuterol as needed for wheezing or shortness of breath.    We did review her CT scan performed in March that shows a new right lower lobe pulmonary nodule measuring 8 mm.  We will repeat a CT scan in 3 months and if stable we will go to yearly screenings Based on her smoking history.  She quit 4 years ago.  She also has a [id number] history.      She will follow-up in 1 year.    I personally spent a total of 31 minutes on patient visit today including chart review, face to face with the patient obtaining the history and physical exam, review of pertinent images and tests, counseling and discussion and/or coordination " of care as described above, and documentation.  Total time excludes time spent on other separate services such as performing procedures or test interpretation, if applicable.        DAYSI Torres  03/25/202408:47 EDT  Electronically signed     Please note that portions of this note were completed with a voice recognition program.        CC: Dee Elena, APRN

## 2024-04-11 DIAGNOSIS — F51.01 PRIMARY INSOMNIA: ICD-10-CM

## 2024-04-11 RX ORDER — ZOLPIDEM TARTRATE 5 MG/1
5 TABLET ORAL NIGHTLY PRN
Qty: 30 TABLET | Refills: 0 | Status: SHIPPED | OUTPATIENT
Start: 2024-04-11

## 2024-04-24 DIAGNOSIS — F41.8 DEPRESSION WITH ANXIETY: ICD-10-CM

## 2024-04-24 RX ORDER — CLONAZEPAM 1 MG/1
TABLET ORAL
Qty: 45 TABLET | Refills: 0 | Status: SHIPPED | OUTPATIENT
Start: 2024-04-24

## 2024-04-24 RX ORDER — LEVOTHYROXINE SODIUM 0.07 MG/1
75 TABLET ORAL DAILY
Qty: 90 TABLET | Refills: 1 | Status: SHIPPED | OUTPATIENT
Start: 2024-04-24

## 2024-05-16 ENCOUNTER — TELEPHONE (OUTPATIENT)
Dept: INTERNAL MEDICINE | Facility: CLINIC | Age: 64
End: 2024-05-16

## 2024-05-16 DIAGNOSIS — F41.8 DEPRESSION WITH ANXIETY: ICD-10-CM

## 2024-05-16 DIAGNOSIS — F51.01 PRIMARY INSOMNIA: ICD-10-CM

## 2024-05-16 NOTE — TELEPHONE ENCOUNTER
Please ask Dee to refill her zoloft  50mg [facility] [place] [phone]. She was late for her appt.today  But I made her another  appt. For the 5/23/2024 She is out of zploft.

## 2024-05-17 RX ORDER — ZOLPIDEM TARTRATE 5 MG/1
5 TABLET ORAL NIGHTLY PRN
Qty: 30 TABLET | Refills: 0 | Status: SHIPPED | OUTPATIENT
Start: 2024-05-17

## 2024-05-23 ENCOUNTER — OFFICE VISIT (OUTPATIENT)
Dept: INTERNAL MEDICINE | Facility: CLINIC | Age: 64
End: 2024-05-23
Payer: MEDICARE

## 2024-05-23 VITALS
HEIGHT: 61 IN | TEMPERATURE: 98.1 F | SYSTOLIC BLOOD PRESSURE: 116 MMHG | DIASTOLIC BLOOD PRESSURE: 74 MMHG | OXYGEN SATURATION: 98 % | BODY MASS INDEX: 28.7 KG/M2 | WEIGHT: 152 LBS | RESPIRATION RATE: 16 BRPM | HEART RATE: 78 BPM

## 2024-05-23 DIAGNOSIS — R73.03 BORDERLINE DIABETES: ICD-10-CM

## 2024-05-23 DIAGNOSIS — E78.00 HYPERCHOLESTEREMIA: ICD-10-CM

## 2024-05-23 DIAGNOSIS — F51.01 PRIMARY INSOMNIA: Primary | ICD-10-CM

## 2024-05-23 DIAGNOSIS — M62.838 MUSCLE SPASM: ICD-10-CM

## 2024-05-23 DIAGNOSIS — F41.8 DEPRESSION WITH ANXIETY: ICD-10-CM

## 2024-05-23 DIAGNOSIS — N18.31 STAGE 3A CHRONIC KIDNEY DISEASE: ICD-10-CM

## 2024-05-23 LAB
ALBUMIN SERPL-MCNC: 4.3 G/DL (ref 3.5–5.2)
ALBUMIN/GLOB SERPL: 1.5 G/DL
ALP SERPL-CCNC: 113 U/L (ref 39–117)
ALT SERPL W P-5'-P-CCNC: 9 U/L (ref 1–33)
ANION GAP SERPL CALCULATED.3IONS-SCNC: 11.6 MMOL/L (ref 5–15)
AST SERPL-CCNC: 12 U/L (ref 1–32)
BILIRUB SERPL-MCNC: <0.2 MG/DL (ref 0–1.2)
BUN SERPL-MCNC: 17 MG/DL (ref 8–23)
BUN/CREAT SERPL: 14.5 (ref 7–25)
CALCIUM SPEC-SCNC: 9.3 MG/DL (ref 8.6–10.5)
CHLORIDE SERPL-SCNC: 105 MMOL/L (ref 98–107)
CHOLEST SERPL-MCNC: 163 MG/DL (ref 0–200)
CO2 SERPL-SCNC: 23.4 MMOL/L (ref 22–29)
CREAT SERPL-MCNC: 1.17 MG/DL (ref 0.57–1)
EGFRCR SERPLBLD CKD-EPI 2021: 51.6 ML/MIN/1.73
GLOBULIN UR ELPH-MCNC: 2.8 GM/DL
GLUCOSE SERPL-MCNC: 96 MG/DL (ref 65–99)
HBA1C MFR BLD: 5.8 % (ref 4.8–5.6)
HDLC SERPL-MCNC: 75 MG/DL (ref 40–60)
LDLC SERPL CALC-MCNC: 73 MG/DL (ref 0–100)
LDLC/HDLC SERPL: 0.95 {RATIO}
MAGNESIUM SERPL-MCNC: 2.1 MG/DL (ref 1.6–2.4)
POTASSIUM SERPL-SCNC: 4 MMOL/L (ref 3.5–5.2)
PROT SERPL-MCNC: 7.1 G/DL (ref 6–8.5)
SODIUM SERPL-SCNC: 140 MMOL/L (ref 136–145)
TRIGL SERPL-MCNC: 82 MG/DL (ref 0–150)
VLDLC SERPL-MCNC: 15 MG/DL (ref 5–40)

## 2024-05-23 PROCEDURE — 83735 ASSAY OF MAGNESIUM: CPT | Performed by: NURSE PRACTITIONER

## 2024-05-23 PROCEDURE — 1159F MED LIST DOCD IN RCRD: CPT | Performed by: NURSE PRACTITIONER

## 2024-05-23 PROCEDURE — 1160F RVW MEDS BY RX/DR IN RCRD: CPT | Performed by: NURSE PRACTITIONER

## 2024-05-23 PROCEDURE — 83036 HEMOGLOBIN GLYCOSYLATED A1C: CPT | Performed by: NURSE PRACTITIONER

## 2024-05-23 PROCEDURE — 80061 LIPID PANEL: CPT | Performed by: NURSE PRACTITIONER

## 2024-05-23 PROCEDURE — 99214 OFFICE O/P EST MOD 30 MIN: CPT | Performed by: NURSE PRACTITIONER

## 2024-05-23 PROCEDURE — 1126F AMNT PAIN NOTED NONE PRSNT: CPT | Performed by: NURSE PRACTITIONER

## 2024-05-23 PROCEDURE — G2211 COMPLEX E/M VISIT ADD ON: HCPCS | Performed by: NURSE PRACTITIONER

## 2024-05-23 PROCEDURE — 80053 COMPREHEN METABOLIC PANEL: CPT | Performed by: NURSE PRACTITIONER

## 2024-05-23 RX ORDER — CLONAZEPAM 1 MG/1
1 TABLET ORAL 2 TIMES DAILY PRN
Qty: 50 TABLET | Refills: 2 | Status: SHIPPED | OUTPATIENT
Start: 2024-05-23

## 2024-05-23 RX ORDER — TRAZODONE HYDROCHLORIDE 150 MG/1
150 TABLET ORAL NIGHTLY
Qty: 90 TABLET | Refills: 1 | Status: SHIPPED | OUTPATIENT
Start: 2024-05-23
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
## 2024-06-04 ENCOUNTER — HOSPITAL ENCOUNTER (OUTPATIENT)
Dept: CT IMAGING | Facility: HOSPITAL | Age: 64
Discharge: HOME | End: 2024-06-04
Admitting: NURSE PRACTITIONER
Payer: COMMERCIAL

## 2024-06-04 DIAGNOSIS — R91.8 MULTIPLE LUNG NODULES ON CT: ICD-10-CM

## 2024-06-04 PROCEDURE — 71250 CT THORAX DX C-: CPT

## 2024-06-14 DIAGNOSIS — F51.01 PRIMARY INSOMNIA: ICD-10-CM

## 2024-06-14 RX ORDER — ZOLPIDEM TARTRATE 5 MG/1
5 TABLET ORAL NIGHTLY PRN
Qty: 30 TABLET | Refills: 1 | Status: SHIPPED | OUTPATIENT
Start: 2024-06-14

## 2024-06-17 DIAGNOSIS — R91.8 MULTIPLE PULMONARY NODULES: Primary | ICD-10-CM

## 2024-06-17 NOTE — PROGRESS NOTES
Only about her CT scan finding I called  about her CT scan findings.  These nodules have been present since 2019 we have watched them closely with repeat CT scans.  She did have a negative PET scan in 2019.  They are slow-growing and it was recommended to have a 6-month follow-up CT scan.  The patient wanted to discuss further options.  I did advise her we could do a PET scan or possible biopsy.  She would like to proceed with the PET scan first and if the PET is positive we may proceed with biopsy.  If the PET is negative we will go ahead and repeat a CT scan in 6 months.    She is agreeable this plan and will call with any additional concerns or questions.  I will contact her after the PET scan.

## 2024-06-24 ENCOUNTER — TELEPHONE (OUTPATIENT)
Dept: INTERNAL MEDICINE | Facility: CLINIC | Age: 64
End: 2024-06-24
Payer: COMMERCIAL

## 2024-06-24 NOTE — TELEPHONE ENCOUNTER
HUB CAN RELAY MESSAGE TO  PATIENT       ATTEMPTED TO CONTACT THE PT AND INFORM HER SHE CAN CALL [facility] [phone] TO MAKE AN APPT, SINCE SHE HAS YET TO BE SCHEDULED WITH THEIR OFFICE.

## 2024-07-11 ENCOUNTER — HOSPITAL ENCOUNTER (OUTPATIENT)
Dept: PET IMAGING | Facility: HOSPITAL | Age: 64
Discharge: HOME | End: 2024-07-11
Payer: COMMERCIAL

## 2024-07-11 DIAGNOSIS — R91.8 MULTIPLE PULMONARY NODULES: ICD-10-CM

## 2024-07-11 LAB — GLUCOSE BLDC GLUCOMTR-MCNC: 98 MG/DL (ref 70–130)

## 2024-07-11 PROCEDURE — 78815 PET IMAGE W/CT SKULL-THIGH: CPT

## 2024-07-11 PROCEDURE — A9552 F18 FDG: HCPCS | Performed by: NURSE PRACTITIONER

## 2024-07-11 PROCEDURE — 0 FLUDEOXYGLUCOSE F18 SOLUTION: Performed by: NURSE PRACTITIONER

## 2024-07-11 PROCEDURE — 82948 REAGENT STRIP/BLOOD GLUCOSE: CPT

## 2024-07-11 RX ADMIN — FLUDEOXYGLUCOSE F 18 1 DOSE: 200 INJECTION, SOLUTION INTRAVENOUS at 13:53

## 2024-07-17 ENCOUNTER — TELEPHONE (OUTPATIENT)
Age: 64
End: 2024-07-17
Payer: COMMERCIAL

## 2024-07-17 NOTE — TELEPHONE ENCOUNTER
I talked with Dr. Mcclelland and Dr. Mckeon about Ms. Gamble's PET scan results.  Ms. Gamble would like to talk with Dr. Mcclelland and he is willing to see her in the office on Tuesday, May 23 at 10 AM to discuss possible bronchoscopy.  I will have her added to the schedule and she is agreeable with this plan.

## 2024-07-23 ENCOUNTER — OFFICE VISIT (OUTPATIENT)
Dept: PULMONOLOGY | Facility: CLINIC | Age: 64
End: 2024-07-23
Payer: COMMERCIAL

## 2024-07-23 ENCOUNTER — PATIENT OUTREACH (OUTPATIENT)
Dept: ONCOLOGY | Facility: CLINIC | Age: 64
End: 2024-07-23
Payer: COMMERCIAL

## 2024-07-23 ENCOUNTER — LAB (OUTPATIENT)
Dept: LAB | Facility: HOSPITAL | Age: 64
End: 2024-07-23
Payer: COMMERCIAL

## 2024-07-23 VITALS
DIASTOLIC BLOOD PRESSURE: 78 MMHG | BODY MASS INDEX: 28.89 KG/M2 | TEMPERATURE: 98.4 F | SYSTOLIC BLOOD PRESSURE: 120 MMHG | HEIGHT: 61 IN | HEART RATE: 82 BPM | OXYGEN SATURATION: 97 % | WEIGHT: 153 LBS

## 2024-07-23 DIAGNOSIS — Z87.891 PERSONAL HISTORY OF TOBACCO USE, PRESENTING HAZARDS TO HEALTH: ICD-10-CM

## 2024-07-23 DIAGNOSIS — R91.8 MULTIPLE LUNG NODULES ON CT: Primary | ICD-10-CM

## 2024-07-23 DIAGNOSIS — J44.89 COPD WITH ASTHMA: ICD-10-CM

## 2024-07-23 PROCEDURE — 86671 FUNGUS NES ANTIBODY: CPT | Performed by: INTERNAL MEDICINE

## 2024-07-23 PROCEDURE — 86606 ASPERGILLUS ANTIBODY: CPT | Performed by: INTERNAL MEDICINE

## 2024-07-23 PROCEDURE — 86609 BACTERIUM ANTIBODY: CPT | Performed by: INTERNAL MEDICINE

## 2024-07-23 PROCEDURE — 86602 ANTINOMYCES ANTIBODY: CPT | Performed by: INTERNAL MEDICINE

## 2024-07-23 PROCEDURE — 86331 IMMUNODIFFUSION OUCHTERLONY: CPT | Performed by: INTERNAL MEDICINE

## 2024-07-23 PROCEDURE — 94060 EVALUATION OF WHEEZING: CPT | Performed by: INTERNAL MEDICINE

## 2024-07-23 PROCEDURE — 99215 OFFICE O/P EST HI 40 MIN: CPT | Performed by: INTERNAL MEDICINE

## 2024-07-23 PROCEDURE — 36415 COLL VENOUS BLD VENIPUNCTURE: CPT | Performed by: INTERNAL MEDICINE

## 2024-07-23 RX ORDER — FLUTICASONE FUROATE, UMECLIDINIUM BROMIDE AND VILANTEROL TRIFENATATE 100; 62.5; 25 UG/1; UG/1; UG/1
1 POWDER RESPIRATORY (INHALATION)
Qty: 90 EACH | Refills: 3 | Status: SHIPPED | OUTPATIENT
Start: 2024-07-23

## 2024-07-23 RX ORDER — LEVALBUTEROL TARTRATE 45 UG/1
3 AEROSOL, METERED ORAL ONCE
Status: COMPLETED | OUTPATIENT
Start: 2024-07-23 | End: 2024-07-23

## 2024-07-23 RX ORDER — LEVOTHYROXINE SODIUM 0.07 MG/1
75 TABLET ORAL DAILY
Qty: 90 TABLET | Refills: 1 | Status: SHIPPED | OUTPATIENT
Start: 2024-07-23

## 2024-07-23 RX ADMIN — LEVALBUTEROL TARTRATE 3 PUFF: 45 AEROSOL, METERED ORAL at 11:00

## 2024-07-23 NOTE — PROGRESS NOTES
Pulmonary Follow-up      Patient Care Team:  Dee Elena APRN as PCP - General (Nurse Practitioner)  Laura Jimenez MD as Referring Physician (Gynecologic Oncology)  Cliff Montana MD as Consulting Physician (Radiation Oncology)  Toni Mcclelland MD as Consulting Physician (Pulmonary Disease)  Erik Mckeon DO as Consulting Physician (Pulmonary Disease)    Chief Complaint / Reason: Lung nodules      Chief Complaint   Patient presents with    Post PET Scan     F/U       Subjective     64 y.o. female former smoker who quit in 2019 who actively vapes with history of cervical cancer, lung nodules previously followed by myself and Dr. Mckeon, COPD (with most recent FEV1 of 1.07 L or 48% predicted), recently seen by Ngoc Alvarez and had a CT scan of the chest showing some slow progression of her lung nodules.  She subsequently had a PET CT scan of the chest which showed some hypermetabolism and a small right lower lobe lung nodule.    Patient reports her breathing is overall worse.  She reports previous non-compliance with Stiolto but has been using it regularly more recently and reports she is breathing better.      History taken from: patient    PMH/FH/Social History were reviewed and updated appropriately in the electronic medical record.     Past Medical History:   Diagnosis Date    Acid reflux     Acid reflux     Anxiety 1976    Arthritis     Atherosclerotic cardiovascular disease 3/25/2024    Cervical cancer     Depression     Emphysema of lung     History of radiation therapy 11/20/2020    pelvis + intracavitary brachytherapy    Hypothyroidism     Lung nodule     Ovarian cyst 1980s    Visual impairment corrected with glasses    Wears dentures     Wears glasses      Family History   Problem Relation Age of Onset    Cancer Mother     Hypertension Mother     Mental illness Mother     Anxiety disorder Mother     Asthma Mother     COPD Mother     Depression Mother      Emphysema Mother     Cancer Father     Early death Father     Heart attack Maternal Grandfather     Heart disease Maternal Grandfather         Heart Attack 65 yo    Cancer Maternal Aunt     Cancer Maternal Aunt     Cancer Maternal Grandmother     Breast cancer Neg Hx     Ovarian cancer Neg Hx      Past Surgical History:   Procedure Laterality Date    LYMPH NODE BIOPSY      SUBTOTAL HYSTERECTOMY  ?    ovary/fallopian tube removed    TOTAL ABDOMINAL HYSTERECTOMY PELVIC NODE DISSECTION N/A 2020    Procedure: TOTAL RADICAL HYSTERECTOMY PELVIC NODE DISSECTION;  Surgeon: Laura Jimenez MD;  Location: [facility];  Service: Gynecology Oncology;  Laterality: N/A;    TUBAL ABDOMINAL LIGATION      right with ovarian dissection        Social History     Socioeconomic History    Marital status:    Tobacco Use    Smoking status: Former     Current packs/day: 0.00     Average packs/day: 1.5 packs/day for 45.0 years (67.5 ttl pk-yrs)     Types: Cigarettes     Start date:      Quit date: 3/30/2019     Years since quittin.3     Passive exposure: Past    Smokeless tobacco: Never    Tobacco comments:     quit analog cigs in march, still vapes daily   Vaping Use    Vaping status: Every Day    Substances: Nicotine   Substance and Sexual Activity    Alcohol use: No    Drug use: No    Sexual activity: Not Currently     Partners: Male         Review of Systems:   Review of Systems  All other systems were reviewed and are negative.  Exceptions are noted in the subjective or above.      Objective     Vital Signs     No intake/output data recorded.  Body mass index is 28.74 kg/m².    Physical Exam:     Constitutional:    Alert, cooperative, in no acute distress   Head:    Normocephalic, without obvious abnormality, atraumatic   Eyes:            Lids and lashes normal, conjunctivae and sclerae normal, no   icterus, no pallor, corneas clear, PER   ENMT:   Ears appear intact with no abnormalities noted         Neck:  Trachea midline, no thyromegaly, no JVD       Lungs/Resp:     Normal effort, symmetric chest rise, no crepitus, clear to      auscultation bilaterally, no chest wall tenderness                 Heart/CV:    Regular rhythm and normal rate, normal S1 and S2, no            murmur   Abdomen/GI:        :     Deferred   Extremities/MSK:   No clubbing or cyanosis.  No edema.  Normal tone.  No deformities.    Pulses:      Skin:   No bleeding, bruising or rash   Heme/Lymph:   No cervical or supraclavicular adenopathy.    Neurologic:    Psychiatric:       Moves all extremities with no obvious focal motor deficit.  Cranial nerves 2 - 12 grossly intact   Oriented x 3, normal affect.          The above findings are documentation of my personal physical examination from today.  Electronically signed by:  Toni Mcclelland MD  07/23/24  09:54 EDT     Results Review:     I reviewed the patient's new clinical results.       Imaging:  I reviewed the patient's new imaging including reviewing the images and radiologist's report.      I reviewed patient's CT scan of the chest from 6/4/2024, PET CT from 7/11/2024, and remote CT scan of the chest from 9/30/2019.  Images show progression of bilateral groundglass nodules with relatively new right upper lobe groundglass nodule which appears to be faintly PET avid on PET scan, and a right lower lobe superior segment nodule with a small solid component that appears to be PET avid.  Radiologist impression from most recent PET scan follows:    Impression:     1. The patient's small discrete medial right lower lobe nodule is moderately hypermetabolic and suspicious for neoplasm.     2. Of the patient's multiple groundglass lesions, the right upper lobe lesion on image 117 is equivalent to mediastinal/blood pool activity, and remaining lesions are less active than this. This does not exclude low-grade neoplasm at any of these sites.     3. Negative PET scan elsewhere.    PFTs:     Spirometry was  done on 7/23/2024.  Please see scanned PFT report for complete details.  FVC was 1.79 L or 65% predicted.  FEV1 was 1.12 L or 51% predicted.  FEV1 to FVC ratio was 62%.  Postbronchodilator FEV1 was 1.31 L or 60% predicted, a 17% increase from prebronchodilator.  Maximal voluntary ventilation was 30 L/min or 36% predicted.    Interpretation: Moderate obstruction technically with no significant improvement with bronchodilator although the patient did have a 17% improvement, she only had a 190 mL improvement which did not meet the 200 mL requirement for significance.  Nonetheless, I do feel that, given her lung volumes, 190 mL is probably a significant improvement for this patient.    Medication Review:     Current Outpatient Medications   Medication Sig Dispense Refill    albuterol sulfate  (90 Base) MCG/ACT inhaler Inhale 2 puffs Every 4 (Four) Hours As Needed for Wheezing. 18 g 5    aspirin 81 MG EC tablet Take 1 tablet by mouth Daily. 90 tablet 1    atorvastatin (LIPITOR) 10 MG tablet Take 1 tablet by mouth Every Night. 90 tablet 1    clonazePAM (KlonoPIN) 1 MG tablet Take 1 tablet by mouth 2 (Two) Times a Day As Needed for Anxiety. 50 tablet 2    famotidine (PEPCID) 20 MG tablet Take 1 tablet by mouth twice daily OR take 2 tablets by mouth once daily as needed for heartburn 60 tablet 5    levothyroxine (Synthroid) 75 MCG tablet Take 1 tablet by mouth Daily. 90 tablet 1    ondansetron ODT (ZOFRAN-ODT) 4 MG disintegrating tablet Place 1 tablet on the tongue Every 8 (Eight) Hours As Needed for Nausea or Vomiting. 30 tablet 2    sertraline (Zoloft) 50 MG tablet Take 1 tablet by mouth Daily. 30 tablet 0    tiotropium bromide-olodaterol (Stiolto Respimat) 2.5-2.5 MCG/ACT aerosol solution inhaler Inhale 1 puff Daily. 1 each 5    traZODone (DESYREL) 150 MG tablet Take 1 tablet by mouth Every Night. 90 tablet 1    vitamin D (ERGOCALCIFEROL) 1.25 MG (55590 UT) capsule capsule Take 1 capsule by mouth 1 (One) Time Per  Week. 4 capsule 5    zolpidem (AMBIEN) 5 MG tablet Take 1 tablet by mouth At Night As Needed for Sleep. 30 tablet 1     No current facility-administered medications for this visit.       Assessment & Plan   Problems Addressed this Visit          Pulmonary and Pneumonias    COPD with asthma    Relevant Medications    levalbuterol (XOPENEX HFA) inhaler 3 puff (Completed)    Fluticasone-Umeclidin-Vilant (Trelegy Ellipta) 100-62.5-25 MCG/ACT inhaler    Multiple lung nodules on CT - Primary    Relevant Medications    levalbuterol (XOPENEX HFA) inhaler 3 puff (Completed)    Fluticasone-Umeclidin-Vilant (Trelegy Ellipta) 100-62.5-25 MCG/ACT inhaler    Other Relevant Orders    CT Chest Without Contrast    Hypersensitivity Pneumonitis Profile    Spirometry (Completed)       Tobacco    Personal history of tobacco use, presenting hazards to health     Diagnoses         Codes Comments    Multiple lung nodules on CT    -  Primary ICD-10-CM: R91.8  ICD-9-CM: 793.19     COPD with asthma     ICD-10-CM: J44.89  ICD-9-CM: 493.20     Personal history of tobacco use, presenting hazards to health     ICD-10-CM: Z87.891  ICD-9-CM: V15.82           64 y.o. female former smoker who quit in 2019 who actively vapes with history of cervical cancer, lung nodules previously followed by myself and Dr. Mckeon, COPD (with most recent FEV1 of 1.07 L or 48% predicted), recently seen by Ngoc Alvarez and had a CT scan of the chest showing some slow progression of her lung nodules.  She subsequently had a PET CT scan of the chest which showed some hypermetabolism and a small right lower lobe lung nodule.    Patient reports her breathing is overall worse.  She reports previous non-compliance with Stiolto but has been using it regularly more recently and reports she is breathing better.      I reviewed the patient's recent CT scans, remote CT scans, and recent PET scan.  She has a left lower lobe superior segment nodule which is PET avid.  She has a  relatively new groundglass nodule in the right upper lobe along with several other bilateral groundglass nodules that have slowly progressed in size since 2019.    Pulmonary function testing today shows moderate obstruction and appears to have an asthmatic component.    I discussed options for further diagnosis with the patient, including the options of serial imaging, CT-guided biopsy, bronchoscopic biopsy including utilizing robotic navigation bronchoscopy, and surgical biopsy, including the risks and benefits of each.  After discussion, patient wishes to pursue robotic navigational bronchoscopy to obtain tissue use in the hopes of obtaining a diagnosis for her lung nodules.  I did describe the risk of bleeding, lung damage/pneumothorax, medication reaction, respiratory failure, and death.  Patient wishes to proceed.    I will additionally send off a hypersensitivity pneumonitis panel today.    I will try to do her bronchoscopy in about 2 weeks.    Plan:  1.  For progressive bilateral groundglass nodules including a PET avid right lower lobe lung nodule: Obtain repeat CT scan for procedural planning.  Schedule robotic navigation bronchoscopy with endobronchial ultrasound evaluation of mediastinal lymph nodes and biopsies if indicated.  2.  For COPD with asthma: I am going to increase her Stiolto to Trelegy 100.  Counseled her on proper inhaler use including rinsing mouth out after use and demonstrated proper inhaler use.  3.  For vaping: Counseled on avoiding vaping.  4.      Immunization History   Administered Date(s) Administered    COVID-19 (MODERNA) 1st,2nd,3rd Dose Monovalent 03/12/2021, 04/02/2021    COVID-19 (PFIZER) BIVALENT 12+YRS 10/20/2022    COVID-19 (PFIZER) Purple Cap Monovalent 03/12/2021, 04/02/2021, 10/02/2021    COVID-19 F23 (PFIZER) 12YRS+ (COMIRNATY) 10/14/2023    Covid-19 (Pfizer) Gray Cap Monovalent 05/29/2022    Fluad Quad 65+ 10/14/2023    Fluzone (or Fluarix & Flulaval for VFC) >6mos  10/25/2018, 2020, 10/02/2021, 10/20/2022    Influenza, Unspecified 10/02/2021, 10/20/2022    Pneumococcal Polysaccharide (PPSV23) 2019    flucelvax quad pfs =>4 YRS 10/09/2019       Social History     Tobacco Use   Smoking Status Former    Current packs/day: 0.00    Average packs/day: 1.5 packs/day for 45.0 years (67.5 ttl pk-yrs)    Types: Cigarettes    Start date:     Quit date: 3/30/2019    Years since quittin.3    Passive exposure: Past   Smokeless Tobacco Never   Tobacco Comments    quit analog cigs in march, still vapes daily        Sydnie Gamble  reports that she quit smoking about 5 years ago. Her smoking use included cigarettes. She started smoking about 52 years ago. She has a 67.5 pack-year smoking history. She has been exposed to tobacco smoke. She has never used smokeless tobacco.           Advance Care Planning   ACP discussion was held with the patient during this visit. Patient does not have an advance directive, information provided.        I personally spent a total of 45 minutes on patient visit today including chart review, face to face with the patient obtaining the history and physical exam, review of pertinent images and tests, counseling and discussion and/or coordination of care as described above, and documentation.  Total time excludes time spent on other separate services such as performing procedures or test interpretation, if applicable.      Electronically signed by:  Toni Mcclelland MD  24  09:54 EDT      *. Please note that portions of this note were completed with [employer] - a voice recognition program.

## 2024-07-24 ENCOUNTER — PREP FOR SURGERY (OUTPATIENT)
Dept: OTHER | Facility: HOSPITAL | Age: 64
End: 2024-07-24
Payer: COMMERCIAL

## 2024-07-24 DIAGNOSIS — R91.8 MULTIPLE LUNG NODULES ON CT: Primary | ICD-10-CM

## 2024-07-25 RX ORDER — SODIUM CHLORIDE 0.9 % (FLUSH) 0.9 %
10 SYRINGE (ML) INJECTION AS NEEDED
OUTPATIENT
Start: 2024-07-25

## 2024-07-25 RX ORDER — LIDOCAINE HYDROCHLORIDE 40 MG/ML
4 INJECTION, SOLUTION RETROBULBAR; TOPICAL ONCE
OUTPATIENT
Start: 2024-07-25 | End: 2024-07-25

## 2024-07-25 RX ORDER — SODIUM CHLORIDE 0.9 % (FLUSH) 0.9 %
10 SYRINGE (ML) INJECTION EVERY 12 HOURS SCHEDULED
OUTPATIENT
Start: 2024-07-25

## 2024-07-25 RX ORDER — SODIUM CHLORIDE 9 MG/ML
40 INJECTION, SOLUTION INTRAVENOUS AS NEEDED
OUTPATIENT
Start: 2024-07-25

## 2024-07-29 LAB
A FUMIGATUS IGG SER QL: NEGATIVE
A PULLULANS IGG SER QL: NEGATIVE
LACEYELLA SACCHARI AB SER QL ID: NEGATIVE
PIGEON SERUM IGG QL: NEGATIVE
S RECTIVIRGULA IGG SER QL ID: NEGATIVE
T VULGARIS IGG SER QL: NEGATIVE

## 2024-07-31 ENCOUNTER — PRE-ADMISSION TESTING (OUTPATIENT)
Dept: PREADMISSION TESTING | Facility: HOSPITAL | Age: 64
End: 2024-07-31
Payer: COMMERCIAL

## 2024-07-31 VITALS — BODY MASS INDEX: 28.72 KG/M2 | HEIGHT: 61 IN | WEIGHT: 152.12 LBS

## 2024-07-31 DIAGNOSIS — R91.8 MULTIPLE LUNG NODULES ON CT: ICD-10-CM

## 2024-07-31 LAB
ALBUMIN SERPL-MCNC: 4.3 G/DL (ref 3.5–5.2)
ALBUMIN/GLOB SERPL: 1.4 G/DL
ALP SERPL-CCNC: 124 U/L (ref 39–117)
ALT SERPL W P-5'-P-CCNC: 8 U/L (ref 1–33)
ANION GAP SERPL CALCULATED.3IONS-SCNC: 12 MMOL/L (ref 5–15)
APTT PPP: 27.3 SECONDS (ref 22–39)
AST SERPL-CCNC: 13 U/L (ref 1–32)
BASOPHILS # BLD AUTO: 0.05 10*3/MM3 (ref 0–0.2)
BASOPHILS NFR BLD AUTO: 0.7 % (ref 0–1.5)
BILIRUB SERPL-MCNC: 0.2 MG/DL (ref 0–1.2)
BUN SERPL-MCNC: 18 MG/DL (ref 8–23)
BUN/CREAT SERPL: 13.2 (ref 7–25)
CALCIUM SPEC-SCNC: 9.3 MG/DL (ref 8.6–10.5)
CHLORIDE SERPL-SCNC: 101 MMOL/L (ref 98–107)
CO2 SERPL-SCNC: 27 MMOL/L (ref 22–29)
CREAT SERPL-MCNC: 1.36 MG/DL (ref 0.57–1)
DEPRECATED RDW RBC AUTO: 42.7 FL (ref 37–54)
EGFRCR SERPLBLD CKD-EPI 2021: 43 ML/MIN/1.73
EOSINOPHIL # BLD AUTO: 0.21 10*3/MM3 (ref 0–0.4)
EOSINOPHIL NFR BLD AUTO: 3.1 % (ref 0.3–6.2)
ERYTHROCYTE [DISTWIDTH] IN BLOOD BY AUTOMATED COUNT: 12.9 % (ref 12.3–15.4)
GLOBULIN UR ELPH-MCNC: 3 GM/DL
GLUCOSE SERPL-MCNC: 102 MG/DL (ref 65–99)
HCT VFR BLD AUTO: 39.5 % (ref 34–46.6)
HGB BLD-MCNC: 12.8 G/DL (ref 12–15.9)
IMM GRANULOCYTES # BLD AUTO: 0.01 10*3/MM3 (ref 0–0.05)
IMM GRANULOCYTES NFR BLD AUTO: 0.1 % (ref 0–0.5)
INR PPP: 0.95 (ref 0.89–1.12)
LYMPHOCYTES # BLD AUTO: 1.34 10*3/MM3 (ref 0.7–3.1)
LYMPHOCYTES NFR BLD AUTO: 19.9 % (ref 19.6–45.3)
MCH RBC QN AUTO: 29.2 PG (ref 26.6–33)
MCHC RBC AUTO-ENTMCNC: 32.4 G/DL (ref 31.5–35.7)
MCV RBC AUTO: 90.2 FL (ref 79–97)
MONOCYTES # BLD AUTO: 0.58 10*3/MM3 (ref 0.1–0.9)
MONOCYTES NFR BLD AUTO: 8.6 % (ref 5–12)
NEUTROPHILS NFR BLD AUTO: 4.55 10*3/MM3 (ref 1.7–7)
NEUTROPHILS NFR BLD AUTO: 67.6 % (ref 42.7–76)
NRBC BLD AUTO-RTO: 0 /100 WBC (ref 0–0.2)
PLATELET # BLD AUTO: 305 10*3/MM3 (ref 140–450)
PMV BLD AUTO: 9.2 FL (ref 6–12)
POTASSIUM SERPL-SCNC: 4.1 MMOL/L (ref 3.5–5.2)
PROT SERPL-MCNC: 7.3 G/DL (ref 6–8.5)
PROTHROMBIN TIME: 12.8 SECONDS (ref 12.2–14.5)
QT INTERVAL: 400 MS
QTC INTERVAL: 432 MS
RBC # BLD AUTO: 4.38 10*6/MM3 (ref 3.77–5.28)
SODIUM SERPL-SCNC: 140 MMOL/L (ref 136–145)
WBC NRBC COR # BLD AUTO: 6.74 10*3/MM3 (ref 3.4–10.8)

## 2024-07-31 PROCEDURE — 85025 COMPLETE CBC W/AUTO DIFF WBC: CPT

## 2024-07-31 PROCEDURE — 93005 ELECTROCARDIOGRAM TRACING: CPT

## 2024-07-31 PROCEDURE — 85610 PROTHROMBIN TIME: CPT

## 2024-07-31 PROCEDURE — 85730 THROMBOPLASTIN TIME PARTIAL: CPT

## 2024-07-31 PROCEDURE — 36415 COLL VENOUS BLD VENIPUNCTURE: CPT

## 2024-07-31 PROCEDURE — 80053 COMPREHEN METABOLIC PANEL: CPT

## 2024-08-01 ENCOUNTER — HOSPITAL ENCOUNTER (OUTPATIENT)
Facility: HOSPITAL | Age: 64
Discharge: HOME | End: 2024-08-01
Admitting: INTERNAL MEDICINE
Payer: COMMERCIAL

## 2024-08-01 DIAGNOSIS — R91.8 MULTIPLE LUNG NODULES ON CT: ICD-10-CM

## 2024-08-01 PROCEDURE — 71250 CT THORAX DX C-: CPT

## 2024-08-06 ENCOUNTER — ANESTHESIA (OUTPATIENT)
Dept: GASTROENTEROLOGY | Facility: HOSPITAL | Age: 64
End: 2024-08-06
Payer: COMMERCIAL

## 2024-08-06 ENCOUNTER — ANESTHESIA EVENT (OUTPATIENT)
Dept: GASTROENTEROLOGY | Facility: HOSPITAL | Age: 64
End: 2024-08-06
Payer: COMMERCIAL

## 2024-08-06 ENCOUNTER — HOSPITAL ENCOUNTER (OUTPATIENT)
Facility: HOSPITAL | Age: 64
Setting detail: HOSPITAL OUTPATIENT SURGERY
Discharge: HOME | End: 2024-08-06
Attending: INTERNAL MEDICINE | Admitting: INTERNAL MEDICINE
Payer: COMMERCIAL

## 2024-08-06 ENCOUNTER — APPOINTMENT (OUTPATIENT)
Dept: GENERAL RADIOLOGY | Facility: HOSPITAL | Age: 64
End: 2024-08-06
Payer: COMMERCIAL

## 2024-08-06 VITALS
RESPIRATION RATE: 20 BRPM | WEIGHT: 153 LBS | OXYGEN SATURATION: 93 % | DIASTOLIC BLOOD PRESSURE: 57 MMHG | SYSTOLIC BLOOD PRESSURE: 105 MMHG | HEART RATE: 71 BPM | TEMPERATURE: 97.6 F | HEIGHT: 61 IN | BODY MASS INDEX: 28.89 KG/M2

## 2024-08-06 DIAGNOSIS — R91.8 MULTIPLE LUNG NODULES ON CT: ICD-10-CM

## 2024-08-06 PROCEDURE — 25010000002 DEXAMETHASONE PER 1 MG: Performed by: NURSE ANESTHETIST, CERTIFIED REGISTERED

## 2024-08-06 PROCEDURE — 76000 FLUOROSCOPY <1 HR PHYS/QHP: CPT

## 2024-08-06 PROCEDURE — 31632 BRONCHOSCOPY/LUNG BX ADDL: CPT | Performed by: INTERNAL MEDICINE

## 2024-08-06 PROCEDURE — 31629 BRONCHOSCOPY/NEEDLE BX EACH: CPT | Performed by: INTERNAL MEDICINE

## 2024-08-06 PROCEDURE — 87206 SMEAR FLUORESCENT/ACID STAI: CPT | Performed by: INTERNAL MEDICINE

## 2024-08-06 PROCEDURE — C1726 CATH, BAL DIL, NON-VASCULAR: HCPCS | Performed by: INTERNAL MEDICINE

## 2024-08-06 PROCEDURE — 87116 MYCOBACTERIA CULTURE: CPT | Performed by: INTERNAL MEDICINE

## 2024-08-06 PROCEDURE — 31624 DX BRONCHOSCOPE/LAVAGE: CPT | Performed by: INTERNAL MEDICINE

## 2024-08-06 PROCEDURE — 87205 SMEAR GRAM STAIN: CPT | Performed by: INTERNAL MEDICINE

## 2024-08-06 PROCEDURE — 87070 CULTURE OTHR SPECIMN AEROBIC: CPT | Performed by: INTERNAL MEDICINE

## 2024-08-06 PROCEDURE — 31623 DX BRONCHOSCOPE/BRUSH: CPT | Performed by: INTERNAL MEDICINE

## 2024-08-06 PROCEDURE — 31654 BRONCH EBUS IVNTJ PERPH LES: CPT | Performed by: INTERNAL MEDICINE

## 2024-08-06 PROCEDURE — 31652 BRONCH EBUS SAMPLNG 1/2 NODE: CPT | Performed by: INTERNAL MEDICINE

## 2024-08-06 PROCEDURE — 25810000003 SODIUM CHLORIDE 0.9 % SOLUTION: Performed by: NURSE ANESTHETIST, CERTIFIED REGISTERED

## 2024-08-06 PROCEDURE — 31628 BRONCHOSCOPY/LUNG BX EACH: CPT | Performed by: INTERNAL MEDICINE

## 2024-08-06 PROCEDURE — 88341 IMHCHEM/IMCYTCHM EA ADD ANTB: CPT | Performed by: INTERNAL MEDICINE

## 2024-08-06 PROCEDURE — 88305 TISSUE EXAM BY PATHOLOGIST: CPT | Performed by: INTERNAL MEDICINE

## 2024-08-06 PROCEDURE — 25010000002 PROPOFOL 10 MG/ML EMULSION: Performed by: NURSE ANESTHETIST, CERTIFIED REGISTERED

## 2024-08-06 PROCEDURE — 25010000002 ONDANSETRON PER 1 MG: Performed by: NURSE ANESTHETIST, CERTIFIED REGISTERED

## 2024-08-06 PROCEDURE — 71045 X-RAY EXAM CHEST 1 VIEW: CPT

## 2024-08-06 PROCEDURE — 25010000002 ONDANSETRON PER 1 MG: Performed by: ANESTHESIOLOGY

## 2024-08-06 PROCEDURE — 25010000002 MIDAZOLAM PER 1 MG: Performed by: ANESTHESIOLOGY

## 2024-08-06 PROCEDURE — 31633 BRONCHOSCOPY/NEEDLE BX ADDL: CPT | Performed by: INTERNAL MEDICINE

## 2024-08-06 PROCEDURE — 31627 NAVIGATIONAL BRONCHOSCOPY: CPT | Performed by: INTERNAL MEDICINE

## 2024-08-06 PROCEDURE — 87102 FUNGUS ISOLATION CULTURE: CPT | Performed by: INTERNAL MEDICINE

## 2024-08-06 RX ORDER — LIDOCAINE HYDROCHLORIDE 40 MG/ML
4 INJECTION, SOLUTION RETROBULBAR; TOPICAL ONCE
Status: DISCONTINUED | OUTPATIENT
Start: 2024-08-06 | End: 2024-08-06 | Stop reason: HOSPADM

## 2024-08-06 RX ORDER — LIDOCAINE HYDROCHLORIDE 10 MG/ML
INJECTION, SOLUTION EPIDURAL; INFILTRATION; INTRACAUDAL; PERINEURAL AS NEEDED
Status: DISCONTINUED | OUTPATIENT
Start: 2024-08-06 | End: 2024-08-06 | Stop reason: SURG

## 2024-08-06 RX ORDER — ONDANSETRON 2 MG/ML
4 INJECTION INTRAMUSCULAR; INTRAVENOUS ONCE AS NEEDED
Status: DISCONTINUED | OUTPATIENT
Start: 2024-08-06 | End: 2024-08-06 | Stop reason: HOSPADM

## 2024-08-06 RX ORDER — ONDANSETRON 2 MG/ML
INJECTION INTRAMUSCULAR; INTRAVENOUS AS NEEDED
Status: DISCONTINUED | OUTPATIENT
Start: 2024-08-06 | End: 2024-08-06 | Stop reason: SURG

## 2024-08-06 RX ORDER — ONDANSETRON 2 MG/ML
4 INJECTION INTRAMUSCULAR; INTRAVENOUS ONCE
Status: COMPLETED | OUTPATIENT
Start: 2024-08-06 | End: 2024-08-06

## 2024-08-06 RX ORDER — DEXAMETHASONE SODIUM PHOSPHATE 4 MG/ML
INJECTION, SOLUTION INTRA-ARTICULAR; INTRALESIONAL; INTRAMUSCULAR; INTRAVENOUS; SOFT TISSUE AS NEEDED
Status: DISCONTINUED | OUTPATIENT
Start: 2024-08-06 | End: 2024-08-06 | Stop reason: SURG

## 2024-08-06 RX ORDER — MIDAZOLAM HYDROCHLORIDE 1 MG/ML
2 INJECTION INTRAMUSCULAR; INTRAVENOUS ONCE
Status: COMPLETED | OUTPATIENT
Start: 2024-08-06 | End: 2024-08-06

## 2024-08-06 RX ORDER — SODIUM CHLORIDE 9 MG/ML
40 INJECTION, SOLUTION INTRAVENOUS AS NEEDED
Status: DISCONTINUED | OUTPATIENT
Start: 2024-08-06 | End: 2024-08-06 | Stop reason: HOSPADM

## 2024-08-06 RX ORDER — EPHEDRINE SULFATE 50 MG/ML
INJECTION INTRAVENOUS AS NEEDED
Status: DISCONTINUED | OUTPATIENT
Start: 2024-08-06 | End: 2024-08-06 | Stop reason: SURG

## 2024-08-06 RX ORDER — IPRATROPIUM BROMIDE AND ALBUTEROL SULFATE 2.5; .5 MG/3ML; MG/3ML
SOLUTION RESPIRATORY (INHALATION)
Status: DISCONTINUED
Start: 2024-08-06 | End: 2024-08-06 | Stop reason: HOSPADM

## 2024-08-06 RX ORDER — SODIUM CHLORIDE 0.9 % (FLUSH) 0.9 %
10 SYRINGE (ML) INJECTION AS NEEDED
Status: DISCONTINUED | OUTPATIENT
Start: 2024-08-06 | End: 2024-08-06 | Stop reason: HOSPADM

## 2024-08-06 RX ORDER — ROCURONIUM BROMIDE 10 MG/ML
INJECTION, SOLUTION INTRAVENOUS AS NEEDED
Status: DISCONTINUED | OUTPATIENT
Start: 2024-08-06 | End: 2024-08-06 | Stop reason: SURG

## 2024-08-06 RX ORDER — SODIUM CHLORIDE 0.9 % (FLUSH) 0.9 %
10 SYRINGE (ML) INJECTION EVERY 12 HOURS SCHEDULED
Status: DISCONTINUED | OUTPATIENT
Start: 2024-08-06 | End: 2024-08-06 | Stop reason: HOSPADM

## 2024-08-06 RX ORDER — PROPOFOL 10 MG/ML
VIAL (ML) INTRAVENOUS AS NEEDED
Status: DISCONTINUED | OUTPATIENT
Start: 2024-08-06 | End: 2024-08-06 | Stop reason: SURG

## 2024-08-06 RX ORDER — IPRATROPIUM BROMIDE AND ALBUTEROL SULFATE 2.5; .5 MG/3ML; MG/3ML
3 SOLUTION RESPIRATORY (INHALATION) ONCE AS NEEDED
Status: COMPLETED | OUTPATIENT
Start: 2024-08-06 | End: 2024-08-06

## 2024-08-06 RX ORDER — SODIUM CHLORIDE 9 MG/ML
INJECTION, SOLUTION INTRAVENOUS CONTINUOUS PRN
Status: DISCONTINUED | OUTPATIENT
Start: 2024-08-06 | End: 2024-08-06 | Stop reason: SURG

## 2024-08-06 RX ADMIN — IPRATROPIUM BROMIDE AND ALBUTEROL SULFATE 3 ML: 2.5; .5 SOLUTION RESPIRATORY (INHALATION) at 17:30

## 2024-08-06 RX ADMIN — ONDANSETRON 4 MG: 2 INJECTION INTRAMUSCULAR; INTRAVENOUS at 15:22

## 2024-08-06 RX ADMIN — MIDAZOLAM HYDROCHLORIDE 2 MG: 1 INJECTION, SOLUTION INTRAMUSCULAR; INTRAVENOUS at 14:59

## 2024-08-06 RX ADMIN — EPHEDRINE SULFATE 5 MG: 50 INJECTION INTRAVENOUS at 15:44

## 2024-08-06 RX ADMIN — DEXAMETHASONE SODIUM PHOSPHATE 4 MG: 4 INJECTION INTRA-ARTICULAR; INTRALESIONAL; INTRAMUSCULAR; INTRAVENOUS; SOFT TISSUE at 15:20

## 2024-08-06 RX ADMIN — SODIUM CHLORIDE: 9 INJECTION, SOLUTION INTRAVENOUS at 15:08

## 2024-08-06 RX ADMIN — ROCURONIUM BROMIDE 50 MG: 10 SOLUTION INTRAVENOUS at 15:15

## 2024-08-06 RX ADMIN — Medication 10 ML: at 14:44

## 2024-08-06 RX ADMIN — PROPOFOL 200 MG: 10 INJECTION, EMULSION INTRAVENOUS at 15:15

## 2024-08-06 RX ADMIN — LIDOCAINE HYDROCHLORIDE 50 MG: 10 INJECTION, SOLUTION EPIDURAL; INFILTRATION; INTRACAUDAL; PERINEURAL at 15:15

## 2024-08-06 RX ADMIN — ONDANSETRON 4 MG: 2 INJECTION INTRAMUSCULAR; INTRAVENOUS at 14:57

## 2024-08-06 NOTE — OP NOTE
Bronchoscopy Procedural Note       Date of Operation: 8/6/2024    Indication: This is a 64 y.o. female with enlarging groundglass nodules including a PET avid right lower lobe nodule.    Pre-op Diagnosis: Lung nodules.    Post-op Diagnosis: Lung nodules with borderline interlobar lymphadenopathy status post robotic navigational directed and endobronchial ultrasound-guided biopsies.    Surgeon: Toni Mcclelland MD      Referring Physician:    Procedures Performed:  Flexible videobronchoscopy  Bronchial washings  Robotic navigational, fluoroscopic, and radial EBUS directed TBNA of right lower lobe lung nodule.    Robotic navigational, fluoroscopic, and radial EBUS directed transbronchial forceps biopsies of right lower lobe lung nodule.    Endobronchial brushing right lower lobe lung nodule.  Bronchoalveolar lavage right lower lobe  Robotic navigational, fluoroscopic, and radial EBUS directed TBNA of right upper lobe lung nodule.  Robotic navigational, fluoroscopic, and radial EBUS directed transbronchial forceps biopsy of right upper lobe lung nodule.  Endobronchial brushing right upper lobe lung nodule.  Bronchoalveolar lavage right upper lobe.  Linear endobronchial ultrasound-guided TBNA of station 11R.      Anesthesia: General Endotracheal, per anesthesia    Estimated Blood Loss: <5 ml    Description of Procedure:    Informed consent was obtained after the risks and benefits of the procedure, including the risk of bleeding, pneumothorax, medication reaction, and death were described.  A signed informed consent form was placed on the chart prior to the procedure.      Patient was brought to the endoscopy room where she was intubated by anesthesia who monitored the patient throughout the case.    A flexible video bronchoscope was inserted through the existing airway and advanced to the distal trachea.  Distal trachea was normal in appearance.  Main onel was sharp.  I examined both the right and left bronchial  trees which showed normal bronchial anatomy, scant, clear secretions, no discrete endobronchial lesions were seen.  I then removed the flexible video bronchoscope.    I then docked the robotic navigational bronchoscope to the patient/endotracheal tube.  I performed a partial registration process of the right lung which was deemed to be adequate.  I then navigated to the preplanned right lower lobe biopsy site.  Once I was in position, used fluoroscopy and radial EBUS to confirm position.  I then used robotic navigational bronchoscopy, fluoroscopy, and radial EBUS to do TBNA with a 19-gauge TBNA needle of the right lower lobe lung nodule.  I then used robotic navigational bronchoscopy, fluoroscopy, and radial EBUS to do transbronchial biopsies with forceps of the right lower lobe lung nodule.  I then did an endobronchial brushing using fluoroscopy of the right lower lobe lung nodule.  I then did a bronchoalveolar lavage with a total of 100 mL of sterile saline with approximately 40 mL of bloody return.  There was no significant bleeding with any of the procedures.    Next, I navigated to the preplanned right upper lobe biopsy site.  I then used radial EBUS to confirm location adjacent to the nodule.  Once in position, I brought in fluoroscopy and used robotic navigational bronchoscopy, fluoroscopy, and radial EBUS to do multiple TBNA's with a 19-gauge TBNA needle of the right upper lobe lung nodule.  I then used robotic navigational bronchoscopy, fluoroscopy, and radial endobronchial ultrasound to do multiple transbronchial biopsies utilizing biopsy forceps of the right upper lobe lung nodule.  I then used fluoroscopy to do an endobronchial brushing of the right upper lobe lung nodule.  I then did a bronchoalveolar lavage with a total of 80 mL of sterile saline with approximately 25 mL of bloody return.  I then removed the robotic navigational bronchoscope.    I then reinserted the flexible video bronchoscope and did  a brief airway examination and cleaned up some residual secretions.  I then removed the scope.    I then inserted a linear endobronchial ultrasound scope and did a survey of mediastinal, hilar, and interlobar lymph node stations.  There was a mildly enlarged lymph node at station 11R.  I used linear endobronchial ultrasound to do multiple passes of a 21-gauge TBNA needle at station 11R with adequate specimen obtained and no significant bleeding.  I then remove the linear endobronchial ultrasound scope.    I then reinserted the flexible video bronchoscope and did a brief airway inspection.  I cleaned up some residual secretions and removed the scope.  I turned the patient over to anesthesia and endoscopy staff for extubation and postprocedure monitoring.    There were no immediate complications.    Findings and Recommendations:  Specimens obtained:  1.  Right lower lobe TBNA.  2.  Right lower lobe transbronchial biopsies.  3.  Right lower lobe endobronchial brushing.  4.  Bronchoalveolar lavage of right lower lobe.  5.  Right upper lobe TBNA.  6.  Right upper lobe transbronchial biopsies.  7.  Right upper lobe endobronchial brush.  8.  Right upper lobe bronchoalveolar lavage.  9.  TBNA station 11R.  10.  Bronchial washings.    Total fluoroscopy time 143 seconds.    Patient will follow-up on Friday or Monday in the office for preliminary results of biopsies.    Toni Mcclelland MD  Pulmonary and Critical Care Medicine   08/06/24 17:00 EDT

## 2024-08-06 NOTE — H&P
Pulmonary Follow-up      Patient Care Team:  Dee Elena APRN as PCP - General (Nurse Practitioner)  Laura Jimenez MD as Referring Physician (Gynecologic Oncology)  Cliff Montana MD as Consulting Physician (Radiation Oncology)  Toni Mcclelland MD as Consulting Physician (Pulmonary Disease)  Erik Mckeon DO as Consulting Physician (Pulmonary Disease)  Susan Griffiths, RN as Nurse Navigator    Chief Complaint / Reason: Lung nodules          Subjective     64 y.o. female former smoker who quit in 2019 who actively vapes with history of cervical cancer, lung nodules previously followed by myself and Dr. Mckeon, COPD (with most recent FEV1 of 1.07 L or 48% predicted), recently seen by Ngoc Alvarez and had a CT scan of the chest showing some slow progression of her lung nodules.  She subsequently had a PET CT scan of the chest which showed some hypermetabolism and a small right lower lobe lung nodule.    Patient reports her breathing is overall worse.  She reports previous non-compliance with Stiolto but has been using it regularly more recently and reports she is breathing better.      History taken from: patient    PMH/FH/Social History were reviewed and updated appropriately in the electronic medical record.     Past Medical History:   Diagnosis Date    Acid reflux     Acid reflux     Anxiety 1976    Arthritis     Atherosclerotic cardiovascular disease 03/25/2024    Cervical cancer     Depression     Emphysema of lung     History of radiation therapy 11/20/2020    pelvis + intracavitary brachytherapy    Hyperlipidemia     Hypothyroidism     Kidney disease     Lung nodule     Ovarian cyst 1980s    Visual impairment corrected with glasses    Wears dentures     Wears glasses      Family History   Problem Relation Age of Onset    Cancer Mother     Hypertension Mother     Mental illness Mother     Anxiety disorder Mother     Asthma Mother     COPD Mother     Depression  Mother     Emphysema Mother     Cancer Father     Early death Father     Heart attack Maternal Grandfather     Heart disease Maternal Grandfather         Heart Attack 65 yo    Cancer Maternal Aunt     Cancer Maternal Aunt     Cancer Maternal Grandmother     Breast cancer Neg Hx     Ovarian cancer Neg Hx      Past Surgical History:   Procedure Laterality Date    LYMPH NODE BIOPSY      SUBTOTAL HYSTERECTOMY  ?    ovary/fallopian tube removed    TOTAL ABDOMINAL HYSTERECTOMY PELVIC NODE DISSECTION N/A 2020    Procedure: TOTAL RADICAL HYSTERECTOMY PELVIC NODE DISSECTION;  Surgeon: Laura Jimenez MD;  Location: [facility];  Service: Gynecology Oncology;  Laterality: N/A;    TUBAL ABDOMINAL LIGATION      right with ovarian dissection        Social History     Socioeconomic History    Marital status:    Tobacco Use    Smoking status: Former     Current packs/day: 0.00     Average packs/day: 1.5 packs/day for 45.0 years (67.5 ttl pk-yrs)     Types: Cigarettes     Start date:      Quit date: 3/30/2019     Years since quittin.3     Passive exposure: Past    Smokeless tobacco: Never    Tobacco comments:     quit analog cigs in march, still vapes daily   Vaping Use    Vaping status: Every Day    Substances: Nicotine   Substance and Sexual Activity    Alcohol use: No    Drug use: No    Sexual activity: Not Currently     Partners: Male         Review of Systems:   Review of Systems  All other systems were reviewed and are negative.  Exceptions are noted in the subjective or above.      Objective     Vital Signs     No intake/output data recorded.  There is no height or weight on file to calculate BMI.    Physical Exam:     Constitutional:    Alert, cooperative, in no acute distress   Head:    Normocephalic, without obvious abnormality, atraumatic   Eyes:            Lids and lashes normal, conjunctivae and sclerae normal, no   icterus, no pallor, corneas clear, PER   ENMT:   Ears appear intact with no  abnormalities noted         Neck: Trachea midline, no thyromegaly, no JVD       Lungs/Resp:     Normal effort, symmetric chest rise, no crepitus, clear to      auscultation bilaterally, no chest wall tenderness                 Heart/CV:    Regular rhythm and normal rate, normal S1 and S2, no            murmur   Abdomen/GI:        :     Deferred   Extremities/MSK:   No clubbing or cyanosis.  No edema.  Normal tone.  No deformities.    Pulses:      Skin:   No bleeding, bruising or rash   Heme/Lymph:   No cervical or supraclavicular adenopathy.    Neurologic:    Psychiatric:       Moves all extremities with no obvious focal motor deficit.  Cranial nerves 2 - 12 grossly intact   Oriented x 3, normal affect.          The above findings are documentation of my personal physical examination from today.  Electronically signed by:  Toni Mcclelland MD  08/06/24  14:35 EDT     Results Review:     I reviewed the patient's new clinical results.       Imaging:  I reviewed the patient's new imaging including reviewing the images and radiologist's report.      I reviewed patient's CT scan of the chest from 6/4/2024, PET CT from 7/11/2024, and remote CT scan of the chest from 9/30/2019.  Images show progression of bilateral groundglass nodules with relatively new right upper lobe groundglass nodule which appears to be faintly PET avid on PET scan, and a right lower lobe superior segment nodule with a small solid component that appears to be PET avid.  Radiologist impression from most recent PET scan follows:    Impression:     1. The patient's small discrete medial right lower lobe nodule is moderately hypermetabolic and suspicious for neoplasm.     2. Of the patient's multiple groundglass lesions, the right upper lobe lesion on image 117 is equivalent to mediastinal/blood pool activity, and remaining lesions are less active than this. This does not exclude low-grade neoplasm at any of these sites.     3. Negative PET scan  elsewhere.    PFTs:     Spirometry was done on 7/23/2024.  Please see scanned PFT report for complete details.  FVC was 1.79 L or 65% predicted.  FEV1 was 1.12 L or 51% predicted.  FEV1 to FVC ratio was 62%.  Postbronchodilator FEV1 was 1.31 L or 60% predicted, a 17% increase from prebronchodilator.  Maximal voluntary ventilation was 30 L/min or 36% predicted.    Interpretation: Moderate obstruction technically with no significant improvement with bronchodilator although the patient did have a 17% improvement, she only had a 190 mL improvement which did not meet the 200 mL requirement for significance.  Nonetheless, I do feel that, given her lung volumes, 190 mL is probably a significant improvement for this patient.    Medication Review:     No current facility-administered medications for this encounter.       Assessment & Plan   Problems Addressed this Visit    None  Diagnoses    None.       64 y.o. female former smoker who quit in 2019 who actively vapes with history of cervical cancer, lung nodules previously followed by myself and Dr. Mckeon, COPD (with most recent FEV1 of 1.07 L or 48% predicted), recently seen by Ngoc Alvarez and had a CT scan of the chest showing some slow progression of her lung nodules.  She subsequently had a PET CT scan of the chest which showed some hypermetabolism and a small right lower lobe lung nodule.    Patient reports her breathing is overall worse.  She reports previous non-compliance with Stiolto but has been using it regularly more recently and reports she is breathing better.      I reviewed the patient's recent CT scans, remote CT scans, and recent PET scan.  She has a left lower lobe superior segment nodule which is PET avid.  She has a relatively new groundglass nodule in the right upper lobe along with several other bilateral groundglass nodules that have slowly progressed in size since 2019.    Pulmonary function testing today shows moderate obstruction and appears  to have an asthmatic component.    I discussed options for further diagnosis with the patient, including the options of serial imaging, CT-guided biopsy, bronchoscopic biopsy including utilizing robotic navigation bronchoscopy, and surgical biopsy, including the risks and benefits of each.  After discussion, patient wishes to pursue robotic navigational bronchoscopy to obtain tissue use in the hopes of obtaining a diagnosis for her lung nodules.  I did describe the risk of bleeding, lung damage/pneumothorax, medication reaction, respiratory failure, and death.  Patient wishes to proceed.    I will additionally send off a hypersensitivity pneumonitis panel today.    I will try to do her bronchoscopy in about 2 weeks.    Plan:  1.  For progressive bilateral groundglass nodules including a PET avid right lower lobe lung nodule: Obtain repeat CT scan for procedural planning.  Schedule robotic navigation bronchoscopy with endobronchial ultrasound evaluation of mediastinal lymph nodes and biopsies if indicated.  2.  For COPD with asthma: I am going to increase her Stiolto to Trelegy 100.  Counseled her on proper inhaler use including rinsing mouth out after use and demonstrated proper inhaler use.  3.  For vaping: Counseled on avoiding vaping.        Immunization History   Administered Date(s) Administered    COVID-19 (MODERNA) 1st,2nd,3rd Dose Monovalent 03/12/2021, 04/02/2021    COVID-19 (PFIZER) BIVALENT 12+YRS 10/20/2022    COVID-19 (PFIZER) Purple Cap Monovalent 03/12/2021, 04/02/2021, 10/02/2021    COVID-19 F23 (PFIZER) 12YRS+ (COMIRNATY) 10/14/2023    Covid-19 (Pfizer) Gray Cap Monovalent 05/29/2022    Fluad Quad 65+ 10/14/2023    Fluzone (or Fluarix & Flulaval for VFC) >6mos 10/25/2018, 09/25/2020, 10/02/2021, 10/20/2022    Influenza, Unspecified 10/02/2021, 10/20/2022    Pneumococcal Polysaccharide (PPSV23) 04/09/2019    flucelvax quad pfs =>4 YRS 10/09/2019       Social History     Tobacco Use   Smoking Status  Former    Current packs/day: 0.00    Average packs/day: 1.5 packs/day for 45.0 years (67.5 ttl pk-yrs)    Types: Cigarettes    Start date:     Quit date: 3/30/2019    Years since quittin.3    Passive exposure: Past   Smokeless Tobacco Never   Tobacco Comments    quit analog cigs in march, still vapes daily        Sydnie Gamble  reports that she quit smoking about 5 years ago. Her smoking use included cigarettes. She started smoking about 52 years ago. She has a 67.5 pack-year smoking history. She has been exposed to tobacco smoke. She has never used smokeless tobacco.           Advance Care Planning   ACP discussion was held with the patient during this visit. Patient does not have an advance directive, information provided.      ________________________________________________________________________________      The above note from 2024 was reviewed.  I reviewed with the patient and she has had no changes.  She had a planning CT scan done and I have had a chance to plan the robotic navigation procedure and the plan appears to be adequate for procedure.  I reviewed the risks, benefits, and alternatives to procedure including the risk of bleeding, medication reaction, pneumothorax, respiratory failure, and death.  The patient is agreeable and we will proceed with robotic navigation bronchoscopy with biopsies and endobronchial ultrasound evaluation of mediastinal and chest lymph nodes with biopsies if indicated.    Electronically signed by:  Toni Mcclelland MD  24  14:35 EDT      *. Please note that portions of this note were completed with [employer] - a voice recognition program.

## 2024-08-06 NOTE — ANESTHESIA PROCEDURE NOTES
Airway  Urgency: elective    Date/Time: 8/6/2024 3:20 PM  Airway not difficult    General Information and Staff    Patient location during procedure: OR  CRNA/CAA: Junior CLARITA Hairston, CRNA    Indications and Patient Condition  Indications for airway management: airway protection    Preoxygenated: yes  MILS not maintained throughout  Mask difficulty assessment: 1 - vent by mask    Final Airway Details  Final airway type: endotracheal airway      Successful airway: ETT  Cuffed: yes   Successful intubation technique: direct laryngoscopy  Endotracheal tube insertion site: oral  Blade: Lindsey  Blade size: 3  ETT size (mm): 8.5  Cormack-Lehane Classification: grade I - full view of glottis  Placement verified by: chest auscultation and capnometry   Measured from: lips  ETT/EBT  to lips (cm): 20  Number of attempts at approach: 1  Assessment: lips, teeth, and gum same as pre-op and atraumatic intubation    Additional Comments  Negative epigastric sounds, Breath sound equal bilaterally with symmetric chest rise and fall

## 2024-08-06 NOTE — ANESTHESIA PREPROCEDURE EVALUATION
Anesthesia Evaluation     Patient summary reviewed and Nursing notes reviewed   NPO Solid Status: > 8 hours  NPO Liquid Status: > 2 hours           Airway   Mallampati: I  TM distance: >3 FB  Neck ROM: full  No difficulty expected  Dental    (+) upper dentures and lower dentures    Pulmonary     breath sounds clear to auscultation  (+) COPD, asthma,  Cardiovascular     ECG reviewed  Rhythm: regular  Rate: normal    (+) hyperlipidemia      Neuro/Psych  (+) psychiatric history Anxiety  GI/Hepatic/Renal/Endo    (+) GERD, renal disease-, thyroid problem hypothyroidism    Musculoskeletal     Abdominal    Substance History      OB/GYN          Other   arthritis,   history of cancer    ROS/Med Hx Other: Left ventricular systolic function is normal. Left ventricular ejection fraction appears to be 56 - 60%.  ·  Left ventricular diastolic function was normal.  ·  Estimated right ventricular systolic pressure from tricuspid regurgitation is normal (<35 mmHg).  ·  No significant structural or functional valvular disease.                  Anesthesia Plan    ASA 3     general     intravenous induction     Anesthetic plan, risks, benefits, and alternatives have been provided, discussed and informed consent has been obtained with: patient.    Plan discussed with CRNA.    CODE STATUS:

## 2024-08-06 NOTE — ANESTHESIA POSTPROCEDURE EVALUATION
Patient: Sydnie Gamble    Procedure Summary       Date: 08/06/24 Room / Location: [facility] ENDOSCOPY 3 /  [facility]    Anesthesia Start: 1508 Anesthesia Stop:     Procedure: BRONCHOSCOPY NAVIGATION WITH ENDOBRONCHIAL ULTRASOUND AND ION ROBOT Diagnosis:       Multiple lung nodules on CT      (Multiple lung nodules on CT [R91.8])    Surgeons: Toni Mcclelland MD Provider: Carlos Silva MD    Anesthesia Type: general ASA Status: 3            Anesthesia Type: general    Vitals  No vitals data found for the desired time range.          Post Anesthesia Care and Evaluation    Patient location during evaluation: PACU  Patient participation: complete - patient participated  Level of consciousness: awake and alert  Pain management: adequate    Airway patency: patent  Anesthetic complications: No anesthetic complications  PONV Status: none  Cardiovascular status: hemodynamically stable and acceptable  Respiratory status: nonlabored ventilation, acceptable and nasal cannula  Hydration status: acceptable

## 2024-08-07 LAB
GIE STN SPEC: NORMAL

## 2024-08-08 LAB
BACTERIA SPEC RESP CULT: NO GROWTH
BACTERIA SPEC RESP CULT: NO GROWTH
BACTERIA SPEC RESP CULT: NORMAL
GRAM STN SPEC: NORMAL

## 2024-08-09 LAB — REF LAB TEST METHOD: NORMAL

## 2024-08-11 LAB
FUNGUS WND CULT: NORMAL
MYCOBACTERIUM SPEC CULT: NORMAL
NIGHT BLUE STAIN TISS: NORMAL

## 2024-08-12 ENCOUNTER — OFFICE VISIT (OUTPATIENT)
Dept: PULMONOLOGY | Facility: CLINIC | Age: 64
End: 2024-08-12
Payer: COMMERCIAL

## 2024-08-12 VITALS
HEART RATE: 96 BPM | BODY MASS INDEX: 28.51 KG/M2 | WEIGHT: 151 LBS | TEMPERATURE: 97.5 F | SYSTOLIC BLOOD PRESSURE: 120 MMHG | HEIGHT: 61 IN | DIASTOLIC BLOOD PRESSURE: 80 MMHG | OXYGEN SATURATION: 97 %

## 2024-08-12 DIAGNOSIS — Z87.891 PERSONAL HISTORY OF TOBACCO USE, PRESENTING HAZARDS TO HEALTH: ICD-10-CM

## 2024-08-12 DIAGNOSIS — C34.91 ADENOCARCINOMA OF RIGHT LUNG: ICD-10-CM

## 2024-08-12 DIAGNOSIS — R59.0 HILAR ADENOPATHY: ICD-10-CM

## 2024-08-12 DIAGNOSIS — R91.8 MULTIPLE LUNG NODULES ON CT: Primary | ICD-10-CM

## 2024-08-12 PROBLEM — C34.90 ADENOCARCINOMA OF LUNG: Status: ACTIVE | Noted: 2024-08-12

## 2024-08-12 PROCEDURE — 99214 OFFICE O/P EST MOD 30 MIN: CPT | Performed by: NURSE PRACTITIONER

## 2024-08-12 NOTE — PROGRESS NOTES
"[facility] Pulmonary Follow up      Chief Complaint  Breathing Problem    Subjective          Sydnie Gamble presents to [facility] for follow-up after her bronchoscopy.  She had  enlarging pulmonary nodules that were hypermetabolic on her PET scan and underwent bronchoscopy with biopsy by Dr. Mcclelland on August 6.    Since her procedure she has done well.  She has been on Trelegy since her last visit in July and actually feels much better with her shortness of breath.  She is less dyspneic with activity.  She denies any cough or sputum production.        Objective     Vital Signs:   /80   Pulse 96   Temp 97.5 °F (36.4 °C)   Ht 154.9 cm (60.98\")   Wt 68.5 kg (151 lb)   SpO2 97% Comment: room air at rest  BMI 28.55 kg/m²       Immunization History   Administered Date(s) Administered    COVID-19 (MODERNA) 1st,2nd,3rd Dose Monovalent 03/12/2021, 04/02/2021    COVID-19 (PFIZER) BIVALENT 12+YRS 10/20/2022    COVID-19 (PFIZER) Purple Cap Monovalent 03/12/2021, 04/02/2021, 10/02/2021    COVID-19 F23 (PFIZER) 12YRS+ (COMIRNATY) 10/14/2023    Covid-19 (Pfizer) Gray Cap Monovalent 05/29/2022    Fluad Quad 65+ 10/14/2023    Fluzone (or Fluarix & Flulaval for VFC) >6mos 10/25/2018, 09/25/2020, 10/02/2021, 10/20/2022    Influenza, Unspecified 10/02/2021, 10/20/2022    Pneumococcal Polysaccharide (PPSV23) 04/09/2019    flucelvax quad pfs =>4 YRS 10/09/2019       Physical Exam  Vitals reviewed.   Constitutional:       Appearance: She is well-developed.   HENT:      Head: Normocephalic and atraumatic.   Eyes:      Pupils: Pupils are equal, round, and reactive to light.   Cardiovascular:      Rate and Rhythm: Normal rate and regular rhythm.      Heart sounds: No murmur heard.  Pulmonary:      Effort: Pulmonary effort is normal. No respiratory distress.      Breath sounds: Normal breath sounds. No wheezing or rales.   Abdominal:      General: Bowel sounds are " normal. There is no distension.      Palpations: Abdomen is soft.   Musculoskeletal:         General: Normal range of motion.      Cervical back: Normal range of motion and neck supple.   Skin:     General: Skin is warm and dry.      Findings: No erythema.   Neurological:      Mental Status: She is alert and oriented to person, place, and time.   Psychiatric:         Behavior: Behavior normal.          Result Review :            Final Diagnosis   1.  LUNG, RIGHT LOWER LOBE, TRANSBRONCHIAL NEEDLE ASPIRATE:  Predominantly blood with scant minute aggregates of histiocytes  Negative for carcinoma  No alveolated lung tissue present     2.  LUNG, RIGHT LOWER LOBE, TRANSBRONCHIAL BIOPSY:  Adenocarcinoma, see Comment     3.  LUNG, RIGHT UPPER LOBE, TRANSBRONCHIAL NEEDLE ASPIRATE:  Predominantly blood with scant minute aggregates of histiocytes  Negative for carcinoma  No alveolated lung tissue present     4.  LUNG, RIGHT UPPER LOBE, TRANSBRONCHIAL BIOPSY:  Predominantly blood with scant fragments of benign paribronchial tissue  Negative for carcinoma  No alveolated lung tissue present     5.  11R LYMPH NODE, BIOPSY:  Extremely scant fragments of benign lymph node tissue and alveolar mucosa  Negative for carcinoma, see Comment                  Assessment and Plan    Problem List Items Addressed This Visit          Hematology and Neoplasia    Adenocarcinoma of lung    Relevant Orders    MRI brain w wo contrast    Ambulatory Referral to Oncology       Pulmonary and Pneumonias    Multiple lung nodules on CT - Primary    Overview     Right sided sub-centimeter lung nodules.             Symptoms and Signs    Hilar adenopathy       Tobacco    Personal history of tobacco use, presenting hazards to health       We went over her testing today.  She does have multiple nodules on her CT scan, Dr. Mcclelland was able to biopsy the right lower lobe nodule as well as a right upper lobe biopsy and lymph node station 11R.    The pathology from  the right lower lobe did show an adenocarcinoma.  The right upper lobe biopsy did not show any malignant cells, the lymph node biopsy was negative for carcinoma.    The cytology of the lavage from the right lower lobe was malignant, the right upper lobe was negative for malignant cells.    We will proceed with staging with an MRI of the brain.  We will also get her over to oncology for treatment options.  I will follow-up with Ms. Nya Griffiths our nurse navigator, she did meet Ms. Gamble on her visit with Dr. Mcclelland.    Follow Up     No follow-ups on file.  Patient was given instructions and counseling regarding her condition or for health maintenance advice. Please see specific information pulled into the AVS if appropriate.     DAYSI Ruvalcaba, ACNP  [facility]  Electronically signed

## 2024-08-13 DIAGNOSIS — F51.01 PRIMARY INSOMNIA: ICD-10-CM

## 2024-08-13 LAB
FUNGUS WND CULT: NORMAL
MYCOBACTERIUM SPEC CULT: NORMAL
NIGHT BLUE STAIN TISS: NORMAL

## 2024-08-14 ENCOUNTER — PATIENT OUTREACH (OUTPATIENT)
Dept: ONCOLOGY | Facility: CLINIC | Age: 64
End: 2024-08-14
Payer: COMMERCIAL

## 2024-08-14 DIAGNOSIS — F51.01 PRIMARY INSOMNIA: ICD-10-CM

## 2024-08-14 DIAGNOSIS — R11.0 NAUSEA: ICD-10-CM

## 2024-08-14 RX ORDER — ZOLPIDEM TARTRATE 5 MG/1
5 TABLET ORAL NIGHTLY PRN
Qty: 30 TABLET | Refills: 1 | Status: SHIPPED | OUTPATIENT
Start: 2024-08-14

## 2024-08-14 RX ORDER — ONDANSETRON 4 MG/1
4 TABLET, ORALLY DISINTEGRATING ORAL EVERY 8 HOURS PRN
Qty: 30 TABLET | Refills: 2 | Status: SHIPPED | OUTPATIENT
Start: 2024-08-14

## 2024-08-20 LAB
FUNGUS WND CULT: NORMAL
MYCOBACTERIUM SPEC CULT: NORMAL
MYCOBACTERIUM SPEC CULT: NORMAL
NIGHT BLUE STAIN TISS: NORMAL
NIGHT BLUE STAIN TISS: NORMAL

## 2024-08-21 LAB
MYCOBACTERIUM SPEC CULT: ABNORMAL
NIGHT BLUE STAIN TISS: ABNORMAL

## 2024-08-22 ENCOUNTER — LAB (OUTPATIENT)
Dept: INTERNAL MEDICINE | Facility: CLINIC | Age: 64
End: 2024-08-22
Payer: COMMERCIAL

## 2024-08-22 ENCOUNTER — OFFICE VISIT (OUTPATIENT)
Dept: INTERNAL MEDICINE | Facility: CLINIC | Age: 64
End: 2024-08-22
Payer: COMMERCIAL

## 2024-08-22 VITALS
HEIGHT: 61 IN | OXYGEN SATURATION: 97 % | WEIGHT: 149 LBS | HEART RATE: 88 BPM | SYSTOLIC BLOOD PRESSURE: 120 MMHG | BODY MASS INDEX: 28.13 KG/M2 | DIASTOLIC BLOOD PRESSURE: 74 MMHG

## 2024-08-22 DIAGNOSIS — F41.8 DEPRESSION WITH ANXIETY: Primary | ICD-10-CM

## 2024-08-22 DIAGNOSIS — F51.01 PRIMARY INSOMNIA: ICD-10-CM

## 2024-08-22 DIAGNOSIS — N18.31 STAGE 3A CHRONIC KIDNEY DISEASE: ICD-10-CM

## 2024-08-22 DIAGNOSIS — Z79.899 MEDICATION MANAGEMENT: ICD-10-CM

## 2024-08-22 DIAGNOSIS — E55.9 VITAMIN D DEFICIENCY: ICD-10-CM

## 2024-08-22 DIAGNOSIS — C34.91 ADENOCARCINOMA OF RIGHT LUNG: ICD-10-CM

## 2024-08-22 DIAGNOSIS — R73.03 BORDERLINE DIABETES: ICD-10-CM

## 2024-08-22 DIAGNOSIS — R68.89 COLD INTOLERANCE: ICD-10-CM

## 2024-08-22 DIAGNOSIS — E03.9 ACQUIRED HYPOTHYROIDISM: ICD-10-CM

## 2024-08-22 LAB
BASOPHILS # BLD AUTO: 0.05 10*3/MM3 (ref 0–0.2)
BASOPHILS NFR BLD AUTO: 0.6 % (ref 0–1.5)
DEPRECATED RDW RBC AUTO: 40.1 FL (ref 37–54)
EOSINOPHIL # BLD AUTO: 0.1 10*3/MM3 (ref 0–0.4)
EOSINOPHIL NFR BLD AUTO: 1.3 % (ref 0.3–6.2)
ERYTHROCYTE [DISTWIDTH] IN BLOOD BY AUTOMATED COUNT: 12.7 % (ref 12.3–15.4)
HCT VFR BLD AUTO: 37.2 % (ref 34–46.6)
HGB BLD-MCNC: 12.6 G/DL (ref 12–15.9)
IMM GRANULOCYTES # BLD AUTO: 0.04 10*3/MM3 (ref 0–0.05)
IMM GRANULOCYTES NFR BLD AUTO: 0.5 % (ref 0–0.5)
LYMPHOCYTES # BLD AUTO: 1.04 10*3/MM3 (ref 0.7–3.1)
LYMPHOCYTES NFR BLD AUTO: 13 % (ref 19.6–45.3)
MCH RBC QN AUTO: 29.6 PG (ref 26.6–33)
MCHC RBC AUTO-ENTMCNC: 33.9 G/DL (ref 31.5–35.7)
MCV RBC AUTO: 87.5 FL (ref 79–97)
MONOCYTES # BLD AUTO: 0.55 10*3/MM3 (ref 0.1–0.9)
MONOCYTES NFR BLD AUTO: 6.9 % (ref 5–12)
NEUTROPHILS NFR BLD AUTO: 6.2 10*3/MM3 (ref 1.7–7)
NEUTROPHILS NFR BLD AUTO: 77.7 % (ref 42.7–76)
NRBC BLD AUTO-RTO: 0 /100 WBC (ref 0–0.2)
PLATELET # BLD AUTO: 306 10*3/MM3 (ref 140–450)
PMV BLD AUTO: 9.5 FL (ref 6–12)
RBC # BLD AUTO: 4.25 10*6/MM3 (ref 3.77–5.28)
WBC NRBC COR # BLD AUTO: 7.98 10*3/MM3 (ref 3.4–10.8)

## 2024-08-22 PROCEDURE — 80050 GENERAL HEALTH PANEL: CPT | Performed by: NURSE PRACTITIONER

## 2024-08-22 PROCEDURE — 99214 OFFICE O/P EST MOD 30 MIN: CPT | Performed by: NURSE PRACTITIONER

## 2024-08-22 PROCEDURE — 82306 VITAMIN D 25 HYDROXY: CPT | Performed by: NURSE PRACTITIONER

## 2024-08-22 PROCEDURE — 82746 ASSAY OF FOLIC ACID SERUM: CPT | Performed by: NURSE PRACTITIONER

## 2024-08-22 PROCEDURE — 36415 COLL VENOUS BLD VENIPUNCTURE: CPT | Performed by: NURSE PRACTITIONER

## 2024-08-22 PROCEDURE — 82607 VITAMIN B-12: CPT | Performed by: NURSE PRACTITIONER

## 2024-08-22 RX ORDER — SERTRALINE HYDROCHLORIDE 100 MG/1
100 TABLET, FILM COATED ORAL DAILY
Qty: 90 TABLET | Refills: 1 | Status: SHIPPED | OUTPATIENT
Start: 2024-08-22

## 2024-08-22 NOTE — PROGRESS NOTES
Subjective   Chief Complaint   Patient presents with    Anxiety     Dx with lung cancer a few weeks ago.         Sydnie Gamble is a 64 y.o. female.    History of Present Illness  The patient presents for evaluation of multiple medical concerns.    She has been diagnosed with adenocarcinoma in her right lung, confirmed through a bronchoscopy and PET scan. She is under the care of Dr. Duarte and Dr. Burnett. She is considering a wedge resection for her lung cancer and is concerned about potential kidney damage from immunotherapy and chemotherapy. She is scheduled for a brain MRI this Sunday.    She reports feeling cold at home, with her hands and feet often feeling frozen. She experiences severe anxiety and has been prescribed clonazepam, which she takes as needed, usually 1.5 tablets. She also takes zolpidem and Ambien at night.    She was initially told that a bronchoscopy could not be performed due to poor results from her pulmonary function test (PFT), but after starting on an inhaler, her PFT improved and the bronchoscopy was successfully completed.    She is worried about her thyroid function and took levothyroxine this morning. Additionally, she has received a Cologuard kit but has not yet used it.    I have reviewed the following portions of the patient's history and confirmed they are accurate: allergies, current medications, past family history, past medical history, past social history, past surgical history, and problem list    Review of Systems  Pertinent items are noted in HPI.     Current Outpatient Medications on File Prior to Visit   Medication Sig    aspirin 81 MG EC tablet Take 1 tablet by mouth Daily.    atorvastatin (LIPITOR) 10 MG tablet Take 1 tablet by mouth Every Night.    famotidine (PEPCID) 20 MG tablet Take 1 tablet by mouth twice daily OR take 2 tablets by mouth once daily as needed for heartburn (Patient taking differently: Take 1 tablet by mouth. Take 1 tablet by mouth twice  "daily OR take 2 tablets by mouth once daily as needed for heartburn)    Fluticasone-Umeclidin-Vilant (Trelegy Ellipta) 100-62.5-25 MCG/ACT inhaler Inhale 1 puff Daily.    levothyroxine (SYNTHROID, LEVOTHROID) 75 MCG tablet TAKE 1 TABLET BY MOUTH EVERY DAY    ondansetron ODT (ZOFRAN-ODT) 4 MG disintegrating tablet Place 1 tablet on the tongue Every 8 (Eight) Hours As Needed for Nausea or Vomiting.    traZODone (DESYREL) 150 MG tablet Take 1 tablet by mouth Every Night.    zolpidem (AMBIEN) 5 MG tablet Take 1 tablet by mouth At Night As Needed for Sleep.     No current facility-administered medications on file prior to visit.       Objective   Vitals:    08/22/24 1454   BP: 120/74   BP Location: Left arm   Pulse: 88   SpO2: 97%   Weight: 67.6 kg (149 lb)   Height: 154.9 cm (61\")     Body mass index is 28.15 kg/m².    Physical Exam  Vitals reviewed.   Constitutional:       Appearance: Normal appearance. She is well-developed.   HENT:      Head: Normocephalic and atraumatic.      Nose: Nose normal.   Eyes:      General: Lids are normal.      Conjunctiva/sclera: Conjunctivae normal.      Pupils: Pupils are equal, round, and reactive to light.   Neck:      Thyroid: No thyromegaly.      Trachea: Trachea normal.   Cardiovascular:      Rate and Rhythm: Normal rate and regular rhythm.      Heart sounds: Normal heart sounds.   Pulmonary:      Effort: Pulmonary effort is normal. No respiratory distress.      Breath sounds: Normal breath sounds.   Skin:     General: Skin is warm and dry.   Neurological:      Mental Status: She is alert and oriented to person, place, and time.      GCS: GCS eye subscore is 4. GCS verbal subscore is 5. GCS motor subscore is 6.   Psychiatric:         Attention and Perception: Attention normal.         Mood and Affect: Mood normal.         Speech: Speech normal.         Behavior: Behavior normal. Behavior is cooperative.         Thought Content: Thought content normal.         Results  Laboratory " Studies  Cholesterol levels were good. A1c was borderline diabetic.    Assessment & Plan   Problem List Items Addressed This Visit       Acquired hypothyroidism    Vitamin D deficiency    Relevant Orders    Vitamin D,25-Hydroxy (Completed)    Adenocarcinoma of right lung     Other Visit Diagnoses       Depression with anxiety    -  Primary    Relevant Medications    sertraline (Zoloft) 100 MG tablet    Other Relevant Orders    CBC Auto Differential (Completed)    Comprehensive Metabolic Panel (Completed)    Primary insomnia        Medication management        Relevant Orders    Compliance Drug Analysis, Ur - Urine, Clean Catch (Completed)    Borderline diabetes        Stage 3a chronic kidney disease        Relevant Orders    CBC Auto Differential (Completed)    Comprehensive Metabolic Panel (Completed)    Cold intolerance        Relevant Orders    CBC Auto Differential (Completed)    Comprehensive Metabolic Panel (Completed)    Vitamin B12 & Folate (Completed)    Vitamin D,25-Hydroxy (Completed)    TSH Rfx On Abnormal To Free T4 (Completed)               Current Outpatient Medications:     aspirin 81 MG EC tablet, Take 1 tablet by mouth Daily., Disp: 90 tablet, Rfl: 1    atorvastatin (LIPITOR) 10 MG tablet, Take 1 tablet by mouth Every Night., Disp: 90 tablet, Rfl: 1    famotidine (PEPCID) 20 MG tablet, Take 1 tablet by mouth twice daily OR take 2 tablets by mouth once daily as needed for heartburn (Patient taking differently: Take 1 tablet by mouth. Take 1 tablet by mouth twice daily OR take 2 tablets by mouth once daily as needed for heartburn), Disp: 60 tablet, Rfl: 5    Fluticasone-Umeclidin-Vilant (Trelegy Ellipta) 100-62.5-25 MCG/ACT inhaler, Inhale 1 puff Daily., Disp: 90 each, Rfl: 3    levothyroxine (SYNTHROID, LEVOTHROID) 75 MCG tablet, TAKE 1 TABLET BY MOUTH EVERY DAY, Disp: 90 tablet, Rfl: 1    ondansetron ODT (ZOFRAN-ODT) 4 MG disintegrating tablet, Place 1 tablet on the tongue Every 8 (Eight) Hours As  Needed for Nausea or Vomiting., Disp: 30 tablet, Rfl: 2    traZODone (DESYREL) 150 MG tablet, Take 1 tablet by mouth Every Night., Disp: 90 tablet, Rfl: 1    zolpidem (AMBIEN) 5 MG tablet, Take 1 tablet by mouth At Night As Needed for Sleep., Disp: 30 tablet, Rfl: 1    clonazePAM (KlonoPIN) 1 MG tablet, Take 1 tablet by mouth 2 (Two) Times a Day As Needed for Anxiety., Disp: 50 tablet, Rfl: 2    sertraline (Zoloft) 100 MG tablet, Take 1 tablet by mouth Daily., Disp: 90 tablet, Rfl: 1    Assessment & Plan  1. Anxiety.  Symptoms indicate significant anxiety, particularly around medical procedures. The dosage of sertraline will be increased to help manage anxiety. She is currently taking clonazepam as needed, particularly before medical procedures, but there is concern about its addictive potential and interaction with other medications. A urine drug screen will be conducted today.    2. Adenocarcinoma of the right lung.  She has been diagnosed with adenocarcinoma of the right lung and has undergone a bronchoscopy and PET scan. A brain MRI is scheduled for this Sunday to check for metastasis. Concerns about immunotherapy and chemotherapy affecting kidney function were discussed. She will be referred to a nephrologist to monitor kidney function, especially if chemotherapy or immunotherapy is needed.    3. Hypothyroidism.  She reports symptoms of feeling cold, which may be related to her thyroid function. Blood work, including thyroid and vitamin levels, will be performed today. She is currently taking levothyroxine.    4. Borderline Diabetes.  Her A1C levels need to be monitored as immunotherapy can affect blood sugar levels. She is advised to continue monitoring her blood sugar levels.           Plan of care reviewed with the patient at the conclusion of today's visit.  Education was provided regarding diagnosis, management, and any prescribed or recommended OTC medications.  Patient verbalized understanding of and  agreement with management plan.     Return in about 3 months (around 11/22/2024), or if symptoms worsen or fail to improve.      Transcribed from ambient dictation for DAYSI Cordero by DAYSI Cordero.  08/22/24   15:46 EDT    Patient or patient representative verbalized consent for the use of Ambient Listening during the visit with  DAYSI Cordero for chart documentation. 9/2/2024  23:52 EDT

## 2024-08-23 LAB
25(OH)D3 SERPL-MCNC: 30.4 NG/ML (ref 30–100)
ALBUMIN SERPL-MCNC: 4.3 G/DL (ref 3.5–5.2)
ALBUMIN/GLOB SERPL: 1.4 G/DL
ALP SERPL-CCNC: 127 U/L (ref 39–117)
ALT SERPL W P-5'-P-CCNC: 9 U/L (ref 1–33)
ANION GAP SERPL CALCULATED.3IONS-SCNC: 12.8 MMOL/L (ref 5–15)
AST SERPL-CCNC: 16 U/L (ref 1–32)
BILIRUB SERPL-MCNC: <0.2 MG/DL (ref 0–1.2)
BUN SERPL-MCNC: 15 MG/DL (ref 8–23)
BUN/CREAT SERPL: 12.7 (ref 7–25)
CALCIUM SPEC-SCNC: 9.7 MG/DL (ref 8.6–10.5)
CHLORIDE SERPL-SCNC: 102 MMOL/L (ref 98–107)
CO2 SERPL-SCNC: 22.2 MMOL/L (ref 22–29)
CREAT SERPL-MCNC: 1.18 MG/DL (ref 0.57–1)
EGFRCR SERPLBLD CKD-EPI 2021: 51 ML/MIN/1.73
FOLATE SERPL-MCNC: 5.75 NG/ML (ref 4.78–24.2)
GLOBULIN UR ELPH-MCNC: 3.1 GM/DL
GLUCOSE SERPL-MCNC: 99 MG/DL (ref 65–99)
POTASSIUM SERPL-SCNC: 4.1 MMOL/L (ref 3.5–5.2)
PROT SERPL-MCNC: 7.4 G/DL (ref 6–8.5)
SODIUM SERPL-SCNC: 137 MMOL/L (ref 136–145)
TSH SERPL DL<=0.05 MIU/L-ACNC: 0.35 UIU/ML (ref 0.27–4.2)
VIT B12 BLD-MCNC: 541 PG/ML (ref 211–946)

## 2024-08-25 ENCOUNTER — HOSPITAL ENCOUNTER (OUTPATIENT)
Facility: HOSPITAL | Age: 64
Discharge: HOME | End: 2024-08-25
Admitting: NURSE PRACTITIONER
Payer: COMMERCIAL

## 2024-08-25 DIAGNOSIS — C34.91 ADENOCARCINOMA OF RIGHT LUNG: ICD-10-CM

## 2024-08-25 PROCEDURE — A9577 INJ MULTIHANCE: HCPCS | Performed by: NURSE PRACTITIONER

## 2024-08-25 PROCEDURE — 70553 MRI BRAIN STEM W/O & W/DYE: CPT

## 2024-08-25 PROCEDURE — 0 GADOBENATE DIMEGLUMINE 529 MG/ML SOLUTION: Performed by: NURSE PRACTITIONER

## 2024-08-25 RX ADMIN — GADOBENATE DIMEGLUMINE 15 ML: 529 INJECTION, SOLUTION INTRAVENOUS at 17:51

## 2024-08-30 ENCOUNTER — CONSULT (OUTPATIENT)
Age: 64
End: 2024-08-30
Payer: COMMERCIAL

## 2024-08-30 VITALS
BODY MASS INDEX: 28.13 KG/M2 | TEMPERATURE: 97.4 F | DIASTOLIC BLOOD PRESSURE: 75 MMHG | RESPIRATION RATE: 16 BRPM | HEIGHT: 61 IN | SYSTOLIC BLOOD PRESSURE: 115 MMHG | WEIGHT: 149 LBS | HEART RATE: 107 BPM | OXYGEN SATURATION: 92 %

## 2024-08-30 DIAGNOSIS — C34.91 ADENOCARCINOMA OF RIGHT LUNG: Primary | ICD-10-CM

## 2024-08-30 DIAGNOSIS — F41.8 DEPRESSION WITH ANXIETY: ICD-10-CM

## 2024-08-30 LAB — DRUGS UR: NORMAL

## 2024-08-30 PROCEDURE — 99204 OFFICE O/P NEW MOD 45 MIN: CPT | Performed by: INTERNAL MEDICINE

## 2024-08-30 RX ORDER — CLONAZEPAM 1 MG/1
1 TABLET ORAL 2 TIMES DAILY PRN
Qty: 50 TABLET | Refills: 2 | Status: SHIPPED | OUTPATIENT
Start: 2024-08-30

## 2024-08-30 NOTE — PROGRESS NOTES
DATE OF CONSULTATION: 8/30/2024    REFERRING PHYSICIAN: Dee Elena APRN    Dear Dee Salguero APRN  Thank you for asking for my medical advice on this patient. I saw her in the  [place] office on 8/30/2024    REASON FOR CONSULTATION: Right lower lobe lung adenocarcinoma    PROBLEM LIST:   1.  Right lower lobe lung adenocarcinoma, T1b N0 M0 stage I A2, PD-L1 85%:  A.  Present with abnormal screening scan done March 22, 2024 with persistent abnormality June 4, 2024.  B.  Whole-body PET scan done July 11, 2024 revealed mild hypermetabolic activity in the medial right lower lobe lung nodule with patchy bilateral lung infiltrates none PET avid.  C.  Negative MRI brain done August 25, 2024.  D.  Bronchoscopy with biopsy right lower lobe positive for adenocarcinoma negative nodes and negative right upper lobe biopsy but atypical cytology.  2.  Advanced COPD:  A.  PFTs done on March 25, 2024 revealed FEV1 of 1 L.  3.  Hypercholesteremia  4.  Hypothyroid  5.  Cervical squamous cell carcinoma:  A.  Status post total hysterectomy followed by adjuvant radiation August 2020.    HISTORY OF PRESENT ILLNESS: The patient is a very pleasant 64 y.o.  female   who was in her usual state of health until March 2024.  The patient presented for screening CT scan that revealed bilateral lung infiltrates more kind of groundglass except for medial right lower lobe nodule about 1.8 cm in size.  3 months follow-up scan revealed stability.  Whole-body PET scan showed minimal hypermetabolic activity in the posterior right lower lobe nodule.  The patient had bronchoscopy with biopsy that confirmed adenocarcinoma.  MRI brain was negative for metastases.  The patient was referred to me for further recommendations.    SUBJECTIVE: When I saw the patient today she is here with her  Jose Francisco.  She is complaining of anxiety.  Her breathing has improved significantly since adding inhalers by Dr. Mcclelland.    Review of  Systems   Constitutional:  Positive for fatigue.   Eyes: Negative.    Respiratory:  Positive for shortness of breath.    Cardiovascular: Negative.    Gastrointestinal: Negative.    Endocrine: Negative.    Genitourinary: Negative.    Musculoskeletal: Negative.    Allergic/Immunologic: Negative.    Neurological: Negative.    Hematological: Negative.    Psychiatric/Behavioral:  The patient is nervous/anxious.        Past Medical History:   Diagnosis Date    Acid reflux     Acid reflux     Adenocarcinoma of lung 2024    Anxiety 1976    Arthritis     Atherosclerotic cardiovascular disease 2024    Cervical cancer     Depression     Emphysema of lung     History of radiation therapy 2020    pelvis + intracavitary brachytherapy    Hyperlipidemia     Hypothyroidism     Kidney disease     Lung nodule     Ovarian cyst     Visual impairment corrected with glasses    Wears dentures     Wears glasses        Social History     Socioeconomic History    Marital status:    Tobacco Use    Smoking status: Former     Current packs/day: 0.00     Average packs/day: 1.5 packs/day for 45.0 years (67.5 ttl pk-yrs)     Types: Cigarettes     Start date:      Quit date: 3/30/2019     Years since quittin.4     Passive exposure: Past    Smokeless tobacco: Never    Tobacco comments:     quit analog cigs in march, still vapes daily   Vaping Use    Vaping status: Every Day    Substances: Nicotine   Substance and Sexual Activity    Alcohol use: No    Drug use: No    Sexual activity: Not Currently     Partners: Male       Family History   Problem Relation Age of Onset    Cancer Mother     Hypertension Mother     Mental illness Mother     Anxiety disorder Mother     Asthma Mother     COPD Mother     Depression Mother     Emphysema Mother     Cancer Father     Early death Father     Heart attack Maternal Grandfather     Heart disease Maternal Grandfather         Heart Attack 65 yo    Cancer Maternal Aunt     Cancer  Maternal Aunt     Cancer Maternal Grandmother     Breast cancer Neg Hx     Ovarian cancer Neg Hx        Past Surgical History:   Procedure Laterality Date    BRONCHOSCOPY N/A 8/6/2024    Procedure: BRONCHOSCOPY WITH ENDOBRONCHIAL ULTRASOUND AND NAVIGATION;  Surgeon: Toni Mcclelland MD;  Location: [facility];  Service: Pulmonary;  Laterality: N/A;    LYMPH NODE BIOPSY      SUBTOTAL HYSTERECTOMY  1987?    ovary/fallopian tube removed    TOTAL ABDOMINAL HYSTERECTOMY PELVIC NODE DISSECTION N/A 08/18/2020    Procedure: TOTAL RADICAL HYSTERECTOMY PELVIC NODE DISSECTION;  Surgeon: Laura Jimenez MD;  Location:  [facility];  Service: Gynecology Oncology;  Laterality: N/A;    TUBAL ABDOMINAL LIGATION      right with ovarian dissection        No Known Allergies       Current Outpatient Medications:     aspirin 81 MG EC tablet, Take 1 tablet by mouth Daily., Disp: 90 tablet, Rfl: 1    atorvastatin (LIPITOR) 10 MG tablet, Take 1 tablet by mouth Every Night., Disp: 90 tablet, Rfl: 1    clonazePAM (KlonoPIN) 1 MG tablet, Take 1 tablet by mouth 2 (Two) Times a Day As Needed for Anxiety., Disp: 50 tablet, Rfl: 2    famotidine (PEPCID) 20 MG tablet, Take 1 tablet by mouth twice daily OR take 2 tablets by mouth once daily as needed for heartburn (Patient taking differently: Take 1 tablet by mouth. Take 1 tablet by mouth twice daily OR take 2 tablets by mouth once daily as needed for heartburn), Disp: 60 tablet, Rfl: 5    Fluticasone-Umeclidin-Vilant (Trelegy Ellipta) 100-62.5-25 MCG/ACT inhaler, Inhale 1 puff Daily., Disp: 90 each, Rfl: 3    levothyroxine (SYNTHROID, LEVOTHROID) 75 MCG tablet, TAKE 1 TABLET BY MOUTH EVERY DAY, Disp: 90 tablet, Rfl: 1    ondansetron ODT (ZOFRAN-ODT) 4 MG disintegrating tablet, Place 1 tablet on the tongue Every 8 (Eight) Hours As Needed for Nausea or Vomiting., Disp: 30 tablet, Rfl: 2    sertraline (Zoloft) 100 MG tablet, Take 1 tablet by mouth Daily., Disp: 90 tablet, Rfl: 1    traZODone  "(DESYREL) 150 MG tablet, Take 1 tablet by mouth Every Night., Disp: 90 tablet, Rfl: 1    zolpidem (AMBIEN) 5 MG tablet, Take 1 tablet by mouth At Night As Needed for Sleep., Disp: 30 tablet, Rfl: 1    PHYSICAL EXAMINATION:   /75   Pulse 107   Temp 97.4 °F (36.3 °C)   Resp 16   Ht 154.9 cm (60.98\")   Wt 67.6 kg (149 lb)   LMP  (LMP Unknown)   SpO2 92%   BMI 28.17 kg/m²   Pain Score    08/30/24 1358   PainSc: 0-No pain                   ECOG Performance Status: 1 - Symptomatic but completely ambulatory  General Appearance:  alert, cooperative, no apparent distress and appears stated age   Neurologic/Psychiatric: A&O x 3, gait steady, appropriate affect, strength 5/5 in all muscle groups   HEENT:  Normocephalic, without obvious abnormality, mucous membranes moist   Neck: Supple, symmetrical, trachea midline, no adenopathy;  No thyromegaly, masses, or tenderness   Lungs:   Clear to auscultation bilaterally; respirations regular, even, and unlabored bilaterally   Heart:  Regular rate and rhythm, no murmurs appreciated   Abdomen:   Soft, non-tender, non-distended, and no organomegaly   Lymph nodes: No cervical, supraclavicular, inguinal or axillary adenopathy noted   Extremities: Normal, atraumatic; no clubbing, cyanosis, or edema    Skin: No rashes, ulcers, or suspicious lesions noted       Lab on 08/22/2024   Component Date Value Ref Range Status    Report Summary 08/22/2024 FINAL   Final    Comment: ====================================================================  [facility] COMP DRUG ANALYSIS,[facility]  ====================================================================  Test                             Result       Flag       Units  Drug Present    7-aminoclonazepam              54                      ng/mg creat     7-aminoclonazepam is an expected metabolite of clonazepam. Source     of clonazepam is a scheduled prescription medication.    Zolpidem                       PRESENT    Zolpidem Acid          "         PRESENT     Zolpidem acid is an expected metabolite of zolpidem.    Sertraline                     PRESENT    Desmethylsertraline            PRESENT     Desmethylsertraline is an expected metabolite of sertraline.    Trazodone                      PRESENT    1,3 chlorophenyl piperazine    PRESENT     1,3-chlorophenyl piperazine is an expected metabolite of     trazodone.  ====================================================================  Test                      Resul                           t    Flag   Units      Ref Range    Creatinine              127              mg/dL      >=20  ====================================================================  Declared Medications:   Medication list was not provided.  ====================================================================  For clinical consultation, please call [phone].  ====================================================================     Office Visit on 08/22/2024   Component Date Value Ref Range Status    WBC 08/22/2024 7.98  3.40 - 10.80 10*3/mm3 Final    RBC 08/22/2024 4.25  3.77 - 5.28 10*6/mm3 Final    Hemoglobin 08/22/2024 12.6  12.0 - 15.9 g/dL Final    Hematocrit 08/22/2024 37.2  34.0 - 46.6 % Final    MCV 08/22/2024 87.5  79.0 - 97.0 fL Final    MCH 08/22/2024 29.6  26.6 - 33.0 pg Final    MCHC 08/22/2024 33.9  31.5 - 35.7 g/dL Final    RDW 08/22/2024 12.7  12.3 - 15.4 % Final    RDW-SD 08/22/2024 40.1  37.0 - 54.0 fl Final    MPV 08/22/2024 9.5  6.0 - 12.0 fL Final    Platelets 08/22/2024 306  140 - 450 10*3/mm3 Final    Neutrophil % 08/22/2024 77.7 (H)  42.7 - 76.0 % Final    Lymphocyte % 08/22/2024 13.0 (L)  19.6 - 45.3 % Final    Monocyte % 08/22/2024 6.9  5.0 - 12.0 % Final    Eosinophil % 08/22/2024 1.3  0.3 - 6.2 % Final    Basophil % 08/22/2024 0.6  0.0 - 1.5 % Final    Immature Grans % 08/22/2024 0.5  0.0 - 0.5 % Final    Neutrophils, Absolute 08/22/2024 6.20  1.70 - 7.00 10*3/mm3 Final    Lymphocytes, Absolute  08/22/2024 1.04  0.70 - 3.10 10*3/mm3 Final    Monocytes, Absolute 08/22/2024 0.55  0.10 - 0.90 10*3/mm3 Final    Eosinophils, Absolute 08/22/2024 0.10  0.00 - 0.40 10*3/mm3 Final    Basophils, Absolute 08/22/2024 0.05  0.00 - 0.20 10*3/mm3 Final    Immature Grans, Absolute 08/22/2024 0.04  0.00 - 0.05 10*3/mm3 Final    nRBC 08/22/2024 0.0  0.0 - 0.2 /100 WBC Final    Glucose 08/22/2024 99  65 - 99 mg/dL Final    BUN 08/22/2024 15  8 - 23 mg/dL Final    Creatinine 08/22/2024 1.18 (H)  0.57 - 1.00 mg/dL Final    Sodium 08/22/2024 137  136 - 145 mmol/L Final    Potassium 08/22/2024 4.1  3.5 - 5.2 mmol/L Final    Chloride 08/22/2024 102  98 - 107 mmol/L Final    CO2 08/22/2024 22.2  22.0 - 29.0 mmol/L Final    Calcium 08/22/2024 9.7  8.6 - 10.5 mg/dL Final    Total Protein 08/22/2024 7.4  6.0 - 8.5 g/dL Final    Albumin 08/22/2024 4.3  3.5 - 5.2 g/dL Final    ALT (SGPT) 08/22/2024 9  1 - 33 U/L Final    AST (SGOT) 08/22/2024 16  1 - 32 U/L Final    Alkaline Phosphatase 08/22/2024 127 (H)  39 - 117 U/L Final    Total Bilirubin 08/22/2024 <0.2  0.0 - 1.2 mg/dL Final    Globulin 08/22/2024 3.1  gm/dL Final    A/G Ratio 08/22/2024 1.4  g/dL Final    BUN/Creatinine Ratio 08/22/2024 12.7  7.0 - 25.0 Final    Anion Gap 08/22/2024 12.8  5.0 - 15.0 mmol/L Final    eGFR 08/22/2024 51.0 (L)  >60.0 mL/min/1.73 Final    Folate 08/22/2024 5.75  4.78 - 24.20 ng/mL Final    Vitamin B-12 08/22/2024 541  211 - 946 pg/mL Final    25 Hydroxy, Vitamin D 08/22/2024 30.4  30.0 - 100.0 ng/ml Final    TSH 08/22/2024 0.351  0.270 - 4.200 uIU/mL Final        MRI Brain With & Without Contrast    Result Date: 8/26/2024  Narrative: MRI BRAIN W WO CONTRAST Date of Exam: 8/25/2024 5:45 PM EDT Indication: lung cancer staging.  Comparison: None available. Technique:  Routine multiplanar/multisequence sequence images of the brain were obtained before and after the uneventful administration of 13 mL Multihance. Findings: No acute infarct is present on  diffusion weighted sequences. Midline structures are normal and the craniocervical junction appears satisfactory. Age-related changes are present with minimal volume loss and few typical T2 hyperintense subcortical and pontine white matter changes, nonspecific but favored to reflect sequela of chronic microvascular ischemia. There is otherwise no evidence of intracranial hemorrhage, mass or mass effect. There is no abnormal brain parenchymal enhancement. The ventricles  are normal in size and configuration. The orbits are normal. The paranasal sinuses are clear.     Impression: Impression: No evidence of intracranial metastatic disease. Essentially normal contrast-enhanced MRI of the brain. Electronically Signed: Pierre Tayolr MD  8/26/2024 8:59 AM EDT  Workstation ID: [id number]    XR Chest 1 View    Result Date: 8/6/2024  Narrative: XR CHEST 1 VW Date of Exam: 8/6/2024 5:02 PM EDT Indication: POST BRONCH Comparison: Chest CT 8/1/2024 Findings: Cardiomediastinal silhouette is unremarkable. Linear increased density within the right mid lung potentially result of recent procedure/intervention. There is generalized interstitial prominence, similar to previous CT. No pneumothorax nor significant effusion is identified. No acute osseous abnormality identified.     Impression: Impression: 1.Faint area of increased density within the right midlung may be result of intervention. No procedural complication identified otherwise. Electronically Signed: Fabricio Tena MD  8/6/2024 5:29 PM EDT  Workstation ID: [id number]    FL C Arm During Surgery    Result Date: 8/6/2024  Narrative: This procedure was auto-finalized with no dictation required.    CT Chest Without Contrast Diagnostic    Result Date: 8/5/2024  Narrative: CT CHEST WO CONTRAST DIAGNOSTIC Date of Exam: 8/1/2024 3:08 PM EDT Indication: enlarging PET avid lung nodules. Comparison: 6/4/2024 chest CT and 7/11/2024 PET/CT Technique: Axial CT images were obtained of the  chest without contrast administration.  Reconstructed coronal and sagittal images were also obtained. Automated exposure control and iterative construction methods were used. Findings: The central airways are patent. There is moderate emphysema. There is no new airspace opacity, pleural effusion, or pneumothorax. The irregular spiculated pleural-based nodule along the medial aspect of the right lower lobe is stable in size measuring 1.1 x 1.0 cm on series 2 image 384. Overall extent is difficult to measure given shape/morphology. This demonstrated hypermetabolic activity on recent PET/CT. The this is slightly increased in size from 6/4/2024 chest CT where it measured 0.9 x 0.7 cm. Irregular predominantly groundglass nodules within the right upper and right lower lobes as seen on series 2 images 203, 219, and 247. There is also an irregular groundglass nodule in the superior aspect of the left lower lobe on series 2 image 166. Additional smaller groundglass opacities are present within the right middle lobe and left upper lobe. These all appear unchanged when compared to 7/11/2024 PET/CT. There is no new mediastinal mass or suspicious lymphadenopathy. Benign calcified right hilar lymph nodes. The heart is unchanged in size. There are coronary artery calcifications. Trace pericardial fluid present. The thoracic esophagus is within normal limits. Chest wall soft tissues show no acute abnormality. Upper abdomen is unchanged. There is no acute fracture or suspicious osseous lesion.     Impression: Impression: There are multiple irregular groundglass nodules, with the most notable indexed pleural-based nodule along the medial aspect of the right lower lobe stable in size from recent PET/CT but slightly increased in size from 6/4/2024 chest CT. This demonstrated hypermetabolic activity concerning for malignancy. The additional scattered bilateral irregular groundglass nodules are stable from 6/4/2024 chest CT, and there is  no evidence of new suspicious pulmonary nodule/mass. There is background moderate emphysema. Coronary artery calcifications are present. Electronically Signed: Alden Contreras MD  8/5/2024 10:35 AM EDT  Workstation ID: [id number]       DIAGNOSTIC DATA:   Extensive patient medical records including doctor's notes, blood work results and imaging reports reviewed by me and document the patient's chart.    ASSESSMENT: The patient is a very pleasant 64 y.o.  female  with right lower lobe lung adenocarcinoma    PLAN:     1.  Right lower lobe lung adenocarcinoma, T1b N0 M0 stage I A2, PD-L1 85%:  A.  I had a long discussion today with the patient and her  about her  new diagnosis of lung cancer. I did go over the final pathology report in  detail with both of them. I reviewed the patient's documents including refereing provider's notes, lab results, imaging, and path report.   B.  I reviewed the patient's scan films myself and I went over the pictures with her.  C.  I explained to the patient that she is not a good surgical candidate given her low FEV1 at 1 L with other patchy bilateral lung nodules.  D.  I will refer the patient to be treated with definitive CyberKnife radiotherapy.  E.  She will follow-up with me in 4 months with repeated CT chest with contrast.  F.  I discussed the case with Dr. Montana over the phone today who kindly agreed to see the patient.  G.  We discussed role of surgery I explained to the patient that this is another option for her however given her borderline PFTs as well as patchy bilateral groundglass opacities I rather do CyberKnife and watch the other lesions.  Patient and her  are in agreement.    2.  Bilateral lung infiltrates:  A.  We will watch for now.  We may consider repeating colonoscopy with biopsy if we see an increase.    3.  Hypothyroid:  A.  She will continue Synthroid    4.  Hypercholesteremia:  A.  She will continue Lipitor.    FOLLOW UP: 4 months with CT chest with  contrast.    Sejal Mckenzie MD  8/30/2024

## 2024-09-02 LAB
MYCOBACTERIUM SPEC CULT: ABNORMAL
NIGHT BLUE STAIN TISS: ABNORMAL

## 2024-09-04 ENCOUNTER — PATIENT OUTREACH (OUTPATIENT)
Dept: ONCOLOGY | Facility: CLINIC | Age: 64
End: 2024-09-04
Payer: COMMERCIAL

## 2024-09-13 ENCOUNTER — HOSPITAL ENCOUNTER (OUTPATIENT)
Facility: HOSPITAL | Age: 64
Setting detail: RADIATION/ONCOLOGY SERIES
End: 2024-09-13
Payer: COMMERCIAL

## 2024-09-16 DIAGNOSIS — F51.01 PRIMARY INSOMNIA: ICD-10-CM

## 2024-09-16 RX ORDER — ZOLPIDEM TARTRATE 5 MG/1
5 TABLET ORAL NIGHTLY PRN
Qty: 30 TABLET | Refills: 2 | Status: SHIPPED | OUTPATIENT
Start: 2024-09-16

## 2024-09-18 ENCOUNTER — OFFICE VISIT (OUTPATIENT)
Age: 64
End: 2024-09-18
Payer: COMMERCIAL

## 2024-09-18 VITALS
HEART RATE: 83 BPM | WEIGHT: 150.5 LBS | OXYGEN SATURATION: 91 % | BODY MASS INDEX: 28.45 KG/M2 | SYSTOLIC BLOOD PRESSURE: 110 MMHG | TEMPERATURE: 97.8 F | RESPIRATION RATE: 18 BRPM | DIASTOLIC BLOOD PRESSURE: 61 MMHG

## 2024-09-18 DIAGNOSIS — C34.91 ADENOCARCINOMA OF RIGHT LUNG: Primary | ICD-10-CM

## 2024-09-18 PROCEDURE — G0463 HOSPITAL OUTPT CLINIC VISIT: HCPCS | Performed by: RADIOLOGY

## 2024-09-18 RX ORDER — ASPIRIN 81 MG/1
81 TABLET, COATED ORAL DAILY
Qty: 90 TABLET | Refills: 1 | Status: SHIPPED | OUTPATIENT
Start: 2024-09-18

## 2024-09-18 RX ORDER — ATORVASTATIN CALCIUM 10 MG/1
10 TABLET, FILM COATED ORAL
Qty: 90 TABLET | Refills: 1 | Status: SHIPPED | OUTPATIENT
Start: 2024-09-18

## 2024-09-20 ENCOUNTER — HOSPITAL ENCOUNTER (OUTPATIENT)
Dept: RADIATION ONCOLOGY | Facility: HOSPITAL | Age: 64
Setting detail: RADIATION/ONCOLOGY SERIES
Discharge: HOME | End: 2024-09-20
Payer: COMMERCIAL

## 2024-09-20 PROCEDURE — 77332 RADIATION TREATMENT AID(S): CPT | Performed by: RADIOLOGY

## 2024-09-21 LAB
MYCOBACTERIUM SPEC CULT: ABNORMAL
NIGHT BLUE STAIN TISS: ABNORMAL

## 2024-09-23 ENCOUNTER — OFFICE VISIT (OUTPATIENT)
Dept: PULMONOLOGY | Facility: CLINIC | Age: 64
End: 2024-09-23
Payer: COMMERCIAL

## 2024-09-23 VITALS
BODY MASS INDEX: 28.62 KG/M2 | HEART RATE: 84 BPM | SYSTOLIC BLOOD PRESSURE: 120 MMHG | OXYGEN SATURATION: 96 % | DIASTOLIC BLOOD PRESSURE: 80 MMHG | WEIGHT: 151.6 LBS | HEIGHT: 61 IN | TEMPERATURE: 96.8 F

## 2024-09-23 DIAGNOSIS — J44.89 COPD WITH ASTHMA: Primary | ICD-10-CM

## 2024-09-23 DIAGNOSIS — R91.8 MULTIPLE LUNG NODULES ON CT: ICD-10-CM

## 2024-09-23 DIAGNOSIS — C34.91 ADENOCARCINOMA OF RIGHT LUNG: ICD-10-CM

## 2024-09-23 PROCEDURE — 99214 OFFICE O/P EST MOD 30 MIN: CPT | Performed by: NURSE PRACTITIONER

## 2024-09-23 RX ORDER — FAMOTIDINE 20 MG/1
TABLET, FILM COATED ORAL
Qty: 180 TABLET | Refills: 1 | Status: SHIPPED | OUTPATIENT
Start: 2024-09-23

## 2024-09-25 PROCEDURE — 77293 RESPIRATOR MOTION MGMT SIMUL: CPT | Performed by: RADIOLOGY

## 2024-09-25 PROCEDURE — 77338 DESIGN MLC DEVICE FOR IMRT: CPT | Performed by: RADIOLOGY

## 2024-09-25 PROCEDURE — 77301 RADIOTHERAPY DOSE PLAN IMRT: CPT | Performed by: RADIOLOGY

## 2024-09-25 PROCEDURE — 77300 RADIATION THERAPY DOSE PLAN: CPT | Performed by: RADIOLOGY

## 2024-09-30 ENCOUNTER — HOSPITAL ENCOUNTER (OUTPATIENT)
Dept: RADIATION ONCOLOGY | Facility: HOSPITAL | Age: 64
Discharge: HOME | End: 2024-09-30
Payer: COMMERCIAL

## 2024-09-30 PROCEDURE — 77373 STRTCTC BDY RAD THER TX DLVR: CPT | Performed by: RADIOLOGY

## 2024-10-01 ENCOUNTER — HOSPITAL ENCOUNTER (OUTPATIENT)
Dept: RADIATION ONCOLOGY | Facility: HOSPITAL | Age: 64
Discharge: HOME | End: 2024-10-01

## 2024-10-01 ENCOUNTER — HOSPITAL ENCOUNTER (OUTPATIENT)
Dept: RADIATION ONCOLOGY | Facility: HOSPITAL | Age: 64
Setting detail: RADIATION/ONCOLOGY SERIES
End: 2024-10-01
Payer: COMMERCIAL

## 2024-10-01 PROCEDURE — 77373 STRTCTC BDY RAD THER TX DLVR: CPT | Performed by: RADIOLOGY

## 2024-10-02 ENCOUNTER — HOSPITAL ENCOUNTER (OUTPATIENT)
Dept: RADIATION ONCOLOGY | Facility: HOSPITAL | Age: 64
Discharge: HOME | End: 2024-10-02

## 2024-10-02 PROCEDURE — 77373 STRTCTC BDY RAD THER TX DLVR: CPT | Performed by: RADIOLOGY

## 2024-10-03 ENCOUNTER — HOSPITAL ENCOUNTER (OUTPATIENT)
Dept: RADIATION ONCOLOGY | Facility: HOSPITAL | Age: 64
Discharge: HOME | End: 2024-10-03

## 2024-10-03 PROCEDURE — 77373 STRTCTC BDY RAD THER TX DLVR: CPT | Performed by: RADIOLOGY

## 2024-10-04 ENCOUNTER — HOSPITAL ENCOUNTER (OUTPATIENT)
Dept: RADIATION ONCOLOGY | Facility: HOSPITAL | Age: 64
Discharge: HOME | End: 2024-10-04

## 2024-10-04 PROCEDURE — 77373 STRTCTC BDY RAD THER TX DLVR: CPT | Performed by: RADIOLOGY

## 2024-10-04 PROCEDURE — 77336 RADIATION PHYSICS CONSULT: CPT | Performed by: RADIOLOGY

## 2024-11-18 ENCOUNTER — HOSPITAL ENCOUNTER (OUTPATIENT)
Dept: RADIATION ONCOLOGY | Facility: HOSPITAL | Age: 64
Setting detail: RADIATION/ONCOLOGY SERIES
Discharge: HOME | End: 2024-11-18
Payer: MEDICARE

## 2024-11-18 ENCOUNTER — CLINICAL SUPPORT (OUTPATIENT)
Dept: RADIATION ONCOLOGY | Facility: HOSPITAL | Age: 64
End: 2024-11-18
Payer: MEDICARE

## 2024-11-18 DIAGNOSIS — C34.91 ADENOCARCINOMA OF RIGHT LUNG: Primary | ICD-10-CM

## 2024-11-18 RX ORDER — TRAZODONE HYDROCHLORIDE 150 MG/1
150 TABLET ORAL
Qty: 90 TABLET | Refills: 3 | Status: SHIPPED | OUTPATIENT
Start: 2024-11-18

## 2024-11-18 NOTE — PROGRESS NOTES
FOLLOW UP NOTE    PATIENT:                                                      Sydnie Gamble  MEDICAL RECORD #:                        [id number]  :                                                          1960  COMPLETION DATE:   10/4/2024  DIAGNOSIS:     Adenocarcinoma of right lung  - Stage IA2 (cT1b, cN0, cM0)    Cervical cancer  - FIGO Stage IIA1 (cT2a1, cN0, cM0)      This visit has been converted to a telehealth virtual visit, the patient's preferred method for today's follow-up.  Total time of discussion was 12 minutes.  The patient has given verbal consent.    BRIEF HISTORY:  Sydnie Gamble is a very pleasant 64 y.o. female presenting via telemedicine for initial follow-up visit regarding a newly diagnosed non-small cell lung cancer of the right lower lobe.  Additional medical history includes COPD, former tobacco abuse, resected IIA1 cervical cancer status post adjuvant radiation therapy 4 years ago, as well as a few pulmonary nodules that have been followed on imaging for surveillance.  Recent PET/CT scan performed 2024 demonstrated isolated hypermetabolism in a medial right lower lobe nodule.  There was otherwise no abnormal FDG uptake associated with several other groundglass lesions and no evidence of regional lymphadenopathy or distant metastatic disease.  She was taken for bronchoscopy with biopsies.  Pathology from the right lower lobe was consistent with adenocarcinoma that is strongly PD-L1 positive.  The right upper lobe biopsy did not show any malignant cells and similarly a station 11R node was negative for carcinoma.  Due to her COPD, she was not considered a surgical candidate.  She underwent a definitive course of stereotactic body radiotherapy to a dose of 50 Gray in 5 fractions delivered to the right lower lobe lung lesion, completing 10/4/2024.  She tolerated treatment well.  Posttreatment fatigue was reportedly mild and is subsiding.  She otherwise did not develop  any notable side effects, and denies change to her baseline exertional dyspnea.  She denies progressive cough, hemoptysis, chest pain, rib pain, swallowing difficulties, fever, chills, or weight loss.      MEDICATIONS: Medication reconciliation for the patient was reviewed and confirmed in the electronic medical record.    Review of Systems   Constitutional:  Positive for fatigue.   Respiratory:  Positive for shortness of breath (with exertion).    Psychiatric/Behavioral:  The patient is nervous/anxious.    All other systems reviewed and are negative.        KPS 80%          Physical Exam  Pulmonary:      Respirations even, unlabored. No audible wheezing or cough.  Neurological:      A+Ox4, conversant, answers questions appropriately.  Psychiatric:     Judgement, affect, and decision-making WNL.    Limited physical exam as visit was conducted remotely via telephone.              The following portions of the patient's history were reviewed and updated as appropriate: allergies, current medications, past family history, past medical history, past social history, past surgical history and problem list.         Diagnoses and all orders for this visit:    1. Adenocarcinoma of right lung (Primary)         IMPRESSION:  Sydnie Gamble is a 64 y.o. female with recent diagnosis of a medically inoperable stage IA2 (cT1b, cN0, cM0) adenocarcinoma of the right lower lobe of lung.  She is 6 weeks status post definitive treatment with stereotactic body radiotherapy, which she tolerated without notable radiation related toxicities aside from the anticipated acute grade 1 fatigue.  Her breathing remains overall stable in the setting of COPD.  She is scheduled to undergo repeat CT scans of the chest, abdomen pelvis on 12/20/2024 with Dr. Mckenzie to evaluate treatment response.  We also discussed the role of serial imaging to keep an eye on the other multiple chest abnormalities on CT scan that were not previously positive on PET scan or  biopsy but should continue to be followed for stability.  In the interim, the patient will return to pulmonary medicine for management of COPD, and I have provided her with the phone number to the GYN oncology clinic to re-establish care as she was apparently lost to follow up for surveillance of her treated cervical cancer.      RECOMMENDATIONS:  Return to clinic in ~6 weeks for follow up and imaging review at that time.      Return in about 6 weeks (around 12/30/2024) for Office Visit.    DAYSI Loza    I spent a total of 25 minutes on today's visit, with more than 12 minutes in virtual communication with the patient via telephone, and the remainder of the time spent in reviewing the relevant history, records, available imaging, and for documentation.

## 2024-11-21 ENCOUNTER — OFFICE VISIT (OUTPATIENT)
Dept: INTERNAL MEDICINE | Facility: CLINIC | Age: 64
End: 2024-11-21
Payer: COMMERCIAL

## 2024-11-21 ENCOUNTER — LAB (OUTPATIENT)
Dept: INTERNAL MEDICINE | Facility: CLINIC | Age: 64
End: 2024-11-21
Payer: COMMERCIAL

## 2024-11-21 VITALS
TEMPERATURE: 97.8 F | HEART RATE: 92 BPM | DIASTOLIC BLOOD PRESSURE: 86 MMHG | SYSTOLIC BLOOD PRESSURE: 128 MMHG | HEIGHT: 61 IN | WEIGHT: 148.2 LBS | BODY MASS INDEX: 27.98 KG/M2 | OXYGEN SATURATION: 98 %

## 2024-11-21 DIAGNOSIS — E78.00 HYPERCHOLESTEREMIA: ICD-10-CM

## 2024-11-21 DIAGNOSIS — N18.31 STAGE 3A CHRONIC KIDNEY DISEASE: ICD-10-CM

## 2024-11-21 DIAGNOSIS — Z13.820 ENCOUNTER FOR OSTEOPOROSIS SCREENING IN ASYMPTOMATIC POSTMENOPAUSAL PATIENT: ICD-10-CM

## 2024-11-21 DIAGNOSIS — Z00.00 MEDICARE ANNUAL WELLNESS VISIT, SUBSEQUENT: Primary | ICD-10-CM

## 2024-11-21 DIAGNOSIS — Z12.31 ENCOUNTER FOR SCREENING MAMMOGRAM FOR MALIGNANT NEOPLASM OF BREAST: ICD-10-CM

## 2024-11-21 DIAGNOSIS — E03.9 ACQUIRED HYPOTHYROIDISM: ICD-10-CM

## 2024-11-21 DIAGNOSIS — Z13.0 SCREENING FOR BLOOD DISEASE: ICD-10-CM

## 2024-11-21 DIAGNOSIS — R73.03 BORDERLINE DIABETES: ICD-10-CM

## 2024-11-21 DIAGNOSIS — E55.9 VITAMIN D DEFICIENCY: ICD-10-CM

## 2024-11-21 DIAGNOSIS — F32.A ANXIETY AND DEPRESSION: ICD-10-CM

## 2024-11-21 DIAGNOSIS — F51.01 PRIMARY INSOMNIA: ICD-10-CM

## 2024-11-21 DIAGNOSIS — Z78.0 ENCOUNTER FOR OSTEOPOROSIS SCREENING IN ASYMPTOMATIC POSTMENOPAUSAL PATIENT: ICD-10-CM

## 2024-11-21 DIAGNOSIS — F41.9 ANXIETY AND DEPRESSION: ICD-10-CM

## 2024-11-21 DIAGNOSIS — Z12.11 SCREENING FOR COLON CANCER: ICD-10-CM

## 2024-11-21 DIAGNOSIS — Z12.4 CERVICAL CANCER SCREENING: ICD-10-CM

## 2024-11-21 DIAGNOSIS — C34.91 ADENOCARCINOMA OF RIGHT LUNG: ICD-10-CM

## 2024-11-21 LAB
ALBUMIN SERPL-MCNC: 4.3 G/DL (ref 3.5–5.2)
ALBUMIN/GLOB SERPL: 1.4 G/DL
ALP SERPL-CCNC: 113 U/L (ref 39–117)
ALT SERPL W P-5'-P-CCNC: 9 U/L (ref 1–33)
ANION GAP SERPL CALCULATED.3IONS-SCNC: 10.7 MMOL/L (ref 5–15)
AST SERPL-CCNC: 22 U/L (ref 1–32)
BILIRUB SERPL-MCNC: <0.2 MG/DL (ref 0–1.2)
BUN SERPL-MCNC: 17 MG/DL (ref 8–23)
BUN/CREAT SERPL: 14.2 (ref 7–25)
CALCIUM SPEC-SCNC: 9.4 MG/DL (ref 8.6–10.5)
CHLORIDE SERPL-SCNC: 103 MMOL/L (ref 98–107)
CHOLEST SERPL-MCNC: 185 MG/DL (ref 0–200)
CO2 SERPL-SCNC: 25.3 MMOL/L (ref 22–29)
CREAT SERPL-MCNC: 1.2 MG/DL (ref 0.57–1)
DEPRECATED RDW RBC AUTO: 43.7 FL (ref 37–54)
EGFRCR SERPLBLD CKD-EPI 2021: 50 ML/MIN/1.73
ERYTHROCYTE [DISTWIDTH] IN BLOOD BY AUTOMATED COUNT: 13.3 % (ref 12.3–15.4)
GLOBULIN UR ELPH-MCNC: 3 GM/DL
GLUCOSE SERPL-MCNC: 96 MG/DL (ref 65–99)
HCT VFR BLD AUTO: 38.2 % (ref 34–46.6)
HDLC SERPL-MCNC: 85 MG/DL (ref 40–60)
HGB BLD-MCNC: 12.3 G/DL (ref 12–15.9)
LDLC SERPL CALC-MCNC: 87 MG/DL (ref 0–100)
LDLC/HDLC SERPL: 1.01 {RATIO}
MCH RBC QN AUTO: 28.9 PG (ref 26.6–33)
MCHC RBC AUTO-ENTMCNC: 32.2 G/DL (ref 31.5–35.7)
MCV RBC AUTO: 89.9 FL (ref 79–97)
PLATELET # BLD AUTO: 277 10*3/MM3 (ref 140–450)
PMV BLD AUTO: 9.7 FL (ref 6–12)
POTASSIUM SERPL-SCNC: 4.2 MMOL/L (ref 3.5–5.2)
PROT SERPL-MCNC: 7.3 G/DL (ref 6–8.5)
RBC # BLD AUTO: 4.25 10*6/MM3 (ref 3.77–5.28)
SODIUM SERPL-SCNC: 139 MMOL/L (ref 136–145)
TRIGL SERPL-MCNC: 70 MG/DL (ref 0–150)
TSH SERPL DL<=0.05 MIU/L-ACNC: 4.51 UIU/ML (ref 0.27–4.2)
VLDLC SERPL-MCNC: 13 MG/DL (ref 5–40)
WBC NRBC COR # BLD AUTO: 5.97 10*3/MM3 (ref 3.4–10.8)

## 2024-11-21 PROCEDURE — 36415 COLL VENOUS BLD VENIPUNCTURE: CPT | Performed by: NURSE PRACTITIONER

## 2024-11-21 PROCEDURE — 80050 GENERAL HEALTH PANEL: CPT | Performed by: NURSE PRACTITIONER

## 2024-11-21 PROCEDURE — 83036 HEMOGLOBIN GLYCOSYLATED A1C: CPT | Performed by: NURSE PRACTITIONER

## 2024-11-21 PROCEDURE — 80061 LIPID PANEL: CPT | Performed by: NURSE PRACTITIONER

## 2024-11-21 PROCEDURE — 84439 ASSAY OF FREE THYROXINE: CPT | Performed by: NURSE PRACTITIONER

## 2024-11-21 RX ORDER — SERTRALINE HYDROCHLORIDE 100 MG/1
100 TABLET, FILM COATED ORAL DAILY
Qty: 90 TABLET | Refills: 1 | Status: SHIPPED | OUTPATIENT
Start: 2024-11-21

## 2024-11-21 RX ORDER — ZOLPIDEM TARTRATE 10 MG/1
10 TABLET ORAL NIGHTLY PRN
Qty: 30 TABLET | Refills: 2 | Status: SHIPPED | OUTPATIENT
Start: 2024-11-21

## 2024-11-21 NOTE — PROGRESS NOTES
The ABCs of the Annual Wellness Visit  Subsequent Medicare Wellness Visit    Chief Complaint   Patient presents with    Medicare Wellness-subsequent       Subjective   History of Present Illness:  Sydnie Gamble is a 64 y.o. female who presents for a Subsequent Medicare Wellness Visit.    History of Present Illness  The patient presents for a Medicare wellness visit.    She reports experiencing stress today. Her anxiety and depression remain unchanged. She is currently taking Ambien and Klonopin, and has requested an increase in her Ambien dosage from 5 mg to 10 mg. Her anxiety is severe at night and not sleeping well. She also takes aspirin and an antidepressant in the morning.    She is diagnosed with adenocarcinoma of right lung. She has a CT scan scheduled for 2024 and an appointment with pulmonology on 2024.    She has not been hospitalized overnight in the past year. She underwent a radical hysterectomy and is overdue for a mammogram. She has received a Cologuard box twice but has not yet used it.        HEALTH RISK ASSESSMENT    Recent Hospitalizations:  No hospitalization(s) within the last year.    Current Medical Providers:  Patient Care Team:  Dee Elena APRN as PCP - General (Nurse Practitioner)  Cliff Montana MD as Consulting Physician (Radiation Oncology)  Toni Mcclelland MD as Consulting Physician (Pulmonary Disease)  Erik Mckeon DO as Consulting Physician (Pulmonary Disease)  Sejal Mckenzie MD as Referring Physician (Hematology and Oncology)    Smoking Status:  Social History     Tobacco Use   Smoking Status Former    Current packs/day: 0.00    Average packs/day: 1.5 packs/day for 45.0 years (67.5 ttl pk-yrs)    Types: Cigarettes    Start date: 1972    Quit date: 3/30/2019    Years since quittin.6    Passive exposure: Past   Smokeless Tobacco Never   Tobacco Comments    quit analog cigs in march, still vapes daily        Alcohol Consumption:  Social History     Substance and Sexual Activity   Alcohol Use No       Depression Screen:       2024     2:14 PM   PHQ-2/PHQ-9 Depression Screening   Little interest or pleasure in doing things Over half   Feeling down, depressed, or hopeless Over half   Trouble falling or staying asleep, or sleeping too much Almost all   Feeling tired or having little energy Almost all   Poor appetite or overeating Over half   Feeling bad about yourself - or that you are a failure or have let yourself or your family down Not at all   Trouble concentrating on things, such as reading the newspaper or watching television Several days   Moving or speaking so slowly that other people could have noticed? Or the opposite - being so fidgety or restless that you have been moving around a lot more than usual. Not at all   Thoughts that you would be better off dead or hurting yourself in some way Not at all   Patient Health Questionnaire-9 Score 13   How difficult have these problems made it for you to do your work, take care of things at home, or get along with other people? Not difficult at all       Fall Risk Screen:  STEADI Fall Risk Assessment was completed, and patient is at LOW risk for falls.Assessment completed on:2024    Health Habits and Functional and Cognitive Screenin/18/2024    12:12 AM   Functional & Cognitive Status   Do you have difficulty preparing food and eating? No    Do you have difficulty bathing yourself, getting dressed or grooming yourself? No    Do you have difficulty using the toilet? No    Do you have difficulty moving around from place to place? No    Do you have trouble with steps or getting out of a bed or a chair? No    Current Diet Well Balanced Diet    Dental Exam Not up to date    Eye Exam Not up to date    Exercise (times per week) 5 times per week    Current Exercises Include House Cleaning    Do you need help using the phone?  No    Are you deaf or do  you have serious difficulty hearing?  No    Do you need help to go to places out of walking distance? No    Do you need help shopping? No    Do you need help preparing meals?  No    Do you need help with housework?  No    Do you need help with laundry? No    Do you need help taking your medications? No    Do you need help managing money? No    Do you ever drive or ride in a car without wearing a seat belt? No    Have you felt unusual stress, anger or loneliness in the last month? No    Who do you live with? Spouse    If you need help, do you have trouble finding someone available to you? No    Have you been bothered in the last four weeks by sexual problems? No    Do you have difficulty concentrating, remembering or making decisions? No        Patient-reported         Does the patient have evidence of cognitive impairment? No    Asprin use counseling:Taking ASA appropriately as indicated    Age-appropriate Screening Schedule:  Refer to the list below for future screening recommendations based on patient's age, sex and/or medical conditions. Orders for these recommended tests are listed in the plan section. The patient has been provided with a written plan.    Health Maintenance   Topic Date Due    DXA SCAN  Never done    TDAP/TD VACCINES (1 - Tdap) Never done    ZOSTER VACCINE (1 of 2) Never done    COLORECTAL CANCER SCREENING  10/29/2021    PAP SMEAR  07/31/2023    COVID-19 Vaccine (9 - 2024-25 season) 09/01/2024    MAMMOGRAM  10/20/2024    INFLUENZA VACCINE  03/31/2025 (Originally 8/1/2024)    Pneumococcal Vaccine 65+ (2 of 2 - PCV) 07/23/2025 (Originally 4/9/2020)    ANNUAL WELLNESS VISIT  11/21/2025    LIPID PANEL  11/21/2025    BMI FOLLOWUP  11/21/2025    HEPATITIS C SCREENING  Completed    LUNG CANCER SCREENING  Discontinued       Transcribed from ambient dictation for DAYSI Cordero by DAYSI Cordero.  11/21/24   14:34 EST    Patient or patient representative verbalized consent  for the use of Ambient Listening during the visit with  DAYSI Cordero for chart documentation. 11/25/2024  14:46 EST       The following portions of the patient's history were reviewed and updated as appropriate: allergies, current medications, past family history, past medical history, past social history, past surgical history, and problem list.    Review of Systems  Pertinent items are noted in HPI.     Outpatient Medications Prior to Visit   Medication Sig Dispense Refill    Aspirin Low Dose 81 MG EC tablet TAKE 1 TABLET BY MOUTH EVERY DAY 90 tablet 1    atorvastatin (LIPITOR) 10 MG tablet TAKE 1 TABLET BY MOUTH EVERY DAY AT NIGHT 90 tablet 1    clonazePAM (KlonoPIN) 1 MG tablet Take 1 tablet by mouth 2 (Two) Times a Day As Needed for Anxiety. 50 tablet 2    famotidine (PEPCID) 20 MG tablet TAKE 1 TABLET BY MOUTH TWICE DAILY OR TAKE 2 TABLETS BY MOUTH ONCE DAILY AS NEEDED FOR HEARTBURN 180 tablet 1    Fluticasone-Umeclidin-Vilant (Trelegy Ellipta) 100-62.5-25 MCG/ACT inhaler Inhale 1 puff Daily. 90 each 3    levothyroxine (SYNTHROID, LEVOTHROID) 75 MCG tablet TAKE 1 TABLET BY MOUTH EVERY DAY 90 tablet 1    ondansetron ODT (ZOFRAN-ODT) 4 MG disintegrating tablet Place 1 tablet on the tongue Every 8 (Eight) Hours As Needed for Nausea or Vomiting. 30 tablet 2    traZODone (DESYREL) 150 MG tablet TAKE 1 TABLET BY MOUTH EVERY DAY AT NIGHT 90 tablet 3    sertraline (Zoloft) 100 MG tablet Take 1 tablet by mouth Daily. 90 tablet 1    zolpidem (AMBIEN) 5 MG tablet Take 1 tablet by mouth At Night As Needed for Sleep. 30 tablet 2     No facility-administered medications prior to visit.       Patient Active Problem List   Diagnosis    Personal history of tobacco use, presenting hazards to health    Gastroesophageal reflux disease without esophagitis    Acquired hypothyroidism    Vitamin D deficiency    Prediabetes    COPD with asthma    ALESIA (generalized anxiety disorder)    Multiple lung nodules on CT     "Hilar adenopathy    HLD (hyperlipidemia)    Cervical cancer    Atherosclerotic cardiovascular disease    Adenocarcinoma of right lung         Advanced Care Planning:  ACP discussion was declined by the patient. Patient does not have an advance directive, information provided.    Compared to one year ago, the patient feels her physical health is worse.  Compared to one year ago, the patient feels her mental health is worse.    No opioid medication/s identified on active medication list..   Reviewed chart for potential of high risk medication in the elderly: yes  Reviewed chart for potential of harmful drug interactions in the elderly:yes    Objective         Vitals:    11/21/24 1417   BP: 128/86   BP Location: Left arm   Patient Position: Sitting   Cuff Size: Adult   Pulse: 92   Temp: 97.8 °F (36.6 °C)   SpO2: 98%   Weight: 67.2 kg (148 lb 3.2 oz)   Height: 154.9 cm (60.98\")   PainSc: 0-No pain       Body mass index is 28.02 kg/m².  Discussed the patient's BMI with her. The BMI is above average; BMI management plan is completed.    Physical Exam  Vitals reviewed.   Constitutional:       Appearance: Normal appearance. She is well-developed.   HENT:      Head: Normocephalic and atraumatic.      Right Ear: Hearing, tympanic membrane, ear canal and external ear normal.      Left Ear: Hearing, tympanic membrane, ear canal and external ear normal.      Nose: Nose normal.      Mouth/Throat:      Lips: Pink.      Mouth: Mucous membranes are moist.      Pharynx: Oropharynx is clear. Uvula midline.   Eyes:      General: Lids are normal.      Extraocular Movements: Extraocular movements intact.      Conjunctiva/sclera: Conjunctivae normal.      Pupils: Pupils are equal, round, and reactive to light.   Neck:      Thyroid: No thyromegaly.      Trachea: Trachea normal.   Cardiovascular:      Rate and Rhythm: Normal rate and regular rhythm.      Pulses:           Carotid pulses are 2+ on the right side and 2+ on the left side.     " Heart sounds: Normal heart sounds.   Pulmonary:      Effort: Pulmonary effort is normal. No respiratory distress.      Breath sounds: Normal breath sounds.   Abdominal:      General: Bowel sounds are normal.      Palpations: Abdomen is soft.      Tenderness: There is no abdominal tenderness.   Skin:     General: Skin is warm and dry.      Capillary Refill: Capillary refill takes 2 to 3 seconds.   Neurological:      Mental Status: She is alert and oriented to person, place, and time.      GCS: GCS eye subscore is 4. GCS verbal subscore is 5. GCS motor subscore is 6.   Psychiatric:         Attention and Perception: Attention normal.         Mood and Affect: Mood normal.         Speech: Speech normal.         Behavior: Behavior normal. Behavior is cooperative.         Thought Content: Thought content normal.         Results      BMI is >= 25 and <30. (Overweight) The following options were offered after discussion;: exercise counseling/recommendations and nutrition counseling/recommendations      Lab Results   Component Value Date    TRIG 70 11/21/2024    HDL 85 (H) 11/21/2024    LDL 87 11/21/2024    VLDL 13 11/21/2024    HGBA1C 6.20 (H) 11/21/2024        Assessment & Plan   Medicare Risks and Personalized Health Plan  CMS Preventative Services Quick Reference  Advance Directive Discussion  Cardiovascular risk  Chronic Pain   Depression/Dysphoria  Inactivity/Sedentary  Obesity/Overweight   Osteoporosis Risk    The above risks/problems have been discussed with the patient.  Pertinent information has been shared with the patient in the After Visit Summary.  Follow up plans and orders are seen below in the Assessment/Plan Section.    Diagnoses and all orders for this visit:    1. Medicare annual wellness visit, subsequent (Primary)  -     CBC (No Diff)  -     Comprehensive Metabolic Panel  -     Lipid Panel  -     Hemoglobin A1c    2. Primary insomnia  -     zolpidem (AMBIEN) 10 MG tablet; Take 1 tablet by mouth At Night  As Needed for Sleep.  Dispense: 30 tablet; Refill: 2    3. Acquired hypothyroidism  -     TSH Rfx On Abnormal To Free T4  -     T4, Free    4. Borderline diabetes  -     CBC (No Diff)  -     Comprehensive Metabolic Panel  -     Hemoglobin A1c    5. Hypercholesteremia  -     CBC (No Diff)  -     Comprehensive Metabolic Panel  -     Lipid Panel    6. Adenocarcinoma of right lung    7. Stage 3a chronic kidney disease  -     Comprehensive Metabolic Panel    8. Anxiety and depression  -     sertraline (Zoloft) 100 MG tablet; Take 1 tablet by mouth Daily.  Dispense: 90 tablet; Refill: 1    9. Vitamin D deficiency    10. Screening for blood disease  -     CBC (No Diff)  -     Comprehensive Metabolic Panel  -     Lipid Panel  -     Hemoglobin A1c    11. Cervical cancer screening    12. Encounter for screening mammogram for malignant neoplasm of breast  -     Mammo Screening Digital Tomosynthesis Bilateral With CAD; Future    13. Screening for colon cancer  -     Cologuard - Stool, Per Rectum; Future    14. Encounter for osteoporosis screening in asymptomatic postmenopausal patient  -     DEXA Bone Density Axial; Future        Assessment & Plan  Medicare wellness  Patient will continue to eat a well-balanced diet, drink adequate amount of water, exercise at least 3 times weekly, and get adequate rest.  Suggested a multivitamin daily.  Discussed future preventative screenings and immunizations.    Ambien will be increased to 10 mg. Continue zoloft. Continue klonopin PRN. Discussed with patient to use this sparingly.  Continue levothyroxine daily on empty stomach.  Checking TSH.  Follow low-carb and low sugar diet.  Checking A1c.  Continue atorvastatin and follow low cholesterol high-fiber diet.  Checking fasting lipid panel.  Continue Trelegy inhaler and continue following up with pulmonology.    Patient educated on risks and side effects of taking ambien and klonopin including risk of addiction and sedation. Advised to use  this medication sparingly.  Advised to not drive or operate heavy machinery when taking this medication. UDS and CSC completed. Abad reviewed and appropriate.       Health Maintenance.  A Cologuard box will be ordered for colon cancer screening. Mammogram was ordered. A bone scan for osteoporosis will be scheduled.     Follow-up  Return in 3 months for follow up.    Follow Up:  Return if symptoms worsen or fail to improve.     An After Visit Summary and PPPS were given to the patient.       Patient or patient representative verbalized consent for the use of Ambient Listening during the visit with  DAYSI Cordero for chart documentation. 11/25/2024  21:34 EST

## 2024-11-22 LAB
HBA1C MFR BLD: 6.2 % (ref 4.8–5.6)
T4 FREE SERPL-MCNC: 1.22 NG/DL (ref 0.92–1.68)

## 2024-12-02 ENCOUNTER — TELEPHONE (OUTPATIENT)
Dept: INTERNAL MEDICINE | Facility: CLINIC | Age: 64
End: 2024-12-02

## 2024-12-02 ENCOUNTER — TELEPHONE (OUTPATIENT)
Dept: INTERNAL MEDICINE | Facility: CLINIC | Age: 64
End: 2024-12-02
Payer: COMMERCIAL

## 2024-12-02 NOTE — TELEPHONE ENCOUNTER
Patient was referred to nephrology in June. She was given the number to contact their office for appt but can't find this. Can you call her and tell her where to schedule or get her an appt. Thank you.

## 2024-12-02 NOTE — TELEPHONE ENCOUNTER
Caller: Sydnie Gamble    Relationship: Self    Best call back number:  [phone] (Mobile)     What is the medical concern/diagnosis:  HIGH BLOOD SUGAR, DECLINING KIDNEY FUNCTIONS     What specialty or service is being requested:  NEPHROLOGY     PATIENT SAID SHE WOULD LIKE TO GO TO [facility] BUT IF ARNAUD DAVIS WANTS HER TO GO TO  THEN SHE WILL GO       PLEASE CALL

## 2024-12-03 DIAGNOSIS — F41.8 DEPRESSION WITH ANXIETY: ICD-10-CM

## 2024-12-04 RX ORDER — CLONAZEPAM 1 MG/1
1 TABLET ORAL 2 TIMES DAILY PRN
Qty: 50 TABLET | Refills: 1 | Status: SHIPPED | OUTPATIENT
Start: 2024-12-04

## 2024-12-19 DIAGNOSIS — R19.5 POSITIVE COLORECTAL CANCER SCREENING USING COLOGUARD TEST: Primary | ICD-10-CM

## 2024-12-19 DIAGNOSIS — Z12.11 SCREENING FOR COLON CANCER: ICD-10-CM

## 2024-12-20 ENCOUNTER — HOSPITAL ENCOUNTER (OUTPATIENT)
Dept: CT IMAGING | Facility: HOSPITAL | Age: 64
Discharge: HOME | End: 2024-12-20
Payer: MEDICARE

## 2024-12-20 DIAGNOSIS — C34.91 ADENOCARCINOMA OF RIGHT LUNG: ICD-10-CM

## 2024-12-20 PROCEDURE — 71250 CT THORAX DX C-: CPT

## 2024-12-20 RX ORDER — IOPAMIDOL 612 MG/ML
100 INJECTION, SOLUTION INTRAVASCULAR
Status: DISCONTINUED | OUTPATIENT
Start: 2024-12-20 | End: 2024-12-21 | Stop reason: HOSPADM

## 2024-12-30 ENCOUNTER — OFFICE VISIT (OUTPATIENT)
Age: 64
End: 2024-12-30
Payer: MEDICARE

## 2024-12-30 VITALS
BODY MASS INDEX: 27.19 KG/M2 | HEART RATE: 75 BPM | TEMPERATURE: 96.6 F | DIASTOLIC BLOOD PRESSURE: 69 MMHG | HEIGHT: 61 IN | RESPIRATION RATE: 12 BRPM | SYSTOLIC BLOOD PRESSURE: 113 MMHG | WEIGHT: 144 LBS | OXYGEN SATURATION: 97 %

## 2024-12-30 DIAGNOSIS — C34.91 ADENOCARCINOMA OF RIGHT LUNG: Primary | ICD-10-CM

## 2024-12-30 NOTE — PROGRESS NOTES
DATE OF VISIT: 12/30/2024    REASON FOR VISIT: Followup for right lung adenocarcinoma     PROBLEM LIST:  1. Right lower lobe lung adenocarcinoma, T1b N0 M0 stage I A2, PD-L1 85%:  A.  Present with abnormal screening scan done March 22, 2024 with persistent abnormality June 4, 2024.  B.  Whole-body PET scan done July 11, 2024 revealed mild hypermetabolic activity in the medial right lower lobe lung nodule with patchy bilateral lung infiltrates none PET avid.  C.  Negative MRI brain done August 25, 2024.  D.  Bronchoscopy with biopsy August 6, 2024 right lower lobe positive for adenocarcinoma negative nodes and negative right upper lobe biopsy but atypical cytology.  E.  Treated with CyberKnife radiotherapy September 2024.  2.  Advanced COPD:  A.  PFTs done on March 25, 2024 revealed FEV1 of 1 L.  3.  Hypercholesteremia  4.  Hypothyroid  5.  Cervical squamous cell carcinoma:  A.  Status post total hysterectomy followed by adjuvant radiation August 2020.    HISTORY OF PRESENT ILLNESS: The patient is a very pleasant 64 y.o. female  with past medical history significant for right lower lobe lung adenocarcinoma diagnosed August 2024.  She was treated with CyberKnife radiotherapy September 2024. The  patient is here today for scheduled follow-up visit.    SUBJECTIVE: The patient is here today with her .  She is anxious about the scan result.  Denies any fever chills or night sweats.    Past History:  Medical History: has a past medical history of Acid reflux, Acid reflux, Adenocarcinoma of lung (08/12/2024), Anxiety (1976), Arthritis, Atherosclerotic cardiovascular disease (03/25/2024), Cervical cancer, Depression, Emphysema of lung, History of radiation therapy (11/20/2020), History of radiation therapy (10/04/2024), Hyperlipidemia, Hypothyroidism, Kidney disease, Lung cancer, Lung nodule, Ovarian cyst (1980s), Visual impairment (corrected with glasses), Wears dentures, and Wears glasses.   Surgical History: has a  "past surgical history that includes Tubal ligation; total abdominal hysterectomy pelvic node dissection (N/A, 08/18/2020); Lymph node biopsy; Subtotal Hysterectomy (1987?); Bronchoscopy; Lung biopsy; and BRONCHOSCOPY WITH ION ROBOTIC ASSIST (N/A, 8/6/2024).   Family History: family history includes Anxiety disorder in her mother; Asthma in her mother; COPD in her mother; Cancer in her father, maternal aunt, maternal aunt, maternal grandmother, and mother; Depression in her mother; Early death in her father; Emphysema in her mother; Heart attack in her maternal grandfather; Heart disease in her maternal grandfather; Hypertension in her mother; Mental illness in her mother.   Social History: reports that she quit smoking about 5 years ago. Her smoking use included cigarettes. She started smoking about 53 years ago. She has a 67.5 pack-year smoking history. She has been exposed to tobacco smoke. She has never used smokeless tobacco. She reports that she does not drink alcohol and does not use drugs.    (Not in a hospital admission)     Allergies: Patient has no known allergies.     Review of Systems   Constitutional: Negative.    Respiratory:  Positive for shortness of breath.    Musculoskeletal: Negative.    Psychiatric/Behavioral:  The patient is nervous/anxious.        PHYSICAL EXAMINATION:   /69   Pulse 75   Temp 96.6 °F (35.9 °C)   Resp 12   Ht 154.9 cm (61\")   Wt 65.3 kg (144 lb)   LMP  (LMP Unknown)   SpO2 97%   BMI 27.21 kg/m²    Pain Score    12/30/24 1139   PainSc: 0-No pain        ECOG score: 0            ECOG Performance Status: 1 - Symptomatic but completely ambulatory      General Appearance:      alert, cooperative, no apparent distress and appears stated age   Lungs:   Clear to auscultation bilaterally; respirations regular, even, and unlabored bilaterally   Heart:  Regular rate and rhythm, no murmurs appreciated   Abdomen:   Soft, non-tender, non-distended, and no organomegaly             "     No visits with results within 2 Week(s) from this visit.   Latest known visit with results is:   Results Encounter on 11/26/2024   Component Date Value Ref Range Status    Cologuard 12/10/2024 Positive (A)  Negative Final    Comment: POSITIVE TEST RESULT. A positive Cologuard result should be followed with a colonoscopy or visual examination of the colon. The normal value (reference range) for this assay is negative.    TEST DESCRIPTION: Composite algorithmic analysis of stool DNA-biomarkers with hemoglobin immunoassay.   Quantitative values of individual biomarkers are not reportable and are not associated with individual biomarker result reference ranges. Cologuard is intended for colorectal cancer screening of adults of either sex, 45 years or older, who are at average-risk for colorectal cancer (CRC). Cologuard has been approved for use by the U.S. FDA. The performance of Cologuard was established in a cross sectional study of average-risk adults aged 50-84. Cologuard performance in patients ages 45 to 49 years was estimated by sub-group analysis of near-age groups. Colonoscopies performed for a positive result may find as the most clinically significant lesion: colorectal cancer [4.0%], advanced adenoma                            (including sessile serrated polyps greater than or equal to 1cm diameter) [20%] or non- advanced adenoma [31%]; or no colorectal neoplasia [45%]. These estimates are derived from a prospective cross-sectional screening study of 10,000 individuals at average risk for colorectal cancer who were screened with both Cologuard and colonoscopy. (Geovanna ROJAS et al, N Engl J Med 2014;370(14):0146-2737.) Cologuard may produce a false negative or false positive result (no colorectal cancer or precancerous polyp present at colonoscopy follow up). A negative Cologuard test result does not guarantee the absence of CRC or advanced adenoma (pre-cancer). The current Cologuard screening interval is  every 3 years. (American Cancer Society and U.S. Multi-Society Task Force). Cologuard performance data in a 10,000 patient pivotal study using colonoscopy as the reference method can be accessed at the following location: www.[employer].Playdom/results. Additional description of the Cologuard test process,                            warnings and precautions can be found at www.[employer].Playdom.          CT Chest Without Contrast Diagnostic    Result Date: 12/25/2024  Narrative: CT CHEST WO CONTRAST DIAGNOSTIC Date of Exam: 12/20/2024 1:50 PM EST Indication: f/u scans lung cancer. Comparison: CT chest August 1, 2024 Technique: Axial CT images were obtained of the chest without contrast administration.  Reconstructed coronal and sagittal images were also obtained. Automated exposure control and iterative construction methods were used. Findings: There are emphysematous changes present. There is a groundglass area of density in the right upper lobe measuring about 1.2 cm previously measuring about 1.6 cm on the prior exam. There is additional groundglass area of density just inferior to this area  measuring about 1.9 cm on image 43 series 4 previously measuring about 1.6 cm. There is a groundglass area of density measuring about 1.5 cm on image 48 series 4 in the superior segment the right lower lobe previously measuring about 1.3 cm. There is a vague pulmonary nodular density in the right middle lobe measuring 3.5 mm on image 54 series 4 which has been suggested and groundglass area of density measuring 3.9 mm in the right middle lobe on image 63 series 4 not significantly changed. There remains a pleural-based nodular area in the right lower lobe medially measuring about 1.2 x 0.8 cm previously measuring 1.2 x 0.93 cm. There is a groundglass area of density in the left upper lobe measuring 7.8 mm image 34 series 4 similar in appearance to the prior study. There is minimal left basilar atelectasis. There are no pleural  effusions. There is atherosclerotic vascular calcification. There is some coronary artery calcification. There is right hilar calcification compatible with prior granulomatous exposure. There is a small pericardial effusion measuring 8.8 mm in greatest thickness previously measuring 1.4 cm. Lower slices through the upper abdomen reveal hypodense lesions in the liver which have been suggested and could reflect cysts. There is a suggested gallstone within the gallbladder without inflammation in this area. There is a sebaceous cyst suggested in the subcutaneous soft tissues of the base of the neck on the right.     Impression: Impression: 1.There are groundglass areas of density within the lungs which have been suggested. 2.Emphysematous changes are present. 3.Atherosclerotic vascular calcification including coronary artery calcification. 4.Small pericardial effusion which has been suggested. 5.Hepatic lesions which have been suggested and could reflect cysts. 6.Cholelithiasis. 7.Sebaceous cyst in the subcutaneous soft tissues base of the neck on the right. 8.There is a vague pulmonary nodular density in the right middle lobe measuring 3.5 mm which has been suggested and groundglass area of density measuring 3.9 mm in the right middle lobe not significantly changed. 9.There remains a pleural-based nodular area in the right lower lobe medially measuring about 1.2 x 0.8 cm previously measuring 1.2 x 0.93 cm. Electronically Signed: Blas Herrera MD  12/25/2024 2:27 PM EST  Workstation ID: [id number]       ASSESSMENT: The patient is a very pleasant 64 y.o. female  with right lower lobe lung adenocarcinoma      PLAN:    1.  Right lower lobe lung adenocarcinoma,  T1b N0 M0 stage I A2, PD-L1 85%:  A.  I did go over the scan results with the patient from December 25, 2024.  No evidence of progressive disease.  She does have stable chronic changes including right lung nodules.  B.  She will follow-up with us in 3 months repeat CT  scan if things remain stable we will switch her to every 6-month visit at that point.    2. Hypothyroid:  A.  She will continue Synthroid     3.  Hypercholesteremia:  A.  She will continue Lipitor.    FOLLOW UP: 3 months with CT chest with contrast.    Sejal Mckenzie MD  12/30/2024

## 2025-01-01 ENCOUNTER — APPOINTMENT (OUTPATIENT)
Dept: GENERAL RADIOLOGY | Facility: HOSPITAL | Age: 65
End: 2025-01-01
Payer: COMMERCIAL

## 2025-01-01 ENCOUNTER — HOSPITAL ENCOUNTER (INPATIENT)
Dept: ULTRASOUND IMAGING | Facility: HOSPITAL | Age: 65
LOS: 11 days | End: 2025-06-23
Attending: HOSPITALIST | Admitting: STUDENT IN AN ORGANIZED HEALTH CARE EDUCATION/TRAINING PROGRAM
Payer: COMMERCIAL

## 2025-01-01 ENCOUNTER — ANESTHESIA (OUTPATIENT)
Dept: PERIOP | Facility: HOSPITAL | Age: 65
End: 2025-01-01
Payer: COMMERCIAL

## 2025-01-01 ENCOUNTER — ANESTHESIA EVENT (OUTPATIENT)
Dept: PERIOP | Facility: HOSPITAL | Age: 65
End: 2025-01-01
Payer: COMMERCIAL

## 2025-01-01 VITALS
OXYGEN SATURATION: 85 % | WEIGHT: 123 LBS | RESPIRATION RATE: 14 BRPM | HEART RATE: 92 BPM | DIASTOLIC BLOOD PRESSURE: 50 MMHG | HEIGHT: 61 IN | SYSTOLIC BLOOD PRESSURE: 90 MMHG | BODY MASS INDEX: 23.22 KG/M2 | TEMPERATURE: 97 F

## 2025-01-01 DIAGNOSIS — C34.91 ADENOCARCINOMA OF RIGHT LUNG: ICD-10-CM

## 2025-01-01 DIAGNOSIS — J90 RECURRENT RIGHT PLEURAL EFFUSION: ICD-10-CM

## 2025-01-01 DIAGNOSIS — J90 RECURRENT PLEURAL EFFUSION: Primary | ICD-10-CM

## 2025-01-01 LAB
ALBUMIN SERPL-MCNC: 2 G/DL (ref 3.5–5.2)
ALBUMIN SERPL-MCNC: 2.1 G/DL (ref 3.5–5.2)
ALBUMIN SERPL-MCNC: 2.3 G/DL (ref 3.5–5.2)
ALBUMIN SERPL-MCNC: 2.8 G/DL (ref 3.5–5.2)
ALBUMIN/GLOB SERPL: 0.7 G/DL
ALBUMIN/GLOB SERPL: 0.7 G/DL
ALBUMIN/GLOB SERPL: 0.8 G/DL
ALP SERPL-CCNC: 455 U/L (ref 39–117)
ALP SERPL-CCNC: 493 U/L (ref 39–117)
ALP SERPL-CCNC: 541 U/L (ref 39–117)
ALP SERPL-CCNC: 560 U/L (ref 39–117)
ALT SERPL W P-5'-P-CCNC: 21 U/L (ref 1–33)
ALT SERPL W P-5'-P-CCNC: 26 U/L (ref 1–33)
ALT SERPL W P-5'-P-CCNC: 45 U/L (ref 1–33)
ALT SERPL W P-5'-P-CCNC: 51 U/L (ref 1–33)
ANION GAP SERPL CALCULATED.3IONS-SCNC: 11 MMOL/L (ref 5–15)
ANION GAP SERPL CALCULATED.3IONS-SCNC: 11 MMOL/L (ref 5–15)
ANION GAP SERPL CALCULATED.3IONS-SCNC: 12 MMOL/L (ref 5–15)
ANION GAP SERPL CALCULATED.3IONS-SCNC: 14 MMOL/L (ref 5–15)
ANION GAP SERPL CALCULATED.3IONS-SCNC: 14 MMOL/L (ref 5–15)
ANION GAP SERPL CALCULATED.3IONS-SCNC: 16.1 MMOL/L (ref 5–15)
ANION GAP SERPL CALCULATED.3IONS-SCNC: 16.9 MMOL/L (ref 5–15)
APTT PPP: 32.7 SECONDS (ref 60–90)
AST SERPL-CCNC: 133 U/L (ref 1–32)
AST SERPL-CCNC: 181 U/L (ref 1–32)
AST SERPL-CCNC: 49 U/L (ref 1–32)
AST SERPL-CCNC: 71 U/L (ref 1–32)
BASOPHILS # BLD AUTO: 0.08 10*3/MM3 (ref 0–0.2)
BASOPHILS # BLD AUTO: 0.08 10*3/MM3 (ref 0–0.2)
BASOPHILS # BLD AUTO: 0.09 10*3/MM3 (ref 0–0.2)
BASOPHILS # BLD AUTO: 0.09 10*3/MM3 (ref 0–0.2)
BASOPHILS # BLD AUTO: 0.1 10*3/MM3 (ref 0–0.2)
BASOPHILS # BLD MANUAL: 0.23 10*3/MM3 (ref 0–0.2)
BASOPHILS NFR BLD AUTO: 0.4 % (ref 0–1.5)
BASOPHILS NFR BLD AUTO: 0.4 % (ref 0–1.5)
BASOPHILS NFR BLD AUTO: 0.5 % (ref 0–1.5)
BASOPHILS NFR BLD MANUAL: 1 % (ref 0–1.5)
BILIRUB CONJ SERPL-MCNC: 0.3 MG/DL (ref 0–0.3)
BILIRUB INDIRECT SERPL-MCNC: 0.2 MG/DL
BILIRUB SERPL-MCNC: 0.2 MG/DL (ref 0–1.2)
BILIRUB SERPL-MCNC: 0.3 MG/DL (ref 0–1.2)
BILIRUB SERPL-MCNC: 0.5 MG/DL (ref 0–1.2)
BILIRUB SERPL-MCNC: 0.6 MG/DL (ref 0–1.2)
BUN SERPL-MCNC: 21.7 MG/DL (ref 8–23)
BUN SERPL-MCNC: 23 MG/DL (ref 8–23)
BUN SERPL-MCNC: 25.5 MG/DL (ref 8–23)
BUN SERPL-MCNC: 32.7 MG/DL (ref 8–23)
BUN SERPL-MCNC: 43.4 MG/DL (ref 8–23)
BUN SERPL-MCNC: 43.9 MG/DL (ref 8–23)
BUN SERPL-MCNC: 52.4 MG/DL (ref 8–23)
BUN/CREAT SERPL: 24.9 (ref 7–25)
BUN/CREAT SERPL: 27.1 (ref 7–25)
BUN/CREAT SERPL: 29.3 (ref 7–25)
BUN/CREAT SERPL: 30.3 (ref 7–25)
BUN/CREAT SERPL: 40.2 (ref 7–25)
BUN/CREAT SERPL: 45.3 (ref 7–25)
BUN/CREAT SERPL: 49.4 (ref 7–25)
CALCIUM SPEC-SCNC: 6.5 MG/DL (ref 8.6–10.5)
CALCIUM SPEC-SCNC: 7.3 MG/DL (ref 8.6–10.5)
CALCIUM SPEC-SCNC: 7.4 MG/DL (ref 8.6–10.5)
CALCIUM SPEC-SCNC: 7.4 MG/DL (ref 8.6–10.5)
CALCIUM SPEC-SCNC: 7.5 MG/DL (ref 8.6–10.5)
CALCIUM SPEC-SCNC: 7.7 MG/DL (ref 8.6–10.5)
CALCIUM SPEC-SCNC: 7.8 MG/DL (ref 8.6–10.5)
CHLORIDE SERPL-SCNC: 90 MMOL/L (ref 98–107)
CHLORIDE SERPL-SCNC: 92 MMOL/L (ref 98–107)
CHLORIDE SERPL-SCNC: 92 MMOL/L (ref 98–107)
CHLORIDE SERPL-SCNC: 94 MMOL/L (ref 98–107)
CHLORIDE SERPL-SCNC: 95 MMOL/L (ref 98–107)
CHLORIDE SERPL-SCNC: 97 MMOL/L (ref 98–107)
CHLORIDE SERPL-SCNC: 97 MMOL/L (ref 98–107)
CO2 SERPL-SCNC: 19.9 MMOL/L (ref 22–29)
CO2 SERPL-SCNC: 20 MMOL/L (ref 22–29)
CO2 SERPL-SCNC: 20 MMOL/L (ref 22–29)
CO2 SERPL-SCNC: 21.1 MMOL/L (ref 22–29)
CO2 SERPL-SCNC: 25 MMOL/L (ref 22–29)
CO2 SERPL-SCNC: 26 MMOL/L (ref 22–29)
CO2 SERPL-SCNC: 28 MMOL/L (ref 22–29)
CREAT SERPL-MCNC: 0.85 MG/DL (ref 0.57–1)
CREAT SERPL-MCNC: 0.87 MG/DL (ref 0.57–1)
CREAT SERPL-MCNC: 0.87 MG/DL (ref 0.57–1)
CREAT SERPL-MCNC: 0.97 MG/DL (ref 0.57–1)
CREAT SERPL-MCNC: 1.06 MG/DL (ref 0.57–1)
CREAT SERPL-MCNC: 1.08 MG/DL (ref 0.57–1)
CREAT SERPL-MCNC: 1.08 MG/DL (ref 0.57–1)
DEPRECATED RDW RBC AUTO: 47.6 FL (ref 37–54)
DEPRECATED RDW RBC AUTO: 47.6 FL (ref 37–54)
DEPRECATED RDW RBC AUTO: 48.6 FL (ref 37–54)
DEPRECATED RDW RBC AUTO: 48.7 FL (ref 37–54)
DEPRECATED RDW RBC AUTO: 48.9 FL (ref 37–54)
DEPRECATED RDW RBC AUTO: 49.4 FL (ref 37–54)
DEPRECATED RDW RBC AUTO: 50.4 FL (ref 37–54)
DEPRECATED RDW RBC AUTO: 50.4 FL (ref 37–54)
DEPRECATED RDW RBC AUTO: 50.5 FL (ref 37–54)
EGFRCR SERPLBLD CKD-EPI 2021: 56.4 ML/MIN/1.73
EGFRCR SERPLBLD CKD-EPI 2021: 56.4 ML/MIN/1.73
EGFRCR SERPLBLD CKD-EPI 2021: 57.7 ML/MIN/1.73
EGFRCR SERPLBLD CKD-EPI 2021: 64.2 ML/MIN/1.73
EGFRCR SERPLBLD CKD-EPI 2021: 73.1 ML/MIN/1.73
EGFRCR SERPLBLD CKD-EPI 2021: 73.1 ML/MIN/1.73
EGFRCR SERPLBLD CKD-EPI 2021: 75.2 ML/MIN/1.73
EOSINOPHIL # BLD AUTO: 0.68 10*3/MM3 (ref 0–0.4)
EOSINOPHIL # BLD AUTO: 0.93 10*3/MM3 (ref 0–0.4)
EOSINOPHIL # BLD AUTO: 1.08 10*3/MM3 (ref 0–0.4)
EOSINOPHIL # BLD AUTO: 1.11 10*3/MM3 (ref 0–0.4)
EOSINOPHIL # BLD AUTO: 1.22 10*3/MM3 (ref 0–0.4)
EOSINOPHIL # BLD MANUAL: 0.68 10*3/MM3 (ref 0–0.4)
EOSINOPHIL NFR BLD AUTO: 3.9 % (ref 0.3–6.2)
EOSINOPHIL NFR BLD AUTO: 4.7 % (ref 0.3–6.2)
EOSINOPHIL NFR BLD AUTO: 5.2 % (ref 0.3–6.2)
EOSINOPHIL NFR BLD AUTO: 5.7 % (ref 0.3–6.2)
EOSINOPHIL NFR BLD AUTO: 5.9 % (ref 0.3–6.2)
EOSINOPHIL NFR BLD MANUAL: 3 % (ref 0.3–6.2)
ERYTHROCYTE [DISTWIDTH] IN BLOOD BY AUTOMATED COUNT: 14.8 % (ref 12.3–15.4)
ERYTHROCYTE [DISTWIDTH] IN BLOOD BY AUTOMATED COUNT: 14.8 % (ref 12.3–15.4)
ERYTHROCYTE [DISTWIDTH] IN BLOOD BY AUTOMATED COUNT: 15.1 % (ref 12.3–15.4)
ERYTHROCYTE [DISTWIDTH] IN BLOOD BY AUTOMATED COUNT: 15.2 % (ref 12.3–15.4)
ERYTHROCYTE [DISTWIDTH] IN BLOOD BY AUTOMATED COUNT: 15.5 % (ref 12.3–15.4)
ERYTHROCYTE [DISTWIDTH] IN BLOOD BY AUTOMATED COUNT: 15.5 % (ref 12.3–15.4)
ERYTHROCYTE [DISTWIDTH] IN BLOOD BY AUTOMATED COUNT: 15.6 % (ref 12.3–15.4)
ERYTHROCYTE [DISTWIDTH] IN BLOOD BY AUTOMATED COUNT: 15.9 % (ref 12.3–15.4)
ERYTHROCYTE [DISTWIDTH] IN BLOOD BY AUTOMATED COUNT: 16.3 % (ref 12.3–15.4)
GLOBULIN UR ELPH-MCNC: 2.8 GM/DL
GLOBULIN UR ELPH-MCNC: 3.1 GM/DL
GLOBULIN UR ELPH-MCNC: 3.3 GM/DL
GLUCOSE BLDC GLUCOMTR-MCNC: 103 MG/DL (ref 70–130)
GLUCOSE BLDC GLUCOMTR-MCNC: 130 MG/DL (ref 70–130)
GLUCOSE SERPL-MCNC: 106 MG/DL (ref 65–99)
GLUCOSE SERPL-MCNC: 107 MG/DL (ref 65–99)
GLUCOSE SERPL-MCNC: 108 MG/DL (ref 65–99)
GLUCOSE SERPL-MCNC: 111 MG/DL (ref 65–99)
GLUCOSE SERPL-MCNC: 117 MG/DL (ref 65–99)
GLUCOSE SERPL-MCNC: 117 MG/DL (ref 65–99)
GLUCOSE SERPL-MCNC: 134 MG/DL (ref 65–99)
HCT VFR BLD AUTO: 27 % (ref 34–46.6)
HCT VFR BLD AUTO: 28.7 % (ref 34–46.6)
HCT VFR BLD AUTO: 30.6 % (ref 34–46.6)
HCT VFR BLD AUTO: 32.2 % (ref 34–46.6)
HCT VFR BLD AUTO: 32.7 % (ref 34–46.6)
HCT VFR BLD AUTO: 33.5 % (ref 34–46.6)
HCT VFR BLD AUTO: 33.9 % (ref 34–46.6)
HCT VFR BLD AUTO: 34.2 % (ref 34–46.6)
HCT VFR BLD AUTO: 35.1 % (ref 34–46.6)
HGB BLD-MCNC: 10.3 G/DL (ref 12–15.9)
HGB BLD-MCNC: 10.3 G/DL (ref 12–15.9)
HGB BLD-MCNC: 10.5 G/DL (ref 12–15.9)
HGB BLD-MCNC: 10.7 G/DL (ref 12–15.9)
HGB BLD-MCNC: 8.4 G/DL (ref 12–15.9)
HGB BLD-MCNC: 8.9 G/DL (ref 12–15.9)
HGB BLD-MCNC: 9.6 G/DL (ref 12–15.9)
HGB BLD-MCNC: 9.8 G/DL (ref 12–15.9)
HGB BLD-MCNC: 9.9 G/DL (ref 12–15.9)
HYPOCHROMIA BLD QL: ABNORMAL
IMM GRANULOCYTES # BLD AUTO: 0.44 10*3/MM3 (ref 0–0.05)
IMM GRANULOCYTES # BLD AUTO: 0.47 10*3/MM3 (ref 0–0.05)
IMM GRANULOCYTES # BLD AUTO: 0.5 10*3/MM3 (ref 0–0.05)
IMM GRANULOCYTES # BLD AUTO: 0.6 10*3/MM3 (ref 0–0.05)
IMM GRANULOCYTES # BLD AUTO: 0.76 10*3/MM3 (ref 0–0.05)
IMM GRANULOCYTES NFR BLD AUTO: 2.2 % (ref 0–0.5)
IMM GRANULOCYTES NFR BLD AUTO: 2.3 % (ref 0–0.5)
IMM GRANULOCYTES NFR BLD AUTO: 2.8 % (ref 0–0.5)
IMM GRANULOCYTES NFR BLD AUTO: 2.8 % (ref 0–0.5)
IMM GRANULOCYTES NFR BLD AUTO: 4 % (ref 0–0.5)
INR PPP: 1.17 (ref 0.89–1.12)
LYMPHOCYTES # BLD AUTO: 0.74 10*3/MM3 (ref 0.7–3.1)
LYMPHOCYTES # BLD AUTO: 0.76 10*3/MM3 (ref 0.7–3.1)
LYMPHOCYTES # BLD AUTO: 0.8 10*3/MM3 (ref 0.7–3.1)
LYMPHOCYTES # BLD AUTO: 0.91 10*3/MM3 (ref 0.7–3.1)
LYMPHOCYTES # BLD AUTO: 1.18 10*3/MM3 (ref 0.7–3.1)
LYMPHOCYTES # BLD MANUAL: 0.45 10*3/MM3 (ref 0.7–3.1)
LYMPHOCYTES NFR BLD AUTO: 3.8 % (ref 19.6–45.3)
LYMPHOCYTES NFR BLD AUTO: 4.2 % (ref 19.6–45.3)
LYMPHOCYTES NFR BLD AUTO: 4.2 % (ref 19.6–45.3)
LYMPHOCYTES NFR BLD AUTO: 4.3 % (ref 19.6–45.3)
LYMPHOCYTES NFR BLD AUTO: 5.7 % (ref 19.6–45.3)
LYMPHOCYTES NFR BLD MANUAL: 15 % (ref 5–12)
MAGNESIUM SERPL-MCNC: 2.7 MG/DL (ref 1.6–2.4)
MAGNESIUM SERPL-MCNC: 3.2 MG/DL (ref 1.6–2.4)
MCH RBC QN AUTO: 27 PG (ref 26.6–33)
MCH RBC QN AUTO: 27.4 PG (ref 26.6–33)
MCH RBC QN AUTO: 27.5 PG (ref 26.6–33)
MCH RBC QN AUTO: 27.5 PG (ref 26.6–33)
MCH RBC QN AUTO: 27.6 PG (ref 26.6–33)
MCH RBC QN AUTO: 27.6 PG (ref 26.6–33)
MCH RBC QN AUTO: 27.7 PG (ref 26.6–33)
MCH RBC QN AUTO: 27.7 PG (ref 26.6–33)
MCH RBC QN AUTO: 27.9 PG (ref 26.6–33)
MCHC RBC AUTO-ENTMCNC: 30 G/DL (ref 31.5–35.7)
MCHC RBC AUTO-ENTMCNC: 30.1 G/DL (ref 31.5–35.7)
MCHC RBC AUTO-ENTMCNC: 30.5 G/DL (ref 31.5–35.7)
MCHC RBC AUTO-ENTMCNC: 30.7 G/DL (ref 31.5–35.7)
MCHC RBC AUTO-ENTMCNC: 30.7 G/DL (ref 31.5–35.7)
MCHC RBC AUTO-ENTMCNC: 31 G/DL (ref 31.5–35.7)
MCHC RBC AUTO-ENTMCNC: 31 G/DL (ref 31.5–35.7)
MCHC RBC AUTO-ENTMCNC: 31.1 G/DL (ref 31.5–35.7)
MCHC RBC AUTO-ENTMCNC: 31.4 G/DL (ref 31.5–35.7)
MCV RBC AUTO: 88.2 FL (ref 79–97)
MCV RBC AUTO: 88.2 FL (ref 79–97)
MCV RBC AUTO: 88.3 FL (ref 79–97)
MCV RBC AUTO: 89.7 FL (ref 79–97)
MCV RBC AUTO: 89.9 FL (ref 79–97)
MCV RBC AUTO: 90.1 FL (ref 79–97)
MCV RBC AUTO: 90.1 FL (ref 79–97)
MCV RBC AUTO: 90.5 FL (ref 79–97)
MCV RBC AUTO: 91.4 FL (ref 79–97)
METAMYELOCYTES NFR BLD MANUAL: 1 % (ref 0–0)
MONOCYTES # BLD AUTO: 1.45 10*3/MM3 (ref 0.1–0.9)
MONOCYTES # BLD AUTO: 1.5 10*3/MM3 (ref 0.1–0.9)
MONOCYTES # BLD AUTO: 1.52 10*3/MM3 (ref 0.1–0.9)
MONOCYTES # BLD AUTO: 1.67 10*3/MM3 (ref 0.1–0.9)
MONOCYTES # BLD AUTO: 1.78 10*3/MM3 (ref 0.1–0.9)
MONOCYTES # BLD: 3.41 10*3/MM3 (ref 0.1–0.9)
MONOCYTES NFR BLD AUTO: 7.6 % (ref 5–12)
MONOCYTES NFR BLD AUTO: 7.8 % (ref 5–12)
MONOCYTES NFR BLD AUTO: 8 % (ref 5–12)
MONOCYTES NFR BLD AUTO: 8.2 % (ref 5–12)
MONOCYTES NFR BLD AUTO: 8.6 % (ref 5–12)
NEUTROPHILS # BLD AUTO: 17.03 10*3/MM3 (ref 1.7–7)
NEUTROPHILS NFR BLD AUTO: 14.13 10*3/MM3 (ref 1.7–7)
NEUTROPHILS NFR BLD AUTO: 14.61 10*3/MM3 (ref 1.7–7)
NEUTROPHILS NFR BLD AUTO: 15.98 10*3/MM3 (ref 1.7–7)
NEUTROPHILS NFR BLD AUTO: 16.23 10*3/MM3 (ref 1.7–7)
NEUTROPHILS NFR BLD AUTO: 17.02 10*3/MM3 (ref 1.7–7)
NEUTROPHILS NFR BLD AUTO: 77 % (ref 42.7–76)
NEUTROPHILS NFR BLD AUTO: 77.6 % (ref 42.7–76)
NEUTROPHILS NFR BLD AUTO: 79.5 % (ref 42.7–76)
NEUTROPHILS NFR BLD AUTO: 80.4 % (ref 42.7–76)
NEUTROPHILS NFR BLD AUTO: 81.3 % (ref 42.7–76)
NEUTROPHILS NFR BLD MANUAL: 56 % (ref 42.7–76)
NEUTS BAND NFR BLD MANUAL: 19 % (ref 0–5)
NRBC BLD AUTO-RTO: 0 /100 WBC (ref 0–0.2)
NRBC BLD AUTO-RTO: 0 /100 WBC (ref 0–0.2)
NRBC BLD AUTO-RTO: 0.1 /100 WBC (ref 0–0.2)
NRBC BLD AUTO-RTO: 0.1 /100 WBC (ref 0–0.2)
NRBC BLD AUTO-RTO: 0.2 /100 WBC (ref 0–0.2)
NRBC SPEC MANUAL: 2 /100 WBC (ref 0–0.2)
PLAT MORPH BLD: NORMAL
PLATELET # BLD AUTO: 145 10*3/MM3 (ref 140–450)
PLATELET # BLD AUTO: 150 10*3/MM3 (ref 140–450)
PLATELET # BLD AUTO: 174 10*3/MM3 (ref 140–450)
PLATELET # BLD AUTO: 192 10*3/MM3 (ref 140–450)
PLATELET # BLD AUTO: 215 10*3/MM3 (ref 140–450)
PLATELET # BLD AUTO: 217 10*3/MM3 (ref 140–450)
PLATELET # BLD AUTO: 220 10*3/MM3 (ref 140–450)
PLATELET # BLD AUTO: 221 10*3/MM3 (ref 140–450)
PLATELET # BLD AUTO: 285 10*3/MM3 (ref 140–450)
PMV BLD AUTO: 10 FL (ref 6–12)
PMV BLD AUTO: 10 FL (ref 6–12)
PMV BLD AUTO: 10.1 FL (ref 6–12)
PMV BLD AUTO: 10.7 FL (ref 6–12)
PMV BLD AUTO: 11.2 FL (ref 6–12)
PMV BLD AUTO: 9.9 FL (ref 6–12)
PMV BLD AUTO: 9.9 FL (ref 6–12)
POTASSIUM SERPL-SCNC: 4 MMOL/L (ref 3.5–5.2)
POTASSIUM SERPL-SCNC: 4.1 MMOL/L (ref 3.5–5.2)
POTASSIUM SERPL-SCNC: 4.1 MMOL/L (ref 3.5–5.2)
POTASSIUM SERPL-SCNC: 4.2 MMOL/L (ref 3.5–5.2)
POTASSIUM SERPL-SCNC: 4.3 MMOL/L (ref 3.5–5.2)
POTASSIUM SERPL-SCNC: 4.4 MMOL/L (ref 3.5–5.2)
POTASSIUM SERPL-SCNC: 4.5 MMOL/L (ref 3.5–5.2)
POTASSIUM SERPL-SCNC: 5 MMOL/L (ref 3.5–5.2)
PROMYELOCYTES NFR BLD MANUAL: 3 % (ref 0–0)
PROT SERPL-MCNC: 4.8 G/DL (ref 6–8.5)
PROT SERPL-MCNC: 5.1 G/DL (ref 6–8.5)
PROT SERPL-MCNC: 5.4 G/DL (ref 6–8.5)
PROT SERPL-MCNC: 6.1 G/DL (ref 6–8.5)
PROTHROMBIN TIME: 15.6 SECONDS (ref 12.2–15.3)
QT INTERVAL: 236 MS
QT INTERVAL: 348 MS
QT INTERVAL: 438 MS
QT INTERVAL: 492 MS
QTC INTERVAL: 418 MS
QTC INTERVAL: 455 MS
QTC INTERVAL: 544 MS
QTC INTERVAL: 564 MS
RBC # BLD AUTO: 3.06 10*6/MM3 (ref 3.77–5.28)
RBC # BLD AUTO: 3.25 10*6/MM3 (ref 3.77–5.28)
RBC # BLD AUTO: 3.47 10*6/MM3 (ref 3.77–5.28)
RBC # BLD AUTO: 3.59 10*6/MM3 (ref 3.77–5.28)
RBC # BLD AUTO: 3.63 10*6/MM3 (ref 3.77–5.28)
RBC # BLD AUTO: 3.72 10*6/MM3 (ref 3.77–5.28)
RBC # BLD AUTO: 3.74 10*6/MM3 (ref 3.77–5.28)
RBC # BLD AUTO: 3.77 10*6/MM3 (ref 3.77–5.28)
RBC # BLD AUTO: 3.88 10*6/MM3 (ref 3.77–5.28)
SODIUM SERPL-SCNC: 124 MMOL/L (ref 136–145)
SODIUM SERPL-SCNC: 127 MMOL/L (ref 136–145)
SODIUM SERPL-SCNC: 128 MMOL/L (ref 136–145)
SODIUM SERPL-SCNC: 129 MMOL/L (ref 136–145)
SODIUM SERPL-SCNC: 129 MMOL/L (ref 136–145)
SODIUM SERPL-SCNC: 130 MMOL/L (ref 136–145)
SODIUM SERPL-SCNC: 131 MMOL/L (ref 136–145)
SODIUM SERPL-SCNC: 134 MMOL/L (ref 136–145)
SODIUM SERPL-SCNC: 136 MMOL/L (ref 136–145)
UFH PPP CHRO-ACNC: 0.27 IU/ML (ref 0.3–0.7)
UFH PPP CHRO-ACNC: 0.3 IU/ML (ref 0.3–0.7)
UFH PPP CHRO-ACNC: 0.38 IU/ML (ref 0.3–0.7)
UFH PPP CHRO-ACNC: 0.42 IU/ML (ref 0.3–0.7)
UFH PPP CHRO-ACNC: 0.44 IU/ML (ref 0.3–0.7)
UFH PPP CHRO-ACNC: 0.46 IU/ML (ref 0.3–0.7)
UFH PPP CHRO-ACNC: 0.48 IU/ML (ref 0.3–0.7)
UFH PPP CHRO-ACNC: 0.54 IU/ML (ref 0.3–0.7)
UFH PPP CHRO-ACNC: 0.55 IU/ML (ref 0.3–0.7)
VARIANT LYMPHS NFR BLD MANUAL: 2 % (ref 19.6–45.3)
WBC NRBC COR # BLD AUTO: 17.59 10*3/MM3 (ref 3.4–10.8)
WBC NRBC COR # BLD AUTO: 18.46 10*3/MM3 (ref 3.4–10.8)
WBC NRBC COR # BLD AUTO: 18.84 10*3/MM3 (ref 3.4–10.8)
WBC NRBC COR # BLD AUTO: 19.96 10*3/MM3 (ref 3.4–10.8)
WBC NRBC COR # BLD AUTO: 20.73 10*3/MM3 (ref 3.4–10.8)
WBC NRBC COR # BLD AUTO: 21.36 10*3/MM3 (ref 3.4–10.8)
WBC NRBC COR # BLD AUTO: 21.39 10*3/MM3 (ref 3.4–10.8)
WBC NRBC COR # BLD AUTO: 22.7 10*3/MM3 (ref 3.4–10.8)
WBC NRBC COR # BLD AUTO: 24.17 10*3/MM3 (ref 3.4–10.8)

## 2025-01-01 PROCEDURE — 99232 SBSQ HOSP IP/OBS MODERATE 35: CPT | Performed by: INTERNAL MEDICINE

## 2025-01-01 PROCEDURE — 25010000002 HYDROMORPHONE PER 4 MG: Performed by: NURSE PRACTITIONER

## 2025-01-01 PROCEDURE — 99291 CRITICAL CARE FIRST HOUR: CPT | Performed by: STUDENT IN AN ORGANIZED HEALTH CARE EDUCATION/TRAINING PROGRAM

## 2025-01-01 PROCEDURE — 94799 UNLISTED PULMONARY SVC/PX: CPT

## 2025-01-01 PROCEDURE — 25010000002 PROPOFOL 10 MG/ML EMULSION: Performed by: NURSE ANESTHETIST, CERTIFIED REGISTERED

## 2025-01-01 PROCEDURE — 25010000002 DIGOXIN PER 500 MCG: Performed by: INTERNAL MEDICINE

## 2025-01-01 PROCEDURE — 71045 X-RAY EXAM CHEST 1 VIEW: CPT

## 2025-01-01 PROCEDURE — 94761 N-INVAS EAR/PLS OXIMETRY MLT: CPT

## 2025-01-01 PROCEDURE — 99232 SBSQ HOSP IP/OBS MODERATE 35: CPT | Performed by: PHYSICIAN ASSISTANT

## 2025-01-01 PROCEDURE — 80053 COMPREHEN METABOLIC PANEL: CPT | Performed by: INTERNAL MEDICINE

## 2025-01-01 PROCEDURE — 93005 ELECTROCARDIOGRAM TRACING: CPT | Performed by: INTERNAL MEDICINE

## 2025-01-01 PROCEDURE — 94664 DEMO&/EVAL PT USE INHALER: CPT

## 2025-01-01 PROCEDURE — 85610 PROTHROMBIN TIME: CPT | Performed by: NURSE PRACTITIONER

## 2025-01-01 PROCEDURE — 25010000002 HYDROMORPHONE PER 4 MG

## 2025-01-01 PROCEDURE — 63710000001 REVEFENACIN 175 MCG/3ML SOLUTION: Performed by: PHYSICIAN ASSISTANT

## 2025-01-01 PROCEDURE — 99232 SBSQ HOSP IP/OBS MODERATE 35: CPT | Performed by: THORACIC SURGERY (CARDIOTHORACIC VASCULAR SURGERY)

## 2025-01-01 PROCEDURE — 85520 HEPARIN ASSAY: CPT

## 2025-01-01 PROCEDURE — 25810000003 SODIUM CHLORIDE PER 500 ML: Performed by: THORACIC SURGERY (CARDIOTHORACIC VASCULAR SURGERY)

## 2025-01-01 PROCEDURE — 85025 COMPLETE CBC W/AUTO DIFF WBC: CPT | Performed by: INTERNAL MEDICINE

## 2025-01-01 PROCEDURE — 32555 ASPIRATE PLEURA W/ IMAGING: CPT | Performed by: STUDENT IN AN ORGANIZED HEALTH CARE EDUCATION/TRAINING PROGRAM

## 2025-01-01 PROCEDURE — 25010000002 HYDROMORPHONE PER 4 MG: Performed by: PHYSICIAN ASSISTANT

## 2025-01-01 PROCEDURE — 99024 POSTOP FOLLOW-UP VISIT: CPT

## 2025-01-01 PROCEDURE — 82948 REAGENT STRIP/BLOOD GLUCOSE: CPT

## 2025-01-01 PROCEDURE — 80048 BASIC METABOLIC PNL TOTAL CA: CPT | Performed by: INTERNAL MEDICINE

## 2025-01-01 PROCEDURE — 25010000002 CEFAZOLIN PER 500 MG: Performed by: NURSE ANESTHETIST, CERTIFIED REGISTERED

## 2025-01-01 PROCEDURE — 85025 COMPLETE CBC W/AUTO DIFF WBC: CPT | Performed by: NURSE PRACTITIONER

## 2025-01-01 PROCEDURE — 84295 ASSAY OF SERUM SODIUM: CPT | Performed by: INTERNAL MEDICINE

## 2025-01-01 PROCEDURE — 85730 THROMBOPLASTIN TIME PARTIAL: CPT | Performed by: NURSE PRACTITIONER

## 2025-01-01 PROCEDURE — 99232 SBSQ HOSP IP/OBS MODERATE 35: CPT | Performed by: NURSE PRACTITIONER

## 2025-01-01 PROCEDURE — 63710000001 REVEFENACIN 175 MCG/3ML SOLUTION: Performed by: NURSE PRACTITIONER

## 2025-01-01 PROCEDURE — 25010000002 AMIODARONE IN DEXTROSE 5% 360-4.14 MG/200ML-% SOLUTION: Performed by: STUDENT IN AN ORGANIZED HEALTH CARE EDUCATION/TRAINING PROGRAM

## 2025-01-01 PROCEDURE — 80053 COMPREHEN METABOLIC PANEL: CPT | Performed by: NURSE PRACTITIONER

## 2025-01-01 PROCEDURE — 80048 BASIC METABOLIC PNL TOTAL CA: CPT | Performed by: NURSE PRACTITIONER

## 2025-01-01 PROCEDURE — 99233 SBSQ HOSP IP/OBS HIGH 50: CPT | Performed by: INTERNAL MEDICINE

## 2025-01-01 PROCEDURE — 85027 COMPLETE CBC AUTOMATED: CPT | Performed by: INTERNAL MEDICINE

## 2025-01-01 PROCEDURE — 85520 HEPARIN ASSAY: CPT | Performed by: PHYSICIAN ASSISTANT

## 2025-01-01 PROCEDURE — 83735 ASSAY OF MAGNESIUM: CPT | Performed by: NURSE PRACTITIONER

## 2025-01-01 PROCEDURE — 25010000002 HEPARIN (PORCINE) 25000-0.45 UT/250ML-% SOLUTION: Performed by: NURSE PRACTITIONER

## 2025-01-01 PROCEDURE — 25810000003 SODIUM CHLORIDE 0.9 % SOLUTION: Performed by: INTERNAL MEDICINE

## 2025-01-01 PROCEDURE — 97166 OT EVAL MOD COMPLEX 45 MIN: CPT

## 2025-01-01 PROCEDURE — 93005 ELECTROCARDIOGRAM TRACING: CPT | Performed by: STUDENT IN AN ORGANIZED HEALTH CARE EDUCATION/TRAINING PROGRAM

## 2025-01-01 PROCEDURE — 63710000001 DRONABINOL PER 2.5 MG

## 2025-01-01 PROCEDURE — 25010000002 LIDOCAINE 1 % SOLUTION: Performed by: THORACIC SURGERY (CARDIOTHORACIC VASCULAR SURGERY)

## 2025-01-01 PROCEDURE — C1729 CATH, DRAINAGE: HCPCS | Performed by: THORACIC SURGERY (CARDIOTHORACIC VASCULAR SURGERY)

## 2025-01-01 PROCEDURE — 93005 ELECTROCARDIOGRAM TRACING: CPT

## 2025-01-01 PROCEDURE — 25010000002 PHENYLEPHRINE HCL-NACL 1000-0.9 MCG/10ML-% SOLUTION PREFILLED SYRINGE: Performed by: NURSE ANESTHETIST, CERTIFIED REGISTERED

## 2025-01-01 PROCEDURE — 25810000003 LACTATED RINGERS PER 1000 ML: Performed by: NURSE ANESTHETIST, CERTIFIED REGISTERED

## 2025-01-01 PROCEDURE — 97162 PT EVAL MOD COMPLEX 30 MIN: CPT

## 2025-01-01 PROCEDURE — 25010000002 MORPHINE PER 10 MG

## 2025-01-01 PROCEDURE — 0B9N30Z DRAINAGE OF RIGHT PLEURA WITH DRAINAGE DEVICE, PERCUTANEOUS APPROACH: ICD-10-PCS | Performed by: STUDENT IN AN ORGANIZED HEALTH CARE EDUCATION/TRAINING PROGRAM

## 2025-01-01 PROCEDURE — 97530 THERAPEUTIC ACTIVITIES: CPT

## 2025-01-01 PROCEDURE — 32557 INSERT CATH PLEURA W/ IMAGE: CPT | Performed by: THORACIC SURGERY (CARDIOTHORACIC VASCULAR SURGERY)

## 2025-01-01 PROCEDURE — 76942 ECHO GUIDE FOR BIOPSY: CPT

## 2025-01-01 PROCEDURE — 85520 HEPARIN ASSAY: CPT | Performed by: NURSE PRACTITIONER

## 2025-01-01 PROCEDURE — 25010000002 LIDOCAINE PF 1% 1 % SOLUTION: Performed by: NURSE ANESTHETIST, CERTIFIED REGISTERED

## 2025-01-01 PROCEDURE — 85007 BL SMEAR W/DIFF WBC COUNT: CPT | Performed by: INTERNAL MEDICINE

## 2025-01-01 PROCEDURE — 93010 ELECTROCARDIOGRAM REPORT: CPT | Performed by: INTERNAL MEDICINE

## 2025-01-01 PROCEDURE — 32557 INSERT CATH PLEURA W/ IMAGE: CPT | Performed by: STUDENT IN AN ORGANIZED HEALTH CARE EDUCATION/TRAINING PROGRAM

## 2025-01-01 PROCEDURE — 84132 ASSAY OF SERUM POTASSIUM: CPT | Performed by: ANESTHESIOLOGY

## 2025-01-01 PROCEDURE — 99221 1ST HOSP IP/OBS SF/LOW 40: CPT | Performed by: PHYSICIAN ASSISTANT

## 2025-01-01 PROCEDURE — 25010000002 AMIODARONE IN DEXTROSE 5% 360-4.14 MG/200ML-% SOLUTION: Performed by: INTERNAL MEDICINE

## 2025-01-01 PROCEDURE — 0W9930Z DRAINAGE OF RIGHT PLEURAL CAVITY WITH DRAINAGE DEVICE, PERCUTANEOUS APPROACH: ICD-10-PCS | Performed by: THORACIC SURGERY (CARDIOTHORACIC VASCULAR SURGERY)

## 2025-01-01 PROCEDURE — 99223 1ST HOSP IP/OBS HIGH 75: CPT | Performed by: PHYSICIAN ASSISTANT

## 2025-01-01 PROCEDURE — 25010000002 FENTANYL CITRATE (PF) 100 MCG/2ML SOLUTION: Performed by: NURSE ANESTHETIST, CERTIFIED REGISTERED

## 2025-01-01 PROCEDURE — 80076 HEPATIC FUNCTION PANEL: CPT | Performed by: INTERNAL MEDICINE

## 2025-01-01 PROCEDURE — 99231 SBSQ HOSP IP/OBS SF/LOW 25: CPT | Performed by: NURSE PRACTITIONER

## 2025-01-01 PROCEDURE — 25010000002 HEPARIN (PORCINE) 25000-0.45 UT/250ML-% SOLUTION

## 2025-01-01 PROCEDURE — 99222 1ST HOSP IP/OBS MODERATE 55: CPT | Performed by: INTERNAL MEDICINE

## 2025-01-01 PROCEDURE — 0W993ZZ DRAINAGE OF RIGHT PLEURAL CAVITY, PERCUTANEOUS APPROACH: ICD-10-PCS | Performed by: STUDENT IN AN ORGANIZED HEALTH CARE EDUCATION/TRAINING PROGRAM

## 2025-01-01 PROCEDURE — 99222 1ST HOSP IP/OBS MODERATE 55: CPT | Performed by: NURSE PRACTITIONER

## 2025-01-01 PROCEDURE — 99232 SBSQ HOSP IP/OBS MODERATE 35: CPT

## 2025-01-01 PROCEDURE — 94640 AIRWAY INHALATION TREATMENT: CPT

## 2025-01-01 PROCEDURE — 83735 ASSAY OF MAGNESIUM: CPT | Performed by: INTERNAL MEDICINE

## 2025-01-01 RX ORDER — CYCLOBENZAPRINE HCL 10 MG
10 TABLET ORAL 2 TIMES DAILY PRN
Status: ON HOLD | COMMUNITY
End: 2025-01-01

## 2025-01-01 RX ORDER — AMOXICILLIN 250 MG
2 CAPSULE ORAL 2 TIMES DAILY
Status: DISCONTINUED | OUTPATIENT
Start: 2025-01-01 | End: 2025-01-01 | Stop reason: HOSPADM

## 2025-01-01 RX ORDER — SODIUM CHLORIDE 9 MG/ML
75 INJECTION, SOLUTION INTRAVENOUS CONTINUOUS
Status: ACTIVE | OUTPATIENT
Start: 2025-01-01 | End: 2025-01-01

## 2025-01-01 RX ORDER — HEPARIN SODIUM 10000 [USP'U]/100ML
16 INJECTION, SOLUTION INTRAVENOUS
Status: DISCONTINUED | OUTPATIENT
Start: 2025-01-01 | End: 2025-01-01

## 2025-01-01 RX ORDER — NYSTATIN 100000 [USP'U]/ML
5 SUSPENSION ORAL 4 TIMES DAILY
Status: DISCONTINUED | OUTPATIENT
Start: 2025-01-01 | End: 2025-01-01

## 2025-01-01 RX ORDER — SODIUM CHLORIDE, SODIUM LACTATE, POTASSIUM CHLORIDE, CALCIUM CHLORIDE 600; 310; 30; 20 MG/100ML; MG/100ML; MG/100ML; MG/100ML
9 INJECTION, SOLUTION INTRAVENOUS CONTINUOUS
Status: ACTIVE | OUTPATIENT
Start: 2025-01-01 | End: 2025-01-01

## 2025-01-01 RX ORDER — ZOLPIDEM TARTRATE 5 MG/1
5 TABLET ORAL NIGHTLY PRN
Status: DISCONTINUED | OUTPATIENT
Start: 2025-01-01 | End: 2025-01-01

## 2025-01-01 RX ORDER — SODIUM CHLORIDE 0.9 % (FLUSH) 0.9 %
10 SYRINGE (ML) INJECTION EVERY 12 HOURS SCHEDULED
Status: DISCONTINUED | OUTPATIENT
Start: 2025-01-01 | End: 2025-01-01

## 2025-01-01 RX ORDER — CYCLOBENZAPRINE HCL 10 MG
10 TABLET ORAL 2 TIMES DAILY PRN
Status: DISCONTINUED | OUTPATIENT
Start: 2025-01-01 | End: 2025-01-01 | Stop reason: HOSPADM

## 2025-01-01 RX ORDER — LIDOCAINE HYDROCHLORIDE 10 MG/ML
5 INJECTION, SOLUTION EPIDURAL; INFILTRATION; INTRACAUDAL; PERINEURAL ONCE
Status: DISCONTINUED | OUTPATIENT
Start: 2025-01-01 | End: 2025-01-01

## 2025-01-01 RX ORDER — FENTANYL CITRATE 50 UG/ML
50 INJECTION, SOLUTION INTRAMUSCULAR; INTRAVENOUS
Status: DISCONTINUED | OUTPATIENT
Start: 2025-01-01 | End: 2025-01-01 | Stop reason: HOSPADM

## 2025-01-01 RX ORDER — MIDAZOLAM HYDROCHLORIDE 1 MG/ML
0.5 INJECTION, SOLUTION INTRAMUSCULAR; INTRAVENOUS
Status: DISCONTINUED | OUTPATIENT
Start: 2025-01-01 | End: 2025-01-01 | Stop reason: HOSPADM

## 2025-01-01 RX ORDER — PROPOFOL 10 MG/ML
VIAL (ML) INTRAVENOUS AS NEEDED
Status: DISCONTINUED | OUTPATIENT
Start: 2025-01-01 | End: 2025-01-01 | Stop reason: SURG

## 2025-01-01 RX ORDER — GLYCOPYRROLATE 0.2 MG/ML
0.4 INJECTION INTRAMUSCULAR; INTRAVENOUS EVERY 4 HOURS PRN
Status: DISCONTINUED | OUTPATIENT
Start: 2025-01-01 | End: 2025-01-01 | Stop reason: HOSPADM

## 2025-01-01 RX ORDER — ACETAMINOPHEN 650 MG/1
650 SUPPOSITORY RECTAL EVERY 4 HOURS PRN
Status: DISCONTINUED | OUTPATIENT
Start: 2025-01-01 | End: 2025-01-01 | Stop reason: HOSPADM

## 2025-01-01 RX ORDER — BISACODYL 10 MG
10 SUPPOSITORY, RECTAL RECTAL ONCE
Status: DISCONTINUED | OUTPATIENT
Start: 2025-01-01 | End: 2025-01-01

## 2025-01-01 RX ORDER — SODIUM CHLORIDE, SODIUM LACTATE, POTASSIUM CHLORIDE, CALCIUM CHLORIDE 600; 310; 30; 20 MG/100ML; MG/100ML; MG/100ML; MG/100ML
INJECTION, SOLUTION INTRAVENOUS CONTINUOUS PRN
Status: DISCONTINUED | OUTPATIENT
Start: 2025-01-01 | End: 2025-01-01 | Stop reason: SURG

## 2025-01-01 RX ORDER — SERTRALINE HYDROCHLORIDE 100 MG/1
100 TABLET, FILM COATED ORAL DAILY
Status: DISCONTINUED | OUTPATIENT
Start: 2025-01-01 | End: 2025-01-01 | Stop reason: HOSPADM

## 2025-01-01 RX ORDER — ONDANSETRON 2 MG/ML
4 INJECTION INTRAMUSCULAR; INTRAVENOUS EVERY 6 HOURS PRN
Status: DISCONTINUED | OUTPATIENT
Start: 2025-01-01 | End: 2025-01-01 | Stop reason: HOSPADM

## 2025-01-01 RX ORDER — DIGOXIN 0.25 MG/ML
500 INJECTION INTRAMUSCULAR; INTRAVENOUS ONCE
Status: COMPLETED | OUTPATIENT
Start: 2025-01-01 | End: 2025-01-01

## 2025-01-01 RX ORDER — ARFORMOTEROL TARTRATE 15 UG/2ML
15 SOLUTION RESPIRATORY (INHALATION)
Status: DISCONTINUED | OUTPATIENT
Start: 2025-01-01 | End: 2025-01-01 | Stop reason: HOSPADM

## 2025-01-01 RX ORDER — SODIUM CHLORIDE 0.9 % (FLUSH) 0.9 %
10 SYRINGE (ML) INJECTION AS NEEDED
Status: DISCONTINUED | OUTPATIENT
Start: 2025-01-01 | End: 2025-01-01 | Stop reason: HOSPADM

## 2025-01-01 RX ORDER — OXYCODONE AND ACETAMINOPHEN 5; 325 MG/1; MG/1
1 TABLET ORAL EVERY 4 HOURS PRN
Refills: 0 | Status: DISCONTINUED | OUTPATIENT
Start: 2025-01-01 | End: 2025-01-01

## 2025-01-01 RX ORDER — PHENYLEPHRINE HCL IN 0.9% NACL 1 MG/10 ML
SYRINGE (ML) INTRAVENOUS AS NEEDED
Status: DISCONTINUED | OUTPATIENT
Start: 2025-01-01 | End: 2025-01-01 | Stop reason: SURG

## 2025-01-01 RX ORDER — DRONABINOL 2.5 MG/1
5 CAPSULE ORAL 2 TIMES DAILY
Status: DISCONTINUED | OUTPATIENT
Start: 2025-01-01 | End: 2025-01-01 | Stop reason: HOSPADM

## 2025-01-01 RX ORDER — SODIUM CHLORIDE 9 MG/ML
40 INJECTION, SOLUTION INTRAVENOUS AS NEEDED
Status: DISCONTINUED | OUTPATIENT
Start: 2025-01-01 | End: 2025-01-01

## 2025-01-01 RX ORDER — ACETAMINOPHEN 160 MG/5ML
650 SOLUTION ORAL EVERY 4 HOURS PRN
Status: DISCONTINUED | OUTPATIENT
Start: 2025-01-01 | End: 2025-01-01 | Stop reason: HOSPADM

## 2025-01-01 RX ORDER — SODIUM CHLORIDE 9 MG/ML
INJECTION, SOLUTION INTRAVENOUS AS NEEDED
Status: DISCONTINUED | OUTPATIENT
Start: 2025-01-01 | End: 2025-01-01 | Stop reason: HOSPADM

## 2025-01-01 RX ORDER — MORPHINE SULFATE IN 0.9 % NACL 30 MG/30ML
2 PATIENT CONTROLLED ANALGESIA SYRINGE INTRAVENOUS
Status: DISCONTINUED | OUTPATIENT
Start: 2025-01-01 | End: 2025-01-01 | Stop reason: HOSPADM

## 2025-01-01 RX ORDER — HEPARIN SODIUM 1000 [USP'U]/ML
80 INJECTION, SOLUTION INTRAVENOUS; SUBCUTANEOUS ONCE
Status: DISCONTINUED | OUTPATIENT
Start: 2025-01-01 | End: 2025-01-01

## 2025-01-01 RX ORDER — OXYCODONE HYDROCHLORIDE 10 MG/1
10 TABLET ORAL EVERY 4 HOURS PRN
Refills: 0 | Status: DISCONTINUED | OUTPATIENT
Start: 2025-01-01 | End: 2025-01-01

## 2025-01-01 RX ORDER — HEPARIN SODIUM 1000 [USP'U]/ML
25 INJECTION, SOLUTION INTRAVENOUS; SUBCUTANEOUS AS NEEDED
Status: DISCONTINUED | OUTPATIENT
Start: 2025-01-01 | End: 2025-01-01

## 2025-01-01 RX ORDER — DIAZEPAM 10 MG/2ML
5 INJECTION, SOLUTION INTRAMUSCULAR; INTRAVENOUS EVERY 4 HOURS PRN
Status: DISCONTINUED | OUTPATIENT
Start: 2025-01-01 | End: 2025-01-01 | Stop reason: HOSPADM

## 2025-01-01 RX ORDER — ATORVASTATIN CALCIUM 10 MG/1
10 TABLET, FILM COATED ORAL NIGHTLY
Status: DISCONTINUED | OUTPATIENT
Start: 2025-01-01 | End: 2025-01-01 | Stop reason: HOSPADM

## 2025-01-01 RX ORDER — BISACODYL 5 MG/1
5 TABLET, DELAYED RELEASE ORAL DAILY PRN
Status: DISCONTINUED | OUTPATIENT
Start: 2025-01-01 | End: 2025-01-01 | Stop reason: HOSPADM

## 2025-01-01 RX ORDER — POLYETHYLENE GLYCOL 3350 17 G/17G
17 POWDER, FOR SOLUTION ORAL DAILY PRN
Status: DISCONTINUED | OUTPATIENT
Start: 2025-01-01 | End: 2025-01-01 | Stop reason: HOSPADM

## 2025-01-01 RX ORDER — IPRATROPIUM BROMIDE AND ALBUTEROL SULFATE 2.5; .5 MG/3ML; MG/3ML
3 SOLUTION RESPIRATORY (INHALATION)
Status: DISCONTINUED | OUTPATIENT
Start: 2025-01-01 | End: 2025-01-01

## 2025-01-01 RX ORDER — NALOXONE HCL 0.4 MG/ML
0.4 VIAL (ML) INJECTION
Status: DISCONTINUED | OUTPATIENT
Start: 2025-01-01 | End: 2025-01-01 | Stop reason: HOSPADM

## 2025-01-01 RX ORDER — BISACODYL 10 MG
10 SUPPOSITORY, RECTAL RECTAL DAILY PRN
Status: DISCONTINUED | OUTPATIENT
Start: 2025-01-01 | End: 2025-01-01 | Stop reason: HOSPADM

## 2025-01-01 RX ORDER — FAMOTIDINE 10 MG/ML
20 INJECTION, SOLUTION INTRAVENOUS ONCE
Status: DISCONTINUED | OUTPATIENT
Start: 2025-01-01 | End: 2025-01-01 | Stop reason: HOSPADM

## 2025-01-01 RX ORDER — ONDANSETRON 4 MG/1
4 TABLET, ORALLY DISINTEGRATING ORAL EVERY 6 HOURS PRN
Status: DISCONTINUED | OUTPATIENT
Start: 2025-01-01 | End: 2025-01-01 | Stop reason: HOSPADM

## 2025-01-01 RX ORDER — HEPARIN SODIUM 1000 [USP'U]/ML
50 INJECTION, SOLUTION INTRAVENOUS; SUBCUTANEOUS AS NEEDED
Status: DISCONTINUED | OUTPATIENT
Start: 2025-01-01 | End: 2025-01-01

## 2025-01-01 RX ORDER — OXYCODONE HYDROCHLORIDE 5 MG/1
5 TABLET ORAL EVERY 4 HOURS PRN
Refills: 0 | Status: DISCONTINUED | OUTPATIENT
Start: 2025-01-01 | End: 2025-01-01 | Stop reason: HOSPADM

## 2025-01-01 RX ORDER — SODIUM CHLORIDE 0.9 % (FLUSH) 0.9 %
10 SYRINGE (ML) INJECTION AS NEEDED
Status: DISCONTINUED | OUTPATIENT
Start: 2025-01-01 | End: 2025-01-01

## 2025-01-01 RX ORDER — IPRATROPIUM BROMIDE AND ALBUTEROL SULFATE 2.5; .5 MG/3ML; MG/3ML
3 SOLUTION RESPIRATORY (INHALATION) EVERY 4 HOURS PRN
Status: DISCONTINUED | OUTPATIENT
Start: 2025-01-01 | End: 2025-01-01 | Stop reason: HOSPADM

## 2025-01-01 RX ORDER — LEVOTHYROXINE SODIUM 88 UG/1
88 TABLET ORAL
Status: DISCONTINUED | OUTPATIENT
Start: 2025-01-01 | End: 2025-01-01 | Stop reason: HOSPADM

## 2025-01-01 RX ORDER — LIDOCAINE HYDROCHLORIDE 10 MG/ML
0.5 INJECTION, SOLUTION EPIDURAL; INFILTRATION; INTRACAUDAL; PERINEURAL ONCE AS NEEDED
Status: DISCONTINUED | OUTPATIENT
Start: 2025-01-01 | End: 2025-01-01 | Stop reason: HOSPADM

## 2025-01-01 RX ORDER — OXYCODONE AND ACETAMINOPHEN 5; 325 MG/1; MG/1
1 TABLET ORAL ONCE
Status: COMPLETED | OUTPATIENT
Start: 2025-01-01 | End: 2025-01-01

## 2025-01-01 RX ORDER — FAMOTIDINE 20 MG/1
20 TABLET, FILM COATED ORAL ONCE
Status: DISCONTINUED | OUTPATIENT
Start: 2025-01-01 | End: 2025-01-01 | Stop reason: HOSPADM

## 2025-01-01 RX ORDER — BUDESONIDE 0.5 MG/2ML
0.5 INHALANT ORAL
Status: DISCONTINUED | OUTPATIENT
Start: 2025-01-01 | End: 2025-01-01 | Stop reason: HOSPADM

## 2025-01-01 RX ORDER — LIDOCAINE HYDROCHLORIDE 10 MG/ML
INJECTION, SOLUTION EPIDURAL; INFILTRATION; INTRACAUDAL; PERINEURAL AS NEEDED
Status: DISCONTINUED | OUTPATIENT
Start: 2025-01-01 | End: 2025-01-01 | Stop reason: SURG

## 2025-01-01 RX ORDER — NYSTATIN 100000 [USP'U]/ML
5 SUSPENSION ORAL 4 TIMES DAILY
Qty: 473 ML | Refills: 0 | Status: SHIPPED | OUTPATIENT
Start: 2025-01-01 | End: 2025-06-25

## 2025-01-01 RX ORDER — CLONAZEPAM 1 MG/1
1 TABLET ORAL 2 TIMES DAILY PRN
Status: DISCONTINUED | OUTPATIENT
Start: 2025-01-01 | End: 2025-01-01

## 2025-01-01 RX ORDER — AMIODARONE HYDROCHLORIDE 200 MG/1
200 TABLET ORAL 2 TIMES DAILY WITH MEALS
Status: DISCONTINUED | OUTPATIENT
Start: 2025-01-01 | End: 2025-01-01 | Stop reason: HOSPADM

## 2025-01-01 RX ORDER — FAMOTIDINE 20 MG/1
40 TABLET, FILM COATED ORAL 2 TIMES DAILY PRN
Status: DISCONTINUED | OUTPATIENT
Start: 2025-01-01 | End: 2025-01-01 | Stop reason: HOSPADM

## 2025-01-01 RX ORDER — FENTANYL CITRATE 50 UG/ML
INJECTION, SOLUTION INTRAMUSCULAR; INTRAVENOUS AS NEEDED
Status: DISCONTINUED | OUTPATIENT
Start: 2025-01-01 | End: 2025-01-01 | Stop reason: SURG

## 2025-01-01 RX ORDER — HYDROMORPHONE HYDROCHLORIDE 1 MG/ML
0.5 INJECTION, SOLUTION INTRAMUSCULAR; INTRAVENOUS; SUBCUTANEOUS
Refills: 0 | Status: DISCONTINUED | OUTPATIENT
Start: 2025-01-01 | End: 2025-01-01

## 2025-01-01 RX ORDER — ACETAMINOPHEN 325 MG/1
650 TABLET ORAL EVERY 4 HOURS PRN
Status: DISCONTINUED | OUTPATIENT
Start: 2025-01-01 | End: 2025-01-01 | Stop reason: HOSPADM

## 2025-01-01 RX ORDER — SIMETHICONE 80 MG
80 TABLET,CHEWABLE ORAL 4 TIMES DAILY PRN
Status: DISCONTINUED | OUTPATIENT
Start: 2025-01-01 | End: 2025-01-01 | Stop reason: HOSPADM

## 2025-01-01 RX ORDER — ONDANSETRON 2 MG/ML
4 INJECTION INTRAMUSCULAR; INTRAVENOUS ONCE AS NEEDED
Status: DISCONTINUED | OUTPATIENT
Start: 2025-01-01 | End: 2025-01-01 | Stop reason: HOSPADM

## 2025-01-01 RX ORDER — LIDOCAINE HYDROCHLORIDE 10 MG/ML
INJECTION, SOLUTION INFILTRATION; PERINEURAL AS NEEDED
Status: DISCONTINUED | OUTPATIENT
Start: 2025-01-01 | End: 2025-01-01 | Stop reason: HOSPADM

## 2025-01-01 RX ORDER — SODIUM CHLORIDE 0.9 % (FLUSH) 0.9 %
10 SYRINGE (ML) INJECTION EVERY 12 HOURS SCHEDULED
Status: DISCONTINUED | OUTPATIENT
Start: 2025-01-01 | End: 2025-01-01 | Stop reason: HOSPADM

## 2025-01-01 RX ORDER — LIDOCAINE HYDROCHLORIDE 20 MG/ML
5 SOLUTION OROPHARYNGEAL
Status: DISCONTINUED | OUTPATIENT
Start: 2025-01-01 | End: 2025-01-01 | Stop reason: HOSPADM

## 2025-01-01 RX ORDER — ACETAMINOPHEN 325 MG/1
650 TABLET ORAL EVERY 6 HOURS PRN
Start: 2025-01-01 | End: 2025-06-25

## 2025-01-01 RX ORDER — HYDROMORPHONE HYDROCHLORIDE 1 MG/ML
0.5 INJECTION, SOLUTION INTRAMUSCULAR; INTRAVENOUS; SUBCUTANEOUS
Status: DISCONTINUED | OUTPATIENT
Start: 2025-01-01 | End: 2025-01-01 | Stop reason: HOSPADM

## 2025-01-01 RX ORDER — CEFAZOLIN SODIUM 1 G/3ML
INJECTION, POWDER, FOR SOLUTION INTRAMUSCULAR; INTRAVENOUS AS NEEDED
Status: DISCONTINUED | OUTPATIENT
Start: 2025-01-01 | End: 2025-01-01 | Stop reason: SURG

## 2025-01-01 RX ADMIN — NYSTATIN 500000 UNITS: 100000 SUSPENSION ORAL at 17:34

## 2025-01-01 RX ADMIN — LEVOTHYROXINE SODIUM 88 MCG: 0.09 TABLET ORAL at 06:53

## 2025-01-01 RX ADMIN — AMIODARONE HYDROCHLORIDE 200 MG: 200 TABLET ORAL at 17:54

## 2025-01-01 RX ADMIN — DICLOFENAC SODIUM 2 G: 10 GEL TOPICAL at 17:43

## 2025-01-01 RX ADMIN — MUPIROCIN 1 APPLICATION: 20 OINTMENT TOPICAL at 20:25

## 2025-01-01 RX ADMIN — APIXABAN 5 MG: 5 TABLET, FILM COATED ORAL at 21:27

## 2025-01-01 RX ADMIN — OXYCODONE HYDROCHLORIDE AND ACETAMINOPHEN 1 TABLET: 5; 325 TABLET ORAL at 10:28

## 2025-01-01 RX ADMIN — HYDROMORPHONE HYDROCHLORIDE 0.5 MG: 1 INJECTION, SOLUTION INTRAMUSCULAR; INTRAVENOUS; SUBCUTANEOUS at 08:52

## 2025-01-01 RX ADMIN — OXYCODONE HYDROCHLORIDE AND ACETAMINOPHEN 1 TABLET: 5; 325 TABLET ORAL at 15:04

## 2025-01-01 RX ADMIN — HEPARIN SODIUM 16 UNITS/KG/HR: 10000 INJECTION, SOLUTION INTRAVENOUS at 11:57

## 2025-01-01 RX ADMIN — BUDESONIDE 0.5 MG: 0.5 SUSPENSION RESPIRATORY (INHALATION) at 20:34

## 2025-01-01 RX ADMIN — OXYCODONE HYDROCHLORIDE AND ACETAMINOPHEN 1 TABLET: 5; 325 TABLET ORAL at 12:53

## 2025-01-01 RX ADMIN — APIXABAN 5 MG: 5 TABLET, FILM COATED ORAL at 08:52

## 2025-01-01 RX ADMIN — ARFORMOTEROL TARTRATE 15 MCG: 15 SOLUTION RESPIRATORY (INHALATION) at 08:56

## 2025-01-01 RX ADMIN — Medication 10 ML: at 20:49

## 2025-01-01 RX ADMIN — BUDESONIDE 0.5 MG: 0.5 SUSPENSION RESPIRATORY (INHALATION) at 21:09

## 2025-01-01 RX ADMIN — BUDESONIDE 0.5 MG: 0.5 SUSPENSION RESPIRATORY (INHALATION) at 19:15

## 2025-01-01 RX ADMIN — SIMETHICONE 80 MG: 80 TABLET, CHEWABLE ORAL at 20:40

## 2025-01-01 RX ADMIN — DRONABINOL 5 MG: 2.5 CAPSULE ORAL at 08:28

## 2025-01-01 RX ADMIN — NYSTATIN 500000 UNITS: 100000 SUSPENSION ORAL at 21:27

## 2025-01-01 RX ADMIN — AMIODARONE HYDROCHLORIDE 0.5 MG/MIN: 1.8 INJECTION, SOLUTION INTRAVENOUS at 09:55

## 2025-01-01 RX ADMIN — SERTRALINE 100 MG: 100 TABLET, FILM COATED ORAL at 09:04

## 2025-01-01 RX ADMIN — HYDROMORPHONE HYDROCHLORIDE 0.5 MG: 1 INJECTION, SOLUTION INTRAMUSCULAR; INTRAVENOUS; SUBCUTANEOUS at 12:21

## 2025-01-01 RX ADMIN — CYCLOBENZAPRINE HYDROCHLORIDE 10 MG: 10 TABLET, FILM COATED ORAL at 05:04

## 2025-01-01 RX ADMIN — HEPARIN SODIUM 16 UNITS/KG/HR: 10000 INJECTION, SOLUTION INTRAVENOUS at 06:07

## 2025-01-01 RX ADMIN — DICLOFENAC SODIUM 2 G: 10 GEL TOPICAL at 20:31

## 2025-01-01 RX ADMIN — SIMETHICONE 80 MG: 80 TABLET, CHEWABLE ORAL at 09:24

## 2025-01-01 RX ADMIN — SERTRALINE 100 MG: 100 TABLET, FILM COATED ORAL at 09:09

## 2025-01-01 RX ADMIN — HYDROMORPHONE HYDROCHLORIDE 0.5 MG: 1 INJECTION, SOLUTION INTRAMUSCULAR; INTRAVENOUS; SUBCUTANEOUS at 08:36

## 2025-01-01 RX ADMIN — ARFORMOTEROL TARTRATE 15 MCG: 15 SOLUTION RESPIRATORY (INHALATION) at 20:34

## 2025-01-01 RX ADMIN — ATORVASTATIN CALCIUM 10 MG: 10 TABLET, FILM COATED ORAL at 21:16

## 2025-01-01 RX ADMIN — ARFORMOTEROL TARTRATE 15 MCG: 15 SOLUTION RESPIRATORY (INHALATION) at 21:09

## 2025-01-01 RX ADMIN — DOCUSATE SODIUM 50 MG AND SENNOSIDES 8.6 MG 2 TABLET: 8.6; 5 TABLET, FILM COATED ORAL at 20:34

## 2025-01-01 RX ADMIN — DRONABINOL 5 MG: 2.5 CAPSULE ORAL at 21:26

## 2025-01-01 RX ADMIN — ARFORMOTEROL TARTRATE 15 MCG: 15 SOLUTION RESPIRATORY (INHALATION) at 18:50

## 2025-01-01 RX ADMIN — DICLOFENAC SODIUM 2 G: 10 GEL TOPICAL at 17:45

## 2025-01-01 RX ADMIN — BUDESONIDE 0.5 MG: 0.5 SUSPENSION RESPIRATORY (INHALATION) at 08:56

## 2025-01-01 RX ADMIN — NYSTATIN 500000 UNITS: 100000 SUSPENSION ORAL at 12:42

## 2025-01-01 RX ADMIN — TRAZODONE HYDROCHLORIDE 150 MG: 50 TABLET ORAL at 20:46

## 2025-01-01 RX ADMIN — DRONABINOL 5 MG: 2.5 CAPSULE ORAL at 12:08

## 2025-01-01 RX ADMIN — DICLOFENAC SODIUM 2 G: 10 GEL TOPICAL at 12:42

## 2025-01-01 RX ADMIN — DOCUSATE SODIUM 50 MG AND SENNOSIDES 8.6 MG 2 TABLET: 8.6; 5 TABLET, FILM COATED ORAL at 20:40

## 2025-01-01 RX ADMIN — Medication 10 ML: at 09:46

## 2025-01-01 RX ADMIN — TRAZODONE HYDROCHLORIDE 150 MG: 50 TABLET ORAL at 21:25

## 2025-01-01 RX ADMIN — IPRATROPIUM BROMIDE AND ALBUTEROL SULFATE 3 ML: 2.5; .5 SOLUTION RESPIRATORY (INHALATION) at 17:19

## 2025-01-01 RX ADMIN — SERTRALINE 100 MG: 100 TABLET, FILM COATED ORAL at 08:52

## 2025-01-01 RX ADMIN — LEVOTHYROXINE SODIUM 88 MCG: 0.09 TABLET ORAL at 06:21

## 2025-01-01 RX ADMIN — ATORVASTATIN CALCIUM 10 MG: 10 TABLET, FILM COATED ORAL at 21:28

## 2025-01-01 RX ADMIN — CLONAZEPAM 1 MG: 1 TABLET ORAL at 21:38

## 2025-01-01 RX ADMIN — DICLOFENAC SODIUM 2 G: 10 GEL TOPICAL at 21:39

## 2025-01-01 RX ADMIN — BUDESONIDE 0.5 MG: 0.5 SUSPENSION RESPIRATORY (INHALATION) at 07:52

## 2025-01-01 RX ADMIN — HYDROMORPHONE HYDROCHLORIDE 0.5 MG: 1 INJECTION, SOLUTION INTRAMUSCULAR; INTRAVENOUS; SUBCUTANEOUS at 10:24

## 2025-01-01 RX ADMIN — ATORVASTATIN CALCIUM 10 MG: 10 TABLET, FILM COATED ORAL at 21:27

## 2025-01-01 RX ADMIN — HYDROMORPHONE HYDROCHLORIDE 0.5 MG: 1 INJECTION, SOLUTION INTRAMUSCULAR; INTRAVENOUS; SUBCUTANEOUS at 10:32

## 2025-01-01 RX ADMIN — NYSTATIN 500000 UNITS: 100000 SUSPENSION ORAL at 13:05

## 2025-01-01 RX ADMIN — ARFORMOTEROL TARTRATE 15 MCG: 15 SOLUTION RESPIRATORY (INHALATION) at 19:15

## 2025-01-01 RX ADMIN — HYDROMORPHONE HYDROCHLORIDE 0.5 MG: 1 INJECTION, SOLUTION INTRAMUSCULAR; INTRAVENOUS; SUBCUTANEOUS at 20:07

## 2025-01-01 RX ADMIN — APIXABAN 5 MG: 5 TABLET, FILM COATED ORAL at 10:32

## 2025-01-01 RX ADMIN — HYDROMORPHONE HYDROCHLORIDE 0.5 MG: 1 INJECTION, SOLUTION INTRAMUSCULAR; INTRAVENOUS; SUBCUTANEOUS at 10:33

## 2025-01-01 RX ADMIN — DRONABINOL 5 MG: 2.5 CAPSULE ORAL at 21:28

## 2025-01-01 RX ADMIN — ARFORMOTEROL TARTRATE 15 MCG: 15 SOLUTION RESPIRATORY (INHALATION) at 20:03

## 2025-01-01 RX ADMIN — HYDROMORPHONE HYDROCHLORIDE 0.5 MG: 1 INJECTION, SOLUTION INTRAMUSCULAR; INTRAVENOUS; SUBCUTANEOUS at 20:39

## 2025-01-01 RX ADMIN — ARFORMOTEROL TARTRATE 15 MCG: 15 SOLUTION RESPIRATORY (INHALATION) at 07:52

## 2025-01-01 RX ADMIN — HYDROMORPHONE HYDROCHLORIDE 0.5 MG: 1 INJECTION, SOLUTION INTRAMUSCULAR; INTRAVENOUS; SUBCUTANEOUS at 05:28

## 2025-01-01 RX ADMIN — Medication 10 ML: at 21:39

## 2025-01-01 RX ADMIN — Medication 10 ML: at 21:00

## 2025-01-01 RX ADMIN — SODIUM CHLORIDE, POTASSIUM CHLORIDE, SODIUM LACTATE AND CALCIUM CHLORIDE: 600; 310; 30; 20 INJECTION, SOLUTION INTRAVENOUS at 07:22

## 2025-01-01 RX ADMIN — TRAZODONE HYDROCHLORIDE 150 MG: 50 TABLET ORAL at 21:37

## 2025-01-01 RX ADMIN — APIXABAN 5 MG: 5 TABLET, FILM COATED ORAL at 08:36

## 2025-01-01 RX ADMIN — OXYCODONE HYDROCHLORIDE 10 MG: 10 TABLET ORAL at 21:35

## 2025-01-01 RX ADMIN — SERTRALINE 100 MG: 100 TABLET, FILM COATED ORAL at 09:45

## 2025-01-01 RX ADMIN — HYDROMORPHONE HYDROCHLORIDE 0.5 MG: 1 INJECTION, SOLUTION INTRAMUSCULAR; INTRAVENOUS; SUBCUTANEOUS at 23:05

## 2025-01-01 RX ADMIN — HYDROMORPHONE HYDROCHLORIDE 0.5 MG: 1 INJECTION, SOLUTION INTRAMUSCULAR; INTRAVENOUS; SUBCUTANEOUS at 06:29

## 2025-01-01 RX ADMIN — ARFORMOTEROL TARTRATE 15 MCG: 15 SOLUTION RESPIRATORY (INHALATION) at 21:02

## 2025-01-01 RX ADMIN — NYSTATIN 500000 UNITS: 100000 SUSPENSION ORAL at 20:54

## 2025-01-01 RX ADMIN — AMIODARONE HYDROCHLORIDE 200 MG: 200 TABLET ORAL at 08:36

## 2025-01-01 RX ADMIN — ARFORMOTEROL TARTRATE 15 MCG: 15 SOLUTION RESPIRATORY (INHALATION) at 08:50

## 2025-01-01 RX ADMIN — TRAZODONE HYDROCHLORIDE 150 MG: 50 TABLET ORAL at 21:27

## 2025-01-01 RX ADMIN — REVEFENACIN 175 MCG: 175 SOLUTION RESPIRATORY (INHALATION) at 08:50

## 2025-01-01 RX ADMIN — HYDROMORPHONE HYDROCHLORIDE 0.5 MG: 1 INJECTION, SOLUTION INTRAMUSCULAR; INTRAVENOUS; SUBCUTANEOUS at 21:38

## 2025-01-01 RX ADMIN — Medication 10 ML: at 08:53

## 2025-01-01 RX ADMIN — LEVOTHYROXINE SODIUM 88 MCG: 0.09 TABLET ORAL at 05:03

## 2025-01-01 RX ADMIN — BUDESONIDE 0.5 MG: 0.5 SUSPENSION RESPIRATORY (INHALATION) at 21:25

## 2025-01-01 RX ADMIN — DRONABINOL 5 MG: 2.5 CAPSULE ORAL at 08:36

## 2025-01-01 RX ADMIN — ATORVASTATIN CALCIUM 10 MG: 10 TABLET, FILM COATED ORAL at 20:40

## 2025-01-01 RX ADMIN — NYSTATIN 500000 UNITS: 100000 SUSPENSION ORAL at 08:28

## 2025-01-01 RX ADMIN — HEPARIN SODIUM 16 UNITS/KG/HR: 10000 INJECTION, SOLUTION INTRAVENOUS at 03:45

## 2025-01-01 RX ADMIN — PROPOFOL 50 MG: 10 INJECTION, EMULSION INTRAVENOUS at 07:27

## 2025-01-01 RX ADMIN — LIDOCAINE HYDROCHLORIDE 5 ML: 20 SOLUTION ORAL at 23:20

## 2025-01-01 RX ADMIN — NYSTATIN 500000 UNITS: 100000 SUSPENSION ORAL at 11:59

## 2025-01-01 RX ADMIN — Medication 10 ML: at 20:40

## 2025-01-01 RX ADMIN — HYDROMORPHONE HYDROCHLORIDE 0.5 MG: 1 INJECTION, SOLUTION INTRAMUSCULAR; INTRAVENOUS; SUBCUTANEOUS at 20:46

## 2025-01-01 RX ADMIN — BUDESONIDE 0.5 MG: 0.5 SUSPENSION RESPIRATORY (INHALATION) at 20:03

## 2025-01-01 RX ADMIN — SODIUM CHLORIDE 75 ML/HR: 9 INJECTION, SOLUTION INTRAVENOUS at 11:30

## 2025-01-01 RX ADMIN — OXYCODONE HYDROCHLORIDE AND ACETAMINOPHEN 1 TABLET: 5; 325 TABLET ORAL at 23:04

## 2025-01-01 RX ADMIN — TRAZODONE HYDROCHLORIDE 150 MG: 50 TABLET ORAL at 20:14

## 2025-01-01 RX ADMIN — TRAZODONE HYDROCHLORIDE 150 MG: 50 TABLET ORAL at 20:31

## 2025-01-01 RX ADMIN — CEFAZOLIN 2 G: 1 INJECTION, POWDER, FOR SOLUTION INTRAMUSCULAR; INTRAVENOUS at 07:22

## 2025-01-01 RX ADMIN — NYSTATIN 500000 UNITS: 100000 SUSPENSION ORAL at 20:31

## 2025-01-01 RX ADMIN — APIXABAN 5 MG: 5 TABLET, FILM COATED ORAL at 12:09

## 2025-01-01 RX ADMIN — SERTRALINE 100 MG: 100 TABLET, FILM COATED ORAL at 08:36

## 2025-01-01 RX ADMIN — DIGOXIN 500 MCG: 0.25 INJECTION INTRAMUSCULAR; INTRAVENOUS at 09:45

## 2025-01-01 RX ADMIN — DOCUSATE SODIUM 50 MG AND SENNOSIDES 8.6 MG 2 TABLET: 8.6; 5 TABLET, FILM COATED ORAL at 21:16

## 2025-01-01 RX ADMIN — LEVOTHYROXINE SODIUM 88 MCG: 0.09 TABLET ORAL at 05:45

## 2025-01-01 RX ADMIN — HYDROMORPHONE HYDROCHLORIDE 0.5 MG: 1 INJECTION, SOLUTION INTRAMUSCULAR; INTRAVENOUS; SUBCUTANEOUS at 12:05

## 2025-01-01 RX ADMIN — HYDROMORPHONE HYDROCHLORIDE 0.5 MG: 1 INJECTION, SOLUTION INTRAMUSCULAR; INTRAVENOUS; SUBCUTANEOUS at 18:30

## 2025-01-01 RX ADMIN — LEVOTHYROXINE SODIUM 88 MCG: 0.09 TABLET ORAL at 06:13

## 2025-01-01 RX ADMIN — DOCUSATE SODIUM 50 MG AND SENNOSIDES 8.6 MG 2 TABLET: 8.6; 5 TABLET, FILM COATED ORAL at 08:36

## 2025-01-01 RX ADMIN — HEPARIN SODIUM 16 UNITS/KG/HR: 10000 INJECTION, SOLUTION INTRAVENOUS at 00:31

## 2025-01-01 RX ADMIN — ARFORMOTEROL TARTRATE 15 MCG: 15 SOLUTION RESPIRATORY (INHALATION) at 21:04

## 2025-01-01 RX ADMIN — HYDROMORPHONE HYDROCHLORIDE 0.5 MG: 1 INJECTION, SOLUTION INTRAMUSCULAR; INTRAVENOUS; SUBCUTANEOUS at 18:28

## 2025-01-01 RX ADMIN — BUDESONIDE 0.5 MG: 0.5 SUSPENSION RESPIRATORY (INHALATION) at 18:50

## 2025-01-01 RX ADMIN — DRONABINOL 5 MG: 2.5 CAPSULE ORAL at 08:52

## 2025-01-01 RX ADMIN — BUDESONIDE 0.5 MG: 0.5 SUSPENSION RESPIRATORY (INHALATION) at 10:40

## 2025-01-01 RX ADMIN — APIXABAN 5 MG: 5 TABLET, FILM COATED ORAL at 08:28

## 2025-01-01 RX ADMIN — NYSTATIN 500000 UNITS: 100000 SUSPENSION ORAL at 06:31

## 2025-01-01 RX ADMIN — DRONABINOL 5 MG: 2.5 CAPSULE ORAL at 20:25

## 2025-01-01 RX ADMIN — DICLOFENAC SODIUM 2 G: 10 GEL TOPICAL at 11:59

## 2025-01-01 RX ADMIN — REVEFENACIN 175 MCG: 175 SOLUTION RESPIRATORY (INHALATION) at 08:56

## 2025-01-01 RX ADMIN — NYSTATIN 500000 UNITS: 100000 SUSPENSION ORAL at 17:43

## 2025-01-01 RX ADMIN — ATORVASTATIN CALCIUM 10 MG: 10 TABLET, FILM COATED ORAL at 20:14

## 2025-01-01 RX ADMIN — ARFORMOTEROL TARTRATE 15 MCG: 15 SOLUTION RESPIRATORY (INHALATION) at 21:42

## 2025-01-01 RX ADMIN — ARFORMOTEROL TARTRATE 15 MCG: 15 SOLUTION RESPIRATORY (INHALATION) at 21:24

## 2025-01-01 RX ADMIN — HYDROMORPHONE HYDROCHLORIDE 0.5 MG: 1 INJECTION, SOLUTION INTRAMUSCULAR; INTRAVENOUS; SUBCUTANEOUS at 09:15

## 2025-01-01 RX ADMIN — IPRATROPIUM BROMIDE AND ALBUTEROL SULFATE 3 ML: 2.5; .5 SOLUTION RESPIRATORY (INHALATION) at 23:21

## 2025-01-01 RX ADMIN — AMIODARONE HYDROCHLORIDE 0.5 MG/MIN: 1.8 INJECTION, SOLUTION INTRAVENOUS at 16:04

## 2025-01-01 RX ADMIN — AMIODARONE HYDROCHLORIDE 200 MG: 200 TABLET ORAL at 10:30

## 2025-01-01 RX ADMIN — Medication 10 ML: at 09:05

## 2025-01-01 RX ADMIN — REVEFENACIN 175 MCG: 175 SOLUTION RESPIRATORY (INHALATION) at 10:40

## 2025-01-01 RX ADMIN — DOCUSATE SODIUM 50 MG AND SENNOSIDES 8.6 MG 2 TABLET: 8.6; 5 TABLET, FILM COATED ORAL at 09:05

## 2025-01-01 RX ADMIN — OXYCODONE HYDROCHLORIDE AND ACETAMINOPHEN 1 TABLET: 5; 325 TABLET ORAL at 10:07

## 2025-01-01 RX ADMIN — IPRATROPIUM BROMIDE AND ALBUTEROL SULFATE 3 ML: 2.5; .5 SOLUTION RESPIRATORY (INHALATION) at 11:11

## 2025-01-01 RX ADMIN — OXYCODONE HYDROCHLORIDE 10 MG: 10 TABLET ORAL at 13:05

## 2025-01-01 RX ADMIN — HYDROMORPHONE HYDROCHLORIDE 0.5 MG: 1 INJECTION, SOLUTION INTRAMUSCULAR; INTRAVENOUS; SUBCUTANEOUS at 17:17

## 2025-01-01 RX ADMIN — HYDROMORPHONE HYDROCHLORIDE 0.5 MG: 1 INJECTION, SOLUTION INTRAMUSCULAR; INTRAVENOUS; SUBCUTANEOUS at 08:40

## 2025-01-01 RX ADMIN — Medication 10 ML: at 20:54

## 2025-01-01 RX ADMIN — NYSTATIN 500000 UNITS: 100000 SUSPENSION ORAL at 20:45

## 2025-01-01 RX ADMIN — REVEFENACIN 175 MCG: 175 SOLUTION RESPIRATORY (INHALATION) at 07:53

## 2025-01-01 RX ADMIN — BISACODYL 5 MG: 5 TABLET, COATED ORAL at 09:24

## 2025-01-01 RX ADMIN — DICLOFENAC SODIUM 2 G: 10 GEL TOPICAL at 09:04

## 2025-01-01 RX ADMIN — SERTRALINE 100 MG: 100 TABLET, FILM COATED ORAL at 08:28

## 2025-01-01 RX ADMIN — BUDESONIDE 0.5 MG: 0.5 SUSPENSION RESPIRATORY (INHALATION) at 21:42

## 2025-01-01 RX ADMIN — HYDROMORPHONE HYDROCHLORIDE 0.5 MG: 1 INJECTION, SOLUTION INTRAMUSCULAR; INTRAVENOUS; SUBCUTANEOUS at 17:50

## 2025-01-01 RX ADMIN — DOCUSATE SODIUM 50 MG AND SENNOSIDES 8.6 MG 2 TABLET: 8.6; 5 TABLET, FILM COATED ORAL at 12:23

## 2025-01-01 RX ADMIN — DOCUSATE SODIUM 50 MG AND SENNOSIDES 8.6 MG 2 TABLET: 8.6; 5 TABLET, FILM COATED ORAL at 21:37

## 2025-01-01 RX ADMIN — HYDROMORPHONE HYDROCHLORIDE 0.5 MG: 1 INJECTION, SOLUTION INTRAMUSCULAR; INTRAVENOUS; SUBCUTANEOUS at 17:30

## 2025-01-01 RX ADMIN — LEVOTHYROXINE SODIUM 88 MCG: 0.09 TABLET ORAL at 09:45

## 2025-01-01 RX ADMIN — BUDESONIDE 0.5 MG: 0.5 SUSPENSION RESPIRATORY (INHALATION) at 19:20

## 2025-01-01 RX ADMIN — Medication 10 ML: at 09:09

## 2025-01-01 RX ADMIN — HEPARIN SODIUM 16 UNITS/KG/HR: 10000 INJECTION, SOLUTION INTRAVENOUS at 06:56

## 2025-01-01 RX ADMIN — HYDROMORPHONE HYDROCHLORIDE 0.5 MG: 1 INJECTION, SOLUTION INTRAMUSCULAR; INTRAVENOUS; SUBCUTANEOUS at 17:39

## 2025-01-01 RX ADMIN — Medication 100 MCG: at 07:34

## 2025-01-01 RX ADMIN — TRAZODONE HYDROCHLORIDE 150 MG: 50 TABLET ORAL at 20:40

## 2025-01-01 RX ADMIN — BUDESONIDE 0.5 MG: 0.5 SUSPENSION RESPIRATORY (INHALATION) at 21:04

## 2025-01-01 RX ADMIN — OXYCODONE HYDROCHLORIDE 10 MG: 10 TABLET ORAL at 14:49

## 2025-01-01 RX ADMIN — HYDROMORPHONE HYDROCHLORIDE 0.5 MG: 1 INJECTION, SOLUTION INTRAMUSCULAR; INTRAVENOUS; SUBCUTANEOUS at 04:23

## 2025-01-01 RX ADMIN — Medication 2 MG/HR: at 12:53

## 2025-01-01 RX ADMIN — TRAZODONE HYDROCHLORIDE 150 MG: 50 TABLET ORAL at 20:25

## 2025-01-01 RX ADMIN — AMIODARONE HYDROCHLORIDE 1 MG/MIN: 1.8 INJECTION, SOLUTION INTRAVENOUS at 14:10

## 2025-01-01 RX ADMIN — PROPOFOL 75 MCG/KG/MIN: 10 INJECTION, EMULSION INTRAVENOUS at 07:30

## 2025-01-01 RX ADMIN — AMIODARONE HYDROCHLORIDE 0.5 MG/MIN: 1.8 INJECTION, SOLUTION INTRAVENOUS at 23:20

## 2025-01-01 RX ADMIN — ATORVASTATIN CALCIUM 10 MG: 10 TABLET, FILM COATED ORAL at 21:37

## 2025-01-01 RX ADMIN — HYDROMORPHONE HYDROCHLORIDE 0.5 MG: 1 INJECTION, SOLUTION INTRAMUSCULAR; INTRAVENOUS; SUBCUTANEOUS at 11:58

## 2025-01-01 RX ADMIN — SERTRALINE 100 MG: 100 TABLET, FILM COATED ORAL at 09:15

## 2025-01-01 RX ADMIN — HYDROMORPHONE HYDROCHLORIDE 0.5 MG: 1 INJECTION, SOLUTION INTRAMUSCULAR; INTRAVENOUS; SUBCUTANEOUS at 15:38

## 2025-01-01 RX ADMIN — DICLOFENAC SODIUM 2 G: 10 GEL TOPICAL at 13:26

## 2025-01-01 RX ADMIN — LEVOTHYROXINE SODIUM 88 MCG: 0.09 TABLET ORAL at 06:30

## 2025-01-01 RX ADMIN — APIXABAN 5 MG: 5 TABLET, FILM COATED ORAL at 20:14

## 2025-01-01 RX ADMIN — Medication 10 ML: at 08:52

## 2025-01-01 RX ADMIN — SODIUM CHLORIDE 500 ML: 9 INJECTION, SOLUTION INTRAVENOUS at 09:49

## 2025-01-01 RX ADMIN — HYDROMORPHONE HYDROCHLORIDE 0.5 MG: 1 INJECTION, SOLUTION INTRAMUSCULAR; INTRAVENOUS; SUBCUTANEOUS at 21:20

## 2025-01-01 RX ADMIN — ATORVASTATIN CALCIUM 10 MG: 10 TABLET, FILM COATED ORAL at 20:31

## 2025-01-01 RX ADMIN — HYDROMORPHONE HYDROCHLORIDE 0.5 MG: 1 INJECTION, SOLUTION INTRAMUSCULAR; INTRAVENOUS; SUBCUTANEOUS at 01:54

## 2025-01-01 RX ADMIN — DOCUSATE SODIUM 50 MG AND SENNOSIDES 8.6 MG 2 TABLET: 8.6; 5 TABLET, FILM COATED ORAL at 09:15

## 2025-01-01 RX ADMIN — ARFORMOTEROL TARTRATE 15 MCG: 15 SOLUTION RESPIRATORY (INHALATION) at 10:41

## 2025-01-01 RX ADMIN — LEVOTHYROXINE SODIUM 88 MCG: 0.09 TABLET ORAL at 05:29

## 2025-01-01 RX ADMIN — BUDESONIDE 0.5 MG: 0.5 SUSPENSION RESPIRATORY (INHALATION) at 21:02

## 2025-01-01 RX ADMIN — OXYCODONE HYDROCHLORIDE AND ACETAMINOPHEN 1 TABLET: 5; 325 TABLET ORAL at 12:23

## 2025-01-01 RX ADMIN — SERTRALINE 100 MG: 100 TABLET, FILM COATED ORAL at 18:28

## 2025-01-01 RX ADMIN — MUPIROCIN 1 APPLICATION: 20 OINTMENT TOPICAL at 08:36

## 2025-01-01 RX ADMIN — DOCUSATE SODIUM 50 MG AND SENNOSIDES 8.6 MG 2 TABLET: 8.6; 5 TABLET, FILM COATED ORAL at 20:46

## 2025-01-01 RX ADMIN — CLONAZEPAM 1 MG: 1 TABLET ORAL at 15:58

## 2025-01-01 RX ADMIN — ATORVASTATIN CALCIUM 10 MG: 10 TABLET, FILM COATED ORAL at 20:34

## 2025-01-01 RX ADMIN — LIDOCAINE HYDROCHLORIDE 100 MG: 10 INJECTION, SOLUTION EPIDURAL; INFILTRATION; INTRACAUDAL; PERINEURAL at 07:27

## 2025-01-01 RX ADMIN — HEPARIN SODIUM 16 UNITS/KG/HR: 10000 INJECTION, SOLUTION INTRAVENOUS at 19:12

## 2025-01-01 RX ADMIN — CYCLOBENZAPRINE HYDROCHLORIDE 10 MG: 10 TABLET, FILM COATED ORAL at 08:40

## 2025-01-01 RX ADMIN — ARFORMOTEROL TARTRATE 15 MCG: 15 SOLUTION RESPIRATORY (INHALATION) at 19:20

## 2025-01-01 RX ADMIN — NYSTATIN 500000 UNITS: 100000 SUSPENSION ORAL at 09:05

## 2025-01-01 RX ADMIN — SODIUM CHLORIDE 500 ML: 9 INJECTION, SOLUTION INTRAVENOUS at 10:19

## 2025-01-01 RX ADMIN — SODIUM CHLORIDE 75 ML/HR: 9 INJECTION, SOLUTION INTRAVENOUS at 01:50

## 2025-01-01 RX ADMIN — ATORVASTATIN CALCIUM 10 MG: 10 TABLET, FILM COATED ORAL at 20:25

## 2025-01-01 RX ADMIN — HYDROMORPHONE HYDROCHLORIDE 0.5 MG: 1 INJECTION, SOLUTION INTRAMUSCULAR; INTRAVENOUS; SUBCUTANEOUS at 00:14

## 2025-01-01 RX ADMIN — CYCLOBENZAPRINE HYDROCHLORIDE 10 MG: 10 TABLET, FILM COATED ORAL at 22:35

## 2025-01-01 RX ADMIN — FENTANYL CITRATE 100 MCG: 50 INJECTION, SOLUTION INTRAMUSCULAR; INTRAVENOUS at 07:27

## 2025-01-01 RX ADMIN — ATORVASTATIN CALCIUM 10 MG: 10 TABLET, FILM COATED ORAL at 20:45

## 2025-01-01 RX ADMIN — OXYCODONE HYDROCHLORIDE 10 MG: 10 TABLET ORAL at 09:19

## 2025-01-01 RX ADMIN — BUDESONIDE 0.5 MG: 0.5 SUSPENSION RESPIRATORY (INHALATION) at 08:50

## 2025-01-01 RX ADMIN — TRAZODONE HYDROCHLORIDE 150 MG: 50 TABLET ORAL at 20:34

## 2025-01-01 RX ADMIN — APIXABAN 5 MG: 5 TABLET, FILM COATED ORAL at 20:25

## 2025-01-01 RX ADMIN — Medication 10 ML: at 21:25

## 2025-01-06 ENCOUNTER — HOSPITAL ENCOUNTER (OUTPATIENT)
Dept: RADIATION ONCOLOGY | Facility: HOSPITAL | Age: 65
Setting detail: RADIATION/ONCOLOGY SERIES
Discharge: HOME | End: 2025-01-06
Payer: COMMERCIAL

## 2025-01-06 ENCOUNTER — CLINICAL SUPPORT (OUTPATIENT)
Dept: RADIATION ONCOLOGY | Facility: HOSPITAL | Age: 65
End: 2025-01-06
Payer: MEDICARE

## 2025-01-06 DIAGNOSIS — C34.91 ADENOCARCINOMA OF RIGHT LUNG: Primary | ICD-10-CM
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
## 2025-01-15 ENCOUNTER — OFFICE VISIT (OUTPATIENT)
Dept: PULMONOLOGY | Facility: CLINIC | Age: 65
End: 2025-01-15
Payer: COMMERCIAL

## 2025-01-15 VITALS
DIASTOLIC BLOOD PRESSURE: 60 MMHG | OXYGEN SATURATION: 97 % | BODY MASS INDEX: 26.62 KG/M2 | SYSTOLIC BLOOD PRESSURE: 110 MMHG | TEMPERATURE: 97.2 F | WEIGHT: 141 LBS | HEART RATE: 84 BPM | HEIGHT: 61 IN

## 2025-01-15 DIAGNOSIS — J44.89 COPD WITH ASTHMA: Primary | ICD-10-CM

## 2025-01-15 DIAGNOSIS — R91.8 MULTIPLE LUNG NODULES ON CT: ICD-10-CM

## 2025-01-15 DIAGNOSIS — Z87.891 PERSONAL HISTORY OF TOBACCO USE, PRESENTING HAZARDS TO HEALTH: ICD-10-CM

## 2025-01-15 PROCEDURE — 99214 OFFICE O/P EST MOD 30 MIN: CPT | Performed by: NURSE PRACTITIONER

## 2025-01-15 RX ORDER — ASPIRIN 81 MG/1
81 TABLET, COATED ORAL DAILY
Qty: 90 TABLET | Refills: 1 | Status: SHIPPED | OUTPATIENT
Start: 2025-01-15

## 2025-01-15 RX ORDER — FAMOTIDINE 20 MG/1
TABLET, FILM COATED ORAL
Qty: 180 TABLET | Refills: 1 | Status: SHIPPED | OUTPATIENT
Start: 2025-01-15

## 2025-01-15 RX ORDER — ATORVASTATIN CALCIUM 10 MG/1
10 TABLET, FILM COATED ORAL
Qty: 90 TABLET | Refills: 1 | Status: SHIPPED | OUTPATIENT
Start: 2025-01-15

## 2025-01-15 RX ORDER — LEVOTHYROXINE SODIUM 75 UG/1
75 TABLET ORAL DAILY
Qty: 90 TABLET | Refills: 1 | Status: SHIPPED | OUTPATIENT
Start: 2025-01-15

## 2025-01-15 NOTE — PROGRESS NOTES
"[facility] Pulmonary Follow up      Chief Complaint  COPD with asthma    Subjective          Sydnie Gamble presents to [facility] for routine follow-up on her asthma with COPD overlap.  She does have a history of smoking and quit about 5 years ago.    She has been on Trelegy daily.  She really feels like that has helped quite a bit with her chronic pulmonary issues.  She is breathing much better.  She never has to use her rescue inhaler.  She not had any recent acute illnesses or exacerbations.      She also had multiple nodules and underwent bronchoscopy with biopsy on August 6, 2024 by Dr. Mcclelland.  Pathology from the right lower lobe did show an adenocarcinoma the pathology from the right upper lobe and the lymph node was negative for malignancy.  She followed up with Dr. Mckenzie and Dr. Montana and underwent SBRT and completed in October 2024.    She did have a follow-up CT scan on December 20 that did show some groundglass disease and emphysema changes, with a stable right lower lobe tumor.  The plan is for a short interval CT follow-up in March.      Objective     Vital Signs:   /60   Pulse 84   Temp 97.2 °F (36.2 °C)   Ht 154.9 cm (60.98\")   Wt 64 kg (141 lb)   SpO2 97% Comment: room air at rest  BMI 26.66 kg/m²       Immunization History   Administered Date(s) Administered    COVID-19 (MODERNA) 1st,2nd,3rd Dose Monovalent 03/12/2021, 04/02/2021    COVID-19 (PFIZER) 12YRS+ (COMIRNATY) 10/14/2023    COVID-19 (PFIZER) BIVALENT 12+YRS 10/20/2022    COVID-19 (PFIZER) Purple Cap Monovalent 03/12/2021, 04/02/2021, 10/02/2021    Covid-19 (Pfizer) Gray Cap Monovalent 05/29/2022    Fluad Quad 65+ 10/14/2023    Fluzone (or Fluarix & Flulaval for VFC) >6mos 10/25/2018, 09/25/2020, 10/02/2021, 10/20/2022    Influenza, Unspecified 10/02/2021, 10/20/2022    Pneumococcal Polysaccharide (PPSV23) 04/09/2019    flucelvax quad pfs =>4 YRS 10/09/2019 "       Physical Exam  Vitals reviewed.   Constitutional:       Appearance: She is well-developed.   HENT:      Head: Normocephalic and atraumatic.   Eyes:      Pupils: Pupils are equal, round, and reactive to light.   Cardiovascular:      Rate and Rhythm: Normal rate and regular rhythm.      Heart sounds: No murmur heard.  Pulmonary:      Effort: Pulmonary effort is normal. No respiratory distress.      Breath sounds: Normal breath sounds. No wheezing or rales.   Abdominal:      General: Bowel sounds are normal. There is no distension.      Palpations: Abdomen is soft.   Musculoskeletal:         General: Normal range of motion.      Cervical back: Normal range of motion and neck supple.   Skin:     General: Skin is warm and dry.      Findings: No erythema.   Neurological:      Mental Status: She is alert and oriented to person, place, and time.   Psychiatric:         Behavior: Behavior normal.          Result Review :            PFTs done in the office today:  Much improved from 7/2024, with mild to moderate obstructive airway disease with an FEV1 of 1.64, 76% predicted.  Evidence of air trapping and elevated residual volume normal adjusted DLCO.                 Assessment and Plan    Problem List Items Addressed This Visit          Pulmonary and Pneumonias    COPD with asthma - Primary    Multiple lung nodules on CT    Overview     Right sided sub-centimeter lung nodules.             Tobacco    Personal history of tobacco use, presenting hazards to health     Mrs. Gamble is here today for follow-up on her COPD with asthma.  We reviewed her PFTs which have significantly improved since last year.  She has not had any recent acute exacerbation or respiratory illness.    She will continue on her Trelegy daily.    Continue follow-up with oncology and radiation oncology for her adenocarcinoma.  She does have follow-up CT scan in March scheduled.        Follow Up     Return in about 6 months (around 7/15/2025).  Patient  was given instructions and counseling regarding her condition or for health maintenance advice. Please see specific information pulled into the AVS if appropriate.     Moderate level of Medical Decision Making complexity based on 2 or more chronic stable illnesses and prescription drug management.    DAYSI Ruvalcaba, ACNP  [facility]  Electronically signed

## 2025-01-21 RX ORDER — SODIUM, POTASSIUM,MAG SULFATES 17.5-3.13G
1 SOLUTION, RECONSTITUTED, ORAL ORAL TAKE AS DIRECTED
Qty: 354 ML | Refills: 0 | Status: SHIPPED | OUTPATIENT
Start: 2025-01-21

## 2025-01-30 ENCOUNTER — OFFICE VISIT (OUTPATIENT)
Dept: INTERNAL MEDICINE | Facility: CLINIC | Age: 65
End: 2025-01-30
Payer: COMMERCIAL

## 2025-01-30 ENCOUNTER — LAB (OUTPATIENT)
Dept: INTERNAL MEDICINE | Facility: CLINIC | Age: 65
End: 2025-01-30
Payer: COMMERCIAL

## 2025-01-30 VITALS
WEIGHT: 139 LBS | HEART RATE: 100 BPM | DIASTOLIC BLOOD PRESSURE: 70 MMHG | TEMPERATURE: 98.5 F | SYSTOLIC BLOOD PRESSURE: 116 MMHG | OXYGEN SATURATION: 97 % | BODY MASS INDEX: 26.24 KG/M2 | HEIGHT: 61 IN

## 2025-01-30 DIAGNOSIS — L02.91 ABSCESS: Primary | ICD-10-CM

## 2025-01-30 DIAGNOSIS — N28.9 FUNCTION KIDNEY DECREASED: ICD-10-CM

## 2025-01-30 DIAGNOSIS — R73.03 BORDERLINE DIABETES: ICD-10-CM

## 2025-01-30 DIAGNOSIS — N90.89 LABIAL LESION: ICD-10-CM

## 2025-01-30 DIAGNOSIS — E03.9 ACQUIRED HYPOTHYROIDISM: ICD-10-CM

## 2025-01-30 DIAGNOSIS — Z80.8 FAMILY HISTORY OF MALIGNANT MELANOMA: ICD-10-CM

## 2025-01-30 LAB
ALBUMIN SERPL-MCNC: 4.4 G/DL (ref 3.5–5.2)
ALBUMIN/GLOB SERPL: 1.4 G/DL
ALP SERPL-CCNC: 97 U/L (ref 39–117)
ALT SERPL W P-5'-P-CCNC: 10 U/L (ref 1–33)
ANION GAP SERPL CALCULATED.3IONS-SCNC: 14 MMOL/L (ref 5–15)
AST SERPL-CCNC: 19 U/L (ref 1–32)
BILIRUB SERPL-MCNC: 0.2 MG/DL (ref 0–1.2)
BUN SERPL-MCNC: 16 MG/DL (ref 8–23)
BUN/CREAT SERPL: 12.8 (ref 7–25)
CALCIUM SPEC-SCNC: 9.6 MG/DL (ref 8.6–10.5)
CHLORIDE SERPL-SCNC: 100 MMOL/L (ref 98–107)
CO2 SERPL-SCNC: 22 MMOL/L (ref 22–29)
CREAT SERPL-MCNC: 1.25 MG/DL (ref 0.57–1)
EGFRCR SERPLBLD CKD-EPI 2021: 47.6 ML/MIN/1.73
GLOBULIN UR ELPH-MCNC: 3.1 GM/DL
GLUCOSE SERPL-MCNC: 87 MG/DL (ref 65–99)
POTASSIUM SERPL-SCNC: 4.1 MMOL/L (ref 3.5–5.2)
PROT SERPL-MCNC: 7.5 G/DL (ref 6–8.5)
SODIUM SERPL-SCNC: 136 MMOL/L (ref 136–145)

## 2025-01-30 PROCEDURE — 36415 COLL VENOUS BLD VENIPUNCTURE: CPT | Performed by: NURSE PRACTITIONER

## 2025-01-30 PROCEDURE — 99214 OFFICE O/P EST MOD 30 MIN: CPT | Performed by: NURSE PRACTITIONER

## 2025-01-30 PROCEDURE — 80050 GENERAL HEALTH PANEL: CPT | Performed by: NURSE PRACTITIONER

## 2025-01-30 PROCEDURE — 84439 ASSAY OF FREE THYROXINE: CPT | Performed by: NURSE PRACTITIONER

## 2025-01-30 PROCEDURE — 83036 HEMOGLOBIN GLYCOSYLATED A1C: CPT | Performed by: NURSE PRACTITIONER

## 2025-01-30 RX ORDER — DOXYCYCLINE 100 MG/1
100 CAPSULE ORAL 2 TIMES DAILY
Qty: 20 CAPSULE | Refills: 0 | Status: SHIPPED | OUTPATIENT
Start: 2025-01-30 | End: 2025-02-09

## 2025-01-30 NOTE — PROGRESS NOTES
Subjective   Chief Complaint   Patient presents with    Abscess     In groin area         Sydnie Gamble is a 64 y.o. female.    History of Present Illness  The patient presents for evaluation of a boil in the perianal area.    She has been experiencing discomfort due to a boil in the perianal region, which has persisted for over a month. She has attempted self-treatment with antibiotic cream, drawing salve, hot compresses, and Sitz baths, but these measures have not provided relief. She also uses an antibiotic soap. She reports that the boil appears to be accompanied by an unusual piece of skin. Additionally, she has noticed the development of another boil in her groin area. She expresses concern about potential staph or MRSA infection. She has not taken antibiotics for several years and is uncertain about their potential to cause yeast infections. She recalls a past experience of gastrointestinal upset with an antibiotic, which led her to discontinue its use.    She has a scheduled colonoscopy on Tuesday and is considering a consultation with a gastroenterologist prior to the procedure. She expresses concern about the potential impact of the procedure on her electrolyte balance and kidney function, as well as the possibility of severe diarrhea. She has a history of radical hysterectomy and 29 radiation treatments, which she believes may have resulted in adhesions and scar tissue. She also reports recent radiation treatment to her lower lung. She experienced severe pain at the site of her surgical incision when attempting to bend over, leading her to suspect bowel attachment to the incision. She is currently taking ondansetron for nausea management.    She has two dark spots on labia. She has a family history of melanoma, with her mother and grandmother both having had the disease. She underwent mole removal by a dermatologist approximately 10 to 15 years ago, which was found to be benign. She has not had  any recent dermatological evaluations.    She has been referred to a nephrologist but requires a new referral to schedule an appointment. She has lost over 10 pounds since her last visit, which she attributes to dietary changes, including the elimination of sugar and processed foods.      I have reviewed the following portions of the patient's history and confirmed they are accurate: allergies, current medications, past family history, past medical history, past social history, past surgical history, and problem list    Review of Systems  Pertinent items are noted in HPI.     Current Outpatient Medications on File Prior to Visit   Medication Sig    Aspirin Low Dose 81 MG EC tablet TAKE 1 TABLET BY MOUTH EVERY DAY    atorvastatin (LIPITOR) 10 MG tablet TAKE 1 TABLET BY MOUTH EVERY DAY AT NIGHT    clonazePAM (KlonoPIN) 1 MG tablet TAKE 1 TABLET BY MOUTH 2 TIMES A DAY AS NEEDED FOR ANXIETY.    famotidine (PEPCID) 20 MG tablet TAKE 1 TABLET BY MOUTH TWICE DAILY OR TAKE 2 TABLETS BY MOUTH ONCE DAILY AS NEEDED FOR HEARTBURN    Fluticasone-Umeclidin-Vilant (Trelegy Ellipta) 100-62.5-25 MCG/ACT inhaler Inhale 1 puff Daily.    levothyroxine (SYNTHROID, LEVOTHROID) 75 MCG tablet TAKE 1 TABLET BY MOUTH EVERY DAY    ondansetron ODT (ZOFRAN-ODT) 4 MG disintegrating tablet Place 1 tablet on the tongue Every 8 (Eight) Hours As Needed for Nausea or Vomiting.    sertraline (Zoloft) 100 MG tablet Take 1 tablet by mouth Daily.    sodium-potassium-magnesium sulfates (Suprep Bowel Prep Kit) 17.5-3.13-1.6 GM/177ML solution oral solution Take 1 bottle by mouth Take As Directed.    traZODone (DESYREL) 150 MG tablet TAKE 1 TABLET BY MOUTH EVERY DAY AT NIGHT    zolpidem (AMBIEN) 10 MG tablet Take 1 tablet by mouth At Night As Needed for Sleep.     No current facility-administered medications on file prior to visit.       Objective   Vitals:    01/30/25 1345   BP: 116/70   BP Location: Left arm   Patient Position: Sitting   Cuff Size: Adult  "  Pulse: 100   Temp: 98.5 °F (36.9 °C)   SpO2: 97%   Weight: 63 kg (139 lb)   Height: 154.9 cm (60.98\")     Body mass index is 26.28 kg/m².    Physical Exam  Vitals reviewed.   Constitutional:       Appearance: Normal appearance. She is well-developed.   HENT:      Head: Normocephalic and atraumatic.      Nose: Nose normal.   Eyes:      General: Lids are normal.      Conjunctiva/sclera: Conjunctivae normal.      Pupils: Pupils are equal, round, and reactive to light.   Neck:      Thyroid: No thyromegaly.      Trachea: Trachea normal.   Cardiovascular:      Rate and Rhythm: Normal rate and regular rhythm.      Heart sounds: Normal heart sounds.   Pulmonary:      Effort: Pulmonary effort is normal. No respiratory distress.      Breath sounds: Normal breath sounds.   Genitourinary:      Skin:     General: Skin is warm and dry.   Neurological:      Mental Status: She is alert and oriented to person, place, and time.      GCS: GCS eye subscore is 4. GCS verbal subscore is 5. GCS motor subscore is 6.   Psychiatric:         Attention and Perception: Attention normal.         Mood and Affect: Mood normal.         Speech: Speech normal.         Behavior: Behavior normal. Behavior is cooperative.         Thought Content: Thought content normal.         Results      Lab Results   Component Value Date    WBC 6.95 01/30/2025    HGB 12.8 01/30/2025    HCT 38.4 01/30/2025    MCV 88.9 01/30/2025     01/30/2025      Lab Results   Component Value Date    GLUCOSE 87 01/30/2025    BUN 16 01/30/2025    CREATININE 1.25 (H) 01/30/2025     01/30/2025    K 4.1 01/30/2025     01/30/2025    CALCIUM 9.6 01/30/2025    PROTEINTOT 7.5 01/30/2025    ALBUMIN 4.4 01/30/2025    ALT 10 01/30/2025    AST 19 01/30/2025    ALKPHOS 97 01/30/2025    BILITOT 0.2 01/30/2025    GLOB 3.1 01/30/2025    AGRATIO 1.4 01/30/2025    BCR 12.8 01/30/2025    ANIONGAP 14.0 01/30/2025    EGFR 47.6 (L) 01/30/2025      Lab Results   Component Value Date "    HGBA1C 5.90 (H) 01/30/2025      Lab Results   Component Value Date    CHOL 185 11/21/2024    TRIG 70 11/21/2024    HDL 85 (H) 11/21/2024    LDL 87 11/21/2024      Lab Results   Component Value Date    TSH 5.100 (H) 01/30/2025          Assessment & Plan   Problem List Items Addressed This Visit       Acquired hypothyroidism    Relevant Orders    TSH Rfx On Abnormal To Free T4 (Completed)    T4, Free (Completed)     Other Visit Diagnoses       Abscess    -  Primary    Relevant Medications    doxycycline (MONODOX) 100 MG capsule    Other Relevant Orders    CBC Auto Differential (Completed)    Labial lesion        Relevant Orders    Ambulatory Referral to Dermatology (Completed)    Borderline diabetes        Relevant Orders    Hemoglobin A1c (Completed)    Function kidney decreased        Relevant Orders    Comprehensive Metabolic Panel (Completed)    Family history of malignant melanoma        Relevant Orders    Ambulatory Referral to Dermatology (Completed)               Current Outpatient Medications:     Aspirin Low Dose 81 MG EC tablet, TAKE 1 TABLET BY MOUTH EVERY DAY, Disp: 90 tablet, Rfl: 1    atorvastatin (LIPITOR) 10 MG tablet, TAKE 1 TABLET BY MOUTH EVERY DAY AT NIGHT, Disp: 90 tablet, Rfl: 1    clonazePAM (KlonoPIN) 1 MG tablet, TAKE 1 TABLET BY MOUTH 2 TIMES A DAY AS NEEDED FOR ANXIETY., Disp: 50 tablet, Rfl: 1    doxycycline (MONODOX) 100 MG capsule, Take 1 capsule by mouth 2 (Two) Times a Day for 10 days., Disp: 20 capsule, Rfl: 0    famotidine (PEPCID) 20 MG tablet, TAKE 1 TABLET BY MOUTH TWICE DAILY OR TAKE 2 TABLETS BY MOUTH ONCE DAILY AS NEEDED FOR HEARTBURN, Disp: 180 tablet, Rfl: 1    Fluticasone-Umeclidin-Vilant (Trelegy Ellipta) 100-62.5-25 MCG/ACT inhaler, Inhale 1 puff Daily., Disp: 90 each, Rfl: 3    levothyroxine (SYNTHROID, LEVOTHROID) 75 MCG tablet, TAKE 1 TABLET BY MOUTH EVERY DAY, Disp: 90 tablet, Rfl: 1    ondansetron ODT (ZOFRAN-ODT) 4 MG disintegrating tablet, Place 1 tablet on the  tongue Every 8 (Eight) Hours As Needed for Nausea or Vomiting., Disp: 30 tablet, Rfl: 2    sertraline (Zoloft) 100 MG tablet, Take 1 tablet by mouth Daily., Disp: 90 tablet, Rfl: 1    sodium-potassium-magnesium sulfates (Suprep Bowel Prep Kit) 17.5-3.13-1.6 GM/177ML solution oral solution, Take 1 bottle by mouth Take As Directed., Disp: 354 mL, Rfl: 0    traZODone (DESYREL) 150 MG tablet, TAKE 1 TABLET BY MOUTH EVERY DAY AT NIGHT, Disp: 90 tablet, Rfl: 3    zolpidem (AMBIEN) 10 MG tablet, Take 1 tablet by mouth At Night As Needed for Sleep., Disp: 30 tablet, Rfl: 2    Assessment & Plan  Abscess.  The patient reports a boil in the perianal area persisting for over a month, despite using antibiotic cream, hot compresses, and Sitz baths. She also mentions a new boil in the groin area. Examination confirmed the presence of an abscess. Doxycycline has been prescribed to manage the infection. She is advised that improvement should be noticeable within 2 days, but complete resolution may take longer.    Labial skin lesion/nevus  A dark skin lesion was observed during the examination. A referral to dermatology has been made for further evaluation to rule out any malignancy, given the patient's family history of melanoma.    Colonoscopy concerns.  The patient expressed concerns about an upcoming colonoscopy, particularly regarding potential pain due to her history of radical hysterectomy and radiation. She is advised to consult with the gastroenterologist prior to the procedure to address her concerns and ensure appropriate anesthesia and care.    Decreased kidney function.  The patient has a history of decreased kidney function. A referral to nephrology has been made for further evaluation and management.    Health maintenance.  A blood count and A1c test will be conducted today to monitor her overall health status.    Follow-up  The patient will follow up in 3 months.    PROCEDURE  The patient underwent a radical  hysterectomy and 29 radiation treatments in the past. She also had a mole removed by a dermatologist approximately 10 to 15 years ago, which was found to be benign.         Plan of care reviewed with the patient at the conclusion of today's visit.  Education was provided regarding diagnosis, management, and any prescribed or recommended OTC medications.  Patient verbalized understanding of and agreement with management plan.     Return in about 3 months (around 4/30/2025), or if symptoms worsen or fail to improve, for Follow-up.      Transcribed from ambient dictation for DAYSI Cordero by DAYSI Cordero.  01/30/25   13:58 EST    Patient or patient representative verbalized consent for the use of Ambient Listening during the visit with  DAYSI Cordero for chart documentation. 2/3/2025  22:21 EST

## 2025-01-31 LAB
BASOPHILS # BLD AUTO: 0.03 10*3/MM3 (ref 0–0.2)
BASOPHILS NFR BLD AUTO: 0.4 % (ref 0–1.5)
DEPRECATED RDW RBC AUTO: 42.7 FL (ref 37–54)
EOSINOPHIL # BLD AUTO: 0.12 10*3/MM3 (ref 0–0.4)
EOSINOPHIL NFR BLD AUTO: 1.7 % (ref 0.3–6.2)
ERYTHROCYTE [DISTWIDTH] IN BLOOD BY AUTOMATED COUNT: 13.1 % (ref 12.3–15.4)
HBA1C MFR BLD: 5.9 % (ref 4.8–5.6)
HCT VFR BLD AUTO: 38.4 % (ref 34–46.6)
HGB BLD-MCNC: 12.8 G/DL (ref 12–15.9)
IMM GRANULOCYTES # BLD AUTO: 0.04 10*3/MM3 (ref 0–0.05)
IMM GRANULOCYTES NFR BLD AUTO: 0.6 % (ref 0–0.5)
LYMPHOCYTES # BLD AUTO: 0.78 10*3/MM3 (ref 0.7–3.1)
LYMPHOCYTES NFR BLD AUTO: 11.2 % (ref 19.6–45.3)
MCH RBC QN AUTO: 29.6 PG (ref 26.6–33)
MCHC RBC AUTO-ENTMCNC: 33.3 G/DL (ref 31.5–35.7)
MCV RBC AUTO: 88.9 FL (ref 79–97)
MONOCYTES # BLD AUTO: 0.56 10*3/MM3 (ref 0.1–0.9)
MONOCYTES NFR BLD AUTO: 8.1 % (ref 5–12)
NEUTROPHILS NFR BLD AUTO: 5.42 10*3/MM3 (ref 1.7–7)
NEUTROPHILS NFR BLD AUTO: 78 % (ref 42.7–76)
NRBC BLD AUTO-RTO: 0 /100 WBC (ref 0–0.2)
PLATELET # BLD AUTO: 303 10*3/MM3 (ref 140–450)
PMV BLD AUTO: 9.6 FL (ref 6–12)
RBC # BLD AUTO: 4.32 10*6/MM3 (ref 3.77–5.28)
T4 FREE SERPL-MCNC: 1.34 NG/DL (ref 0.92–1.68)
TSH SERPL DL<=0.05 MIU/L-ACNC: 5.1 UIU/ML (ref 0.27–4.2)
WBC NRBC COR # BLD AUTO: 6.95 10*3/MM3 (ref 3.4–10.8)

## 2025-02-04 ENCOUNTER — OUTSIDE FACILITY SERVICE (OUTPATIENT)
Dept: GASTROENTEROLOGY | Facility: CLINIC | Age: 65
End: 2025-02-04
Payer: COMMERCIAL

## 2025-02-04 PROCEDURE — 88305 TISSUE EXAM BY PATHOLOGIST: CPT | Performed by: INTERNAL MEDICINE

## 2025-02-04 PROCEDURE — 45385 COLONOSCOPY W/LESION REMOVAL: CPT | Performed by: INTERNAL MEDICINE

## 2025-02-05 ENCOUNTER — LAB REQUISITION (OUTPATIENT)
Dept: LAB | Facility: HOSPITAL | Age: 65
End: 2025-02-05
Payer: COMMERCIAL

## 2025-02-05 DIAGNOSIS — D12.3 BENIGN NEOPLASM OF TRANSVERSE COLON: ICD-10-CM

## 2025-02-05 DIAGNOSIS — K64.8 OTHER HEMORRHOIDS: ICD-10-CM

## 2025-02-05 DIAGNOSIS — D12.2 BENIGN NEOPLASM OF ASCENDING COLON: ICD-10-CM

## 2025-02-05 DIAGNOSIS — R19.5 OTHER FECAL ABNORMALITIES: ICD-10-CM

## 2025-02-06 DIAGNOSIS — F41.8 DEPRESSION WITH ANXIETY: ICD-10-CM

## 2025-02-07 RX ORDER — CLONAZEPAM 1 MG/1
1 TABLET ORAL 2 TIMES DAILY PRN
Qty: 50 TABLET | Refills: 1 | Status: SHIPPED | OUTPATIENT
Start: 2025-02-07

## 2025-02-17 LAB
NCCN CRITERIA FLAG: ABNORMAL
TYRER CUZICK SCORE: 5.5

## 2025-02-21 ENCOUNTER — DOCUMENTATION (OUTPATIENT)
Dept: GENETICS | Facility: HOSPITAL | Age: 65
End: 2025-02-21
Payer: COMMERCIAL

## 2025-02-21 NOTE — PROGRESS NOTES
This patient recently completed the CARE risk assessment for a mammogram appointment. Based on the patient's responses, NCCN criteria for genetic testing was met.     Navigator follow-up:   Attempts to reach the patient to discuss the risk assessment results have been unsuccessful.

## 2025-03-02 DIAGNOSIS — F51.01 PRIMARY INSOMNIA: ICD-10-CM

## 2025-03-03 RX ORDER — ZOLPIDEM TARTRATE 10 MG/1
10 TABLET ORAL NIGHTLY PRN
Qty: 30 TABLET | Refills: 1 | Status: SHIPPED | OUTPATIENT
Start: 2025-03-03

## 2025-03-04 ENCOUNTER — HOSPITAL ENCOUNTER (OUTPATIENT)
Dept: MAMMOGRAPHY | Facility: HOSPITAL | Age: 65
Discharge: HOME | End: 2025-03-04
Payer: COMMERCIAL

## 2025-03-04 ENCOUNTER — HOSPITAL ENCOUNTER (OUTPATIENT)
Dept: BONE DENSITY | Facility: HOSPITAL | Age: 65
Discharge: HOME | End: 2025-03-04
Payer: COMMERCIAL

## 2025-03-04 DIAGNOSIS — Z78.0 ENCOUNTER FOR OSTEOPOROSIS SCREENING IN ASYMPTOMATIC POSTMENOPAUSAL PATIENT: ICD-10-CM

## 2025-03-04 DIAGNOSIS — Z13.820 ENCOUNTER FOR OSTEOPOROSIS SCREENING IN ASYMPTOMATIC POSTMENOPAUSAL PATIENT: ICD-10-CM

## 2025-03-04 DIAGNOSIS — Z12.31 ENCOUNTER FOR SCREENING MAMMOGRAM FOR MALIGNANT NEOPLASM OF BREAST: ICD-10-CM

## 2025-03-04 PROCEDURE — 77080 DXA BONE DENSITY AXIAL: CPT

## 2025-03-04 PROCEDURE — 77063 BREAST TOMOSYNTHESIS BI: CPT

## 2025-03-04 PROCEDURE — 77067 SCR MAMMO BI INCL CAD: CPT

## 2025-03-31 ENCOUNTER — HOSPITAL ENCOUNTER (OUTPATIENT)
Dept: CT IMAGING | Facility: HOSPITAL | Age: 65
Discharge: HOME | End: 2025-03-31
Admitting: INTERNAL MEDICINE
Payer: COMMERCIAL

## 2025-03-31 DIAGNOSIS — C34.91 ADENOCARCINOMA OF RIGHT LUNG: ICD-10-CM

## 2025-03-31 PROCEDURE — 71250 CT THORAX DX C-: CPT

## 2025-04-01 ENCOUNTER — OFFICE VISIT (OUTPATIENT)
Dept: ONCOLOGY | Facility: CLINIC | Age: 65
End: 2025-04-01
Payer: MEDICARE

## 2025-04-01 ENCOUNTER — OFFICE VISIT (OUTPATIENT)
Dept: RADIATION ONCOLOGY | Facility: HOSPITAL | Age: 65
End: 2025-04-01
Payer: MEDICARE

## 2025-04-01 ENCOUNTER — HOSPITAL ENCOUNTER (OUTPATIENT)
Dept: RADIATION ONCOLOGY | Facility: HOSPITAL | Age: 65
Setting detail: RADIATION/ONCOLOGY SERIES
Discharge: HOME | End: 2025-04-01
Payer: COMMERCIAL

## 2025-04-01 VITALS
WEIGHT: 136 LBS | RESPIRATION RATE: 16 BRPM | HEART RATE: 92 BPM | DIASTOLIC BLOOD PRESSURE: 64 MMHG | SYSTOLIC BLOOD PRESSURE: 141 MMHG | TEMPERATURE: 97.3 F | BODY MASS INDEX: 25.68 KG/M2 | OXYGEN SATURATION: 98 % | HEIGHT: 61 IN

## 2025-04-01 DIAGNOSIS — C53.9 MALIGNANT NEOPLASM OF CERVIX, UNSPECIFIED SITE: Primary | ICD-10-CM

## 2025-04-01 DIAGNOSIS — C34.91 ADENOCARCINOMA OF RIGHT LUNG: Primary | ICD-10-CM

## 2025-04-01 PROCEDURE — 1126F AMNT PAIN NOTED NONE PRSNT: CPT | Performed by: INTERNAL MEDICINE

## 2025-04-01 PROCEDURE — 99214 OFFICE O/P EST MOD 30 MIN: CPT | Performed by: INTERNAL MEDICINE

## 2025-04-01 PROCEDURE — G2211 COMPLEX E/M VISIT ADD ON: HCPCS | Performed by: INTERNAL MEDICINE

## 2025-04-01 PROCEDURE — G0463 HOSPITAL OUTPT CLINIC VISIT: HCPCS

## 2025-04-01 NOTE — PROGRESS NOTES
DATE OF VISIT: 4/1/2025    REASON FOR VISIT: Followup for right lung adenocarcinoma     PROBLEM LIST:  1. Right lower lobe lung adenocarcinoma, T1b N0 M0 stage I A2, PD-L1 85%:  A.  Present with abnormal screening scan done March 22, 2024 with persistent abnormality June 4, 2024.  B.  Whole-body PET scan done July 11, 2024 revealed mild hypermetabolic activity in the medial right lower lobe lung nodule with patchy bilateral lung infiltrates none PET avid.  C.  Negative MRI brain done August 25, 2024.  D.  Bronchoscopy with biopsy August 6, 2024 right lower lobe positive for adenocarcinoma negative nodes and negative right upper lobe biopsy but atypical cytology.  E.  Treated with CyberKnife radiotherapy September 2024.  2.  Advanced COPD:  A.  PFTs done on March 25, 2024 revealed FEV1 of 1 L.  3.  Hypercholesteremia  4.  Hypothyroid  5.  Cervical squamous cell carcinoma:  A.  Status post total hysterectomy followed by adjuvant radiation August 2020.    HISTORY OF PRESENT ILLNESS: The patient is a very pleasant 64 y.o. female  with past medical history significant for right lower lobe lung adenocarcinoma diagnosed August 2024.  She was treated with CyberKnife radiotherapy September 2024. The  patient is here today for scheduled follow-up visit.    SUBJECTIVE: Sydnie is here today by herself.  She has been complaining of neck pain.  She is anxious about the scan results.  Her breathing is stable.    Past History:  Medical History: has a past medical history of Acid reflux, Acid reflux, Adenocarcinoma of lung (08/12/2024), Anxiety (1976), Arthritis, Atherosclerotic cardiovascular disease (03/25/2024), Cervical cancer, Depression, Emphysema of lung, History of radiation therapy (11/20/2020), History of radiation therapy (10/04/2024), radiation therapy, Hyperlipidemia, Hypothyroidism, Kidney disease, Lung cancer, Lung nodule, Ovarian cyst (1980s), Visual impairment (corrected with glasses), Wears dentures, and Wears  "glasses.   Surgical History: has a past surgical history that includes Tubal ligation; total abdominal hysterectomy pelvic node dissection (N/A, 08/18/2020); Lymph node biopsy; Subtotal Hysterectomy (1987?); Bronchoscopy; Lung biopsy; BRONCHOSCOPY WITH ION ROBOTIC ASSIST (N/A, 08/06/2024); and Mohs surgery (03/24/2025).   Family History: family history includes Anxiety disorder in her mother; Asthma in her mother; COPD in her mother; Cancer in her father, maternal aunt, maternal aunt, maternal grandmother, and mother; Depression in her mother; Early death in her father; Emphysema in her mother; Heart attack in her maternal grandfather; Heart disease in her maternal grandfather; Hypertension in her mother; Mental illness in her mother.   Social History: reports that she quit smoking about 6 years ago. Her smoking use included cigarettes. She started smoking about 53 years ago. She has a 67.5 pack-year smoking history. She has been exposed to tobacco smoke. She has never used smokeless tobacco. She reports that she does not drink alcohol and does not use drugs.    (Not in a hospital admission)     Allergies: Patient has no known allergies.     Review of Systems   Constitutional:  Positive for fatigue.   Respiratory:  Positive for shortness of breath.    Musculoskeletal: Negative.    Psychiatric/Behavioral:  The patient is nervous/anxious.        PHYSICAL EXAMINATION:   /64 Comment: RUE  Pulse 92   Temp 97.3 °F (36.3 °C) (Temporal)   Resp 16   Ht 154.9 cm (61\")   Wt 61.7 kg (136 lb)   LMP  (LMP Unknown)   SpO2 98% Comment: RA  BMI 25.70 kg/m²    Pain Score    04/01/25 1304   PainSc: 0-No pain          ECOG score: 0            ECOG Performance Status: 1 - Symptomatic but completely ambulatory      General Appearance:      alert, cooperative, no apparent distress and appears stated age   Lungs:   Clear to auscultation bilaterally; respirations regular, even, and unlabored bilaterally   Heart:  Regular rate " and rhythm, no murmurs appreciated   Abdomen:   Soft, non-tender, non-distended, and no organomegaly                 No visits with results within 2 Week(s) from this visit.   Latest known visit with results is:   Orders Only on 02/17/2025   Component Date Value Ref Range Status    Jared 02/17/2025 5.5   Final    NCCN 02/17/2025 NCCN met (A)   Final    High Risk Cancer Risk Assessment        Mammo Screening Digital Tomosynthesis Bilateral With CAD  Result Date: 3/8/2025  Narrative: DIGITAL SCREENING MAMMOGRAM WITH TOMOSYNTHESIS  HISTORY: Routine screening.  IMAGE COMPARISON: 10/20/2022, 1/18/2019.  TECHNIQUE:  Low dose full field digital breast tomosynthesis imaging was performed with 2D and 3D acquisitions consisting of bilateral CC and MLO views.  FINDINGS: The breasts are heterogeneously dense, which may obscure small masses. The fibroglandular pattern appears stable.  There is no mass, worrisome microcalcifications, or architectural distortion to suggest development of malignancy.      Impression: No findings suspicious for malignancy.  ACR BI-RADS CATEGORY:  1, NEGATIVE  RECOMMENDATION: Yearly mammogram, yearly clinical breast exam, and encourage self breast awareness.  CAD was used.  The standard false negative rate of mammography is between 10% and 25%. Complex patterns or increased breast density will markedly elevate the false negative rate of mammography.  A letter, in lay terminology, with the results of this exam will be mailed to the patient.  At our facility, a triangular marker is positioned over a palpable area of concern indicated by the patient. A [place] marker is placed over a visible skin lesion. A linear marker indicates a scar.   If there is a palpable area of concern, biopsy should be considered regardless of imaging findings.    3/8/2025 11:08 AM by Dr. Joaquina Garcia MD on Workstation: [id number]      DEXA Bone Density Axial  Result Date: 3/4/2025  Narrative: DUAL-ENERGY X-RAY  "ABSORPTIOMETRY (DXA) INDICATION: Postmenopausal, screening for osteoporosis, history of glucocorticoids, cancer, asthma emphysema, hysterectomy COMPARISON: There are no equivalent studies available for comparison PROCEDURE: A DXA scan was performed using a Hologic densitometer. The lumbar spine L1-L4 was evaluated as well as bilateral total hip. The T-score compares the patient's bone mineral density with the peak bone mass of young normal patients.  According to criteria established by the World Health Organization, patients with T-scores  between 1.0 and 2.5 standard deviations BELOW the mean are osteopenic (low bone mass).  Patients with T-scores EQUAL TO OR GREATER than 2.5 standard deviations below the mean are osteoporotic. The Z-score compares the patient bone mineral density with age and sex matched peers.  According to the International Society for Clinical Densitometry's 2007 consensus conference:  In women prior to menopause and men less than age 50, Z-scores, not T-scores are preferred.  A Z-score of -2.0 or lower is defined as \"below the expected range for age\" and a Z-score above -2.0 is \"within the expected range for age.\"  The WHO diagnostic criteria may be applied in women in the menopausal transition.  Osteoporosis cannot be diagnosed in men under age 50 on the basis of BMD alone. TECHNICAL QUALITY:  The study is of good technical quality. RESULTS: Lumbar Spine:  The BMD measured in the L1-L4 0 region is 0.799 g/cm2.  The average T-score is -2.3.  The Z-score is -0.4. Total Hip:  The BMD measured at the left total proximal femur is 0.715 g/cm2.  The T-score is -1.9.  The Z-score is -0.5. Femoral Neck:  The BMD measured at the left femoral neck is 0.520 g/cm2.  The T-score is -3.0.  The Z-score is -1.3. Total Hip:  The BMD measured at the right total proximal femur is 0.715 g/cm2.  The T-score is -1.9.  The Z-score is -0.5. Femoral neck: The BMD measured at the right femoral neck is 0.560 g/cm2. The " T score is -2.6. The Z score is -1.0.     Impression: Osteoporosis of the femoral necks bilaterally. Osteopenia of the L1-L4 vertebrae, and total hips bilaterally. The ten year fracture risk assessment was not calculated because some T-scores were at or below -2.5. All the treatment decisions require clinical judgment and consideration of individual patient factors, including patient preferences, co-morbidities, previous drug use, risk factors not captured in the FRAX model (frailty, falls, vitamin D deficiency, increased bone turnover, interval significant decline in bone density) and possible under or over estimation of fracture risk by FRAX. Approaches to reduce osteoporosis related fracture risk include optimizing calcium and vitamin D status, appropriate weight bearing exercises and fall-prevention measurements.  The National Osteoporosis Foundation recommends (http://www.nof.org/hcp/practice/practice-and-clinical-guidelines/clinicians-guide) that FDA-approved medical therapies be considered in postmenopausal women and men aged equal or greater than 50 years with :  a) hip or vertebral (clinical or morphometric) fracture; b) T-score of -2.5 or less at the spine or hip; c) Ten-year fracture probability by FRAX of greater than 3% for hip fracture  of greater than 20% for major osteoporotic fracture.  Secondary causes of bone loss should be evaluated if clinically indicated since the etiology of low BMD cannot be determined by BMD measurement alone. FOLLOWUP: Consider repeating the study in 2-3 years to reassess the patient's status or sooner if there is some new clinical indication. INTERVAL CHANGE:   There were no equivalent studies available for comparison.   At this facility, the least significant change in the BMD at the left hip with 95% confidence is 0.665251 gm/cm2 at the hip and 0.179241 g/cm2 at the lumbar spine. Report dictated by: Khushboo Driscoll PA-c  I have personally reviewed this case and agree with  the findings above: Electronically Signed: Franco Shaffer MD  3/4/2025 3:57 PM EST  Workstation ID: [id number]        ASSESSMENT: The patient is a very pleasant 64 y.o. female  with right lower lobe lung adenocarcinoma      PLAN:    1.  Right lower lobe lung adenocarcinoma,  T1b N0 M0 stage I A2, PD-L1 85%:  A.  I do over the CT scan result with the patient from today.  I reviewed the films myself of compare the pictures to previous study done December 20, 2024.  The patient does not have any evidence of new or progressive findings.  She has stable bilateral lung haziness with stable right lower lobe pleural-based area.  B.  I will switch the patient for 6-month visit at this point.    2. Hypothyroid:  A.  She will continue Synthroid     3.  Hypercholesteremia:  A.  She will continue Lipitor.    FOLLOW UP: 6 months with CT chest with contrast.    Sejal Mckenzie MD  4/1/2025

## 2025-04-01 NOTE — PROGRESS NOTES
FOLLOW UP NOTE    PATIENT:                                                      Sydnie Gamble  MEDICAL RECORD #:                        [id number]  :                                                          1960  COMPLETION DATE:   10/4/2024  DIAGNOSIS:     Adenocarcinoma of right lung  - Stage IA2 (cT1b, cN0, cM0)    Cervical cancer  - FIGO Stage IIA1 (cT2a1, cN0, cM0)      BRIEF HISTORY:  Sydnie Gamble is a very pleasant 64 y.o. female presenting for routine follow-up visit regarding a recently diagnosed non-small cell lung cancer of the right lower lobe.  Additional medical history includes COPD, former tobacco abuse, resected IIA1 cervical cancer status post adjuvant radiation therapy in 2020, as well as a few pulmonary nodules that have been followed on imaging for surveillance.  PET/CT scan performed 2024 demonstrated isolated hypermetabolism in a medial right lower lobe nodule.  There was otherwise no abnormal FDG uptake associated with several other groundglass lesions and no evidence of regional lymphadenopathy or distant metastatic disease.  She was taken for bronchoscopy with biopsies.  Pathology from the right lower lobe was consistent with adenocarcinoma that is strongly PD-L1 positive.  The right upper lobe biopsy did not show any malignant cells and similarly a station 11R node was negative for carcinoma.  Due to her COPD, she was not considered a surgical candidate.  She underwent a definitive course of stereotactic body radiotherapy to a dose of 50 Gray in 5 fractions delivered to the right lower lobe lung lesion, completing 10/4/2024.  She tolerated treatment well.  She presents today following repeat CT scan of the chest performed 3/31/2025.  From a symptom standpoint, she describes chronic baseline exertional dyspnea and denies progressive cough, hemoptysis, chest pain, rib pain, dysphagia, fever, chills, or weight loss.  She complains of neck and back pain which are  responsive to NSAIDs as needed.    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  She did not require adjuvant treatment and continues to apply topical antibiotic ointment to the site.     She reports recent colonoscopy was notable for several adenomas with 3-year recall recommended.  She denies BRBPR, dark tarry stools or change in bowel function.          MEDICATIONS: Medication reconciliation for the patient was reviewed and confirmed in the electronic medical record.    Review of Systems   Constitutional:  Positive for fatigue.   Respiratory:  Positive for shortness of breath (with exertion).    Musculoskeletal:  Positive for back pain and neck pain.   Skin:         Cutaneous SCC of the labia/groin region s/p excision, follows with dermatology   Psychiatric/Behavioral:  The patient is nervous/anxious.    All other systems reviewed and are negative.          KPS 90%      Physical Exam  Vitals and nursing note reviewed.   Constitutional:       General: She is not in acute distress.     Appearance: Normal appearance. She is well-developed.   HENT:      Head: Normocephalic and atraumatic.   Eyes:      Conjunctiva/sclera: Conjunctivae normal.      Pupils: Pupils are equal, round, and reactive to light.   Cardiovascular:      Rate and Rhythm: Normal rate and regular rhythm.      Heart sounds: No murmur heard.     No friction rub.   Pulmonary:      Effort: Pulmonary effort is normal. No respiratory distress.      Breath sounds: Normal breath sounds. No wheezing.   Musculoskeletal:         General: Normal range of motion.      Cervical back: Normal range of motion and neck supple.   Lymphadenopathy:      Cervical: No cervical adenopathy.   Skin:     General: Skin is warm and dry.   Neurological:      Mental Status: She is alert and oriented to person, place, and time.   Psychiatric:         Behavior: Behavior normal.         Thought Content: Thought  "content normal.         Judgment: Judgment normal.         VITAL SIGNS:   /64 Comment: RUE  Pulse 92   Temp 97.3 °F (36.3 °C) (Temporal)   Resp 16   Ht 154.9 cm (61\")   Wt 61.7 kg (136 lb)   LMP  (LMP Unknown)   SpO2 98% Comment: RA  BMI 25.70 kg/m²    Vitals[]Expand by Default       Pain Score     04/01/25 1304   PainSc: 0-No pain            IMAGING:  I have personally reviewed the following imaging study:  CT Chest Without Contrast Diagnostic 3/31/2025    The official radiology read is currently pending at the time of this encounter.            The following portions of the patient's history were reviewed and updated as appropriate: allergies, current medications, past family history, past medical history, past social history, past surgical history and problem list.         Diagnoses and all orders for this visit:    1. Malignant neoplasm of cervix, unspecified site (Primary)  -     Ambulatory Referral to Gynecologic Oncology         IMPRESSION:  Sydnie Gamble is a 64 y.o. female with known history of a medically inoperable stage IA2 (cT1b, cN0, cM0) adenocarcinoma of the right lower lobe of lung.  She is 6 months status post definitive treatment with stereotactic body radiotherapy.  She tolerated treatment well.  Her breathing remains overall stable in the setting of COPD.  Short interval repeat CT scan of the chest performed 3/31/2025 demonstrates further decrease in size of the treated right lower lobe lung lesion and changes consistent with postradiation effect/response to treatment.  There is no evidence of new/progressive disease identified.  Multiple stable bilateral groundglass nodules were not previously positive on PET scan but should continue to be followed on serial imaging for stability.  Radiology read is currently pending, and the patient will be notified if radiology read differs from personal interpretation.  Dr. Mckenzie is planning for repeat CT scan of the chest in 6 months.  We again " reviewed follow-up intervals, surveillance imaging, and continued expectations for response to treatment.      She plans to re-establish care with the GYN oncology clinic as she was apparently lost to follow up for surveillance of her treated cervical cancer.  Referral placed.        RECOMMENDATIONS:  RTC in 6 months following next CT scan of the chest, or sooner as needed.        Return in about 6 months (around 10/1/2025) for Office Visit.    DAYSI Loza      I spent a total of 35 minutes on today's visit, with more than 15 minutes in direct face to face communication, and the remainder of the time spent in reviewing the relevant history, records, available imaging, and for documentation.    ___________________________________  ADDENDUM 4/4/2025  I have personally reviewed the CT scan of the chest dated 3/31/2025 alongside Dr. Montana following publication of official radiology report which calls for further improvement in the treated right lower lobe pulmonary lesion, stability of numerous additional bilateral lung lesions, and interval enlargement of several mediastinal lymph nodes.  Given findings of tree-and-bud groundglass opacities in the right upper lobe likely consistent with evolving infectious process, the enlarged paratracheal nodes are favored to be reactive in nature.  No change in recommendations for repeat CT scan of the chest in 6 months - with close attention on follow up.  RTC at that time.

## 2025-04-03 DIAGNOSIS — F41.8 DEPRESSION WITH ANXIETY: ICD-10-CM

## 2025-04-03 RX ORDER — CLONAZEPAM 1 MG/1
1 TABLET ORAL 2 TIMES DAILY PRN
Qty: 50 TABLET | Refills: 0 | Status: SHIPPED | OUTPATIENT
Start: 2025-04-03

## 2025-04-12 ENCOUNTER — HOSPITAL ENCOUNTER (EMERGENCY)
Facility: HOSPITAL | Age: 65
Discharge: HOME | End: 2025-04-12
Attending: EMERGENCY MEDICINE
Payer: MEDICARE

## 2025-04-12 ENCOUNTER — APPOINTMENT (OUTPATIENT)
Facility: HOSPITAL | Age: 65
End: 2025-04-12
Payer: MEDICARE

## 2025-04-12 VITALS
RESPIRATION RATE: 18 BRPM | BODY MASS INDEX: 25.49 KG/M2 | DIASTOLIC BLOOD PRESSURE: 67 MMHG | TEMPERATURE: 97.7 F | HEIGHT: 61 IN | WEIGHT: 135 LBS | OXYGEN SATURATION: 95 % | SYSTOLIC BLOOD PRESSURE: 120 MMHG | HEART RATE: 63 BPM

## 2025-04-12 DIAGNOSIS — R07.89 CHEST WALL PAIN: ICD-10-CM

## 2025-04-12 DIAGNOSIS — M94.0 COSTOCHONDRITIS: Primary | ICD-10-CM

## 2025-04-12 LAB
ALBUMIN SERPL-MCNC: 4 G/DL (ref 3.5–5.2)
ALBUMIN/GLOB SERPL: 1.2 G/DL
ALP SERPL-CCNC: 128 U/L (ref 39–117)
ALT SERPL W P-5'-P-CCNC: 11 U/L (ref 1–33)
ANION GAP SERPL CALCULATED.3IONS-SCNC: 15.8 MMOL/L (ref 5–15)
AST SERPL-CCNC: 25 U/L (ref 1–32)
BASOPHILS # BLD AUTO: 0.01 10*3/MM3 (ref 0–0.2)
BASOPHILS NFR BLD AUTO: 0.2 % (ref 0–1.5)
BILIRUB SERPL-MCNC: 0.2 MG/DL (ref 0–1.2)
BUN SERPL-MCNC: 15 MG/DL (ref 8–23)
BUN/CREAT SERPL: 14.3 (ref 7–25)
CALCIUM SPEC-SCNC: 9.7 MG/DL (ref 8.6–10.5)
CHLORIDE SERPL-SCNC: 104 MMOL/L (ref 98–107)
CO2 SERPL-SCNC: 23.2 MMOL/L (ref 22–29)
CREAT SERPL-MCNC: 1.05 MG/DL (ref 0.57–1)
DEPRECATED RDW RBC AUTO: 45.3 FL (ref 37–54)
EGFRCR SERPLBLD CKD-EPI 2021: 58.7 ML/MIN/1.73
EOSINOPHIL # BLD AUTO: 0.23 10*3/MM3 (ref 0–0.4)
EOSINOPHIL NFR BLD AUTO: 3.9 % (ref 0.3–6.2)
ERYTHROCYTE [DISTWIDTH] IN BLOOD BY AUTOMATED COUNT: 13.2 % (ref 12.3–15.4)
GLOBULIN UR ELPH-MCNC: 3.4 GM/DL
GLUCOSE SERPL-MCNC: 98 MG/DL (ref 65–99)
HCT VFR BLD AUTO: 37.7 % (ref 34–46.6)
HGB BLD-MCNC: 12.2 G/DL (ref 12–15.9)
HOLD SPECIMEN: NORMAL
IMM GRANULOCYTES # BLD AUTO: 0.01 10*3/MM3 (ref 0–0.05)
IMM GRANULOCYTES NFR BLD AUTO: 0.2 % (ref 0–0.5)
LIPASE SERPL-CCNC: 21 U/L (ref 13–60)
LYMPHOCYTES # BLD AUTO: 0.94 10*3/MM3 (ref 0.7–3.1)
LYMPHOCYTES NFR BLD AUTO: 15.8 % (ref 19.6–45.3)
MCH RBC QN AUTO: 29.6 PG (ref 26.6–33)
MCHC RBC AUTO-ENTMCNC: 32.4 G/DL (ref 31.5–35.7)
MCV RBC AUTO: 91.5 FL (ref 79–97)
MONOCYTES # BLD AUTO: 0.52 10*3/MM3 (ref 0.1–0.9)
MONOCYTES NFR BLD AUTO: 8.7 % (ref 5–12)
NEUTROPHILS NFR BLD AUTO: 4.25 10*3/MM3 (ref 1.7–7)
NEUTROPHILS NFR BLD AUTO: 71.2 % (ref 42.7–76)
PLATELET # BLD AUTO: 283 10*3/MM3 (ref 140–450)
PMV BLD AUTO: 9.3 FL (ref 6–12)
POTASSIUM SERPL-SCNC: 4.1 MMOL/L (ref 3.5–5.2)
PROT SERPL-MCNC: 7.4 G/DL (ref 6–8.5)
RBC # BLD AUTO: 4.12 10*6/MM3 (ref 3.77–5.28)
SODIUM SERPL-SCNC: 143 MMOL/L (ref 136–145)
TROPONIN T SERPL HS-MCNC: 8 NG/L
WBC NRBC COR # BLD AUTO: 5.96 10*3/MM3 (ref 3.4–10.8)
WHOLE BLOOD HOLD COAG: NORMAL
WHOLE BLOOD HOLD SPECIMEN: NORMAL

## 2025-04-12 PROCEDURE — 80053 COMPREHEN METABOLIC PANEL: CPT | Performed by: PHYSICIAN ASSISTANT

## 2025-04-12 PROCEDURE — 99284 EMERGENCY DEPT VISIT MOD MDM: CPT | Performed by: EMERGENCY MEDICINE

## 2025-04-12 PROCEDURE — 25010000002 KETOROLAC TROMETHAMINE PER 15 MG: Performed by: PHYSICIAN ASSISTANT

## 2025-04-12 PROCEDURE — 96374 THER/PROPH/DIAG INJ IV PUSH: CPT

## 2025-04-12 PROCEDURE — 71046 X-RAY EXAM CHEST 2 VIEWS: CPT

## 2025-04-12 PROCEDURE — 84484 ASSAY OF TROPONIN QUANT: CPT | Performed by: PHYSICIAN ASSISTANT

## 2025-04-12 PROCEDURE — 85025 COMPLETE CBC W/AUTO DIFF WBC: CPT | Performed by: PHYSICIAN ASSISTANT

## 2025-04-12 PROCEDURE — 83690 ASSAY OF LIPASE: CPT | Performed by: PHYSICIAN ASSISTANT

## 2025-04-12 PROCEDURE — 93005 ELECTROCARDIOGRAM TRACING: CPT | Performed by: EMERGENCY MEDICINE

## 2025-04-12 RX ORDER — KETOROLAC TROMETHAMINE 15 MG/ML
15 INJECTION, SOLUTION INTRAMUSCULAR; INTRAVENOUS ONCE
Status: COMPLETED | OUTPATIENT
Start: 2025-04-12 | End: 2025-04-12

## 2025-04-12 RX ORDER — PREDNISONE 20 MG/1
40 TABLET ORAL DAILY
Qty: 10 TABLET | Refills: 0 | Status: SHIPPED | OUTPATIENT
Start: 2025-04-12

## 2025-04-12 RX ORDER — SODIUM CHLORIDE 0.9 % (FLUSH) 0.9 %
10 SYRINGE (ML) INJECTION AS NEEDED
Status: DISCONTINUED | OUTPATIENT
Start: 2025-04-12 | End: 2025-04-12 | Stop reason: HOSPADM

## 2025-04-12 RX ADMIN — KETOROLAC TROMETHAMINE 15 MG: 15 INJECTION, SOLUTION INTRAMUSCULAR; INTRAVENOUS at 13:40

## 2025-04-12 NOTE — FSED PROVIDER NOTE
Subjective  History of Present Illness:    This is a 64-year-old female with past medical history of adenocarcinoma of the lung who presents with complaints of 3 to 4 days of mid chest pain with radiation under her left breast and into her left back.  Patient states that pain is worse with deep inspiration and movement.  Worse with palpation.  Patient denies any shortness of breath.  She is currently under the care of oncology, underwent CyberKnife with no new findings at last visit.  Not currently undergoing any chemotherapy.  She denies any nausea or vomiting.  No cough, no fevers.      Nurses Notes reviewed and agree, including vitals, allergies, social history and prior medical history.     REVIEW OF SYSTEMS: All systems reviewed and not pertinent unless noted.  Review of Systems    Past Medical History:   Diagnosis Date    Acid reflux     Acid reflux     Adenocarcinoma of lung 08/12/2024    Anxiety 1976    Arthritis     Atherosclerotic cardiovascular disease 03/25/2024    Cervical cancer     Depression     Emphysema of lung     History of radiation therapy 11/20/2020    pelvis + intracavitary brachytherapy    History of radiation therapy 10/04/2024    RUL lung    Hx of radiation therapy     Hyperlipidemia     Hypothyroidism     Kidney disease     Lung cancer     Lung nodule     Ovarian cyst 1980s    Visual impairment corrected with glasses    Wears dentures     Wears glasses        Allergies:    Patient has no known allergies.      Past Surgical History:   Procedure Laterality Date    BRONCHOSCOPY      BRONCHOSCOPY WITH ION ROBOTIC ASSIST N/A 08/06/2024    Procedure: BRONCHOSCOPY NAVIGATION WITH ENDOBRONCHIAL ULTRASOUND AND ION ROBOT;  Surgeon: Toni Mcclelland MD;  [facility]: [facility] END[facility];  Service: Robotics - Pulmonary;  Laterality: N/A;    LUNG BIOPSY      LYMPH NODE BIOPSY      MOHS SURGERY  03/24/2025    Groin    SUBTOTAL HYSTERECTOMY  1987?    ovary/fallopian tube removed    TOTAL ABDOMINAL  "HYSTERECTOMY PELVIC NODE DISSECTION N/A 2020    Procedure: TOTAL RADICAL HYSTERECTOMY PELVIC NODE DISSECTION;  Surgeon: Laura Jimenez MD;  Location: [facility];  Service: Gynecology Oncology;  Laterality: N/A;    TUBAL ABDOMINAL LIGATION      right with ovarian dissection          Social History     Socioeconomic History    Marital status:    Tobacco Use    Smoking status: Former     Current packs/day: 0.00     Average packs/day: 1.5 packs/day for 45.0 years (67.5 ttl pk-yrs)     Types: Cigarettes     Start date: 1972     Quit date: 3/30/2019     Years since quittin.0     Passive exposure: Past    Smokeless tobacco: Never    Tobacco comments:     quit analog cigs in march, still vapes daily   Vaping Use    Vaping status: Every Day    Substances: Nicotine   Substance and Sexual Activity    Alcohol use: No    Drug use: No    Sexual activity: Not Currently     Partners: Male         Family History   Problem Relation Age of Onset    Cancer Mother     Hypertension Mother     Mental illness Mother     Anxiety disorder Mother     Asthma Mother     COPD Mother     Depression Mother     Emphysema Mother     Cancer Father     Early death Father     Heart attack Maternal Grandfather     Heart disease Maternal Grandfather         Heart Attack 65 yo    Cancer Maternal Aunt     Cancer Maternal Aunt     Cancer Maternal Grandmother     Breast cancer Neg Hx     Ovarian cancer Neg Hx        Objective  Physical Exam:  /67   Pulse 63   Temp 97.7 °F (36.5 °C)   Resp 18   Ht 154.9 cm (61\")   Wt 61.2 kg (135 lb)   LMP  (LMP Unknown)   SpO2 95%   BMI 25.51 kg/m²      Physical Exam  Vitals and nursing note reviewed.   Constitutional:       Appearance: Normal appearance.   Cardiovascular:      Rate and Rhythm: Normal rate and regular rhythm.      Pulses: Normal pulses.      Heart sounds: Normal heart sounds.   Pulmonary:      Effort: Pulmonary effort is normal.      Breath sounds: Normal breath sounds. "   Chest:      Chest wall: Tenderness (Lower sternum with pain with palpation along just the left breast) present.      Comments: No rash no dermatomal pattern, no crepitus.  Palpation completely reproduces her symptoms  Neurological:      Mental Status: She is alert.         Procedures    ED Course:         Lab Results (last 24 hours)       Procedure Component Value Units Date/Time    CBC & Differential [[id number]]  (Abnormal) Collected: 04/12/25 1246    Specimen: Blood Updated: 04/12/25 1301    Narrative:      The following orders were created for panel order CBC & Differential.  Procedure                               Abnormality         Status                     ---------                               -----------         ------                     CBC Auto Differential[[id number]]        Abnormal            Final result                 Please view results for these tests on the individual orders.    Comprehensive Metabolic Panel [[id number]]  (Abnormal) Collected: 04/12/25 1246    Specimen: Blood Updated: 04/12/25 1323     Glucose 98 mg/dL      BUN 15 mg/dL      Creatinine 1.05 mg/dL      Sodium 143 mmol/L      Potassium 4.1 mmol/L      Comment: Specimen hemolyzed.  Result may be falsely elevated.        Chloride 104 mmol/L      CO2 23.2 mmol/L      Calcium 9.7 mg/dL      Total Protein 7.4 g/dL      Albumin 4.0 g/dL      ALT (SGPT) 11 U/L      AST (SGOT) 25 U/L      Comment: Specimen hemolyzed.  Result may be falsely elevated.        Alkaline Phosphatase 128 U/L      Total Bilirubin 0.2 mg/dL      Globulin 3.4 gm/dL      A/G Ratio 1.2 g/dL      BUN/Creatinine Ratio 14.3     Anion Gap 15.8 mmol/L      eGFR 58.7 mL/min/1.73     Narrative:      GFR Categories in Chronic Kidney Disease (CKD)      GFR Category          GFR (mL/min/1.73)    Interpretation  G1                     90 or greater         Normal or high (1)  G2                      60-89                Mild decrease (1)  G3a                   45-59                 Mild to moderate decrease  G3b                   30-44                Moderate to severe decrease  G4                    15-29                Severe decrease  G5                    14 or less           Kidney failure          (1)In the absence of evidence of kidney disease, neither GFR category G1 or G2 fulfill the criteria for CKD.    eGFR calculation 2021 CKD-EPI creatinine equation, which does not include race as a factor    CBC Auto Differential [[id number]]  (Abnormal) Collected: 04/12/25 1246    Specimen: Blood Updated: 04/12/25 1301     WBC 5.96 10*3/mm3      RBC 4.12 10*6/mm3      Hemoglobin 12.2 g/dL      Hematocrit 37.7 %      MCV 91.5 fL      MCH 29.6 pg      MCHC 32.4 g/dL      RDW 13.2 %      RDW-SD 45.3 fl      MPV 9.3 fL      Platelets 283 10*3/mm3      Neutrophil % 71.2 %      Lymphocyte % 15.8 %      Monocyte % 8.7 %      Eosinophil % 3.9 %      Basophil % 0.2 %      Immature Grans % 0.2 %      Neutrophils, Absolute 4.25 10*3/mm3      Lymphocytes, Absolute 0.94 10*3/mm3      Monocytes, Absolute 0.52 10*3/mm3      Eosinophils, Absolute 0.23 10*3/mm3      Basophils, Absolute 0.01 10*3/mm3      Immature Grans, Absolute 0.01 10*3/mm3     Lipase [[id number]]  (Normal) Collected: 04/12/25 1246    Specimen: Blood Updated: 04/12/25 1319     Lipase 21 U/L     High Sensitivity Troponin T [[id number]]  (Normal) Collected: 04/12/25 1246    Specimen: Blood Updated: 04/12/25 1315     HS Troponin T 8 ng/L              XR Chest 2 View  Result Date: 4/12/2025  XR CHEST 2 VW Date of Exam: 4/12/2025 12:51 PM EDT Indication: Pleuritic pain Comparison: CT chest 3/31/2025, 6/4/2024; AP chest x-ray 8/6/2024 Findings: Ill-defined airspace opacity in the right midlung corresponds to a groundglass nodule that has been followed with CT. Lungs otherwise appear clear. No pneumothorax or pleural effusion is seen. Heart size is normal.     Impression: Impression: 1.No acute cardiopulmonary abnormality is identified.  2.Ill-defined airspace opacity in the right midlung corresponds to a groundglass nodule that has been followed with CT. Electronically Signed: Maryellen Dalton  4/12/2025 1:14 PM EDT  Workstation ID: [id number]         [facility]     Amount and/or Complexity of Data Reviewed  Clinical lab tests: reviewed  Tests in the radiology section of CPT®: reviewed  Tests in the medicine section of CPT®: reviewed        Initial impression of presenting illness: This is a 64 year old female who presents with complaints of chest wall pain.  Pain is completely reproducible on palpation.  Subsided with Toradol.  Suspect costochondritis.  Troponin is negative, EKG nonischemic.  Chest x-ray did not reveal any acute findings, did show a being followed by CT with no changes.  Patient is anxious however felt much better after Toradol and reassurance.  Discussed that she will be discharged with steroids, she is on several sedating medications, I do not feel that she needs further sedation with pain medication.  Discussed that she can take ibuprofen occasionally, her kidney function is mildly elevated.  Recommended she follow-up with her primary care provider for recheck in 1 week.  Discussed findings and plan of care with the patient is agreeable to discharge    DDX: includes but is not limited to: MI, pneumothorax, pneumonia, costochondritis, chest wall pain      Medications   sodium chloride 0.9 % flush 10 mL (has no administration in time range)   ketorolac (TORADOL) injection 15 mg (15 mg Intravenous Given 4/12/25 1340)         Data interpreted: Nursing notes reviewed, vital signs reviewed.  Labs independently interpreted by me (CBC, CMP, lipase, UA, troponin, ABG, lactic acid, procalcitonin).  Imaging independently interpreted by me (x-ray, CT scan).  EKG independently interpreted by me.  O2 saturation: 99%    Counseling: Discussed the results above with the patient regarding need for admission or discharge.  Patient understands and agrees plan  of care.      -----  ED Disposition       ED Disposition   Discharge    Condition   Stable    Comment   --             Final diagnoses:   Costochondritis   Chest wall pain      Your Follow-Up Providers       Go to  [facility] EMERGENCY [facility].    Specialty: Emergency Medicine  Follow up details: As needed, If symptoms worsen  [address]  [facility] [address] [phone]             Dee Elena APRN.    Specialties: Nurse Practitioner, Family Medicine  Follow up details: As needed, If symptoms worsen  [address]  [address]  [facility] [id number] [phone]                       Contact information for after-discharge care    Follow-up information has not been specified.                    Your medication list        START taking these medications        Instructions Last Dose Given Next Dose Due   predniSONE 20 MG tablet  Commonly known as: DELTASONE      Take 2 tablets by mouth Daily.              CONTINUE taking these medications        Instructions Last Dose Given Next Dose Due   Aspirin Low Dose 81 MG EC tablet  Generic drug: aspirin      TAKE 1 TABLET BY MOUTH EVERY DAY       atorvastatin 10 MG tablet  Commonly known as: LIPITOR      TAKE 1 TABLET BY MOUTH EVERY DAY AT NIGHT       clonazePAM 1 MG tablet  Commonly known as: KlonoPIN      Take 1 tablet by mouth 2 (Two) Times a Day As Needed for Anxiety.       famotidine 20 MG tablet  Commonly known as: PEPCID      TAKE 1 TABLET BY MOUTH TWICE DAILY OR TAKE 2 TABLETS BY MOUTH ONCE DAILY AS NEEDED FOR HEARTBURN       levothyroxine 75 MCG tablet  Commonly known as: SYNTHROID, LEVOTHROID      TAKE 1 TABLET BY MOUTH EVERY DAY       ondansetron ODT 4 MG disintegrating tablet  Commonly known as: ZOFRAN-ODT      Place 1 tablet on the tongue Every 8 (Eight) Hours As Needed for Nausea or Vomiting.       sertraline 100 MG tablet  Commonly known as: Zoloft      Take 1 tablet by mouth Daily.        sodium-potassium-magnesium sulfates 17.5-3.13-1.6 GM/177ML solution oral solution  Commonly known as: Suprep Bowel Prep Kit      Take 1 bottle by mouth Take As Directed.       traZODone 150 MG tablet  Commonly known as: DESYREL      TAKE 1 TABLET BY MOUTH EVERY DAY AT NIGHT       Montanalebettie Ellipta 100-62.5-25 MCG/ACT inhaler  Generic drug: Fluticasone-Umeclidin-Vilant      Inhale 1 puff Daily.       zolpidem 10 MG tablet  Commonly known as: AMBIEN      Take 1 tablet by mouth At Night As Needed for Sleep.                 Where to Get Your Medications        These medications were sent to [id number] - [place], [place] - [address] AT NEXT TO [facility] - [phone]  - [phone]   [address] [place] [address] [address]      Phone: [phone]   predniSONE 20 MG tablet

## 2025-04-12 NOTE — DISCHARGE INSTRUCTIONS
Call your primary care provider for recheck in 1 week.  Return to the emergency department for any worsening symptoms.  You may use ibuprofen occasionally.  The steroids that you are being prescribed should help your pain.

## 2025-04-14 LAB
QT INTERVAL: 396 MS
QTC INTERVAL: 433 MS

## 2025-04-15 ENCOUNTER — PATIENT OUTREACH (OUTPATIENT)
Dept: CASE MANAGEMENT | Facility: OTHER | Age: 65
End: 2025-04-15
Payer: COMMERCIAL

## 2025-04-15 DIAGNOSIS — E03.9 ACQUIRED HYPOTHYROIDISM: ICD-10-CM

## 2025-04-15 DIAGNOSIS — C34.91 ADENOCARCINOMA OF RIGHT LUNG: Primary | ICD-10-CM

## 2025-04-15 DIAGNOSIS — J44.89 COPD WITH ASTHMA: ICD-10-CM

## 2025-04-15 DIAGNOSIS — I25.10 ATHEROSCLEROTIC CARDIOVASCULAR DISEASE: ICD-10-CM

## 2025-04-15 DIAGNOSIS — R73.03 PREDIABETES: ICD-10-CM

## 2025-04-15 NOTE — OUTREACH NOTE
AMBULATORY CASE MANAGEMENT NOTE    Names and Relationships of Patient/Support Persons: Contact: Sydnie Gamble; Relationship: Self -     Patient Outreach    Spoke with patient as an ER follow up call. Patient reports continued pain in left rib and left axillary area, rated pain as a 7 of 10. Pain increases if she has to raise her arm or coughs. She stated prednisone is no helping but did have some relief from a Toradol shot in the ER. Offered to move patient's PCP appointment up but she wanted to wait until her regularly scheduled appointment. Messaged PCP for recommendations. ACM will follow up.     Selam ASKEW  Ambulatory Case Management    4/15/2025, 15:29 EDT

## 2025-04-16 ENCOUNTER — PATIENT OUTREACH (OUTPATIENT)
Dept: CASE MANAGEMENT | Facility: OTHER | Age: 65
End: 2025-04-16
Payer: COMMERCIAL

## 2025-04-16 DIAGNOSIS — J44.89 COPD WITH ASTHMA: ICD-10-CM

## 2025-04-16 DIAGNOSIS — R73.03 PREDIABETES: Primary | ICD-10-CM

## 2025-04-16 DIAGNOSIS — R91.8 MULTIPLE LUNG NODULES ON CT: ICD-10-CM

## 2025-04-16 NOTE — OUTREACH NOTE
AMBULATORY CASE MANAGEMENT NOTE    Names and Relationships of Patient/Support Persons: Contact: Sydnie Gambel; Relationship: Self -     Care Coordination    Spoke with patient as a follow up call. Continues to have chest pain with inspiration and lifting heavy objects. Does have some shortness of breath, reports her oxygen was 92. Pain continues despite Tylenol and steroids, has had nausea. Denied fever. Spoke with staff at PCP office, appointment made for tomorrow. Updated patient via phone, she voiced understanding and will see if her  can take her otherwise will reschedule.     Selam ASKEW  Ambulatory Case Management    4/16/2025, 14:11 EDT

## 2025-04-17 ENCOUNTER — LAB (OUTPATIENT)
Dept: INTERNAL MEDICINE | Facility: CLINIC | Age: 65
End: 2025-04-17
Payer: COMMERCIAL

## 2025-04-17 ENCOUNTER — OFFICE VISIT (OUTPATIENT)
Dept: INTERNAL MEDICINE | Facility: CLINIC | Age: 65
End: 2025-04-17
Payer: COMMERCIAL

## 2025-04-17 VITALS
TEMPERATURE: 98.2 F | HEIGHT: 61 IN | OXYGEN SATURATION: 96 % | HEART RATE: 101 BPM | DIASTOLIC BLOOD PRESSURE: 70 MMHG | SYSTOLIC BLOOD PRESSURE: 104 MMHG | WEIGHT: 134 LBS | BODY MASS INDEX: 25.3 KG/M2

## 2025-04-17 DIAGNOSIS — R59.1 LYMPHADENOPATHY: Primary | ICD-10-CM

## 2025-04-17 DIAGNOSIS — C34.91 ADENOCARCINOMA OF RIGHT LUNG: ICD-10-CM

## 2025-04-17 DIAGNOSIS — M94.0 COSTOCHONDRITIS: ICD-10-CM

## 2025-04-17 DIAGNOSIS — E03.9 ACQUIRED HYPOTHYROIDISM: ICD-10-CM

## 2025-04-17 DIAGNOSIS — Z00.00 HEALTHCARE MAINTENANCE: Primary | ICD-10-CM

## 2025-04-17 PROCEDURE — 36415 COLL VENOUS BLD VENIPUNCTURE: CPT | Performed by: NURSE PRACTITIONER

## 2025-04-17 PROCEDURE — 99214 OFFICE O/P EST MOD 30 MIN: CPT | Performed by: NURSE PRACTITIONER

## 2025-04-17 PROCEDURE — 84439 ASSAY OF FREE THYROXINE: CPT | Performed by: NURSE PRACTITIONER

## 2025-04-17 PROCEDURE — 84443 ASSAY THYROID STIM HORMONE: CPT | Performed by: NURSE PRACTITIONER

## 2025-04-17 RX ORDER — CYCLOBENZAPRINE HCL 10 MG
10 TABLET ORAL 2 TIMES DAILY PRN
Qty: 60 TABLET | Refills: 1 | Status: SHIPPED | OUTPATIENT
Start: 2025-04-17

## 2025-04-17 NOTE — PROGRESS NOTES
Subjective   Chief Complaint   Patient presents with    costochondritis     Diagnosed in ER         Sydnie Gamble is a 64 y.o. female.    History of Present Illness  The patient presents for evaluation of costochondritis, adenocarcinoma of the lung, and hypothyroidism.    Costochondritis was diagnosed in the emergency room and has been causing severe pain. Persistent dry cough is reported, which is attributed to allergies. No recent illness is noted, but neck and back pain continue to be problematic. Neck pain has been radiating down the shoulder, ribs, and back for the past 2 weeks. A change in voice is also reported, raising concerns about potential esophageal issues. Prednisone has been prescribed, and a heating pad is being used for relief. Toradol was administered intravenously during the ER visit, providing significant relief.    Adenocarcinoma of the lung was treated with radiation therapy, with the expectation that it could take up to a year for the treatment to show full effect. Excruciating pain when lifting the arm was initially attributed to gallbladder issues. A follow-up appointment with the pulmonologist is scheduled for 07/18/2025. Recent mammogram and colonoscopy were completed.    Daily aspirin is taken in the morning, and atorvastatin is taken at night.    A biopsy on the groin revealed squamous cell carcinoma, and a follow-up appointment with the gynecologist is scheduled.    PAST SURGICAL HISTORY:  - Bronchoscopy  - Biopsy on groin (squamous cell carcinoma)  - Radiation therapy for adenocarcinoma of the lung        I have reviewed the following portions of the patient's history and confirmed they are accurate: allergies, current medications, past family history, past medical history, past social history, past surgical history, and problem list    Review of Systems  Pertinent items are noted in HPI.     Current Outpatient Medications on File Prior to Visit   Medication Sig    Aspirin Low  "Dose 81 MG EC tablet TAKE 1 TABLET BY MOUTH EVERY DAY    atorvastatin (LIPITOR) 10 MG tablet TAKE 1 TABLET BY MOUTH EVERY DAY AT NIGHT    clonazePAM (KlonoPIN) 1 MG tablet Take 1 tablet by mouth 2 (Two) Times a Day As Needed for Anxiety.    famotidine (PEPCID) 20 MG tablet TAKE 1 TABLET BY MOUTH TWICE DAILY OR TAKE 2 TABLETS BY MOUTH ONCE DAILY AS NEEDED FOR HEARTBURN    Fluticasone-Umeclidin-Vilant (Trelegy Ellipta) 100-62.5-25 MCG/ACT inhaler Inhale 1 puff Daily.    levothyroxine (SYNTHROID, LEVOTHROID) 75 MCG tablet TAKE 1 TABLET BY MOUTH EVERY DAY    ondansetron ODT (ZOFRAN-ODT) 4 MG disintegrating tablet Place 1 tablet on the tongue Every 8 (Eight) Hours As Needed for Nausea or Vomiting.    predniSONE (DELTASONE) 20 MG tablet Take 2 tablets by mouth Daily.    sertraline (Zoloft) 100 MG tablet Take 1 tablet by mouth Daily.    sodium-potassium-magnesium sulfates (Suprep Bowel Prep Kit) 17.5-3.13-1.6 GM/177ML solution oral solution Take 1 bottle by mouth Take As Directed.    traZODone (DESYREL) 150 MG tablet TAKE 1 TABLET BY MOUTH EVERY DAY AT NIGHT    zolpidem (AMBIEN) 10 MG tablet Take 1 tablet by mouth At Night As Needed for Sleep.     No current facility-administered medications on file prior to visit.       Objective   Vitals:    04/17/25 0947   BP: 104/70   BP Location: Left arm   Patient Position: Sitting   Cuff Size: Adult   Pulse: 101   Temp: 98.2 °F (36.8 °C)   SpO2: 96%   Weight: 60.8 kg (134 lb)   Height: 154.9 cm (60.98\")     Body mass index is 25.33 kg/m².    Physical Exam  Vitals reviewed.   Constitutional:       Appearance: Normal appearance. She is well-developed.   HENT:      Head: Normocephalic and atraumatic.      Nose: Nose normal.   Eyes:      General: Lids are normal.      Conjunctiva/sclera: Conjunctivae normal.      Pupils: Pupils are equal, round, and reactive to light.   Neck:      Thyroid: No thyromegaly.      Trachea: Trachea normal.   Cardiovascular:      Rate and Rhythm: Normal rate " and regular rhythm.      Heart sounds: Normal heart sounds.   Pulmonary:      Effort: Pulmonary effort is normal. No respiratory distress.      Breath sounds: Normal breath sounds.   Chest:       Lymphadenopathy:      Cervical: No cervical adenopathy.      Right cervical: No superficial, deep or posterior cervical adenopathy.     Left cervical: No superficial, deep or posterior cervical adenopathy.   Skin:     General: Skin is warm and dry.   Neurological:      Mental Status: She is alert and oriented to person, place, and time.      GCS: GCS eye subscore is 4. GCS verbal subscore is 5. GCS motor subscore is 6.   Psychiatric:         Attention and Perception: Attention normal.         Mood and Affect: Mood normal.         Speech: Speech normal.         Behavior: Behavior normal. Behavior is cooperative.         Thought Content: Thought content normal.         Results  Imaging   - CT scan of the mediastinal lymph nodes: Significant interval enlargement of multiple previously very small mediastinal lymph nodes, possibly reactive lymphadenopathy.   - CT scan of the pericardium: Small pericardial effusion observed. This was not seen on recent xray   - CT scan of the right lung: Lung nodules have decreased in size.  Lab Results   Component Value Date    WBC 5.96 04/12/2025    HGB 12.2 04/12/2025    HCT 37.7 04/12/2025    MCV 91.5 04/12/2025     04/12/2025      Lab Results   Component Value Date    GLUCOSE 98 04/12/2025    BUN 15 04/12/2025    CREATININE 1.05 (H) 04/12/2025     04/12/2025    K 4.1 04/12/2025     04/12/2025    CALCIUM 9.7 04/12/2025    PROTEINTOT 7.4 04/12/2025    ALBUMIN 4.0 04/12/2025    ALT 11 04/12/2025    AST 25 04/12/2025    ALKPHOS 128 (H) 04/12/2025    BILITOT 0.2 04/12/2025    GLOB 3.4 04/12/2025    AGRATIO 1.2 04/12/2025    BCR 14.3 04/12/2025    ANIONGAP 15.8 (H) 04/12/2025    EGFR 58.7 (L) 04/12/2025      Lab Results   Component Value Date    HGBA1C 5.90 (H) 01/30/2025       Lab Results   Component Value Date    CHOL 185 11/21/2024    TRIG 70 11/21/2024    HDL 85 (H) 11/21/2024    LDL 87 11/21/2024      Lab Results   Component Value Date    TSH 5.100 (H) 01/30/2025          Assessment & Plan   Problem List Items Addressed This Visit       Acquired hypothyroidism    Relevant Orders    TSH Rfx On Abnormal To Free T4    Adenocarcinoma of right lung     Other Visit Diagnoses         Lymphadenopathy    -  Primary      Costochondritis        Relevant Medications    cyclobenzaprine (FLEXERIL) 10 MG tablet               Current Outpatient Medications:     Aspirin Low Dose 81 MG EC tablet, TAKE 1 TABLET BY MOUTH EVERY DAY, Disp: 90 tablet, Rfl: 1    atorvastatin (LIPITOR) 10 MG tablet, TAKE 1 TABLET BY MOUTH EVERY DAY AT NIGHT, Disp: 90 tablet, Rfl: 1    clonazePAM (KlonoPIN) 1 MG tablet, Take 1 tablet by mouth 2 (Two) Times a Day As Needed for Anxiety., Disp: 50 tablet, Rfl: 0    cyclobenzaprine (FLEXERIL) 10 MG tablet, Take 1 tablet by mouth 2 (Two) Times a Day As Needed for Muscle Spasms., Disp: 60 tablet, Rfl: 1    famotidine (PEPCID) 20 MG tablet, TAKE 1 TABLET BY MOUTH TWICE DAILY OR TAKE 2 TABLETS BY MOUTH ONCE DAILY AS NEEDED FOR HEARTBURN, Disp: 180 tablet, Rfl: 1    Fluticasone-Umeclidin-Vilant (Trelegy Ellipta) 100-62.5-25 MCG/ACT inhaler, Inhale 1 puff Daily., Disp: 90 each, Rfl: 3    levothyroxine (SYNTHROID, LEVOTHROID) 75 MCG tablet, TAKE 1 TABLET BY MOUTH EVERY DAY, Disp: 90 tablet, Rfl: 1    ondansetron ODT (ZOFRAN-ODT) 4 MG disintegrating tablet, Place 1 tablet on the tongue Every 8 (Eight) Hours As Needed for Nausea or Vomiting., Disp: 30 tablet, Rfl: 2    predniSONE (DELTASONE) 20 MG tablet, Take 2 tablets by mouth Daily., Disp: 10 tablet, Rfl: 0    sertraline (Zoloft) 100 MG tablet, Take 1 tablet by mouth Daily., Disp: 90 tablet, Rfl: 1    sodium-potassium-magnesium sulfates (Suprep Bowel Prep Kit) 17.5-3.13-1.6 GM/177ML solution oral solution, Take 1 bottle by mouth Take  As Directed., Disp: 354 mL, Rfl: 0    traZODone (DESYREL) 150 MG tablet, TAKE 1 TABLET BY MOUTH EVERY DAY AT NIGHT, Disp: 90 tablet, Rfl: 3    zolpidem (AMBIEN) 10 MG tablet, Take 1 tablet by mouth At Night As Needed for Sleep., Disp: 30 tablet, Rfl: 1    Assessment & Plan  1. Costochondritis.  - Severe pain associated with costochondritis, with pain radiating from the neck down to the shoulder and ribs.  - Prescription for a muscle relaxer provided, with instructions to take half a tablet during the day and a full tablet at night.  - Advised to use a heating pad for additional relief.  - Further evaluation will be necessary if symptoms persist or worsen.    2. Reactive lymphadenopathy.  - Significant interval enlargement of multiple previously small mediastinal lymph nodes, possibly reactive lymphadenopathy.  - Could be due to recent illness or inflammation.  - Repeat CT scan of the lymph nodes scheduled in 4-6 weeks to monitor.     3. Adenocarcinoma of the lung.  - History of adenocarcinoma of the lung with previous radiation therapy.  - Recent imaging shows a decrease in the size of lung nodules, suggesting a positive response to treatment.  - Continued monitoring is necessary.  - Advised to follow up with the oncologist as scheduled.    4. Hypothyroidism.  - Thyroid function fluctuates, and the patient is currently on medication.  - Recheck of thyroid levels will be conducted today to ensure appropriate dosing.  - Patient is compliant with morning medication but occasionally forgets to take atorvastatin at night.    Follow-up  - The patient will follow up in 1 month.         Plan of care reviewed with the patient at the conclusion of today's visit.  Education was provided regarding diagnosis, management, and any prescribed or recommended OTC medications.  Patient verbalized understanding of and agreement with management plan.     Return in about 1 month (around 5/17/2025), or if symptoms worsen or fail to  improve.      Transcribed from ambient dictation for DAYSI Cordero by DAYSI Cordero.  04/17/25   10:20 EDT    Patient or patient representative verbalized consent for the use of Ambient Listening during the visit with  DAYSI Cordero for chart documentation. 4/22/2025  13:39 EDT

## 2025-04-18 LAB
T4 FREE SERPL-MCNC: 1.03 NG/DL (ref 0.92–1.68)
TSH SERPL DL<=0.05 MIU/L-ACNC: 17.2 UIU/ML (ref 0.27–4.2)

## 2025-04-23 ENCOUNTER — TELEPHONE (OUTPATIENT)
Dept: ONCOLOGY | Facility: CLINIC | Age: 65
End: 2025-04-23
Payer: COMMERCIAL

## 2025-04-23 ENCOUNTER — TELEPHONE (OUTPATIENT)
Dept: PULMONOLOGY | Facility: CLINIC | Age: 65
End: 2025-04-23
Payer: COMMERCIAL

## 2025-04-23 DIAGNOSIS — C34.91 ADENOCARCINOMA OF RIGHT LUNG: Primary | ICD-10-CM

## 2025-04-23 NOTE — TELEPHONE ENCOUNTER
Patient's daughter called stating her CT scan results ordered by Dr. 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 Discussed she would need to discuss with her hematologist/oncologist. She understands and will call.

## 2025-04-23 NOTE — TELEPHONE ENCOUNTER
Yeah...I'm guessing it wasn't read yet. Tell her there is some enlargement, however they are small (less than 2 cm) and could be reactive, but would recommend closer interval follow up with scan in 3 months instead of 6 to be sure they are stable.

## 2025-04-23 NOTE — TELEPHONE ENCOUNTER
Caller: LEVI    Relationship: Child    Best call back number: [phone]    What is the best time to reach you: ANYTIME    Who are you requesting to speak with (clinical staff, provider,  specific staff member): CLINICAL    What was the call regarding: LEVI IS VERY CONCERNED ABOUT THE 3/31 CT SCAN RESULTS NOTING POSSIBLY METASTASES IN PATIENT'S LYMPH NODES.     ALSO WANTED TO KNOW IF THERE WAS A  AVAILABLE TO HELP PATIENT MANAGE ALL COMORBIDITIES/APPTS.

## 2025-04-23 NOTE — TELEPHONE ENCOUNTER
RN called Dipti back and discussed scan results. Let her know that the lymph nodes are very small and could be reactive to any illnesses should could have caught over the last few months. We will move her appointment and scans up to June from September to further assess these lymph nodes mentioned on the CT report. Dipti v/u and appreciation.     She is also wondering about an ambulatory  or someone to help her mother manage all of her different appointments and doctors. RN will look into this and get patient referred appropriately.

## 2025-04-24 ENCOUNTER — REFERRAL TRIAGE (OUTPATIENT)
Dept: CASE MANAGEMENT | Facility: OTHER | Age: 65
End: 2025-04-24
Payer: COMMERCIAL

## 2025-04-24 NOTE — TELEPHONE ENCOUNTER
Called patient and LVM with new appointments also letter sent with reminders of both appointments dates and times.

## 2025-04-25 ENCOUNTER — PATIENT OUTREACH (OUTPATIENT)
Dept: CASE MANAGEMENT | Facility: OTHER | Age: 65
End: 2025-04-25
Payer: COMMERCIAL

## 2025-04-25 NOTE — OUTREACH NOTE
AMBULATORY CASE MANAGEMENT NOTE    Names and Relationships of Patient/Support Persons: Contact: Sydnie Gamble; Relationship: Self -     Patient Outreach    Spoke with patient as a follow up call. Patient reports her pain has improved a little, but continues to have some pain. Tylenol does not really help. Encouraged her to contact her PCP if her pain does not improve over the next week. She voiced understanding. She denied further needs at this time.     Selam ASKEW  Ambulatory Case Management    4/25/2025, 13:09 EDT

## 2025-04-28 ENCOUNTER — TELEPHONE (OUTPATIENT)
Dept: INTERNAL MEDICINE | Age: 65
End: 2025-04-28
Payer: COMMERCIAL

## 2025-04-28 DIAGNOSIS — M54.2 NECK PAIN: Primary | ICD-10-CM

## 2025-04-28 NOTE — TELEPHONE ENCOUNTER
Caller: Sydnie Gamble    Relationship: Self    Best call back number: [phone]     What is the best time to reach you: ANYTIME     Who are you requesting to speak with (clinical staff, provider,  specific staff member): PROVIDER    Do you know the name of the person who called: NA    What was the call regarding: PATIENT WOULD LIKE A CALL BACK TO DISCUSS WHAT SHE CAN TAKE FOR NECK AND SHOULDER PAIN. WHAT SHE WAS GIVEN IS NOT HELPING.     Is it okay if the provider responds through MyChart: NO

## 2025-04-30 DIAGNOSIS — F51.01 PRIMARY INSOMNIA: ICD-10-CM

## 2025-05-02 RX ORDER — ZOLPIDEM TARTRATE 10 MG/1
10 TABLET ORAL NIGHTLY PRN
Qty: 30 TABLET | Refills: 1 | Status: SHIPPED | OUTPATIENT
Start: 2025-05-02

## 2025-05-02 NOTE — TELEPHONE ENCOUNTER
Patient unable to take nsaids due to decline in kidney function. Has already tried steroid pack and flexeril.     Sending diclofenac gel and zanaflex. Stop flexeril and replace with zanaflex as needed. If this does not help patient needs to be seen for follow up so imaging can be ordered and meds changed.

## 2025-05-08 DIAGNOSIS — F41.8 DEPRESSION WITH ANXIETY: ICD-10-CM

## 2025-05-12 RX ORDER — CLONAZEPAM 1 MG/1
1 TABLET ORAL 2 TIMES DAILY PRN
Qty: 50 TABLET | Refills: 0 | Status: SHIPPED | OUTPATIENT
Start: 2025-05-12

## 2025-05-13 ENCOUNTER — APPOINTMENT (OUTPATIENT)
Dept: CT IMAGING | Facility: HOSPITAL | Age: 65
DRG: 175 | End: 2025-05-13
Payer: COMMERCIAL

## 2025-05-13 ENCOUNTER — HOSPITAL ENCOUNTER (INPATIENT)
Facility: HOSPITAL | Age: 65
LOS: 3 days | Discharge: HOME | DRG: 175 | End: 2025-05-16
Attending: EMERGENCY MEDICINE | Admitting: INTERNAL MEDICINE
Payer: COMMERCIAL

## 2025-05-13 ENCOUNTER — APPOINTMENT (OUTPATIENT)
Dept: GENERAL RADIOLOGY | Facility: HOSPITAL | Age: 65
DRG: 175 | End: 2025-05-13
Payer: COMMERCIAL

## 2025-05-13 DIAGNOSIS — J96.01 ACUTE RESPIRATORY FAILURE WITH HYPOXIA: Primary | ICD-10-CM

## 2025-05-13 DIAGNOSIS — D63.8 ANEMIA, CHRONIC DISEASE: ICD-10-CM

## 2025-05-13 DIAGNOSIS — M54.2 NECK PAIN: ICD-10-CM

## 2025-05-13 DIAGNOSIS — R06.02 SHORTNESS OF BREATH: ICD-10-CM

## 2025-05-13 DIAGNOSIS — J90 PLEURAL EFFUSION: ICD-10-CM

## 2025-05-13 DIAGNOSIS — J90 PLEURAL EFFUSION, RIGHT: ICD-10-CM

## 2025-05-13 DIAGNOSIS — I26.99 OTHER ACUTE PULMONARY EMBOLISM WITHOUT ACUTE COR PULMONALE: ICD-10-CM

## 2025-05-13 DIAGNOSIS — I26.99 PULMONARY EMBOLISM, UNSPECIFIED CHRONICITY, UNSPECIFIED PULMONARY EMBOLISM TYPE, UNSPECIFIED WHETHER ACUTE COR PULMONALE PRESENT: ICD-10-CM

## 2025-05-13 DIAGNOSIS — R79.89 ELEVATED D-DIMER: ICD-10-CM

## 2025-05-13 DIAGNOSIS — R07.89 ATYPICAL CHEST PAIN: ICD-10-CM

## 2025-05-13 DIAGNOSIS — C34.91 ADENOCARCINOMA OF RIGHT LUNG: ICD-10-CM

## 2025-05-13 LAB
ALBUMIN SERPL-MCNC: 3.8 G/DL (ref 3.5–5.2)
ALBUMIN/GLOB SERPL: 1.2 G/DL
ALP SERPL-CCNC: 166 U/L (ref 39–117)
ALT SERPL W P-5'-P-CCNC: 18 U/L (ref 1–33)
ANION GAP SERPL CALCULATED.3IONS-SCNC: 17 MMOL/L (ref 5–15)
APTT PPP: 28.3 SECONDS (ref 60–90)
AST SERPL-CCNC: 21 U/L (ref 1–32)
ATMOSPHERIC PRESS: ABNORMAL MM[HG]
B PARAPERT DNA SPEC QL NAA+PROBE: NOT DETECTED
B PERT DNA SPEC QL NAA+PROBE: NOT DETECTED
BASE EXCESS BLDV CALC-SCNC: 0.2 MMOL/L (ref -2–2)
BASOPHILS # BLD AUTO: 0.08 10*3/MM3 (ref 0–0.2)
BASOPHILS NFR BLD AUTO: 0.8 % (ref 0–1.5)
BDY SITE: ABNORMAL
BILIRUB SERPL-MCNC: <0.2 MG/DL (ref 0–1.2)
BODY TEMPERATURE: 37
BUN SERPL-MCNC: 14 MG/DL (ref 8–23)
BUN/CREAT SERPL: 15.2 (ref 7–25)
C PNEUM DNA NPH QL NAA+NON-PROBE: NOT DETECTED
CALCIUM SPEC-SCNC: 9.3 MG/DL (ref 8.6–10.5)
CHLORIDE SERPL-SCNC: 102 MMOL/L (ref 98–107)
CHOLEST SERPL-MCNC: 170 MG/DL (ref 0–200)
CK SERPL-CCNC: 38 U/L (ref 20–180)
CO2 BLDA-SCNC: 27.2 MMOL/L (ref 22–33)
CO2 SERPL-SCNC: 20 MMOL/L (ref 22–29)
COHGB MFR BLD: 1.5 %
CREAT SERPL-MCNC: 0.92 MG/DL (ref 0.57–1)
CRP SERPL-MCNC: 9.74 MG/DL (ref 0–0.5)
D DIMER PPP FEU-MCNC: 14.28 MCGFEU/ML (ref 0–0.67)
D-LACTATE SERPL-SCNC: 1 MMOL/L (ref 0.5–2)
DEPRECATED RDW RBC AUTO: 43.1 FL (ref 37–54)
EGFRCR SERPLBLD CKD-EPI 2021: 68.4 ML/MIN/1.73
EOSINOPHIL # BLD AUTO: 0.97 10*3/MM3 (ref 0–0.4)
EOSINOPHIL NFR BLD AUTO: 10 % (ref 0.3–6.2)
EPAP: 0
ERYTHROCYTE [DISTWIDTH] IN BLOOD BY AUTOMATED COUNT: 13.2 % (ref 12.3–15.4)
ERYTHROCYTE [SEDIMENTATION RATE] IN BLOOD: 40 MM/HR (ref 0–30)
FERRITIN SERPL-MCNC: 313.4 NG/ML (ref 13–150)
FLUAV SUBTYP SPEC NAA+PROBE: NOT DETECTED
FLUBV RNA ISLT QL NAA+PROBE: NOT DETECTED
GEN 5 1HR TROPONIN T REFLEX: 15 NG/L
GLOBULIN UR ELPH-MCNC: 3.2 GM/DL
GLUCOSE SERPL-MCNC: 105 MG/DL (ref 65–99)
HADV DNA SPEC NAA+PROBE: NOT DETECTED
HBA1C MFR BLD: 5.8 % (ref 4.8–5.6)
HCO3 BLDV-SCNC: 25.8 MMOL/L (ref 22–28)
HCOV 229E RNA SPEC QL NAA+PROBE: NOT DETECTED
HCOV HKU1 RNA SPEC QL NAA+PROBE: NOT DETECTED
HCOV NL63 RNA SPEC QL NAA+PROBE: NOT DETECTED
HCOV OC43 RNA SPEC QL NAA+PROBE: NOT DETECTED
HCT VFR BLD AUTO: 36.7 % (ref 34–46.6)
HDLC SERPL-MCNC: 66 MG/DL (ref 40–60)
HGB BLD-MCNC: 11.8 G/DL (ref 12–15.9)
HGB BLDA-MCNC: 11.1 G/DL (ref 14–18)
HMPV RNA NPH QL NAA+NON-PROBE: NOT DETECTED
HOLD SPECIMEN: NORMAL
HPIV1 RNA ISLT QL NAA+PROBE: NOT DETECTED
HPIV2 RNA SPEC QL NAA+PROBE: NOT DETECTED
HPIV3 RNA NPH QL NAA+PROBE: NOT DETECTED
HPIV4 P GENE NPH QL NAA+PROBE: NOT DETECTED
IMM GRANULOCYTES # BLD AUTO: 0.16 10*3/MM3 (ref 0–0.05)
IMM GRANULOCYTES NFR BLD AUTO: 1.7 % (ref 0–0.5)
INHALED O2 CONCENTRATION: 40 %
INR PPP: 0.99 (ref 0.89–1.12)
IPAP: 0
IRON 24H UR-MRATE: 24 MCG/DL (ref 37–145)
IRON SATN MFR SERPL: 9 % (ref 20–50)
LDLC SERPL CALC-MCNC: 82 MG/DL (ref 0–100)
LDLC/HDLC SERPL: 1.2 {RATIO}
LYMPHOCYTES # BLD AUTO: 0.94 10*3/MM3 (ref 0.7–3.1)
LYMPHOCYTES NFR BLD AUTO: 9.7 % (ref 19.6–45.3)
M PNEUMO IGG SER IA-ACNC: NOT DETECTED
MCH RBC QN AUTO: 28.8 PG (ref 26.6–33)
MCHC RBC AUTO-ENTMCNC: 32.2 G/DL (ref 31.5–35.7)
MCV RBC AUTO: 89.5 FL (ref 79–97)
METHGB BLD QL: 0.7 %
MODALITY: ABNORMAL
MONOCYTES # BLD AUTO: 0.85 10*3/MM3 (ref 0.1–0.9)
MONOCYTES NFR BLD AUTO: 8.8 % (ref 5–12)
NEUTROPHILS NFR BLD AUTO: 6.69 10*3/MM3 (ref 1.7–7)
NEUTROPHILS NFR BLD AUTO: 69 % (ref 42.7–76)
NRBC BLD AUTO-RTO: 0 /100 WBC (ref 0–0.2)
NT-PROBNP SERPL-MCNC: 138.5 PG/ML (ref 0–900)
OXYHGB MFR BLDV: 40.5 %
PAW @ PEAK INSP FLOW SETTING VENT: 0 CMH2O
PCO2 BLDV: 45.1 MM HG (ref 41–51)
PH BLDV: 7.37 PH UNITS (ref 7.31–7.41)
PLATELET # BLD AUTO: 330 10*3/MM3 (ref 140–450)
PMV BLD AUTO: 8.6 FL (ref 6–12)
PO2 BLDV: 25.7 MM HG (ref 27–53)
POTASSIUM SERPL-SCNC: 3.7 MMOL/L (ref 3.5–5.2)
PROCALCITONIN SERPL-MCNC: 0.16 NG/ML (ref 0–0.25)
PROT SERPL-MCNC: 7 G/DL (ref 6–8.5)
PROTHROMBIN TIME: 13.7 SECONDS (ref 12.2–15.3)
RBC # BLD AUTO: 4.1 10*6/MM3 (ref 3.77–5.28)
RHINOVIRUS RNA SPEC NAA+PROBE: NOT DETECTED
RSV RNA NPH QL NAA+NON-PROBE: NOT DETECTED
SARS-COV-2 RNA NPH QL NAA+NON-PROBE: NOT DETECTED
SODIUM SERPL-SCNC: 139 MMOL/L (ref 136–145)
TIBC SERPL-MCNC: 274 MCG/DL (ref 298–536)
TOTAL RATE: 0 BREATHS/MINUTE
TRANSFERRIN SERPL-MCNC: 184 MG/DL (ref 200–360)
TRIGL SERPL-MCNC: 124 MG/DL (ref 0–150)
TROPONIN T NUMERIC DELTA: 6 NG/L
TROPONIN T SERPL HS-MCNC: 9 NG/L
TSH SERPL DL<=0.05 MIU/L-ACNC: 3.4 UIU/ML (ref 0.27–4.2)
UFH PPP CHRO-ACNC: 0.1 IU/ML (ref 0.3–0.7)
UFH PPP CHRO-ACNC: 0.42 IU/ML (ref 0.3–0.7)
UFH PPP CHRO-ACNC: 0.44 IU/ML (ref 0.3–0.7)
VLDLC SERPL-MCNC: 22 MG/DL (ref 5–40)
WBC NRBC COR # BLD AUTO: 9.69 10*3/MM3 (ref 3.4–10.8)
WHOLE BLOOD HOLD COAG: NORMAL
WHOLE BLOOD HOLD SPECIMEN: NORMAL

## 2025-05-13 PROCEDURE — 63710000001 REVEFENACIN 175 MCG/3ML SOLUTION

## 2025-05-13 PROCEDURE — 85610 PROTHROMBIN TIME: CPT | Performed by: EMERGENCY MEDICINE

## 2025-05-13 PROCEDURE — 84145 PROCALCITONIN (PCT): CPT

## 2025-05-13 PROCEDURE — 80061 LIPID PANEL: CPT

## 2025-05-13 PROCEDURE — 83880 ASSAY OF NATRIURETIC PEPTIDE: CPT | Performed by: EMERGENCY MEDICINE

## 2025-05-13 PROCEDURE — 85730 THROMBOPLASTIN TIME PARTIAL: CPT | Performed by: EMERGENCY MEDICINE

## 2025-05-13 PROCEDURE — 71045 X-RAY EXAM CHEST 1 VIEW: CPT

## 2025-05-13 PROCEDURE — 0202U NFCT DS 22 TRGT SARS-COV-2: CPT | Performed by: EMERGENCY MEDICINE

## 2025-05-13 PROCEDURE — 84466 ASSAY OF TRANSFERRIN: CPT

## 2025-05-13 PROCEDURE — 83036 HEMOGLOBIN GLYCOSYLATED A1C: CPT

## 2025-05-13 PROCEDURE — 25010000002 HEPARIN (PORCINE) 25000-0.45 UT/250ML-% SOLUTION: Performed by: EMERGENCY MEDICINE

## 2025-05-13 PROCEDURE — 85379 FIBRIN DEGRADATION QUANT: CPT | Performed by: EMERGENCY MEDICINE

## 2025-05-13 PROCEDURE — 83540 ASSAY OF IRON: CPT

## 2025-05-13 PROCEDURE — 86140 C-REACTIVE PROTEIN: CPT

## 2025-05-13 PROCEDURE — 94799 UNLISTED PULMONARY SVC/PX: CPT

## 2025-05-13 PROCEDURE — 82805 BLOOD GASES W/O2 SATURATION: CPT

## 2025-05-13 PROCEDURE — 82728 ASSAY OF FERRITIN: CPT

## 2025-05-13 PROCEDURE — 99291 CRITICAL CARE FIRST HOUR: CPT

## 2025-05-13 PROCEDURE — 85652 RBC SED RATE AUTOMATED: CPT

## 2025-05-13 PROCEDURE — 83605 ASSAY OF LACTIC ACID: CPT

## 2025-05-13 PROCEDURE — 82550 ASSAY OF CK (CPK): CPT

## 2025-05-13 PROCEDURE — 80050 GENERAL HEALTH PANEL: CPT | Performed by: EMERGENCY MEDICINE

## 2025-05-13 PROCEDURE — 99222 1ST HOSP IP/OBS MODERATE 55: CPT | Performed by: INTERNAL MEDICINE

## 2025-05-13 PROCEDURE — 85520 HEPARIN ASSAY: CPT | Performed by: EMERGENCY MEDICINE

## 2025-05-13 PROCEDURE — 36415 COLL VENOUS BLD VENIPUNCTURE: CPT

## 2025-05-13 PROCEDURE — 94640 AIRWAY INHALATION TREATMENT: CPT

## 2025-05-13 PROCEDURE — 85520 HEPARIN ASSAY: CPT

## 2025-05-13 PROCEDURE — 93005 ELECTROCARDIOGRAM TRACING: CPT | Performed by: EMERGENCY MEDICINE

## 2025-05-13 PROCEDURE — 99223 1ST HOSP IP/OBS HIGH 75: CPT

## 2025-05-13 PROCEDURE — 84484 ASSAY OF TROPONIN QUANT: CPT | Performed by: EMERGENCY MEDICINE

## 2025-05-13 PROCEDURE — 71275 CT ANGIOGRAPHY CHEST: CPT

## 2025-05-13 PROCEDURE — 25010000002 HEPARIN (PORCINE) PER 1000 UNITS: Performed by: EMERGENCY MEDICINE

## 2025-05-13 PROCEDURE — 25510000001 IOPAMIDOL PER 1 ML: Performed by: EMERGENCY MEDICINE

## 2025-05-13 RX ORDER — SODIUM CHLORIDE FOR INHALATION 7 %
4 VIAL, NEBULIZER (ML) INHALATION ONCE
Status: DISCONTINUED | OUTPATIENT
Start: 2025-05-13 | End: 2025-05-16 | Stop reason: HOSPADM

## 2025-05-13 RX ORDER — SODIUM CHLORIDE 0.9 % (FLUSH) 0.9 %
10 SYRINGE (ML) INJECTION EVERY 12 HOURS SCHEDULED
Status: DISCONTINUED | OUTPATIENT
Start: 2025-05-13 | End: 2025-05-16 | Stop reason: HOSPADM

## 2025-05-13 RX ORDER — FAMOTIDINE 20 MG/1
20 TABLET, FILM COATED ORAL
Status: DISCONTINUED | OUTPATIENT
Start: 2025-05-13 | End: 2025-05-16 | Stop reason: HOSPADM

## 2025-05-13 RX ORDER — HEPARIN SODIUM 1000 [USP'U]/ML
50 INJECTION, SOLUTION INTRAVENOUS; SUBCUTANEOUS AS NEEDED
Status: DISCONTINUED | OUTPATIENT
Start: 2025-05-13 | End: 2025-05-13

## 2025-05-13 RX ORDER — BISACODYL 10 MG
10 SUPPOSITORY, RECTAL RECTAL DAILY PRN
Status: DISCONTINUED | OUTPATIENT
Start: 2025-05-13 | End: 2025-05-16 | Stop reason: HOSPADM

## 2025-05-13 RX ORDER — HEPARIN SODIUM 1000 [USP'U]/ML
25 INJECTION, SOLUTION INTRAVENOUS; SUBCUTANEOUS AS NEEDED
Status: DISCONTINUED | OUTPATIENT
Start: 2025-05-13 | End: 2025-05-13

## 2025-05-13 RX ORDER — ACETAMINOPHEN 325 MG/1
650 TABLET ORAL EVERY 4 HOURS PRN
Status: DISCONTINUED | OUTPATIENT
Start: 2025-05-13 | End: 2025-05-16 | Stop reason: HOSPADM

## 2025-05-13 RX ORDER — IPRATROPIUM BROMIDE AND ALBUTEROL SULFATE 2.5; .5 MG/3ML; MG/3ML
3 SOLUTION RESPIRATORY (INHALATION)
Status: DISCONTINUED | OUTPATIENT
Start: 2025-05-13 | End: 2025-05-16 | Stop reason: HOSPADM

## 2025-05-13 RX ORDER — ZOLPIDEM TARTRATE 5 MG/1
10 TABLET ORAL NIGHTLY PRN
Status: DISCONTINUED | OUTPATIENT
Start: 2025-05-13 | End: 2025-05-16 | Stop reason: HOSPADM

## 2025-05-13 RX ORDER — BUDESONIDE AND FORMOTEROL FUMARATE DIHYDRATE 160; 4.5 UG/1; UG/1
2 AEROSOL RESPIRATORY (INHALATION)
Status: DISCONTINUED | OUTPATIENT
Start: 2025-05-13 | End: 2025-05-16 | Stop reason: HOSPADM

## 2025-05-13 RX ORDER — GUAIFENESIN 600 MG/1
600 TABLET, EXTENDED RELEASE ORAL EVERY 12 HOURS SCHEDULED
Status: DISCONTINUED | OUTPATIENT
Start: 2025-05-13 | End: 2025-05-16 | Stop reason: HOSPADM

## 2025-05-13 RX ORDER — LEVOTHYROXINE SODIUM 88 UG/1
88 TABLET ORAL
Status: DISCONTINUED | OUTPATIENT
Start: 2025-05-14 | End: 2025-05-16 | Stop reason: HOSPADM

## 2025-05-13 RX ORDER — SODIUM CHLORIDE 0.9 % (FLUSH) 0.9 %
10 SYRINGE (ML) INJECTION AS NEEDED
Status: DISCONTINUED | OUTPATIENT
Start: 2025-05-13 | End: 2025-05-16 | Stop reason: HOSPADM

## 2025-05-13 RX ORDER — ACETAMINOPHEN 160 MG/5ML
650 SOLUTION ORAL EVERY 4 HOURS PRN
Status: DISCONTINUED | OUTPATIENT
Start: 2025-05-13 | End: 2025-05-16 | Stop reason: HOSPADM

## 2025-05-13 RX ORDER — ATORVASTATIN CALCIUM 10 MG/1
10 TABLET, FILM COATED ORAL DAILY
Status: DISCONTINUED | OUTPATIENT
Start: 2025-05-13 | End: 2025-05-16 | Stop reason: HOSPADM

## 2025-05-13 RX ORDER — ASPIRIN 81 MG/1
81 TABLET ORAL DAILY
Status: DISCONTINUED | OUTPATIENT
Start: 2025-05-13 | End: 2025-05-16 | Stop reason: HOSPADM

## 2025-05-13 RX ORDER — CEFDINIR 300 MG/1
300 CAPSULE ORAL 2 TIMES DAILY
COMMUNITY
Start: 2025-05-05 | End: 2025-05-16 | Stop reason: HOSPADM

## 2025-05-13 RX ORDER — IPRATROPIUM BROMIDE AND ALBUTEROL SULFATE 2.5; .5 MG/3ML; MG/3ML
SOLUTION RESPIRATORY (INHALATION)
COMMUNITY
Start: 2025-05-05 | End: 2025-05-13

## 2025-05-13 RX ORDER — IOPAMIDOL 755 MG/ML
100 INJECTION, SOLUTION INTRAVASCULAR
Status: COMPLETED | OUTPATIENT
Start: 2025-05-13 | End: 2025-05-13

## 2025-05-13 RX ORDER — SERTRALINE HYDROCHLORIDE 100 MG/1
100 TABLET, FILM COATED ORAL DAILY
Status: DISCONTINUED | OUTPATIENT
Start: 2025-05-13 | End: 2025-05-16 | Stop reason: HOSPADM

## 2025-05-13 RX ORDER — ACETAMINOPHEN 650 MG/1
650 SUPPOSITORY RECTAL EVERY 4 HOURS PRN
Status: DISCONTINUED | OUTPATIENT
Start: 2025-05-13 | End: 2025-05-16 | Stop reason: HOSPADM

## 2025-05-13 RX ORDER — CLONAZEPAM 1 MG/1
1 TABLET ORAL 2 TIMES DAILY PRN
Status: DISCONTINUED | OUTPATIENT
Start: 2025-05-13 | End: 2025-05-16 | Stop reason: HOSPADM

## 2025-05-13 RX ORDER — BENZONATATE 100 MG/1
100 CAPSULE ORAL 3 TIMES DAILY PRN
Status: DISCONTINUED | OUTPATIENT
Start: 2025-05-13 | End: 2025-05-16 | Stop reason: HOSPADM

## 2025-05-13 RX ORDER — HEPARIN SODIUM 10000 [USP'U]/100ML
18 INJECTION, SOLUTION INTRAVENOUS
Status: DISCONTINUED | OUTPATIENT
Start: 2025-05-13 | End: 2025-05-15

## 2025-05-13 RX ORDER — BISACODYL 5 MG/1
5 TABLET, DELAYED RELEASE ORAL DAILY PRN
Status: DISCONTINUED | OUTPATIENT
Start: 2025-05-13 | End: 2025-05-16 | Stop reason: HOSPADM

## 2025-05-13 RX ORDER — AMOXICILLIN 250 MG
2 CAPSULE ORAL 2 TIMES DAILY PRN
Status: DISCONTINUED | OUTPATIENT
Start: 2025-05-13 | End: 2025-05-16 | Stop reason: HOSPADM

## 2025-05-13 RX ORDER — ONDANSETRON 4 MG/1
4 TABLET, ORALLY DISINTEGRATING ORAL EVERY 8 HOURS PRN
Status: DISCONTINUED | OUTPATIENT
Start: 2025-05-13 | End: 2025-05-16 | Stop reason: HOSPADM

## 2025-05-13 RX ORDER — SODIUM CHLORIDE 9 MG/ML
40 INJECTION, SOLUTION INTRAVENOUS AS NEEDED
Status: DISCONTINUED | OUTPATIENT
Start: 2025-05-13 | End: 2025-05-16 | Stop reason: HOSPADM

## 2025-05-13 RX ORDER — HEPARIN SODIUM 1000 [USP'U]/ML
4500 INJECTION, SOLUTION INTRAVENOUS; SUBCUTANEOUS ONCE
Status: COMPLETED | OUTPATIENT
Start: 2025-05-13 | End: 2025-05-13

## 2025-05-13 RX ORDER — POLYETHYLENE GLYCOL 3350 17 G/17G
17 POWDER, FOR SOLUTION ORAL DAILY PRN
Status: DISCONTINUED | OUTPATIENT
Start: 2025-05-13 | End: 2025-05-16 | Stop reason: HOSPADM

## 2025-05-13 RX ORDER — ALBUTEROL SULFATE 0.83 MG/ML
2.5 SOLUTION RESPIRATORY (INHALATION) EVERY 4 HOURS PRN
Status: DISCONTINUED | OUTPATIENT
Start: 2025-05-13 | End: 2025-05-16 | Stop reason: HOSPADM

## 2025-05-13 RX ORDER — PREDNISONE 20 MG/1
40 TABLET ORAL DAILY
Status: DISCONTINUED | OUTPATIENT
Start: 2025-05-13 | End: 2025-05-16 | Stop reason: HOSPADM

## 2025-05-13 RX ORDER — ALBUTEROL SULFATE 90 UG/1
2 INHALANT RESPIRATORY (INHALATION)
COMMUNITY

## 2025-05-13 RX ORDER — NITROGLYCERIN 0.4 MG/1
0.4 TABLET SUBLINGUAL
Status: DISCONTINUED | OUTPATIENT
Start: 2025-05-13 | End: 2025-05-16 | Stop reason: HOSPADM

## 2025-05-13 RX ADMIN — IPRATROPIUM BROMIDE AND ALBUTEROL SULFATE 3 ML: 2.5; .5 SOLUTION RESPIRATORY (INHALATION) at 14:05

## 2025-05-13 RX ADMIN — HEPARIN SODIUM 18 UNITS/KG/HR: 10000 INJECTION, SOLUTION INTRAVENOUS at 08:07

## 2025-05-13 RX ADMIN — DICLOFENAC SODIUM 2 G: 10 GEL TOPICAL at 16:59

## 2025-05-13 RX ADMIN — Medication 10 ML: at 08:04

## 2025-05-13 RX ADMIN — BUDESONIDE AND FORMOTEROL FUMARATE DIHYDRATE 2 PUFF: 160; 4.5 AEROSOL RESPIRATORY (INHALATION) at 14:05

## 2025-05-13 RX ADMIN — IOPAMIDOL 75 ML: 755 INJECTION, SOLUTION INTRAVENOUS at 06:48

## 2025-05-13 RX ADMIN — GUAIFENESIN 600 MG: 600 TABLET, EXTENDED RELEASE ORAL at 20:59

## 2025-05-13 RX ADMIN — TIZANIDINE 4 MG: 4 TABLET ORAL at 11:41

## 2025-05-13 RX ADMIN — BUDESONIDE AND FORMOTEROL FUMARATE DIHYDRATE 2 PUFF: 160; 4.5 AEROSOL RESPIRATORY (INHALATION) at 19:43

## 2025-05-13 RX ADMIN — GUAIFENESIN 600 MG: 600 TABLET, EXTENDED RELEASE ORAL at 11:23

## 2025-05-13 RX ADMIN — ACETAMINOPHEN 650 MG: 325 TABLET ORAL at 11:22

## 2025-05-13 RX ADMIN — IPRATROPIUM BROMIDE AND ALBUTEROL SULFATE 3 ML: 2.5; .5 SOLUTION RESPIRATORY (INHALATION) at 19:43

## 2025-05-13 RX ADMIN — FAMOTIDINE 20 MG: 20 TABLET, FILM COATED ORAL at 16:59

## 2025-05-13 RX ADMIN — REVEFENACIN 175 MCG: 175 SOLUTION RESPIRATORY (INHALATION) at 14:05

## 2025-05-13 RX ADMIN — TRAZODONE HYDROCHLORIDE 150 MG: 50 TABLET ORAL at 20:59

## 2025-05-13 RX ADMIN — Medication 10 MG: at 20:59

## 2025-05-13 RX ADMIN — SERTRALINE 100 MG: 100 TABLET, FILM COATED ORAL at 11:41

## 2025-05-13 RX ADMIN — DICLOFENAC SODIUM 2 G: 10 GEL TOPICAL at 21:04

## 2025-05-13 RX ADMIN — Medication 10 ML: at 21:01

## 2025-05-13 RX ADMIN — HEPARIN SODIUM 4500 UNITS: 1000 INJECTION INTRAVENOUS; SUBCUTANEOUS at 08:02

## 2025-05-13 RX ADMIN — IPRATROPIUM BROMIDE AND ALBUTEROL SULFATE 3 ML: 2.5; .5 SOLUTION RESPIRATORY (INHALATION) at 16:17

## 2025-05-13 RX ADMIN — ATORVASTATIN CALCIUM 10 MG: 10 TABLET, FILM COATED ORAL at 20:58

## 2025-05-13 RX ADMIN — CLONAZEPAM 1 MG: 1 TABLET ORAL at 21:48

## 2025-05-13 NOTE — CONSULTS
Subjective     CHIEF COMPLAINT: Shortness of breath    HISTORY OF PRESENT ILLNESS:  The patient is a 65 y.o. female, referred by Latonya Lange MD for relapsed right lung cancer. The patient was admitted to the hospitalist service last night after presenting with shortness of breath and found to have new bilateral PEs. The CT scan showed suspicious findings for recurrent malignancy, including new right pleural effusion and new liver lesions. I was consulted to further assist in her care.   When I saw the patient today she is sitting comfortable in bed.  Her breathing has improved.  She is anxious about the scan findings.    REVIEW OF SYSTEMS:  A 14 point review of systems was performed and is negative except as noted above.    Past Medical History:   Diagnosis Date    Acid reflux     Acid reflux     Adenocarcinoma of lung 08/12/2024    Anxiety 1976    Arthritis     Atherosclerotic cardiovascular disease 03/25/2024    Cervical cancer     Depression     Emphysema of lung     History of radiation therapy 11/20/2020    pelvis + intracavitary brachytherapy    History of radiation therapy 10/04/2024    RUL lung    Hx of radiation therapy     Hyperlipidemia     Hypothyroidism     Kidney disease     Lung cancer     Lung nodule     Ovarian cyst 1980s    Pulmonary embolism 5/13/2025    Visual impairment corrected with glasses    Wears dentures     Wears glasses        No current facility-administered medications on file prior to encounter.     Current Outpatient Medications on File Prior to Encounter   Medication Sig Dispense Refill    albuterol sulfate  (90 Base) MCG/ACT inhaler Inhale 2 puffs Every 4 (Four) to 6 (Six) Hours As Needed for Wheezing or Shortness of Air.      Aspirin Low Dose 81 MG EC tablet TAKE 1 TABLET BY MOUTH EVERY DAY 90 tablet 1    atorvastatin (LIPITOR) 10 MG tablet TAKE 1 TABLET BY MOUTH EVERY DAY AT NIGHT 90 tablet 1    cefdinir (OMNICEF) 300 MG capsule Take 1 capsule by mouth 2 (Two) Times  a Day. For 10 days      clonazePAM (KlonoPIN) 1 MG tablet Take 1 tablet by mouth 2 (Two) Times a Day As Needed for Anxiety. 50 tablet 0    famotidine (PEPCID) 20 MG tablet TAKE 1 TABLET BY MOUTH TWICE DAILY OR TAKE 2 TABLETS BY MOUTH ONCE DAILY AS NEEDED FOR HEARTBURN 180 tablet 1    Fluticasone-Umeclidin-Vilant (Trelegy Ellipta) 100-62.5-25 MCG/ACT inhaler Inhale 1 puff Daily. 90 each 3    levothyroxine (Synthroid) 88 MCG tablet Take 1 tablet by mouth Every Morning. 90 tablet 1    ondansetron ODT (ZOFRAN-ODT) 4 MG disintegrating tablet Place 1 tablet on the tongue Every 8 (Eight) Hours As Needed for Nausea or Vomiting. 30 tablet 2    sertraline (Zoloft) 100 MG tablet Take 1 tablet by mouth Daily. 90 tablet 1    tiZANidine (Zanaflex) 4 MG tablet Take 1 tablet by mouth 2 (Two) Times a Day As Needed for Muscle Spasms. 60 tablet 0    traZODone (DESYREL) 150 MG tablet TAKE 1 TABLET BY MOUTH EVERY DAY AT NIGHT 90 tablet 3    zolpidem (AMBIEN) 10 MG tablet Take 1 tablet by mouth At Night As Needed for Sleep. (Patient taking differently: Take 1 tablet by mouth Every Night.) 30 tablet 1    [DISCONTINUED] ipratropium-albuterol (DUO-NEB) 0.5-2.5 mg/3 ml nebulizer INHALE 1 VIAL BY NEBULIZER EVERY 6 HOURS (AS NEEDED FOR WHEEZING) FOR 7 DAYS      [DISCONTINUED] Diclofenac Sodium (VOLTAREN) 1 % gel gel Apply 1 gram topically to indicated area 4 times daily as needed for mild to moderate pain. STOP FLEXERIL 100 g 2    [DISCONTINUED] predniSONE (DELTASONE) 20 MG tablet Take 2 tablets by mouth Daily. 10 tablet 0    [DISCONTINUED] sodium-potassium-magnesium sulfates (Suprep Bowel Prep Kit) 17.5-3.13-1.6 GM/177ML solution oral solution Take 1 bottle by mouth Take As Directed. 354 mL 0       No Known Allergies    Past Surgical History:   Procedure Laterality Date    BRONCHOSCOPY      BRONCHOSCOPY WITH ION ROBOTIC ASSIST N/A 08/06/2024    Procedure: BRONCHOSCOPY NAVIGATION WITH ENDOBRONCHIAL ULTRASOUND AND ION ROBOT;  Surgeon: Stas  Toni MERCEDES MD;  Location:  [facility];  Service: Robotics - Pulmonary;  Laterality: N/A;    COLONOSCOPY      HYSTEROSCOPY      LUNG BIOPSY      LYMPH NODE BIOPSY      MOHS SURGERY  2025    Groin    SUBTOTAL HYSTERECTOMY  ?    ovary/fallopian tube removed    TOTAL ABDOMINAL HYSTERECTOMY PELVIC NODE DISSECTION N/A 2020    Procedure: TOTAL RADICAL HYSTERECTOMY PELVIC NODE DISSECTION;  Surgeon: Laura Jimenez MD;  Location:  [facility];  Service: Gynecology Oncology;  Laterality: N/A;    TUBAL ABDOMINAL LIGATION      right with ovarian dissection        OB History    Para Term  AB Living   1 1 1      SAB IAB Ectopic Molar Multiple Live Births        1      # Outcome Date GA Lbr Solis/2nd Weight Sex Type Anes PTL Lv   1 Term                Social History     Socioeconomic History    Marital status:    Tobacco Use    Smoking status: Former     Current packs/day: 0.00     Average packs/day: 1.5 packs/day for 45.0 years (67.5 ttl pk-yrs)     Types: Cigarettes     Start date: 1972     Quit date: 3/30/2019     Years since quittin.1     Passive exposure: Past    Smokeless tobacco: Never    Tobacco comments:     quit analog cigs in march, still vapes daily   Vaping Use    Vaping status: Every Day    Substances: Nicotine   Substance and Sexual Activity    Alcohol use: No    Drug use: No    Sexual activity: Not Currently     Partners: Male       Family History   Problem Relation Age of Onset    Cancer Mother     Hypertension Mother     Mental illness Mother     Anxiety disorder Mother     Asthma Mother     COPD Mother     Depression Mother     Emphysema Mother     Uterine cancer Mother     Melanoma Mother     Cancer Father     Early death Father     Heart attack Maternal Grandfather     Heart disease Maternal Grandfather         Heart Attack 65 yo    Cancer Maternal Aunt     Cancer Maternal Aunt     Cancer Maternal Grandmother     Cancer Maternal Aunt     Cancer Maternal Aunt      Breast cancer Neg Hx     Ovarian cancer Neg Hx        Objective     Vitals:    05/13/25 0900 05/13/25 0930 05/13/25 1011 05/13/25 1100   BP: 141/76  174/77    BP Location:   Left arm    Patient Position:   Lying    Pulse: 92 89 84 91   Resp:       Temp:       TempSrc:       SpO2: 95% 95% 96% 96%   Weight:       Height:                            ECOG Performance Status: 2 - Symptomatic, <50% confined to bed  General: well appearing female in no acute distress  Neuro/Psych: A&O x 3, gait steady, appropriate affect, strength 5/5 in all muscle groups  HEENT: sclerae anicteric, oropharynx clear  Lymphatics: no cervical, supraclavicular, or axillary adenopathy  Cardiovascular: regular rate and rhythm, no murmurs  Lungs: clear to auscultation bilaterally  Abdomen: soft, nontender, nondistended.  No palpable organomegaly  Extremities: no lower extremity edema  Skin: no rashes, lesions, bruising, or petechiae      Admission on 05/13/2025   Component Date Value Ref Range Status    QT Interval 05/13/2025 322  ms Preliminary    QTC Interval 05/13/2025 433  ms Preliminary    QT Interval 05/13/2025 360  ms Preliminary    QTC Interval 05/13/2025 454  ms Preliminary    Glucose 05/13/2025 105 (H)  65 - 99 mg/dL Final    BUN 05/13/2025 14  8 - 23 mg/dL Final    Creatinine 05/13/2025 0.92  0.57 - 1.00 mg/dL Final    Sodium 05/13/2025 139  136 - 145 mmol/L Final    Potassium 05/13/2025 3.7  3.5 - 5.2 mmol/L Final    Chloride 05/13/2025 102  98 - 107 mmol/L Final    CO2 05/13/2025 20.0 (L)  22.0 - 29.0 mmol/L Final    Calcium 05/13/2025 9.3  8.6 - 10.5 mg/dL Final    Total Protein 05/13/2025 7.0  6.0 - 8.5 g/dL Final    Albumin 05/13/2025 3.8  3.5 - 5.2 g/dL Final    ALT (SGPT) 05/13/2025 18  1 - 33 U/L Final    AST (SGOT) 05/13/2025 21  1 - 32 U/L Final    Alkaline Phosphatase 05/13/2025 166 (H)  39 - 117 U/L Final    Total Bilirubin 05/13/2025 <0.2  0.0 - 1.2 mg/dL Final    Globulin 05/13/2025 3.2  gm/dL Final    Calculated Result     A/G Ratio 05/13/2025 1.2  g/dL Final    BUN/Creatinine Ratio 05/13/2025 15.2  7.0 - 25.0 Final    Anion Gap 05/13/2025 17.0 (H)  5.0 - 15.0 mmol/L Final    eGFR 05/13/2025 68.4  >60.0 mL/min/1.73 Final    proBNP 05/13/2025 138.5  0.0 - 900.0 pg/mL Final    HS Troponin T 05/13/2025 9  <14 ng/L Final    Extra Tube 05/13/2025 Hold for add-ons.   Final    Auto resulted.    Extra Tube 05/13/2025 hold for add-on   Final    Auto resulted    Extra Tube 05/13/2025 Hold for add-ons.   Final    Auto resulted.    Extra Tube 05/13/2025 Hold for add-ons.   Final    Auto resulted.    Extra Tube 05/13/2025 Hold for add-ons.   Final    Auto resulted    WBC 05/13/2025 9.69  3.40 - 10.80 10*3/mm3 Final    RBC 05/13/2025 4.10  3.77 - 5.28 10*6/mm3 Final    Hemoglobin 05/13/2025 11.8 (L)  12.0 - 15.9 g/dL Final    Hematocrit 05/13/2025 36.7  34.0 - 46.6 % Final    MCV 05/13/2025 89.5  79.0 - 97.0 fL Final    MCH 05/13/2025 28.8  26.6 - 33.0 pg Final    MCHC 05/13/2025 32.2  31.5 - 35.7 g/dL Final    RDW 05/13/2025 13.2  12.3 - 15.4 % Final    RDW-SD 05/13/2025 43.1  37.0 - 54.0 fl Final    MPV 05/13/2025 8.6  6.0 - 12.0 fL Final    Platelets 05/13/2025 330  140 - 450 10*3/mm3 Final    Neutrophil % 05/13/2025 69.0  42.7 - 76.0 % Final    Lymphocyte % 05/13/2025 9.7 (L)  19.6 - 45.3 % Final    Monocyte % 05/13/2025 8.8  5.0 - 12.0 % Final    Eosinophil % 05/13/2025 10.0 (H)  0.3 - 6.2 % Final    Basophil % 05/13/2025 0.8  0.0 - 1.5 % Final    Immature Grans % 05/13/2025 1.7 (H)  0.0 - 0.5 % Final    Neutrophils, Absolute 05/13/2025 6.69  1.70 - 7.00 10*3/mm3 Final    Lymphocytes, Absolute 05/13/2025 0.94  0.70 - 3.10 10*3/mm3 Final    Monocytes, Absolute 05/13/2025 0.85  0.10 - 0.90 10*3/mm3 Final    Eosinophils, Absolute 05/13/2025 0.97 (H)  0.00 - 0.40 10*3/mm3 Final    Basophils, Absolute 05/13/2025 0.08  0.00 - 0.20 10*3/mm3 Final    Immature Grans, Absolute 05/13/2025 0.16 (H)  0.00 - 0.05 10*3/mm3 Final    nRBC 05/13/2025 0.0  0.0 -  0.2 /100 WBC Final    ADENOVIRUS, PCR 05/13/2025 Not Detected  Not Detected Final    Coronavirus 229E 05/13/2025 Not Detected  Not Detected Final    Coronavirus HKU1 05/13/2025 Not Detected  Not Detected Final    Coronavirus NL63 05/13/2025 Not Detected  Not Detected Final    Coronavirus OC43 05/13/2025 Not Detected  Not Detected Final    COVID19 05/13/2025 Not Detected  Not Detected - Ref. Range Final    Human Metapneumovirus 05/13/2025 Not Detected  Not Detected Final    Human Rhinovirus/Enterovirus 05/13/2025 Not Detected  Not Detected Final    Influenza A PCR 05/13/2025 Not Detected  Not Detected Final    Influenza B PCR 05/13/2025 Not Detected  Not Detected Final    Parainfluenza Virus 1 05/13/2025 Not Detected  Not Detected Final    Parainfluenza Virus 2 05/13/2025 Not Detected  Not Detected Final    Parainfluenza Virus 3 05/13/2025 Not Detected  Not Detected Final    Parainfluenza Virus 4 05/13/2025 Not Detected  Not Detected Final    RSV, PCR 05/13/2025 Not Detected  Not Detected Final    Bordetella pertussis pcr 05/13/2025 Not Detected  Not Detected Final    Bordetella parapertussis PCR 05/13/2025 Not Detected  Not Detected Final    Chlamydophila pneumoniae PCR 05/13/2025 Not Detected  Not Detected Final    Mycoplasma pneumo by PCR 05/13/2025 Not Detected  Not Detected Final    D-Dimer, Quantitative 05/13/2025 14.28 (H)  0.00 - 0.67 MCGFEU/mL Final    HS Troponin T 05/13/2025 15 (H)  <14 ng/L Final    Troponin T Numeric Delta 05/13/2025 6 (C)  Abnormal if >/=3 ng/L Final    Heparin Anti-Xa (UFH) 05/13/2025 0.10 (L)  0.30 - 0.70 IU/ml Final    Protime 05/13/2025 13.7  12.2 - 15.3 Seconds Final    INR 05/13/2025 0.99  0.89 - 1.12 Final    PTT 05/13/2025 28.3 (L)  60.0 - 90.0 seconds Final    Creatine Kinase 05/13/2025 38  20 - 180 U/L Final    Lactate 05/13/2025 1.0  0.5 - 2.0 mmol/L Final    Falsely depressed results may occur on samples drawn from patients receiving N-Acetylcysteine (NAC) or Metamizole.     Procalcitonin 05/13/2025 0.16  0.00 - 0.25 ng/mL Final    C-Reactive Protein 05/13/2025 9.74 (H)  0.00 - 0.50 mg/dL Final    TSH 05/13/2025 3.400  0.270 - 4.200 uIU/mL Final    Hemoglobin A1C 05/13/2025 5.80 (H)  4.80 - 5.60 % Final    Ferritin 05/13/2025 313.40 (H)  13.00 - 150.00 ng/mL Final    Iron 05/13/2025 24 (L)  37 - 145 mcg/dL Final    Iron Saturation (TSAT) 05/13/2025 9 (L)  20 - 50 % Final    Transferrin 05/13/2025 184 (L)  200 - 360 mg/dL Final    TIBC 05/13/2025 274 (L)  298 - 536 mcg/dL Final    Total Cholesterol 05/13/2025 170  0 - 200 mg/dL Final    Triglycerides 05/13/2025 124  0 - 150 mg/dL Final    HDL Cholesterol 05/13/2025 66 (H)  40 - 60 mg/dL Final    LDL Cholesterol  05/13/2025 82  0 - 100 mg/dL Final    VLDL Cholesterol 05/13/2025 22  5 - 40 mg/dL Final    LDL/HDL Ratio 05/13/2025 1.20   Final    Sed Rate 05/13/2025 40 (H)  0 - 30 mm/hr Final        CT Angiogram Chest Pulmonary Embolism  Result Date: 5/13/2025  Narrative: CT ANGIOGRAM CHEST PULMONARY EMBOLISM Date of Exam: 5/13/2025 6:39 AM EDT Indication: new acute hypoxia, short of breath, right pleural effusion, elevated d-dimer, history of lung cancer. vapes. Comparison: CT chest dated 3/31/2025 Technique: Axial CT images were obtained of the chest after the uneventful intravenous administration of 75 mL Isovue-370 utilizing pulmonary embolism protocol.  In addition, a 3-D volume rendered image was created for interpretation.  Reconstructed coronal and sagittal images were also obtained. Automated exposure control and iterative construction methods were used. Findings: Pulmonary arteries: Adequate opacification of the pulmonary arteries. There is a small segmental left upper lobe pulmonary embolism. There are small segmental and subsegmental pulmonary emboli of the left lower lobe. There is a small segmental right upper lobe pulmonary embolism. Lungs and Pleura: There is a moderate sized right pleural effusion. There is right basilar  atelectasis. Posttreatment changes of the right upper lobe pulmonary nodule are present. Now, the spiculated component appears to be slightly larger than previously seen. This was seen on image 37 of series 4 previous study measuring about 8 mm and measures about 11 mm on image 42 of series 6 on this exam. The nodular densities seen in the anterior right upper lobe is stable at 11 mm. The lesion seen previously in the superior segment of the right lower lobe is obscured by the presence of the pleural effusion and atelectasis. Superior segment groundglass nodule in the left lower lobe is stable. Mediastinum/Ashley: No mediastinal or hilar lymphadenopathy. Lymph nodes: No axillary or supraclavicular adenopathy. Cardiovascular: The cardiac chambers are within normal limits. The pericardium is normal. The aorta and its arch branch vessels are unremarkable.   Upper Abdomen: There are several hepatic cysts. However, there also appear to be multiple new low-density hepatic lesions which are difficult to assess in this phase of enhancement which is arterial but one lesion adjacent to the gallbladder is at least 2.2 cm in diameter. Bones and Soft Tissue: No suspicious osseous lesion.     Impression: Impression: 1.Small segmental and subsegmental pulmonary emboli of the left upper lobe, left lower lobe and right upper lobe. 2.Moderate right pleural effusion with right basilar atelectasis. 3.Posttreatment changes of the right upper lobe pulmonary nodule. The spiculated component appears to be slightly larger than previously seen. 4.Multiple new low-density hepatic lesions are suspicious for metastatic disease. Electronically Signed: Rodriguez Singh MD  5/13/2025 7:07 AM EDT  Workstation ID: [id number]    XR Chest 1 View  Result Date: 5/13/2025  Narrative: XR CHEST 1 VW Date of Exam: 5/13/2025 5:01 AM EDT Indication: SOA triage protocol Comparison: Chest radiograph 4/12/2025 Findings: The heart is enlarged. The pulmonary vascular  markings are normal. There is a new right-sided pleural fluid collection. There is right lower lobe airspace disease which is likely atelectasis. Left lung is clear. The osseous structures are normal.     Impression: Impression: New right-sided pleural fluid collection with right lower lobe airspace disease likely atelectasis. Electronically Signed: Rodriguez Singh MD  5/13/2025 5:41 AM EDT  Workstation ID: [id number]      ASSESSMENT 64 YO female with right lung cancer    PROBLEM LIST   1. Right lower lobe lung adenocarcinoma, T1b N0 M0 stage I A2, PD-L1 85%:  A.  Present with abnormal screening scan done March 22, 2024 with persistent abnormality June 4, 2024.  B.  Whole-body PET scan done July 11, 2024 revealed mild hypermetabolic activity in the medial right lower lobe lung nodule with patchy bilateral lung infiltrates none PET avid.  C.  Negative MRI brain done August 25, 2024.  D.  Bronchoscopy with biopsy August 6, 2024 right lower lobe positive for adenocarcinoma negative nodes and negative right upper lobe biopsy but atypical cytology.  E.  Treated with CyberKnife radiotherapy September 2024.  2.  Advanced COPD:  A.  PFTs done on March 25, 2024 revealed FEV1 of 1 L.  3.  Hypercholesteremia  4.  Hypothyroid  5.  Cervical squamous cell carcinoma:  A.  Status post total hysterectomy followed by adjuvant radiation August 2020.  6. Right pleural effusion:  A. Status post thoracentesis May 14, 2025  7. Bilateral PEs:  A. Noted on CT angiogram May 12, 2025.    PLAN  1.  Right lower lobe lung cancer:  A. I reviewed the patient's chart including admission note for the results and imaging reports.  B. I reviewed the patient scan films myself hypersensitization as above.  C. I discussed the case with the hospitalist over the phone.  Coordinate patient care.  D.  I discussed with the patient current findings are very suspicious for relapsed disease.  I will go ahead and order MRI brain while she is inpatient and I will also  help up for whole-body PET scan as an outpatient.  E.  She will follow-up with me in 1 to 2 weeks at [facility].    2.  Right pleural effusion:  A.  Recommend thoracentesis and send fluid for cytology.    3.  Bilateral PEs:  A.  Will keep her on heparin drip until procedure is done then she can be discharged home on Eliquis 5 mg twice daily without loading dose to avoid higher bleeding risk.    Sejal Mckenzie MD    5/13/2025

## 2025-05-13 NOTE — PLAN OF CARE
Problem: Adult Inpatient Plan of Care  Goal: Plan of Care Review  Outcome: Progressing  Flowsheets (Taken 5/13/2025 1150)  Plan of Care Reviewed With: patient  Goal: Patient-Specific Goal (Individualized)  Outcome: Progressing  Goal: Absence of Hospital-Acquired Illness or Injury  Outcome: Progressing  Goal: Optimal Comfort and Wellbeing  Outcome: Progressing  Intervention: Monitor Pain and Promote Comfort  Recent Flowsheet Documentation  Taken 5/13/2025 1122 by Alicia Brown RN  Pain Management Interventions: pain medication given  Goal: Readiness for Transition of Care  Outcome: Progressing   Goal Outcome Evaluation:  Plan of Care Reviewed With: patient

## 2025-05-13 NOTE — ED NOTES
Sydnie Gamble    Nursing Report ED to Floor:  Mental status: ALERT AND ORIENTED X 4  Ambulatory status: AMBULATORY AT HOME  Oxygen Therapy:  6L/NC  Cardiac Rhythm: NSR  Admitted from: HOME  Safety Concerns:  FALL RISK  Precautions: NONE  Social Issues: NONE  ED Room #:  16    ED Nurse Phone Extension - [phone] or may call [phone].      HPI:   Chief Complaint   Patient presents with    Shortness of Breath       Past Medical History:  Past Medical History:   Diagnosis Date    Acid reflux     Acid reflux     Adenocarcinoma of lung 08/12/2024    Anxiety 1976    Arthritis     Atherosclerotic cardiovascular disease 03/25/2024    Cervical cancer     Depression     Emphysema of lung     History of radiation therapy 11/20/2020    pelvis + intracavitary brachytherapy    History of radiation therapy 10/04/2024    RUL lung    Hx of radiation therapy     Hyperlipidemia     Hypothyroidism     Kidney disease     Lung cancer     Lung nodule     Ovarian cyst 1980s    Pulmonary embolism 5/13/2025    Visual impairment corrected with glasses    Wears dentures     Wears glasses         Past Surgical History:  Past Surgical History:   Procedure Laterality Date    BRONCHOSCOPY      BRONCHOSCOPY WITH ION ROBOTIC ASSIST N/A 08/06/2024    Procedure: BRONCHOSCOPY NAVIGATION WITH ENDOBRONCHIAL ULTRASOUND AND ION ROBOT;  Surgeon: Toni Mcclelland MD;  Location: [facility] ENDOSCOPY;  Service: Robotics - Pulmonary;  Laterality: N/A;    COLONOSCOPY      HYSTEROSCOPY      LUNG BIOPSY      LYMPH NODE BIOPSY      MOHS SURGERY  03/24/2025    Groin    SUBTOTAL HYSTERECTOMY  1987?    ovary/fallopian tube removed    TOTAL ABDOMINAL HYSTERECTOMY PELVIC NODE DISSECTION N/A 08/18/2020    Procedure: TOTAL RADICAL HYSTERECTOMY PELVIC NODE DISSECTION;  Surgeon: Laura Jimenez MD;  Location: [facility] OR;  Service: Gynecology Oncology;  Laterality: N/A;    TUBAL ABDOMINAL LIGATION      right with ovarian dissection         Admitting Doctor:   Latonya  MD Nazario    Consulting Provider(s):  Consults       Date and Time Order Name Status Description    5/13/2025  7:45 AM Inpatient Hematology & Oncology Consult               Admitting Diagnosis:   The primary encounter diagnosis was Acute respiratory failure with hypoxia. Diagnoses of Shortness of breath, Elevated d-dimer, Pleural effusion, right, Atypical chest pain, and Other acute pulmonary embolism without acute cor pulmonale were also pertinent to this visit.    Most Recent Vitals:   Vitals:    05/13/25 0600 05/13/25 0630 05/13/25 0700 05/13/25 0730   BP: 129/84      BP Location:       Patient Position:       Pulse: 99 89 92 96   Resp:       Temp:       TempSrc:       SpO2: 97% 98% 97% 95%   Weight:       Height:           Active LDAs/IV Access:   Lines, Drains & Airways       Active LDAs       Name Placement date Placement time Site Days    Peripheral IV Right Antecubital --  --  Antecubital  --    Peripheral IV 05/13/25 0515 18 G Right Antecubital 05/13/25  0515  Antecubital  less than 1    Urethral Catheter Non-latex 16 Fr. 08/19/20  1610  -- 1727                    Labs (abnormal labs have a star):   Labs Reviewed   COMPREHENSIVE METABOLIC PANEL - Abnormal; Notable for the following components:       Result Value    Glucose 105 (*)     CO2 20.0 (*)     Alkaline Phosphatase 166 (*)     Anion Gap 17.0 (*)     All other components within normal limits    Narrative:     GFR Categories in Chronic Kidney Disease (CKD)              GFR Category          GFR (mL/min/1.73)    Interpretation  G1                    90 or greater        Normal or high (1)  G2                    60-89                Mild decrease (1)  G3a                   45-59                Mild to moderate decrease  G3b                   30-44                Moderate to severe decrease  G4                    15-29                Severe decrease  G5                    14 or less           Kidney failure    (1)In the absence of evidence of kidney  "disease, neither GFR category G1 or G2 fulfill the criteria for CKD.    eGFR calculation 2021 CKD-EPI creatinine equation, which does not include race as a factor   CBC WITH AUTO DIFFERENTIAL - Abnormal; Notable for the following components:    Hemoglobin 11.8 (*)     Lymphocyte % 9.7 (*)     Eosinophil % 10.0 (*)     Immature Grans % 1.7 (*)     Eosinophils, Absolute 0.97 (*)     Immature Grans, Absolute 0.16 (*)     All other components within normal limits   D-DIMER, QUANTITATIVE - Abnormal; Notable for the following components:    D-Dimer, Quantitative 14.28 (*)     All other components within normal limits    Narrative:     According to the assay 's published package insert, a normal (<0.50 MCGFEU/mL) D-dimer result in conjunction with a non-high clinical probability assessment, excludes deep vein thrombosis (DVT) and pulmonary embolism (PE) with high sensitivity.    D-dimer values increase with age and this can make VTE exclusion of an older population difficult. To address this, the [employer] College [employer], based on best available evidence and recent guidelines, recommends that clinicians use age-adjusted D-dimer thresholds in patients greater than 50 years of age with: a) a low probability of PE who do not meet all Pulmonary Embolism Rule Out Criteria, or b) in those with intermediate probability of PE.   The formula for an age-adjusted D-dimer cut-off is \"age/100\".  For example, a 60 year old patient would have an age-adjusted cut-off of 0.60 MCGFEU/mL and an 80 year old 0.80 MCGFEU/mL.   HIGH SENSITIVITIY TROPONIN T 1HR - Abnormal; Notable for the following components:    HS Troponin T 15 (*)     Troponin T Numeric Delta 6 (*)     All other components within normal limits    Narrative:     High Sensitive Troponin T Reference Range:  <14.0 ng/L- Negative Female for AMI  <22.0 ng/L- Negative Male for AMI  >=14 - Abnormal Female indicating possible myocardial injury.  >=22 - Abnormal Male " indicating possible myocardial injury.   Clinicians would have to utilize clinical acumen, EKG, Troponin, and serial changes to determine if it is an Acute Myocardial Infarction or myocardial injury due to an underlying chronic condition.        HEPARIN ANTI XA - Abnormal; Notable for the following components:    Heparin Anti-Xa (UFH) 0.10 (*)     All other components within normal limits   APTT - Abnormal; Notable for the following components:    PTT 28.3 (*)     All other components within normal limits    Narrative:     PTT = The equivalent PTT values for the therapeutic range of heparin levels at 0.3 to 0.5 U/ml are 60 to 70 seconds.   RESPIRATORY PANEL PCR W/ COVID-19 (SARS-COV-2), NP SWAB IN UTM/VTP, 2 HR TAT - Normal    Narrative:     In the setting of a positive respiratory panel with a viral infection PLUS a negative procalcitonin without other underlying concern for bacterial infection, consider observing off antibiotics or discontinuation of antibiotics and continue supportive care. If the respiratory panel is positive for atypical bacterial infection (Bordetella pertussis, Chlamydophila pneumoniae, or Mycoplasma pneumoniae), consider antibiotic de-escalation to target atypical bacterial infection.   BNP (IN-HOUSE) - Normal    Narrative:     This assay is used as an aid in the diagnosis of individuals suspected of having heart failure. It can be used as an aid in the diagnosis of acute decompensated heart failure (ADHF) in patients presenting with signs and symptoms of ADHF to the emergency department (ED). In addition, NT-proBNP of <300 pg/mL indicates ADHF is not likely.    Age Range Result Interpretation  NT-proBNP Concentration (pg/mL:      <50             Positive            >450                   Gray                 300-450                    Negative             <300    50-75           Positive            >900                  Gray                300-900                  Negative             <300      >75             Positive            >1800                  Gray                300-1800                  Negative            <300   TROPONIN - Normal    Narrative:     High Sensitive Troponin T Reference Range:  <14.0 ng/L- Negative Female for AMI  <22.0 ng/L- Negative Male for AMI  >=14 - Abnormal Female indicating possible myocardial injury.  >=22 - Abnormal Male indicating possible myocardial injury.   Clinicians would have to utilize clinical acumen, EKG, Troponin, and serial changes to determine if it is an Acute Myocardial Infarction or myocardial injury due to an underlying chronic condition.        PROTIME-INR - Normal   COVID PRE-OP / PRE-PROCEDURE SCREENING ORDER (NO ISOLATION)    Narrative:     The following orders were created for panel order COVID PRE-OP / PRE-PROCEDURE SCREENING ORDER (NO ISOLATION) - Swab, Nasopharynx.  Procedure                               Abnormality         Status                     ---------                               -----------         ------                     Respiratory Panel PCR w/...[[id number]]  Normal              Final result                 Please view results for these tests on the individual orders.   RESPIRATORY CULTURE   STREP PNEUMO AG, URINE OR CSF   LEGIONELLA ANTIGEN, URINE   MRSA SCREEN, PCR   RAINBOW DRAW    Narrative:     The following orders were created for panel order [place] Draw.  Procedure                               Abnormality         Status                     ---------                               -----------         ------                     Green Top (Gel)[[id number]]                                  Final result               Lavender Top[[id number]]                                     Final result               Gold Top - SST[[id number]]                                   Final result               Gardner Top[[id number]]                                         Final result               Light Blue Top[[id number]]                                    Final result                 Please view results for these tests on the individual orders.   HEPARIN ANTI XA   BLOOD GAS, VENOUS   CK   LACTIC ACID, PLASMA   PROCALCITONIN   C-REACTIVE PROTEIN   TSH RFX ON ABNORMAL TO FREE T4   HEMOGLOBIN A1C   FERRITIN   IRON PROFILE   LIPID PANEL   SEDIMENTATION RATE   URINE DRUG SCREEN   URINALYSIS W/ CULTURE IF INDICATED   CBC AND DIFFERENTIAL    Narrative:     The following orders were created for panel order CBC & Differential.  Procedure                               Abnormality         Status                     ---------                               -----------         ------                     CBC Auto Differential[[id number]]        Abnormal            Final result                 Please view results for these tests on the individual orders.   GREEN TOP   LAVENDER TOP   GOLD TOP - [facility]   GRAY TOP   LIGHT BLUE TOP       Meds Given in ED:   Medications   sodium chloride 0.9 % flush 10 mL (has no administration in time range)   heparin 67490 units/250 mL (100 units/mL) in 0.45 % NaCl infusion (has no administration in time range)   Pharmacy to Dose Heparin (has no administration in time range)   sodium chloride 0.9 % flush 10 mL (10 mL Intravenous Given 5/13/25 0804)   sodium chloride 0.9 % flush 10 mL (has no administration in time range)   sodium chloride 0.9 % infusion 40 mL (has no administration in time range)   nitroglycerin (NITROSTAT) SL tablet 0.4 mg (has no administration in time range)   Potassium Replacement - Follow Nurse / BPA Driven Protocol (has no administration in time range)   Magnesium Standard Dose Replacement - Follow Nurse / BPA Driven Protocol (has no administration in time range)   Phosphorus Replacement - Follow Nurse / BPA Driven Protocol (has no administration in time range)   Calcium Replacement - Follow Nurse / BPA Driven Protocol (has no administration in time range)   acetaminophen (TYLENOL) tablet 650 mg (has no administration in  time range)     Or   acetaminophen (TYLENOL) 160 MG/5ML oral solution 650 mg (has no administration in time range)     Or   acetaminophen (TYLENOL) suppository 650 mg (has no administration in time range)   melatonin tablet 10 mg (has no administration in time range)   sennosides-docusate (PERICOLACE) 8.6-50 MG per tablet 2 tablet (has no administration in time range)     And   polyethylene glycol (MIRALAX) packet 17 g (has no administration in time range)     And   bisacodyl (DULCOLAX) EC tablet 5 mg (has no administration in time range)     And   bisacodyl (DULCOLAX) suppository 10 mg (has no administration in time range)   iopamidol (ISOVUE-370) 76 % injection 100 mL (75 mL Intravenous Given 5/13/25 0648)   heparin (porcine) injection 4,500 Units (4,500 Units Intravenous Given 5/13/25 0802)     heparin, 18 Units/kg/hr  Pharmacy to Dose Heparin,          Last NIH score:                                                          Dysphagia screening results:  Patient Factors Component (Dysphagia:Stroke or Rule-out)  Best Eye Response: 4-->(E4) spontaneous (05/13/25 0515)  Best Motor Response: 6-->(M6) obeys commands (05/13/25 0515)  Best Verbal Response: 5-->(V5) oriented (05/13/25 0515)  [place] Coma Scale Score: 15 (05/13/25 0515)     [place] Coma Scale:  No data recorded     CIWA:        Restraint Type:            Isolation Status:  No active isolations

## 2025-05-13 NOTE — Clinical Note
Level of Care: Telemetry [5]   Diagnosis: Pulmonary embolism [730566]   Admitting Physician: MARY LOU GARCIA [[id number]]   Attending Physician: MARY LOU GARCIA [[id number]]

## 2025-05-13 NOTE — CASE MANAGEMENT/SOCIAL WORK
Discharge Planning Assessment  [facility]     Patient Name: Sydnie Gamble  MRN: [id number]  Today's Date: 5/13/2025    Admit Date: 5/13/2025    Plan: Home   Discharge Needs Assessment       Row Name 05/13/25 0902       Living Environment    People in Home spouse    Name(s) of People in Home Jose Francisco Diaz Spouse [phone]    Current Living Arrangements home    Potentially Unsafe Housing Conditions none    In the past 12 months has the electric, gas, oil, or water company threatened to shut off services in your home? No    Primary Care Provided by self    Provides Primary Care For no one    Family Caregiver if Needed spouse    Family Caregiver Names Jose Francisco Diaz Spouse [phone]    Quality of Family Relationships involved;helpful    Able to Return to Prior Arrangements yes       Resource/Environmental Concerns    Resource/Environmental Concerns none    Transportation Concerns none       Transportation Needs    In the past 12 months, has lack of transportation kept you from medical appointments or from getting medications? no    In the past 12 months, has lack of transportation kept you from meetings, work, or from getting things needed for daily living? No       Food Insecurity    Within the past 12 months, you worried that your food would run out before you got the money to buy more. Never true    Within the past 12 months, the food you bought just didn't last and you didn't have money to get more. Never true       Transition Planning    Patient/Family Anticipates Transition to home with family    Patient/Family Anticipated Services at Transition none    Transportation Anticipated family or friend will provide       Discharge Needs Assessment    Readmission Within the Last 30 Days no previous admission in last 30 days    Equipment Currently Used at Home none    Concerns to be Addressed denies needs/concerns at this time    Do you want help finding or keeping work or a job? I do not need or want  help    Do you want help with school or training? For example, starting or completing job training or getting a high school diploma, GED or equivalent No    Anticipated Changes Related to Illness none    Equipment Needed After Discharge none                   Discharge Plan       Row Name 05/13/25 0903       Plan    Plan Home    Patient/Family in Agreement with Plan yes    Plan Comments CM spoke with patient and spouse at bedside regarding DC planning. Patient resides in [facility] with her spouse. Patient is independent with ADL's, denies any DME. Patient denies any current home health or outpatient services. Patient has medical insurance, prescription coverage and is able to afford/obtain medications without difficulty. Patient has an advanced directives. Patient denies any discharge planning needs at this time. Goal is home. CM will continue to follow    Final Discharge Disposition Code 01 - home or self-care                       Demographic Summary       Row Name 05/13/25 0900       General Information    Arrived From home    Referral Source emergency department    Reason for Consult discharge planning    Preferred Language English       Contact Information    Contact Information Comments HarithamikeJose Francisco RAI Spouse [phone]                   Functional Status       Row Name 05/13/25 0902       Functional Status    Usual Activity Tolerance moderate    Current Activity Tolerance moderate       Physical Activity    On average, how many days per week do you engage in moderate to strenuous exercise (like a brisk walk)? 0 days    On average, how many minutes do you engage in exercise at this level? 0 min    Number of minutes of exercise per week 0       Assessment of Health Literacy    How often do you have someone help you read hospital materials? Never    How often do you have problems learning about your medical condition because of difficulty understanding written information? Never    How often do you have a  problem understanding what is told to you about your medical condition? Never    How confident are you filling out medical forms by yourself? Quite a bit    Health Literacy Good       Functional Status, IADL    Medications independent    Meal Preparation independent    Housekeeping independent    Laundry independent    Shopping independent    If for any reason you need help with day-to-day activities such as bathing, preparing meals, shopping, managing finances, etc., do you get the help you need? I get all the help I need       Mental Status    General Appearance WDL WDL       Mental Status Summary    Recent Changes in Mental Status/Cognitive Functioning no changes       Employment/    Employment Status retired                   Psychosocial    No documentation.                  Abuse/Neglect    No documentation.                  Legal    No documentation.                  Substance Abuse    No documentation.                  Patient Forms    No documentation.                     Yola Larsen RN

## 2025-05-13 NOTE — H&P
Psychiatric Medicine Services  HISTORY AND PHYSICAL    Patient Name: Sydnie Gamble  : 1960  MRN: [id number]  Primary Care Physician: Dee Elena APRN  Date of admission: 2025      Subjective   Subjective     Chief Complaint:  Shortness of breath    HPI:  Sydnie Gamble is a 65 y.o. female with a PMH significant for RLL adenocarcinoma (non-small cell lung cancer) s/p CyberKnife radiotherapy 2024, OA, GERD, cervical squamous cell carcinoma, HTN, HLD, hypothyroidism and CAD with anxiety and depression.  Patient presents to [facility] ED due to significant shortness of breath, pleuritic chest pain and generalized malaise with weakness.  She states she presented to [facility], was told she had costochondritis was discharged on pain medications however her symptoms persisted.  She also endorses PND, orthopnea and chest congestion.  She denies any chest pain, palpitations, recent ill contacts, recent trauma, melena, hematochezia or hematemesis.    In the ED, she was hemodynamically stable in fact hypertensive however requiring 6 L nasal cannula oxygen as she desatted to 80% on room air.  Currently satting 97% on 6 L.  Initially on presentation was also tachycardic with heart rate 118.  Initial troponin 9 with subsequent recheck 15 with a delta of 6, proBNP 138.  Bicarb 20.0 on BMP with anion gap of 17.  Lactic acid 1.0.  D-dimer 14.28.  INR 0.99.  No leukocytosis, hemoglobin 11.8.  Viral respiratory panel was negative.  CXR showed new right-sided pleural effusion and RLL airspace, deemed to be atelectasis.  Underwent CTA chest for PE which showed small segmental and subsegmental pulmonary emboli in the DANIELLE, LLL and RUL.  There was also moderate right pleural effusion with right basilar atelectasis.  Posttreatment changes of RUL pulmonary nodule with spiculated component appearing to be slightly larger than previously  seen.  Also multiple new low-density hepatic lesions which are suspicious for metastatic disease.  Patient was subsequently started on a heparin drip by ED provider.  Hospitalist team was contact for admission, agreed to admit.      Personal History     Past Medical History:   Diagnosis Date    Acid reflux     Acid reflux     Adenocarcinoma of lung 08/12/2024    Anemia, chronic disease 5/13/2025    Anxiety 1976    Arthritis     Atherosclerotic cardiovascular disease 03/25/2024    Cervical cancer     Depression     Emphysema of lung     History of radiation therapy 11/20/2020    pelvis + intracavitary brachytherapy    History of radiation therapy 10/04/2024    RUL lung    Hx of radiation therapy     Hyperlipidemia     Hypothyroidism     Kidney disease     Lung cancer     Lung nodule     Ovarian cyst 1980s    Pulmonary embolism 5/13/2025    Visual impairment corrected with glasses    Wears dentures     Wears glasses          Oncology Problem List:  Adenocarcinoma of right lung (08/12/2024; Status: Active)  Cervical cancer (08/07/2020; Status: Active)  Oncology/Hematology History   Cervical cancer   8/7/2020 Initial Diagnosis    Suspected cervical cancer  Referred by Dr. Elissa Lee    7/31/2020: Seen for complaints of postmenopausal bleeding and found to have abnormal appearing cervix. Pap test suspicious for squamous cell carcinoma.     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 Lymph nodes negative.    [facility]       9/2/2020 Cancer Staged    Staging form: Cervix Uteri, AJCC 8th Edition  - Clinical stage from 9/2/2020: FIGO Stage IIA1 (cT2a1, cN0, cM0) - Signed by Laura Jimenez MD on 9/2/2020 9/28/2020 - 11/5/2020 Radiation    Radiation OncologyTreatment Course:   Sydnie Gamble received 5040 cGy in 28 fractions to cervix via External Beam Radiation - EBRT.     11/16/2020 - 11/20/2020 Radiation    Radiation OncologyTreatment Course:  Sydnie Gamble received 1500 cGy in 3 fractions to cervix via High Dose Radiation - HDR.     Adenocarcinoma of right lung   8/12/2024 Initial Diagnosis    Adenocarcinoma of right lung     8/30/2024 Cancer Staged    Staging form: Lung, AJCC 8th Edition  - Clinical stage from 8/30/2024: Stage IA2 (cT1b, cN0, cM0) - Signed by Sejal Mckenzie MD on 8/30/2024 9/30/2024 - 10/4/2024 Radiation    Radiation OncologyTreatment Course:  Sydnie Gamble received 5000 cGy in 5 fractions to right lung via Stereotactic Radiation Therapy - SRT.       Past Surgical History:   Procedure Laterality Date    BRONCHOSCOPY      BRONCHOSCOPY WITH ION ROBOTIC ASSIST N/A 08/06/2024    Procedure: BRONCHOSCOPY NAVIGATION WITH ENDOBRONCHIAL ULTRASOUND AND ION ROBOT;  Surgeon: Toni Mcclelland MD;  Location: [facility] ENDOSCOPY;  Service: Robotics - Pulmonary;  Laterality: N/A;    COLONOSCOPY      HYSTEROSCOPY      LUNG BIOPSY      LYMPH NODE BIOPSY      MOHS SURGERY  03/24/2025    Groin    SUBTOTAL HYSTERECTOMY  1987?    ovary/fallopian tube removed    TOTAL ABDOMINAL HYSTERECTOMY PELVIC NODE DISSECTION N/A 08/18/2020    Procedure: TOTAL RADICAL HYSTERECTOMY PELVIC NODE DISSECTION;  Surgeon: Laura Jimenez MD;  Location: [facility] OR;  Service: Gynecology Oncology;  Laterality: N/A;    TUBAL ABDOMINAL LIGATION      right with ovarian dissection        Family History: family history includes Anxiety disorder in her mother; Asthma in her mother; COPD in her mother; Cancer in her father, maternal aunt, maternal aunt, maternal aunt, maternal aunt, maternal grandmother, and mother; Depression in her mother; Early death in her father; Emphysema in her mother; Heart attack in her maternal grandfather; Heart disease in her maternal grandfather; Hypertension in her mother;  Melanoma in her mother; Mental illness in her mother; Uterine cancer in her mother.     Social History:  reports that she quit smoking about 6 years ago. Her smoking use included cigarettes. She started smoking about 53 years ago. She has a 67.5 pack-year smoking history. She has been exposed to tobacco smoke. She has never used smokeless tobacco. She reports that she does not drink alcohol and does not use drugs.  Social History     Social History Narrative    Not on file       Medications:  Available home medication information reviewed.  Fluticasone-Umeclidin-Vilant, albuterol sulfate HFA, aspirin, atorvastatin, cefdinir, clonazePAM, famotidine, levothyroxine, ondansetron ODT, sertraline, tiZANidine, traZODone, and zolpidem    No Known Allergies    Objective   Objective     Vital Signs:   Temp:  [96.3 °F (35.7 °C)-98.2 °F (36.8 °C)] 96.3 °F (35.7 °C)  Heart Rate:  [] 70  Resp:  [20-21] 20  BP: (103-174)/(55-94) 103/55  Flow (L/min) (Oxygen Therapy):  [6] 6       Physical Exam   Constitutional: No acute distress, awake, alert  HENT: NCAT, mucous membranes moist  Respiratory: Clear to auscultation bilaterally, respiratory effort normal   Cardiovascular: RRR, no murmurs, rubs, or gallops  Gastrointestinal: Positive bowel sounds, soft, nontender, nondistended  Musculoskeletal: No bilateral ankle edema  Psychiatric: Appropriate affect, cooperative  Neurologic: Oriented x 3, strength symmetric in all extremities, Cranial Nerves grossly intact to confrontation, speech clear  Skin: No rashes     Result Review:  I have personally reviewed the results from the time of this admission to 5/13/2025 15:44 EDT and agree with these findings:  [x]  Laboratory list / accordion  [x]  Microbiology  [x]  Radiology  [x]  EKG/Telemetry   [x]  Cardiology/Vascular   [x]  Pathology  [x]  Old records  []  Other:    LAB RESULTS:      Lab 05/13/25  0733 05/13/25  0642 05/13/25  0510   WBC  --   --  9.69   HEMOGLOBIN  --   --  11.8*    HEMATOCRIT  --   --  36.7   PLATELETS  --   --  330   NEUTROS ABS  --   --  6.69   IMMATURE GRANS (ABS)  --   --  0.16*   LYMPHS ABS  --   --  0.94   MONOS ABS  --   --  0.85   EOS ABS  --   --  0.97*   MCV  --   --  89.5   SED RATE  --   --  40*   CRP  --  9.74*  --    PROCALCITONIN  --  0.16  --    LACTATE  --   --  1.0   PROTIME  --   --  13.7   INR  --   --  0.99   APTT  --   --  28.3*   HEPARIN ANTI-XA 0.10*  --   --    D DIMER QUANT  --   --  14.28*         Lab 05/13/25  0642 05/13/25  0510   SODIUM  --  139   POTASSIUM  --  3.7   CHLORIDE  --  102   CO2  --  20.0*   ANION GAP  --  17.0*   BUN  --  14   CREATININE  --  0.92   EGFR  --  68.4   GLUCOSE  --  105*   CALCIUM  --  9.3   HEMOGLOBIN A1C  --  5.80*   TSH 3.400  --          Lab 05/13/25  0510   TOTAL PROTEIN 7.0   ALBUMIN 3.8   GLOBULIN 3.2   ALT (SGPT) 18   AST (SGOT) 21   BILIRUBIN <0.2   ALK PHOS 166*         Lab 05/13/25  0642 05/13/25  0510   PROBNP  --  138.5   HSTROP T 15* 9         Lab 05/13/25 0642   CHOLESTEROL 170   LDL CHOL 82   HDL CHOL 66*   TRIGLYCERIDES 124         Lab 05/13/25 0642   IRON 24*   IRON SATURATION (TSAT) 9*   TIBC 274*   TRANSFERRIN 184*   FERRITIN 313.40*         Lab 05/13/25  1520   FIO2 40   CARBOXYHEMOGLOBIN (VENOUS) 1.5         Microbiology Results (last 10 days)       Procedure Component Value - Date/Time    COVID PRE-OP / PRE-PROCEDURE SCREENING ORDER (NO ISOLATION) - Swab, Nasopharynx [[id number]]  (Normal) Collected: 05/13/25 0513    Lab Status: Final result Specimen: Swab from Nasopharynx Updated: 05/13/25 0608    Narrative:      The following orders were created for panel order COVID PRE-OP / PRE-PROCEDURE SCREENING ORDER (NO ISOLATION) - Swab, Nasopharynx.  Procedure                               Abnormality         Status                     ---------                               -----------         ------                     Respiratory Panel PCR w/...[[id number]]  Normal              Final result                  Please view results for these tests on the individual orders.    Respiratory Panel PCR w/COVID-19(SARS-CoV-2) CINDY/SUDHAKAR/SHREYA/PAD/COR/NATALY In-House, NP Swab in UTM/VTM, 2 HR TAT - Swab, Nasopharynx [[id number]]  (Normal) Collected: 05/13/25 0513    Lab Status: Final result Specimen: Swab from Nasopharynx Updated: 05/13/25 0608     ADENOVIRUS, PCR Not Detected     Coronavirus 229E Not Detected     Coronavirus HKU1 Not Detected     Coronavirus NL63 Not Detected     Coronavirus OC43 Not Detected     COVID19 Not Detected     Human Metapneumovirus Not Detected     Human Rhinovirus/Enterovirus Not Detected     Influenza A PCR Not Detected     Influenza B PCR Not Detected     Parainfluenza Virus 1 Not Detected     Parainfluenza Virus 2 Not Detected     Parainfluenza Virus 3 Not Detected     Parainfluenza Virus 4 Not Detected     RSV, PCR Not Detected     Bordetella pertussis pcr Not Detected     Bordetella parapertussis PCR Not Detected     Chlamydophila pneumoniae PCR Not Detected     Mycoplasma pneumo by PCR Not Detected    Narrative:      In the setting of a positive respiratory panel with a viral infection PLUS a negative procalcitonin without other underlying concern for bacterial infection, consider observing off antibiotics or discontinuation of antibiotics and continue supportive care. If the respiratory panel is positive for atypical bacterial infection (Bordetella pertussis, Chlamydophila pneumoniae, or Mycoplasma pneumoniae), consider antibiotic de-escalation to target atypical bacterial infection.            CT Angiogram Chest Pulmonary Embolism  Result Date: 5/13/2025  CT ANGIOGRAM CHEST PULMONARY EMBOLISM Date of Exam: 5/13/2025 6:39 AM EDT Indication: new acute hypoxia, short of breath, right pleural effusion, elevated d-dimer, history of lung cancer. vapes. Comparison: CT chest dated 3/31/2025 Technique: Axial CT images were obtained of the chest after the uneventful intravenous administration of 75 mL  [id number] utilizing pulmonary embolism protocol.  In addition, a 3-D volume rendered image was created for interpretation.  Reconstructed coronal and sagittal images were also obtained. Automated exposure control and iterative construction methods were used. Findings: Pulmonary arteries: Adequate opacification of the pulmonary arteries. There is a small segmental left upper lobe pulmonary embolism. There are small segmental and subsegmental pulmonary emboli of the left lower lobe. There is a small segmental right upper lobe pulmonary embolism. Lungs and Pleura: There is a moderate sized right pleural effusion. There is right basilar atelectasis. Posttreatment changes of the right upper lobe pulmonary nodule are present. Now, the spiculated component appears to be slightly larger than previously seen. This was seen on image 37 of series 4 previous study measuring about 8 mm and measures about 11 mm on image 42 of series 6 on this exam. The nodular densities seen in the anterior right upper lobe is stable at 11 mm. The lesion seen previously in the superior segment of the right lower lobe is obscured by the presence of the pleural effusion and atelectasis. Superior segment groundglass nodule in the left lower lobe is stable. Mediastinum/Ashley: No mediastinal or hilar lymphadenopathy. Lymph nodes: No axillary or supraclavicular adenopathy. Cardiovascular: The cardiac chambers are within normal limits. The pericardium is normal. The aorta and its arch branch vessels are unremarkable.   Upper Abdomen: There are several hepatic cysts. However, there also appear to be multiple new low-density hepatic lesions which are difficult to assess in this phase of enhancement which is arterial but one lesion adjacent to the gallbladder is at least 2.2 cm in diameter. Bones and Soft Tissue: No suspicious osseous lesion.     Impression: Impression: 1.Small segmental and subsegmental pulmonary emboli of the left upper lobe, left lower  lobe and right upper lobe. 2.Moderate right pleural effusion with right basilar atelectasis. 3.Posttreatment changes of the right upper lobe pulmonary nodule. The spiculated component appears to be slightly larger than previously seen. 4.Multiple new low-density hepatic lesions are suspicious for metastatic disease. Electronically Signed: Rodriguez Singh MD  5/13/2025 7:07 AM EDT  Workstation ID: [id number]    XR Chest 1 View  Result Date: 5/13/2025  XR CHEST 1 VW Date of Exam: 5/13/2025 5:01 AM EDT Indication: SOA triage protocol Comparison: Chest radiograph 4/12/2025 Findings: The heart is enlarged. The pulmonary vascular markings are normal. There is a new right-sided pleural fluid collection. There is right lower lobe airspace disease which is likely atelectasis. Left lung is clear. The osseous structures are normal.     Impression: Impression: New right-sided pleural fluid collection with right lower lobe airspace disease likely atelectasis. Electronically Signed: Rodriguez Singh MD  5/13/2025 5:41 AM EDT  Workstation ID: [id number]      Results for orders placed during the hospital encounter of 01/12/24    Adult Transthoracic Echo Complete W/ Cont if Necessary Per Protocol    Interpretation Summary    Left ventricular systolic function is normal. Left ventricular ejection fraction appears to be 56 - 60%.    Left ventricular diastolic function was normal.    Estimated right ventricular systolic pressure from tricuspid regurgitation is normal (<35 mmHg).    No significant structural or functional valvular disease.      Assessment & Plan   Assessment & Plan       Pulmonary embolism    Gastroesophageal reflux disease without esophagitis    Acquired hypothyroidism    Prediabetes    COPD with asthma    ALESIA (generalized anxiety disorder)    HLD (hyperlipidemia)    Cervical cancer    Atherosclerotic cardiovascular disease    Adenocarcinoma of right lung    Anemia, chronic disease    Sydnie Gamble is a 65 y.o. female  with a PMH significant for RLL adenocarcinoma (non-small cell lung cancer) s/p CyberKnife radiotherapy September 2024, OA, GERD, cervical squamous cell carcinoma, HTN, HLD, hypothyroidism and CAD with anxiety and depression.  Admitted for segmental by segmental PEs, currently on heparin drip also found to have new metastatic lesions on the liver with enhancement/progression of previous RUL nodule in the lungs.    New Pulmonary embolism  Patient found to have segmental and subsegmental PEs on CTA chest and DANIELLE, LLL and RUL vascular with evidence of moderate right pleural effusion  Has a history of RUL adenocarcinoma of the lung which certainly increases risk of DVTs and PEs  D-dimer significantly elevated at 14.28, this may be due to underlying malignancy as well as PE  PESI score 147 class V, check VBG  Initiated on heparin drip by ED provider, consider switching to Eliquis in a.m.  May need oxygen at discharge    RUL adenocarcinoma of the lung with new metastases  Cervical squamous cell carcinoma history  New pleural effusion - suspect malignant  Acute hypoxemic respiratory failure  History of asthma  PMH of RUL pulmonary adenocarcinoma, follows Dr. Mckenzie, previous CT chest March 31, 2025.  S/p CyberKnife RT September 2024  CT chest this admission showing a moderate-sized pleural effusion in the right lung particularly RUL with new hypodensities in the liver concerning for metastatic spread  Per independent review and report patient had no evidence of pleural effusion at the time on the right side, also hypodense lesions in the liver were not visible at the time however does have evidence of hepatic cysts which have been consistent on previous imaging as well  Suspect malignant pleural effusion, consult to IR for thoracentesis, pleural fluid labs have already been ordered  Consult to oncology, discussed with Dr. Mckenzie, plan to check brain MRI and schedule outpatient PET CT  For symptom management will initiate  Tessalon Perles, DuoNebs, Mucinex, hypertonic saline  Check CRP, procalcitonin  Wean oxygen as tolerated    Prediabetes  Check HbA1c, 5.8 this admission  If hyperglycemic on a.m. BMP can consider SSI    HLD  Check lipid panel, continue home statin    Generalized anxiety disorder  Depression  Continue home Klonopin, Zoloft and trazodone, holding home Ambien    Recent diagnosis of costochondritis at OSH  Recently started on tizanidine, will continue for now as patient is endorsing symptoms    Hypothyroidism  Check TSH, WNL, continue home levothyroxine    Anemia of chronic disease  Check iron panel with ferritin    GERD  Continue home PPI    Elevated troponin  Likely type II demand ischemia in the setting of pulmonary embolism  Initial troponin 9 with subsequent recheck 15 with delta of 6  No significant ST changes noted on EKG    VTE Prophylaxis:  Pharmacologic & mechanical VTE prophylaxis orders are present.    Total time spent: 80 minutes  Time spent includes time reviewing chart, face-to-face time, counseling patient/family/caregiver, ordering medications/tests/procedures, communicating with other health care professionals, documenting clinical information in the electronic health record, and coordination of care.        CODE STATUS:    Code Status and Medical Interventions: CPR (Attempt to Resuscitate); Full Support   Ordered at: 05/13/25 0745     Code Status (Patient has no pulse and is not breathing):    CPR (Attempt to Resuscitate)     Medical Interventions (Patient has pulse or is breathing):    Full Support     Level Of Support Discussed With:    Patient       Expected Discharge   Expected discharge date/ time has not been documented.     Latonya Lange MD  05/13/25

## 2025-05-13 NOTE — ED PROVIDER NOTES
Subjective   History of Present Illness  65 year old female with history of right lower lobe lung adenocarcinoma presents to the emergency department accompanied by her spouse for evaluation of shortness of breath for the past few days, worse with exertion, with associated nonproductive cough. She denies recent known sick contacts or fever. She has had chest pain since 4/12/25, and states she went to the ER at another facility and had been advised she had costochondritis. She vapes, former cigarette smoker.      Review of Systems   Constitutional:  Negative for diaphoresis and fever.   HENT:  Negative for nosebleeds.    Eyes:  Negative for photophobia and discharge.   Respiratory:  Positive for cough and shortness of breath. Negative for stridor.    Cardiovascular:  Positive for chest pain.       Past Medical History:   Diagnosis Date    Acid reflux     Acid reflux     Adenocarcinoma of lung 08/12/2024    Anxiety 1976    Arthritis     Atherosclerotic cardiovascular disease 03/25/2024    Cervical cancer     Depression     Emphysema of lung     History of radiation therapy 11/20/2020    pelvis + intracavitary brachytherapy    History of radiation therapy 10/04/2024    RUL lung    Hx of radiation therapy     Hyperlipidemia     Hypothyroidism     Kidney disease     Lung cancer     Lung nodule     Ovarian cyst 1980s    Pulmonary embolism 5/13/2025    Visual impairment corrected with glasses    Wears dentures     Wears glasses        No Known Allergies    Past Surgical History:   Procedure Laterality Date    BRONCHOSCOPY      BRONCHOSCOPY WITH ION ROBOTIC ASSIST N/A 08/06/2024    Procedure: BRONCHOSCOPY NAVIGATION WITH ENDOBRONCHIAL ULTRASOUND AND ION ROBOT;  Surgeon: Toni Mcclelland MD;  Location: [facility];  Service: Robotics - Pulmonary;  Laterality: N/A;    COLONOSCOPY      HYSTEROSCOPY      LUNG BIOPSY      LYMPH NODE BIOPSY      MOHS SURGERY  03/24/2025    Groin    SUBTOTAL HYSTERECTOMY  1987?     ovary/fallopian tube removed    TOTAL ABDOMINAL HYSTERECTOMY PELVIC NODE DISSECTION N/A 2020    Procedure: TOTAL RADICAL HYSTERECTOMY PELVIC NODE DISSECTION;  Surgeon: Laura Jimenez MD;  Location: [facility];  Service: Gynecology Oncology;  Laterality: N/A;    TUBAL ABDOMINAL LIGATION      right with ovarian dissection        Family History   Problem Relation Age of Onset    Cancer Mother     Hypertension Mother     Mental illness Mother     Anxiety disorder Mother     Asthma Mother     COPD Mother     Depression Mother     Emphysema Mother     Uterine cancer Mother     Melanoma Mother     Cancer Father     Early death Father     Heart attack Maternal Grandfather     Heart disease Maternal Grandfather         Heart Attack 63 yo    Cancer Maternal Aunt     Cancer Maternal Aunt     Cancer Maternal Grandmother     Cancer Maternal Aunt     Cancer Maternal Aunt     Breast cancer Neg Hx     Ovarian cancer Neg Hx        Social History     Socioeconomic History    Marital status:    Tobacco Use    Smoking status: Former     Current packs/day: 0.00     Average packs/day: 1.5 packs/day for 45.0 years (67.5 ttl pk-yrs)     Types: Cigarettes     Start date: 1972     Quit date: 3/30/2019     Years since quittin.1     Passive exposure: Past    Smokeless tobacco: Never    Tobacco comments:     quit analog cigs in march, still vapes daily   Vaping Use    Vaping status: Every Day    Substances: Nicotine   Substance and Sexual Activity    Alcohol use: No    Drug use: No    Sexual activity: Not Currently     Partners: Male           Objective   Physical Exam  Vitals and nursing note reviewed.   Constitutional:       Appearance: She is not diaphoretic.      Comments: BMI 25.    Eyes:      Comments: No photophobia or discharge.   Neck:      Trachea: No tracheal deviation.   Cardiovascular:      Rate and Rhythm: Regular rhythm. Tachycardia present.      Heart sounds: No murmur heard.  Pulmonary:      Effort:  Tachypnea, accessory muscle usage and respiratory distress present.      Breath sounds: No stridor. Examination of the right-lower field reveals decreased breath sounds. Decreased breath sounds present. No wheezing.   Skin:     General: Skin is warm and dry.   Neurological:      Mental Status: She is alert.      Comments: No dysarthria. No facial droop.   Psychiatric:      Comments: Anxious affect.         Procedures           ED Course  ED Course as of 05/13/25 0738   Tue May 13, 2025   0545 SpO2(!): 80 % [LD]   0545 Device (Oxygen Therapy): room air [LD]   0545 SpO2: 94 % [LD]   0545 Flow (L/min) (Oxygen Therapy): 6  6L/min [LD]   0545 WBC: 9.69 [LD]   0545 Hemoglobin(!): 11.8 [LD]   0545 Platelets: 330 [LD]   0545 BP: 170/80 [LD]   0545 BP: 125/80 [LD]   0624 D-Dimer, Quant(!): 14.28 [LD]   0719 HS Troponin T(!): 15 [LD]   0719 Troponin T Numeric Delta(!!): 6 [LD]   0734 INR: 0.99 [LD]   0734 Protime: 13.7 [LD]   0734 PTT(!): 28.3 [LD]   0734 Results and plan discussed with the patient and her  at the bedside. All questions addressed. Hospitalist contacted. [LD]      ED Course User Index  [LD] Anat Hewitt MD                                                       Medical Decision Making  Differential diagnosis includes pleural effusion, COPD exacerbation, pneumonia, pulmonary embolus, viral illness, and others.    Problems Addressed:  Acute respiratory failure with hypoxia: complicated acute illness or injury  Atypical chest pain: complicated acute illness or injury  Elevated d-dimer: complicated acute illness or injury  Other acute pulmonary embolism without acute cor pulmonale: complicated acute illness or injury  Pleural effusion, right: complicated acute illness or injury  Shortness of breath: complicated acute illness or injury    Amount and/or Complexity of Data Reviewed  Independent Historian: spouse  External Data Reviewed: notes.     Details: 4/1/25 oncology office note reviewed. 4/12/25  emergency department note reviewed from outlying facility.  Labs: ordered. Decision-making details documented in ED Course.  Radiology: ordered. Decision-making details documented in ED Course.  ECG/medicine tests: ordered and independent interpretation performed. Decision-making details documented in ED Course.     Details: EKG at 0453 shows sinus tachycardia at a rate of 109 beats per minute, LAFB, nonspecific ST/T wave changes. Repeat EKG at 0610 shows normal sinus rhythm at a rate of 96 beats per minute, normal intervals, no acute ischemic changes.  Discussion of management or test interpretation with external provider(s): Hospitalist contacted.    Risk  OTC drugs.  Prescription drug management.  Decision regarding hospitalization.    Critical Care  Total time providing critical care: 32 minutes (Respiratory decompensation risk, acute respiratory failure with hypoxia, interventions include administration of supplemental oxygen, review of prior records, initiation of anticoagulation, and others.)      Recent Results (from the past 24 hours)   ECG 12 Lead QT Measurement    Collection Time: 05/13/25  4:53 AM   Result Value Ref Range    QT Interval 322 ms    QTC Interval 433 ms   Comprehensive Metabolic Panel    Collection Time: 05/13/25  5:10 AM    Specimen: Blood   Result Value Ref Range    Glucose 105 (H) 65 - 99 mg/dL    BUN 14 8 - 23 mg/dL    Creatinine 0.92 0.57 - 1.00 mg/dL    Sodium 139 136 - 145 mmol/L    Potassium 3.7 3.5 - 5.2 mmol/L    Chloride 102 98 - 107 mmol/L    CO2 20.0 (L) 22.0 - 29.0 mmol/L    Calcium 9.3 8.6 - 10.5 mg/dL    Total Protein 7.0 6.0 - 8.5 g/dL    Albumin 3.8 3.5 - 5.2 g/dL    ALT (SGPT) 18 1 - 33 U/L    AST (SGOT) 21 1 - 32 U/L    Alkaline Phosphatase 166 (H) 39 - 117 U/L    Total Bilirubin <0.2 0.0 - 1.2 mg/dL    Globulin 3.2 gm/dL    A/G Ratio 1.2 g/dL    BUN/Creatinine Ratio 15.2 7.0 - 25.0    Anion Gap 17.0 (H) 5.0 - 15.0 mmol/L    eGFR 68.4 >60.0 mL/min/1.73   BNP    Collection  Time: 05/13/25  5:10 AM    Specimen: Blood   Result Value Ref Range    proBNP 138.5 0.0 - 900.0 pg/mL   High Sensitivity Troponin T    Collection Time: 05/13/25  5:10 AM    Specimen: Blood   Result Value Ref Range    HS Troponin T 9 <14 ng/L   Green Top (Gel)    Collection Time: 05/13/25  5:10 AM   Result Value Ref Range    Extra Tube Hold for add-ons.    Lavender Top    Collection Time: 05/13/25  5:10 AM   Result Value Ref Range    Extra Tube hold for add-on    Gold Top - SST    Collection Time: 05/13/25  5:10 AM   Result Value Ref Range    Extra Tube Hold for add-ons.    Gray Top    Collection Time: 05/13/25  5:10 AM   Result Value Ref Range    Extra Tube Hold for add-ons.    Light Blue Top    Collection Time: 05/13/25  5:10 AM   Result Value Ref Range    Extra Tube Hold for add-ons.    CBC Auto Differential    Collection Time: 05/13/25  5:10 AM    Specimen: Blood   Result Value Ref Range    WBC 9.69 3.40 - 10.80 10*3/mm3    RBC 4.10 3.77 - 5.28 10*6/mm3    Hemoglobin 11.8 (L) 12.0 - 15.9 g/dL    Hematocrit 36.7 34.0 - 46.6 %    MCV 89.5 79.0 - 97.0 fL    MCH 28.8 26.6 - 33.0 pg    MCHC 32.2 31.5 - 35.7 g/dL    RDW 13.2 12.3 - 15.4 %    RDW-SD 43.1 37.0 - 54.0 fl    MPV 8.6 6.0 - 12.0 fL    Platelets 330 140 - 450 10*3/mm3    Neutrophil % 69.0 42.7 - 76.0 %    Lymphocyte % 9.7 (L) 19.6 - 45.3 %    Monocyte % 8.8 5.0 - 12.0 %    Eosinophil % 10.0 (H) 0.3 - 6.2 %    Basophil % 0.8 0.0 - 1.5 %    Immature Grans % 1.7 (H) 0.0 - 0.5 %    Neutrophils, Absolute 6.69 1.70 - 7.00 10*3/mm3    Lymphocytes, Absolute 0.94 0.70 - 3.10 10*3/mm3    Monocytes, Absolute 0.85 0.10 - 0.90 10*3/mm3    Eosinophils, Absolute 0.97 (H) 0.00 - 0.40 10*3/mm3    Basophils, Absolute 0.08 0.00 - 0.20 10*3/mm3    Immature Grans, Absolute 0.16 (H) 0.00 - 0.05 10*3/mm3    nRBC 0.0 0.0 - 0.2 /100 WBC   D-dimer, Quantitative    Collection Time: 05/13/25  5:10 AM    Specimen: Blood   Result Value Ref Range    D-Dimer, Quantitative 14.28 (H) 0.00 -  0.67 MCGFEU/mL   Protime-INR    Collection Time: 05/13/25  5:10 AM    Specimen: Blood   Result Value Ref Range    Protime 13.7 12.2 - 15.3 Seconds    INR 0.99 0.89 - 1.12   aPTT    Collection Time: 05/13/25  5:10 AM    Specimen: Blood   Result Value Ref Range    PTT 28.3 (L) 60.0 - 90.0 seconds   Respiratory Panel PCR w/COVID-19(SARS-CoV-2) CINDY/SUDHAKAR/SHREYA/PAD/COR/NATALY In-House, NP Swab in UTM/VTM, 2 HR TAT - Swab, Nasopharynx    Collection Time: 05/13/25  5:13 AM    Specimen: Nasopharynx; Swab   Result Value Ref Range    ADENOVIRUS, PCR Not Detected Not Detected    Coronavirus 229E Not Detected Not Detected    Coronavirus HKU1 Not Detected Not Detected    Coronavirus NL63 Not Detected Not Detected    Coronavirus OC43 Not Detected Not Detected    COVID19 Not Detected Not Detected - Ref. Range    Human Metapneumovirus Not Detected Not Detected    Human Rhinovirus/Enterovirus Not Detected Not Detected    Influenza A PCR Not Detected Not Detected    Influenza B PCR Not Detected Not Detected    Parainfluenza Virus 1 Not Detected Not Detected    Parainfluenza Virus 2 Not Detected Not Detected    Parainfluenza Virus 3 Not Detected Not Detected    Parainfluenza Virus 4 Not Detected Not Detected    RSV, PCR Not Detected Not Detected    Bordetella pertussis pcr Not Detected Not Detected    Bordetella parapertussis PCR Not Detected Not Detected    Chlamydophila pneumoniae PCR Not Detected Not Detected    Mycoplasma pneumo by PCR Not Detected Not Detected   ECG 12 Lead QT Measurement    Collection Time: 05/13/25  6:10 AM   Result Value Ref Range    QT Interval 360 ms    QTC Interval 454 ms   High Sensitivity Troponin T 1Hr    Collection Time: 05/13/25  6:42 AM    Specimen: Blood   Result Value Ref Range    HS Troponin T 15 (H) <14 ng/L    Troponin T Numeric Delta 6 (C) Abnormal if >/=3 ng/L     Note: In addition to lab results from this visit, the labs listed above may include labs taken at another facility or during a different  encounter within the last 24 hours. Please correlate lab times with ED admission and discharge times for further clarification of the services performed during this visit.    CT Angiogram Chest Pulmonary Embolism   Final Result   Impression:   1.Small segmental and subsegmental pulmonary emboli of the left upper lobe, left lower lobe and right upper lobe.   2.Moderate right pleural effusion with right basilar atelectasis.   3.Posttreatment changes of the right upper lobe pulmonary nodule. The spiculated component appears to be slightly larger than previously seen.   4.Multiple new low-density hepatic lesions are suspicious for metastatic disease.                  Electronically Signed: Rodriguez Singh MD     5/13/2025 7:07 AM EDT     Workstation ID: [id number]      XR Chest 1 View   Final Result   Impression:   New right-sided pleural fluid collection with right lower lobe airspace disease likely atelectasis.               Electronically Signed: Rodriguez Singh MD     5/13/2025 5:41 AM EDT     Workstation ID: [id number]        Vitals:    05/13/25 0600 05/13/25 0630 05/13/25 0700 05/13/25 0730   BP: 129/84      BP Location:       Patient Position:       Pulse: 99 89 92 96   Resp:       Temp:       TempSrc:       SpO2: 97% 98% 97% 95%   Weight:       Height:         Medications   sodium chloride 0.9 % flush 10 mL (has no administration in time range)   heparin (porcine) injection 4,860 Units (has no administration in time range)   heparin 80695 units/250 mL (100 units/mL) in 0.45 % NaCl infusion (has no administration in time range)   heparin (porcine) injection 3,040 Units (has no administration in time range)   heparin (porcine) injection 1,520 Units (has no administration in time range)   Pharmacy to Dose Heparin (has no administration in time range)   iopamidol (ISOVUE-370) 76 % injection 100 mL (75 mL Intravenous Given 5/13/25 0648)     ECG/EMG Results (last 24 hours)       Procedure Component Value Units Date/Time    ECG  12 Lead QT Measurement [[id number]] Collected: 05/13/25 0453     Updated: 05/13/25 0453     QT Interval 322 ms      QTC Interval 433 ms     Narrative:      Test Reason : QT Measurement  Blood Pressure :   */*   mmHG  Vent. Rate : 109 BPM     Atrial Rate : 109 BPM     P-R Int : 118 ms          QRS Dur :  76 ms      QT Int : 322 ms       P-R-T Axes :  66 -79  56 degrees    QTcB Int : 433 ms    Sinus tachycardia  Left anterior fascicular block  Nonspecific ST and T wave abnormality  Abnormal ECG  When compared with ECG of 12-Apr-2025 12:14, (Unconfirmed)  Vent. rate has increased by  37 bpm  Left anterior fascicular block is now present  ST no longer elevated in Lateral leads    Referred By: fabricio           Confirmed By:           ECG 12 Lead QT Measurement   Preliminary Result   Test Reason : QT Measurement   Blood Pressure :   */*   mmHG   Vent. Rate :  96 BPM     Atrial Rate :  96 BPM      P-R Int : 118 ms          QRS Dur :  80 ms       QT Int : 360 ms       P-R-T Axes :  41 -23  25 degrees     QTcB Int : 454 ms      Normal sinus rhythm   Normal ECG   When compared with ECG of 13-May-2025 04:53, (Unconfirmed)   Left anterior fascicular block is no longer present   ST no longer depressed in Inferior leads   Nonspecific T wave abnormality, improved in Inferior leads      Referred By: fabricio           Confirmed By:       ECG 12 Lead QT Measurement   Preliminary Result   Test Reason : QT Measurement   Blood Pressure :   */*   mmHG   Vent. Rate : 109 BPM     Atrial Rate : 109 BPM      P-R Int : 118 ms          QRS Dur :  76 ms       QT Int : 322 ms       P-R-T Axes :  66 -79  56 degrees     QTcB Int : 433 ms      Sinus tachycardia   Left anterior fascicular block   Nonspecific ST and T wave abnormality   Abnormal ECG   When compared with ECG of 12-Apr-2025 12:14, (Unconfirmed)   Vent. rate has increased by  37 bpm   Left anterior fascicular block is now present   ST no longer elevated in Lateral leads      Referred By: fabricio            Confirmed By:               Final diagnoses:   Acute respiratory failure with hypoxia   Shortness of breath   Elevated d-dimer   Pleural effusion, right   Atypical chest pain   Other acute pulmonary embolism without acute cor pulmonale       ED Disposition  ED Disposition       ED Disposition   Decision to Admit    Condition   --    Comment   Level of Care: Telemetry [5]   Diagnosis: Pulmonary embolism [[id number]]   Admitting Physician: MARY LOU GARCIA [[id number]]   Attending Physician: MARY LOU GARCIA [[id number]]   Certification: I Certify That Inpatient Hospital Services Are Medically Necessary For Greater Than 2 Midnights                 No follow-up provider specified.       Medication List      No changes were made to your prescriptions during this visit.            Anat Hewitt MD  05/13/25 0768       Anat Hewitt MD  05/14/25 4911

## 2025-05-14 ENCOUNTER — APPOINTMENT (OUTPATIENT)
Dept: INTERVENTIONAL RADIOLOGY/VASCULAR | Facility: HOSPITAL | Age: 65
DRG: 175 | End: 2025-05-14
Payer: COMMERCIAL

## 2025-05-14 ENCOUNTER — APPOINTMENT (OUTPATIENT)
Dept: CARDIOLOGY | Facility: HOSPITAL | Age: 65
DRG: 175 | End: 2025-05-14
Payer: COMMERCIAL

## 2025-05-14 ENCOUNTER — APPOINTMENT (OUTPATIENT)
Dept: MRI IMAGING | Facility: HOSPITAL | Age: 65
DRG: 175 | End: 2025-05-14
Payer: COMMERCIAL

## 2025-05-14 ENCOUNTER — APPOINTMENT (OUTPATIENT)
Dept: GENERAL RADIOLOGY | Facility: HOSPITAL | Age: 65
DRG: 175 | End: 2025-05-14
Payer: COMMERCIAL

## 2025-05-14 DIAGNOSIS — C34.91 ADENOCARCINOMA OF RIGHT LUNG: Primary | ICD-10-CM

## 2025-05-14 LAB
ALBUMIN FLD-MCNC: 3 G/DL
AMYLASE FLD-CCNC: 34 U/L
ANION GAP SERPL CALCULATED.3IONS-SCNC: 14 MMOL/L (ref 5–15)
AORTIC DIMENSIONLESS INDEX: 0.89 (DI)
APPEARANCE FLD: ABNORMAL
AV MEAN PRESS GRAD SYS DOP V1V2: 4 MMHG
AV VMAX SYS DOP: 133 CM/SEC
BASOPHILS # BLD AUTO: 0.09 10*3/MM3 (ref 0–0.2)
BASOPHILS NFR BLD AUTO: 0.9 % (ref 0–1.5)
BH CV ECHO MEAS - AO MAX PG: 7.1 MMHG
BH CV ECHO MEAS - AO ROOT DIAM: 3.1 CM
BH CV ECHO MEAS - AO V2 VTI: 20.7 CM
BH CV ECHO MEAS - AVA(I,D): 2.8 CM2
BH CV ECHO MEAS - EDV(CUBED): 46.7 ML
BH CV ECHO MEAS - EDV(MOD-SP2): 56.1 ML
BH CV ECHO MEAS - EDV(MOD-SP4): 50.5 ML
BH CV ECHO MEAS - EF(MOD-SP2): 65.6 %
BH CV ECHO MEAS - EF(MOD-SP4): 68.7 %
BH CV ECHO MEAS - ESV(CUBED): 9.3 ML
BH CV ECHO MEAS - ESV(MOD-SP2): 19.3 ML
BH CV ECHO MEAS - ESV(MOD-SP4): 15.8 ML
BH CV ECHO MEAS - FS: 41.7 %
BH CV ECHO MEAS - IVS/LVPW: 0.88 CM
BH CV ECHO MEAS - IVSD: 0.7 CM
BH CV ECHO MEAS - LA DIMENSION: 3.1 CM
BH CV ECHO MEAS - LAT PEAK E' VEL: 11.2 CM/SEC
BH CV ECHO MEAS - LV DIASTOLIC VOL/BSA (35-75): 31.7 CM2
BH CV ECHO MEAS - LV MASS(C)D: 72.1 GRAMS
BH CV ECHO MEAS - LV MAX PG: 4.3 MMHG
BH CV ECHO MEAS - LV MEAN PG: 2.33 MMHG
BH CV ECHO MEAS - LV SYSTOLIC VOL/BSA (12-30): 9.9 CM2
BH CV ECHO MEAS - LV V1 MAX: 102.8 CM/SEC
BH CV ECHO MEAS - LV V1 VTI: 18.4 CM
BH CV ECHO MEAS - LVIDD: 3.6 CM
BH CV ECHO MEAS - LVIDS: 2.1 CM
BH CV ECHO MEAS - LVOT AREA: 3.1 CM2
BH CV ECHO MEAS - LVOT DIAM: 2 CM
BH CV ECHO MEAS - LVPWD: 0.8 CM
BH CV ECHO MEAS - MED PEAK E' VEL: 7.9 CM/SEC
BH CV ECHO MEAS - MV A MAX VEL: 117 CM/SEC
BH CV ECHO MEAS - MV DEC SLOPE: 688 CM/SEC2
BH CV ECHO MEAS - MV DEC TIME: 0.14 SEC
BH CV ECHO MEAS - MV E MAX VEL: 94.4 CM/SEC
BH CV ECHO MEAS - MV E/A: 0.81
BH CV ECHO MEAS - MV P1/2T: 53.2 MSEC
BH CV ECHO MEAS - MVA(P1/2T): 4.1 CM2
BH CV ECHO MEAS - SV(LVOT): 57.9 ML
BH CV ECHO MEAS - SV(MOD-SP2): 36.8 ML
BH CV ECHO MEAS - SV(MOD-SP4): 34.7 ML
BH CV ECHO MEAS - SVI(LVOT): 36.3 ML/M2
BH CV ECHO MEAS - SVI(MOD-SP2): 23.1 ML/M2
BH CV ECHO MEAS - SVI(MOD-SP4): 21.8 ML/M2
BH CV ECHO MEAS - TAPSE (>1.6): 1.79 CM
BH CV ECHO MEASUREMENTS AVERAGE E/E' RATIO: 9.88
BH CV LOW VAS LEFT PERONEAL VESSEL: 1
BH CV LOWER VASCULAR LEFT COMMON FEMORAL COMPRESS: NORMAL
BH CV LOWER VASCULAR LEFT COMMON FEMORAL PHASIC: NORMAL
BH CV LOWER VASCULAR LEFT COMMON FEMORAL SPONT: NORMAL
BH CV LOWER VASCULAR LEFT DISTAL FEMORAL COMPRESS: NORMAL
BH CV LOWER VASCULAR LEFT DISTAL FEMORAL PHASIC: NORMAL
BH CV LOWER VASCULAR LEFT DISTAL FEMORAL SPONT: NORMAL
BH CV LOWER VASCULAR LEFT GASTRONEMIUS COMPRESS: NORMAL
BH CV LOWER VASCULAR LEFT GREATER SAPH AK COMPRESS: NORMAL
BH CV LOWER VASCULAR LEFT GREATER SAPH BK COMPRESS: NORMAL
BH CV LOWER VASCULAR LEFT LESSER SAPH COMPRESS: NORMAL
BH CV LOWER VASCULAR LEFT MID FEMORAL COMPRESS: NORMAL
BH CV LOWER VASCULAR LEFT MID FEMORAL PHASIC: NORMAL
BH CV LOWER VASCULAR LEFT MID FEMORAL SPONT: NORMAL
BH CV LOWER VASCULAR LEFT PERONEAL COMPRESS: NORMAL
BH CV LOWER VASCULAR LEFT PERONEAL THROMBUS: NORMAL
BH CV LOWER VASCULAR LEFT POPLITEAL COMPRESS: NORMAL
BH CV LOWER VASCULAR LEFT POPLITEAL PHASIC: NORMAL
BH CV LOWER VASCULAR LEFT POPLITEAL SPONT: NORMAL
BH CV LOWER VASCULAR LEFT POSTERIOR TIBIAL COMPRESS: NORMAL
BH CV LOWER VASCULAR LEFT PROFUNDA FEMORAL PHASIC: NORMAL
BH CV LOWER VASCULAR LEFT PROFUNDA FEMORAL SPONT: NORMAL
BH CV LOWER VASCULAR LEFT PROXIMAL FEMORAL COMPRESS: NORMAL
BH CV LOWER VASCULAR LEFT PROXIMAL FEMORAL PHASIC: NORMAL
BH CV LOWER VASCULAR LEFT PROXIMAL FEMORAL SPONT: NORMAL
BH CV LOWER VASCULAR LEFT SAPHENOFEMORAL JUNCTION COMPRESS: NORMAL
BH CV LOWER VASCULAR LEFT SAPHENOFEMORAL JUNCTION PHASIC: NORMAL
BH CV LOWER VASCULAR LEFT SAPHENOFEMORAL JUNCTION SPONT: NORMAL
BH CV LOWER VASCULAR RIGHT COMMON FEMORAL COMPRESS: NORMAL
BH CV LOWER VASCULAR RIGHT COMMON FEMORAL PHASIC: NORMAL
BH CV LOWER VASCULAR RIGHT COMMON FEMORAL SPONT: NORMAL
BH CV LOWER VASCULAR RIGHT DISTAL FEMORAL COMPRESS: NORMAL
BH CV LOWER VASCULAR RIGHT DISTAL FEMORAL PHASIC: NORMAL
BH CV LOWER VASCULAR RIGHT DISTAL FEMORAL SPONT: NORMAL
BH CV LOWER VASCULAR RIGHT GASTRONEMIUS COMPRESS: NORMAL
BH CV LOWER VASCULAR RIGHT GREATER SAPH AK COMPRESS: NORMAL
BH CV LOWER VASCULAR RIGHT GREATER SAPH BK COMPRESS: NORMAL
BH CV LOWER VASCULAR RIGHT LESSER SAPH COMPRESS: NORMAL
BH CV LOWER VASCULAR RIGHT MID FEMORAL COMPRESS: NORMAL
BH CV LOWER VASCULAR RIGHT MID FEMORAL PHASIC: NORMAL
BH CV LOWER VASCULAR RIGHT MID FEMORAL SPONT: NORMAL
BH CV LOWER VASCULAR RIGHT PERONEAL COMPRESS: NORMAL
BH CV LOWER VASCULAR RIGHT POPLITEAL COMPRESS: NORMAL
BH CV LOWER VASCULAR RIGHT POPLITEAL PHASIC: NORMAL
BH CV LOWER VASCULAR RIGHT POPLITEAL SPONT: NORMAL
BH CV LOWER VASCULAR RIGHT POSTERIOR TIBIAL COMPRESS: NORMAL
BH CV LOWER VASCULAR RIGHT PROFUNDA FEMORAL PHASIC: NORMAL
BH CV LOWER VASCULAR RIGHT PROFUNDA FEMORAL SPONT: NORMAL
BH CV LOWER VASCULAR RIGHT PROXIMAL FEMORAL COMPRESS: NORMAL
BH CV LOWER VASCULAR RIGHT PROXIMAL FEMORAL PHASIC: NORMAL
BH CV LOWER VASCULAR RIGHT PROXIMAL FEMORAL SPONT: NORMAL
BH CV LOWER VASCULAR RIGHT SAPHENOFEMORAL JUNCTION COMPRESS: NORMAL
BH CV LOWER VASCULAR RIGHT SAPHENOFEMORAL JUNCTION PHASIC: NORMAL
BH CV LOWER VASCULAR RIGHT SAPHENOFEMORAL JUNCTION SPONT: NORMAL
BH CV VAS PRELIMINARY FINDINGS SCRIPTING: 1
BH CV XLRA - RV BASE: 2.5 CM
BH CV XLRA - RV LENGTH: 4.9 CM
BH CV XLRA - RV MID: 2.18 CM
BH CV XLRA - TDI S': 12.6 CM/SEC
BUN SERPL-MCNC: 13 MG/DL (ref 8–23)
BUN/CREAT SERPL: 12.4 (ref 7–25)
CALCIUM SPEC-SCNC: 9.3 MG/DL (ref 8.6–10.5)
CHLORIDE SERPL-SCNC: 103 MMOL/L (ref 98–107)
CHOLESTEROL FLUID: 92 MG/DL
CO2 SERPL-SCNC: 23 MMOL/L (ref 22–29)
COLOR FLD: YELLOW
CREAT SERPL-MCNC: 1.05 MG/DL (ref 0.57–1)
DEPRECATED RDW RBC AUTO: 43.5 FL (ref 37–54)
EGFRCR SERPLBLD CKD-EPI 2021: 58.4 ML/MIN/1.73
EOSINOPHIL # BLD AUTO: 1.15 10*3/MM3 (ref 0–0.4)
EOSINOPHIL NFR BLD AUTO: 11.4 % (ref 0.3–6.2)
EOSINOPHIL NFR FLD MANUAL: 3 %
ERYTHROCYTE [DISTWIDTH] IN BLOOD BY AUTOMATED COUNT: 13.2 % (ref 12.3–15.4)
GLUCOSE FLD-MCNC: 100 MG/DL
GLUCOSE SERPL-MCNC: 99 MG/DL (ref 65–99)
HCT VFR BLD AUTO: 36.3 % (ref 34–46.6)
HGB BLD-MCNC: 11.7 G/DL (ref 12–15.9)
IMM GRANULOCYTES # BLD AUTO: 0.13 10*3/MM3 (ref 0–0.05)
IMM GRANULOCYTES NFR BLD AUTO: 1.3 % (ref 0–0.5)
LDH FLD-CCNC: 332 U/L
LEFT ATRIUM VOLUME INDEX: 14.9 ML/M2
LV EF BIPLANE MOD: 68 %
LYMPHOCYTES # BLD AUTO: 0.8 10*3/MM3 (ref 0.7–3.1)
LYMPHOCYTES NFR BLD AUTO: 7.9 % (ref 19.6–45.3)
LYMPHOCYTES NFR FLD MANUAL: 37 %
MACROPHAGE FLUID %: 18 %
MAGNESIUM SERPL-MCNC: 2 MG/DL (ref 1.6–2.4)
MCH RBC QN AUTO: 29 PG (ref 26.6–33)
MCHC RBC AUTO-ENTMCNC: 32.2 G/DL (ref 31.5–35.7)
MCV RBC AUTO: 89.9 FL (ref 79–97)
MESOTHL CELL NFR FLD MANUAL: 15 %
MONOCYTES # BLD AUTO: 0.85 10*3/MM3 (ref 0.1–0.9)
MONOCYTES NFR BLD AUTO: 8.4 % (ref 5–12)
MONOCYTES NFR FLD: 14 %
NEUTROPHILS NFR BLD AUTO: 7.05 10*3/MM3 (ref 1.7–7)
NEUTROPHILS NFR BLD AUTO: 70.1 % (ref 42.7–76)
NEUTROPHILS NFR FLD MANUAL: 13 %
NRBC BLD AUTO-RTO: 0 /100 WBC (ref 0–0.2)
PH FLD: 7.55 [PH]
PLATELET # BLD AUTO: 334 10*3/MM3 (ref 140–450)
PMV BLD AUTO: 9 FL (ref 6–12)
POTASSIUM SERPL-SCNC: 4.1 MMOL/L (ref 3.5–5.2)
PROT FLD-MCNC: 4.5 G/DL
QT INTERVAL: 322 MS
QT INTERVAL: 360 MS
QTC INTERVAL: 433 MS
QTC INTERVAL: 454 MS
RBC # BLD AUTO: 4.04 10*6/MM3 (ref 3.77–5.28)
RBC # FLD AUTO: 2000 /MM3
SODIUM SERPL-SCNC: 140 MMOL/L (ref 136–145)
TRIGL FLD-MCNC: 28 MG/DL
UFH PPP CHRO-ACNC: 0.56 IU/ML (ref 0.3–0.7)
WBC # FLD AUTO: 581 /MM3
WBC NRBC COR # BLD AUTO: 10.07 10*3/MM3 (ref 3.4–10.8)

## 2025-05-14 PROCEDURE — 93306 TTE W/DOPPLER COMPLETE: CPT

## 2025-05-14 PROCEDURE — C1729 CATH, DRAINAGE: HCPCS | Performed by: STUDENT IN AN ORGANIZED HEALTH CARE EDUCATION/TRAINING PROGRAM

## 2025-05-14 PROCEDURE — 87206 SMEAR FLUORESCENT/ACID STAI: CPT

## 2025-05-14 PROCEDURE — 97165 OT EVAL LOW COMPLEX 30 MIN: CPT

## 2025-05-14 PROCEDURE — 83735 ASSAY OF MAGNESIUM: CPT

## 2025-05-14 PROCEDURE — 87205 SMEAR GRAM STAIN: CPT

## 2025-05-14 PROCEDURE — 85025 COMPLETE CBC W/AUTO DIFF WBC: CPT

## 2025-05-14 PROCEDURE — 25010000002 HEPARIN (PORCINE) 25000-0.45 UT/250ML-% SOLUTION

## 2025-05-14 PROCEDURE — 87116 MYCOBACTERIA CULTURE: CPT

## 2025-05-14 PROCEDURE — 87015 SPECIMEN INFECT AGNT CONCNTJ: CPT

## 2025-05-14 PROCEDURE — 80048 BASIC METABOLIC PNL TOTAL CA: CPT

## 2025-05-14 PROCEDURE — 87070 CULTURE OTHR SPECIMN AEROBIC: CPT

## 2025-05-14 PROCEDURE — 87102 FUNGUS ISOLATION CULTURE: CPT

## 2025-05-14 PROCEDURE — 93970 EXTREMITY STUDY: CPT | Performed by: INTERNAL MEDICINE

## 2025-05-14 PROCEDURE — 0W993ZZ DRAINAGE OF RIGHT PLEURAL CAVITY, PERCUTANEOUS APPROACH: ICD-10-PCS | Performed by: STUDENT IN AN ORGANIZED HEALTH CARE EDUCATION/TRAINING PROGRAM

## 2025-05-14 PROCEDURE — 71045 X-RAY EXAM CHEST 1 VIEW: CPT

## 2025-05-14 PROCEDURE — 32555 ASPIRATE PLEURA W/ IMAGING: CPT | Performed by: STUDENT IN AN ORGANIZED HEALTH CARE EDUCATION/TRAINING PROGRAM

## 2025-05-14 PROCEDURE — 82945 GLUCOSE OTHER FLUID: CPT

## 2025-05-14 PROCEDURE — 84478 ASSAY OF TRIGLYCERIDES: CPT

## 2025-05-14 PROCEDURE — 93306 TTE W/DOPPLER COMPLETE: CPT | Performed by: INTERNAL MEDICINE

## 2025-05-14 PROCEDURE — 99232 SBSQ HOSP IP/OBS MODERATE 35: CPT | Performed by: INTERNAL MEDICINE

## 2025-05-14 PROCEDURE — 63710000001 REVEFENACIN 175 MCG/3ML SOLUTION

## 2025-05-14 PROCEDURE — 94799 UNLISTED PULMONARY SVC/PX: CPT

## 2025-05-14 PROCEDURE — 85520 HEPARIN ASSAY: CPT

## 2025-05-14 PROCEDURE — 99232 SBSQ HOSP IP/OBS MODERATE 35: CPT | Performed by: NURSE PRACTITIONER

## 2025-05-14 PROCEDURE — 97161 PT EVAL LOW COMPLEX 20 MIN: CPT

## 2025-05-14 PROCEDURE — 25010000002 LIDOCAINE PF 1% 1 % SOLUTION

## 2025-05-14 PROCEDURE — 94664 DEMO&/EVAL PT USE INHALER: CPT

## 2025-05-14 PROCEDURE — 84311 SPECTROPHOTOMETRY: CPT

## 2025-05-14 PROCEDURE — 83986 ASSAY PH BODY FLUID NOS: CPT

## 2025-05-14 PROCEDURE — 94761 N-INVAS EAR/PLS OXIMETRY MLT: CPT

## 2025-05-14 PROCEDURE — 93970 EXTREMITY STUDY: CPT

## 2025-05-14 PROCEDURE — 76942 ECHO GUIDE FOR BIOPSY: CPT

## 2025-05-14 PROCEDURE — 82150 ASSAY OF AMYLASE: CPT

## 2025-05-14 PROCEDURE — 89051 BODY FLUID CELL COUNT: CPT

## 2025-05-14 PROCEDURE — 84157 ASSAY OF PROTEIN OTHER: CPT

## 2025-05-14 PROCEDURE — 83615 LACTATE (LD) (LDH) ENZYME: CPT

## 2025-05-14 PROCEDURE — 82042 OTHER SOURCE ALBUMIN QUAN EA: CPT

## 2025-05-14 PROCEDURE — 87075 CULTR BACTERIA EXCEPT BLOOD: CPT

## 2025-05-14 RX ORDER — LIDOCAINE HYDROCHLORIDE 10 MG/ML
INJECTION, SOLUTION EPIDURAL; INFILTRATION; INTRACAUDAL; PERINEURAL
Status: COMPLETED
Start: 2025-05-14 | End: 2025-05-14

## 2025-05-14 RX ORDER — MONTELUKAST SODIUM 10 MG/1
10 TABLET ORAL DAILY
Status: DISCONTINUED | OUTPATIENT
Start: 2025-05-14 | End: 2025-05-16 | Stop reason: HOSPADM

## 2025-05-14 RX ADMIN — ATORVASTATIN CALCIUM 10 MG: 10 TABLET, FILM COATED ORAL at 20:19

## 2025-05-14 RX ADMIN — LEVOTHYROXINE SODIUM 88 MCG: 0.09 TABLET ORAL at 06:05

## 2025-05-14 RX ADMIN — DICLOFENAC SODIUM 2 G: 10 GEL TOPICAL at 13:56

## 2025-05-14 RX ADMIN — IPRATROPIUM BROMIDE AND ALBUTEROL SULFATE 3 ML: 2.5; .5 SOLUTION RESPIRATORY (INHALATION) at 07:25

## 2025-05-14 RX ADMIN — SERTRALINE 100 MG: 100 TABLET, FILM COATED ORAL at 10:11

## 2025-05-14 RX ADMIN — TRAZODONE HYDROCHLORIDE 150 MG: 50 TABLET ORAL at 20:19

## 2025-05-14 RX ADMIN — Medication 10 ML: at 10:12

## 2025-05-14 RX ADMIN — DICLOFENAC SODIUM 2 G: 10 GEL TOPICAL at 18:07

## 2025-05-14 RX ADMIN — ACETAMINOPHEN 650 MG: 325 TABLET ORAL at 20:41

## 2025-05-14 RX ADMIN — FAMOTIDINE 20 MG: 20 TABLET, FILM COATED ORAL at 18:07

## 2025-05-14 RX ADMIN — FAMOTIDINE 20 MG: 20 TABLET, FILM COATED ORAL at 10:10

## 2025-05-14 RX ADMIN — DICLOFENAC SODIUM 2 G: 10 GEL TOPICAL at 10:12

## 2025-05-14 RX ADMIN — BUDESONIDE AND FORMOTEROL FUMARATE DIHYDRATE 2 PUFF: 160; 4.5 AEROSOL RESPIRATORY (INHALATION) at 07:25

## 2025-05-14 RX ADMIN — HEPARIN SODIUM 18 UNITS/KG/HR: 10000 INJECTION, SOLUTION INTRAVENOUS at 13:46

## 2025-05-14 RX ADMIN — Medication 10 MG: at 20:19

## 2025-05-14 RX ADMIN — Medication 10 ML: at 20:19

## 2025-05-14 RX ADMIN — MONTELUKAST 10 MG: 10 TABLET, FILM COATED ORAL at 10:11

## 2025-05-14 RX ADMIN — REVEFENACIN 175 MCG: 175 SOLUTION RESPIRATORY (INHALATION) at 07:25

## 2025-05-14 RX ADMIN — GUAIFENESIN 600 MG: 600 TABLET, EXTENDED RELEASE ORAL at 20:19

## 2025-05-14 RX ADMIN — LIDOCAINE HYDROCHLORIDE 7 ML: 10 INJECTION, SOLUTION EPIDURAL; INFILTRATION; INTRACAUDAL; PERINEURAL at 13:13

## 2025-05-14 RX ADMIN — CLONAZEPAM 1 MG: 1 TABLET ORAL at 22:30

## 2025-05-14 RX ADMIN — ACETAMINOPHEN 650 MG: 325 TABLET ORAL at 10:10

## 2025-05-14 RX ADMIN — HEPARIN SODIUM 18 UNITS/KG/HR: 10000 INJECTION, SOLUTION INTRAVENOUS at 04:40

## 2025-05-14 RX ADMIN — GUAIFENESIN 600 MG: 600 TABLET, EXTENDED RELEASE ORAL at 10:11

## 2025-05-14 NOTE — PLAN OF CARE
Goal Outcome Evaluation:  Plan of Care Reviewed With: patient, spouse        Progress: no change  Outcome Evaluation: OT demar complete. Pt presents below baseline with decreased strength, activity tolerance, balance deficits, and impulsivity/limited safety awareness. Pt and spouse educated on fall prevention. Recommend IPOT POC and home with assist at D/C.    Anticipated Discharge Disposition (OT): home with assist

## 2025-05-14 NOTE — THERAPY EVALUATION
Patient Name: Sydnie Gamble  : 1960    MRN: [id number]                              Today's Date: 2025       Admit Date: 2025    Visit Dx:     ICD-10-CM ICD-9-CM   1. Acute respiratory failure with hypoxia  J96.01 518.81   2. Shortness of breath  R06.02 786.05   3. Elevated d-dimer  R79.89 790.92   4. Pleural effusion, right  J90 511.9   5. Atypical chest pain  R07.89 786.59   6. Other acute pulmonary embolism without acute cor pulmonale  I26.99 415.19     Patient Active Problem List   Diagnosis    Personal history of tobacco use, presenting hazards to health    Gastroesophageal reflux disease without esophagitis    Acquired hypothyroidism    Vitamin D deficiency    Prediabetes    COPD with asthma    ALESIA (generalized anxiety disorder)    Multiple lung nodules on CT    Hilar adenopathy    HLD (hyperlipidemia)    Cervical cancer    Atherosclerotic cardiovascular disease    Adenocarcinoma of right lung    Pulmonary embolism    Anemia, chronic disease    Pleural effusion     Past Medical History:   Diagnosis Date    Acid reflux     Acid reflux     Adenocarcinoma of lung 2024    Anemia, chronic disease 2025    Anxiety 1976    Arthritis     Atherosclerotic cardiovascular disease 2024    Cervical cancer     Depression     Emphysema of lung     History of radiation therapy 2020    pelvis + intracavitary brachytherapy    History of radiation therapy 10/04/2024    RUL lung    Hx of radiation therapy     Hyperlipidemia     Hypothyroidism     Kidney disease     Lung cancer     Lung nodule     Ovarian cyst 1980s    Pleural effusion 2025    Pulmonary embolism 2025    Visual impairment corrected with glasses    Wears dentures     Wears glasses      Past Surgical History:   Procedure Laterality Date    BRONCHOSCOPY      BRONCHOSCOPY WITH ION ROBOTIC ASSIST N/A 2024    Procedure: BRONCHOSCOPY NAVIGATION WITH ENDOBRONCHIAL ULTRASOUND AND ION ROBOT;  Surgeon: Stas  Toni MERCEDES MD;  Location:  [facility];  Service: Robotics - Pulmonary;  Laterality: N/A;    COLONOSCOPY      HYSTEROSCOPY      LUNG BIOPSY      LYMPH NODE BIOPSY      MOHS SURGERY  03/24/2025    Groin    SUBTOTAL HYSTERECTOMY  1987?    ovary/fallopian tube removed    TOTAL ABDOMINAL HYSTERECTOMY PELVIC NODE DISSECTION N/A 08/18/2020    Procedure: TOTAL RADICAL HYSTERECTOMY PELVIC NODE DISSECTION;  Surgeon: Laura Jimenez MD;  Location:  [facility];  Service: Gynecology Oncology;  Laterality: N/A;    TUBAL ABDOMINAL LIGATION      right with ovarian dissection       General Information       Row Name 05/14/25 1145          Physical Therapy Time and Intention    Document Type evaluation  -KE     Mode of Treatment physical therapy;co-treatment  -KE       Row Name 05/14/25 1145          General Information    Patient Profile Reviewed yes  -KE     Prior Level of Function independent:;all household mobility;driving;ADL's  limited community ambulator, no AD/DME baseline, denies recent falls  -KE     Existing Precautions/Restrictions fall;oxygen therapy device and L/min  -KE     Barriers to Rehab medically complex;previous functional deficit  -KE       Row Name 05/14/25 1145          Living Environment    Current Living Arrangements home  -KE     People in Home spouse  -KE       Row Name 05/14/25 1145          Home Main Entrance    Number of Stairs, Main Entrance one  -KE     Stair Railings, Main Entrance none  -KE       Row Name 05/14/25 1145          Stairs Within Home, Primary    Number of Stairs, Within Home, Primary none  -KE       Row Name 05/14/25 1145          Safety Issues/Impairments Affecting Functional Mobility    Safety Issues Affecting Function (Mobility) ability to follow commands;awareness of need for assistance;safety precaution awareness;safety precautions follow-through/compliance;insight into deficits/self-awareness;impulsivity;problem-solving  -KE     Impairments Affecting Function (Mobility)  balance;cognition;endurance/activity tolerance;shortness of breath;strength;pain  -KE     Cognitive Impairments, Mobility Safety/Performance attention;awareness, need for assistance;safety precaution awareness;insight into deficits/self-awareness;problem-solving/reasoning  -KE               User Key  (r) = Recorded By, (t) = Taken By, (c) = Cosigned By      Initials Name Provider Type    Daphne Summers PT Physical Therapist                   Mobility       Row Name 05/14/25 1146          Bed Mobility    Bed Mobility supine-sit  -KE     Supine-Sit [place] (Bed Mobility) standby assist  -KE     Assistive Device (Bed Mobility) head of bed elevated  -KE       Row Name 05/14/25 1146          Sit-Stand Transfer    Sit-Stand [place] (Transfers) contact guard  -KE     Comment, (Sit-Stand Transfer) unsupported, mild posterior LOB upon stand which pt self corrected w/ posterior portion of LE pushed against EOB  -KE       Row Name 05/14/25 1146          Gait/Stairs (Locomotion)    [place] Level (Gait) minimum assist (75% patient effort);1 person assist  -KE     Assistive Device (Gait) other (see comments)  unsupported  -KE     Patient was able to Ambulate yes  -KE     Distance in Feet (Gait) 120  -KE     Deviations/Abnormal Patterns (Gait) bilateral deviations;base of support, narrow;stride length decreased;gait speed decreased  -KE     Bilateral Gait Deviations forward flexed posture  -KE     Comment, (Gait/Stairs) Demo step through gait pattern with slowed gait speed and narrow CORTES, noted to step across midline at times. Pt w/ multiple LOBs throughout gait req Hiren to correct. VCs throughout for attention to task and safety awareness. Further mobility limited by fatigue.  -KE               User Key  (r) = Recorded By, (t) = Taken By, (c) = Cosigned By      Initials Name Provider Type    Daphne Summers PT Physical Therapist                   Obj/Interventions       Row Name 05/14/25 1142           Range of Motion Comprehensive    General Range of Motion bilateral lower extremity ROM WFL  -KE       Row Name 05/14/25 1148          Strength Comprehensive (MMT)    General Manual Muscle Testing (MMT) Assessment lower extremity strength deficits identified  -KE     Comment, General Manual Muscle Testing (MMT) Assessment B LE grossly 4/5  -KE       Row Name 05/14/25 1148          Balance    Balance Assessment sitting static balance;sitting dynamic balance;standing static balance;standing dynamic balance  -KE     Static Sitting Balance standby assist  -KE     Dynamic Sitting Balance contact guard  -KE     Position, Sitting Balance sitting edge of bed  -KE     Static Standing Balance contact guard  -KE     Dynamic Standing Balance minimal assist  -KE     Position/Device Used, Standing Balance unsupported  -KE     Balance Interventions sitting;standing;sit to stand;supported;static;dynamic  -KE     Comment, Balance multi directional LOBs noted throughout  -       Row Name 05/14/25 1148          Sensory Assessment (Somatosensory)    Sensory Assessment (Somatosensory) LE sensation intact  -               User Key  (r) = Recorded By, (t) = Taken By, (c) = Cosigned By      Initials Name Provider Type    Daphne Summers, PT Physical Therapist                   Goals/Plan       Row Name 05/14/25 1307          Bed Mobility Goal 1 (PT)    Activity/Assistive Device (Bed Mobility Goal 1, PT) sit to supine/supine to sit  -KE     [place] Level/Cues Needed (Bed Mobility Goal 1, PT) independent  -KE     Time Frame (Bed Mobility Goal 1, PT) short term goal (STG);5 days  -KE     Strategies/Barriers (Bed Mobility Goal 1, PT) HOB flat  -KE     Progress/Outcomes (Bed Mobility Goal 1, PT) new goal  -       Row Name 05/14/25 1307          Transfer Goal 1 (PT)    Activity/Assistive Device (Transfer Goal 1, PT) sit-to-stand/stand-to-sit;bed-to-chair/chair-to-bed  -KE     [place] Level/Cues Needed (Transfer Goal 1, PT)  independent  -KE     Time Frame (Transfer Goal 1, PT) long term goal (LTG);10 days  -KE     Progress/Outcome (Transfer Goal 1, PT) new goal  -KE       Row Name 05/14/25 1307          Gait Training Goal 1 (PT)    Activity/Assistive Device (Gait Training Goal 1, PT) gait (walking locomotion);assistive device use;improve balance and speed  -KE     Distance (Gait Training Goal 1, PT) 400  -KE     Time Frame (Gait Training Goal 1, PT) long term goal (LTG);10 days  -KE     Progress/Outcome (Gait Training Goal 1, PT) new goal  -KE       Row Name 05/14/25 1307          Stairs Goal 1 (PT)    Activity/Assistive Device (Stairs Goal 1, PT) stairs, all skills  -KE     [place] Level/Cues Needed (Stairs Goal 1, PT) standby assist  -KE     Number of Stairs (Stairs Goal 1, PT) 2  -KE     Time Frame (Stairs Goal 1, PT) long term goal (LTG);10 days  -KE     Progress/Outcome (Stairs Goal 1, PT) new [facility]  -       Row Name 05/14/25 1307          Therapy Assessment/Plan (PT)    Planned Therapy Interventions (PT) balance training;bed mobility training;gait training;home exercise program;neuromuscular re-education;postural re-education;patient/family education;transfer training;stretching;strengthening;stair training;ROM (range of motion)  -KE               User Key  (r) = Recorded By, (t) = Taken By, (c) = Cosigned By      Initials Name Provider Type    Daphne Summers, PT Physical Therapist                   Clinical Impression       Row Name 05/14/25 1151          Pain    Pain Location back  -KE     Pain Side/Orientation generalized  -KE     Additional Documentation Pain Scale: FACES Pre/Post-Treatment (Group)  -KE       Row Name 05/14/25 1151          Pain Scale: FACES Pre/Post-Treatment    Pain: FACES Scale, Pretreatment 2-->hurts little bit  -KE     Posttreatment Pain Rating 2-->hurts little bit  -KE       Row Name 05/14/25 1151          Plan of Care Review    Plan of Care Reviewed With patient;spouse  -KE     Outcome  Evaluation PT eval complete. Pt presents with balance deficits, generalized weakness, poor safety awareness, and  functional endurance warranting IPPT. Pt ambulating 120 ft unsupported w/ MiNAx1. Would benefit from trialing SPC in future gait training sessions. PT rec home w/ assist at d/c ;may benefit from OPPT if balance deficits persist.  -KE       Row Name 25 1151          Therapy Assessment/Plan (PT)    Patient/Family Therapy Goals Statement (PT) to go home  -KE     Rehab Potential (PT) fair  -KE     Criteria for Skilled Interventions Met (PT) yes;meets criteria  -KE     Therapy Frequency (PT) daily  -KE     Predicted Duration of Therapy Intervention (PT) 10 days  -KE       Row Name 25 1151          Vital Signs    Pre Systolic BP Rehab 140  -KE     Pre Treatment Diastolic BP 71  -KE     Pretreatment Heart Rate (beats/min) 103  -KE     Intratreatment Heart Rate (beats/min) 142  -KE     Posttreatment Heart Rate (beats/min) 113  -KE     Pre SpO2 (%) 95  -KE     O2 Delivery Pre Treatment room air  -KE     Intra SpO2 (%) 92  -KE     O2 Delivery Intra Treatment room air  -KE     Post SpO2 (%) 94  -KE     O2 Delivery Post Treatment room air  -KE     Pre Patient Position Supine  -KE     Intra Patient Position Standing  -KE     Post Patient Position Sitting  -KE       Row Name 25 1151          Positioning and Restraints    Pre-Treatment Position in bed  -KE     Post Treatment Position chair  -KE     In Chair notified nsg;reclined;waffle cushion;call light within reach;encouraged to call for assist;exit alarm on;legs elevated;with family/caregiver  -KE               User Key  (r) = Recorded By, (t) = Taken By, (c) = Cosigned By      Initials Name Provider Type    Daphne Summers, PT Physical Therapist                   Outcome Measures       Row Name 25 1306          How much help from another person do you currently need...    Turning from your back to your side while in flat bed  without using bedrails? 4  -KE     Moving from lying on back to sitting on the side of a flat bed without bedrails? 3  -KE     Moving to and from a bed to a chair (including a wheelchair)? 3  -KE     Standing up from a chair using your arms (e.g., wheelchair, bedside chair)? 3  -KE     Climbing 3-5 steps with a railing? 3  -KE     To walk in hospital room? 3  -KE     AM-PAC 6 Clicks Score (PT) 19  -KE     Highest Level of Mobility Goal Walk 10 Steps or More-6  -KE       Row Name 05/14/25 1309 05/14/25 1155       Functional Assessment    Outcome Measure Options AM-PAC 6 Clicks Basic Mobility (PT)  -KE AM-PAC 6 Clicks Daily Activity (OT)  -LR              User Key  (r) = Recorded By, (t) = Taken By, (c) = Cosigned By      Initials Name Provider Type    Daphne Summers, PT Physical Therapist    LR Mary Rangel, OT Occupational Therapist                                 Physical Therapy Education       Title: PT OT SLP Therapies (In Progress)       Topic: Physical Therapy (In Progress)       Point: Mobility training (In Progress)       Learning Progress Summary            Patient Acceptance, E, NR by TESSY at 5/14/2025 1309                      Point: Home exercise program (Not Started)       Learner Progress:  Not documented in this visit.              Point: Body mechanics (In Progress)       Learning Progress Summary            Patient Acceptance, E, NR by TESSY at 5/14/2025 1309                      Point: Precautions (In Progress)       Learning Progress Summary            Patient Acceptance, E, NR by  at 5/14/2025 1309                                      User Key       Initials Effective Dates Name Provider Type Discipline     11/16/23 -  Daphne Sullivan, PT Physical Therapist PT                  PT Recommendation and Plan  Planned Therapy Interventions (PT): balance training, bed mobility training, gait training, home exercise program, neuromuscular re-education, postural re-education, patient/family  education, transfer training, stretching, strengthening, stair training, ROM (range of motion)  Outcome Evaluation: PT eval complete. Pt presents with balance deficits, generalized weakness, poor safety awareness, and  functional endurance warranting IPPT. Pt ambulating 120 ft unsupported w/ MiNAx1. Would benefit from trialing SPC in future gait training sessions. PT rec home w/ assist at d/c ;may benefit from OPPT if balance deficits persist.     Time Calculation:   PT Evaluation Complexity  History, PT Evaluation Complexity: 1-2 personal factors and/or comorbidities  Examination of Body Systems (PT Eval Complexity): 1-2 elements  Clinical Presentation (PT Evaluation Complexity): stable  Clinical Decision Making (PT Evaluation Complexity): low complexity  Overall Complexity (PT Evaluation Complexity): low complexity     PT Charges       Row Name 25 1310             Time Calculation    Start Time 1008  -KE      PT Received On 25  -KE      PT Goal Re-Cert Due Date 25  -KE         Untimed Charges    PT Eval/Re-eval Minutes 52  -KE         Total Minutes    Untimed Charges Total Minutes 52  -KE       Total Minutes 52  -KE                User Key  (r) = Recorded By, (t) = Taken By, (c) = Cosigned By      Initials Name Provider Type    Daphne Summers PT Physical Therapist                  Therapy Charges for Today       Code Description Service Date Service Provider Modifiers Qty    [id number]  PT EVAL LOW COMPLEXITY 4 2025 Daphne Sullivan PT GP 1            PT G-Codes  Outcome Measure Options: AM-PAC 6 Clicks Basic Mobility (PT)  AM-PAC 6 Clicks Score (PT): 19  AM-PAC 6 Clicks Score (OT): 15  PT Discharge Summary  Anticipated Discharge Disposition (PT): home with assist, home with outpatient therapy services    Daphne Sullivan PT  2025

## 2025-05-14 NOTE — PROGRESS NOTES
DATE OF VISIT: 5/14/2025    Chief Complaint: Followup for shortness of breath     SUBJECTIVE: The patient is getting up to the bedside chair. She is complaining of her chest hurting and back pain. She is scheduled for thoracentesis and MRI today.     REVIEW OF SYSTEMS: All the other 9 systems are reviewed by me and negative  except what is mentioned in HPI.     Past History:  Medical History: has a past medical history of Acid reflux, Acid reflux, Adenocarcinoma of lung (08/12/2024), Anemia, chronic disease (5/13/2025), Anxiety (1976), Arthritis, Atherosclerotic cardiovascular disease (03/25/2024), Cervical cancer, Depression, Emphysema of lung, History of radiation therapy (11/20/2020), History of radiation therapy (10/04/2024), radiation therapy, Hyperlipidemia, Hypothyroidism, Kidney disease, Lung cancer, Lung nodule, Ovarian cyst (1980s), Pleural effusion (5/13/2025), Pulmonary embolism (5/13/2025), Visual impairment (corrected with glasses), Wears dentures, and Wears glasses.   Surgical History: has a past surgical history that includes Tubal ligation; total abdominal hysterectomy pelvic node dissection (N/A, 08/18/2020); Lymph node biopsy; Subtotal Hysterectomy (1987?); Bronchoscopy; Lung biopsy; BRONCHOSCOPY WITH ION ROBOTIC ASSIST (N/A, 08/06/2024); Mohs surgery (03/24/2025); Hysteroscopy; and Colonoscopy.   Family History: family history includes Anxiety disorder in her mother; Asthma in her mother; COPD in her mother; Cancer in her father, maternal aunt, maternal aunt, maternal aunt, maternal aunt, maternal grandmother, and mother; Depression in her mother; Early death in her father; Emphysema in her mother; Heart attack in her maternal grandfather; Heart disease in her maternal grandfather; Hypertension in her mother; Melanoma in her mother; Mental illness in her mother; Uterine cancer in her mother.   Social History: reports that she quit smoking about 6 years ago. Her smoking use included cigarettes.  She started smoking about 53 years ago. She has a 67.5 pack-year smoking history. She has been exposed to tobacco smoke. She has never used smokeless tobacco. She reports that she does not drink alcohol and does not use drugs.    Medications Prior to Admission   Medication Sig Dispense Refill Last Dose/Taking    albuterol sulfate  (90 Base) MCG/ACT inhaler Inhale 2 puffs Every 4 (Four) to 6 (Six) Hours As Needed for Wheezing or Shortness of Air.   Past Week    Aspirin Low Dose 81 MG EC tablet TAKE 1 TABLET BY MOUTH EVERY DAY 90 tablet 1 5/12/2025    atorvastatin (LIPITOR) 10 MG tablet TAKE 1 TABLET BY MOUTH EVERY DAY AT NIGHT 90 tablet 1 5/12/2025    cefdinir (OMNICEF) 300 MG capsule Take 1 capsule by mouth 2 (Two) Times a Day. For 10 days   5/12/2025    clonazePAM (KlonoPIN) 1 MG tablet Take 1 tablet by mouth 2 (Two) Times a Day As Needed for Anxiety. 50 tablet 0 Past Week    famotidine (PEPCID) 20 MG tablet TAKE 1 TABLET BY MOUTH TWICE DAILY OR TAKE 2 TABLETS BY MOUTH ONCE DAILY AS NEEDED FOR HEARTBURN 180 tablet 1 Past Week    Fluticasone-Umeclidin-Vilant (Trelegy Ellipta) 100-62.5-25 MCG/ACT inhaler Inhale 1 puff Daily. 90 each 3 5/12/2025    levothyroxine (Synthroid) 88 MCG tablet Take 1 tablet by mouth Every Morning. 90 tablet 1 5/12/2025    ondansetron ODT (ZOFRAN-ODT) 4 MG disintegrating tablet Place 1 tablet on the tongue Every 8 (Eight) Hours As Needed for Nausea or Vomiting. 30 tablet 2 Past Week    sertraline (Zoloft) 100 MG tablet Take 1 tablet by mouth Daily. 90 tablet 1 5/12/2025    tiZANidine (Zanaflex) 4 MG tablet Take 1 tablet by mouth 2 (Two) Times a Day As Needed for Muscle Spasms. 60 tablet 0 Past Week    traZODone (DESYREL) 150 MG tablet TAKE 1 TABLET BY MOUTH EVERY DAY AT NIGHT 90 tablet 3 5/12/2025    zolpidem (AMBIEN) 10 MG tablet Take 1 tablet by mouth At Night As Needed for Sleep. (Patient taking differently: Take 1 tablet by mouth Every Night.) 30 tablet 1 5/12/2025      Allergies:  Patient has no known allergies.     Current Facility-Administered Medications:     acetaminophen (TYLENOL) tablet 650 mg, 650 mg, Oral, Q4H PRN, 650 mg at 05/14/25 1010 **OR** acetaminophen (TYLENOL) 160 MG/5ML oral solution 650 mg, 650 mg, Oral, Q4H PRN **OR** acetaminophen (TYLENOL) suppository 650 mg, 650 mg, Rectal, Q4H PRN, Latonya Lange MD    albuterol (PROVENTIL) nebulizer solution 0.083% 2.5 mg/3mL, 2.5 mg, Nebulization, Q4H PRN, Latonya Lange MD    [Held by provider] aspirin EC tablet 81 mg, 81 mg, Oral, Daily, Latonya Lange MD    atorvastatin (LIPITOR) tablet 10 mg, 10 mg, Oral, Daily, Latonya Lange MD, 10 mg at 05/13/25 2058    benzonatate (TESSALON) capsule 100 mg, 100 mg, Oral, TID PRN, Latonya Lange MD    sennosides-docusate (PERICOLACE) 8.6-50 MG per tablet 2 tablet, 2 tablet, Oral, BID PRN **AND** polyethylene glycol (MIRALAX) packet 17 g, 17 g, Oral, Daily PRN **AND** bisacodyl (DULCOLAX) EC tablet 5 mg, 5 mg, Oral, Daily PRN **AND** bisacodyl (DULCOLAX) suppository 10 mg, 10 mg, Rectal, Daily PRN, Latonya Lange MD    budesonide-formoterol (SYMBICORT) 160-4.5 MCG/ACT inhaler 2 puff, 2 puff, Inhalation, BID - RT, 2 puff at 05/14/25 0725 **AND** revefenacin (YUPELRI) nebulizer solution 175 mcg, 175 mcg, Nebulization, Daily - RT, Latonya Lange MD, 175 mcg at 05/14/25 0725    Calcium Replacement - Follow Nurse / BPA Driven Protocol, , Not Applicable, PRN, Latonya Lange MD    clonazePAM (KlonoPIN) tablet 1 mg, 1 mg, Oral, BID PRN, Latonya Lange MD, 1 mg at 05/13/25 2148    Diclofenac Sodium (VOLTAREN) 1 % gel 2 g, 2 g, Topical, 4x Daily, Latonya Lange MD, 2 g at 05/14/25 1012    famotidine (PEPCID) tablet 20 mg, 20 mg, Oral, BID AC, Latonya Lange MD, 20 mg at 05/14/25 1010    guaiFENesin (MUCINEX) 12 hr tablet 600 mg, 600 mg, Oral, Q12H, Latonya Lange MD, 600 mg at 05/14/25 1011    heparin 11898 units/250 mL (100 units/mL) in 0.45 % NaCl  infusion, 18 Units/kg/hr, Intravenous, Titrated, Latonya Lange MD, Last Rate: 10.94 mL/hr at 05/14/25 0440, 18 Units/kg/hr at 05/14/25 0440    ipratropium-albuterol (DUO-NEB) nebulizer solution 3 mL, 3 mL, Nebulization, 4x Daily - RT, Latonya Lange MD, 3 mL at 05/14/25 0725    levothyroxine (SYNTHROID, LEVOTHROID) tablet 88 mcg, 88 mcg, Oral, Q AM, Latonya Lange MD, 88 mcg at 05/14/25 0605    Magnesium Standard Dose Replacement - Follow Nurse / BPA Driven Protocol, , Not Applicable, PRN, Latonya Lange MD    melatonin tablet 10 mg, 10 mg, Oral, Nightly, Latonya Lange MD, 10 mg at 05/13/25 2059    montelukast (SINGULAIR) tablet 10 mg, 10 mg, Oral, Daily, Terese Srivastava, DO, 10 mg at 05/14/25 1011    nitroglycerin (NITROSTAT) SL tablet 0.4 mg, 0.4 mg, Sublingual, Q5 Min PRN, Latonya Lange MD    ondansetron ODT (ZOFRAN-ODT) disintegrating tablet 4 mg, 4 mg, Translingual, Q8H PRN, Latonya Lange MD    Pharmacy to Dose Heparin, , Not Applicable, Continuous PRN, Latonya Lange MD    Phosphorus Replacement - Follow Nurse / BPA Driven Protocol, , Not Applicable, PRN, Latonya Lange MD    Potassium Replacement - Follow Nurse / BPA Driven Protocol, , Not Applicable, PRNNazario Muhammad, MD    [Held by provider] predniSONE (DELTASONE) tablet 40 mg, 40 mg, Oral, Daily, Latonya Lange MD    sertraline (ZOLOFT) tablet 100 mg, 100 mg, Oral, Daily, Latonya Lange MD, 100 mg at 05/14/25 1011    sodium chloride 0.9 % flush 10 mL, 10 mL, Intravenous, PRN, Latonya Lange MD    sodium chloride 0.9 % flush 10 mL, 10 mL, Intravenous, Q12H, Latonya Lange MD, 10 mL at 05/14/25 1012    sodium chloride 0.9 % flush 10 mL, 10 mL, Intravenous, PRN, Latonya Lange MD    sodium chloride 0.9 % infusion 40 mL, 40 mL, Intravenous, PRN, Latonya Lange MD    sodium chloride 7 % nebulizer solution nebulizer solution 4 mL, 4 mL, Nebulization, Once, Latonya Lange MD     "tiZANidine (ZANAFLEX) tablet 4 mg, 4 mg, Oral, BID PRN, Latonya Lange MD, 4 mg at 05/13/25 1141    traZODone (DESYREL) tablet 150 mg, 150 mg, Oral, Nightly, Latonya Lange MD, 150 mg at 05/13/25 2059    [Held by provider] zolpidem (AMBIEN) tablet 10 mg, 10 mg, Oral, Nightly PRN, Latonya Lange MD    PHYSICAL EXAMINATION:   /71 (BP Location: Left arm, Patient Position: Lying)   Pulse 106   Temp 96.3 °F (35.7 °C) (Axillary)   Resp 18   Ht 154.9 cm (61\")   Wt 60.8 kg (134 lb)   LMP  (LMP Unknown)   SpO2 90%   BMI 25.32 kg/m²                ECOG Performance Status: 1 - Symptomatic but completely ambulatory  GENERAL: Age appropriate. No acute distress.   NEURO/PSYCH: A&O x 3, strength 5/5 in all muscle groups  HEENT: Head atraumatic, normocephalic.   NECK: Supple. No JVD. No lymphadenopathy.   LUNGS: Diminished in right bases, on O2 per NC.  HEART: Regular rate and rhythm. S1, S2, no murmurs.   ABDOMEN: Soft, nontender, nondistended. Bowel sounds positive. No  hepatosplenomegaly.   EXTREMITIES: No clubbing, cyanosis, or edema.   SKIN: No rashes. No purpura.       No results displayed because visit has over 200 results.          Duplex Venous Lower Extremity - Bilateral CAR  Result Date: 5/14/2025  Narrative:   Acute left lower extremity deep vein thrombosis noted in the peroneal.     Adult Transthoracic Echo Complete w/ Color, Spectral and Contrast if necessary per protocol  Result Date: 5/14/2025  Narrative:   Left ventricular ejection fraction appears to be 66 - 70%.   Normal right ventricular cavity size, wall thickness, systolic function and septal motion noted.   There is a small (<1cm) pericardial effusion adjacent to the right ventricle. There is no evidence of cardiac tamponade.   There is a left pleural effusion.     CT Angiogram Chest Pulmonary Embolism  Result Date: 5/13/2025  Narrative: CT ANGIOGRAM CHEST PULMONARY EMBOLISM Date of Exam: 5/13/2025 6:39 AM EDT Indication: new acute " hypoxia, short of breath, right pleural effusion, elevated d-dimer, history of lung cancer. vapes. Comparison: CT chest dated 3/31/2025 Technique: Axial CT images were obtained of the chest after the uneventful intravenous administration of 75 mL Isovue-370 utilizing pulmonary embolism protocol.  In addition, a 3-D volume rendered image was created for interpretation.  Reconstructed coronal and sagittal images were also obtained. Automated exposure control and iterative construction methods were used. Findings: Pulmonary arteries: Adequate opacification of the pulmonary arteries. There is a small segmental left upper lobe pulmonary embolism. There are small segmental and subsegmental pulmonary emboli of the left lower lobe. There is a small segmental right upper lobe pulmonary embolism. Lungs and Pleura: There is a moderate sized right pleural effusion. There is right basilar atelectasis. Posttreatment changes of the right upper lobe pulmonary nodule are present. Now, the spiculated component appears to be slightly larger than previously seen. This was seen on image 37 of series 4 previous study measuring about 8 mm and measures about 11 mm on image 42 of series 6 on this exam. The nodular densities seen in the anterior right upper lobe is stable at 11 mm. The lesion seen previously in the superior segment of the right lower lobe is obscured by the presence of the pleural effusion and atelectasis. Superior segment groundglass nodule in the left lower lobe is stable. Mediastinum/Ashley: No mediastinal or hilar lymphadenopathy. Lymph nodes: No axillary or supraclavicular adenopathy. Cardiovascular: The cardiac chambers are within normal limits. The pericardium is normal. The aorta and its arch branch vessels are unremarkable.   Upper Abdomen: There are several hepatic cysts. However, there also appear to be multiple new low-density hepatic lesions which are difficult to assess in this phase of enhancement which is  arterial but one lesion adjacent to the gallbladder is at least 2.2 cm in diameter. Bones and Soft Tissue: No suspicious osseous lesion.     Impression: Impression: 1.Small segmental and subsegmental pulmonary emboli of the left upper lobe, left lower lobe and right upper lobe. 2.Moderate right pleural effusion with right basilar atelectasis. 3.Posttreatment changes of the right upper lobe pulmonary nodule. The spiculated component appears to be slightly larger than previously seen. 4.Multiple new low-density hepatic lesions are suspicious for metastatic disease. Electronically Signed: Rodriguez Singh MD  5/13/2025 7:07 AM EDT  Workstation ID: [id number]    XR Chest 1 View  Result Date: 5/13/2025  Narrative: XR CHEST 1 VW Date of Exam: 5/13/2025 5:01 AM EDT Indication: SOA triage protocol Comparison: Chest radiograph 4/12/2025 Findings: The heart is enlarged. The pulmonary vascular markings are normal. There is a new right-sided pleural fluid collection. There is right lower lobe airspace disease which is likely atelectasis. Left lung is clear. The osseous structures are normal.     Impression: Impression: New right-sided pleural fluid collection with right lower lobe airspace disease likely atelectasis. Electronically Signed: Rodriguez Singh MD  5/13/2025 5:41 AM EDT  Workstation ID: [id number]      ASSESSMENT: The patient is a very pleasant 65 y.o. female  with suspected recurrent right lung cancer.      PLAN:  RLL lung cancer:  A. Patient scheduled for thoracentesis today. We will follow upon cytology results.   B. Plan for MRI today for staging work up. Will plan for PET scan as an outpatient to complete staging.   C. Continue supportive care with supplemental oxygen, nebs, Tessalon pearls.   D. Follow up with Dr. Mckenzie as an outpatient in 1-2 weeks to discuss results and as well as for treatment planning.     2. Right  pleural effusion:  A. Pt symptomatic with shortness of breath and pleuritic chest pain.   B.  Thoracentesis scheduled for today.   C. Wean oxygen as tolerated. Currently requiring 6L.     3. Bilateral PEs:  A. Continue heparin drip until completion of thoracentesis. Then can transition to Eliquis 5 mg BID for ongoing management.         Shannon Jean-Baptiste, APRN  5/14/2025

## 2025-05-14 NOTE — PROGRESS NOTES
[facility]  PROGRESS NOTE    Patient Name: Sydnie Gamble  : 1960  MRN: [id number]    Date of Admission: 2025  Primary Care Physician: Dee Elena APRN    Subjective   Subjective     CC:  dyspnea    HPI:  Still complaining of significant difficulty breathing, even after thoracentesis      Objective   Objective     Vital Signs:   Temp:  [96.3 °F (35.7 °C)-97.8 °F (36.6 °C)] 97.8 °F (36.6 °C)  Heart Rate:  [] 100  Resp:  [16-18] 16  BP: (123-163)/(74-99) 138/79  Flow (L/min) (Oxygen Therapy):  [2-6] 2     Physical Exam:  Constitutional: No acute distress, mildly anxious  HENT: NCAT, mucous membranes moist  Respiratory: Mild conversational dyspnea  Musculoskeletal: No bilateral ankle edema  Psychiatric: Appropriate affect, cooperative  Neurologic: Oriented x 3, speech clear  Skin: No rashes      Results Reviewed:  LAB RESULTS:      Lab 05/15/25  0336 25  0630 259 25  1511 25  0733 25  0642 25  0510   WBC 8.70 10.07  --   --   --   --  9.69   HEMOGLOBIN 11.0* 11.7*  --   --   --   --  11.8*   HEMATOCRIT 34.9 36.3  --   --   --   --  36.7   PLATELETS 331 334  --   --   --   --  330   NEUTROS ABS 5.43 7.05*  --   --   --   --  6.69   IMMATURE GRANS (ABS) 0.15* 0.13*  --   --   --   --  0.16*   LYMPHS ABS 1.14 0.80  --   --   --   --  0.94   MONOS ABS 0.78 0.85  --   --   --   --  0.85   EOS ABS 1.13* 1.15*  --   --   --   --  0.97*   MCV 91.4 89.9  --   --   --   --  89.5   SED RATE  --   --   --   --   --   --  40*   CRP  --   --   --   --   --  9.74*  --    PROCALCITONIN  --   --   --   --   --  0.16  --    LACTATE  --   --   --   --   --   --  1.0   PROTIME  --   --   --   --   --   --  13.7   APTT  --   --   --   --   --   --  28.3*   HEPARIN ANTI-XA 0.45 0.56 0.42 0.44 0.10*  --   --    D DIMER QUANT  --   --   --   --   --   --  14.28*   HSTROP T  --   --   --   --   --  15* 9         Lab  05/15/25  0336 05/14/25  0630 05/13/25  0642 05/13/25  0510   SODIUM 140 140  --  139   POTASSIUM 3.7 4.1  --  3.7   CHLORIDE 104 103  --  102   CO2 24.0 23.0  --  20.0*   ANION GAP 12.0 14.0  --  17.0*   BUN 16 13  --  14   CREATININE 1.01* 1.05*  --  0.92   EGFR 61.1 58.4*  --  68.4   GLUCOSE 108* 99  --  105*   CALCIUM 9.2 9.3  --  9.3   MAGNESIUM 2.2 2.0  --   --    HEMOGLOBIN A1C  --   --   --  5.80*   TSH  --   --  3.400  --          Lab 05/13/25  0510   TOTAL PROTEIN 7.0   ALBUMIN 3.8   GLOBULIN 3.2   ALT (SGPT) 18   AST (SGOT) 21   BILIRUBIN <0.2   ALK PHOS 166*         Lab 05/13/25  0642 05/13/25  0510   PROBNP  --  138.5   HSTROP T 15* 9   PROTIME  --  13.7   INR  --  0.99         Lab 05/13/25  0642   CHOLESTEROL 170   LDL CHOL 82   HDL CHOL 66*   TRIGLYCERIDES 124         Lab 05/13/25  0642   IRON 24*   IRON SATURATION (TSAT) 9*   TIBC 274*   TRANSFERRIN 184*   FERRITIN 313.40*         Lab 05/13/25  1520   FIO2 40   CARBOXYHEMOGLOBIN (VENOUS) 1.5     Brief Urine Lab Results       None            Microbiology Results Abnormal       None            US Thoracentesis  Result Date: 5/14/2025  PROCEDURE: US THORACENTESIS-  ATTENDING: Jose Hope M.D.  CLINICAL HISTORY: Large right-sided pleural effusion.  TECHNIQUE:  The risk, benefits, and alternatives were described in detail and the patient was brought to the ultrasound suite and positioned seated. The skin entry site was prepped and draped utilizing standard sterile technique. 1% lidocaine was applied to the soft tissues of the posterior right thorax   A 5 Thai Yueh was advanced into the right pleural space.   A total of 1150 mL was removed from the right pleural space. A specimen was sent to the laboratory for analysis. The needle was removed and a sterile dressing was applied.  The patient tolerated the procedure well and there were no complications.      Impression: Successful ultrasound-guided right thoracentesis.   Attestation Signer name:  Jose Hope MD I attest that I was present for the entire procedure. I reviewed the stored images and agree with the report as written.      5/14/2025 2:32 PM by Jose Hope MD on Workstation: [id number]      XR Chest 1 View  Result Date: 5/14/2025  XR CHEST 1 VW Date of Exam: 5/14/2025 1:15 PM EDT Indication: s/p thora Comparison: CT chest 5/13/2025, AP chest x-ray 5/13/2025 Findings: No pneumothorax is seen following thoracentesis. There is improved aeration of the right lung with persistent patchy opacities in the right midlung and right lung base. Left lung appears clear. Cardiomediastinal contours are stable.     Impression: Impression: 1.No visible pneumothorax following thoracentesis. 2.Improved aeration of the right lung with persistent patchy opacities in the right midlung and right lung base. Electronically Signed: Maryellen Dalton MD  5/14/2025 1:49 PM EDT  Workstation ID: [id number]    Duplex Venous Lower Extremity - Bilateral CAR  Result Date: 5/14/2025    Acute left lower extremity deep vein thrombosis noted in the peroneal.       Results for orders placed during the hospital encounter of 05/13/25    Adult Transthoracic Echo Complete w/ Color, Spectral and Contrast if necessary per protocol    Interpretation Summary    Left ventricular ejection fraction appears to be 66 - 70%.    Normal right ventricular cavity size, wall thickness, systolic function and septal motion noted.    There is a small (<1cm) pericardial effusion adjacent to the right ventricle. There is no evidence of cardiac tamponade.    There is a left pleural effusion.      Current medications:  Scheduled Meds:apixaban, 10 mg, Oral, Q12H   Followed by  [START ON 5/22/2025] apixaban, 5 mg, Oral, Q12H  [Held by provider] aspirin, 81 mg, Oral, Daily  atorvastatin, 10 mg, Oral, Daily  budesonide-formoterol, 2 puff, Inhalation, BID - RT   And  revefenacin, 175 mcg, Nebulization, Daily - RT  Diclofenac Sodium, 2 g, Topical, 4x  Daily  famotidine, 20 mg, Oral, BID AC  guaiFENesin, 600 mg, Oral, Q12H  ipratropium-albuterol, 3 mL, Nebulization, 4x Daily - RT  levothyroxine, 88 mcg, Oral, Q AM  melatonin, 10 mg, Oral, Nightly  montelukast, 10 mg, Oral, Daily  [Held by provider] predniSONE, 40 mg, Oral, Daily  sertraline, 100 mg, Oral, Daily  sodium chloride, 10 mL, Intravenous, Q12H  sodium chloride, 4 mL, Nebulization, Once  traZODone, 150 mg, Oral, Nightly      Continuous Infusions:Pharmacy to Dose Heparin,       PRN Meds:.  acetaminophen **OR** acetaminophen **OR** acetaminophen    albuterol    benzonatate    senna-docusate sodium **AND** polyethylene glycol **AND** bisacodyl **AND** bisacodyl    Calcium Replacement - Follow Nurse / BPA Driven Protocol    clonazePAM    Magnesium Standard Dose Replacement - Follow Nurse / BPA Driven Protocol    nitroglycerin    ondansetron ODT    Pharmacy to Dose Heparin    Phosphorus Replacement - Follow Nurse / BPA Driven Protocol    Potassium Replacement - Follow Nurse / BPA Driven Protocol    sodium chloride    sodium chloride    sodium chloride    tiZANidine    [Held by provider] zolpidem    Assessment & Plan   Assessment & Plan     Active Hospital Problems    Diagnosis  POA    **Pulmonary embolism [I26.99]  Yes    Anemia, chronic disease [D63.8]  Unknown    Pleural effusion [J90]  Unknown    Adenocarcinoma of right lung [C34.91]  Yes    Atherosclerotic cardiovascular disease [I25.10]  Yes    Cervical cancer [C53.9]  Yes    HLD (hyperlipidemia) [E78.5]  Yes    COPD with asthma [J44.89]  Yes    ALESIA (generalized anxiety disorder) [F41.1]  Yes    Acquired hypothyroidism [E03.9]  Yes    Prediabetes [R73.03]  Yes    Gastroesophageal reflux disease without esophagitis [K21.9]  Yes      Resolved Hospital Problems   No resolved problems to display.        Brief Hospital Course to date:  Sydnie Gamble is a 65 y.o. female with a PMH significant for RLL adenocarcinoma (non-small cell lung cancer) s/p  [employer]berKnife radiotherapy September 2024, OA, GERD, cervical squamous cell carcinoma, HTN, HLD, hypothyroidism and CAD with anxiety and depression.  Admitted for segmental BL PEs, currently on heparin drip.  She was also found to have new metastatic lesions on the liver with enhancement/progression of previous RUL nodule in the lungs.     New Pulmonary embolism  -Patient found to have segmental and subsegmental PEs on CTA chest and DANIELLE, LLL and RUL vascular with evidence of moderate right pleural effusion  -Has a history of RUL adenocarcinoma of the lung with concern for progression   -Heparin drip DC'd.  Started on Eliquis with DVT dosing       Left lower extremity DVT  - Eliquis as above     RUL adenocarcinoma of the lung with new metastases  Cervical squamous cell carcinoma history  New pleural effusion - suspect malignant  Acute hypoxemic respiratory failure  -PMH of RUL pulmonary adenocarcinoma, follows Dr. Mckenzie, previous CT chest March 31, 2025.  S/p CyberKnife RT September 2024  -CT chest this admission showing a moderate-sized pleural effusion in the right lung particularly RUL with new hypodensities in the liver concerning for metastatic spread  - s/p thoracentesis 5/14 with 1100 ml removed, pleural fluid labs have already been ordered  -Oncology following    -MRI brain still not done     Generalized anxiety disorder  Depression  -Continue home meds     Recent diagnosis of costochondritis at OSH  -continue tizanidine    Hypothyroidism  -Continue Synthroid       Elevated troponin  -Likely type II demand ischemia in the setting of pulmonary embolism  -No significant ST changes noted on EKG      Expected Discharge Location and Transportation: likely home, waiting on MRI to be done before she can be discharged  Expected Discharge   Expected Discharge Date: 5/16/2025; Expected Discharge Time:      VTE Prophylaxis:  Pharmacologic & mechanical VTE prophylaxis orders are present.         AM-PAC 6 Clicks Score (PT):  20 (05/15/25 0816)    CODE STATUS:   Code Status and Medical Interventions: CPR (Attempt to Resuscitate); Full Support   Ordered at: 05/13/25 0745     Code Status (Patient has no pulse and is not breathing):    CPR (Attempt to Resuscitate)     Medical Interventions (Patient has pulse or is breathing):    Full Support     Level Of Support Discussed With:    Patient       Terese Stanford Atif, DO  05/15/25

## 2025-05-14 NOTE — NURSING NOTE
Image guided right thoracentesis performed by Jose Hope MD. 60 mls removed from pleural space for labs.  Total is 1150 mls.  CXR at bedside before leaving IR. Patient tolerated well. Report called to nurse.

## 2025-05-14 NOTE — PLAN OF CARE
Goal Outcome Evaluation:  Plan of Care Reviewed With: patient, spouse           Outcome Evaluation: PT eval complete. Pt presents with balance deficits, generalized weakness, poor safety awareness, and  functional endurance warranting IPPT. Pt ambulating 120 ft unsupported w/ MiNAx1. Would benefit from trialing SPC in future gait training sessions. PT rec home w/ assist at d/c ;may benefit from OPPT if balance deficits persist.    Anticipated Discharge Disposition (PT): home with assist, home with outpatient therapy services

## 2025-05-14 NOTE — PLAN OF CARE
Problem: Adult Inpatient Plan of Care  Goal: Plan of Care Review  Outcome: Progressing  Goal: Patient-Specific Goal (Individualized)  Outcome: Progressing  Goal: Absence of Hospital-Acquired Illness or Injury  Outcome: Progressing  Intervention: Identify and Manage Fall Risk  Recent Flowsheet Documentation  Taken 5/14/2025 0405 by Omega Frost RN  Safety Promotion/Fall Prevention:   safety round/check completed   activity supervised   assistive device/personal items within reach   clutter free environment maintained   fall prevention program maintained  Taken 5/14/2025 0205 by Omega Frost RN  Safety Promotion/Fall Prevention:   activity supervised   assistive device/personal items within reach   safety round/check completed   clutter free environment maintained   nonskid shoes/slippers when out of bed  Taken 5/14/2025 0005 by Omega Frost RN  Safety Promotion/Fall Prevention:   safety round/check completed   activity supervised   assistive device/personal items within reach   clutter free environment maintained   fall prevention program maintained  Taken 5/13/2025 2205 by Omega Frost RN  Safety Promotion/Fall Prevention:   activity supervised   assistive device/personal items within reach   safety round/check completed   clutter free environment maintained   nonskid shoes/slippers when out of bed  Taken 5/13/2025 2005 by Omega Frost RN  Safety Promotion/Fall Prevention:   safety round/check completed   activity supervised   assistive device/personal items within reach   clutter free environment maintained   fall prevention program maintained  Intervention: Prevent Skin Injury  Recent Flowsheet Documentation  Taken 5/14/2025 0405 by Omega Frost RN  Body Position: position changed independently  Skin Protection:   incontinence pads utilized   silicone foam dressing in place   transparent dressing maintained  Taken 5/14/2025 0205 by Omega Frost RN  Body Position:  position changed independently  Skin Protection:   incontinence pads utilized   transparent dressing maintained  Taken 5/14/2025 0005 by Omega Frost RN  Body Position: position changed independently  Skin Protection:   incontinence pads utilized   silicone foam dressing in place   transparent dressing maintained  Taken 5/13/2025 2205 by Omega Frost RN  Body Position: position changed independently  Skin Protection:   incontinence pads utilized   transparent dressing maintained  Taken 5/13/2025 2005 by Omega Frost RN  Body Position: position changed independently  Skin Protection: incontinence pads utilized  Intervention: Prevent Infection  Recent Flowsheet Documentation  Taken 5/14/2025 0405 by Omega Frost RN  Infection Prevention:   hand hygiene promoted   rest/sleep promoted  Taken 5/14/2025 0205 by Omega Frost RN  Infection Prevention:   environmental surveillance performed   rest/sleep promoted  Taken 5/14/2025 0005 by Omega Frost RN  Infection Prevention:   hand hygiene promoted   rest/sleep promoted  Taken 5/13/2025 2205 by Omega Frost RN  Infection Prevention:   environmental surveillance performed   rest/sleep promoted  Taken 5/13/2025 2005 by Omega Frost RN  Infection Prevention:   environmental surveillance performed   hand hygiene promoted   rest/sleep promoted   single patient room provided   cohorting utilized  Goal: Optimal Comfort and Wellbeing  Outcome: Progressing  Intervention: Provide Person-Centered Care  Recent Flowsheet Documentation  Taken 5/13/2025 2005 by Oemga Frost RN  Trust Relationship/Rapport:   care explained   emotional support provided   questions answered   questions encouraged   thoughts/feelings acknowledged  Goal: Readiness for Transition of Care  Outcome: Progressing     Problem: Gas Exchange Impaired  Goal: Optimal Gas Exchange  Outcome: Progressing     Problem: Comorbidity Management  Goal: Maintenance of  Asthma Control  Outcome: Progressing  Intervention: Maintain Asthma Symptom Control  Recent Flowsheet Documentation  Taken 5/13/2025 2005 by Omega Frost RN  Medication Review/Management: medications reviewed  Goal: Maintenance of COPD Symptom Control  Outcome: Progressing  Intervention: Maintain COPD (Chronic Obstructive Pulmonary Disease) Symptom Control  Recent Flowsheet Documentation  Taken 5/13/2025 2005 by Omega Frost RN  Medication Review/Management: medications reviewed  Goal: Blood Glucose Level Within Target Range  Outcome: Progressing  Intervention: Monitor and Manage Glycemia  Recent Flowsheet Documentation  Taken 5/13/2025 2005 by Omega Frost RN  Medication Review/Management: medications reviewed  Goal: Blood Pressure in Desired Range  Outcome: Progressing  Intervention: Maintain Blood Pressure Management  Recent Flowsheet Documentation  Taken 5/13/2025 2005 by Omega Frost RN  Medication Review/Management: medications reviewed  Goal: Maintenance of Osteoarthritis Symptom Control  Outcome: Progressing  Intervention: Maintain Osteoarthritis Symptom Control  Recent Flowsheet Documentation  Taken 5/14/2025 0405 by Omega rFost, RN  Activity Management: activity minimized  Taken 5/14/2025 0205 by Omega Frost RN  Activity Management: activity minimized  Taken 5/14/2025 0005 by Omega Frost RN  Activity Management: activity minimized  Taken 5/13/2025 2205 by Omega Frost RN  Activity Management: activity minimized  Taken 5/13/2025 2005 by Omega Frost RN  Activity Management: activity encouraged  Medication Review/Management: medications reviewed   Goal Outcome Evaluation:               Pt has no new nursing issues at this time. Pt is a&ox4, NSR, 6L NC, VSS. Hep gtt: 18un/kg/hr. PRN medication given per pt request. Pt has no complaints at this time. Will continue plan of care.

## 2025-05-14 NOTE — CASE MANAGEMENT/SOCIAL WORK
Continued Stay Note  [facility]     Patient Name: Sydnie Gamble  MRN: [id number]  Today's Date: 5/14/2025    Admit Date: 5/13/2025    Plan: Home w/OutPt PT   Discharge Plan       Row Name 05/14/25 5232       Plan    Plan Home w/OutPt PT    Plan Comments Per discussion in MDR, Pt is scheduled for R thoracentesis. She is on 6L NC (does not wear home O2). She is on a heparin drip. TTE and BLE venous duplex ordered. She walked 120ft with minimal assist today. Therapy recommended outpatient PT (order entered). CM stopped by to see Pt but she was off the floor for a procedure. Spouse was present in room. CM will try again tomorrow. CM will continue to follow for medical readiness.    Final Discharge Disposition Code 01 - home or self-care                   Discharge Codes    No documentation.                 Expected Discharge Date and Time       Expected Discharge Date Expected Discharge Time    May 16, 2025               Elva Blank RN

## 2025-05-14 NOTE — THERAPY EVALUATION
Patient Name: Sydnie Gamble  : 1960    MRN: [id number]                              Today's Date: 2025       Admit Date: 2025    Visit Dx:     ICD-10-CM ICD-9-CM   1. Acute respiratory failure with hypoxia  J96.01 518.81   2. Shortness of breath  R06.02 786.05   3. Elevated d-dimer  R79.89 790.92   4. Pleural effusion, right  J90 511.9   5. Atypical chest pain  R07.89 786.59   6. Other acute pulmonary embolism without acute cor pulmonale  I26.99 415.19     Patient Active Problem List   Diagnosis    Personal history of tobacco use, presenting hazards to health    Gastroesophageal reflux disease without esophagitis    Acquired hypothyroidism    Vitamin D deficiency    Prediabetes    COPD with asthma    ALESIA (generalized anxiety disorder)    Multiple lung nodules on CT    Hilar adenopathy    HLD (hyperlipidemia)    Cervical cancer    Atherosclerotic cardiovascular disease    Adenocarcinoma of right lung    Pulmonary embolism    Anemia, chronic disease    Pleural effusion     Past Medical History:   Diagnosis Date    Acid reflux     Acid reflux     Adenocarcinoma of lung 2024    Anemia, chronic disease 2025    Anxiety 1976    Arthritis     Atherosclerotic cardiovascular disease 2024    Cervical cancer     Depression     Emphysema of lung     History of radiation therapy 2020    pelvis + intracavitary brachytherapy    History of radiation therapy 10/04/2024    RUL lung    Hx of radiation therapy     Hyperlipidemia     Hypothyroidism     Kidney disease     Lung cancer     Lung nodule     Ovarian cyst 1980s    Pleural effusion 2025    Pulmonary embolism 2025    Visual impairment corrected with glasses    Wears dentures     Wears glasses      Past Surgical History:   Procedure Laterality Date    BRONCHOSCOPY      BRONCHOSCOPY WITH ION ROBOTIC ASSIST N/A 2024    Procedure: BRONCHOSCOPY NAVIGATION WITH ENDOBRONCHIAL ULTRASOUND AND ION ROBOT;  Surgeon: Stas  Toni MERCEDES MD;  Location:  [facility];  Service: Robotics - Pulmonary;  Laterality: N/A;    COLONOSCOPY      HYSTEROSCOPY      LUNG BIOPSY      LYMPH NODE BIOPSY      MOHS SURGERY  03/24/2025    Groin    SUBTOTAL HYSTERECTOMY  1987?    ovary/fallopian tube removed    TOTAL ABDOMINAL HYSTERECTOMY PELVIC NODE DISSECTION N/A 08/18/2020    Procedure: TOTAL RADICAL HYSTERECTOMY PELVIC NODE DISSECTION;  Surgeon: Laura Jimenez MD;  Location:  [facility];  Service: Gynecology Oncology;  Laterality: N/A;    TUBAL ABDOMINAL LIGATION      right with ovarian dissection       General Information       Row Name 05/14/25 1143          OT Time and Intention    Document Type evaluation  -LR     Mode of Treatment occupational therapy  -LR       Row Name 05/14/25 1143          General Information    Patient Profile Reviewed yes  -LR     Prior Level of Function independent:;ADL's;bed mobility;transfer;gait  -LR     Existing Precautions/Restrictions fall;oxygen therapy device and L/min  -LR     Barriers to Rehab medically complex;previous functional deficit  -LR       Row Name 05/14/25 1143          Living Environment    Current Living Arrangements home  -LR     People in Home spouse  -LR       Row Name 05/14/25 1143          Home Main Entrance    Number of Stairs, Main Entrance one;other (see comments)  1-2 steps per spouse  -LR     Stair Railings, Main Entrance none;other (see comments)  pt holds onto spouse's arm  -LR       Row Name 05/14/25 1143          Stairs Within Home, Primary    Number of Stairs, Within Home, Primary none  -LR       Row Name 05/14/25 1143          Cognition    Orientation Status (Cognition) oriented x 3  -LR       Row Name 05/14/25 1143          Safety Issues/Impairments Affecting Functional Mobility    Safety Issues Affecting Function (Mobility) safety precaution awareness;awareness of need for assistance;safety precautions follow-through/compliance;insight into deficits/self-awareness;sequencing  abilities;judgment  -LR     Impairments Affecting Function (Mobility) balance;cognition;endurance/activity tolerance;shortness of breath;strength;pain  -LR     Cognitive Impairments, Mobility Safety/Performance attention;awareness, need for assistance;safety precaution follow-through;insight into deficits/self-awareness;safety precaution awareness  -LR               User Key  (r) = Recorded By, (t) = Taken By, (c) = Cosigned By      Initials Name Provider Type    LR Mary Rangel, OT Occupational Therapist                     Mobility/ADL's       Row Name 05/14/25 [facility]5          Bed Mobility    Bed Mobility supine-sit  -LR     Supine-Sit [place] (Bed Mobility) standby assist;verbal cues  -     Assistive Device (Bed Mobility) bed rails;head of bed elevated  -       Row Name 05/14/25 [facility]5          Transfers    Transfers sit-stand transfer  -       Row Name 05/14/25 [facility]          Sit-Stand Transfer    Sit-Stand [place] (Transfers) contact guard;verbal cues  -     Assistive Device (Sit-Stand Transfers) other (see comments)  none  -       Row Name 05/14/25 [facility]          Functional Mobility    Functional Mobility- Ind. Level minimum assist (75% patient effort);1 person;verbal cues required  -     Functional Mobility- Device walker, front-wheeled  -LR     Functional Mobility-Distance (Feet) --  <HH distance  -LR     Patient was able to Ambulate yes  -       Row Name 05/14/25 [facility]5          Activities of Daily Living    BADL Assessment/Intervention lower body dressing;grooming  -       Row Name 05/14/25 [facility]          Lower Body Dressing Assessment/Training    [place] Level (Lower Body Dressing) don;socks;maximum assist (25% patient effort)  -     Position (Lower Body Dressing) edge of bed sitting  -       Row Name 05/14/25 [facility]          Grooming Assessment/Training    [place] Level (Grooming) wash face, hands;hair care, combing/brushing;set up  -     Position (Grooming) supported  sitting  -LR               User Key  (r) = Recorded By, (t) = Taken By, (c) = Cosigned By      Initials Name Provider Type    Mary Cross OT Occupational Therapist                   Obj/Interventions       Row Name 05/14/25 1146          Sensory Assessment (Somatosensory)    Sensory Assessment (Somatosensory) UE sensation intact  -LR       [facility] Name 05/14/25 1146          Vision Assessment/Intervention    Visual Impairment/Limitations WFL;corrective lenses full-time  -LR       Row Name 05/14/25 1146          Range of Motion Comprehensive    General Range of Motion bilateral upper extremity ROM WFL  -LR       [facility] Name 05/14/25 1146          Strength Comprehensive (MMT)    General Manual Muscle Testing (MMT) Assessment upper extremity strength deficits identified  -LR     Comment, General Manual Muscle Testing (MMT) Assessment BUE grossly 3+/5 MMT  -LR       [facility] Name 05/14/25 1146          Balance    Balance Assessment sitting static balance;sitting dynamic balance;standing static balance;standing dynamic balance  -LR     Static Sitting Balance standby assist  -LR     Dynamic Sitting Balance contact guard;verbal cues  -LR     Position, Sitting Balance unsupported;sitting edge of bed  -LR     Static Standing Balance contact guard  -LR     Dynamic Standing Balance minimal assist;verbal cues  -LR     Position/Device Used, Standing Balance unsupported  -LR     Balance Interventions sitting;standing;supported;static;dynamic;occupation based/functional task  -LR               User Key  (r) = Recorded By, (t) = Taken By, (c) = Cosigned By      Initials Name Provider Type    Mary Cross OT Occupational Therapist                   Goals/Plan       Row Name 05/14/25 1154          Transfer Goal 1 (OT)    Activity/Assistive Device (Transfer Goal 1, OT) sit-to-stand/stand-to-sit;bed-to-chair/chair-to-bed;toilet  -LR     [place] Level/Cues Needed (Transfer Goal 1, OT) contact guard required  -LR     Time  Frame (Transfer Goal 1, OT) long term goal (LTG);10 days  -LR     Strategies/Barriers (Transfers Goal 1, OT) No LOB  -LR     Progress/Outcome (Transfer Goal 1, OT) new goal;goal ongoing  -LR       Row Name 05/14/25 1159          Dressing Goal 1 (OT)    Activity/Device (Dressing Goal 1, OT) lower body dressing  -LR     [place]/Cues Needed (Dressing Goal 1, OT) minimum assist (75% or more patient effort)  -LR     Time Frame (Dressing Goal 1, OT) short term goal (STG);1 week  -LR     Progress/Outcome (Dressing Goal 1, OT) goal ongoing;new goal  -LR       Row Name 05/14/25 1157          Grooming Goal 1 (OT)    Activity/Device (Grooming Goal 1, OT) hair care;oral care;wash face, hands  -LR     [place] (Grooming Goal 1, OT) standby assist  -LR     Time Frame (Grooming Goal 1, OT) short term goal (STG);4 days  -LR     Strategies/Barriers (Grooming Goal 1, OT) sink side  -LR     Progress/Outcome (Grooming Goal 1, OT) new goal;goal ongoing  -LR       Row Name 05/14/25 4425          Therapy Assessment/Plan (OT)    Planned Therapy Interventions (OT) activity tolerance training;adaptive equipment training;BADL retraining;cognitive/visual perception retraining;occupation/activity based interventions;strengthening exercise;transfer/mobility retraining;functional balance retraining;IADL retraining;passive ROM/stretching;ROM/therapeutic exercise;patient/caregiver education/training  -LR               User Key  (r) = Recorded By, (t) = Taken By, (c) = Cosigned By      Initials Name Provider Type    LR Mary Rangel, OT Occupational Therapist                   Clinical Impression       Row Name 05/14/25 1145          Pain Assessment    Pain Location abdomen;other (see comments)  rib area  -LR     Pain Side/Orientation left;lateral  -LR     Pain Management Interventions exercise or physical activity utilized;nursing notified;premedicated for activity  -LR     Response to Pain Interventions activity participation with  tolerable pain  -LR     Additional Documentation Pain Scale: FACES Pre/Post-Treatment (Group)  -LR       Row Name 05/14/25 1147          Pain Scale: FACES Pre/Post-Treatment    Pain: FACES Scale, Pretreatment 2-->hurts little bit  -LR     Posttreatment Pain Rating 2-->hurts little bit  -LR       Row Name 05/14/25 1147          Plan of Care Review    Plan of Care Reviewed With patient;spouse  -LR     Progress no change  -LR     Outcome Evaluation OT demar complete. Pt presents below baseline with decreased strength, activity tolerance, balance deficits, and impulsivity/limited safety awareness. Pt and spouse educated on fall prevention. Recommend IPOT POC and home with assist at D/C.  -LR       Row Name 05/14/25 1147          Therapy Assessment/Plan (OT)    Patient/Family Therapy Goal Statement (OT) PLOF  -LR     Rehab Potential (OT) good  -LR     Criteria for Skilled Therapeutic Interventions Met (OT) yes;skilled treatment is necessary  -LR     Therapy Frequency (OT) daily  -LR     Predicted Duration of Therapy Intervention (OT) 10 days  -LR       Row Name 05/14/25 1147          Therapy Plan Review/Discharge Plan (OT)    Anticipated Discharge Disposition (OT) home with assist  -LR       Row Name 05/14/25 1147          Vital Signs    Pre Systolic BP Rehab 140  -LR     Pre Treatment Diastolic BP 70  -LR     Pretreatment Heart Rate (beats/min) 109  -LR     Intratreatment Heart Rate (beats/min) 140  -LR     Pre SpO2 (%) 96  -LR     O2 Delivery Pre Treatment nasal cannula  -LR     O2 Delivery Intra Treatment nasal cannula  -LR     O2 Delivery Post Treatment nasal cannula  -LR     Pre Patient Position Supine  -LR     Intra Patient Position Standing  -LR     Post Patient Position Sitting  -LR       Row Name 05/14/25 1147          Positioning and Restraints    Pre-Treatment Position in bed  -LR     Post Treatment Position chair  -LR     In Chair notified nsg;reclined;call light within reach;encouraged to call for assist;exit  alarm on;with family/caregiver;waffle cushion;legs elevated  -LR               User Key  (r) = Recorded By, (t) = Taken By, (c) = Cosigned By      Initials Name Provider Type    Mary Cross OT Occupational Therapist                   Outcome Measures       Row Name 05/14/25 1155          How much help from another is currently needed...    Putting on and taking off regular lower body clothing? 2  -LR     Bathing (including washing, rinsing, and drying) 2  -LR     Toileting (which includes using toilet bed pan or urinal) 2  -LR     Putting on and taking off regular upper body clothing 3  -LR     Taking care of personal grooming (such as brushing teeth) 3  -LR     Eating meals 3  -LR     AM-PAC 6 Clicks Score (OT) 15  -LR       Row Name 05/14/25 1155          Functional Assessment    Outcome Measure Options AM-PAC 6 Clicks Daily Activity (OT)  -LR               User Key  (r) = Recorded By, (t) = Taken By, (c) = Cosigned By      Initials Name Provider Type    Mary Cross OT Occupational Therapist                    Occupational Therapy Education       Title: PT OT SLP Therapies (In Progress)       Topic: Occupational Therapy (In Progress)       Point: ADL training (In Progress)       Learning Progress Summary            Patient Acceptance, E, NR by LR at 5/14/2025 1155                      Point: Home exercise program (In Progress)       Learning Progress Summary            Patient Acceptance, E, NR by LR at 5/14/2025 1155                      Point: Precautions (In Progress)       Learning Progress Summary            Patient Acceptance, E, NR by LR at 5/14/2025 1155                      Point: Body mechanics (In Progress)       Learning Progress Summary            Patient Acceptance, E, NR by LR at 5/14/2025 1155                                      User Key       Initials Effective Dates Name Provider Type Discipline    LR 10/09/24 -  Mary Rangel OT Occupational Therapist OT                   OT Recommendation and Plan  Planned Therapy Interventions (OT): activity tolerance training, adaptive equipment training, BADL retraining, cognitive/visual perception retraining, occupation/activity based interventions, strengthening exercise, transfer/mobility retraining, functional balance retraining, IADL retraining, passive ROM/stretching, ROM/therapeutic exercise, patient/caregiver education/training  Therapy Frequency (OT): daily  Plan of Care Review  Plan of Care Reviewed With: patient, spouse  Progress: no change  Outcome Evaluation: OT demar complete. Pt presents below baseline with decreased strength, activity tolerance, balance deficits, and impulsivity/limited safety awareness. Pt and spouse educated on fall prevention. Recommend IPOT POC and home with assist at D/C.     Time Calculation:   Evaluation Complexity (OT)  Review Occupational Profile/Medical/Therapy History Complexity: brief/low complexity  Assessment, Occupational Performance/Identification of Deficit Complexity: 1-3 performance deficits  Clinical Decision Making Complexity (OT): problem focused assessment/low complexity  Overall Complexity of Evaluation (OT): low complexity     Time Calculation- OT       Row Name 05/14/25 1156             Time Calculation- OT    OT Start Time 1015  -LR      OT Received On 05/14/25  -LR      OT Goal Re-Cert Due Date 05/24/25  -LR         Untimed Charges    OT Eval/Re-eval Minutes 46  -LR         Total Minutes    Untimed Charges Total Minutes 46  -LR       Total Minutes 46  -LR                User Key  (r) = Recorded By, (t) = Taken By, (c) = Cosigned By      Initials Name Provider Type    LR Mary Rangel OT Occupational Therapist                  Therapy Charges for Today       Code Description Service Date Service Provider Modifiers Qty    [id number]  OT EVAL LOW COMPLEXITY 4 5/14/2025 Mary Rangel OT GO 1                 Mary Rangel OT  5/14/2025

## 2025-05-14 NOTE — PROGRESS NOTES
[facility] Medicine Services  PROGRESS NOTE    Patient Name: Sydnie Gamble  : 1960  MRN: [id number]    Date of Admission: 2025  Primary Care Physician: Dee Elena APRN    Subjective   Subjective     CC:  Dyspnea, PE    HPI:  Complaining of some left-sided back discomfort and across her left side/ribs      Objective   Objective     Vital Signs:   Temp:  [96.1 °F (35.6 °C)-96.3 °F (35.7 °C)] 96.3 °F (35.7 °C)  Heart Rate:  [] 113  Resp:  [18-20] 20  BP: (103-190)/(55-81) 136/77  Flow (L/min) (Oxygen Therapy):  [6] 6     Physical Exam:  Constitutional: No acute distress, awake, alert  HENT: NCAT, mucous membranes moist  Respiratory: Poor effort, mild conversational dyspnea  Cardiovascular: RRR, no murmurs, rubs, or gallops  Gastrointestinal: Soft, nontender, nondistended  Musculoskeletal: No bilateral ankle edema  Psychiatric: Appropriate affect, cooperative  Neurologic: Oriented x 3, speech clear  Skin: No rashes      Results Reviewed:  LAB RESULTS:      Lab 25  0630 259 25  1511 25  0733 25  0642 25  0510   WBC 10.07  --   --   --   --  9.69   HEMOGLOBIN 11.7*  --   --   --   --  11.8*   HEMATOCRIT 36.3  --   --   --   --  36.7   PLATELETS 334  --   --   --   --  330   NEUTROS ABS 7.05*  --   --   --   --  6.69   IMMATURE GRANS (ABS) 0.13*  --   --   --   --  0.16*   LYMPHS ABS 0.80  --   --   --   --  0.94   MONOS ABS 0.85  --   --   --   --  0.85   EOS ABS 1.15*  --   --   --   --  0.97*   MCV 89.9  --   --   --   --  89.5   SED RATE  --   --   --   --   --  40*   CRP  --   --   --   --  9.74*  --    PROCALCITONIN  --   --   --   --  0.16  --    LACTATE  --   --   --   --   --  1.0   PROTIME  --   --   --   --   --  13.7   APTT  --   --   --   --   --  28.3*   HEPARIN ANTI-XA 0.56 0.42 0.44 0.10*  --   --    D DIMER QUANT  --   --   --   --   --  14.28*   HSTROP T  --   --   --   --  15* 9         Lab  05/14/25  0630 05/13/25  0642 05/13/25  0510   SODIUM 140  --  139   POTASSIUM 4.1  --  3.7   CHLORIDE 103  --  102   CO2 23.0  --  20.0*   ANION GAP 14.0  --  17.0*   BUN 13  --  14   CREATININE 1.05*  --  0.92   EGFR 58.4*  --  68.4   GLUCOSE 99  --  105*   CALCIUM 9.3  --  9.3   MAGNESIUM 2.0  --   --    HEMOGLOBIN A1C  --   --  5.80*   TSH  --  3.400  --          Lab 05/13/25  0510   TOTAL PROTEIN 7.0   ALBUMIN 3.8   GLOBULIN 3.2   ALT (SGPT) 18   AST (SGOT) 21   BILIRUBIN <0.2   ALK PHOS 166*         Lab 05/13/25  0642 05/13/25  0510   PROBNP  --  138.5   HSTROP T 15* 9   PROTIME  --  13.7   INR  --  0.99         Lab 05/13/25  0642   CHOLESTEROL 170   LDL CHOL 82   HDL CHOL 66*   TRIGLYCERIDES 124         Lab 05/13/25  0642   IRON 24*   IRON SATURATION (TSAT) 9*   TIBC 274*   TRANSFERRIN 184*   FERRITIN 313.40*         Lab 05/13/25  1520   FIO2 40   CARBOXYHEMOGLOBIN (VENOUS) 1.5     Brief Urine Lab Results       None            Microbiology Results Abnormal       None            Duplex Venous Lower Extremity - Bilateral CAR  Result Date: 5/14/2025    Acute left lower extremity deep vein thrombosis noted in the peroneal.     CT Angiogram Chest Pulmonary Embolism  Result Date: 5/13/2025  CT ANGIOGRAM CHEST PULMONARY EMBOLISM Date of Exam: 5/13/2025 6:39 AM EDT Indication: new acute hypoxia, short of breath, right pleural effusion, elevated d-dimer, history of lung cancer. vapes. Comparison: CT chest dated 3/31/2025 Technique: Axial CT images were obtained of the chest after the uneventful intravenous administration of 75 mL Isovue-370 utilizing pulmonary embolism protocol.  In addition, a 3-D volume rendered image was created for interpretation.  Reconstructed coronal and sagittal images were also obtained. Automated exposure control and iterative construction methods were used. Findings: Pulmonary arteries: Adequate opacification of the pulmonary arteries. There is a small segmental left upper lobe pulmonary  embolism. There are small segmental and subsegmental pulmonary emboli of the left lower lobe. There is a small segmental right upper lobe pulmonary embolism. Lungs and Pleura: There is a moderate sized right pleural effusion. There is right basilar atelectasis. Posttreatment changes of the right upper lobe pulmonary nodule are present. Now, the spiculated component appears to be slightly larger than previously seen. This was seen on image 37 of series 4 previous study measuring about 8 mm and measures about 11 mm on image 42 of series 6 on this exam. The nodular densities seen in the anterior right upper lobe is stable at 11 mm. The lesion seen previously in the superior segment of the right lower lobe is obscured by the presence of the pleural effusion and atelectasis. Superior segment groundglass nodule in the left lower lobe is stable. Mediastinum/Ashley: No mediastinal or hilar lymphadenopathy. Lymph nodes: No axillary or supraclavicular adenopathy. Cardiovascular: The cardiac chambers are within normal limits. The pericardium is normal. The aorta and its arch branch vessels are unremarkable.   Upper Abdomen: There are several hepatic cysts. However, there also appear to be multiple new low-density hepatic lesions which are difficult to assess in this phase of enhancement which is arterial but one lesion adjacent to the gallbladder is at least 2.2 cm in diameter. Bones and Soft Tissue: No suspicious osseous lesion.     Impression: Impression: 1.Small segmental and subsegmental pulmonary emboli of the left upper lobe, left lower lobe and right upper lobe. 2.Moderate right pleural effusion with right basilar atelectasis. 3.Posttreatment changes of the right upper lobe pulmonary nodule. The spiculated component appears to be slightly larger than previously seen. 4.Multiple new low-density hepatic lesions are suspicious for metastatic disease. Electronically Signed: Rodriguez Singh MD  5/13/2025 7:07 AM EDT  Workstation  ID: [id number]    XR Chest 1 View  Result Date: 5/13/2025  XR CHEST 1 VW Date of Exam: 5/13/2025 5:01 AM EDT Indication: SOA triage protocol Comparison: Chest radiograph 4/12/2025 Findings: The heart is enlarged. The pulmonary vascular markings are normal. There is a new right-sided pleural fluid collection. There is right lower lobe airspace disease which is likely atelectasis. Left lung is clear. The osseous structures are normal.     Impression: Impression: New right-sided pleural fluid collection with right lower lobe airspace disease likely atelectasis. Electronically Signed: Rodriguez Singh MD  5/13/2025 5:41 AM EDT  Workstation ID: [id number]      Results for orders placed during the hospital encounter of 05/13/25    Adult Transthoracic Echo Complete w/ Color, Spectral and Contrast if necessary per protocol    Interpretation Summary    Left ventricular ejection fraction appears to be 66 - 70%.    Normal right ventricular cavity size, wall thickness, systolic function and septal motion noted.    There is a small (<1cm) pericardial effusion adjacent to the right ventricle. There is no evidence of cardiac tamponade.    There is a left pleural effusion.      Current medications:  Scheduled Meds:[Held by provider] aspirin, 81 mg, Oral, Daily  atorvastatin, 10 mg, Oral, Daily  budesonide-formoterol, 2 puff, Inhalation, BID - RT   And  revefenacin, 175 mcg, Nebulization, Daily - RT  Diclofenac Sodium, 2 g, Topical, 4x Daily  famotidine, 20 mg, Oral, BID AC  guaiFENesin, 600 mg, Oral, Q12H  ipratropium-albuterol, 3 mL, Nebulization, 4x Daily - RT  levothyroxine, 88 mcg, Oral, Q AM  melatonin, 10 mg, Oral, Nightly  montelukast, 10 mg, Oral, Daily  [Held by provider] predniSONE, 40 mg, Oral, Daily  sertraline, 100 mg, Oral, Daily  sodium chloride, 10 mL, Intravenous, Q12H  sodium chloride, 4 mL, Nebulization, Once  traZODone, 150 mg, Oral, Nightly      Continuous Infusions:heparin, 18 Units/kg/hr, Last Rate: Stopped (05/14/25  1139)  Pharmacy to Dose Heparin,       PRN Meds:.  acetaminophen **OR** acetaminophen **OR** acetaminophen    albuterol    benzonatate    senna-docusate sodium **AND** polyethylene glycol **AND** bisacodyl **AND** bisacodyl    Calcium Replacement - Follow Nurse / BPA Driven Protocol    clonazePAM    Magnesium Standard Dose Replacement - Follow Nurse / BPA Driven Protocol    nitroglycerin    ondansetron ODT    Pharmacy to Dose Heparin    Phosphorus Replacement - Follow Nurse / BPA Driven Protocol    Potassium Replacement - Follow Nurse / BPA Driven Protocol    sodium chloride    sodium chloride    sodium chloride    tiZANidine    [Held by provider] zolpidem    Assessment & Plan   Assessment & Plan     Active Hospital Problems    Diagnosis  POA    **Pulmonary embolism [I26.99]  Yes    Anemia, chronic disease [D63.8]  Unknown    Pleural effusion [J90]  Unknown    Adenocarcinoma of right lung [C34.91]  Yes    Atherosclerotic cardiovascular disease [I25.10]  Yes    Cervical cancer [C53.9]  Yes    HLD (hyperlipidemia) [E78.5]  Yes    COPD with asthma [J44.89]  Yes    ALESIA (generalized anxiety disorder) [F41.1]  Yes    Acquired hypothyroidism [E03.9]  Yes    Prediabetes [R73.03]  Yes    Gastroesophageal reflux disease without esophagitis [K21.9]  Yes      Resolved Hospital Problems   No resolved problems to display.        Brief Hospital Course to date:  Sydnie Gamble is a 65 y.o. female with a PMH significant for RLL adenocarcinoma (non-small cell lung cancer) s/p CyberKnife radiotherapy September 2024, OA, GERD, cervical squamous cell carcinoma, HTN, HLD, hypothyroidism and CAD with anxiety and depression.  Admitted for segmental BL PEs, currently on heparin drip.  She was also found to have new metastatic lesions on the liver with enhancement/progression of previous RUL nodule in the lungs.     New Pulmonary embolism  -Patient found to have segmental and subsegmental PEs on CTA chest and DANIELLE, LLL and RUL  vascular with evidence of moderate right pleural effusion  -Has a history of RUL adenocarcinoma of the lung with concern for progression   Continue heparin drip, plan to switch to Eliquis after thoracentesis per oncology    -Left lower extremity DVT    RUL adenocarcinoma of the lung with new metastases  Cervical squamous cell carcinoma history  New pleural effusion - suspect malignant  Acute hypoxemic respiratory failure  History of asthma  -PMH of RUL pulmonary adenocarcinoma, follows Dr. Mckenzie, previous CT chest March 31, 2025.  S/p CyberKnife RT September 2024  -CT chest this admission showing a moderate-sized pleural effusion in the right lung particularly RUL with new hypodensities in the liver concerning for metastatic spread  - IR consult for thoracentesis, pleural fluid labs have already been ordered  -Oncology following  -MRI brain ordered but pending     Generalized anxiety disorder  Depression  -Continue home Klonopin, Zoloft and trazodone, holding home Ambien     Recent diagnosis of costochondritis at OSH  -Recently started on tizanidine, will continue for now as patient is endorsing symptoms     Hypothyroidism  -Continue Synthroid     Anemia of chronic disease     GERD  -Continue home PPI     Elevated troponin  -Likely type II demand ischemia in the setting of pulmonary embolism  -No significant ST changes noted on EKG    Expected Discharge Location and Transportation: Home?  Expected Discharge   Expected Discharge Date: 5/16/2025; Expected Discharge Time:      VTE Prophylaxis:  Pharmacologic & mechanical VTE prophylaxis orders are present.         AM-PAC 6 Clicks Score (PT): 23 (05/13/25 2005)    CODE STATUS:   Code Status and Medical Interventions: CPR (Attempt to Resuscitate); Full Support   Ordered at: 05/13/25 0745     Code Status (Patient has no pulse and is not breathing):    CPR (Attempt to Resuscitate)     Medical Interventions (Patient has pulse or is breathing):    Full Support     Level Of  Support Discussed With:    Patient       Terese Stanford Atif, DO  05/14/25

## 2025-05-14 NOTE — PROGRESS NOTES
DATE OF VISIT: 5/14/2025    Chief Complaint: Followup for recurrent right lung cancer     SUBJECTIVE: The patient is sitting comfortable in bed.  She is feeling better after thoracentesis.  She is anxious to go home.    REVIEW OF SYSTEMS: All the other 9 systems are reviewed by me and negative  except what is mentioned in HPI.     Past History:  Medical History: has a past medical history of Acid reflux, Acid reflux, Adenocarcinoma of lung (08/12/2024), Anemia, chronic disease (5/13/2025), Anxiety (1976), Arthritis, Atherosclerotic cardiovascular disease (03/25/2024), Cervical cancer, Depression, Emphysema of lung, History of radiation therapy (11/20/2020), History of radiation therapy (10/04/2024), radiation therapy, Hyperlipidemia, Hypothyroidism, Kidney disease, Lung cancer, Lung nodule, Ovarian cyst (1980s), Pleural effusion (5/13/2025), Pulmonary embolism (5/13/2025), Visual impairment (corrected with glasses), Wears dentures, and Wears glasses.   Surgical History: has a past surgical history that includes Tubal ligation; total abdominal hysterectomy pelvic node dissection (N/A, 08/18/2020); Lymph node biopsy; Subtotal Hysterectomy (1987?); Bronchoscopy; Lung biopsy; BRONCHOSCOPY WITH ION ROBOTIC ASSIST (N/A, 08/06/2024); Mohs surgery (03/24/2025); Hysteroscopy; and Colonoscopy.   Family History: family history includes Anxiety disorder in her mother; Asthma in her mother; COPD in her mother; Cancer in her father, maternal aunt, maternal aunt, maternal aunt, maternal aunt, maternal grandmother, and mother; Depression in her mother; Early death in her father; Emphysema in her mother; Heart attack in her maternal grandfather; Heart disease in her maternal grandfather; Hypertension in her mother; Melanoma in her mother; Mental illness in her mother; Uterine cancer in her mother.   Social History: reports that she quit smoking about 6 years ago. Her smoking use included cigarettes. She started smoking about 53  years ago. She has a 67.5 pack-year smoking history. She has been exposed to tobacco smoke. She has never used smokeless tobacco. She reports that she does not drink alcohol and does not use drugs.    Medications Prior to Admission   Medication Sig Dispense Refill Last Dose/Taking    albuterol sulfate  (90 Base) MCG/ACT inhaler Inhale 2 puffs Every 4 (Four) to 6 (Six) Hours As Needed for Wheezing or Shortness of Air.   Past Week    Aspirin Low Dose 81 MG EC tablet TAKE 1 TABLET BY MOUTH EVERY DAY 90 tablet 1 5/12/2025    atorvastatin (LIPITOR) 10 MG tablet TAKE 1 TABLET BY MOUTH EVERY DAY AT NIGHT 90 tablet 1 5/12/2025    cefdinir (OMNICEF) 300 MG capsule Take 1 capsule by mouth 2 (Two) Times a Day. For 10 days   5/12/2025    clonazePAM (KlonoPIN) 1 MG tablet Take 1 tablet by mouth 2 (Two) Times a Day As Needed for Anxiety. 50 tablet 0 Past Week    famotidine (PEPCID) 20 MG tablet TAKE 1 TABLET BY MOUTH TWICE DAILY OR TAKE 2 TABLETS BY MOUTH ONCE DAILY AS NEEDED FOR HEARTBURN 180 tablet 1 Past Week    Fluticasone-Umeclidin-Vilant (Trelegy Ellipta) 100-62.5-25 MCG/ACT inhaler Inhale 1 puff Daily. 90 each 3 5/12/2025    levothyroxine (Synthroid) 88 MCG tablet Take 1 tablet by mouth Every Morning. 90 tablet 1 5/12/2025    ondansetron ODT (ZOFRAN-ODT) 4 MG disintegrating tablet Place 1 tablet on the tongue Every 8 (Eight) Hours As Needed for Nausea or Vomiting. 30 tablet 2 Past Week    sertraline (Zoloft) 100 MG tablet Take 1 tablet by mouth Daily. 90 tablet 1 5/12/2025    tiZANidine (Zanaflex) 4 MG tablet Take 1 tablet by mouth 2 (Two) Times a Day As Needed for Muscle Spasms. 60 tablet 0 Past Week    traZODone (DESYREL) 150 MG tablet TAKE 1 TABLET BY MOUTH EVERY DAY AT NIGHT 90 tablet 3 5/12/2025    zolpidem (AMBIEN) 10 MG tablet Take 1 tablet by mouth At Night As Needed for Sleep. (Patient taking differently: Take 1 tablet by mouth Every Night.) 30 tablet 1 5/12/2025      Allergies: Patient has no known  allergies.     Current Facility-Administered Medications:     acetaminophen (TYLENOL) tablet 650 mg, 650 mg, Oral, Q4H PRN, 650 mg at 05/14/25 1010 **OR** acetaminophen (TYLENOL) 160 MG/5ML oral solution 650 mg, 650 mg, Oral, Q4H PRN **OR** acetaminophen (TYLENOL) suppository 650 mg, 650 mg, Rectal, Q4H PRN, Latonya Lange MD    albuterol (PROVENTIL) nebulizer solution 0.083% 2.5 mg/3mL, 2.5 mg, Nebulization, Q4H PRN, Latonya Lange MD    [Held by provider] aspirin EC tablet 81 mg, 81 mg, Oral, Daily, Latonya Lange MD    atorvastatin (LIPITOR) tablet 10 mg, 10 mg, Oral, Daily, Latonya Lange MD, 10 mg at 05/13/25 2058    benzonatate (TESSALON) capsule 100 mg, 100 mg, Oral, TID PRN, Latonya Lange MD    sennosides-docusate (PERICOLACE) 8.6-50 MG per tablet 2 tablet, 2 tablet, Oral, BID PRN **AND** polyethylene glycol (MIRALAX) packet 17 g, 17 g, Oral, Daily PRN **AND** bisacodyl (DULCOLAX) EC tablet 5 mg, 5 mg, Oral, Daily PRN **AND** bisacodyl (DULCOLAX) suppository 10 mg, 10 mg, Rectal, Daily PRN, Latonya Lange MD    budesonide-formoterol (SYMBICORT) 160-4.5 MCG/ACT inhaler 2 puff, 2 puff, Inhalation, BID - RT, 2 puff at 05/14/25 0725 **AND** revefenacin (YUPELRI) nebulizer solution 175 mcg, 175 mcg, Nebulization, Daily - RT, Latonya Lange MD, 175 mcg at 05/14/25 0725    Calcium Replacement - Follow Nurse / BPA Driven Protocol, , Not Applicable, PRN, Latonya Lange MD    clonazePAM (KlonoPIN) tablet 1 mg, 1 mg, Oral, BID PRN, Latonya Lange MD, 1 mg at 05/13/25 2148    Diclofenac Sodium (VOLTAREN) 1 % gel 2 g, 2 g, Topical, 4x Daily, Latonya Lange MD, 2 g at 05/14/25 1356    famotidine (PEPCID) tablet 20 mg, 20 mg, Oral, BID AC, Latonya Lange MD, 20 mg at 05/14/25 1010    guaiFENesin (MUCINEX) 12 hr tablet 600 mg, 600 mg, Oral, Q12H, Latonya Lange MD, 600 mg at 05/14/25 1011    heparin 87928 units/250 mL (100 units/mL) in 0.45 % NaCl infusion, 18 Units/kg/hr,  Intravenous, Titrated, Latonya Lange MD, Last Rate: 10.94 mL/hr at 05/14/25 1346, 18 Units/kg/hr at 05/14/25 1346    ipratropium-albuterol (DUO-NEB) nebulizer solution 3 mL, 3 mL, Nebulization, 4x Daily - RT, Latonya Lange MD, 3 mL at 05/14/25 0725    levothyroxine (SYNTHROID, LEVOTHROID) tablet 88 mcg, 88 mcg, Oral, Q AM, Latonya Lange MD, 88 mcg at 05/14/25 0605    Magnesium Standard Dose Replacement - Follow Nurse / BPA Driven Protocol, , Not Applicable, PRN, Latonya Lange MD    melatonin tablet 10 mg, 10 mg, Oral, Nightly, Latonya Lange MD, 10 mg at 05/13/25 2059    montelukast (SINGULAIR) tablet 10 mg, 10 mg, Oral, Daily, Terese Srivastava, , 10 mg at 05/14/25 1011    nitroglycerin (NITROSTAT) SL tablet 0.4 mg, 0.4 mg, Sublingual, Q5 Min PRN, Latonya Lange MD    ondansetron ODT (ZOFRAN-ODT) disintegrating tablet 4 mg, 4 mg, Translingual, Q8H PRN, Latonya Lange MD    Pharmacy to Dose Heparin, , Not Applicable, Continuous PRN, Latonya Lange MD    Phosphorus Replacement - Follow Nurse / BPA Driven Protocol, , Not Applicable, PRN, Latonya Lange MD    Potassium Replacement - Follow Nurse / BPA Driven Protocol, , Not Applicable, PRNNazario Muhammad, MD    [Held by provider] predniSONE (DELTASONE) tablet 40 mg, 40 mg, Oral, Daily, Latonya Lange MD    sertraline (ZOLOFT) tablet 100 mg, 100 mg, Oral, Daily, Latonya Lange MD, 100 mg at 05/14/25 1011    sodium chloride 0.9 % flush 10 mL, 10 mL, Intravenous, PRN, Latonya Lange MD    sodium chloride 0.9 % flush 10 mL, 10 mL, Intravenous, Q12H, Latonya Lange MD, 10 mL at 05/14/25 1012    sodium chloride 0.9 % flush 10 mL, 10 mL, Intravenous, PRN, Latonya Lange MD    sodium chloride 0.9 % infusion 40 mL, 40 mL, Intravenous, PRN, Latonya Lange MD    sodium chloride 7 % nebulizer solution nebulizer solution 4 mL, 4 mL, Nebulization, Once, Latonya Lange MD    tiZANidine (ZANAFLEX) tablet 4  "mg, 4 mg, Oral, BID PRN, Latonya Lange MD, 4 mg at 05/13/25 1141    traZODone (DESYREL) tablet 150 mg, 150 mg, Oral, Nightly, Latonya Lange MD, 150 mg at 05/13/25 2059    [Held by provider] zolpidem (AMBIEN) tablet 10 mg, 10 mg, Oral, Nightly PRN, Latonya Lange MD    PHYSICAL EXAMINATION:   /99 (BP Location: Left arm, Patient Position: Lying)   Pulse 97   Temp 96.3 °F (35.7 °C) (Axillary)   Resp 18   Ht 154.9 cm (61\")   Wt 60.8 kg (134 lb)   LMP  (LMP Unknown)   SpO2 93%   BMI 25.32 kg/m²                ECOG Performance Status: 2 - Symptomatic, <50% confined to bed  GENERAL: Age appropriate. No acute distress.   NEURO/PSYCH: A&O x 3, strength 5/5 in all muscle groups  HEENT: Head atraumatic, normocephalic.   NECK: Supple. No JVD. No lymphadenopathy.   LUNGS: Clear to auscultation bilaterally. No wheezing. No rhonchi.   HEART: Regular rate and rhythm. S1, S2, no murmurs.   ABDOMEN: Soft, nontender, nondistended. Bowel sounds positive. No  hepatosplenomegaly.   EXTREMITIES: No clubbing, cyanosis, or edema.   SKIN: No rashes. No purpura.       No results displayed because visit has over 200 results.          US Thoracentesis  Result Date: 5/14/2025  Narrative: PROCEDURE: US THORACENTESIS-  ATTENDING: Jose Hope M.D.  CLINICAL HISTORY: Large right-sided pleural effusion.  TECHNIQUE:  The risk, benefits, and alternatives were described in detail and the patient was brought to the ultrasound suite and positioned seated. The skin entry site was prepped and draped utilizing standard sterile technique. 1% lidocaine was applied to the soft tissues of the posterior right thorax   A 5 Emirati Yueh was advanced into the right pleural space.   A total of 1150 mL was removed from the right pleural space. A specimen was sent to the laboratory for analysis. The needle was removed and a sterile dressing was applied.  The patient tolerated the procedure well and there were no complications.  "     Impression: Successful ultrasound-guided right thoracentesis.   Attestation Signer name: Jose Hope MD I attest that I was present for the entire procedure. I reviewed the stored images and agree with the report as written.      5/14/2025 2:32 PM by Jose Hope MD on Workstation: [id number]      XR Chest 1 View  Result Date: 5/14/2025  Narrative: XR CHEST 1 VW Date of Exam: 5/14/2025 1:15 PM EDT Indication: s/p thora Comparison: CT chest 5/13/2025, AP chest x-ray 5/13/2025 Findings: No pneumothorax is seen following thoracentesis. There is improved aeration of the right lung with persistent patchy opacities in the right midlung and right lung base. Left lung appears clear. Cardiomediastinal contours are stable.     Impression: Impression: 1.No visible pneumothorax following thoracentesis. 2.Improved aeration of the right lung with persistent patchy opacities in the right midlung and right lung base. Electronically Signed: Maryellen Dalton MD  5/14/2025 1:49 PM EDT  Workstation ID: [id number]    Duplex Venous Lower Extremity - Bilateral CAR  Result Date: 5/14/2025  Narrative:   Acute left lower extremity deep vein thrombosis noted in the peroneal.     Adult Transthoracic Echo Complete w/ Color, Spectral and Contrast if necessary per protocol  Result Date: 5/14/2025  Narrative:   Left ventricular ejection fraction appears to be 66 - 70%.   Normal right ventricular cavity size, wall thickness, systolic function and septal motion noted.   There is a small (<1cm) pericardial effusion adjacent to the right ventricle. There is no evidence of cardiac tamponade.   There is a left pleural effusion.     CT Angiogram Chest Pulmonary Embolism  Result Date: 5/13/2025  Narrative: CT ANGIOGRAM CHEST PULMONARY EMBOLISM Date of Exam: 5/13/2025 6:39 AM EDT Indication: new acute hypoxia, short of breath, right pleural effusion, elevated d-dimer, history of lung cancer. vapes. Comparison: CT chest dated 3/31/2025 Technique: Axial  CT images were obtained of the chest after the uneventful intravenous administration of 75 mL Isovue-370 utilizing pulmonary embolism protocol.  In addition, a 3-D volume rendered image was created for interpretation.  Reconstructed coronal and sagittal images were also obtained. Automated exposure control and iterative construction methods were used. Findings: Pulmonary arteries: Adequate opacification of the pulmonary arteries. There is a small segmental left upper lobe pulmonary embolism. There are small segmental and subsegmental pulmonary emboli of the left lower lobe. There is a small segmental right upper lobe pulmonary embolism. Lungs and Pleura: There is a moderate sized right pleural effusion. There is right basilar atelectasis. Posttreatment changes of the right upper lobe pulmonary nodule are present. Now, the spiculated component appears to be slightly larger than previously seen. This was seen on image 37 of series 4 previous study measuring about 8 mm and measures about 11 mm on image 42 of series 6 on this exam. The nodular densities seen in the anterior right upper lobe is stable at 11 mm. The lesion seen previously in the superior segment of the right lower lobe is obscured by the presence of the pleural effusion and atelectasis. Superior segment groundglass nodule in the left lower lobe is stable. Mediastinum/Ashley: No mediastinal or hilar lymphadenopathy. Lymph nodes: No axillary or supraclavicular adenopathy. Cardiovascular: The cardiac chambers are within normal limits. The pericardium is normal. The aorta and its arch branch vessels are unremarkable.   Upper Abdomen: There are several hepatic cysts. However, there also appear to be multiple new low-density hepatic lesions which are difficult to assess in this phase of enhancement which is arterial but one lesion adjacent to the gallbladder is at least 2.2 cm in diameter. Bones and Soft Tissue: No suspicious osseous lesion.     Impression:  Impression: 1.Small segmental and subsegmental pulmonary emboli of the left upper lobe, left lower lobe and right upper lobe. 2.Moderate right pleural effusion with right basilar atelectasis. 3.Posttreatment changes of the right upper lobe pulmonary nodule. The spiculated component appears to be slightly larger than previously seen. 4.Multiple new low-density hepatic lesions are suspicious for metastatic disease. Electronically Signed: Rodriguez Singh MD  5/13/2025 7:07 AM EDT  Workstation ID: [id number]    XR Chest 1 View  Result Date: 5/13/2025  Narrative: XR CHEST 1 VW Date of Exam: 5/13/2025 5:01 AM EDT Indication: SOA triage protocol Comparison: Chest radiograph 4/12/2025 Findings: The heart is enlarged. The pulmonary vascular markings are normal. There is a new right-sided pleural fluid collection. There is right lower lobe airspace disease which is likely atelectasis. Left lung is clear. The osseous structures are normal.     Impression: Impression: New right-sided pleural fluid collection with right lower lobe airspace disease likely atelectasis. Electronically Signed: Rodriguez Singh MD  5/13/2025 5:41 AM EDT  Workstation ID: [id number]      ASSESSMENT: The patient is a very pleasant 65 y.o. female  with recurrent right lung cancer      PLAN:  1.  Recurrent right lung cancer: I will follow-up on MRI brain.  I will set her up to have PET scan as an outpatient.  2.  Right pleural effusion: Status post right thoracentesis done May 14, 2025 with 1200 cc removal.  I will follow-up on cytology.  3.  Left lower extremity DVT with bilateral PEs: Continue anticoagulation.  Recommend to transition to Eliquis 5 mg twice daily.    Okay to discharge patient home from oncology perspective.  She will follow-up with me after the PET scan.      Sejal Mckenzie MD  5/14/2025

## 2025-05-14 NOTE — PROGRESS NOTES
Pharmacy to Dose Heparin Infusion Note    Sydnie Gamble is a 65 y.o. female receiving heparin infusion.     Therapy for (VTE/Cardiac): VTE  Patient Weight: 60.8 kg  Initial Bolus (Y/N): Yes  Any Bolus (Y/N): Yes     Signs or Symptoms of Bleeding: No      VTE (PE/DVT)   Initial Bolus: 80 units/kg (Max 10,000 units)  Initial rate: 18 units/kg/hr (Max 1,500 units/hr)    Anti-Xa Bolus   Dose Infusion Hold   Time Infusion Rate Change (units/kg/hr) Repeat  Anti-Xa   < 0.11 50 units/kg  (4000 units Max) None Increase by  4 units/kg/hr 6 hours   0.11 - 0.19 25 units/kg  (2000 units Max) None Increase by  3 units/kg/hr 6 hours   0.2 - 0.29 0 None Increase by  2 units/kg/hr 6 hours   0.3 - 0.7 0 None No Change 6 hours (after 2 consecutive levels in range check qAM)   0.71 - 0.8 0 None Decrease by  1 units/kg/hr 6 hours   0.81 - 0.9 0 None Decrease by  2 units/kg/hr 6 hours   0.91 - 1 0 60 minutes Decrease by  3 units/kg/hr 6 hours   >1 0 Hold  After Anti-Xa less than 0.7 decrease previous rate by  4 units/kg/hr  Every 2 hours until Anti-Xa less than 0.7 then when infusion restarts in 6 hours     Results from last 7 days   Lab Units 05/14/25  0630 05/13/25  0510   INR   --  0.99   HEMOGLOBIN g/dL 11.7* 11.8*   HEMATOCRIT % 36.3 36.7   PLATELETS 10*3/mm3 334 330       Date   Time   Anti-Xa Current Rate (units/kg/hr) Bolus   (units) Rate Change   (units/kg/hr) New Rate (units/kg/hr) Repeat  Anti-Xa Comments /  Pump Check    5/13 0730 Pending -- 4500 +18 18 1500 YVETTE Sharpe   5/13 1511 0.44 18 -- -- 18 2100 LEWIS Vargas RN   5/13 2039 0.42 18 -- -- 18 0600 LEWIS CRAWFORD   5/14 0630 0.56 18 -- -- 18 0600 Omega 3241                                                                                                                                                                                                Tevin Olivo [facility]  5/14/2025  07:22 EDT

## 2025-05-15 ENCOUNTER — TELEPHONE (OUTPATIENT)
Dept: ONCOLOGY | Facility: CLINIC | Age: 65
End: 2025-05-15
Payer: COMMERCIAL

## 2025-05-15 LAB
ANION GAP SERPL CALCULATED.3IONS-SCNC: 12 MMOL/L (ref 5–15)
BASOPHILS # BLD AUTO: 0.07 10*3/MM3 (ref 0–0.2)
BASOPHILS NFR BLD AUTO: 0.8 % (ref 0–1.5)
BUN SERPL-MCNC: 16 MG/DL (ref 8–23)
BUN/CREAT SERPL: 15.8 (ref 7–25)
CALCIUM SPEC-SCNC: 9.2 MG/DL (ref 8.6–10.5)
CHLORIDE SERPL-SCNC: 104 MMOL/L (ref 98–107)
CO2 SERPL-SCNC: 24 MMOL/L (ref 22–29)
CREAT SERPL-MCNC: 1.01 MG/DL (ref 0.57–1)
DEPRECATED RDW RBC AUTO: 45.4 FL (ref 37–54)
EGFRCR SERPLBLD CKD-EPI 2021: 61.1 ML/MIN/1.73
EOSINOPHIL # BLD AUTO: 1.13 10*3/MM3 (ref 0–0.4)
EOSINOPHIL NFR BLD AUTO: 13 % (ref 0.3–6.2)
ERYTHROCYTE [DISTWIDTH] IN BLOOD BY AUTOMATED COUNT: 13.4 % (ref 12.3–15.4)
GIE STN SPEC: NORMAL
GLUCOSE SERPL-MCNC: 108 MG/DL (ref 65–99)
HCT VFR BLD AUTO: 34.9 % (ref 34–46.6)
HGB BLD-MCNC: 11 G/DL (ref 12–15.9)
IMM GRANULOCYTES # BLD AUTO: 0.15 10*3/MM3 (ref 0–0.05)
IMM GRANULOCYTES NFR BLD AUTO: 1.7 % (ref 0–0.5)
LYMPHOCYTES # BLD AUTO: 1.14 10*3/MM3 (ref 0.7–3.1)
LYMPHOCYTES NFR BLD AUTO: 13.1 % (ref 19.6–45.3)
MAGNESIUM SERPL-MCNC: 2.2 MG/DL (ref 1.6–2.4)
MCH RBC QN AUTO: 28.8 PG (ref 26.6–33)
MCHC RBC AUTO-ENTMCNC: 31.5 G/DL (ref 31.5–35.7)
MCV RBC AUTO: 91.4 FL (ref 79–97)
MONOCYTES # BLD AUTO: 0.78 10*3/MM3 (ref 0.1–0.9)
MONOCYTES NFR BLD AUTO: 9 % (ref 5–12)
NEUTROPHILS NFR BLD AUTO: 5.43 10*3/MM3 (ref 1.7–7)
NEUTROPHILS NFR BLD AUTO: 62.4 % (ref 42.7–76)
NRBC BLD AUTO-RTO: 0 /100 WBC (ref 0–0.2)
PLATELET # BLD AUTO: 331 10*3/MM3 (ref 140–450)
PMV BLD AUTO: 9 FL (ref 6–12)
POTASSIUM SERPL-SCNC: 3.7 MMOL/L (ref 3.5–5.2)
RBC # BLD AUTO: 3.82 10*6/MM3 (ref 3.77–5.28)
REF LAB TEST METHOD: NORMAL
SODIUM SERPL-SCNC: 140 MMOL/L (ref 136–145)
UFH PPP CHRO-ACNC: 0.45 IU/ML (ref 0.3–0.7)
WBC NRBC COR # BLD AUTO: 8.7 10*3/MM3 (ref 3.4–10.8)

## 2025-05-15 PROCEDURE — 99232 SBSQ HOSP IP/OBS MODERATE 35: CPT | Performed by: INTERNAL MEDICINE

## 2025-05-15 PROCEDURE — 94799 UNLISTED PULMONARY SVC/PX: CPT

## 2025-05-15 PROCEDURE — 83735 ASSAY OF MAGNESIUM: CPT

## 2025-05-15 PROCEDURE — 80048 BASIC METABOLIC PNL TOTAL CA: CPT

## 2025-05-15 PROCEDURE — 97110 THERAPEUTIC EXERCISES: CPT

## 2025-05-15 PROCEDURE — 94664 DEMO&/EVAL PT USE INHALER: CPT

## 2025-05-15 PROCEDURE — 85025 COMPLETE CBC W/AUTO DIFF WBC: CPT | Performed by: INTERNAL MEDICINE

## 2025-05-15 PROCEDURE — 97535 SELF CARE MNGMENT TRAINING: CPT

## 2025-05-15 PROCEDURE — 85520 HEPARIN ASSAY: CPT

## 2025-05-15 PROCEDURE — 25010000002 HEPARIN (PORCINE) 25000-0.45 UT/250ML-% SOLUTION

## 2025-05-15 PROCEDURE — 94761 N-INVAS EAR/PLS OXIMETRY MLT: CPT

## 2025-05-15 RX ADMIN — FAMOTIDINE 20 MG: 20 TABLET, FILM COATED ORAL at 16:45

## 2025-05-15 RX ADMIN — DICLOFENAC SODIUM 2 G: 10 GEL TOPICAL at 22:04

## 2025-05-15 RX ADMIN — CLONAZEPAM 1 MG: 1 TABLET ORAL at 23:17

## 2025-05-15 RX ADMIN — Medication 10 MG: at 22:03

## 2025-05-15 RX ADMIN — ACETAMINOPHEN 650 MG: 325 TABLET ORAL at 18:39

## 2025-05-15 RX ADMIN — GUAIFENESIN 600 MG: 600 TABLET, EXTENDED RELEASE ORAL at 08:13

## 2025-05-15 RX ADMIN — MONTELUKAST 10 MG: 10 TABLET, FILM COATED ORAL at 08:13

## 2025-05-15 RX ADMIN — ATORVASTATIN CALCIUM 10 MG: 10 TABLET, FILM COATED ORAL at 22:03

## 2025-05-15 RX ADMIN — SENNOSIDES, DOCUSATE SODIUM 2 TABLET: 50; 8.6 TABLET, FILM COATED ORAL at 08:26

## 2025-05-15 RX ADMIN — HEPARIN SODIUM 18 UNITS/KG/HR: 10000 INJECTION, SOLUTION INTRAVENOUS at 05:18

## 2025-05-15 RX ADMIN — DICLOFENAC SODIUM 2 G: 10 GEL TOPICAL at 18:36

## 2025-05-15 RX ADMIN — Medication 10 ML: at 08:13

## 2025-05-15 RX ADMIN — APIXABAN 10 MG: 5 TABLET, FILM COATED ORAL at 22:02

## 2025-05-15 RX ADMIN — DICLOFENAC SODIUM 2 G: 10 GEL TOPICAL at 08:14

## 2025-05-15 RX ADMIN — ACETAMINOPHEN 650 MG: 325 TABLET ORAL at 11:03

## 2025-05-15 RX ADMIN — TRAZODONE HYDROCHLORIDE 150 MG: 50 TABLET ORAL at 22:02

## 2025-05-15 RX ADMIN — LEVOTHYROXINE SODIUM 88 MCG: 0.09 TABLET ORAL at 05:12

## 2025-05-15 RX ADMIN — APIXABAN 10 MG: 5 TABLET, FILM COATED ORAL at 11:42

## 2025-05-15 RX ADMIN — DICLOFENAC SODIUM 2 G: 10 GEL TOPICAL at 11:45

## 2025-05-15 RX ADMIN — IPRATROPIUM BROMIDE AND ALBUTEROL SULFATE 3 ML: 2.5; .5 SOLUTION RESPIRATORY (INHALATION) at 12:43

## 2025-05-15 RX ADMIN — SERTRALINE 100 MG: 100 TABLET, FILM COATED ORAL at 08:13

## 2025-05-15 RX ADMIN — Medication 10 ML: at 22:04

## 2025-05-15 RX ADMIN — FAMOTIDINE 20 MG: 20 TABLET, FILM COATED ORAL at 08:13

## 2025-05-15 NOTE — CASE MANAGEMENT/SOCIAL WORK
Continued Stay Note  [facility]     Patient Name: Sydnie Gamble  MRN: [id number]  Today's Date: 5/15/2025    Admit Date: 5/13/2025    Plan: Home w/OutPt PT   Discharge Plan       Row Name 05/15/25 1049       Plan    Plan Home w/OutPt PT    Plan Comments Per discussion in MDR, Pt had a R thoracentesis yesterday (-1150ml). Plan to transition heparin drip to Eliquis. Creatinine is better (1.01). MRI brain ordered. She walked 120ft with minimal assist yesterday. Therapy recommended outpatient PT, and Pt is agreeable. Pt was given an order for outpatient PT and told to arrange at the facility of her choice. CM provided Pt a new pulse oximeter. Pt will need home O2 (has documented qualifying sats). CM will continue to follow for medical readiness.    Final Discharge Disposition Code 01 - home or self-care                   Discharge Codes    No documentation.                 Expected Discharge Date and Time       Expected Discharge Date Expected Discharge Time    May 16, 2025               Elva Blank RN

## 2025-05-15 NOTE — PLAN OF CARE
Goal Outcome Evaluation:  Plan of Care Reviewed With: patient        Progress: improving  Outcome Evaluation: Patient demonstrated improved balance and LBD, grooming and toileting independence today.  Pt. declined walker or cane use.  CGA given for room mobility.  No unsteadiness noted. Limited standing time due to back pain. Pulmonary TE completed 5 reps each.  Pt. can only complete to 500 with incentive spirometer.  Continue OT POC.  Pt. expects to discharge tomorrow and home with OP PT.    Anticipated Discharge Disposition (OT): home with assist, home with outpatient therapy services (OP PT for pulmonary rehab, high level balance and back pain recommended)

## 2025-05-15 NOTE — THERAPY TREATMENT NOTE
Patient Name: Sydnie Gamble  : 1960    MRN: [id number]                              Today's Date: 5/15/2025       Admit Date: 2025    Visit Dx:     ICD-10-CM ICD-9-CM   1. Acute respiratory failure with hypoxia  J96.01 518.81   2. Shortness of breath  R06.02 786.05   3. Elevated d-dimer  R79.89 790.92   4. Pleural effusion, right  J90 511.9   5. Atypical chest pain  R07.89 786.59   6. Other acute pulmonary embolism without acute cor pulmonale  I26.99 415.19   7. Pleural effusion  J90 511.9   8. Anemia, chronic disease  D63.8 285.29   9. Pulmonary embolism, unspecified chronicity, unspecified pulmonary embolism type, unspecified whether acute cor pulmonale present  I26.99 415.19   10. Adenocarcinoma of right lung  C34.91 162.9     Patient Active Problem List   Diagnosis    Personal history of tobacco use, presenting hazards to health    Gastroesophageal reflux disease without esophagitis    Acquired hypothyroidism    Vitamin D deficiency    Prediabetes    COPD with asthma    ALESIA (generalized anxiety disorder)    Multiple lung nodules on CT    Hilar adenopathy    HLD (hyperlipidemia)    Cervical cancer    Atherosclerotic cardiovascular disease    Adenocarcinoma of right lung    Pulmonary embolism    Anemia, chronic disease    Pleural effusion     Past Medical History:   Diagnosis Date    Acid reflux     Acid reflux     Adenocarcinoma of lung 2024    Anemia, chronic disease 2025    Anxiety 1976    Arthritis     Atherosclerotic cardiovascular disease 2024    Cervical cancer     Depression     Emphysema of lung     History of radiation therapy 2020    pelvis + intracavitary brachytherapy    History of radiation therapy 10/04/2024    RUL lung    Hx of radiation therapy     Hyperlipidemia     Hypothyroidism     Kidney disease     Lung cancer     Lung nodule     Ovarian cyst 1980s    Pleural effusion 2025    Pulmonary embolism 2025    Visual impairment corrected with  glasses    Wears dentures     Wears glasses      Past Surgical History:   Procedure Laterality Date    BRONCHOSCOPY      BRONCHOSCOPY WITH ION ROBOTIC ASSIST N/A 08/06/2024    Procedure: BRONCHOSCOPY NAVIGATION WITH ENDOBRONCHIAL ULTRASOUND AND ION ROBOT;  Surgeon: Toni Mcclelland MD;  Location: [facility] ENDOSCOPY;  Service: Robotics - Pulmonary;  Laterality: N/A;    COLONOSCOPY      HYSTEROSCOPY      LUNG BIOPSY      LYMPH NODE BIOPSY      MOHS SURGERY  03/24/2025    Groin    SUBTOTAL HYSTERECTOMY  1987?    ovary/fallopian tube removed    TOTAL ABDOMINAL HYSTERECTOMY PELVIC NODE DISSECTION N/A 08/18/2020    Procedure: TOTAL RADICAL HYSTERECTOMY PELVIC NODE DISSECTION;  Surgeon: Laura Jimenez MD;  Location: [facility] OR;  Service: Gynecology Oncology;  Laterality: N/A;    TUBAL ABDOMINAL LIGATION      right with ovarian dissection       General Information       Row Name 05/15/25 1149          OT Time and Intention    Subjective Information complains of;pain;dyspnea  -ROSA     Document Type therapy note (daily note)  -ROSA     Mode of Treatment individual therapy;occupational therapy  -ROSA     Patient Effort good  -ROSA     Symptoms Noted During/After Treatment increased pain  -ROSA     Comment increased pain with standing, but overall improvement by end of therapy  -ROSA       Row Name 05/15/25 1149          General Information    Patient Profile Reviewed yes  -ROSA     Existing Precautions/Restrictions fall;oxygen therapy device and L/min  -ROSA     Barriers to Rehab medically complex;previous functional deficit  -ROSA       Row Name 05/15/25 1149          Cognition    Orientation Status (Cognition) oriented x 3  -ROSA       Row Name 05/15/25 1149          Safety Issues/Impairments Affecting Functional Mobility    Impairments Affecting Function (Mobility) balance;endurance/activity tolerance;shortness of breath;strength;pain  -ROSA               User Key  (r) = Recorded By, (t) = Taken By, (c) = Cosigned By      Initials Name  Provider Type    Nusrat Hammond, OT Occupational Therapist                     Mobility/ADL's       Row Name 05/15/25 1150          Bed Mobility    Supine-Sit [place] (Bed Mobility) standby assist  -ROSA     Assistive Device (Bed Mobility) head of bed elevated  -       Row Name 05/15/25 1150          Transfers    Transfers sit-stand transfer;stand-sit transfer  -ROSA       Row Name 05/15/25 1150          Sit-Stand Transfer    Sit-Stand [place] (Transfers) standby assist  -ROSA       Row Name 05/15/25 1150          Stand-Sit Transfer    Stand-Sit [place] (Transfers) standby assist  -ROSA       Row Name 05/15/25 1150          Functional Mobility    Functional Mobility- Ind. Level contact guard assist  -ROSA     Functional Mobility-Distance (Feet) 30  -ROSA     Functional Mobility- Comment pt. declined AD use despite back pain  -       Row Name 05/15/25 1150          Activities of Daily Living    BADL Assessment/Intervention lower body dressing  -       Row Name 05/15/25 1150          Lower Body Dressing Assessment/Training    Position (Lower Body Dressing) supported sitting;unsupported standing  -ROSA     Comment, (Lower Body Dressing) Pt. was unable to reach feet at EOB, but once back supported she could easily complete figure four to reach feet for LBD.  Pt. demonstrated ability to stand and balance and pull up and down undergarment with toileting.  -       Row Name 05/15/25 1150          Grooming Assessment/Training    [place] Level (Grooming) oral care regimen  -ROSA     Position (Grooming) supported standing  -ROSA     Comment, (Grooming) washed hands also, some UE support due to back pain  -ROSA               User Key  (r) = Recorded By, (t) = Taken By, (c) = Cosigned By      Initials Name Provider Type    Nusrat Hammond, OT Occupational Therapist                   Obj/Interventions       Row Name 05/15/25 1152          Shoulder (Therapeutic Exercise)    Shoulder (Therapeutic Exercise) AROM  (active range of motion)  -ROSA     Shoulder AROM (Therapeutic Exercise) bilateral;flexion;extension;aBduction;aDduction;scapular retraction;5 repetitions;sitting  pulmonary TE, cues for PLB, pt. also complete 5 rep diaphragmatic breathing, with handout to help with independence  -ROSA       Row Name 05/15/25 1152          Motor Skills    Therapeutic Exercise shoulder  -ROSA       Row Name 05/15/25 1152          Balance    Static Sitting Balance independent  -ROSA     Dynamic Sitting Balance standby assist  -ROSA     Position, Sitting Balance unsupported;sitting edge of bed;other (see comments)  toilet  -ROSA     Static Standing Balance standby assist  -ROSA     Dynamic Standing Balance standby assist  -ROSA     Position/Device Used, Standing Balance unsupported  -ROSA     Balance Interventions sit to stand;occupation based/functional task  -ROSA     Comment, Balance toileting and grooming  -ROSA               User Key  (r) = Recorded By, (t) = Taken By, (c) = Cosigned By      Initials Name Provider Type    Nusrat Hammond OT Occupational Therapist                   Goals/Plan       Row Name 05/15/25 1158          Transfer Goal 1 (OT)    Progress/Outcome (Transfer Goal 1, OT) good progress toward goal;goal partially met;goal ongoing  -       Row Name 05/15/25 1158          Dressing Goal 1 (OT)    Progress/Outcome (Dressing Goal 1, OT) goal partially met;goal ongoing  met with simulation and pulling undergarment up and down toileting  -ROSA       Row Name 05/15/25 1158          Grooming Goal 1 (OT)    Progress/Outcome (Grooming Goal 1, OT) goal partially met;goal ongoing  -ROSA               User Key  (r) = Recorded By, (t) = Taken By, (c) = Cosigned By      Initials Name Provider Type    Nusrat Hammond OT Occupational Therapist                   Clinical Impression       Row Name 05/15/25 1153          Pain Assessment    Pretreatment Pain Rating 7/10  -ROSA     Posttreatment Pain Rating 5/10  -ROSA     Pain Location back  -ROSA     Pain  Side/Orientation left;lower  -ROSA     Pain Management Interventions heat applied;premedicated for activity;exercise or physical activity utilized;breathing exercises utilized  -ROSA     Response to Pain Interventions intervention effective per patient report  -ROSA       Row Name 05/15/25 1294          Plan of Care Review    Plan of Care Reviewed With patient  -ROSA     Progress improving  -ROSA     Outcome Evaluation Patient demonstrated improved balance and LBD, grooming and toileting independence today.  Pt. declined walker or cane use.  CGA given for room mobility.  No unsteadiness noted. Limited standing time due to back pain. Pulmonary TE completed 5 reps each.  Pt. can only complete to 500 with incentive spirometer.  Continue OT POC.  Pt. expects to discharge tomorrow and home with OP PT.  -       Row Name 05/15/25 7593          Therapy Assessment/Plan (OT)    Therapy Frequency (OT) daily  -[facility] Name 05/15/25 2170          Therapy Plan Review/Discharge Plan (OT)    Anticipated Discharge Disposition (OT) home with assist;home with outpatient therapy services  OP PT for pulmonary rehab, high level balance and back pain recommended  -[facility] Name 05/15/25 1428          Vital Signs    Pre Systolic BP Rehab 138  -ROSA     Pre Treatment Diastolic BP 79  -ROSA     Pretreatment Heart Rate (beats/min) 99  -ROSA     Posttreatment Heart Rate (beats/min) 100  -ROSA     Pre SpO2 (%) 94  -ROSA     O2 Delivery Pre Treatment nasal cannula  -ROSA     Intra SpO2 (%) 95  -ROSA     O2 Delivery Intra Treatment nasal cannula  -ROSA     Post SpO2 (%) 93  -ROSA     O2 Delivery Post Treatment nasal cannula  -ROSA     Pre Patient Position Supine  -ROSA     Intra Patient Position Standing  sitting- highest noted with pulmonary TE  -ROSA     Post Patient Position Sitting  -ROSA       Row Name 05/15/25 6039          Positioning and Restraints    Pre-Treatment Position in bed  -ROSA     Post Treatment Position chair  -ROSA     In Chair reclined;call light within  reach;encouraged to call for assist;exit alarm on;legs elevated;on mechanical lift sling  pillow under knees for back pain relieve, kpad at lower back  -ROSA               User Key  (r) = Recorded By, (t) = Taken By, (c) = Cosigned By      Initials Name Provider Type    Nusrat Hammond OT Occupational Therapist                   Outcome Measures       Row Name 05/15/25 1159          How much help from another is currently needed...    Putting on and taking off regular lower body clothing? 3  -ROSA     Bathing (including washing, rinsing, and drying) 3  -ROSA     Toileting (which includes using toilet bed pan or urinal) 4  -ROSA     Putting on and taking off regular upper body clothing 3  -ROSA     Taking care of personal grooming (such as brushing teeth) 3  -ROSA     Eating meals 4  -ROSA     AM-PAC 6 Clicks Score (OT) 20  -ROSA       Row Name 05/15/25 0816          How much help from another person do you currently need...    Turning from your back to your side while in flat bed without using bedrails? 4  -LC     Moving from lying on back to sitting on the side of a flat bed without bedrails? 4  -LC     Moving to and from a bed to a chair (including a wheelchair)? 3  -LC     Standing up from a chair using your arms (e.g., wheelchair, bedside chair)? 3  -LC     Climbing 3-5 steps with a railing? 3  -LC     To walk in hospital room? 3  -LC     AM-PAC 6 Clicks Score (PT) 20  -LC     Highest Level of Mobility Goal Walk 10 Steps or More-6  -LC       Row Name 05/15/25 115          Functional Assessment    Outcome Measure Options AM-PAC 6 Clicks Daily Activity (OT)  -ROSA               User Key  (r) = Recorded By, (t) = Taken By, (c) = Cosigned By      Initials Name Provider Type    Nusrat Hammond OT Occupational Therapist    Nany Davis, RN Registered Nurse                    Occupational Therapy Education       Title: PT OT SLP Therapies (In Progress)       Topic: Occupational Therapy (In Progress)       Point: ADL training  (Done)       Learning Progress Summary            Patient Acceptance, E,D,H, VU,DU by ROSA at 5/15/2025 1200    Comment: pulmonary TE, PLB, diaphragmatic breathing, ADL advanacement    Acceptance, E, NR by LR at 5/14/2025 1155                      Point: Home exercise program (Done)       Learning Progress Summary            Patient Acceptance, E,D,H, VU,DU by ROSA at 5/15/2025 1200    Comment: pulmonary TE, PLB, diaphragmatic breathing, ADL advanacement    Acceptance, E, NR by LR at 5/14/2025 1155                      Point: Precautions (In Progress)       Learning Progress Summary            Patient Acceptance, E, NR by LR at 5/14/2025 1155                      Point: Body mechanics (Done)       Learning Progress Summary            Patient Acceptance, E,D,H, VU,DU by ROSA at 5/15/2025 1200    Comment: pulmonary TE, PLB, diaphragmatic breathing, ADL advanacement    Acceptance, E, NR by LR at 5/14/2025 1155                                      User Key       Initials Effective Dates Name Provider Type Discipline    ROSA 07/11/23 -  Nusrat Toledo, OT Occupational Therapist OT    LR 10/09/24 -  Mary Rangel, OT Occupational Therapist OT                  OT Recommendation and Plan  Therapy Frequency (OT): daily  Plan of Care Review  Plan of Care Reviewed With: patient  Progress: improving  Outcome Evaluation: Patient demonstrated improved balance and LBD, grooming and toileting independence today.  Pt. declined walker or cane use.  CGA given for room mobility.  No unsteadiness noted. Limited standing time due to back pain. Pulmonary TE completed 5 reps each.  Pt. can only complete to 500 with incentive spirometer.  Continue OT POC.  Pt. expects to discharge tomorrow and home with OP PT.     Time Calculation:         Time Calculation- OT       Row Name 05/15/25 1200             Time Calculation- OT    OT Start Time 1111  -ROSA      OT Received On 05/15/25  -ROSA      OT Goal Re-Cert Due Date 05/24/25  -ROSA         Timed  Charges    32451 - OT Therapeutic Exercise Minutes 10  -ROSA      89854 - OT Therapeutic Activity Minutes 10  -ROSA      73948 - OT Self Care/Mgmt Minutes 12  -ROSA         Total Minutes    Timed Charges Total Minutes 32  -ROSA       Total Minutes 32  -ROSA                User Key  (r) = Recorded By, (t) = Taken By, (c) = Cosigned By      Initials Name Provider Type    Nusrat Hammond OT Occupational Therapist                  Therapy Charges for Today       Code Description Service Date Service Provider Modifiers Qty    68092745823  OT THER PROC EA 15 MIN 5/15/2025 Nusrat Toledo OT GO 1    [id number]  OT SELF CARE/MGMT/TRAIN EA 15 MIN 5/15/2025 Nusrat Toledo OT GO 1                 Nusrat Toledo OT  5/15/2025

## 2025-05-15 NOTE — TELEPHONE ENCOUNTER
Caller: OMKARLEVI    Relationship: Emergency Contact  - NOT ON [facility] VERBAL    Best call back number: [phone]     Who are you requesting to speak with (clinical staff, provider,  specific staff member): CLINICAL    What was the call regarding: PT IS CURRENTLY ADMITTED.  PT'S DAUGHTER IS ASKING FOR A CALLBACK TO DISCUSS PT'S PLAN OF CARE.  SHE HAS SOME CLINICAL QUESTIONS.

## 2025-05-15 NOTE — TELEPHONE ENCOUNTER
RN called Dipti back. Went over plan of care - inpatient MRI brain, f/u on cytology results, and outpatient PET scan. We will meet on the 27th to go over those results. Encouraged her to call the nurses station inpatient if she had any specific questions regarding what is happening inpatient as I am ambulatory and cannot see much of the inpatient side of things. She is interested in social work and home health referrals for patient. Will discuss further at appointment on 5/27.

## 2025-05-15 NOTE — PLAN OF CARE
Problem: Adult Inpatient Plan of Care  Goal: Plan of Care Review  Outcome: Progressing  Goal: Patient-Specific Goal (Individualized)  Outcome: Progressing  Goal: Absence of Hospital-Acquired Illness or Injury  Outcome: Progressing  Intervention: Identify and Manage Fall Risk  Recent Flowsheet Documentation  Taken 5/15/2025 0405 by Oemga Frost RN  Safety Promotion/Fall Prevention:   safety round/check completed   activity supervised   assistive device/personal items within reach   clutter free environment maintained   fall prevention program maintained  Taken 5/15/2025 0205 by Omega Frost RN  Safety Promotion/Fall Prevention:   activity supervised   assistive device/personal items within reach   safety round/check completed   clutter free environment maintained   nonskid shoes/slippers when out of bed  Taken 5/15/2025 0005 by Omega Frost RN  Safety Promotion/Fall Prevention:   safety round/check completed   activity supervised   assistive device/personal items within reach   clutter free environment maintained   fall prevention program maintained  Taken 5/14/2025 2205 by Omega Frost RN  Safety Promotion/Fall Prevention:   activity supervised   assistive device/personal items within reach   safety round/check completed   clutter free environment maintained   nonskid shoes/slippers when out of bed  Taken 5/14/2025 2005 by Omega Frost RN  Safety Promotion/Fall Prevention:   safety round/check completed   activity supervised   assistive device/personal items within reach   clutter free environment maintained   fall prevention program maintained  Intervention: Prevent Skin Injury  Recent Flowsheet Documentation  Taken 5/15/2025 0405 by Omega Frost RN  Body Position: position changed independently  Skin Protection:   incontinence pads utilized   silicone foam dressing in place   transparent dressing maintained  Taken 5/15/2025 0205 by Omega Frost RN  Body Position:  position changed independently  Skin Protection:   incontinence pads utilized   transparent dressing maintained  Taken 5/15/2025 0005 by Omega Frost RN  Body Position: position changed independently  Skin Protection:   incontinence pads utilized   silicone foam dressing in place   transparent dressing maintained  Taken 5/14/2025 2205 by Omega Frost RN  Body Position: position changed independently  Skin Protection:   incontinence pads utilized   transparent dressing maintained  Taken 5/14/2025 2005 by Omega Frost RN  Body Position: position changed independently  Skin Protection: incontinence pads utilized  Intervention: Prevent and Manage VTE (Venous Thromboembolism) Risk  Recent Flowsheet Documentation  Taken 5/14/2025 2005 by Omega Frost RN  VTE Prevention/Management: (hep gtt) other (see comments)  Intervention: Prevent Infection  Recent Flowsheet Documentation  Taken 5/15/2025 0405 by Omega Frost RN  Infection Prevention:   hand hygiene promoted   rest/sleep promoted  Taken 5/15/2025 0205 by Omega Frost RN  Infection Prevention:   environmental surveillance performed   rest/sleep promoted  Taken 5/15/2025 0005 by Omega Frost RN  Infection Prevention:   hand hygiene promoted   rest/sleep promoted  Taken 5/14/2025 2205 by Omega Frost RN  Infection Prevention:   environmental surveillance performed   rest/sleep promoted  Taken 5/14/2025 2005 by Omega Frost RN  Infection Prevention:   environmental surveillance performed   hand hygiene promoted   rest/sleep promoted   single patient room provided   cohorting utilized  Goal: Optimal Comfort and Wellbeing  Outcome: Progressing  Intervention: Provide Person-Centered Care  Recent Flowsheet Documentation  Taken 5/14/2025 2005 by Omeag Frost RN  Trust Relationship/Rapport:   care explained   emotional support provided   questions answered   questions encouraged   thoughts/feelings  acknowledged  Goal: Readiness for Transition of Care  Outcome: Progressing     Problem: Gas Exchange Impaired  Goal: Optimal Gas Exchange  Outcome: Progressing     Problem: Comorbidity Management  Goal: Maintenance of Asthma Control  Outcome: Progressing  Intervention: Maintain Asthma Symptom Control  Recent Flowsheet Documentation  Taken 5/14/2025 2005 by Omega Frost RN  Medication Review/Management: medications reviewed  Goal: Maintenance of COPD Symptom Control  Outcome: Progressing  Intervention: Maintain COPD (Chronic Obstructive Pulmonary Disease) Symptom Control  Recent Flowsheet Documentation  Taken 5/14/2025 2005 by Omega Frost RN  Medication Review/Management: medications reviewed  Goal: Blood Glucose Level Within Target Range  Outcome: Progressing  Intervention: Monitor and Manage Glycemia  Recent Flowsheet Documentation  Taken 5/14/2025 2005 by Omega Frost RN  Medication Review/Management: medications reviewed  Goal: Blood Pressure in Desired Range  Outcome: Progressing  Intervention: Maintain Blood Pressure Management  Recent Flowsheet Documentation  Taken 5/14/2025 2005 by Omega Frost RN  Medication Review/Management: medications reviewed  Goal: Maintenance of Osteoarthritis Symptom Control  Outcome: Progressing  Intervention: Maintain Osteoarthritis Symptom Control  Recent Flowsheet Documentation  Taken 5/15/2025 0405 by Omega Frost RN  Activity Management: activity minimized  Taken 5/15/2025 0205 by Omega Frost RN  Activity Management: activity minimized  Taken 5/15/2025 0005 by Omega Frost RN  Activity Management: activity minimized  Taken 5/14/2025 2205 by Omega Frost RN  Activity Management: activity minimized  Taken 5/14/2025 2005 by Omega Frost, RN  Activity Management: activity encouraged  Medication Review/Management: medications reviewed     Problem: Fall Injury Risk  Goal: Absence of Fall and Fall-Related Injury  Outcome:  Progressing  Intervention: Identify and Manage Contributors  Recent Flowsheet Documentation  Taken 5/15/2025 0205 by Omega Frost, RN  Self-Care Promotion: independence encouraged  Taken 5/14/2025 2205 by Omega Frost, RN  Self-Care Promotion: independence encouraged  Taken 5/14/2025 2005 by Omega Frost, RN  Medication Review/Management: medications reviewed  Intervention: Promote Injury-Free Environment  Recent Flowsheet Documentation  Taken 5/15/2025 0405 by Omega Frost, RN  Safety Promotion/Fall Prevention:   safety round/check completed   activity supervised   assistive device/personal items within reach   clutter free environment maintained   fall prevention program maintained  Taken 5/15/2025 0205 by Omega Frost, RN  Safety Promotion/Fall Prevention:   activity supervised   assistive device/personal items within reach   safety round/check completed   clutter free environment maintained   nonskid shoes/slippers when out of bed  Taken 5/15/2025 0005 by Omega Frost, RN  Safety Promotion/Fall Prevention:   safety round/check completed   activity supervised   assistive device/personal items within reach   clutter free environment maintained   fall prevention program maintained  Taken 5/14/2025 2205 by Omega Frost, RN  Safety Promotion/Fall Prevention:   activity supervised   assistive device/personal items within reach   safety round/check completed   clutter free environment maintained   nonskid shoes/slippers when out of bed  Taken 5/14/2025 2005 by Omega Frost, RN  Safety Promotion/Fall Prevention:   safety round/check completed   activity supervised   assistive device/personal items within reach   clutter free environment maintained   fall prevention program maintained   Goal Outcome Evaluation:          Pt has no new nursing issues at this time. Pt is a&ox4, NSR, 2L NC, VSS. Hep gtt: 18un/kg/hr. PRN medication given per pt request. Pt has no complaints at  this time. Will continue plan of care.

## 2025-05-15 NOTE — PROGRESS NOTES
Patient has been initiated on Apixaban (Eliquis) during admission. Education provided on 5/15/2025 verbally and in writing. Information provided includes effects of medication, drug-drug and drug-food interactions, and signs/symptoms of bleeding and clotting. Patient/family verbalized understanding through teach back. All pertinent questions were answered by pharmacist.    Tevin Olivo [facility]  5/15/2025  12:05 EDT

## 2025-05-15 NOTE — PROGRESS NOTES
Pharmacy to Dose Heparin Infusion Note    Sydnie Gamble is a 65 y.o. female receiving heparin infusion.     Therapy for (VTE/Cardiac): VTE  Patient Weight: 60.8 kg  Initial Bolus (Y/N): Yes  Any Bolus (Y/N): Yes     Signs or Symptoms of Bleeding: No      VTE (PE/DVT)   Initial Bolus: 80 units/kg (Max 10,000 units)  Initial rate: 18 units/kg/hr (Max 1,500 units/hr)    Anti-Xa Bolus   Dose Infusion Hold   Time Infusion Rate Change (units/kg/hr) Repeat  Anti-Xa   < 0.11 50 units/kg  (4000 units Max) None Increase by  4 units/kg/hr 6 hours   0.11 - 0.19 25 units/kg  (2000 units Max) None Increase by  3 units/kg/hr 6 hours   0.2 - 0.29 0 None Increase by  2 units/kg/hr 6 hours   0.3 - 0.7 0 None No Change 6 hours (after 2 consecutive levels in range check qAM)   0.71 - 0.8 0 None Decrease by  1 units/kg/hr 6 hours   0.81 - 0.9 0 None Decrease by  2 units/kg/hr 6 hours   0.91 - 1 0 60 minutes Decrease by  3 units/kg/hr 6 hours   >1 0 Hold  After Anti-Xa less than 0.7 decrease previous rate by  4 units/kg/hr  Every 2 hours until Anti-Xa less than 0.7 then when infusion restarts in 6 hours     Results from last 7 days   Lab Units 05/15/25  0336 05/14/25  0630 05/13/25  0510   INR   --   --  0.99   HEMOGLOBIN g/dL 11.0* 11.7* 11.8*   HEMATOCRIT % 34.9 36.3 36.7   PLATELETS 10*3/mm3 331 334 330       Date   Time   Anti-Xa Current Rate (units/kg/hr) Bolus   (units) Rate Change   (units/kg/hr) New Rate (units/kg/hr) Repeat  Anti-Xa Comments /  Pump Check    5/13 0730 Pending -- 4500 +18 18 1500 YVETTE Sharpe   5/13 1511 0.44 18 -- -- 18 2100 LEWIS Vargas RN   5/13 2039 0.42 18 -- -- 18 0600 LEWIS CRAWFORD   5/14 0630 0.56 18 -- -- 18 0600 Omega 3241   5/15 0336 0.45 18 -- -- 18 0600 Nany 3242                                                                                                                                                                                     Tevin Olivo, [facility]  5/15/2025  07:36 EDT

## 2025-05-15 NOTE — PLAN OF CARE
Goal Outcome Evaluation:              Outcome Evaluation: VSS on 2L NC, patient has oxygen desaturation with activity. PO intake encouraged, bowel movement this shift. Reeducated about deep breathing. Heparin drip discontinued, patient transitioned to PO eliquis. Patient up in chair, up with therapy today. Anticipate MRI brain tonight. Complaints of back pain resolved with PRN and scheduled medications.

## 2025-05-16 ENCOUNTER — READMISSION MANAGEMENT (OUTPATIENT)
Dept: CALL CENTER | Facility: HOSPITAL | Age: 65
End: 2025-05-16
Payer: COMMERCIAL

## 2025-05-16 ENCOUNTER — APPOINTMENT (OUTPATIENT)
Dept: MRI IMAGING | Facility: HOSPITAL | Age: 65
DRG: 175 | End: 2025-05-16
Payer: COMMERCIAL

## 2025-05-16 VITALS
HEART RATE: 111 BPM | DIASTOLIC BLOOD PRESSURE: 70 MMHG | HEIGHT: 61 IN | WEIGHT: 134 LBS | TEMPERATURE: 96.9 F | RESPIRATION RATE: 18 BRPM | OXYGEN SATURATION: 92 % | SYSTOLIC BLOOD PRESSURE: 137 MMHG | BODY MASS INDEX: 25.3 KG/M2

## 2025-05-16 PROBLEM — J90 PLEURAL EFFUSION: Status: RESOLVED | Noted: 2025-05-13 | Resolved: 2025-05-16

## 2025-05-16 LAB
ALBUMIN SERPL-MCNC: 3 G/DL (ref 3.5–5.2)
ANION GAP SERPL CALCULATED.3IONS-SCNC: 12 MMOL/L (ref 5–15)
BASOPHILS # BLD AUTO: 0.08 10*3/MM3 (ref 0–0.2)
BASOPHILS NFR BLD AUTO: 0.8 % (ref 0–1.5)
BUN SERPL-MCNC: 14 MG/DL (ref 8–23)
BUN/CREAT SERPL: 14.7 (ref 7–25)
CALCIUM SPEC-SCNC: 8.9 MG/DL (ref 8.6–10.5)
CHLORIDE SERPL-SCNC: 105 MMOL/L (ref 98–107)
CO2 SERPL-SCNC: 22 MMOL/L (ref 22–29)
CREAT SERPL-MCNC: 0.95 MG/DL (ref 0.57–1)
DEPRECATED RDW RBC AUTO: 45.6 FL (ref 37–54)
EGFRCR SERPLBLD CKD-EPI 2021: 65.8 ML/MIN/1.73
EOSINOPHIL # BLD AUTO: 1.12 10*3/MM3 (ref 0–0.4)
EOSINOPHIL NFR BLD AUTO: 11.5 % (ref 0.3–6.2)
ERYTHROCYTE [DISTWIDTH] IN BLOOD BY AUTOMATED COUNT: 13.4 % (ref 12.3–15.4)
GLUCOSE SERPL-MCNC: 113 MG/DL (ref 65–99)
HCT VFR BLD AUTO: 34.5 % (ref 34–46.6)
HGB BLD-MCNC: 10.6 G/DL (ref 12–15.9)
IMM GRANULOCYTES # BLD AUTO: 0.15 10*3/MM3 (ref 0–0.05)
IMM GRANULOCYTES NFR BLD AUTO: 1.5 % (ref 0–0.5)
LYMPHOCYTES # BLD AUTO: 0.9 10*3/MM3 (ref 0.7–3.1)
LYMPHOCYTES NFR BLD AUTO: 9.3 % (ref 19.6–45.3)
MAGNESIUM SERPL-MCNC: 2 MG/DL (ref 1.6–2.4)
MCH RBC QN AUTO: 28.7 PG (ref 26.6–33)
MCHC RBC AUTO-ENTMCNC: 30.7 G/DL (ref 31.5–35.7)
MCV RBC AUTO: 93.5 FL (ref 79–97)
MONOCYTES # BLD AUTO: 1.11 10*3/MM3 (ref 0.1–0.9)
MONOCYTES NFR BLD AUTO: 11.4 % (ref 5–12)
NEUTROPHILS NFR BLD AUTO: 6.36 10*3/MM3 (ref 1.7–7)
NEUTROPHILS NFR BLD AUTO: 65.5 % (ref 42.7–76)
NRBC BLD AUTO-RTO: 0 /100 WBC (ref 0–0.2)
PHOSPHATE SERPL-MCNC: 3.7 MG/DL (ref 2.5–4.5)
PLATELET # BLD AUTO: 299 10*3/MM3 (ref 140–450)
PMV BLD AUTO: 8.6 FL (ref 6–12)
POTASSIUM SERPL-SCNC: 3.7 MMOL/L (ref 3.5–5.2)
RBC # BLD AUTO: 3.69 10*6/MM3 (ref 3.77–5.28)
SODIUM SERPL-SCNC: 139 MMOL/L (ref 136–145)
WBC NRBC COR # BLD AUTO: 9.72 10*3/MM3 (ref 3.4–10.8)

## 2025-05-16 PROCEDURE — 85025 COMPLETE CBC W/AUTO DIFF WBC: CPT | Performed by: INTERNAL MEDICINE

## 2025-05-16 PROCEDURE — 99239 HOSP IP/OBS DSCHRG MGMT >30: CPT | Performed by: NURSE PRACTITIONER

## 2025-05-16 PROCEDURE — 25510000002 GADOBENATE DIMEGLUMINE 529 MG/ML SOLUTION: Performed by: INTERNAL MEDICINE

## 2025-05-16 PROCEDURE — 70553 MRI BRAIN STEM W/O & W/DYE: CPT

## 2025-05-16 PROCEDURE — A9577 INJ MULTIHANCE: HCPCS | Performed by: INTERNAL MEDICINE

## 2025-05-16 PROCEDURE — 83735 ASSAY OF MAGNESIUM: CPT

## 2025-05-16 PROCEDURE — 80069 RENAL FUNCTION PANEL: CPT | Performed by: INTERNAL MEDICINE

## 2025-05-16 RX ORDER — GUAIFENESIN 600 MG/1
600 TABLET, EXTENDED RELEASE ORAL EVERY 12 HOURS SCHEDULED
Qty: 60 TABLET | Refills: 0 | Status: SHIPPED | OUTPATIENT
Start: 2025-05-16

## 2025-05-16 RX ORDER — BENZONATATE 100 MG/1
100 CAPSULE ORAL 3 TIMES DAILY PRN
Qty: 30 CAPSULE | Refills: 0 | Status: SHIPPED | OUTPATIENT
Start: 2025-05-16 | End: 2025-05-21

## 2025-05-16 RX ORDER — MONTELUKAST SODIUM 10 MG/1
10 TABLET ORAL DAILY
Qty: 30 TABLET | Refills: 0 | Status: SHIPPED | OUTPATIENT
Start: 2025-05-17

## 2025-05-16 RX ORDER — ACETAMINOPHEN 325 MG/1
650 TABLET ORAL EVERY 6 HOURS PRN
Start: 2025-05-16

## 2025-05-16 RX ADMIN — APIXABAN 10 MG: 5 TABLET, FILM COATED ORAL at 08:08

## 2025-05-16 RX ADMIN — SERTRALINE 100 MG: 100 TABLET, FILM COATED ORAL at 08:08

## 2025-05-16 RX ADMIN — GADOBENATE DIMEGLUMINE 12 ML: 529 INJECTION, SOLUTION INTRAVENOUS at 00:35

## 2025-05-16 RX ADMIN — LEVOTHYROXINE SODIUM 88 MCG: 0.09 TABLET ORAL at 05:13

## 2025-05-16 RX ADMIN — FAMOTIDINE 20 MG: 20 TABLET, FILM COATED ORAL at 08:08

## 2025-05-16 RX ADMIN — Medication 10 ML: at 08:08

## 2025-05-16 RX ADMIN — MONTELUKAST 10 MG: 10 TABLET, FILM COATED ORAL at 08:08

## 2025-05-16 RX ADMIN — DICLOFENAC SODIUM 2 G: 10 GEL TOPICAL at 08:09

## 2025-05-16 NOTE — OUTREACH NOTE
Prep Survey      Flowsheet Row Responses   [facility] patient discharged from? [place]   Is LACE score < 7 ? No   Eligibility [facility]   Date of Admission 05/13/25   Date of Discharge 05/16/25   Discharge diagnosis Pulmonary embolism   Does the patient have one of the following disease processes/diagnoses(primary or secondary)? Other   Is there a DME ordered? Yes   What DME was ordered? 02   Prep survey completed? Yes            Joan JOSEPH - Registered Nurse

## 2025-05-16 NOTE — CASE MANAGEMENT/SOCIAL WORK
Case Management Discharge Note      Final Note: Pt is discharging home today. A referral for home O2 was called to Raymond with [employer] and an order was entered in [employer]. A portable O2 tank will be delivered to the bedside prior to discharge. Pt was given an order for outpatient PT and told to arrange at the facility of her choice. Spouse will provide transportation via private vehicle. Pt and spouse are aware and agreeable to plan. No other discharge needs identified.         Selected Continued Care - Admitted Since 5/13/2025       Destination    No services have been selected for the patient.                Durable Medical Equipment       Service Provider Services Address Phone Fax Patient Preferred    [facility] [place] [facility] [address], [place] [id number] [phone] --              Dialysis/Infusion    No services have been selected for the patient.                Home Medical Care    No services have been selected for the patient.                Therapy    No services have been selected for the patient.                Community Resources    No services have been selected for the patient.                Community & DME    No services have been selected for the patient.                    Selected Continued Care - Episodes Includes continued care and service providers with selected services from the active episodes listed below          Transportation Services  Private: Car    Final Discharge Disposition Code: 01 - home or self-care

## 2025-05-16 NOTE — DISCHARGE SUMMARY
[facility] Medicine Services  DISCHARGE SUMMARY    Patient Name: Sydnie Gamble  : 1960  MRN: [id number]    Date of Admission: 2025  Date of Discharge:  2025  Primary Care Physician: Dee Elena APRN    Consults       Date and Time Order Name Status Description    2025  7:45 AM Inpatient Hematology & Oncology Consult Completed             Hospital Course     Presenting Problem:   Pulmonary embolism [I26.99]    Active Hospital Problems    Diagnosis  POA    **Pulmonary embolism [I26.99]  Yes    Anemia, chronic disease [D63.8]  Unknown    Adenocarcinoma of right lung [C34.91]  Yes    Atherosclerotic cardiovascular disease [I25.10]  Yes    Cervical cancer [C53.9]  Yes    HLD (hyperlipidemia) [E78.5]  Yes    COPD with asthma [J44.89]  Yes    ALESIA (generalized anxiety disorder) [F41.1]  Yes    Acquired hypothyroidism [E03.9]  Yes    Prediabetes [R73.03]  Yes    Gastroesophageal reflux disease without esophagitis [K21.9]  Yes      Resolved Hospital Problems    Diagnosis Date Resolved POA    Pleural effusion [J90] 2025 Unknown      Hospital Course:  Sydnie Gamble is a 65 y.o. female PMH RLL adenocarcinoma (non-small cell lung cancer) s/p CyberKnife radiotherapy 2024, ANANT, GERD, cervical squamous cell carcinoma, HTN, HLD, hypothyroidism,CAD, anxiety, depression admitted for segmental BL PEs and left lower extremity DVT. She was placed on heparin drip, later transitioned to Eliquis.  Patient also found to have new metastatic lesions on the liver with enhancement/progression of previous RUL nodule in the lung.  MRI Brain was negative for metastatic disease.  CT chest also showed a moderate-sized pleural effusion in the right lung, particularly in the RUL with new hypodensities in the liver concerning for metastatic spread.  Patient is s/p thoracentesis  with 1100 mL removed.  Pleural fluid sent to the lab for  cytology.    Assessment/Plan:  Pulmonary emboli  - Patient found with segmental and subsegmental PEs on CTA chest and DANIELLE and LLL and RUL vascular with evidence of moderate right pleural effusion  -- Patient has history of adenocarcinoma with concern for progression  - Patient started on heparin drip transition to Eliquis    Left lower extremity DVT  - Eliquis    RUL adenocarcinoma of the lung with new metastatic disease  Cervical squamous cell carcinoma hx  New pleural effusion- suspect malignant  Acute hypoxemic resp failure  -- PMH of RUL pulmonary adenocarcinoma follows with Dr. Barboza, previous CT chest 3/31/2025.  S/p CyberKnife RT September 2024.  - CT chest this admission showing moderate-sized pleural effusion in the right lung particularly RUL with new hypodensities in the liver concerning for metastatic spread  - S/p thoracentesis 5/14 with 1100 mL removed, pleural fluid labs have been ordered  - Oncology following  - MRI brain without metastatic disease    Generalized anxiety disorder  Depression  - Home medications    Recent diagnosis of costochondritis OSH  - Tizanidine    Hypothyroidism  -- Synthroid    Troponin  - Likely type II demand ischemia in the setting of pulmonary emboli  - No significant ST changes noted on EKG    Discharge Follow Up Recommendations for labs/diagnostics:  Follow up with PCP in a week  Follow up with Dr Mckenzie in 1 week, planning PET scan    Day of Discharge     HPI:   Patient sitting up in bed reporting she feels improved and wishes to return home today.  She reports she was feeling better at home.  Patient denies shortness of breath, chest pain, or palpitations      Otherwise ROS is negative except as mentioned in the HPI.    Vital Signs:   Temp:  [96.9 °F (36.1 °C)-98.2 °F (36.8 °C)] 96.9 °F (36.1 °C)  Heart Rate:  [] 111  Resp:  [16-18] 18  BP: (128-141)/(58-79) 137/70     Physical Exam:  Constitutional: Awake, alert, NAD  HENT: NCAT, mucous membranes  moist  Respiratory: dim in bases, no retractions  Cardiovascular: RRR, no murmurs, rubs, or gallops  Gastrointestinal: Positive bowel sounds, soft, nontender, nondistended  Musculoskeletal: No bilateral ankle edema  Psychiatric: Appropriate affect, cooperative  Neurologic: Oriented x 3, strength symmetric in all extremities, Cranial Nerves grossly intact to confrontation, speech clear  Skin: No rashes      Pertinent  and/or Most Recent Results     Results from last 7 days   Lab Units 05/16/25  0324 05/15/25  0336 05/14/25  0630 05/13/25  0510   WBC 10*3/mm3 9.72 8.70 10.07 9.69   HEMOGLOBIN g/dL 10.6* 11.0* 11.7* 11.8*   HEMATOCRIT % 34.5 34.9 36.3 36.7   PLATELETS 10*3/mm3 299 331 334 330   SODIUM mmol/L 139 140 140 139   POTASSIUM mmol/L 3.7 3.7 4.1 3.7   CHLORIDE mmol/L 105 104 103 102   CO2 mmol/L 22.0 24.0 23.0 20.0*   BUN mg/dL 14 16 13 14   CREATININE mg/dL 0.95 1.01* 1.05* 0.92   GLUCOSE mg/dL 113* 108* 99 105*   CALCIUM mg/dL 8.9 9.2 9.3 9.3     Results from last 7 days   Lab Units 05/13/25  0510   BILIRUBIN mg/dL <0.2   ALK PHOS U/L 166*   ALT (SGPT) U/L 18   AST (SGOT) U/L 21   PROTIME Seconds 13.7   INR  0.99   APTT seconds 28.3*     Results from last 7 days   Lab Units 05/13/25  0642   CHOLESTEROL mg/dL 170   TRIGLYCERIDES mg/dL 124   HDL CHOL mg/dL 66*     Results from last 7 days   Lab Units 05/13/25  0642 05/13/25  0510   TSH uIU/mL 3.400  --    HEMOGLOBIN A1C %  --  5.80*   PROBNP pg/mL  --  138.5   HSTROP T ng/L 15* 9   PROCALCITONIN ng/mL 0.16  --    LACTATE mmol/L  --  1.0       Brief Urine Lab Results       None            Microbiology Results Abnormal       None            Imaging Results (All)       Procedure Component Value Units Date/Time    MRI Brain With & Without Contrast [[id number]] Collected: 05/16/25 0110     Updated: 05/16/25 0119    Narrative:        MRI BRAIN W WO CONTRAST    Date of Exam: 5/16/2025 12:12 AM EDT    Indication: Metastatic lung cancer.     Comparison:  8/25/2024    Technique:  Routine multiplanar/multisequence sequence images of the brain were obtained before and after the uneventful administration of 12 mL Multihance.      Findings:  There is mild increased T2 and FLAIR signal in the periventricular white matter suggestive of mild chronic microvascular ischemic change. There is no diffusion restriction to suggest acute infarct. There is no evidence of acute or chronic intracranial   hemorrhage. No mass effect or midline shift. No abnormal extra-axial collections. The major vascular flow voids appear intact. The basal ganglia, brainstem and cerebellum appear within normal limits. Calvarial and superficial soft tissue signal is within   normal limits. Orbits appear unremarkable. The paranasal sinuses and the mastoid air cells appear well aerated. Midline structures are intact.  There is no abnormal intracranial enhancement. There is normal enhancement within the intracranial   vasculature including the dural venous sinuses.      Impression:      Impression:  Mild chronic microvascular ischemic change. No acute intracranial process. No evidence of intracranial metastatic disease.              Electronically Signed: Rodriguez Singh MD    5/16/2025 1:15 AM EDT    Workstation ID: [id number]    US Thoracentesis [[id number]] Collected: 05/14/25 1431    Specimen: Body Fluid Updated: 05/14/25 1435    Narrative:      PROCEDURE: US THORACENTESIS-     ATTENDING: Jose Hope M.D.     CLINICAL HISTORY: Large right-sided pleural effusion.     TECHNIQUE:     The risk, benefits, and alternatives were described in detail and the  patient was brought to the ultrasound suite and positioned seated. The  skin entry site was prepped and draped utilizing standard sterile  technique. 1% lidocaine was applied to the soft tissues of the posterior  right thorax        A 5 Greek Yueh was advanced into the right pleural space.        A total of 1150 mL was removed from the right pleural space.  A specimen  was sent to the laboratory for analysis. The needle was removed and a  sterile dressing was applied.     The patient tolerated the procedure well and there were no  complications.       Impression:      Successful ultrasound-guided right thoracentesis.        Attestation  Signer name: Jose Hope MD  I attest that I was present for the entire procedure. I reviewed the  stored images and agree with the report as written.                 5/14/2025 2:32 PM by Jose Hope MD on Workstation: [id number]       XR Chest 1 View [[id number]] Collected: 05/14/25 1344     Updated: 05/14/25 1353    Narrative:      XR CHEST 1 VW    Date of Exam: 5/14/2025 1:15 PM EDT    Indication: s/p thora    Comparison: CT chest 5/13/2025, AP chest x-ray 5/13/2025    Findings:  No pneumothorax is seen following thoracentesis. There is improved aeration of the right lung with persistent patchy opacities in the right midlung and right lung base. Left lung appears clear. Cardiomediastinal contours are stable.      Impression:      Impression:  1.No visible pneumothorax following thoracentesis.  2.Improved aeration of the right lung with persistent patchy opacities in the right midlung and right lung base.        Electronically Signed: Maryellen Dalton MD    5/14/2025 1:49 PM EDT    Workstation ID: [id number]    CT Angiogram Chest Pulmonary Embolism [[id number]] Collected: 05/13/25 0655     Updated: 05/13/25 0710    Narrative:      CT ANGIOGRAM CHEST PULMONARY EMBOLISM    Date of Exam: 5/13/2025 6:39 AM EDT    Indication: new acute hypoxia, short of breath, right pleural effusion, elevated d-dimer, history of lung cancer. vapes.    Comparison: CT chest dated 3/31/2025    Technique: Axial CT images were obtained of the chest after the uneventful intravenous administration of 75 mL Isovue-370 utilizing pulmonary embolism protocol.  In addition, a 3-D volume rendered image was created for interpretation.  Reconstructed   coronal and  sagittal images were also obtained. Automated exposure control and iterative construction methods were used.    Findings:  Pulmonary arteries: Adequate opacification of the pulmonary arteries. There is a small segmental left upper lobe pulmonary embolism. There are small segmental and subsegmental pulmonary emboli of the left lower lobe. There is a small segmental right   upper lobe pulmonary embolism.    Lungs and Pleura: There is a moderate sized right pleural effusion. There is right basilar atelectasis. Posttreatment changes of the right upper lobe pulmonary nodule are present. Now, the spiculated component appears to be slightly larger than   previously seen. This was seen on image 37 of series 4 previous study measuring about 8 mm and measures about 11 mm on image 42 of series 6 on this exam. The nodular densities seen in the anterior right upper lobe is stable at 11 mm. The lesion seen   previously in the superior segment of the right lower lobe is obscured by the presence of the pleural effusion and atelectasis. Superior segment groundglass nodule in the left lower lobe is stable.    Mediastinum/Ashley: No mediastinal or hilar lymphadenopathy.    Lymph nodes: No axillary or supraclavicular adenopathy.    Cardiovascular: The cardiac chambers are within normal limits. The pericardium is normal. The aorta and its arch branch vessels are unremarkable.       Upper Abdomen: There are several hepatic cysts. However, there also appear to be multiple new low-density hepatic lesions which are difficult to assess in this phase of enhancement which is arterial but one lesion adjacent to the gallbladder is at least   2.2 cm in diameter.    Bones and Soft Tissue: No suspicious osseous lesion.        Impression:      Impression:  1.Small segmental and subsegmental pulmonary emboli of the left upper lobe, left lower lobe and right upper lobe.  2.Moderate right pleural effusion with right basilar atelectasis.  3.Posttreatment  changes of the right upper lobe pulmonary nodule. The spiculated component appears to be slightly larger than previously seen.  4.Multiple new low-density hepatic lesions are suspicious for metastatic disease.            Electronically Signed: Rodriguez Singh MD    5/13/2025 7:07 AM EDT    Workstation ID: [id number]    XR Chest 1 View [[id number]] Collected: 05/13/25 0540     Updated: 05/13/25 0544    Narrative:      XR CHEST 1 VW    Date of Exam: 5/13/2025 5:01 AM EDT    Indication: SOA triage protocol    Comparison: Chest radiograph 4/12/2025    Findings:  The heart is enlarged. The pulmonary vascular markings are normal. There is a new right-sided pleural fluid collection. There is right lower lobe airspace disease which is likely atelectasis. Left lung is clear. The osseous structures are normal.      Impression:      Impression:  New right-sided pleural fluid collection with right lower lobe airspace disease likely atelectasis.          Electronically Signed: Rodriguez Singh MD    5/13/2025 5:41 AM EDT    Workstation ID: [id number]            Results for orders placed during the hospital encounter of 05/13/25    Duplex Venous Lower Extremity - Bilateral CAR    Interpretation Summary    Acute left lower extremity deep vein thrombosis noted in the peroneal.      Results for orders placed during the hospital encounter of 05/13/25    Duplex Venous Lower Extremity - Bilateral CAR    Interpretation Summary    Acute left lower extremity deep vein thrombosis noted in the peroneal.      Results for orders placed during the hospital encounter of 05/13/25    Adult Transthoracic Echo Complete w/ Color, Spectral and Contrast if necessary per protocol    Interpretation Summary    Left ventricular ejection fraction appears to be 66 - 70%.    Normal right ventricular cavity size, wall thickness, systolic function and septal motion noted.    There is a small (<1cm) pericardial effusion adjacent to the right ventricle. There is no evidence  of cardiac tamponade.    There is a left pleural effusion.      Pending Labs       Order Current Status    Anaerobic Culture - Pleural Fluid, Pleural Cavity In process    Fungus Culture - Body Fluid, Pleural Cavity In process    AFB Culture - Body Fluid, Pleural Cavity Preliminary result    Body Fluid Culture - Body Fluid, Pleural Cavity Preliminary result          Discharge Details        Discharge Medications        New Medications        Instructions Start Date   acetaminophen 325 MG tablet  Commonly known as: TYLENOL   650 mg, Oral, Every 6 Hours PRN      apixaban 5 MG tablet tablet  Commonly known as: ELIQUIS   Take 2 tablets by mouth Every 12 (Twelve) Hours for 6 days, THEN 1 tablet Every 12 (Twelve) Hours for 24 days. Indications: DVT/PE (active thrombosis)   Start Date: May 16, 2025     benzonatate 100 MG capsule  Commonly known as: TESSALON   100 mg, Oral, 3 Times Daily PRN      guaiFENesin 600 MG 12 hr tablet  Commonly known as: MUCINEX   600 mg, Oral, Every 12 Hours Scheduled      montelukast 10 MG tablet  Commonly known as: SINGULAIR   10 mg, Oral, Daily   Start Date: May 17, 2025            Changes to Medications        Instructions Start Date   zolpidem 10 MG tablet  Commonly known as: AMBIEN  What changed: when to take this   10 mg, Oral, Nightly PRN             Continue These Medications        Instructions Start Date   albuterol sulfate  (90 Base) MCG/ACT inhaler  Commonly known as: PROVENTIL HFA;VENTOLIN HFA;PROAIR HFA   2 puffs, Every 4 to 6 Hours PRN      atorvastatin 10 MG tablet  Commonly known as: LIPITOR   10 mg, Oral, Every Night at Bedtime      clonazePAM 1 MG tablet  Commonly known as: KlonoPIN   1 mg, Oral, 2 Times Daily PRN      Diclofenac Sodium 1 % gel gel  Commonly known as: VOLTAREN   Apply 1 gram topically to indicated area 4 times daily as needed for mild to moderate pain. STOP FLEXERIL      famotidine 20 MG tablet  Commonly known as: PEPCID   TAKE 1 TABLET BY MOUTH TWICE  DAILY OR TAKE 2 TABLETS BY MOUTH ONCE DAILY AS NEEDED FOR HEARTBURN      levothyroxine 88 MCG tablet  Commonly known as: Synthroid   88 mcg, Oral, Every Early Morning      ondansetron ODT 4 MG disintegrating tablet  Commonly known as: ZOFRAN-ODT   4 mg, Translingual, Every 8 Hours PRN      sertraline 100 MG tablet  Commonly known as: Zoloft   100 mg, Oral, Daily      tiZANidine 4 MG tablet  Commonly known as: Zanaflex   4 mg, Oral, 2 Times Daily PRN      traZODone 150 MG tablet  Commonly known as: DESYREL   150 mg, Oral, Every Night at Bedtime      Trelegy Ellipta 100-62.5-25 MCG/ACT inhaler  Generic drug: Fluticasone-Umeclidin-Vilant   1 puff, Inhalation, Daily - RT             Stop These Medications      Aspirin Low Dose 81 MG EC tablet  Generic drug: aspirin     cefdinir 300 MG capsule  Commonly known as: OMNICEF              No Known Allergies      Discharge Disposition:  Home or Self Care    Discharge Diet:  Diet Order   Procedures    Diet: Regular/House; Fluid Consistency: Thin (IDDSI 0)         Discharge Activity:   Activity Instructions       Activity as Tolerated      Up WIth Assist                CODE STATUS:    Code Status and Medical Interventions: CPR (Attempt to Resuscitate); Full Support   Ordered at: 05/13/25 0745     Code Status (Patient has no pulse and is not breathing):    CPR (Attempt to Resuscitate)     Medical Interventions (Patient has pulse or is breathing):    Full Support     Level Of Support Discussed With:    Patient         Future Appointments   Date Time Provider Department Center   5/21/2025 10:00 AM Joi Younger APRN [facility] PROSP [facility]   5/22/2025 11:15 AM [facility] PET ADMIN RM1 [facility] PETCT [facility]   5[facility]22[facility]2025 12:[facility]   5/27/2025  3:45 PM Sejal Mckenzie MD [facility] ON[facility]   7/18/2025  2:30 PM Ngoc Alvarez APRN [facility]AM [facility]   10/2/2025  3:00 PM Jacinda De La Rosa APRN NEE RAON [facility] None       Additional Instructions for the  Follow-ups that You Need to Schedule       Ambulatory Referral to Physical Therapy for Evaluation & Treatment   As directed      Specialty needed: Evaluate and treat   Follow-up needed: Yes                Time Spent on Discharge:  45 minutes    Electronically signed by DAYSI Em, 05/16/25, 9:51 AM EDT.

## 2025-05-17 LAB
BACTERIA FLD CULT: NORMAL
GRAM STN SPEC: NORMAL
GRAM STN SPEC: NORMAL

## 2025-05-19 ENCOUNTER — TELEPHONE (OUTPATIENT)
Dept: INTERNAL MEDICINE | Age: 65
End: 2025-05-19
Payer: COMMERCIAL

## 2025-05-19 ENCOUNTER — TRANSITIONAL CARE MANAGEMENT TELEPHONE ENCOUNTER (OUTPATIENT)
Dept: CALL CENTER | Facility: HOSPITAL | Age: 65
End: 2025-05-19
Payer: COMMERCIAL

## 2025-05-19 LAB — BACTERIA SPEC ANAEROBE CULT: NORMAL

## 2025-05-19 NOTE — TELEPHONE ENCOUNTER
Caller: Sydnie Gamble    Relationship: Self    Best call back number:  [phone] (Mobile)     Who are you requesting to speak with (clinical staff, provider,  specific staff member):   CLINICAL    What was the call regarding: PATIENT SAID HER OXYGEN DROPS TO 88 OR 89 WHEN STANDING UP     HEART RATE RATES AND FLASHES ON THE SCREEN     [facility] NURSE TOLD HER TO ASK HER PCP TO SEE IF SHE NEEDS TO TURN IT UP   FROM 2 TO 2.5 OR 3 BEFORE SHE GETS UP     PLEASE CALL AND ADVISE

## 2025-05-19 NOTE — OUTREACH NOTE
Call Center TCM Note      Flowsheet Row Responses   [facility] patient discharged from? [place]   Does the patient have one of the following disease processes/diagnoses(primary or secondary)? Other   TCM attempt successful? Yes   Call start time 1531   Call end time 1544   Discharge diagnosis Pulmonary embolism   Person spoke with today (if not patient) and relationship pt   Meds reviewed with patient/caregiver? Yes   Is the patient having any side effects they believe may be caused by any medication additions or changes? No   Does the patient have all medications ordered at discharge? Yes   Is the patient taking all medications as directed (includes completed medication regime)? Yes   Comments PET scan 5/22/25,  Oncology 5/27/25   Does the patient have an appointment with their PCP within 7-14 days of discharge? Yes  [5/21/25 10 AM]   Psychosocial issues? No   Did the patient receive a copy of their discharge instructions? Yes   Nursing interventions Reviewed instructions with patient   What is the patient's perception of their health status since discharge? Improving   Is the patient/caregiver able to teach back signs and symptoms related to disease process for when to call PCP? Yes   Is the patient/caregiver able to teach back signs and symptoms related to disease process for when to call 911? Yes   Is the patient/caregiver able to teach back the hierarchy of who to call/visit for symptoms/problems? PCP, Specialist, Home health nurse, Urgent Care, ED, 911 Yes   If the patient is a current smoker, are they able to teach back resources for cessation? Not a smoker   TCM call completed? Yes   Wrap up additional comments Pt states her O2 sats drop upon exertion with 2LO2 to 88-89%. Pt advised to reports O2 sats upon exertion and to see if PCP wants to order pt to be on 2.5-3L upon exertion. Pt verbalized understanding. Pt verified PCP/PET scan/Oncology fu appts.   Call end time 1544   Would this patient  benefit from a Referral to [facility] Social Work? No   Is the patient interested in additional calls from an ambulatory ? No            Rhina RAMEY - Registered Nurse    5/19/2025, 15:46 EDT

## 2025-05-21 ENCOUNTER — TELEPHONE (OUTPATIENT)
Dept: ONCOLOGY | Facility: CLINIC | Age: 65
End: 2025-05-21
Payer: COMMERCIAL

## 2025-05-21 ENCOUNTER — OFFICE VISIT (OUTPATIENT)
Dept: INTERNAL MEDICINE | Age: 65
End: 2025-05-21
Payer: COMMERCIAL

## 2025-05-21 VITALS
HEIGHT: 61 IN | RESPIRATION RATE: 20 BRPM | SYSTOLIC BLOOD PRESSURE: 122 MMHG | WEIGHT: 132.2 LBS | BODY MASS INDEX: 24.96 KG/M2 | DIASTOLIC BLOOD PRESSURE: 72 MMHG | OXYGEN SATURATION: 95 % | TEMPERATURE: 97.4 F | HEART RATE: 130 BPM

## 2025-05-21 DIAGNOSIS — C34.91 ADENOCARCINOMA OF RIGHT LUNG: Primary | ICD-10-CM

## 2025-05-21 DIAGNOSIS — I26.94 MULTIPLE SUBSEGMENTAL PULMONARY EMBOLI WITHOUT ACUTE COR PULMONALE: ICD-10-CM

## 2025-05-21 DIAGNOSIS — R06.02 SHORTNESS OF BREATH: ICD-10-CM

## 2025-05-21 DIAGNOSIS — Z09 HOSPITAL DISCHARGE FOLLOW-UP: Primary | ICD-10-CM

## 2025-05-21 LAB
FUNGUS WND CULT: NORMAL
MYCOBACTERIUM SPEC CULT: NORMAL
NIGHT BLUE STAIN TISS: NORMAL

## 2025-05-21 NOTE — TELEPHONE ENCOUNTER
Caller: LEVI ESPITIA (NO BH VERBAL)    Relationship: Emergency Contact    Best call back number: [phone]    Caller requesting test results: DAUGHTER    What test was performed: LABS    When was the test performed: 5-16    Where was the test performed:  [facility]

## 2025-05-21 NOTE — TELEPHONE ENCOUNTER
RN called Dipti back, she asked for call back in 15 minutes. RN called back for 2nd time after 15 minutes, no answer. Will attempt again later.

## 2025-05-21 NOTE — TELEPHONE ENCOUNTER
Dipti called RN back and discussed lab results with her and other concerns Dipti has. RN will reach out to patient tomorrow regarding concerns and let her know our current plan.

## 2025-05-21 NOTE — PROGRESS NOTES
300 mL Ms. Gamble is an 65-year-old  female with pertinent medical history significant for right lower lung adenocarcinoma, non-small cell carcinoma Transitional Care Follow Up Visit  Subjective     Sydnie Gamble is a 65 y.o. female who presents for a transitional care management visit.    Within 48 business hours after discharge our office contacted her via telephone to coordinate her care and needs.      I reviewed and discussed the details of that call along with the discharge summary, hospital problems, inpatient lab results, inpatient diagnostic studies, and consultation reports with Sydnie.     Current outpatient and discharge medications have been reconciled for the patient.  Reviewed by: Joi Younger, DAYSI          5/16/2025     1:13 PM   Date of TCM Phone Call   Hospital [facility]   Date of Admission 5/13/2025   Date of Discharge 5/16/2025     Risk for Readmission (LACE) Score: 11 (5/16/2025  6:00 AM)      History of Present Illness   Course During Hospital Stay: Ms. Gamble is a 65-year-old  female with pertinent medical history significant for non-small cell lung cancer of the right lower lung status post CyberKnife radiotherapy in 2024, ANANT, GERD, history of cervical squamous cell carcinoma, hypertension, hyperlipidemia, hypothyroidism, CAD, anxiety and depression.  She presented to [facility] on 5/13/2024 where patient was found to have  multiple PEs with left lower extremity DVT.  She was placed on a heparin drip and later transitioned to Eliquis.  Additionally, imaging revealed new metastatic lesions on the liver with progression of previous right upper lobe nodule in the lung.  MRI of the brain was negative for metastatic disease.  It was also noted that she had moderate size pleural effusion in the right lung and underwent thoracentesis on 5/14/2025 with 1100 mL removed.    She was deemed clinically stable and appropriate for discharge  home on 5/16/2025.  She was discharged home with supplemental oxygen therapy.    Patient presents alone to her appointment today.  She does verbalize some shortness of breath that is worse with activities.  Reports she is comfortable on 2 L of nasal cannula oxygen at rest but with ambulating to kitchen or into the office today, she feels short of breath and that her heart rate increases.  She denies that shortness of breath is worse than when she  was discharged from the hospital.  She is maintaining adequate hydration and feels that her oral intake is appropriate.  Denies any issues with bowel or bladder function and reports she is scheduled for her body PET scan tomorrow and has appointment with her oncologist, Dr. Mckenzie, on June 2, 2025.  She does endorse left lower extremity discomfort and also continuing to have some discomfort in the posterior chest wall.  Which was previously diagnosed as costochondritis, treated with tizanidine.  She is taking Eliquis as prescribed.  Reports that tomorrow will be the day she changes to 1 pill twice daily.  Notes she has been nervous about her health status, reading frequently on [employer] what her diagnosis is and inquiring what is her prognosis.  Also verbalizes concern about her oxygen levels dropping to 88% while she is ambulating.  Notes that oxygen quickly rises into the 90s with rest.      Reports she is sleeping well.  She feels that her mood is stable.       The following portions of the patient's history were reviewed and updated as appropriate: allergies, current medications, past family history, past medical history, past social history, past surgical history, and problem list.    Review of Systems   Constitutional:  Positive for activity change and fatigue.   HENT:  Negative for trouble swallowing.    Respiratory:  Positive for shortness of breath. Negative for cough.    Cardiovascular:  Negative for leg swelling.   Musculoskeletal:         + for LLE pain (lateral  "thigh area).   Skin:  Negative for color change.   Neurological:  Positive for weakness.   Psychiatric/Behavioral:  Negative for sleep disturbance.        Objective   /72   Pulse (!) 130   Temp 97.4 °F (36.3 °C)   Resp 20   Ht 154.9 cm (60.98\")   Wt 60 kg (132 lb 3.2 oz)   SpO2 95% Comment: 2L of O2  BMI 24.99 kg/m²   Physical Exam  Vitals and nursing note reviewed.   Constitutional:       General: She is not in acute distress.  HENT:      Head: Normocephalic and atraumatic.      Nose: Rhinorrhea present.   Eyes:      General: No scleral icterus.  Cardiovascular:      Rate and Rhythm: Tachycardia present.      Pulses: Normal pulses.   Pulmonary:      Effort: Pulmonary effort is normal.      Breath sounds: No stridor. No rhonchi or rales.      Comments: Diminished bases bilaterally, right worse than left.    Abdominal:      General: Bowel sounds are normal. There is no distension.      Tenderness: There is no abdominal tenderness.   Musculoskeletal:      Right lower leg: No edema.      Left lower leg: No edema.   Skin:     General: Skin is warm and dry.   Neurological:      Mental Status: She is alert and oriented to person, place, and time. Mental status is at baseline.      Gait: Gait normal.   Psychiatric:         Mood and Affect: Mood normal.         Thought Content: Thought content normal.           ECG 12 Lead    Date/Time: 5/21/2025 11:00 AM  Performed by: Joi Younger APRN    Authorized by: Joi Younger APRN  Comparison: compared with previous ECG from 5/13/2025  Similar to previous ECG  Rhythm: sinus tachycardia    Clinical impression: abnormal EKG           Assessment & Plan   Diagnoses and all orders for this visit:    1. Hospital discharge follow-up (Primary)    2. Shortness of breath  -     ECG 12 Lead    3. Multiple subsegmental pulmonary emboli without acute cor pulmonale    Hospital discharge follow-up  Multiple PEs, DVT and lower extremity, identified likely metastatic " disease of non-small cell adenocarcinoma  -EKG showing tachycardia but noted that patient seemed short of breath until she was at rest in the exam room.  -Continue supplemental oxygen at 2 L at rest, increase to 2.5 to 3 L with exertional activity.  Encouraged the patient to rest when she feels necessary  - Encouraged adequate hydration and nutritional support  - Keep scheduled appointment for PET scan tomorrow and follow-up with oncology for further plan of treatment approach.  - Continue Eliquis as prescribed  - Follow-up with PCP in 1 month, prior as needed

## 2025-05-22 ENCOUNTER — HOSPITAL ENCOUNTER (OUTPATIENT)
Dept: PET IMAGING | Facility: HOSPITAL | Age: 65
Discharge: HOME | End: 2025-05-22
Payer: COMMERCIAL

## 2025-05-22 DIAGNOSIS — C34.91 ADENOCARCINOMA OF RIGHT LUNG: ICD-10-CM

## 2025-05-22 LAB — GLUCOSE BLDC GLUCOMTR-MCNC: 113 MG/DL (ref 70–130)

## 2025-05-22 PROCEDURE — 34310000005 FLUDEOXYGLUCOSE F18 SOLUTION: Performed by: INTERNAL MEDICINE

## 2025-05-22 PROCEDURE — A9552 F18 FDG: HCPCS | Performed by: INTERNAL MEDICINE

## 2025-05-22 PROCEDURE — 78815 PET IMAGE W/CT SKULL-THIGH: CPT

## 2025-05-22 PROCEDURE — 82948 REAGENT STRIP/BLOOD GLUCOSE: CPT

## 2025-05-22 RX ADMIN — FLUDEOXYGLUCOSE F 18 1 DOSE: 200 INJECTION, SOLUTION INTRAVENOUS at 11:48

## 2025-05-22 NOTE — TELEPHONE ENCOUNTER
RN called patient, her  answered and said she is just waking up. Let him know we are moving her appointment from June 2nd to this Tuesday May 27th at 11:15. I just wanted to check in on her and update her on our plan. Patient does not want to talk right now, told her to call back with any concerns and we will see her on Tuesday and she will complete her PET scan today.

## 2025-05-22 NOTE — TELEPHONE ENCOUNTER
Had not seen patient for this condition before. She saw guillaume Camarillo, yesterday for hospital follow up and this was addressed

## 2025-05-23 ENCOUNTER — PATIENT OUTREACH (OUTPATIENT)
Dept: CASE MANAGEMENT | Facility: OTHER | Age: 65
End: 2025-05-23
Payer: COMMERCIAL

## 2025-05-23 NOTE — OUTREACH NOTE
AMBULATORY CASE MANAGEMENT NOTE    Names and Relationships of Patient/Support Persons: Contact: Tye, Sydnie Zapata; Relationship: Self -     Patient Outreach  Mrs. Gamble was hospitalized 05/13/2025 through 05/16/2025 for Acute respiratory failure with hypoxia, shortness of breath, elevated d-dimer, pleural effusion, right.  Patient received thoracentesis, 1100 mL's removed.  Today, patient reports she had a rough night last HS, chest discomfort, some shortness of breath, difficulty getting comfortable.  Mrs. Gamble is currently on 2 Liters of oxygen with levels dropping into the 80's with mild exertion. Patient stated she could increase to 3 Liters if needed. Mrs. Gamble agreed to increase to 3 liters now. Patient reports shortness of breath with exertion but recovers with rest. We reviewed signs and symptoms and when to elevate level of care. Patient voiced understanding. Medications reviewed, she is using Trelegy as prescribed. Mrs. Gamble reports she has a rescue inhaler she does not use. Patient instructed on use of rescue medication.   Role of oncology RN-ACM explained, she is agreeable to service.    Atypical chest pain  Education Documentation  oxygen safety, taught by Rahel Johnson, RN at 5/23/2025  1:54 PM.  Learner: Patient  Readiness: Acceptance  Method: Explanation  Response: Needs Reinforcement    energy conservation, taught by Rahel Johnson, RN at 5/23/2025  1:54 PM.  Learner: Patient  Readiness: Acceptance  Method: Explanation  Response: Needs Reinforcement    Unresolved/Worsening Symptoms, taught by Rahel Johnson, RN at 5/23/2025  1:54 PM.  Learner: Patient  Readiness: Acceptance  Method: Explanation  Response: Needs Reinforcement    Fluid/Food Intake, taught by Rahel Johnson, RN at 5/23/2025  1:54 PM.  Learner: Patient  Readiness: Acceptance  Method: Explanation  Response: Needs Reinforcement          Rahel RAHMAN  Ambulatory Case Management    5/23/2025, 13:55 EDT

## 2025-05-23 NOTE — PLAN OF CARE
Problem: Cancer Treatment Phase  Goal: Manage Fatigue (Tiredness)  Outcome: Not Progressing  Intervention: My Fatigue Management To Do List  Description: Why is this important?Cancer treatment and its side effects can drain your energy. It can keep you from doing things you would like to do.There are many things that you can do to manage fatigue.  Flowsheets (Taken 5/23/2025 2046)  My Fatigue Management To Do List:   develop a new routine to improve sleep   do not eat or exercise right before bedtime   eat healthy   take a warm shower or bath before bed     Problem: COPD  Goal: Track and Manage My Symptoms  Outcome: Not Progressing  Intervention: My COPD Symptoms To Do List  Description: Why is this important?Tracking your symptoms and other information about your health helps your doctor plan your care.Write down the symptoms, the time of day, what you were doing and what medicine you are taking.You will soon learn how to manage your symptoms.  Flowsheets (Taken 5/23/2025 5628)  My COPD Symptoms To Do List:   develop a rescue plan   follow rescue plan if symptoms flare up  Goal: Optimal Care Coordination of a Patient Experiencing COPD  Outcome: Not Progressing  Intervention: Alleviate Barriers to COPD Management  Flowsheets (Taken 5/23/2025 1986)  Alleviate Barriers to COPD Management:   activity or exercise based on tolerance encouraged   fall risk monitored   medication-adherence assessment completed   quality of sleep assessed

## 2025-05-27 ENCOUNTER — PATIENT OUTREACH (OUTPATIENT)
Dept: CASE MANAGEMENT | Facility: OTHER | Age: 65
End: 2025-05-27
Payer: COMMERCIAL

## 2025-05-27 ENCOUNTER — READMISSION MANAGEMENT (OUTPATIENT)
Dept: CALL CENTER | Facility: HOSPITAL | Age: 65
End: 2025-05-27
Payer: COMMERCIAL

## 2025-05-27 ENCOUNTER — TELEPHONE (OUTPATIENT)
Dept: ONCOLOGY | Facility: CLINIC | Age: 65
End: 2025-05-27
Payer: COMMERCIAL

## 2025-05-27 ENCOUNTER — OFFICE VISIT (OUTPATIENT)
Dept: ONCOLOGY | Facility: CLINIC | Age: 65
End: 2025-05-27
Payer: COMMERCIAL

## 2025-05-27 ENCOUNTER — LAB (OUTPATIENT)
Dept: LAB | Facility: HOSPITAL | Age: 65
End: 2025-05-27
Payer: COMMERCIAL

## 2025-05-27 VITALS
WEIGHT: 132 LBS | TEMPERATURE: 97.6 F | SYSTOLIC BLOOD PRESSURE: 138 MMHG | RESPIRATION RATE: 18 BRPM | HEART RATE: 128 BPM | OXYGEN SATURATION: 94 % | BODY MASS INDEX: 24.92 KG/M2 | HEIGHT: 61 IN | DIASTOLIC BLOOD PRESSURE: 77 MMHG

## 2025-05-27 DIAGNOSIS — C34.91 ADENOCARCINOMA OF RIGHT LUNG: ICD-10-CM

## 2025-05-27 DIAGNOSIS — C34.91 ADENOCARCINOMA OF RIGHT LUNG: Primary | ICD-10-CM

## 2025-05-27 LAB
ALBUMIN SERPL-MCNC: 3.2 G/DL (ref 3.5–5.2)
ALBUMIN/GLOB SERPL: 0.8 G/DL
ALP SERPL-CCNC: 307 U/L (ref 39–117)
ALT SERPL W P-5'-P-CCNC: 31 U/L (ref 1–33)
ANION GAP SERPL CALCULATED.3IONS-SCNC: 17 MMOL/L (ref 5–15)
AST SERPL-CCNC: 36 U/L (ref 1–32)
BASOPHILS # BLD AUTO: 0.02 10*3/MM3 (ref 0–0.2)
BASOPHILS NFR BLD AUTO: 0.1 % (ref 0–1.5)
BILIRUB SERPL-MCNC: 0.2 MG/DL (ref 0–1.2)
BUN SERPL-MCNC: 15.5 MG/DL (ref 8–23)
BUN/CREAT SERPL: 16.7 (ref 7–25)
CALCIUM SPEC-SCNC: 9.8 MG/DL (ref 8.6–10.5)
CHLORIDE SERPL-SCNC: 97 MMOL/L (ref 98–107)
CO2 SERPL-SCNC: 23 MMOL/L (ref 22–29)
CREAT SERPL-MCNC: 0.93 MG/DL (ref 0.57–1)
DEPRECATED RDW RBC AUTO: 43 FL (ref 37–54)
EGFRCR SERPLBLD CKD-EPI 2021: 67.5 ML/MIN/1.73
EOSINOPHIL # BLD AUTO: 0.3 10*3/MM3 (ref 0–0.4)
EOSINOPHIL NFR BLD AUTO: 2 % (ref 0.3–6.2)
ERYTHROCYTE [DISTWIDTH] IN BLOOD BY AUTOMATED COUNT: 13 % (ref 12.3–15.4)
GLOBULIN UR ELPH-MCNC: 4.2 GM/DL
GLUCOSE SERPL-MCNC: 126 MG/DL (ref 65–99)
HCT VFR BLD AUTO: 33.7 % (ref 34–46.6)
HGB BLD-MCNC: 11 G/DL (ref 12–15.9)
IMM GRANULOCYTES # BLD AUTO: 0.15 10*3/MM3 (ref 0–0.05)
IMM GRANULOCYTES NFR BLD AUTO: 1 % (ref 0–0.5)
LYMPHOCYTES # BLD AUTO: 0.62 10*3/MM3 (ref 0.7–3.1)
LYMPHOCYTES NFR BLD AUTO: 4.2 % (ref 19.6–45.3)
MAGNESIUM SERPL-MCNC: 2 MG/DL (ref 1.6–2.4)
MCH RBC QN AUTO: 29.1 PG (ref 26.6–33)
MCHC RBC AUTO-ENTMCNC: 32.6 G/DL (ref 31.5–35.7)
MCV RBC AUTO: 89.2 FL (ref 79–97)
MONOCYTES # BLD AUTO: 1.31 10*3/MM3 (ref 0.1–0.9)
MONOCYTES NFR BLD AUTO: 8.9 % (ref 5–12)
NEUTROPHILS NFR BLD AUTO: 12.26 10*3/MM3 (ref 1.7–7)
NEUTROPHILS NFR BLD AUTO: 83.8 % (ref 42.7–76)
PHOSPHATE SERPL-MCNC: 3.3 MG/DL (ref 2.5–4.5)
PLATELET # BLD AUTO: 534 10*3/MM3 (ref 140–450)
PMV BLD AUTO: 8.7 FL (ref 6–12)
POTASSIUM SERPL-SCNC: 3.9 MMOL/L (ref 3.5–5.2)
PROT SERPL-MCNC: 7.4 G/DL (ref 6–8.5)
RBC # BLD AUTO: 3.78 10*6/MM3 (ref 3.77–5.28)
SODIUM SERPL-SCNC: 137 MMOL/L (ref 136–145)
T4 FREE SERPL-MCNC: 1.05 NG/DL (ref 0.92–1.68)
TSH SERPL DL<=0.05 MIU/L-ACNC: 4.94 UIU/ML (ref 0.27–4.2)
WBC NRBC COR # BLD AUTO: 14.66 10*3/MM3 (ref 3.4–10.8)

## 2025-05-27 PROCEDURE — 36415 COLL VENOUS BLD VENIPUNCTURE: CPT

## 2025-05-27 PROCEDURE — 83735 ASSAY OF MAGNESIUM: CPT

## 2025-05-27 PROCEDURE — 84100 ASSAY OF PHOSPHORUS: CPT

## 2025-05-27 PROCEDURE — 84439 ASSAY OF FREE THYROXINE: CPT

## 2025-05-27 PROCEDURE — 99215 OFFICE O/P EST HI 40 MIN: CPT | Performed by: INTERNAL MEDICINE

## 2025-05-27 PROCEDURE — 80050 GENERAL HEALTH PANEL: CPT

## 2025-05-27 RX ORDER — SODIUM CHLORIDE 9 MG/ML
20 INJECTION, SOLUTION INTRAVENOUS ONCE
OUTPATIENT
Start: 2025-06-23

## 2025-05-27 RX ORDER — HYDROCODONE BITARTRATE AND ACETAMINOPHEN 5; 325 MG/1; MG/1
1 TABLET ORAL EVERY 6 HOURS PRN
Qty: 30 TABLET | Refills: 0 | Status: SHIPPED | OUTPATIENT
Start: 2025-05-27 | End: 2025-06-26

## 2025-05-27 RX ORDER — SODIUM CHLORIDE 9 MG/ML
20 INJECTION, SOLUTION INTRAVENOUS ONCE
Status: CANCELLED | OUTPATIENT
Start: 2025-06-02

## 2025-05-27 NOTE — TELEPHONE ENCOUNTER
Caller: LEVI ESPITIA    Relationship: Emergency Contact    Best call back number: [phone]    Who are you requesting to speak with (clinical staff, provider,  specific staff member): CLINICAL      What was the call regarding: LEVI REQUESTING CALLBACK TO GO OVER TODAY'S APPT

## 2025-05-27 NOTE — TELEPHONE ENCOUNTER
Pt contacted as pre-procedure phone call prior to planned right thoracentesis for 5/28/25. Reviewed with patient arrival time, location, nothing to eat or drink by mouth,  needed, reviewed procedure instructions and allowed time for questions, and reviewed home medications, allergies, and medical history.

## 2025-05-27 NOTE — OUTREACH NOTE
AMBULATORY CASE MANAGEMENT NOTE    Names and Relationships of Patient/Support Persons: Contact: OMKARLEVI; Relationship: Emergency Contact -     Patient Outreach    Received call from patient's daughter Levi. Ms. Gamble was seen by Dr. Mckenzie today and Levi had some concerns.  Levi was interested in getting her mother a wheelchair and bedside commode as ambulating is painful and difficult. Levi thought her mother had HH PT, but did not know for sure.  Oncology RN-ACM place call to patient to discuss these needs, message left. RN-ACM will call back tomorrow.    Rahel RAHMAN  Ambulatory Case Management    5/27/2025, 15:49 EDT

## 2025-05-27 NOTE — OUTREACH NOTE
Medical Week 2 Survey      Flowsheet Row Responses   [facility] [place] by [facility] patient discharged from? [facility]   Does the patient have one of the following disease processes/diagnoses(primary or secondary)? Other   Week 2 attempt successful? No   Unsuccessful attempts Attempt 1   oke Richard Mata Registered Nurse

## 2025-05-27 NOTE — PROGRESS NOTES
DATE OF VISIT: 5/27/2025    REASON FOR VISIT: Followup for right lung adenocarcinoma     PROBLEM LIST:  1. Right lower lobe lung adenocarcinoma, T1b N0 M0 stage I A2, PD-L1 85%:  A.  Present with abnormal screening scan done March 22, 2024 with persistent abnormality June 4, 2024.  B.  Whole-body PET scan done July 11, 2024 revealed mild hypermetabolic activity in the medial right lower lobe lung nodule with patchy bilateral lung infiltrates none PET avid.  C.  Negative MRI brain done August 25, 2024.  D.  Bronchoscopy with biopsy August 6, 2024 right lower lobe positive for adenocarcinoma negative nodes and negative right upper lobe biopsy but atypical cytology.  E.  Treated with CyberKnife radiotherapy September 2024.  F.  Whole-body PET scan done May 22, 2025 revealed widely metastatic disease.  G.  Will start Keytruda June 2, 2025.  2.  Malignant right pleural effusion:  A.  Status post thoracentesis done May 14, 2025 with positive cytology.  3.  Advanced COPD:  A.  PFTs done on March 25, 2024 revealed FEV1 of 1 L.  4.  Hypercholesteremia  5.  Hypothyroid  6.  Cervical squamous cell carcinoma:  A.  Status post total hysterectomy followed by adjuvant radiation August 2020.    HISTORY OF PRESENT ILLNESS: The patient is a very pleasant 65 y.o. female  with past medical history significant for right lower lobe lung adenocarcinoma diagnosed August 2024.  She was treated with CyberKnife radiotherapy September 2024. The  patient is here today for scheduled follow-up visit.    SUBJECTIVE: Sydnie is here today with her .  She is [place] increased pain.  She is having shortness of breath.  She is anxious about the PET scan result.    Past History:  Medical History: has a past medical history of Acid reflux, Acid reflux, Adenocarcinoma of lung (08/12/2024), Anemia, chronic disease (5/13/2025), Anxiety (1976), Arthritis, Atherosclerotic cardiovascular disease (03/25/2024), Cervical cancer, Depression, Emphysema of lung,  "History of radiation therapy (11/20/2020), History of radiation therapy (10/04/2024), radiation therapy, Hyperlipidemia, Hypothyroidism, Kidney disease, Lung cancer, Lung nodule, Ovarian cyst (1980s), Pleural effusion (5/13/2025), Pulmonary embolism (5/13/2025), Visual impairment (corrected with glasses), Wears dentures, and Wears glasses.   Surgical History: has a past surgical history that includes Tubal ligation; total abdominal hysterectomy pelvic node dissection (N/A, 08/18/2020); Lymph node biopsy; Subtotal Hysterectomy (1987?); Bronchoscopy; Lung biopsy; BRONCHOSCOPY WITH ION ROBOTIC ASSIST (N/A, 08/06/2024); Mohs surgery (03/24/2025); Hysteroscopy; and Colonoscopy.   Family History: family history includes Anxiety disorder in her mother; Asthma in her mother; COPD in her mother; Cancer in her father, maternal aunt, maternal aunt, maternal aunt, maternal aunt, maternal grandmother, and mother; Depression in her mother; Early death in her father; Emphysema in her mother; Heart attack in her maternal grandfather; Heart disease in her maternal grandfather; Hypertension in her mother; Melanoma in her mother; Mental illness in her mother; Uterine cancer in her mother.   Social History: reports that she quit smoking about 6 years ago. Her smoking use included cigarettes. She started smoking about 53 years ago. She has a 67.5 pack-year smoking history. She has been exposed to tobacco smoke. She has never used smokeless tobacco. She reports that she does not drink alcohol and does not use drugs.    (Not in a hospital admission)     Allergies: Patient has no known allergies.     Review of Systems   Constitutional:  Positive for fatigue.   Respiratory:  Positive for shortness of breath.    Musculoskeletal: Negative.    Psychiatric/Behavioral:  The patient is nervous/anxious.        PHYSICAL EXAMINATION:   Ht 154.9 cm (61\")   LMP  (LMP Unknown)   BMI 24.98 kg/m²    There were no vitals filed for this visit.     "     ECOG score: 0            ECOG Performance Status: 1 - Symptomatic but completely ambulatory      General Appearance:      alert, cooperative, no apparent distress and appears stated age   Lungs:   Clear to auscultation bilaterally; respirations regular, even, and unlabored bilaterally   Heart:  Regular rate and rhythm, no murmurs appreciated   Abdomen:   Soft, non-tender, non-distended, and no organomegaly                 Hospital Outpatient Visit on 05/22/2025   Component Date Value Ref Range Status   • Glucose 05/22/2025 113  70 - 130 mg/dL Final   No results displayed because visit has over 200 results.           MRI Brain With & Without Contrast  Result Date: 5/16/2025  Narrative: MRI BRAIN W WO CONTRAST Date of Exam: 5/16/2025 12:12 AM EDT Indication: Metastatic lung cancer.  Comparison: 8/25/2024 Technique:  Routine multiplanar/multisequence sequence images of the brain were obtained before and after the uneventful administration of 12 mL Multihance. Findings: There is mild increased T2 and FLAIR signal in the periventricular white matter suggestive of mild chronic microvascular ischemic change. There is no diffusion restriction to suggest acute infarct. There is no evidence of acute or chronic intracranial hemorrhage. No mass effect or midline shift. No abnormal extra-axial collections. The major vascular flow voids appear intact. The basal ganglia, brainstem and cerebellum appear within normal limits. Calvarial and superficial soft tissue signal is within  normal limits. Orbits appear unremarkable. The paranasal sinuses and the mastoid air cells appear well aerated. Midline structures are intact.  There is no abnormal intracranial enhancement. There is normal enhancement within the intracranial vasculature including the dural venous sinuses.     Impression: Impression: Mild chronic microvascular ischemic change. No acute intracranial process. No evidence of intracranial metastatic disease. Electronically  Signed: Rodriguez Singh MD  5/16/2025 1:15 AM EDT  Workstation ID: [id number]    US Thoracentesis  Result Date: 5/14/2025  Narrative: PROCEDURE: US THORACENTESIS-  ATTENDING: Jose Hope M.D.  CLINICAL HISTORY: Large right-sided pleural effusion.  TECHNIQUE:  The risk, benefits, and alternatives were described in detail and the patient was brought to the ultrasound suite and positioned seated. The skin entry site was prepped and draped utilizing standard sterile technique. 1% lidocaine was applied to the soft tissues of the posterior right thorax   A 5 Tunis[place] Yueh was advanced into the right pleural space.   A total of 1150 mL was removed from the right pleural space. A specimen was sent to the laboratory for analysis. The needle was removed and a sterile dressing was applied.  The patient tolerated the procedure well and there were no complications.      Impression: Successful ultrasound-guided right thoracentesis.   Attestation Signer name: Jose Hope MD I attest that I was present for the entire procedure. I reviewed the stored images and agree with the report as written.      5/14/2025 2:32 PM by Jose Hope MD on Workstation: [id number]      XR Chest 1 View  Result Date: 5/14/2025  Narrative: XR CHEST 1 VW Date of Exam: 5/14/2025 1:15 PM EDT Indication: s/p thora Comparison: CT chest 5/13/2025, AP chest x-ray 5/13/2025 Findings: No pneumothorax is seen following thoracentesis. There is improved aeration of the right lung with persistent patchy opacities in the right midlung and right lung base. Left lung appears clear. Cardiomediastinal contours are stable.     Impression: Impression: 1.No visible pneumothorax following thoracentesis. 2.Improved aeration of the right lung with persistent patchy opacities in the right midlung and right lung base. Electronically Signed: Maryellen Dalton MD  5/14/2025 1:49 PM EDT  Workstation ID: [id number]    Duplex Venous Lower Extremity - Bilateral CAR  Result Date:  5/14/2025  Narrative: •  Acute left lower extremity deep vein thrombosis noted in the peroneal.     Adult Transthoracic Echo Complete w/ Color, Spectral and Contrast if necessary per protocol  Result Date: 5/14/2025  Narrative: •  Left ventricular ejection fraction appears to be 66 - 70%. •  Normal right ventricular cavity size, wall thickness, systolic function and septal motion noted. •  There is a small (<1cm) pericardial effusion adjacent to the right ventricle. There is no evidence of cardiac tamponade. •  There is a left pleural effusion.     CT Angiogram Chest Pulmonary Embolism  Result Date: 5/13/2025  Narrative: CT ANGIOGRAM CHEST PULMONARY EMBOLISM Date of Exam: 5/13/2025 6:39 AM EDT Indication: new acute hypoxia, short of breath, right pleural effusion, elevated d-dimer, history of lung cancer. vapes. Comparison: CT chest dated 3/31/2025 Technique: Axial CT images were obtained of the chest after the uneventful intravenous administration of 75 mL Isovue-370 utilizing pulmonary embolism protocol.  In addition, a 3-D volume rendered image was created for interpretation.  Reconstructed coronal and sagittal images were also obtained. Automated exposure control and iterative construction methods were used. Findings: Pulmonary arteries: Adequate opacification of the pulmonary arteries. There is a small segmental left upper lobe pulmonary embolism. There are small segmental and subsegmental pulmonary emboli of the left lower lobe. There is a small segmental right upper lobe pulmonary embolism. Lungs and Pleura: There is a moderate sized right pleural effusion. There is right basilar atelectasis. Posttreatment changes of the right upper lobe pulmonary nodule are present. Now, the spiculated component appears to be slightly larger than previously seen. This was seen on image 37 of series 4 previous study measuring about 8 mm and measures about 11 mm on image 42 of series 6 on this exam. The nodular densities seen  in the anterior right upper lobe is stable at 11 mm. The lesion seen previously in the superior segment of the right lower lobe is obscured by the presence of the pleural effusion and atelectasis. Superior segment groundglass nodule in the left lower lobe is stable. Mediastinum/Ashley: No mediastinal or hilar lymphadenopathy. Lymph nodes: No axillary or supraclavicular adenopathy. Cardiovascular: The cardiac chambers are within normal limits. The pericardium is normal. The aorta and its arch branch vessels are unremarkable.   Upper Abdomen: There are several hepatic cysts. However, there also appear to be multiple new low-density hepatic lesions which are difficult to assess in this phase of enhancement which is arterial but one lesion adjacent to the gallbladder is at least 2.2 cm in diameter. Bones and Soft Tissue: No suspicious osseous lesion.     Impression: Impression: 1.Small segmental and subsegmental pulmonary emboli of the left upper lobe, left lower lobe and right upper lobe. 2.Moderate right pleural effusion with right basilar atelectasis. 3.Posttreatment changes of the right upper lobe pulmonary nodule. The spiculated component appears to be slightly larger than previously seen. 4.Multiple new low-density hepatic lesions are suspicious for metastatic disease. Electronically Signed: Rodriguez Singh MD  5/13/2025 7:07 AM EDT  Workstation ID: [id number]    XR Chest 1 View  Result Date: 5/13/2025  Narrative: XR CHEST 1 VW Date of Exam: 5/13/2025 5:01 AM EDT Indication: SOA triage protocol Comparison: Chest radiograph 4/12/2025 Findings: The heart is enlarged. The pulmonary vascular markings are normal. There is a new right-sided pleural fluid collection. There is right lower lobe airspace disease which is likely atelectasis. Left lung is clear. The osseous structures are normal.     Impression: Impression: New right-sided pleural fluid collection with right lower lobe airspace disease likely atelectasis.  Electronically Signed: Rodriguez Singh MD  5/13/2025 5:41 AM EDT  Workstation ID: [id number]        ASSESSMENT: The patient is a very pleasant 65 y.o. female  with right lower lobe lung adenocarcinoma      PLAN:    1.  Right lower lobe lung adenocarcinoma,  T1b N0 M0 stage I A2, PD-L1 85%:  A.  I did go over the whole-body PET scan result with the patient unfortunately it did confirm mildly metastatic disease.  B.  I gave the patient 3 options either by supportive care versus immunotherapy single agent versus clinical trial with combination of chemotherapy as immunotherapy.  C.  After long discussion with the patient she is interested in trying immunotherapy.  I will get her started on Keytruda 200 mg IV every 3 weeks.  D. We discussed potential side effects of immunotherapy including but not limited to immune mediated reactions with thyroiditis, pneumonitis, hepatitis, colitis, rash, and electrolyte abnormalities, fatigue, multiorgan failure, and possibly death.  E.  I would repeat her scans after cycle #3.  F.  I will monitor blood work including blood counts kidney function liver function and electrolytes.  G.  I will send blood for molecular analysis.    2.  Malignant right pleural effusion:  A.  I will arrange for another thoracentesis given her shortness of breath and recurrent effusion on her current scan.  B.  We will consider Pleurx catheter if this does not improve with systemic therapy.    3.  Symptomatic bone metastases:  8.  I will start her on calcium vitamin D daily.  B.  I will start her on Xgeva every 6 weeks.    4.  Cancer-related pain:  A.  I will start her on Norco 5/325 mg every 6 hours as needed.    5.  Hypothyroid:  A.  She will continue Synthroid     6.  Hypercholesteremia:  A.  She will continue Lipitor.    FOLLOW UP: 4 weeks with cycle #2.    Total time of patient care including preparation of patient chart prior to arrival, face-to-face with patient and following visit spent in reviewing  records, lab results, imaging studies, discussion with patient, and documentation/charting was 40 minutes       Sejal Mckenzie MD  5/27/2025

## 2025-05-28 ENCOUNTER — HOSPITAL ENCOUNTER (OUTPATIENT)
Dept: GENERAL RADIOLOGY | Facility: HOSPITAL | Age: 65
Discharge: HOME | End: 2025-05-28
Payer: COMMERCIAL

## 2025-05-28 ENCOUNTER — HOSPITAL ENCOUNTER (OUTPATIENT)
Dept: ULTRASOUND IMAGING | Facility: HOSPITAL | Age: 65
Discharge: HOME | End: 2025-05-28
Payer: COMMERCIAL

## 2025-05-28 VITALS
DIASTOLIC BLOOD PRESSURE: 70 MMHG | SYSTOLIC BLOOD PRESSURE: 117 MMHG | WEIGHT: 132 LBS | OXYGEN SATURATION: 94 % | BODY MASS INDEX: 24.92 KG/M2 | TEMPERATURE: 96.7 F | HEIGHT: 61 IN | HEART RATE: 110 BPM | RESPIRATION RATE: 18 BRPM

## 2025-05-28 DIAGNOSIS — C34.91 ADENOCARCINOMA OF RIGHT LUNG: ICD-10-CM

## 2025-05-28 LAB
BASOPHILS # BLD AUTO: 0.06 10*3/MM3 (ref 0–0.2)
BASOPHILS NFR BLD AUTO: 0.5 % (ref 0–1.5)
DEPRECATED RDW RBC AUTO: 43.2 FL (ref 37–54)
EOSINOPHIL # BLD AUTO: 1.07 10*3/MM3 (ref 0–0.4)
EOSINOPHIL NFR BLD AUTO: 8.3 % (ref 0.3–6.2)
ERYTHROCYTE [DISTWIDTH] IN BLOOD BY AUTOMATED COUNT: 13.2 % (ref 12.3–15.4)
FUNGUS WND CULT: NORMAL
HCT VFR BLD AUTO: 31.8 % (ref 34–46.6)
HGB BLD-MCNC: 10.4 G/DL (ref 12–15.9)
IMM GRANULOCYTES # BLD AUTO: 0.14 10*3/MM3 (ref 0–0.05)
IMM GRANULOCYTES NFR BLD AUTO: 1.1 % (ref 0–0.5)
INR PPP: 1.46 (ref 0.89–1.12)
LYMPHOCYTES # BLD AUTO: 0.71 10*3/MM3 (ref 0.7–3.1)
LYMPHOCYTES NFR BLD AUTO: 5.5 % (ref 19.6–45.3)
MCH RBC QN AUTO: 29.1 PG (ref 26.6–33)
MCHC RBC AUTO-ENTMCNC: 32.7 G/DL (ref 31.5–35.7)
MCV RBC AUTO: 88.8 FL (ref 79–97)
MONOCYTES # BLD AUTO: 1.35 10*3/MM3 (ref 0.1–0.9)
MONOCYTES NFR BLD AUTO: 10.5 % (ref 5–12)
MYCOBACTERIUM SPEC CULT: NORMAL
NEUTROPHILS NFR BLD AUTO: 74.1 % (ref 42.7–76)
NEUTROPHILS NFR BLD AUTO: 9.51 10*3/MM3 (ref 1.7–7)
NIGHT BLUE STAIN TISS: NORMAL
NRBC BLD AUTO-RTO: 0 /100 WBC (ref 0–0.2)
PLATELET # BLD AUTO: 515 10*3/MM3 (ref 140–450)
PMV BLD AUTO: 8.9 FL (ref 6–12)
PROTHROMBIN TIME: 18.6 SECONDS (ref 12.2–15.3)
RBC # BLD AUTO: 3.58 10*6/MM3 (ref 3.77–5.28)
WBC NRBC COR # BLD AUTO: 12.84 10*3/MM3 (ref 3.4–10.8)

## 2025-05-28 PROCEDURE — 85025 COMPLETE CBC W/AUTO DIFF WBC: CPT | Performed by: STUDENT IN AN ORGANIZED HEALTH CARE EDUCATION/TRAINING PROGRAM

## 2025-05-28 PROCEDURE — 85610 PROTHROMBIN TIME: CPT | Performed by: STUDENT IN AN ORGANIZED HEALTH CARE EDUCATION/TRAINING PROGRAM

## 2025-05-28 PROCEDURE — 25010000002 LIDOCAINE PF 1% 1 % SOLUTION: Performed by: STUDENT IN AN ORGANIZED HEALTH CARE EDUCATION/TRAINING PROGRAM

## 2025-05-28 PROCEDURE — 71045 X-RAY EXAM CHEST 1 VIEW: CPT

## 2025-05-28 PROCEDURE — 76942 ECHO GUIDE FOR BIOPSY: CPT

## 2025-05-28 RX ORDER — HYDROCODONE BITARTRATE AND ACETAMINOPHEN 5; 325 MG/1; MG/1
1 TABLET ORAL ONCE AS NEEDED
Refills: 0 | Status: COMPLETED | OUTPATIENT
Start: 2025-05-28 | End: 2025-05-28

## 2025-05-28 RX ORDER — SODIUM CHLORIDE 9 MG/ML
40 INJECTION, SOLUTION INTRAVENOUS AS NEEDED
Status: DISCONTINUED | OUTPATIENT
Start: 2025-05-28 | End: 2025-05-29 | Stop reason: HOSPADM

## 2025-05-28 RX ORDER — ONDANSETRON 8 MG/1
8 TABLET, FILM COATED ORAL 3 TIMES DAILY PRN
Qty: 30 TABLET | Refills: 5 | Status: SHIPPED | OUTPATIENT
Start: 2025-05-28

## 2025-05-28 RX ORDER — LIDOCAINE HYDROCHLORIDE 10 MG/ML
5 INJECTION, SOLUTION EPIDURAL; INFILTRATION; INTRACAUDAL; PERINEURAL ONCE
Status: COMPLETED | OUTPATIENT
Start: 2025-05-28 | End: 2025-05-28

## 2025-05-28 RX ORDER — SODIUM CHLORIDE 0.9 % (FLUSH) 0.9 %
10 SYRINGE (ML) INJECTION EVERY 12 HOURS SCHEDULED
Status: DISCONTINUED | OUTPATIENT
Start: 2025-05-28 | End: 2025-05-29 | Stop reason: HOSPADM

## 2025-05-28 RX ORDER — SODIUM CHLORIDE 0.9 % (FLUSH) 0.9 %
10 SYRINGE (ML) INJECTION AS NEEDED
Status: DISCONTINUED | OUTPATIENT
Start: 2025-05-28 | End: 2025-05-29 | Stop reason: HOSPADM

## 2025-05-28 RX ADMIN — LIDOCAINE HYDROCHLORIDE 5 ML: 10 INJECTION, SOLUTION EPIDURAL; INFILTRATION; INTRACAUDAL; PERINEURAL at 10:41

## 2025-05-28 RX ADMIN — HYDROCODONE BITARTRATE AND ACETAMINOPHEN 1 TABLET: 5; 325 TABLET ORAL at 08:53

## 2025-05-28 NOTE — NURSING NOTE
Patient without distress, CXR results noted and no pneumothorax. Discharged home with spouse on home O2.

## 2025-05-28 NOTE — NURSING NOTE
Ultrasound guided Right Thoracentesis performed by Dr Erik Mensah MD. Local Anesthetic used at insertion site. 1400ml of pleuritic fluid. Island dressing applied at site. VSS, Patient tolerated well. Report called to nurse.

## 2025-05-28 NOTE — OP NOTE
IR POST OP NOTE    Physician:Erik Mensah MD      Procedure: Right sided thoracentesis      Pre Op DX: Right-sided pleural effusion      Post Op DX: Same      Anesthesia: Local only      Findings: Large right-sided pleural effusion      Complications: No immediate      Provider Signature: Erik Mensah MD    05/28/25  10:32 EDT

## 2025-05-28 NOTE — PRE-PROCEDURE NOTE
[facility]   Interventional Radiology H&P    Patient Name: Sydnie Gamble  : 1960  MRN: [id number]  Primary Care Physician:  Dee Elena APRN  Referring Physician: Sejal Mckenzie MD  Date of admission: 2025    Subjective   Subjective     HPI:  Sydnie Gamble is a 65 y.o. female with a history of lung cancer.  Patient presents to interventional radiology for large right-sided pleural effusion and image guided thoracentesis.    Review of Systems:   Constitutional no fever,  no weight loss       Otolaryngeal no difficulty swallowing   Cardiovascular no chest pain   Pulmonary no cough, no sputum production   Gastrointestinal no constipation, no diarrhea                         Personal History       Past Medical/Surgical History:   Past Medical History:   Diagnosis Date    Acid reflux     Acid reflux     Adenocarcinoma of lung 2024    Anemia, chronic disease 2025    Anxiety 1976    Arthritis     Atherosclerotic cardiovascular disease 2024    Cervical cancer     Depression     Emphysema of lung     History of radiation therapy 2020    pelvis + intracavitary brachytherapy    History of radiation therapy 10/04/2024    RUL lung    Hx of radiation therapy     Hyperlipidemia     Hypothyroidism     Kidney disease     Lung cancer     Lung nodule     Ovarian cyst 1980s    Pleural effusion 2025    Pulmonary embolism 2025    Visual impairment corrected with glasses    Wears dentures     Wears glasses      Past Surgical History:   Procedure Laterality Date    BRONCHOSCOPY      BRONCHOSCOPY WITH ION ROBOTIC ASSIST N/A 2024    Procedure: BRONCHOSCOPY NAVIGATION WITH ENDOBRONCHIAL ULTRASOUND AND ION ROBOT;  Surgeon: Toni Mcclelland MD;  Location: [facility] ENDOSCOPY;  Service: Robotics - Pulmonary;  Laterality: N/A;    COLONOSCOPY      HYSTEROSCOPY      LUNG BIOPSY      LYMPH NODE BIOPSY      MOHS SURGERY  2025    Groin    SUBTOTAL  "HYSTERECTOMY  1987?    ovary/fallopian tube removed    TOTAL ABDOMINAL HYSTERECTOMY PELVIC NODE DISSECTION N/A 08/18/2020    Procedure: TOTAL RADICAL HYSTERECTOMY PELVIC NODE DISSECTION;  Surgeon: Laura Jimenez MD;  Location: [facility];  Service: Gynecology Oncology;  Laterality: N/A;    TUBAL ABDOMINAL LIGATION      right with ovarian dissection        Social History:  reports that she quit smoking about 6 years ago. Her smoking use included cigarettes. She started smoking about 53 years ago. She has a 67.5 pack-year smoking history. She has been exposed to tobacco smoke. She has never used smokeless tobacco. She reports that she does not drink alcohol and does not use drugs.    Medications:  (Not in a hospital admission)    Current medications:  sodium chloride, 10 mL, Intravenous, Q12H      Current IV drips:       Allergies:  No Known Allergies    Objective    Objective     Vitals:   Temp:  [96.7 °F (35.9 °C)] 96.7 °F (35.9 °C)  Heart Rate:  [107-123] 119  Resp:  [15-21] 19  BP: (117-150)/(67-90) 133/67  Flow (L/min) (Oxygen Therapy):  [3-4] 3.5      Physical Exam:   Constitutional: Awake, alert, No acute distress    Respiratory: labored respirations            Result Review        Result Review:     Sodium   Date Value Ref Range Status   05/27/2025 137 136 - 145 mmol/L Final       Potassium   Date Value Ref Range Status   05/27/2025 3.9 3.5 - 5.2 mmol/L Final       Chloride   Date Value Ref Range Status   05/27/2025 97 (L) 98 - 107 mmol/L Final       No results found for: \"PLASMABICARB\"    BUN   Date Value Ref Range Status   05/27/2025 15.5 8.0 - 23.0 mg/dL Final       Creatinine   Date Value Ref Range Status   05/27/2025 0.93 0.57 - 1.00 mg/dL Final       Calcium   Date Value Ref Range Status   05/27/2025 9.8 8.6 - 10.5 mg/dL Final           No components found for: \"GLUCOSE.*\"  Results from last 7 days   Lab Units 05/28/25  0837   WBC 10*3/mm3 12.84*   HEMOGLOBIN g/dL 10.4*   HEMATOCRIT % 31.8*   PLATELETS " 10*3/mm3 515*      Results from last 7 days   Lab Units 05/28/25  0837   INR  1.46*           Assessment / Plan     Assesment:  65-year-old female with a history of lung cancer presenting to interventional radiology with a large right-sided pleural effusion.      Plan:   Image guided right sided thoracentesis with local anesthesia.    The risks and benefits of the procedure were discussed with the patient.    Electronically signed by Erik Mensah MD, 05/28/25, 10:31 AM EDT.

## 2025-05-29 ENCOUNTER — TELEPHONE (OUTPATIENT)
Dept: INFUSION THERAPY | Facility: HOSPITAL | Age: 65
End: 2025-05-29
Payer: COMMERCIAL

## 2025-05-29 LAB
QT INTERVAL: 396 MS
QTC INTERVAL: 433 MS

## 2025-05-30 ENCOUNTER — PATIENT OUTREACH (OUTPATIENT)
Dept: CASE MANAGEMENT | Facility: OTHER | Age: 65
End: 2025-05-30
Payer: COMMERCIAL

## 2025-05-30 NOTE — PLAN OF CARE
Problem: Cancer Treatment Phase  Goal: Manage Fatigue (Tiredness)  Outcome: Not Progressing     Problem: COPD  Goal: Track and Manage My Symptoms  Outcome: Progressing  Goal: Optimal Care Coordination of a Patient Experiencing COPD  Outcome: Progressing

## 2025-05-30 NOTE — OUTREACH NOTE
AMBULATORY CASE MANAGEMENT NOTE    Names and Relationships of Patient/Support Persons: Contact: Sydnie Gamble; Relationship: Self -     Patient Outreach    Oncology RN-RAYRAY spoke with both Mrs. Gamble and her . Patient reports she feels much better since the fluid removal, states she will not wait as long for the next one. Medications reviewed, Mrs. Gamble is now able to take her Trelegy daily, states she couldn't get enough air in to inhale medication before. She also has albuterol rescue inhaler she uses as needed. Mrs. Gamble manages her own medications and is happy with her system. Mrs. Gamble denies need for BSC, states she has a wheel chair and cane and feels she can safely navigate to bathroom and kitchen.  Mrs. Gamble's  has FMLA papers that he will turn in at her next treatment visit on 06/02/2025. No other needs or concerns identified at this time.        Rahel RAHMAN  Ambulatory Case Management    5/30/2025, 16:37 EDT

## 2025-06-02 ENCOUNTER — PATIENT OUTREACH (OUTPATIENT)
Dept: CASE MANAGEMENT | Facility: OTHER | Age: 65
End: 2025-06-02
Payer: MEDICARE

## 2025-06-02 ENCOUNTER — HOSPITAL ENCOUNTER (OUTPATIENT)
Facility: HOSPITAL | Age: 65
Discharge: HOME | End: 2025-06-02
Payer: MEDICARE

## 2025-06-02 ENCOUNTER — DOCUMENTATION (OUTPATIENT)
Dept: OTHER | Facility: HOSPITAL | Age: 65
End: 2025-06-02
Payer: MEDICARE

## 2025-06-02 ENCOUNTER — DOCUMENTATION (OUTPATIENT)
Dept: NUTRITION | Facility: HOSPITAL | Age: 65
End: 2025-06-02
Payer: MEDICARE

## 2025-06-02 ENCOUNTER — PATIENT MESSAGE (OUTPATIENT)
Dept: CASE MANAGEMENT | Facility: OTHER | Age: 65
End: 2025-06-02
Payer: MEDICARE

## 2025-06-02 ENCOUNTER — SPECIALTY PHARMACY (OUTPATIENT)
Facility: HOSPITAL | Age: 65
End: 2025-06-02
Payer: MEDICARE

## 2025-06-02 ENCOUNTER — TELEPHONE (OUTPATIENT)
Age: 65
End: 2025-06-02

## 2025-06-02 VITALS
WEIGHT: 129 LBS | HEIGHT: 61 IN | RESPIRATION RATE: 18 BRPM | TEMPERATURE: 97.8 F | SYSTOLIC BLOOD PRESSURE: 101 MMHG | HEART RATE: 111 BPM | BODY MASS INDEX: 24.35 KG/M2 | DIASTOLIC BLOOD PRESSURE: 62 MMHG

## 2025-06-02 DIAGNOSIS — C34.91 ADENOCARCINOMA OF RIGHT LUNG: ICD-10-CM

## 2025-06-02 DIAGNOSIS — C34.91 ADENOCARCINOMA OF RIGHT LUNG: Primary | ICD-10-CM

## 2025-06-02 DIAGNOSIS — R53.1 WEAKNESS: ICD-10-CM

## 2025-06-02 DIAGNOSIS — J96.01 ACUTE RESPIRATORY FAILURE WITH HYPOXIA: Primary | ICD-10-CM

## 2025-06-02 LAB
ALBUMIN SERPL-MCNC: 3 G/DL (ref 3.5–5.2)
ALBUMIN/GLOB SERPL: 0.8 G/DL
ALP SERPL-CCNC: 342 U/L (ref 39–117)
ALT SERPL W P-5'-P-CCNC: 38 U/L (ref 1–33)
ANION GAP SERPL CALCULATED.3IONS-SCNC: 15 MMOL/L (ref 5–15)
AST SERPL-CCNC: 51 U/L (ref 1–32)
BASOPHILS # BLD AUTO: 0.02 10*3/MM3 (ref 0–0.2)
BASOPHILS NFR BLD AUTO: 0.1 % (ref 0–1.5)
BILIRUB SERPL-MCNC: 0.2 MG/DL (ref 0–1.2)
BUN SERPL-MCNC: 18.2 MG/DL (ref 8–23)
BUN/CREAT SERPL: 19.2 (ref 7–25)
CALCIUM SPEC-SCNC: 8.8 MG/DL (ref 8.6–10.5)
CHLORIDE SERPL-SCNC: 98 MMOL/L (ref 98–107)
CO2 SERPL-SCNC: 25 MMOL/L (ref 22–29)
CREAT SERPL-MCNC: 0.95 MG/DL (ref 0.57–1)
DEPRECATED RDW RBC AUTO: 44.1 FL (ref 37–54)
EGFRCR SERPLBLD CKD-EPI 2021: 65.8 ML/MIN/1.73
EOSINOPHIL # BLD AUTO: 1.09 10*3/MM3 (ref 0–0.4)
EOSINOPHIL NFR BLD AUTO: 7.3 % (ref 0.3–6.2)
ERYTHROCYTE [DISTWIDTH] IN BLOOD BY AUTOMATED COUNT: 13.5 % (ref 12.3–15.4)
GLOBULIN UR ELPH-MCNC: 3.6 GM/DL
GLUCOSE SERPL-MCNC: 126 MG/DL (ref 65–99)
HCT VFR BLD AUTO: 32 % (ref 34–46.6)
HGB BLD-MCNC: 10.3 G/DL (ref 12–15.9)
IMM GRANULOCYTES # BLD AUTO: 0.3 10*3/MM3 (ref 0–0.05)
IMM GRANULOCYTES NFR BLD AUTO: 2 % (ref 0–0.5)
LYMPHOCYTES # BLD AUTO: 0.76 10*3/MM3 (ref 0.7–3.1)
LYMPHOCYTES NFR BLD AUTO: 5.1 % (ref 19.6–45.3)
MAGNESIUM SERPL-MCNC: 1.7 MG/DL (ref 1.6–2.4)
MCH RBC QN AUTO: 28.6 PG (ref 26.6–33)
MCHC RBC AUTO-ENTMCNC: 32.2 G/DL (ref 31.5–35.7)
MCV RBC AUTO: 88.9 FL (ref 79–97)
MONOCYTES # BLD AUTO: 1.49 10*3/MM3 (ref 0.1–0.9)
MONOCYTES NFR BLD AUTO: 10 % (ref 5–12)
NEUTROPHILS NFR BLD AUTO: 11.2 10*3/MM3 (ref 1.7–7)
NEUTROPHILS NFR BLD AUTO: 75.5 % (ref 42.7–76)
PHOSPHATE SERPL-MCNC: 3.4 MG/DL (ref 2.5–4.5)
PLATELET # BLD AUTO: 410 10*3/MM3 (ref 140–450)
PMV BLD AUTO: 9 FL (ref 6–12)
POTASSIUM SERPL-SCNC: 3.7 MMOL/L (ref 3.5–5.2)
PROT SERPL-MCNC: 6.6 G/DL (ref 6–8.5)
RBC # BLD AUTO: 3.6 10*6/MM3 (ref 3.77–5.28)
SODIUM SERPL-SCNC: 138 MMOL/L (ref 136–145)
T4 FREE SERPL-MCNC: 1.11 NG/DL (ref 0.92–1.68)
TSH SERPL DL<=0.05 MIU/L-ACNC: 7.77 UIU/ML (ref 0.27–4.2)
WBC NRBC COR # BLD AUTO: 14.86 10*3/MM3 (ref 3.4–10.8)

## 2025-06-02 PROCEDURE — 83735 ASSAY OF MAGNESIUM: CPT | Performed by: INTERNAL MEDICINE

## 2025-06-02 PROCEDURE — 96413 CHEMO IV INFUSION 1 HR: CPT

## 2025-06-02 PROCEDURE — 84100 ASSAY OF PHOSPHORUS: CPT | Performed by: INTERNAL MEDICINE

## 2025-06-02 PROCEDURE — 84439 ASSAY OF FREE THYROXINE: CPT | Performed by: INTERNAL MEDICINE

## 2025-06-02 PROCEDURE — 25010000002 DENOSUMAB 120 MG/1.7ML SOLUTION: Performed by: INTERNAL MEDICINE

## 2025-06-02 PROCEDURE — 96372 THER/PROPH/DIAG INJ SC/IM: CPT

## 2025-06-02 PROCEDURE — 25810000003 SODIUM CHLORIDE 0.9 % SOLUTION: Performed by: INTERNAL MEDICINE

## 2025-06-02 PROCEDURE — 25010000002 PEMBROLIZUMAB 100 MG/4ML SOLUTION 4 ML VIAL: Performed by: INTERNAL MEDICINE

## 2025-06-02 PROCEDURE — 80050 GENERAL HEALTH PANEL: CPT | Performed by: INTERNAL MEDICINE

## 2025-06-02 RX ORDER — SODIUM CHLORIDE 9 MG/ML
20 INJECTION, SOLUTION INTRAVENOUS ONCE
Status: COMPLETED | OUTPATIENT
Start: 2025-06-02 | End: 2025-06-02

## 2025-06-02 RX ADMIN — DENOSUMAB 120 MG: 120 INJECTION SUBCUTANEOUS at 12:04

## 2025-06-02 RX ADMIN — SODIUM CHLORIDE 200 MG: 9 INJECTION, SOLUTION INTRAVENOUS at 11:33

## 2025-06-02 RX ADMIN — SODIUM CHLORIDE 20 ML/HR: 9 INJECTION, SOLUTION INTRAVENOUS at 11:17

## 2025-06-02 NOTE — OUTREACH NOTE
AMBULATORY CASE MANAGEMENT NOTE    Names and Relationships of Patient/Support Persons: Contact: OMKARDIPTI; Relationship: Emergency Contact -     Patient Outreach    Patient's daughter called with several concerns she observed during her weekend stay. Dipti reports that education was needed regarding using portable oxygen, RN-ACM called [facility] [facility] and requested in-home education - they have scheduled for tomorrow. Dipti was also concerned that her mother was not receiving adequate pain medication coverage, she stated that her mother was in constant pain during her visit. She voiced concern that her mother was not managing her medications correctly. Dipti also requested in home PT. Message sent to [facility] Nurse [facility] requesting order for portable oxygen concentrator, PT, and pain management. Orders were place for PT through [facility], portable oxygen concentrator ordered through [facility], and appointment was made with Palliative Care.   Message sent to Dipti with update.    Care Coordination    [facility]  [facility] Janes RAHMAN  Ambulatory Case Management    6/2/2025, 12:18 EDT

## 2025-06-02 NOTE — PLAN OF CARE
Problem: Cancer Treatment Phase  Goal: Manage Fatigue (Tiredness)  Outcome: Not Progressing  Goal: Keep Nausea and Vomiting Under Control  Outcome: Not Progressing  Intervention: My Nausea/Vomiting Control To Do List  Description: Why is this important?During cancer treatment, it may be hard to keep your weight and strength up.Feeling sick a lot can make life tough.Cancer treatment can also make you feel sick and throw up.Eating a variety of healthy foods that are high in calories and protein will help.Keeping away from foods and smells that might make you feel worse can make a big difference.There are some tips to help you have fewer bad days.  Flowsheets (Taken 6/2/2025 1216)  My Nausea/Vomiting Control To Do List:   avoid foods that might trigger nausea   eat 5 small meals each day  Goal: Keep Pain Under Control  Outcome: Not Progressing  Intervention: My Pain Control To Do List  Description: Why is this important?Day-to-day life can be hard when you have pain.Even a small change in emotion or a physical problem can make pain better or worse.Coping with pain depends on how the mind and body reacts to pain.Pain medicine is just one piece of the treatment puzzle.There are many tools to help manage pain. A combination of them can be used to best meet your needs.  Flowsheets (Taken 6/2/2025 1216)  My Pain Control To Do List:   call for prescription refill 3 days before needed   do something like read, listen to music or watch television to keep mind off pain   get a pain clinic referral when pain is too hard to control  Goal: Optimal Care Coordination of Patient With Cancer: Treatment Phase  Intervention: Alleviate Barriers to Optimal Nutrition  Flowsheets (Taken 6/2/2025 1216)  Alleviate Barriers to Optimal Nutrition:   diversity of foods encouraged   optimal oral hygiene encouraged   support and encouragement provided  Intervention: Support Psychosocial Response to Cancer Diagnosis  Flowsheets (Taken 6/2/2025  1216)  Support Psychosocial Response to Cancer Diagnosis:   active listening utilized   decision-making supported

## 2025-06-02 NOTE — PROGRESS NOTES
"Outpatient Oncology Nutrition     Reason for Visit:     Oncology Nutrition Screening and Patient Education    Patient Name:  Sydnie Gamble    :  1960    MRN:  [id number]    Date of Encounter: 2025    Nutrition Assessment     Diagnosis: Adenocarcinoma of right lung     Immunotherapy: OP LUNG Pembrolizumab 200 mgevery 21 days     Radiation: H/O adjuvant radiation therapy alone, which will consist of sternal beam radiation therapy to a dose of 50.4 Gy in 28 fractions, and followed by 3 intracavitary brachytherapy cylinders to the upper vagina using iridium - completed in     [facility] 2024     Patient Active Problem List:    Patient Active Problem List   Diagnosis    Personal history of tobacco use, presenting hazards to health    Gastroesophageal reflux disease without esophagitis    Acquired hypothyroidism    Vitamin D deficiency    Prediabetes    COPD with asthma    ALESIA (generalized anxiety disorder)    Multiple lung nodules on CT    Hilar adenopathy    HLD (hyperlipidemia)    Cervical cancer    Atherosclerotic cardiovascular disease    Adenocarcinoma of right lung    Pulmonary embolism    Anemia, chronic disease       Food / Nutrition Related History   Patient reports she doesn't eat much throughout the day. Patient drinks diet coke and little water.     Hydration Status   Discussed the importance of hydration, reviewed hydrating fluids, and recommended she increase her intake.    Goal: 64 ounces    Enteral Feeding       Anthropometric Measurements     Height:    Ht Readings from Last 1 Encounters:   25 154.9 cm (61\")       Weight:    Wt Readings from Last 1 Encounters:   25 58.5 kg (129 lb)       BMI:  24.37 Normal    Weight Change:  24- 144#  25- 139#  25- 136#  25- 134#  25- 129#    Patient has lost 15# within the past 6 months; 10.4% of her body weight     Review of Lab Data (Time Frame - 1 month / 2 month)   Reviewed " 6/2/25    Medication Review   Reviewed MAR 6/2/25    Nutrition Focused Physical Findings       Nutrition Impact Symptoms      Fatigue      Physical Activity      Not my normal self, but able to be up and about with fairly normal activities    Current Nutritional Intake     Oral diet: Regular    Oral nutritional supplements: just recently picked up premier protein     Intake: oral intake has been normal but she has decreased her sugar intake    Malnutrition Risk Assessment     Recent weight loss over the past 6 months:  Yes    How much weight loss:  2 = 14-23 lbs    Eating poorly because of a decreased appetite:  Other - patient reports she cut out sugar to help lose some weight, as well as, help her blood sugar    Malnutrition Screening Score:     MST = 2 more Patient at risk for malnutrition     Nutrition Diagnosis     Problem Inadequate protein intake    Etiology Food-related knowledge deficit and dietary changes   Signs / Symptoms Inconsistent oral intake, constant fatigue, decreased muscle mass per patient     Nutrition Intervention   Reviewed the importance of good nutrition during her treatment course focusing on adequate calorie, protein, nutrient and fluid intake. Advised her to be consuming smaller more frequent meals/snacks throughout the day to aid with potential nausea management. Emphasized the importance of protein and its role in the diet; reviewed high protein foods; and recommended she have a protein source at each meal/snack. Encouraged her to continue drinking Premier Protein as needed to aid with calorie / protein intake. Offered tips to aid with bowel regularity including adequate hydration, increasing fiber intake and she reports she does occasionally take miralex. Patient does not have the energy to walk or engage with other movement at this time.     Discussed the possible side effects of treatment and tips for nutritional management including N/V, constipation, diarrhea, fatigue, appetite  and taste changes. Reviewed modification of the diet for managing treatment side effects.    Patient education material provided-  Foods That Fight Cancer  Soft and Moist High Protein Menu Items  The 3 Macronutrients  High Fiber Snacks  Tips to Increase Hydration     Goal   To achieve adequate nutritional and hydration intake.    To aid with nutrition impact symptom management as needed.    Monitoring / Evaluation   Answered their questions and both voiced understanding of information discussed. RD's contact information provided and encouraged to call with questions. Will follow up as indicated.    Shasha Elliott RD, LD

## 2025-06-02 NOTE — PROGRESS NOTES
MONTY met with pt and her supportive spouse in infusion to provide support and assess for psychosocial needs. SW provided introductions and an overview of the role and available resources. Pt was admittedly anxious, but stated she is doing okay and has always had anxiety, for which she takes medication. SW praised pt for addressing her mental health needs and provided information on DAY[facility] Parnell, should she be interested in additional support in the future. Pt and her spouse communicated they are doing well overall, and denied any needs. MONTY provided pt and her spouse with contact information and encouraged them to reach out should needs arise. Pt and her spouse agreed to plan and thanked SW for the visit. SW will remain available for ongoing support and assistance.

## 2025-06-02 NOTE — TELEPHONE ENCOUNTER
Pt spouse dropped off set of FMLA forms to be completed on his behalf,  Requesting them to be faxed.    No form fee collected    Paperwork given to SCOTT Young

## 2025-06-02 NOTE — PROGRESS NOTES
Outpatient Infusion  [address] [place] [address]  [phone]      CHEMOTHERAPY EDUCATION    NAME:  Sydnie Gamble      : 1960           DATE: 25    Medication Education Sheets: (select all that apply)  Pembrolizumab    Other Education Sheets: (select all that apply)  CINV, Diarrhea, HOPA Immune Checkpoint Inhibitors, Medication Wallet Card, and Symptom Tracker Sheet    Chemotherapy Regimen:   OP LUNG Pembrolizumab 200 mgevery 21 days    TOPICS EDUCATION PROVIDED COMMENTS   DIARRHEA:  causes, s/s of dehydration, ways to manage, dietary changes, when to call MD [x] Discussed risk of diarrhea and immune related colitis. Instructed patient to call MD if 4-6 episodes in 24 hours and discussed role of high dose steroids for the treatment of immune related colitis.    NAUSEA & VOMITING:  cause, use of antiemetics, dietary changes, when to call MD [x] Emetic risk: Low  Premeds: None  Scheduled home meds: None  PRN home meds: Ondansetron 8 mg PO TID PRN N/V      Instructed the patient to take a dose of the PRN medication at the first onset of nausea and if it's not working to call us for additional medications.  Also provided non-drug measures to mitigate nausea.   NERVOUS SYSTEM CHANGES:  causes, s/s, neuropathies, cognitive changes, ways to manage [x] Discussed the less common, but possible risk of immune cells attacking the nerves.  I described nerve pain and instructed the patient to call clinic if new or worsening nerve pain developed.   PAIN:  causes, ways to manage [x] Discussed the less common, but possible risk of immune cells attacking the muscles.  The patient was instructed to call clinic for new or worsening muscle weakness or pain.   SKIN & NAIL CHANGES:  cause, s/s, ways to manage [x] Discussed potential for a rash or itchy skin from immunotherapy, offered nonpharmacologic prevention strategies, and instructed patient to call if a rash develops that worsens  or gets larger.   ORGAN TOXICITIES:  cause, s/s, need for diagnostic tests, labs, when to notify MD [x] Discussed potential effects on organ systems, monitoring, diagnostic tests, labs, and when to notify their MD. Discussed the signs/symptoms of the following: cardiotoxicity, central neurotoxicity (confusion, vision changes, stiff neck, seizure), hepatotoxicity, hormone gland changes (most commonly the thyroid), lung changes and nephrotoxicity.   INFUSION RELATED REACTIONS or INJECTION-SITE REACTION:  Cause, s/s, anaphylaxis, monitoring, etc. [x] Premeds: None    Discussed the uncommon, but possible risk of an infusion reaction and symptoms such as: fever, chills, dizziness, itchiness or rash, flushing, trouble breathing, wheezing, sudden back pain, or feeling faint.  Instructed the patient to notify their nurse if they start feeling weird at any point during their infusion.    Reviewed how infusion reactions are managed.   MISCELLANEOUS:  drug interactions, administration, labs, etc. [x] Discussed infusion schedule, lab draws, infusion times, and total expected visit time.   DDIs: No significant DDIs  Lab draws: On or before day 1 of each cycle, no sooner than 3 days early.   INFERTILITY & SEXUALITY:    causes, fertility preservation options, sexuality changes, ways to manage, importance of birth control [x] IV Oncology Therapy: Reviewed safe sex practices and the importance of minimizing exposure to body fluids for 48 hours after each dose of IV oncology therapy. The patient is not of childbearing potential.       HOME CARE:  storing of oral chemo, how to manage bodily fluids [x] IV - Counseled on management of soiled linens and proper flush technique.  Discussed how to manage all the side effects at home and advised when to contact the MD office     Medications:  Prior to Admission medications    Medication Sig Start Date End Date Taking? Authorizing Provider   acetaminophen (TYLENOL) 325 MG tablet Take 2  tablets by mouth Every 6 (Six) Hours As Needed for Mild Pain. 5/16/25   Trina Hdz APRN   albuterol sulfate  (90 Base) MCG/ACT inhaler Inhale 2 puffs Every 4 (Four) to 6 (Six) Hours As Needed for Wheezing or Shortness of Air.    Provider, MD Levi   apixaban (ELIQUIS) 5 MG tablet tablet Take 2 tablets by mouth Every 12 (Twelve) Hours for 6 days, THEN 1 tablet Every 12 (Twelve) Hours for 24 days. Indications: DVT/PE (active thrombosis) 5/16/25 6/15/25  Trina Hdz APRN   atorvastatin (LIPITOR) 10 MG tablet TAKE 1 TABLET BY MOUTH EVERY DAY AT NIGHT 1/15/25   Dee Elena APRN   clonazePAM (KlonoPIN) 1 MG tablet Take 1 tablet by mouth 2 (Two) Times a Day As Needed for Anxiety. 5/12/25   Dee Elena APRN   Diclofenac Sodium (VOLTAREN) 1 % gel gel Apply 1 gram topically to indicated area 4 times daily as needed for mild to moderate pain. STOP FLEXERIL 5/16/25   Trina Hdz APRN   famotidine (PEPCID) 20 MG tablet TAKE 1 TABLET BY MOUTH TWICE DAILY OR TAKE 2 TABLETS BY MOUTH ONCE DAILY AS NEEDED FOR HEARTBURN 1/15/25   Dee Elena APRN   Fluticasone-Umeclidin-Vilant (Trelegy Ellipta) 100-62.5-25 MCG/ACT inhaler Inhale 1 puff Daily. 7/23/24   Toni Mcclelland MD   guaiFENesin (MUCINEX) 600 MG 12 hr tablet Take 1 tablet by mouth Every 12 (Twelve) Hours. 5/16/25   Trina Hdz APRN   HYDROcodone-acetaminophen (NORCO) 5-325 MG per tablet Take 1 tablet by mouth Every 6 (Six) Hours As Needed for Moderate Pain for up to 30 days. 5/27/25 6/26/25  Alannah Garcia MD   levothyroxine (Synthroid) 88 MCG tablet Take 1 tablet by mouth Every Morning. 4/22/25   Dee Elena APRN   ondansetron (ZOFRAN) 8 MG tablet Take 1 tablet by mouth 3 (Three) Times a Day As Needed for Nausea or Vomiting. 5/28/25   Sejal Mckenzie MD   ondansetron ODT (ZOFRAN-ODT) 4 MG disintegrating tablet Place 1 tablet on the tongue Every 8 (Eight) Hours As Needed for Nausea  or Vomiting. 8/14/24   Dee Elena APRN   sertraline (Zoloft) 100 MG tablet Take 1 tablet by mouth Daily. 11/21/24   Dee Elena APRN   traZODone (DESYREL) 150 MG tablet TAKE 1 TABLET BY MOUTH EVERY DAY AT NIGHT 11/18/24   Dee Elena APRN   zolpidem (AMBIEN) 10 MG tablet Take 1 tablet by mouth At Night As Needed for Sleep. 5/2/25   Dee Elena APRN   Aspirin Low Dose 81 MG EC tablet TAKE 1 TABLET BY MOUTH EVERY DAY 1/15/25 5/16/25  Dee Elena APRN   benzonatate (TESSALON) 100 MG capsule Take 1 capsule by mouth 3 (Three) Times a Day As Needed for Cough.  Patient not taking: Reported on 5/21/2025 5/16/25 5/21/25  Trina Hdz APRN   cefdinir (OMNICEF) 300 MG capsule Take 1 capsule by mouth 2 (Two) Times a Day. For 10 days 5/5/25 5/16/25  Levi Lee MD   clonazePAM (KlonoPIN) 1 MG tablet Take 1 tablet by mouth 2 (Two) Times a Day As Needed for Anxiety. 4/3/25 5/8/25  Dee Elena APRN   Diclofenac Sodium (VOLTAREN) 1 % gel gel Apply 1 gram topically to indicated area 4 times daily as needed for mild to moderate pain. STOP FLEXERIL 5/2/25 5/13/25  Dee Elena APRN   ipratropium-albuterol (DUO-NEB) 0.5-2.5 mg/3 ml nebulizer INHALE 1 VIAL BY NEBULIZER EVERY 6 HOURS (AS NEEDED FOR WHEEZING) FOR 7 DAYS 5/5/25 5/13/25  Lvei Lee MD   montelukast (SINGULAIR) 10 MG tablet Take 1 tablet by mouth Daily. 5/17/25 5/27/25  Trina Hdz APRN   predniSONE (DELTASONE) 20 MG tablet Take 2 tablets by mouth Daily. 4/12/25 5/13/25  Coral Scott PA-C   sodium-potassium-magnesium sulfates (Suprep Bowel Prep Kit) 17.5-3.13-1.6 GM/177ML solution oral solution Take 1 bottle by mouth Take As Directed. 1/21/25 5/13/25  Romero Thompson MD   tiZANidine (Zanaflex) 4 MG tablet Take 1 tablet by mouth 2 (Two) Times a Day As Needed for Muscle Spasms. 5/2/25 5/27/25  Dee Elena, APRN       Notes: All  questions and concerns were addressed. Provided a personalized treatment calendar to patient (includes treatment and lab schedule). Provided patient with contact information for the pharmacist and clinic while instructing her to call if any questions or concerns arise. Informed consent for treatment was obtained. Patient was receptive to information and expressed understanding.     Danae Back PharmD, [facility]  Clinical Oncology Pharmacy Specialist  Phone: [phone]      6/2/2025  10:38 EDT

## 2025-06-03 ENCOUNTER — PATIENT OUTREACH (OUTPATIENT)
Dept: CASE MANAGEMENT | Facility: OTHER | Age: 65
End: 2025-06-03
Payer: MEDICARE

## 2025-06-03 LAB — LAB GUARDANT ONC MSI-HIGH: NOT DETECTED

## 2025-06-03 NOTE — OUTREACH NOTE
AMBULATORY CASE MANAGEMENT NOTE    Names and Relationships of Patient/Support Persons: Contact: Sydnie Gamble; Relationship: Self -     Patient Outreach    Mrs. Gamble received D1 C1 of Pembrolizumab yesterday, she states she tolerated it well. Mrs. Gamble continues to experience rib cage pain and shortness of breath especially with exertion. States she is not able to inhale Trelegy.  Mrs. Gamble is using her oxygen at all times, reports saturation level falls into the 80's with ambulation. We reviewed slow deep breathing exercises. Mrs. Gamble reports rib cage pain is preventing her to rest as HS.  She currently is taking Hydrocodone 5/325 mg twice daily. Medication instructions reviewed, education provided on increasing to every 6 hour on a schedule, she is agreeable to try. Mrs. Gamble states she has not had a bowel movement in 5 days. She uses Miralax as needed but has not taken it for several days. Education provided on bowel management, using Miralax daily as needed and adding stool softeners. Education provided on increasing fluids and eating a nutritious diet.   Mrs. Gamble does not think she needs a walker at this time, she is using a cane and has a wheelchair.     Education Documentation  Medication Management, taught by Rahel Johnson, RN at 6/3/2025  2:22 PM.  Learner: Patient  Readiness: Acceptance  Method: Explanation  Response: Needs Reinforcement    Diet Modification, taught by Rahel Johnson, RN at 6/3/2025  2:22 PM.  Learner: Patient  Readiness: Acceptance  Method: Explanation  Response: Needs Reinforcement    Pain Management, taught by Rahel Johnson, RN at 6/3/2025  2:22 PM.  Learner: Patient  Readiness: Acceptance  Method: Explanation  Response: Needs Reinforcement    Home Safety, taught by Rahel Johnson, RN at 6/3/2025  2:22 PM.  Learner: Patient  Readiness: Acceptance  Method: Explanation  Response: Needs Reinforcement          Rahel RAHMAN  Ambulatory Case Management    6/3/2025, 14:23 EDT

## 2025-06-03 NOTE — PLAN OF CARE
Problem: Cancer Treatment Phase  Goal: Manage Fatigue (Tiredness)  Outcome: Not Progressing  Goal: Keep Nausea and Vomiting Under Control  Outcome: Progressing  Goal: Optimal Care Coordination of Patient With Cancer: Treatment Phase  Outcome: Not Progressing     Problem: COPD  Goal: Track and Manage My Symptoms  Outcome: Not Progressing  Goal: Optimal Care Coordination of a Patient Experiencing COPD  Outcome: Not Progressing

## 2025-06-04 ENCOUNTER — TELEPHONE (OUTPATIENT)
Dept: ONCOLOGY | Facility: CLINIC | Age: 65
End: 2025-06-04
Payer: MEDICARE

## 2025-06-04 ENCOUNTER — TELEPHONE (OUTPATIENT)
Dept: INFUSION THERAPY | Facility: HOSPITAL | Age: 65
End: 2025-06-04
Payer: MEDICARE

## 2025-06-04 DIAGNOSIS — J90 RECURRENT RIGHT PLEURAL EFFUSION: Primary | ICD-10-CM

## 2025-06-04 DIAGNOSIS — C34.91 ADENOCARCINOMA OF RIGHT LUNG: Primary | ICD-10-CM

## 2025-06-04 DIAGNOSIS — C34.91 ADENOCARCINOMA OF RIGHT LUNG: ICD-10-CM

## 2025-06-04 LAB
FUNGUS WND CULT: NORMAL
MYCOBACTERIUM SPEC CULT: NORMAL
NIGHT BLUE STAIN TISS: NORMAL

## 2025-06-04 RX ORDER — DOCUSATE SODIUM 100 MG/1
200 CAPSULE, LIQUID FILLED ORAL DAILY
Qty: 60 CAPSULE | Refills: 3 | Status: SHIPPED | OUTPATIENT
Start: 2025-06-04

## 2025-06-04 RX ORDER — OXYCODONE AND ACETAMINOPHEN 5; 325 MG/1; MG/1
1 TABLET ORAL EVERY 6 HOURS PRN
Qty: 120 TABLET | Refills: 0 | Status: SHIPPED | OUTPATIENT
Start: 2025-06-04

## 2025-06-04 NOTE — TELEPHONE ENCOUNTER
Hub staff attempted to follow warm transfer process and was unsuccessful     Caller: LEVI ESPITIA    Relationship to patient: Emergency Contact    Best call back number: [phone]    Patient is needing: LEVI CALLING AGAIN REGARDING SPEAKING WITH NURSE ABOUT MOTHER'S SOB. PER LEVI IN OFFICE, NURSE WORKING ON APPT, WILL CALL AS SOON AS SCHEDULED.

## 2025-06-04 NOTE — TELEPHONE ENCOUNTER
Patient's daughter called patient is having shortness of breath again, and  probably needs a Thoracentesis she has asked if you can call her, and if she doesn't answer call again. She is at work.

## 2025-06-04 NOTE — TELEPHONE ENCOUNTER
"RN called Dipti back to discuss her concerns:     1.) Patient is experiencing SOA and trouble taking a deep a breath, wondering if she can be set up for thoracentesis. Let Dipti know she is scheduled on Friday 6/6 in IR.     2.) Patient is in \"constant pain\" - the norco 5 is not working adequately. RN will discuss with MD to see if we can switch to percocet or something else stronger. Patient is also scheduled to see palliative on 6/10.     3.) Patient has been constipated. Wondering if stool softener can be called in to help with this.     4.) She is afraid patient is going to fall and thinks she needs a walker. Per case management note, patient does not want a walker at this time. RN will discuss with patient and put orders for a walker in.     5.) Patient is complaining of dry mouth and \"dry chest.\" RN advised this could be from her oxygen. Dipti asked if humidified oxygen was an option. RN will look into this and get ordered for patient if possible.   "

## 2025-06-04 NOTE — TELEPHONE ENCOUNTER
Pt contacted as pre-procedure phone call prior to planned thoracentesis for 6-4-25. Reviewed with patient arrival time, location, nothing to eat or drink by mouth, okay to take blood pressure medications morning of procedure with a small sip of water, eliquis,  needed, reviewed procedure instructions and allowed time for questions, and reviewed home medications, allergies, and medical history.

## 2025-06-04 NOTE — TELEPHONE ENCOUNTER
RN discussed with MD. Okay to increase pain meds to percocet 5 every 6 hours and prescribe colace 200 mg daily.     RN called patient to let her know about the thoracentesis scheduled Friday 6/6, hold eliquis starting now. Present to ED with any worsening SOA or trouble breathing, they can perform thoracentesis in ER if needed. Let her know we are sending in new pain meds - percocet that she can take every 6 hours and a stool softener to go along with that to prevent worsening constipation. Patient v/u and appreciation.

## 2025-06-04 NOTE — TELEPHONE ENCOUNTER
RN discussed with MD. Pereira for repeat thoracentesis. Orders put in. Messaged Girish to schedule in IR ASAP.

## 2025-06-05 ENCOUNTER — TELEPHONE (OUTPATIENT)
Dept: ONCOLOGY | Facility: CLINIC | Age: 65
End: 2025-06-05
Payer: MEDICARE

## 2025-06-05 ENCOUNTER — APPOINTMENT (OUTPATIENT)
Dept: GENERAL RADIOLOGY | Facility: HOSPITAL | Age: 65
End: 2025-06-05
Payer: COMMERCIAL

## 2025-06-05 ENCOUNTER — HOSPITAL ENCOUNTER (EMERGENCY)
Facility: HOSPITAL | Age: 65
Discharge: HOME | End: 2025-06-05
Attending: EMERGENCY MEDICINE
Payer: COMMERCIAL

## 2025-06-05 VITALS
SYSTOLIC BLOOD PRESSURE: 123 MMHG | DIASTOLIC BLOOD PRESSURE: 62 MMHG | HEIGHT: 61 IN | HEART RATE: 111 BPM | RESPIRATION RATE: 19 BRPM | TEMPERATURE: 97.7 F | OXYGEN SATURATION: 94 % | BODY MASS INDEX: 24.35 KG/M2 | WEIGHT: 129 LBS

## 2025-06-05 DIAGNOSIS — J44.89 COPD WITH ASTHMA: ICD-10-CM

## 2025-06-05 DIAGNOSIS — D63.8 ANEMIA, CHRONIC DISEASE: ICD-10-CM

## 2025-06-05 DIAGNOSIS — J90 PLEURAL EFFUSION ON RIGHT: ICD-10-CM

## 2025-06-05 DIAGNOSIS — C34.91 ADENOCARCINOMA OF RIGHT LUNG: ICD-10-CM

## 2025-06-05 DIAGNOSIS — Z98.890 S/P THORACENTESIS: ICD-10-CM

## 2025-06-05 DIAGNOSIS — E78.2 MIXED HYPERLIPIDEMIA: ICD-10-CM

## 2025-06-05 DIAGNOSIS — J96.01 ACUTE RESPIRATORY FAILURE WITH HYPOXIA: Primary | ICD-10-CM

## 2025-06-05 LAB
ALBUMIN SERPL-MCNC: 2.9 G/DL (ref 3.5–5.2)
ALBUMIN/GLOB SERPL: 0.9 G/DL
ALP SERPL-CCNC: 323 U/L (ref 39–117)
ALT SERPL W P-5'-P-CCNC: 28 U/L (ref 1–33)
ANION GAP SERPL CALCULATED.3IONS-SCNC: 13 MMOL/L (ref 5–15)
ARTERIAL PATENCY WRIST A: ABNORMAL
AST SERPL-CCNC: 43 U/L (ref 1–32)
ATMOSPHERIC PRESS: ABNORMAL MM[HG]
BASE EXCESS BLDA CALC-SCNC: 6.5 MMOL/L (ref 0–2)
BASOPHILS # BLD AUTO: 0.07 10*3/MM3 (ref 0–0.2)
BASOPHILS NFR BLD AUTO: 0.4 % (ref 0–1.5)
BDY SITE: ABNORMAL
BILIRUB SERPL-MCNC: 0.2 MG/DL (ref 0–1.2)
BODY TEMPERATURE: 37
BUN SERPL-MCNC: 13 MG/DL (ref 8–23)
BUN/CREAT SERPL: 16.9 (ref 7–25)
CALCIUM SPEC-SCNC: 8.4 MG/DL (ref 8.6–10.5)
CHLORIDE SERPL-SCNC: 96 MMOL/L (ref 98–107)
CO2 BLDA-SCNC: 30.9 MMOL/L (ref 22–33)
CO2 SERPL-SCNC: 29 MMOL/L (ref 22–29)
COHGB MFR BLD: 0.9 % (ref 0–2)
CREAT SERPL-MCNC: 0.77 MG/DL (ref 0.57–1)
D-LACTATE SERPL-SCNC: 1.6 MMOL/L (ref 0.5–2)
DEPRECATED RDW RBC AUTO: 44.5 FL (ref 37–54)
EGFRCR SERPLBLD CKD-EPI 2021: 84.7 ML/MIN/1.73
EOSINOPHIL # BLD AUTO: 0.79 10*3/MM3 (ref 0–0.4)
EOSINOPHIL NFR BLD AUTO: 5 % (ref 0.3–6.2)
EPAP: 0
ERYTHROCYTE [DISTWIDTH] IN BLOOD BY AUTOMATED COUNT: 13.4 % (ref 12.3–15.4)
GLOBULIN UR ELPH-MCNC: 3.2 GM/DL
GLUCOSE SERPL-MCNC: 115 MG/DL (ref 65–99)
HCO3 BLDA-SCNC: 29.8 MMOL/L (ref 20–26)
HCT VFR BLD AUTO: 32.1 % (ref 34–46.6)
HCT VFR BLD CALC: 32.9 % (ref 38–51)
HGB BLD-MCNC: 10.2 G/DL (ref 12–15.9)
HGB BLDA-MCNC: 10.7 G/DL (ref 14–18)
HOLD SPECIMEN: NORMAL
IMM GRANULOCYTES # BLD AUTO: 0.37 10*3/MM3 (ref 0–0.05)
IMM GRANULOCYTES NFR BLD AUTO: 2.3 % (ref 0–0.5)
INHALED O2 CONCENTRATION: 36 %
IPAP: 0
LYMPHOCYTES # BLD AUTO: 0.75 10*3/MM3 (ref 0.7–3.1)
LYMPHOCYTES NFR BLD AUTO: 4.7 % (ref 19.6–45.3)
MAGNESIUM SERPL-MCNC: 1.8 MG/DL (ref 1.6–2.4)
MCH RBC QN AUTO: 28.5 PG (ref 26.6–33)
MCHC RBC AUTO-ENTMCNC: 31.8 G/DL (ref 31.5–35.7)
MCV RBC AUTO: 89.7 FL (ref 79–97)
METHGB BLD QL: ABNORMAL
MODALITY: ABNORMAL
MONOCYTES # BLD AUTO: 1.49 10*3/MM3 (ref 0.1–0.9)
MONOCYTES NFR BLD AUTO: 9.4 % (ref 5–12)
NEUTROPHILS NFR BLD AUTO: 12.45 10*3/MM3 (ref 1.7–7)
NEUTROPHILS NFR BLD AUTO: 78.2 % (ref 42.7–76)
NRBC BLD AUTO-RTO: 0 /100 WBC (ref 0–0.2)
NT-PROBNP SERPL-MCNC: 515.9 PG/ML (ref 0–900)
OXYHGB MFR BLDV: 95.1 % (ref 94–99)
PAW @ PEAK INSP FLOW SETTING VENT: 0 CMH2O
PCO2 BLDA: 37.1 MM HG (ref 35–45)
PCO2 TEMP ADJ BLD: 37.1 MM HG (ref 35–45)
PH BLDA: 7.51 PH UNITS (ref 7.35–7.45)
PH, TEMP CORRECTED: 7.51 PH UNITS
PLATELET # BLD AUTO: 330 10*3/MM3 (ref 140–450)
PMV BLD AUTO: 8.8 FL (ref 6–12)
PO2 BLDA: 70.4 MM HG (ref 83–108)
PO2 TEMP ADJ BLD: 70.4 MM HG (ref 83–108)
POTASSIUM SERPL-SCNC: 3.8 MMOL/L (ref 3.5–5.2)
PROT SERPL-MCNC: 6.1 G/DL (ref 6–8.5)
QT INTERVAL: 340 MS
QTC INTERVAL: 494 MS
RBC # BLD AUTO: 3.58 10*6/MM3 (ref 3.77–5.28)
SODIUM SERPL-SCNC: 138 MMOL/L (ref 136–145)
T4 FREE SERPL-MCNC: 0.89 NG/DL (ref 0.92–1.68)
TOTAL RATE: 0 BREATHS/MINUTE
TROPONIN T SERPL HS-MCNC: 14 NG/L
TSH SERPL DL<=0.05 MIU/L-ACNC: 13.35 UIU/ML (ref 0.27–4.2)
WBC NRBC COR # BLD AUTO: 15.92 10*3/MM3 (ref 3.4–10.8)
WHOLE BLOOD HOLD COAG: NORMAL
WHOLE BLOOD HOLD SPECIMEN: NORMAL

## 2025-06-05 PROCEDURE — 71045 X-RAY EXAM CHEST 1 VIEW: CPT

## 2025-06-05 PROCEDURE — 99284 EMERGENCY DEPT VISIT MOD MDM: CPT

## 2025-06-05 PROCEDURE — 36600 WITHDRAWAL OF ARTERIAL BLOOD: CPT

## 2025-06-05 PROCEDURE — 93005 ELECTROCARDIOGRAM TRACING: CPT

## 2025-06-05 PROCEDURE — 82375 ASSAY CARBOXYHB QUANT: CPT

## 2025-06-05 PROCEDURE — 96374 THER/PROPH/DIAG INJ IV PUSH: CPT

## 2025-06-05 PROCEDURE — 84439 ASSAY OF FREE THYROXINE: CPT | Performed by: EMERGENCY MEDICINE

## 2025-06-05 PROCEDURE — 25010000002 MIDAZOLAM PER 1 MG

## 2025-06-05 PROCEDURE — 83605 ASSAY OF LACTIC ACID: CPT | Performed by: EMERGENCY MEDICINE

## 2025-06-05 PROCEDURE — 83735 ASSAY OF MAGNESIUM: CPT | Performed by: EMERGENCY MEDICINE

## 2025-06-05 PROCEDURE — 93005 ELECTROCARDIOGRAM TRACING: CPT | Performed by: EMERGENCY MEDICINE

## 2025-06-05 PROCEDURE — 80050 GENERAL HEALTH PANEL: CPT | Performed by: EMERGENCY MEDICINE

## 2025-06-05 PROCEDURE — 84484 ASSAY OF TROPONIN QUANT: CPT | Performed by: EMERGENCY MEDICINE

## 2025-06-05 PROCEDURE — 83880 ASSAY OF NATRIURETIC PEPTIDE: CPT | Performed by: EMERGENCY MEDICINE

## 2025-06-05 PROCEDURE — 83050 HGB METHEMOGLOBIN QUAN: CPT

## 2025-06-05 PROCEDURE — 82805 BLOOD GASES W/O2 SATURATION: CPT

## 2025-06-05 RX ORDER — MIDAZOLAM HYDROCHLORIDE 1 MG/ML
1 INJECTION, SOLUTION INTRAMUSCULAR; INTRAVENOUS ONCE
Status: COMPLETED | OUTPATIENT
Start: 2025-06-05 | End: 2025-06-05

## 2025-06-05 RX ORDER — SODIUM CHLORIDE 0.9 % (FLUSH) 0.9 %
10 SYRINGE (ML) INJECTION AS NEEDED
Status: DISCONTINUED | OUTPATIENT
Start: 2025-06-05 | End: 2025-06-05 | Stop reason: HOSPADM

## 2025-06-05 RX ORDER — MIDAZOLAM HYDROCHLORIDE 1 MG/ML
INJECTION, SOLUTION INTRAMUSCULAR; INTRAVENOUS
Status: COMPLETED
Start: 2025-06-05 | End: 2025-06-05

## 2025-06-05 RX ADMIN — MIDAZOLAM HYDROCHLORIDE 1 MG: 1 INJECTION, SOLUTION INTRAMUSCULAR; INTRAVENOUS at 06:13

## 2025-06-05 NOTE — TELEPHONE ENCOUNTER
RN called Jose Francisco back. Appointment tomorrow not needed but we can push it out to sometime next week since patient's fluid has been filling up quickly. Jose Francisco is agreeable to this plan.

## 2025-06-05 NOTE — ED PROVIDER NOTES
Subjective   History of Present Illness  This is a 65-year-old female with a history of lung cancer presenting to the emergency department some shortness of breath.  Patient states that she has recurrent pleural effusions.  She has been having some worsening shortness of breath the last few days.  She was unable to sleep last night secondary to shortness of breath patient feels like she cannot take a deep breath.  Patient has had thoracentesis before in the past.  She has had a mild cough.  Nonproductive in nature.  No hemoptysis.  Denies fevers or chills.  No headache or change in vision.  No focal weakness.    History provided by:  Patient and spouse   used: No        Review of Systems   Constitutional:  Negative for chills and fever.   HENT:  Negative for congestion, ear pain and sore throat.    Eyes:  Negative for visual disturbance.   Respiratory:  Positive for cough and shortness of breath.    Cardiovascular:  Negative for chest pain.   Gastrointestinal:  Negative for abdominal pain.   Genitourinary:  Negative for difficulty urinating.   Musculoskeletal:  Negative for arthralgias.   Skin:  Negative for rash.   Neurological:  Negative for dizziness, weakness and numbness.   Psychiatric/Behavioral:  Negative for agitation.        Past Medical History:   Diagnosis Date    Acid reflux     Acid reflux     Adenocarcinoma of lung 08/12/2024    Anemia, chronic disease 5/13/2025    Anxiety 1976    Arthritis     Atherosclerotic cardiovascular disease 03/25/2024    Cervical cancer     Depression     Emphysema of lung     History of radiation therapy 11/20/2020    pelvis + intracavitary brachytherapy    History of radiation therapy 10/04/2024    RUL lung    Hx of radiation therapy     Hyperlipidemia     Hypothyroidism     Kidney disease     Lung cancer     Lung nodule     Ovarian cyst 1980s    Pleural effusion 5/13/2025    Pulmonary embolism 5/13/2025    Visual impairment corrected with glasses     Wears dentures     Wears glasses        No Known Allergies    Past Surgical History:   Procedure Laterality Date    BRONCHOSCOPY      BRONCHOSCOPY WITH ION ROBOTIC ASSIST N/A 2024    Procedure: BRONCHOSCOPY NAVIGATION WITH ENDOBRONCHIAL ULTRASOUND AND ION ROBOT;  Surgeon: Toni Mcclelland MD;  Location:  [facility];  Service: Robotics - Pulmonary;  Laterality: N/A;    COLONOSCOPY      HYSTEROSCOPY      LUNG BIOPSY      LYMPH NODE BIOPSY      MOHS SURGERY  2025    Groin    SUBTOTAL HYSTERECTOMY  ?    ovary/fallopian tube removed    TOTAL ABDOMINAL HYSTERECTOMY PELVIC NODE DISSECTION N/A 2020    Procedure: TOTAL RADICAL HYSTERECTOMY PELVIC NODE DISSECTION;  Surgeon: Laura Jimenez MD;  Location:  [facility];  Service: Gynecology Oncology;  Laterality: N/A;    TUBAL ABDOMINAL LIGATION      right with ovarian dissection        Family History   Problem Relation Age of Onset    Cancer Mother     Hypertension Mother     Mental illness Mother     Anxiety disorder Mother     Asthma Mother     COPD Mother     Depression Mother     Emphysema Mother     Uterine cancer Mother     Melanoma Mother     Cancer Father     Early death Father     Cancer Maternal Aunt     Cancer Maternal Aunt     Cancer Maternal Aunt     Cancer Maternal Aunt     Cancer Maternal Grandmother     Heart attack Maternal Grandfather     Heart disease Maternal Grandfather         Heart Attack 65 yo    Breast cancer Neg Hx     Ovarian cancer Neg Hx        Social History     Socioeconomic History    Marital status:    Tobacco Use    Smoking status: Former     Current packs/day: 0.00     Average packs/day: 1.5 packs/day for 45.0 years (67.5 ttl pk-yrs)     Types: Cigarettes     Start date: 1972     Quit date: 3/30/2019     Years since quittin.1     Passive exposure: Past    Smokeless tobacco: Never    Tobacco comments:     quit analog cigs in march, still vapes daily   Vaping Use    Vaping status: Every Day     Substances: Nicotine   Substance and Sexual Activity    Alcohol use: No    Drug use: No    Sexual activity: Not Currently     Partners: Male           Objective   Physical Exam  Vitals and nursing note reviewed.   Constitutional:       General: She is in acute distress.      Appearance: She is ill-appearing.   HENT:      Mouth/Throat:      Pharynx: No posterior oropharyngeal erythema.   Eyes:      Conjunctiva/sclera: Conjunctivae normal.      Pupils: Pupils are equal, round, and reactive to light.   Cardiovascular:      Rate and Rhythm: Regular rhythm. Tachycardia present.   Pulmonary:      Effort: Tachypnea, accessory muscle usage and respiratory distress present.      Breath sounds: Examination of the right-middle field reveals decreased breath sounds. Examination of the right-lower field reveals decreased breath sounds. Decreased breath sounds present.   Abdominal:      General: Abdomen is flat. There is no distension.      Palpations: There is no mass.      Tenderness: There is no abdominal tenderness. There is no guarding or rebound.   Musculoskeletal:         General: No deformity. Normal range of motion.   Skin:     General: Skin is warm.      Findings: No rash.   Neurological:      General: No focal deficit present.      Mental Status: She is alert and oriented to person, place, and time.      Motor: No weakness.         ECG 12 Lead Dyspnea      Date/Time: 6/5/2025 5:03 AM    Performed by: Toni Bahena MD  Authorized by: Toni Bahena MD  Interpreted by ED physician  Comparison: compared with previous ECG   Similar to previous ECG  Rhythm: sinus tachycardia  Rate: tachycardic  BPM: 127  QRS axis: normal  Conduction: conduction normal  Other findings comments: Nonspecific ST changes  Clinical impression: non-specific ECG  Comments: Interpretation:  EKG was directly visualized by myself, interpretations as documented in hospital course.      Bedside Thoracentesis    Date/Time: 6/5/2025 6:42  AM    Performed by: Toni Bahena MD  Authorized by: Toni Bahena MD    Consent:     Consent obtained:  Written and verbal    Consent given by:  Patient    Risks, benefits, and alternatives were discussed: yes      Risks discussed:  Bleeding, infection, pain, nerve damage, incomplete drainage and pneumothorax    Alternatives discussed:  Delayed treatment and alternative treatment  Universal protocol:     Procedure explained and questions answered to patient or proxy's satisfaction: yes      Relevant documents present and verified: yes      Test results available: yes      Imaging studies available: yes      Required blood products, implants, devices, and special equipment available: yes      Site/side marked: yes      Immediately prior to procedure, a time out was called: yes      Patient identity confirmed:  Verbally with patient and arm band  Sedation:     Sedation type:  Anxiolysis  Anesthesia:     Anesthesia method:  Local infiltration    Local anesthetic:  Lidocaine 1% w/o epi  Procedure details:     Preparation: Patient was prepped and draped in usual sterile fashion      Patient position:  Sitting    Location:  R midscapular line    Intercostal space:  6th    Puncture method:  Over-the-needle catheter    Ultrasound guidance: yes      Indwelling catheter placed: yes      Needle gauge:  18    Catheter size:  8 Fr    Number of attempts:  1    Drainage characteristics:  Serosanguinous  Post-procedure details:     Chest x-ray performed: yes      Chest x-ray findings:  Pleural effusion improved    Procedure completion:  Tolerated well, no immediate complications  Comments:      A total of 1.2 L of fluid was removed             ED Course  ED Course as of 06/05/25 0711   Thu Jun 05, 2025   0504 BP: 139/71 [JK]   0504 Temp: 97.7 °F (36.5 °C) [JK]   0504 Temp src: Oral [JK]   0504 Heart Rate(!): 128 [JK]   0504 SpO2: 91 % [JK]   0504 Resp: 19  Interpretation:  Patient's repeat vitals, telemetry tracing,  and pulse oximetry tracing were directly viewed and interpreted by myself.   O2 sat 91% on 3 L nasal cannula, interpreted as hypoxia.  Telemetry rhythm strip revealed a rate of 128 bpm, interpreted as sinus tachycardia [JK]   0505 Blood Gas, Arterial With Co-Ox(!)  Interpretation:  Patient's Blood Gas was directly viewed and interpreted by myself.   Metabolic alkalosis [JK]   0551 TSH Rfx On Abnormal To Free T4(!) [JK]   0551 High Sensitivity Troponin T(!) [JK]   0551 BNP [JK]   0551 Comprehensive Metabolic Panel(!) [JK]   0551 Magnesium [JK]   0551 Lactic Acid, Plasma [JK]   0551 CBC & Differential(!)  Interpretation:  Laboratory studies were reviewed and interpreted directly by myself.  CBC showed some leukocytosis with a blood cell count of 15.9, CMP was unremarkable, TSH was slightly elevated 13.3, troponin marginal at 14 [JK]   0552 XR Chest 1 View  Interpretation:  Imaging was directly visualized by myself, per my interpretations, chest x-ray showed a large right-sided pleural effusion. [JK]   0552 Patient is becoming increasingly short of breath.  I do believe she will require emergent thoracentesis.  Patient is agreeable for procedure [JK]   0552 I have discussed the proposed procedure with the patient. I have informed the patient regarding the risks, benefits, side effects and expected outcomes and likelihood of achieving the goals for the procedure. I have also discussed alternatives of the procedure, the risks and benefits of the alternatives, and the possible results of not receiving this treatment. After discussion, the patient has agreed to undergo the procedure. [JK]   0643 Patient tolerated thoracentesis well.  She is breathing much better.  Pain is resolved.  Patient is requiring less supplemental oxygen at this time. [JK]   0710 On reevaluation, the patient is resting comfortably.  Her pain is significantly improved.  She is maintaining oxygenation on her normal home supplemental oxygen.  Patient  is requesting be discharged home.  She has follow-up tomorrow with her oncologist which I encouraged her to maintain.  Given strict return precautions.  Verbalized understanding. [JK]   0710 I had a discussion with the patient/family regarding diagnosis, diagnostic results, treatment plan, and medications. The patient/family indicated understanding of these instructions. I spent adequate time at the bedside prior to discharge necessary to discuss the aftercare instructions, giving patient education, providing explanations of the results of our evaluations/findings, and my decision making to assure that the patient/family understand the plan of care. Time was allotted to answer questions at that time and throughout the ED course. Patient is required to maintain timely follow up, as discussed. I also discussed the potential for the development of an acute emergent condition requiring further evaluation, return to the ER, admission, or even surgical intervention.  I encouraged the patient to return to the emergency department immediately for any concerns, worsening symptoms, new complaints, or if symptoms persist and they are unable to seek follow-up in a timely fashion. The patient/family expressed understanding and agreement with this plan    Shared decision making:   After full review of the patient's clinical presentation, review of any work-up including but not limited to laboratory studies and radiology obtained, I had a discussion with the patient.  Treatment options were discussed as well as the risks, benefits and consequences.  I discussed all findings with the patient and family members if available.  During the discussion, treatment goals were understood by all as well as any misconceptions which were addressed with the patient.  Ample time was given for any questions they may have had.  They are in agreement with the treatment plan as well as final disposition. [JK]      ED Course User Index  [JK]  Toni Bahena MD                                                       Medical Decision Making  This is a 65-year-old female with a history of lung cancer presenting with some shortness of breath.  Patient appears to be in some moderate respiratory distress.  She is hypoxic on her chronic 3 L nasal cannula.  I am concerned for possible underlying pleural effusion causing respiratory failure.  Patient was immediately transferred to ER room 16.  IV access was established in the patient.  Placed on continuous telemetry monitoring.  Given the patient's presentation, differential is broad and will require further evaluation.  Workup initiated.      Differential diagnosis: Acute respiratory failure, pleural effusion, pneumonia, bronchitis, CHF, dysrhythmia, acute kidney injury, electrolyte abnormality, anemia      Amount and/or Complexity of Data Reviewed  Independent Historian: spouse  External Data Reviewed: labs, radiology, ECG and notes.     Details: External laboratories, imaging as well as notes were reviewed personally by myself.  All relevant studies were used to guide decision making.     Date of previous record: 5/13/2025    Source of note: Admission Record    Summary:  Patient was seen and admitted for respiratory failure.  I did review basic laboratory studies on file as well as a previous chest x-ray and EKG.  Records reviewed      Labs: ordered. Decision-making details documented in ED Course.  Radiology: ordered and independent interpretation performed. Decision-making details documented in ED Course.  ECG/medicine tests: ordered and independent interpretation performed. Decision-making details documented in ED Course.    Risk  Prescription drug management.    Critical Care  Total time providing critical care: 35 minutes (Authorized and performed by: Toni Bahena MD  I personally spent a total of 35 minutes of critical care time with the patient.  Due to the high probability of clinically  significant, life-threatening deterioration, the patient required my highest level of care to intervene emergently.  These interventions, including, but not limited to, establishing IV access, continuous pulse oximeter and telemetry monitoring, frequent monitoring and reevaluations, management the patient's airway and cardiovascular system, discussion with other consultants as needed, which bear directly on the management the patient.  This also includes obtaining history, examining the patient, frequent reevaluations and coordinating high level of care.  Failure to emergently initiate these interventions would carry a high probability of resulting in sudden, clinically significant or life threatening deterioration in the patient's condition.  This does not include time spent on separately reported billable procedures.)        Final diagnoses:   Acute respiratory failure with hypoxia   Pleural effusion on right   S/P thoracentesis   COPD with asthma   Mixed hyperlipidemia   Adenocarcinoma of right lung   Anemia, chronic disease       ED Disposition  ED Disposition       ED Disposition   Discharge    Condition   Stable    Comment   --               Dee Elena, APRN  [address] [id number] [phone]    Call in 1 day           Medication List      No changes were made to your prescriptions during this visit.            Toni Bahena MD  06/05/25 0711

## 2025-06-05 NOTE — TELEPHONE ENCOUNTER
Caller: Jose Francisco Diaz    Relationship: Emergency Contact    Best call back number: [phone]     OR CAN SEND MESSAGE ON MY CHART     What is the best time to reach you: ANYTIME    Who are you requesting to speak with (clinical staff, provider,  specific staff member): CLINICAL       What was the call regarding:     TOOK SOL TO  ER LAST NIGHT DUE TO SHORTNESS OF BREATH AND PAIN,  AND THEY DID A THORACENTESIS AND TOOK 1.2 LITERS.  ER DOCTOR SUGGESTED TO FOLLOW UP WITH DR SMITH TO SEE IF HE STILL WANTS HER TO HAVE THE THORACENTESIS SCHEDULED 06/06

## 2025-06-05 NOTE — TELEPHONE ENCOUNTER
RN messaged Nataliia with IR scheduling to cancel appt tomorrow and reschedule for sometime mid to late next week.

## 2025-06-07 LAB
QT INTERVAL: 324 MS
QTC INTERVAL: 434 MS

## 2025-06-09 ENCOUNTER — TELEPHONE (OUTPATIENT)
Dept: PALLIATIVE CARE | Facility: CLINIC | Age: 65
End: 2025-06-09
Payer: MEDICARE

## 2025-06-09 ENCOUNTER — TELEPHONE (OUTPATIENT)
Dept: ONCOLOGY | Facility: CLINIC | Age: 65
End: 2025-06-09
Payer: MEDICARE

## 2025-06-09 NOTE — TELEPHONE ENCOUNTER
Returned phone call to patient's daughter. Answered questions about new patient appointment tomorrow.

## 2025-06-09 NOTE — TELEPHONE ENCOUNTER
Caller: LEVI ESPITIA    Relationship: Emergency Contact    Best call back number: [phone]    What is the best time to reach you: ANY    Who are you requesting to speak with (clinical staff, provider,  specific staff member): CLINICAL    What was the call regarding: LEVI IS CALLING WOULD LIKE A CALL BACK FROM THE NURSE REGARDING TOMORROWS APPOINTMENT,MEDICATION AND PALLIATIVE CARE     SOL HAS TAKEN A RAPID DECLINE

## 2025-06-09 NOTE — TELEPHONE ENCOUNTER
Hub staff attempted to follow warm transfer process and was unsuccessful     Caller: LEVI ESPITIA    Relationship to patient: Emergency Contact    Best call back number: [phone]    Patient is needing: PT DAUGHTER CALLED NEEDING TO SPEAK WITH CLINICAL STAFF. PLEASE ADVISE.

## 2025-06-09 NOTE — TELEPHONE ENCOUNTER
"RN called Robert back and spoke with her. Patient has had a rough weekend. She is not eating, sleeping 18-20 hours a day, and is still in excruciating pain. They spoke with the on-call provider over the weekend who advised to increase her percocet to every 4 hours instead of every 6 hours. She has been doing that but is now \"so drowsy\" since med changes. Patient's  is concerned that he will be unable to get her in to the appointment tomorrow with palliative.     Robert states patient needs medication adjustments. Her PCP has put her on klonopin, ambien, and trazadone. RN told Robert this is most likely causing some drowsiness and lethargy. Patient does not know what to take and what not to take. She needs to be educated.     Robert is also concerned that patient's pleural effusion has already returned. She is short of breath and can barely walk from her bedroom to the bathroom without becoming out of breath. Patient is already on oxygen. She is scheduled for thoracentesis on Thursday but robert is interested in pleur-x drain for patient. RN will discuss with MD.     Robert thinks that patient needs inpatient palliative care. She discussed this with [facility] earlier. Told Robert that direct admission can only happen from the clinic, or patient will need to be evaluated by the ER. Patient is refusing to go to ER due to bad experience last week with thoracentesis.     Robert would like to discuss all of this with Dr. Mckenzie. She thinks patient might be ready for hospice. RN will see if MD can reach out to Robert this afternoon.   "

## 2025-06-10 ENCOUNTER — OFFICE VISIT (OUTPATIENT)
Dept: PALLIATIVE CARE | Facility: CLINIC | Age: 65
End: 2025-06-10
Payer: MEDICARE

## 2025-06-10 VITALS
RESPIRATION RATE: 18 BRPM | TEMPERATURE: 96.8 F | WEIGHT: 127.4 LBS | OXYGEN SATURATION: 96 % | DIASTOLIC BLOOD PRESSURE: 81 MMHG | HEART RATE: 119 BPM | BODY MASS INDEX: 24.07 KG/M2 | SYSTOLIC BLOOD PRESSURE: 123 MMHG

## 2025-06-10 DIAGNOSIS — Z51.81 THERAPEUTIC DRUG MONITORING: Primary | ICD-10-CM

## 2025-06-10 DIAGNOSIS — R09.89 AIR HUNGER: ICD-10-CM

## 2025-06-10 DIAGNOSIS — C34.91 ADENOCARCINOMA OF RIGHT LUNG: ICD-10-CM

## 2025-06-10 DIAGNOSIS — G89.3 CANCER RELATED PAIN: ICD-10-CM

## 2025-06-10 NOTE — PROGRESS NOTES
Palliative Clinic Note      Name: Sydnie Gamble  Age: 65 y.o.  Sex: female  : 1960  MRN: [id number]  Date of Service: 06/10/2025   Referring Physician: Sejal Mckenzie MD    Subjective:    Chief Complaint: SOB, pain, constipation, appetite     History of Present Illness: Sydnie Gamble is a 65 y.o. female with past medical history significant for metastatic lung cancer with recurrent malignant pleural effusions, COPD, hyperlipidemia, hypothyroidism, mood disorder who presents to the palliative clinic today to establish care.     Treatment summary: Right lower lobe lung nodule found on screening in 3/2024. Biopsy of right lower lobe was consistent with adenomacarcinoma. Patient treated with CyberKnife in 2024. PET scan in 2025 revealed widely metastatic disease. Patient started on Keytruda in 2025. Patient is requiring weekly thoracentesis for recurrent malignant pleural effusions.     Pain: The patient complains of pain in her left hip secondary to metastatic cancer. She also complains of pain in her chest which she attributes to pleural fluid.  The patient is prescribed Percocet 5 mg every 6 hours as needed.  The patient's daughter called oncology on-call services over the weekend for patient's increased air hunger.  Patient was instructed to increase Percocet 5 mg to every 4 hours as needed.  Patient reports increased sedation and hallucinations with this adjustment.    Other symptoms: Patient complains of worsening air hunger due to recurrent pleural effusion.  Patient is on 3-4 L of supplemental oxygen all the time.  Opioid therapy also helps with air hunger.  Patient reports decreased appetite.  She is considering trial of medical marijuana.  Patient admits to constipation.  Last bowel movement was 4 to 5 days ago. She plans to restart stool softener and MiraLAX.    Pyschosocial: Patient presents to clinic with her spouse, Jose Francisco.  The couple lives here in [place].  The each  have 1 daughter from previous patient chart.  Patient was a  and ran a [employer].  She enjoys reading and activism.  No use of tobacco, alcohol or illicit drugs.  No known drug allergies.  No history of mental illness.  Patient was prescribed clonazepam 2 mg nightly but has weaned herself down to 1-1/2 mg nightly.  She is also prescribed Ambien for insomnia but has not been taking this medication.    Spiritual: Yes.    Goals: Maximize comfort, optimize function & psychosocial wellbeing, and promote advanced care planning.    The following portions of the patient's history were reviewed and updated as appropriate: allergies, current medications, past family history, past medical history, past social history, past surgical history and problem list.    Decisional capacity:Full  ORT-R: Low risk  PHQ-9: 10-14 (Moderate Depression)  ALESIA: 5  ECOG: (3) Capable of limited self-care, confined to bed or chair > 50% of waking hours     Objective:    /81   Pulse 119   Temp 96.8 °F (36 °C) (Temporal)   Resp 18   Wt 57.8 kg (127 lb 6.4 oz)   SpO2 96%   BMI 24.07 kg/m²     Constitutional: Awake, alert, lying on exam bed, in no acute distress  Eyes: PERRLA, EOMS intact  HENT: NCAT, face symmetric  Neck: Supple, trachea midline  Respiratory: Speaks in short sentences on 4L of oxygen   Cardiovascular: Tachycardic  Gastrointestinal: Soft, no guarding  Musculoskeletal: Moves all extremities   Psychiatric: Appropriate affect, cooperative  Neurologic: Oriented x 3, Cranial Nerves grossly intact to confrontation, speech clear  Skin: Cool dry, no rashes or wounds appreciated     Medication Counts: Instructed to bring controlled medications to all appointments.   I have reviewed the patient's [place] PDMP. [facility] Req #[id number] .   UDS: Collected today. Results pending.    Assessment & Plan:    1. Adenocarcinoma of right lung  2. Recurrent pleural effusions  - We explained what palliative care is and what it can  offer the patient. Reinforced that palliative care is provided in collaboration with primary care provider and any specialty care providers.  Encouraged patient to continue to seek emergency medical treatment as needed for acute illness or injury. We discussed her short-term goals which include improving cancer related symptoms and to receive one more Keytruda infusion. The patient understands that she will need hospice care at some point in the future and we will help guide her through this transition. Plan to reach out to oncology team to discuss the option of a PleurX drain for more frequent draining of the pleura. Low threshold for hospice referral if patient's symptoms worsen    3. Cancer related pain  4. Air hunger  -  Patient is appropriate for opioid therapy due to cancer related pain and other symptoms. Daily function and quality of life improved with pain medication. Side effects of the medication discussed at every visit. Patient was encouraged to continue bowel regimen of daily stool softeners, prn laxatives, and diet modifications. Recommend patient decrease the Percocet 5 mg to every 5-6 hours instead of every 4 hours as needed due to sedation and hallucinations. May consider rotating opioid therapy to Morphine.     5. Therapeutic drug monitoring  - Urine Drug Screen performed today per new patient visit.     Code status: FULL   Advanced directives: No.    Return in about 1 month (around 7/10/2025) for Video visit.    I spent 60 minutes caring for Sydnie Gamble on this date of service. This time includes time spent by me in the following activities: preparing for the visit, reviewing tests, obtaining and/or reviewing a separately obtained history, performing a medically appropriate examination and/or evaluation , counseling and educating the patient/family/caregiver, ordering medications, tests, or procedures, documenting information in the medical record, independently interpreting results  and communicating that information with the patient/family/caregiver, and care coordination    Hiwot Samson PA-C  06/10/2025    Medication Date Filled # Filled Count Used # Days  JOAN   Percocet 5 6/4/25 120 -- -- -- --   Norco 5/325 5/27/25 30 -- -- -- --   Clonazepam 1 5/13/25 50 -- -- -- --   Ambien 10 5/2/25 30 -- -- -- --

## 2025-06-11 ENCOUNTER — TELEPHONE (OUTPATIENT)
Dept: ONCOLOGY | Facility: CLINIC | Age: 65
End: 2025-06-11
Payer: MEDICARE

## 2025-06-11 LAB
FUNGUS WND CULT: NORMAL
MYCOBACTERIUM SPEC CULT: NORMAL
NIGHT BLUE STAIN TISS: NORMAL

## 2025-06-11 NOTE — TELEPHONE ENCOUNTER
Caller: Jose Francisco Diaz    Relationship: Emergency Contact    Best call back number: [phone]      Who are you requesting to speak with (clinical staff, provider,  specific staff member): CLINICAL    What was the call regarding: JOSE FRANCISCO CALLING WITH SOME QUESTIONS ABOUT PT'S PROCEDURE SCHEDULED FOR TOMORROW

## 2025-06-11 NOTE — TELEPHONE ENCOUNTER
Return call to Kadie.  Kadie states that Sydnie is struggling again with fluid build up in her lungs and is inquiring about a pleurx as discussed with Hiwot yesterday.  Spoke with Dr Mckenzie about this possibility.  Dr Mckenzie wants to do the Pembro for a month before deciding on pleurx to evaluate effectiveness.  Kadie states that he really would prefer the pleurx rather than having to do weekly thoracentesis and that Sydnie is very uncomfortable and struggling with her breathing.  Suggested that he take Sydnie to the ER for evaluation if struggling with breathing.  Kadie states understood
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
## 2025-06-12 ENCOUNTER — PATIENT ROUNDING (BHMG ONLY) (OUTPATIENT)
Dept: PALLIATIVE CARE | Facility: CLINIC | Age: 65
End: 2025-06-12
Payer: MEDICARE

## 2025-06-12 ENCOUNTER — HOSPITAL ENCOUNTER (OUTPATIENT)
Dept: GENERAL RADIOLOGY | Facility: HOSPITAL | Age: 65
Discharge: HOME | End: 2025-06-12
Payer: COMMERCIAL

## 2025-06-12 ENCOUNTER — HOSPITAL ENCOUNTER (OUTPATIENT)
Dept: CT IMAGING | Facility: HOSPITAL | Age: 65
Discharge: HOME | End: 2025-06-12
Payer: COMMERCIAL

## 2025-06-12 VITALS
RESPIRATION RATE: 21 BRPM | SYSTOLIC BLOOD PRESSURE: 123 MMHG | DIASTOLIC BLOOD PRESSURE: 58 MMHG | HEART RATE: 107 BPM | OXYGEN SATURATION: 98 %

## 2025-06-12 DIAGNOSIS — J95.811 PNEUMOTHORAX AFTER BIOPSY: ICD-10-CM

## 2025-06-12 PROBLEM — J90 RECURRENT PLEURAL EFFUSION: Status: ACTIVE | Noted: 2025-01-01

## 2025-06-12 PROCEDURE — 25010000002 LIDOCAINE 1 % SOLUTION: Performed by: STUDENT IN AN ORGANIZED HEALTH CARE EDUCATION/TRAINING PROGRAM

## 2025-06-12 PROCEDURE — 71045 X-RAY EXAM CHEST 1 VIEW: CPT

## 2025-06-12 PROCEDURE — C1769 GUIDE WIRE: HCPCS

## 2025-06-12 PROCEDURE — C1729 CATH, DRAINAGE: HCPCS

## 2025-06-12 RX ORDER — LIDOCAINE HYDROCHLORIDE 10 MG/ML
10 INJECTION, SOLUTION INFILTRATION; PERINEURAL ONCE
Status: COMPLETED | OUTPATIENT
Start: 2025-06-12 | End: 2025-06-12

## 2025-06-12 RX ADMIN — LIDOCAINE HYDROCHLORIDE 10 ML: 10 INJECTION, SOLUTION INFILTRATION; PERINEURAL at 15:28

## 2025-06-12 NOTE — NURSING NOTE
Image guided 10 Slovenian chest tube placed to the right side by Dr. Jose Hope. Chest tube connected to LWS. Dressing applied & post procedure CT completed. Chest tube to gravity for transport & report called to YVETTE Acosta.

## 2025-06-12 NOTE — PROGRESS NOTES
Pharmacy to Dose Heparin Infusion Note    Sydnie Gamble is a 65 y.o. female receiving heparin infusion.     Therapy for (VTE/Cardiac): VTE  Patient Weight: 55.8 kg  Initial Bolus (Y/N): No  Any Bolus (Y/N): Yes     Signs or Symptoms of Bleeding: no, chest tube placed 6/12      VTE (PE/DVT)  Initial rate: 18 units/kg/hr (Max 1,500 units/hr)    Anti-Xa Bolus   Dose Infusion Hold   Time Infusion Rate Change (units/kg/hr) Repeat  Anti-Xa   < 0.11 50 units/kg  (4000 units Max) None Increase by  4 units/kg/hr 6 hours   0.11 - 0.19 25 units/kg  (2000 units Max) None Increase by  3 units/kg/hr 6 hours   0.2 - 0.29 0 None Increase by  2 units/kg/hr 6 hours   0.3 - 0.7 0 None No Change 6 hours (after 2 consecutive levels in range check qAM)   0.71 - 0.8 0 None Decrease by  1 units/kg/hr 6 hours   0.81 - 0.9 0 None Decrease by  2 units/kg/hr 6 hours   0.91 - 1 0 60 minutes Decrease by  3 units/kg/hr 6 hours   >1 0 Hold  After Anti-Xa less than 0.7 decrease previous rate by  4 units/kg/hr  Every 2 hours until Anti-Xa less than 0.7 then when infusion restarts in 6 hours     Results from last 7 days   Lab Units 06/12/25 1755   INR  1.17*   HEMOGLOBIN g/dL 10.7*   HEMATOCRIT % 35.1   PLATELETS 10*3/mm3 192       Date   Time   Anti-Xa Current Rate (units/kg/hr) Bolus   (units) Rate Change   (units/kg/hr) New Rate (units/kg/hr) Repeat  Anti-Xa Comments /  Pump Check    6/12 1755 0.48 -- -- +16 +16 0100 LEWIS Blandon, PharmD  6/12/2025  19:02 EDT

## 2025-06-12 NOTE — PROGRESS NOTES
A My-Chart message has been sent to the patient for PATIENT ROUNDING with [facility].

## 2025-06-12 NOTE — NURSING NOTE
Pt to US for right thoracentesis performed by Dr. Jose Hope. 1300 mL sanguinous fluid removed. Pt tolerated procedure well. Report called to Daysi.

## 2025-06-12 NOTE — H&P
[facility] Medicine Services  HISTORY AND PHYSICAL    Patient Name: Sydnie Gamble  : 1960  MRN: [id number]  Primary Care Physician: Dee Elena APRN  Date of admission: 2025      Subjective   Subjective     Chief Complaint:  SOA     HPI:  Sydnie Gamble is a 65 y.o. female with PMH of Adenocarcinoma of lung, anemia, cervical cancer, depression, HLD, hypothyroidism, PE, GERD and had home oxygen. Patient had a thoracentesis and CT placed today and she was admitted.  HPI was taken from chart review,  and patient. Patient was slightly confused on history. I called her  and she had a thoracentesis on . He states her soa returned more quickly than it had before. She is currently received Keytruda  for Adenocarcinoma of her lung with  Dr. Mckenzie. Based on chart review he wants her to be on treatment for a  month before considering  Pleur-x drain. She also started having increased pain over the weekend and  her pain meds where changed to percocet's and increased to every 4 hours. He states she had better pain control but has been sleeping more.  said they were asked about hospice before and at that time she wanted to continue treatment.           Personal History     Past Medical History:   Diagnosis Date    Acid reflux     Acid reflux     Adenocarcinoma of lung 2024    Anemia, chronic disease 2025    Anxiety 1976    Arthritis     Atherosclerotic cardiovascular disease 2024    Cervical cancer     Depression     Emphysema of lung     History of radiation therapy 2020    pelvis + intracavitary brachytherapy    History of radiation therapy 10/04/2024    RUL lung    Hx of radiation therapy     Hyperlipidemia     Hypothyroidism     Kidney disease     Lung cancer     Lung nodule     Ovarian cyst 1980s    Pleural effusion 2025    Pulmonary embolism 2025    Visual impairment corrected with glasses    Wears  dentures     Wears glasses          Oncology Problem List:  Adenocarcinoma of right lung (08/12/2024; Status: Active)  Cervical cancer (08/07/2020; Status: Active)    Oncology/Hematology History   Cervical cancer   8/7/2020 Initial Diagnosis    Suspected cervical cancer  Referred by Dr. Elissa Lee    7/31/2020: Seen for complaints of postmenopausal bleeding and found to have abnormal appearing cervix. Pap test suspicious for squamous cell carcinoma.     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 Lymph nodes negative.    [facility]       9/2/2020 Cancer Staged    Staging form: Cervix Uteri, AJCC 8th Edition  - Clinical stage from 9/2/2020: FIGO Stage IIA1 (cT2a1, cN0, cM0) - Signed by Laura Jimenez MD on 9/2/2020 9/28/2020 - 11/5/2020 Radiation    Radiation OncologyTreatment Course:  Sydnie Gamble received 5040 cGy in 28 fractions to cervix via External Beam Radiation - EBRT.     11/16/2020 - 11/20/2020 Radiation    Radiation OncologyTreatment Course:  Sydnie RAI Gamble received 1500 cGy in 3 fractions to cervix via High Dose Radiation - HDR.     Adenocarcinoma of right lung   8/12/2024 Initial Diagnosis    Adenocarcinoma of right lung     8/30/2024 Cancer Staged    Staging form: Lung, AJCC 8th Edition  - Clinical stage from 8/30/2024: Stage IA2 (cT1b, cN0, cM0) - Signed by Sejal Mckenzie MD on 8/30/2024 9/30/2024 - 10/4/2024 Radiation    Radiation OncologyTreatment Course:  Sydnie Gamble received 5000 cGy in 5 fractions to right lung via Stereotactic Radiation Therapy - SRT.     6/2/2025 -  Chemotherapy    OP LUNG Pembrolizumab 200 mg     6/2/2025 -  Chemotherapy    OP SUPPORTIVE Denosumab (Xgeva) Q42D       Past Surgical History:   Procedure Laterality  Date    BRONCHOSCOPY      BRONCHOSCOPY WITH ION ROBOTIC ASSIST N/A 08/06/2024    Procedure: BRONCHOSCOPY NAVIGATION WITH ENDOBRONCHIAL ULTRASOUND AND ION ROBOT;  Surgeon: Toni Mcclelland MD;  Location: [facility] ENDOSCOPY;  Service: Robotics - Pulmonary;  Laterality: N/A;    COLONOSCOPY      HYSTEROSCOPY      LUNG BIOPSY      LYMPH NODE BIOPSY      MOHS SURGERY  03/24/2025    Groin    SUBTOTAL HYSTERECTOMY  1987?    ovary/fallopian tube removed    TOTAL ABDOMINAL HYSTERECTOMY PELVIC NODE DISSECTION N/A 08/18/2020    Procedure: TOTAL RADICAL HYSTERECTOMY PELVIC NODE DISSECTION;  Surgeon: Laura Jimenez MD;  Location: [facility] OR;  Service: Gynecology Oncology;  Laterality: N/A;    TUBAL ABDOMINAL LIGATION      right with ovarian dissection        Family History: family history includes Anxiety disorder in her mother; Asthma in her mother; COPD in her mother; Cancer in her father, maternal aunt, maternal aunt, maternal aunt, maternal aunt, maternal grandmother, and mother; Depression in her mother; Early death in her father; Emphysema in her mother; Heart attack in her maternal grandfather; Heart disease in her maternal grandfather; Hypertension in her mother; Melanoma in her mother; Mental illness in her mother; Uterine cancer in her mother.     Social History:  reports that she quit smoking about 6 years ago. Her smoking use included cigarettes. She started smoking about 53 years ago. She has a 67.5 pack-year smoking history. She has been exposed to tobacco smoke. She has never used smokeless tobacco. She reports that she does not drink alcohol and does not use drugs.  Social History     Social History Narrative    Not on file       Medications:  Available home medication information reviewed.  Diclofenac Sodium, Fluticasone-Umeclidin-Vilant, albuterol sulfate HFA, apixaban, atorvastatin, clonazePAM, cyclobenzaprine, docusate sodium, famotidine, levothyroxine, ondansetron, oxyCODONE-acetaminophen, sertraline,  traZODone, and zolpidem    No Known Allergies    Objective   Objective     Vital Signs:   Temp:  [97.3 °F (36.3 °C)-97.8 °F (36.6 °C)] 97.8 °F (36.6 °C)  Heart Rate:  [] 96  Resp:  [16-24] 20  BP: ()/(58-80) 105/74  Flow (L/min) (Oxygen Therapy):  [3.5-4] 3.5       Physical Exam   Constitutional: Awake, alert  Eyes: PERRLA, sclerae anicteric, no conjunctival injection  HENT: NCAT, mucous membranes moist  Neck: Supple, no thyromegaly, no lymphadenopathy, trachea midline  Respiratory: Clear to auscultation bilaterally, nonlabored respirations 3L 99, CT to right chest,   Cardiovascular: RRR, no murmurs, rubs, or gallops, palpable pedal pulses bilaterally  Gastrointestinal: Positive bowel sounds, soft, nontender, nondistended  Musculoskeletal: No bilateral ankle edema, no clubbing or cyanosis to extremities  Psychiatric: Appropriate affect, cooperative  Neurologic: Oriented x 2-3, strength symmetric in all extremities, , speech clear  Skin: No rashes, pale   \    Result Review:  I have personally reviewed the results from the time of this admission to 6/12/2025 17:46 EDT and agree with these findings:  [x]  Laboratory list / accordion  []  Microbiology  []  Radiology  []  EKG/Telemetry   []  Cardiology/Vascular   []  Pathology  []  Old records  []  Other:  Most notable findings include:       LAB RESULTS:                                  Microbiology Results (last 10 days)       ** No results found for the last 240 hours. **            CT Guided Chest Tube  Result Date: 6/12/2025  DATE OF EXAM: 6/12/2025 2:48 PM  PROCEDURE: CT GUIDED CHEST TUBE PLACEMENT-  INDICATIONS: right chest tube placement; J95.811-Postprocedural pneumothorax  DLP: 746 mGycm  TECHNIQUE:  The risks, benefits, and alternatives were discussed in detail with the patient. The patient was brought down to the CT suite and positioned supine on the CT table. Preliminary CT scan of the the chest was performed and a skin entry site was selected  and marked. The area was prepped and draped utilizing standard sterile technique. 1% lidocaine was administered to the soft tissues. A small skin incision was made with an 11 blade scalpel. Under CT guidance a 5 Serbian Yueh was advanced into the right pleural space. The inner stylet was removed and an 035 wire was advanced coaxially. Over the wire a 10 Serbian all-purpose drainage catheter was advanced with the pigtail formed and locked within the pleural space the catheter was sutured to the skin and attached to a Pleur-evac. A sterile dressing was then applied.  The patient tolerated the procedure well and there were no immediate complications.      Impression: Successful CT-guided 10 Serbian right sided chest tube placement.   6/12/2025 4:13 PM by Jose Hope MD on Workstation: [id number]      XR Chest 1 View  Result Date: 6/12/2025  XR CHEST 1 VW Date of Exam: 6/12/2025 1:45 PM EDT Indication: s/p thoracentesis; c/o severe right neck pain Comparison: 6/12/2025 Findings: Cardiac size is similar to prior exam. Similar right-sided hydropneumothorax. Similar right basilar opacity. No evidence of acute osseous abnormalities. Visualized upper abdomen is unremarkable.     Impression: Impression: 1.Similar right-sided hydropneumothorax. 2.Similar right basilar opacity may reflect atelectasis or infection. Electronically Signed: Clay Johnson MD  6/12/2025 2:31 PM EDT  Workstation ID: [id number]    XR Chest 1 View  Result Date: 6/12/2025  XR CHEST 1 VW Date of Exam: 6/12/2025 12:25 PM EDT Indication: s/p thoracentesis; c/o severe right neck pain Comparison: Chest radiograph 6/12/2025 Findings: Grossly stable size of the right hydropneumothorax, measuring up to 7 mm at apex with larger right subpulmonic component. Previous noted fluid extending towards the apex appears decreased. Left lung relatively clear. There is partial collapse and probable underlying atelectasis within the right lower lobe. Negative for left  pneumothorax. Stable cardiomediastinal silhouette. Degenerative related osseous change.     Impression: Impression: Slightly decreased right pleural fluid component with otherwise similar appearing moderate size hydropneumothorax. Electronically Signed: Toni Argueta MD  6/12/2025 12:53 PM EDT  Workstation ID: [id number]    XR Chest 1 View  Result Date: 6/12/2025  XR CHEST 1 VW Date of Exam: 6/12/2025 11:23 AM EDT Indication: s/p thoracentesis Comparison: Chest x-ray 6/5/2025 Findings: There is a pneumothorax on the right. It is moderate in size and seen inferiorly measuring up to 2.3 cm. There is a moderate mount of pleural fluid as well. There is right lower lobe airspace opacity which is improved from previous exam. Left lung is clear. Heart size is normal.     Impression: Impression: Moderate sized right hydropneumothorax. Electronically Signed: Susan Leahy MD  6/12/2025 12:41 PM EDT  Workstation ID: [id number]    US Thoracentesis  Result Date: 6/12/2025  PROCEDURE: US THORACENTESIS-  ATTENDING: Jose Hope M.D.  CLINICAL HISTORY: Large right pleural effusion.  TECHNIQUE:  The risk, benefits, and alternatives were described in detail and the patient was brought to the ultrasound suite and positioned seated. The skin entry site was prepped and draped utilizing standard sterile technique. 1% lidocaine was administered to the soft tissues of the right posterior thorax.   A 5 American Yueh was advanced into the right pleural space.   A total of 1.4 L was removed from the right pleural space. A specimen was sent to the laboratory for analysis. The needle was removed and a sterile dressing was applied.  The patient tolerated the procedure well and there were no complications.      Impression: Successful ultrasound-guided right thoracentesis.   Attestation Signer name: Jose Hope MD I attest that I was present for the entire procedure. I reviewed the stored images and agree with the report as written.       6/12/2025 12:17 PM by Jose Hope MD on Workstation: [id number]        Results for orders placed during the hospital encounter of 05/13/25    Adult Transthoracic Echo Complete w/ Color, Spectral and Contrast if necessary per protocol    Interpretation Summary    Left ventricular ejection fraction appears to be 66 - 70%.    Normal right ventricular cavity size, wall thickness, systolic function and septal motion noted.    There is a small (<1cm) pericardial effusion adjacent to the right ventricle. There is no evidence of cardiac tamponade.    There is a left pleural effusion.      Assessment & Plan   Assessment & Plan       Recurrent pleural effusion    Acquired hypothyroidism    HLD (hyperlipidemia)    Adenocarcinoma of right lung    Recurrent Pleural Effusion  Adenocarcinoma of Right lung  -- consult oncology in am  -- consult CTS to manage CT  --consult palliative   -- pain control  -- currently under treatment with Dr. Mckenzie and he wants to hold on pleur-x drain for now. He wants her to receive Keytruda  for a month.   -- 6/12 Thoracentesis 1300 ml removed and CT placed   -- oxygen to keep sats > 90%  -- nebs prn   -- labs pending     Hypothyroidism  -- cont home meds     HLD  -- cont statin     Generalized anxiety disorder  Depression  --cont home meds    LLE DVT  PE  -- hold eliquis   -- heparin drip while she has CT           VTE Prophylaxis:  Pharmacologic VTE prophylaxis orders are present.          CODE STATUS:    Code Status and Medical Interventions: CPR (Attempt to Resuscitate); Full Support; also talked over with  Jose Francisco   Ordered at: 06/12/25 0034     Code Status (Patient has no pulse and is not breathing):    CPR (Attempt to Resuscitate)     Medical Interventions (Patient has pulse or is breathing):    Full Support     Comments:    also talked over with  Jose Francisco     Level Of Support Discussed With:    Patient       Next of Kin (If No Surrogate)       Expected Discharge   Expected Discharge  Date: 6/16/2025; Expected Discharge Time:      Nuha Shafer, DAYSI  06/12/25

## 2025-06-13 DIAGNOSIS — M94.0 CHONDROCOSTAL JUNCTION SYNDROME (TIETZE): ICD-10-CM

## 2025-06-13 RX ORDER — CYCLOBENZAPRINE HCL 10 MG
10 TABLET ORAL 2 TIMES DAILY PRN
Qty: 60 TABLET | Refills: 3 | Status: SHIPPED | OUTPATIENT
Start: 2025-06-13

## 2025-06-13 NOTE — PLAN OF CARE
Goal Outcome Evaluation:  Plan of Care Reviewed With: patient, spouse        Progress: no change  Outcome Evaluation: Patient presents with weakness and poor endurance affecting functional mobility. She tolerated sitting EOB for several minutes but was limited by fatigue and dyspnea. O2 sat stable on 3-4L. IP PT services warranted to support return to baseline. Recommend SNF at d/c.    Anticipated Discharge Disposition (PT): skilled nursing facility

## 2025-06-13 NOTE — PROGRESS NOTES
[facility] Medicine Services  PROGRESS NOTE    Patient Name: Sydnie Gamble  : 1960  MRN: [id number]    Date of Admission: 2025  Primary Care Physician: Dee Elena APRN    Subjective   Subjective     CC: Follow-up SOA    HPI: No acute events overnight, patient did not get much sleep    Objective   Objective     Vital Signs:   Temp:  [97.3 °F (36.3 °C)-98.3 °F (36.8 °C)] 98.3 °F (36.8 °C)  Heart Rate:  [] 109  Resp:  [16-24] 16  BP: ()/(58-80) 111/63  Flow (L/min) (Oxygen Therapy):  [3.5-4] 3.5     Physical Exam:  Constitutional: Chronically ill-appearing female  HENT: NCAT, mucous membranes moist  Respiratory: Nonlabored respirations, chest tube in place, on 4 L NC  Cardiovascular: RRR, no murmurs, rubs, or gallops  Gastrointestinal: Positive bowel sounds, soft, nontender, nondistended  Musculoskeletal: No bilateral ankle edema  Psychiatric: Appropriate affect, cooperative  Neurologic: Oriented x 3, nonfocal  Skin: No rashes      Results Reviewed:  LAB RESULTS:      Lab 25  0341 25  0114 25  175   WBC 20.73*  --  17.59*   HEMOGLOBIN 10.5*  --  10.7*   HEMATOCRIT 33.9*  --  35.1   PLATELETS 220  --  192   NEUTROS ABS 15.98*  --  14.13*   IMMATURE GRANS (ABS) 0.47*  --  0.50*   LYMPHS ABS 1.18  --  0.74   MONOS ABS 1.78*  --  1.45*   EOS ABS 1.22*  --  0.68*   MCV 89.9  --  90.5   PROTIME  --   --  15.6*   APTT  --   --  32.7*   HEPARIN ANTI-XA  --  0.54 0.48         Lab 25  175   SODIUM 136   POTASSIUM 5.0   CHLORIDE 97*   CO2 28.0   ANION GAP 11.0   BUN 21.7   CREATININE 0.87   EGFR 73.1   GLUCOSE 117*   CALCIUM 7.8*         Lab 25  1755   TOTAL PROTEIN 6.1   ALBUMIN 2.8*   GLOBULIN 3.3   ALT (SGPT) 26   AST (SGOT) 49*   BILIRUBIN 0.2   ALK PHOS 455*         Lab 25  1755   PROTIME 15.6*   INR 1.17*                 Brief Urine Lab Results       None            Microbiology Results Abnormal       None             CT Guided Chest Tube  Result Date: 6/12/2025  DATE OF EXAM: 6/12/2025 2:48 PM  PROCEDURE: CT GUIDED CHEST TUBE PLACEMENT-  INDICATIONS: right chest tube placement; J95.811-Postprocedural pneumothorax  DLP: 746 mGycm  TECHNIQUE:  The risks, benefits, and alternatives were discussed in detail with the patient. The patient was brought down to the CT suite and positioned supine on the CT table. Preliminary CT scan of the the chest was performed and a skin entry site was selected and marked. The area was prepped and draped utilizing standard sterile technique. 1% lidocaine was administered to the soft tissues. A small skin incision was made with an 11 blade scalpel. Under CT guidance a 5 [place] Yueh was advanced into the right pleural space. The inner stylet was removed and an 035 wire was advanced coaxially. Over the wire a 10 [place] all-purpose drainage catheter was advanced with the pigtail formed and locked within the pleural space the catheter was sutured to the skin and attached to a Pleur-evac. A sterile dressing was then applied.  The patient tolerated the procedure well and there were no immediate complications.      Impression: Successful CT-guided 10 [place] right sided chest tube placement.   6/12/2025 4:13 PM by Jose Hope MD on Workstation: [id number]      XR Chest 1 View  Result Date: 6/12/2025  XR CHEST 1 VW Date of Exam: 6/12/2025 1:45 PM EDT Indication: s/p thoracentesis; c/o severe right neck pain Comparison: 6/12/2025 Findings: Cardiac size is similar to prior exam. Similar right-sided hydropneumothorax. Similar right basilar opacity. No evidence of acute osseous abnormalities. Visualized upper abdomen is unremarkable.     Impression: Impression: 1.Similar right-sided hydropneumothorax. 2.Similar right basilar opacity may reflect atelectasis or infection. Electronically Signed: Clay Johnson MD  6/12/2025 2:31 PM EDT  Workstation ID: [id number]    XR Chest 1 View  Result Date: 6/12/2025  XR  CHEST 1 VW Date of Exam: 6/12/2025 12:25 PM EDT Indication: s/p thoracentesis; c/o severe right neck pain Comparison: Chest radiograph 6/12/2025 Findings: Grossly stable size of the right hydropneumothorax, measuring up to 7 mm at apex with larger right subpulmonic component. Previous noted fluid extending towards the apex appears decreased. Left lung relatively clear. There is partial collapse and probable underlying atelectasis within the right lower lobe. Negative for left pneumothorax. Stable cardiomediastinal silhouette. Degenerative related osseous change.     Impression: Impression: Slightly decreased right pleural fluid component with otherwise similar appearing moderate size hydropneumothorax. Electronically Signed: Toni Argueta MD  6/12/2025 12:53 PM EDT  Workstation ID: [id number]    XR Chest 1 View  Result Date: 6/12/2025  XR CHEST 1 VW Date of Exam: 6/12/2025 11:23 AM EDT Indication: s/p thoracentesis Comparison: Chest x-ray 6/5/2025 Findings: There is a pneumothorax on the right. It is moderate in size and seen inferiorly measuring up to 2.3 cm. There is a moderate mount of pleural fluid as well. There is right lower lobe airspace opacity which is improved from previous exam. Left lung is clear. Heart size is normal.     Impression: Impression: Moderate sized right hydropneumothorax. Electronically Signed: Susan Leahy MD  6/12/2025 12:41 PM EDT  Workstation ID: [id number]    US Thoracentesis  Result Date: 6/12/2025  PROCEDURE: US THORACENTESIS-  ATTENDING: Jose Hope M.D.  CLINICAL HISTORY: Large right pleural effusion.  TECHNIQUE:  The risk, benefits, and alternatives were described in detail and the patient was brought to the ultrasound suite and positioned seated. The skin entry site was prepped and draped utilizing standard sterile technique. 1% lidocaine was administered to the soft tissues of the right posterior thorax.   A 5 Bengali Yueh was advanced into the right pleural space.   A total  of 1.4 L was removed from the right pleural space. A specimen was sent to the laboratory for analysis. The needle was removed and a sterile dressing was applied.  The patient tolerated the procedure well and there were no complications.      Impression: Successful ultrasound-guided right thoracentesis.   Attestation Signer name: Jose Hope MD I attest that I was present for the entire procedure. I reviewed the stored images and agree with the report as written.      6/12/2025 12:17 PM by Jose Hope MD on Workstation: [id number]        Results for orders placed during the hospital encounter of 05/13/25    Adult Transthoracic Echo Complete w/ Color, Spectral and Contrast if necessary per protocol    Interpretation Summary    Left ventricular ejection fraction appears to be 66 - 70%.    Normal right ventricular cavity size, wall thickness, systolic function and septal motion noted.    There is a small (<1cm) pericardial effusion adjacent to the right ventricle. There is no evidence of cardiac tamponade.    There is a left pleural effusion.      Current medications:  Scheduled Meds:[Held by provider] apixaban, 5 mg, Oral, Q12H  arformoterol, 15 mcg, Nebulization, BID - RT   And  budesonide, 0.5 mg, Nebulization, BID - RT   And  revefenacin, 175 mcg, Nebulization, Daily - RT  atorvastatin, 10 mg, Oral, Nightly  levothyroxine, 88 mcg, Oral, Q AM  lidocaine PF 1%, 5 mL, Injection, Once  senna-docusate sodium, 2 tablet, Oral, BID  sertraline, 100 mg, Oral, Daily  sodium chloride, 10 mL, Intravenous, Q12H  sodium chloride, 10 mL, Intravenous, Q12H  traZODone, 150 mg, Oral, Nightly      Continuous Infusions:heparin, 16 Units/kg/hr, Last Rate: 16 Units/kg/hr (06/12/25 1912)  Pharmacy to Dose Heparin,       PRN Meds:.  acetaminophen **OR** acetaminophen **OR** acetaminophen    senna-docusate sodium **AND** polyethylene glycol **AND** bisacodyl **AND** bisacodyl    Calcium Replacement - Follow Nurse / BPA Driven Protocol     clonazePAM    cyclobenzaprine    famotidine    HYDROmorphone **AND** naloxone    Magnesium Standard Dose Replacement - Follow Nurse / BPA Driven Protocol    ondansetron ODT **OR** ondansetron    oxyCODONE-acetaminophen    Pharmacy to Dose Heparin    Phosphorus Replacement - Follow Nurse / BPA Driven Protocol    Potassium Replacement - Follow Nurse / BPA Driven Protocol    sodium chloride    sodium chloride    sodium chloride    sodium chloride    zolpidem    Assessment & Plan   Assessment & Plan     Active Hospital Problems    Diagnosis  POA    **Recurrent pleural effusion [J90]  Yes    Adenocarcinoma of right lung [C34.91]  Yes    HLD (hyperlipidemia) [E78.5]  Yes    Acquired hypothyroidism [E03.9]  Yes      Resolved Hospital Problems   No resolved problems to display.      Brief Hospital Course to date:  Sydnie Gamble is a 65 y.o. female with PMH of Adenocarcinoma of lung, anemia, cervical cancer, depression, HLD, hypothyroidism, PE, GERD and had home oxygen. Patient had a thoracentesis with chest tube placement, admitted for further management.    This patient's problems and plans were partially entered by my partner and updated as appropriate by me 06/13/25.     Recurrent Pleural Effusion  Adenocarcinoma of Right lung  -s/p thoracentesis and chest tube placement by IR, CTS consulted to help manage this  -- currently under treatment with Dr. Mckenzie and he wants to hold on pleur-x drain for now. He wants her to receive Keytruda  for a month.   --Palliative care consulted, continue pain control  - She is currently on 4 L NC, wean as able  -Continue DuoNeb    Hypothyroidism  -- cont home meds      HLD  -- cont statin      Generalized anxiety disorder  Depression  --cont home meds     LLE DVT  PE  -- hold eliquis   -- heparin drip while she has CT     All problems listed above are new to me today    Expected Discharge Location and Transportation: TBD  Expected Discharge   Expected Discharge Date: 6/16/2025;  Expected Discharge Time:      VTE Prophylaxis:  Pharmacologic VTE prophylaxis orders are present.       AM-PAC 6 Clicks Score (PT): 19 (06/12/25 2006)    CODE STATUS:   Code Status and Medical Interventions: CPR (Attempt to Resuscitate); Full Support; also talked over with  Jose Francisco   Ordered at: 06/12/25 1057     Code Status (Patient has no pulse and is not breathing):    CPR (Attempt to Resuscitate)     Medical Interventions (Patient has pulse or is breathing):    Full Support     Comments:    also talked over with  Jose Francisco     Level Of Support Discussed With:    Patient       Next of Kin (If No Surrogate)       Yolanda Plasencia MD  06/13/25

## 2025-06-13 NOTE — PLAN OF CARE
Goal Outcome Evaluation:  Plan of Care Reviewed With: patient        Progress: no change  Outcome Evaluation: Palliative RN saw pt. at 1113.  New palliative consult for assistance with GOC and pain per DAYSI Davidson.  FATOUMATA is spouse Jose Francisco Diaz.  Palliative care introduced.  Palliative blanket, brochure and meal discount card provided.  Pt. stated she already follows at clinic with Hiwot Samson.  Pt, sitting up in bed on 4LNC endorsing mild shortness of breath that has improved since insertion of chest tube.  She endorsed 7/10 right neck pain but was recently medicated.  She denied nausea but stated her appetite has been pretty poor.  Pt. and spouse both stated that pt. LBM was a small one yesterday.  Heparin infusion continued.  DAYSI Jackson saw pt. for initial provider visit.  Discussed GOC including code status.  Pt. wishes to remain full code, full treat.  Palliative care to follow for support and ongoing GOC.       Problem: Palliative Care  Goal: Enhanced Quality of Life  Intervention: Promote Advance Care Planning  Flowsheets (Taken 6/13/2025 1419)  Life Transition/Adjustment:   palliative care initiated   palliative care discussed        0930 Palliative IDT meeting: MD; DAYSI ; MDiv; RNs; LCSW   After hours, weekends and holidays, contact Palliative Provider by calling [phone].

## 2025-06-13 NOTE — CONSULTS
"Palliative Care Initial Consult   Attending Physician: Yolanda Plasencia MD  Referring Provider: DAYSI Manzo    Reason for Referral:  assistance with clarification of goals of care and pain    Code Status:   Code Status and Medical Interventions: CPR (Attempt to Resuscitate); Full Support; also talked over with  Jose Francisco   Ordered at: 06/12/25 0736     Code Status (Patient has no pulse and is not breathing):    CPR (Attempt to Resuscitate)     Medical Interventions (Patient has pulse or is breathing):    Full Support     Comments:    also talked over with  Jose Francisco     Level Of Support Discussed With:    Patient       Next of Kin (If No Surrogate)      Advanced Directives: Advance Directive Status: Patient does not have advance directive   Family/Support: Jose Francisco Diaz (spouse), Dipti Mccormack (dtr)  Goals of Care: TBD.    HPI: Sydnie Gamble is a 65 y.o. female with PMH significant for adenocarcinoma of lung follows with Dr. Mckenzie, anemia, cervical cancer, depression, HLD, hypothyroidism, PE, GERD, COPD on 3-4LNC ATC. Patient presented to [facility] for thoracentesis and CT placement on 6/12 for recurrent right pleural effusion due to adenocarcinoma. Palliative Care consulted for GOC in the context of complex medical decision making and symptom management.   Follows at Palliative Clinic. Patient receiving Flexeril 10mg PO x1, Hydromorphone 0.5mg IV x3 in the last 24 hours.      Patient alert and oriented, spouse at bedside. She reports most concerning pain to right side of neck and left chest wall at CT insertion site. She has pain, \"all over\" at baseline. She reports left neck pain started a few months ago and did not have any injury noted. Patient reports generalized pain improved with Percocet and Hydromorphone, neck pain improved with Flexeril. Spouse endorses patient with poor intake and weight loss, uses a walker at home. Her LBM has been a week ago, using Miralax at home with no BM.     ROS: +pain, " "right neck, worse with movement, left chest wall CT insertion site, \"all over\", constant. +decreased appetite. +debility, using walker at home. +shortness of breath, currently on 4LNC.       Past Medical History:   Diagnosis Date    Acid reflux     Acid reflux     Adenocarcinoma of lung 08/12/2024    Anemia, chronic disease 5/13/2025    Anxiety 1976    Arthritis     Atherosclerotic cardiovascular disease 03/25/2024    Cervical cancer     Depression     Emphysema of lung     History of radiation therapy 11/20/2020    pelvis + intracavitary brachytherapy    History of radiation therapy 10/04/2024    RUL lung    Hx of radiation therapy     Hyperlipidemia     Hypothyroidism     Kidney disease     Lung cancer     Lung nodule     Ovarian cyst 1980s    Pleural effusion 5/13/2025    Pulmonary embolism 5/13/2025    Visual impairment corrected with glasses    Wears dentures     Wears glasses      Past Surgical History:   Procedure Laterality Date    BRONCHOSCOPY      BRONCHOSCOPY WITH ION ROBOTIC ASSIST N/A 08/06/2024    Procedure: BRONCHOSCOPY NAVIGATION WITH ENDOBRONCHIAL ULTRASOUND AND ION ROBOT;  Surgeon: Toni Mcclelland MD;  Location:  [facility];  Service: Robotics - Pulmonary;  Laterality: N/A;    COLONOSCOPY      HYSTEROSCOPY      LUNG BIOPSY      LYMPH NODE BIOPSY      MOHS SURGERY  03/24/2025    Groin    SUBTOTAL HYSTERECTOMY  1987?    ovary/fallopian tube removed    TOTAL ABDOMINAL HYSTERECTOMY PELVIC NODE DISSECTION N/A 08/18/2020    Procedure: TOTAL RADICAL HYSTERECTOMY PELVIC NODE DISSECTION;  Surgeon: Laura Jimenez MD;  Location:  [facility];  Service: Gynecology Oncology;  Laterality: N/A;    TUBAL ABDOMINAL LIGATION      right with ovarian dissection      Social History     Socioeconomic History    Marital status:    Tobacco Use    Smoking status: Former     Current packs/day: 0.00     Average packs/day: 1.5 packs/day for 45.0 years (67.5 ttl pk-yrs)     Types: Cigarettes     Start date: " "1972     Quit date: 3/30/2019     Years since quittin.2     Passive exposure: Past    Smokeless tobacco: Never    Tobacco comments:     quit analog cigs in march, still vapes daily   Vaping Use    Vaping status: Every Day    Substances: Nicotine   Substance and Sexual Activity    Alcohol use: No    Drug use: No    Sexual activity: Not Currently     Partners: Male     Family History   Problem Relation Age of Onset    Cancer Mother     Hypertension Mother     Mental illness Mother     Anxiety disorder Mother     Asthma Mother     COPD Mother     Depression Mother     Emphysema Mother     Uterine cancer Mother     Melanoma Mother     Cancer Father     Early death Father     Cancer Maternal Aunt     Cancer Maternal Aunt     Cancer Maternal Aunt     Cancer Maternal Aunt     Cancer Maternal Grandmother     Heart attack Maternal Grandfather     Heart disease Maternal Grandfather         Heart Attack 63 yo    Breast cancer Neg Hx     Ovarian cancer Neg Hx        No Known Allergies    Current medication reviewed for route, type, dose and frequency and are current per MAR at time of dictation.    Palliative Performance Scale Score:  40%    /79 (BP Location: Right arm, Patient Position: Lying)   Pulse 103   Temp 98 °F (36.7 °C) (Oral)   Resp 16   Ht 154.9 cm (60.98\")   Wt 55.8 kg (123 lb)   LMP  (LMP Unknown)   SpO2 96%   BMI 23.25 kg/m²     Intake/Output Summary (Last 24 hours) at 2025 1031  Last data filed at 2025 0505  Gross per 24 hour   Intake 240 ml   Output 140 ml   Net 100 ml       Physical Exam:    General Appearance:    Patient laying in bed, awake, alert, A/C ill appearing, frail,  cooperative, NAD   HEENT:    NC/AT, EOMI, anicteric, MMM, face relaxed   Neck:   supple, trachea midline, no JVD   Lungs:     CTA bilat, diminished in bases; respirations regular, even and unlabored; RR 16-18 on exam, 4LNC, CT dressing to left chest wall    Heart:    RRR, normal S1 and S2, no M/R/G,  "   Abdomen:     Normal bowel sounds, soft, nontender, nondistended   G/U:   Deferred   MSK/Extremities:   Wasting, no edema   Pulses:   Pulses palpable and equal bilaterally   Skin:   Warm, dry   Neurologic:   A/Ox3, cooperative, RICE   Psych:   Calm, appropriate         Labs:   Results from last 7 days   Lab Units 06/13/25  0341   WBC 10*3/mm3 20.73*   HEMOGLOBIN g/dL 10.5*   HEMATOCRIT % 33.9*   PLATELETS 10*3/mm3 220     Results from last 7 days   Lab Units 06/12/25  1755   SODIUM mmol/L 136   POTASSIUM mmol/L 5.0   CHLORIDE mmol/L 97*   CO2 mmol/L 28.0   BUN mg/dL 21.7   CREATININE mg/dL 0.87   GLUCOSE mg/dL 117*   CALCIUM mg/dL 7.8*     Results from last 7 days   Lab Units 06/12/25  1755   SODIUM mmol/L 136   POTASSIUM mmol/L 5.0   CHLORIDE mmol/L 97*   CO2 mmol/L 28.0   BUN mg/dL 21.7   CREATININE mg/dL 0.87   CALCIUM mg/dL 7.8*   BILIRUBIN mg/dL 0.2   ALK PHOS U/L 455*   ALT (SGPT) U/L 26   AST (SGOT) U/L 49*   GLUCOSE mg/dL 117*     Imaging Results (Last 72 Hours)       Procedure Component Value Units Date/Time    XR Chest 1 View [[id number]] Collected: 06/13/25 0712     Updated: 06/13/25 0726    Narrative:      XR CHEST 1 VW    Date of Exam: 6/13/2025 2:49 AM EDT    Indication: post Ct placement and thoracentesis    Comparison: AP chest x-ray 6/12/2025, 6/5/2025    Findings:  There is a new right pleural catheter. Previously seen right basilar pleural air has now been replaced with pleural fluid. No apical pneumothorax is seen. There are stable underlying right basilar airspace opacities. Left lung appears clear.   Cardiomediastinal contours are stable.      Impression:      Impression:  1.Interval placement of right pleural catheter with fluid component of right basilar hydropneumothorax.  2.Stable underlying right basilar airspace opacities, likely due to atelectasis.        Electronically Signed: Maryellen Dalton MD    6/13/2025 7:23 AM EDT    Workstation ID: [id number]    XR Chest 1 View [[id number]] Collected:  06/12/25 1429     Updated: 06/12/25 1434    Narrative:      XR CHEST 1 VW    Date of Exam: 6/12/2025 1:45 PM EDT    Indication: s/p thoracentesis; c/o severe right neck pain    Comparison: 6/12/2025    Findings:  Cardiac size is similar to prior exam. Similar right-sided hydropneumothorax. Similar right basilar opacity. No evidence of acute osseous abnormalities. Visualized upper abdomen is unremarkable.      Impression:      Impression:  1.Similar right-sided hydropneumothorax.  2.Similar right basilar opacity may reflect atelectasis or infection.        Electronically Signed: Clay Johnson MD    6/12/2025 2:31 PM EDT    Workstation ID: [id number]    XR Chest 1 View [[id number]] Collected: 06/12/25 1251     Updated: 06/12/25 1256    Narrative:      XR CHEST 1 VW    Date of Exam: 6/12/2025 12:25 PM EDT    Indication: s/p thoracentesis; c/o severe right neck pain    Comparison: Chest radiograph 6/12/2025    Findings:  Grossly stable size of the right hydropneumothorax, measuring up to 7 mm at apex with larger right subpulmonic component. Previous noted fluid extending towards the apex appears decreased. Left lung relatively clear. There is partial collapse and   probable underlying atelectasis within the right lower lobe. Negative for left pneumothorax. Stable cardiomediastinal silhouette. Degenerative related osseous change.      Impression:      Impression:  Slightly decreased right pleural fluid component with otherwise similar appearing moderate size hydropneumothorax.      Electronically Signed: Toni Argueta MD    6/12/2025 12:53 PM EDT    Workstation ID: [id number]    XR Chest 1 View [[id number]] Collected: 06/12/25 1230     Updated: 06/12/25 1244    Narrative:      XR CHEST 1 VW    Date of Exam: 6/12/2025 11:23 AM EDT    Indication: s/p thoracentesis    Comparison: Chest x-ray 6/5/2025    Findings:  There is a pneumothorax on the right. It is moderate in size and seen inferiorly measuring up to 2.3 cm. There  is a moderate mount of pleural fluid as well. There is right lower lobe airspace opacity which is improved from previous exam. Left lung is   clear. Heart size is normal.      Impression:      Impression:  Moderate sized right hydropneumothorax.      Electronically Signed: Susan Leahy MD    6/12/2025 12:41 PM EDT    Workstation ID: [id number]    US Thoracentesis [[id number]] Collected: 06/12/25 1217    Specimen: Body Fluid Updated: 06/12/25 1221    Narrative:      PROCEDURE: US THORACENTESIS-     ATTENDING: Jose Hope M.D.     CLINICAL HISTORY: Large right pleural effusion.     TECHNIQUE:     The risk, benefits, and alternatives were described in detail and the  patient was brought to the ultrasound suite and positioned seated. The  skin entry site was prepped and draped utilizing standard sterile  technique. 1% lidocaine was administered to the soft tissues of the  right posterior thorax.        A 5 [place] Yueh was advanced into the right pleural space.        A total of 1.4 L was removed from the right pleural space. A specimen  was sent to the laboratory for analysis. The needle was removed and a  sterile dressing was applied.     The patient tolerated the procedure well and there were no  complications.       Impression:      Successful ultrasound-guided right thoracentesis.        Attestation  Signer name: Jose oHpe MD  I attest that I was present for the entire procedure. I reviewed the  stored images and agree with the report as written.                 6/12/2025 12:17 PM by Jose Hope MD on Workstation: [id number]                     Diagnostics: Reviewed    A:   Recurrent pleural effusion    Acquired hypothyroidism    HLD (hyperlipidemia)    Adenocarcinoma of right lung     65 yl.o. female with recurrent pleural effusion secondary to adenocarcinoma of right lung, pain, debility, decreased appetite, shortness of breath.   S/S:   Pain -MSK, neoplastic  -continue Flexeril 10mg BID prn muscle  spasms  -continue Voltaren gel to neck/shoulder four times a day  -continue Percocet 5-325mg PO q 4 hours prn moderate pain  -continue Hydromorphone 0.5mg IV q 2 hours prn severe pain    2. Shortness of breath -CT in place, 4LNC in place  -nebs    3. Decreased intake -ordered Boost Plus one time to try chocolate for patient , consider scheduling TID if patient prefers    4. Constipation -LBM one week ago per patient  -continue bowel regimen  -Bisacodyl [place] once today    5. Sleeplessness -Ambien nightly prn    6. Debility -uses a walker at baseline    7.  GOC -Full Code/Full Support -reviewed with patient and spouse  -information regarding code status given to patient  -discussed LW/ACP, aware of option to complete this hospitalization  -discussed symptoms and medications as above    P: Patient know to Palliative Clinic. Reviewed symptoms and medications adjusted as above. Reviewed code status and information given regarding MV/CPR. Further consults pending today. Will continue to follow and support.     Thank you for this consult and allowing us to participate in patient's plan of care. Palliative Care Team will continue to follow patient. Please do not hesitate to contact us regarding further symptom management or goals of care needs.  Time: 60 minutes spent reviewing medical and medication records, assessing and examining patient, discussing with family, answering questions, providing some guidance about a plan and documentation of care, and coordinating care with other healthcare members, with > 50% time spent face to face.         Kaylene Reynoso, APRN  6/13/2025

## 2025-06-13 NOTE — PROGRESS NOTES
"          Clinical Nutrition Assessment     Patient Name: Sydnie Gamble  YOB: 1960  MRN: [id number]  Date of Encounter: 06/13/25 19:19 EDT  Admission date: 6/12/2025  Reason for Visit: Identified at risk by screening criteria    Assessment   Nutrition Assessment   Admission Diagnosis:  Recurrent right pleural effusion [J90]  Adenocarcinoma of right lung [C34.91]  Recurrent pleural effusion [J90]    Problem List:    Recurrent pleural effusion    Acquired hypothyroidism    HLD (hyperlipidemia)    Adenocarcinoma of right lung      PMH:   She  has a past medical history of Acid reflux, Acid reflux, Adenocarcinoma of lung (08/12/2024), Anemia, chronic disease (5/13/2025), Anxiety (1976), Arthritis, Atherosclerotic cardiovascular disease (03/25/2024), Cervical cancer, Depression, Emphysema of lung, History of radiation therapy (11/20/2020), History of radiation therapy (10/04/2024), radiation therapy, Hyperlipidemia, Hypothyroidism, Kidney disease, Lung cancer, Lung nodule, Ovarian cyst (1980s), Pleural effusion (5/13/2025), Pulmonary embolism (5/13/2025), Visual impairment (corrected with glasses), Wears dentures, and Wears glasses.    PSH:  She  has a past surgical history that includes Tubal ligation; total abdominal hysterectomy pelvic node dissection (N/A, 08/18/2020); Lymph node biopsy; Subtotal Hysterectomy (1987?); Bronchoscopy; Lung biopsy; BRONCHOSCOPY WITH ION ROBOTIC ASSIST (N/A, 08/06/2024); Mohs surgery (03/24/2025); Hysteroscopy; and Colonoscopy.    Applicable Nutrition History:       Anthropometrics     Height: Height: 154.9 cm (60.98\")  Last Filed Weight: Weight: 55.8 kg (123 lb) (06/12/25 1546)  Method: Weight Method: Bed scale  BMI: BMI (Calculated): 23.3    UBW:  Per EMR wt of 136 lbs on 4/11 25 127 lbs on 6/10 25  Weight change: weight loss of   9 lbs (  7%) over 2 month(s)    Significant?  Yes    Nutrition Focused Physical Exam    Date: 6/13    Unable to perform due to Pt " unable to participate at time of visit   Not alert at time of visit    Subjective   Reported/Observed/Food/Nutrition Related History:     6/13  Pt slumbering at time of visit. Wt loss consistent w severe criterion noted. Noted MD order for anni boost one time.     Current Nutrition Prescription   PO: Diet: Regular/House; Fluid Consistency: Thin (IDDSI 0)  Oral Nutrition Supplement:   Intake: Insufficient data 25% intake x 1 meal recorded    Assessment & Plan   Nutrition Diagnosis   Date:  6/13            Updated:    Problem Unintended weight loss    Etiology Effect dx/tx including lung CA   Signs/Symptoms 7% wt loss x 2 mo   Status: New      Goal / Objectives:   Nutrition to support treatment and Establish PO      Nutrition Intervention      Follow treatment progress, Care plan reviewed, Menu provided, Adjusted supplement    Left menu for pt use.  Order for Boost 2x/da  See for interview and nutrition focused exam as able.    Monitoring/Evaluation:   Per protocol, I&O, PO intake, Supplement intake, Pertinent labs, Weight, Symptoms    Lata Taylor RD  Time Spent: 20 min

## 2025-06-13 NOTE — PAYOR COMM NOTE
"Ref# [id number]    KEITH Tan, RN  Utilization Review  Phone [phone]  Fax [phone]    [address] [address]         TyeJacesmitha Zapata (65 y.o. Female)       Date of Birth   1960    Social Security Number       Address   [address] [address]    Home Phone   [phone]    MRN   [id number]       Quaker   Mandaen    Marital Status                               Admission Date   6/12/2025    Admission Type   Elective    Admitting Provider   Yolanda Plasenica MD    Attending Provider   Yolanda Plasencia MD    Department, Room/Bed   [facility] 6B, N643/1       Discharge Date       Discharge Disposition       Discharge Destination                                 Attending Provider: Yolanda Plasencia MD    Allergies: No Known Allergies    Isolation: None   Infection: None   Code Status: CPR    Ht: 154.9 cm (60.98\")   Wt: 55.8 kg (123 lb)    Admission Cmt: None   Principal Problem: Recurrent pleural effusion [J90]                   Active Insurance as of 6/12/2025       Primary Coverage       Payor Plan Insurance Group Employer/Plan Group    ANTHEM BLUE CROSS ANTHEM BLUE CROSS BLUE SHIELD PPO [id number]       Payor Plan Address Payor Plan Phone Number Payor Plan Fax Number Effective Dates    [address] [phone]  1/1/2015 - None Entered    [facility] [id number]         Subscriber Name Subscriber Birth Date Member ID       ILDEFONSO SHAHID 2/1/1967 [id number]               Secondary Coverage       Payor Plan Insurance Group Employer/Plan Group    MEDICARE MEDICARE A & B        Payor Plan Address Payor Plan Phone Number Payor Plan Fax Number Effective Dates    [address] [phone]  4/1/2023 - None Entered    [place] [id number]         Subscriber Name Subscriber Birth Date Member ID       SOL JONES 1960 [id number]                     Emergency Contacts        (Rel.) " Home Phone Work Phone Mobile Phone    Jose Francisco Diaz (Spouse) [phone] -- [phone]    LEVI ESPITIA (Daughter) [phone] -- [phone]              [place]: [facility] [id number] Tax ID [id number]  Insurance Information                  ANTHEM BLUE CROSS/ANTHEM BLUE CROSS BLUE SHIELD PPO Phone: [phone]    Subscriber: Jose Francisco Diaz Subscriber#: [id number]    Group#: [id number] Precert#: --    Authorization#: -- Effective Date: --        MEDICARE/MEDICARE A & B Phone: [phone]    Subscriber: Sydnie Gamble Subscriber#: [id number]    Group#: -- Precert#: --    Authorization#: -- Effective Date: --             History & Physical        Nuha Shafer APRN at 25 1615       Attestation signed by Layla Bellamy MD at 25 2225      I have reviewed this documentation and agree.                          [facility]  HISTORY AND PHYSICAL    Patient Name: Sydnie Gamble  : 1960  MRN: [id number]  Primary Care Physician: Dee Elena APRN  Date of admission: 2025      Subjective  Subjective     Chief Complaint:  SOA     HPI:  Sydnie Gamble is a 65 y.o. female with PMH of Adenocarcinoma of lung, anemia, cervical cancer, depression, HLD, hypothyroidism, PE, GERD and had home oxygen. Patient had a thoracentesis and CT placed today and she was admitted.  HPI was taken from chart review,  and patient. Patient was slightly confused on history. I called her  and she had a thoracentesis on . He states her soa returned more quickly than it had before. She is currently received Keytruda  for Adenocarcinoma of her lung with  Dr. Mckenzie. Based on chart review he wants her to be on treatment for a  month before considering  Pleur-x drain. She also started having increased pain over the weekend and  her pain meds where changed to percocet's and increased to every 4 hours. He  states she had better pain control but has been sleeping more.  said they were asked about hospice before and at that time she wanted to continue treatment.           Personal History     Past Medical History:   Diagnosis Date    Acid reflux     Acid reflux     Adenocarcinoma of lung 08/12/2024    Anemia, chronic disease 5/13/2025    Anxiety 1976    Arthritis     Atherosclerotic cardiovascular disease 03/25/2024    Cervical cancer     Depression     Emphysema of lung     History of radiation therapy 11/20/2020    pelvis + intracavitary brachytherapy    History of radiation therapy 10/04/2024    RUL lung    Hx of radiation therapy     Hyperlipidemia     Hypothyroidism     Kidney disease     Lung cancer     Lung nodule     Ovarian cyst 1980s    Pleural effusion 5/13/2025    Pulmonary embolism 5/13/2025    Visual impairment corrected with glasses    Wears dentures     Wears glasses          Oncology Problem List:  Adenocarcinoma of right lung (08/12/2024; Status: Active)  Cervical cancer (08/07/2020; Status: Active)    Oncology/Hematology History   Cervical cancer   8/7/2020 Initial Diagnosis    Suspected cervical cancer  Referred by Dr. Elissa Lee    7/31/2020: Seen for complaints of postmenopausal bleeding and found to have abnormal appearing cervix. Pap test suspicious for squamous cell carcinoma.     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 Lymph nodes negative.    Surgery at [facility] by Laura Jimenez MD       9/2/2020 Cancer Staged    Staging form: Cervix Uteri, AJCC 8th Edition  - Clinical stage from 9/2/2020: FIGO Stage IIA1 (cT2a1, cN0, cM0) - Signed by Laura Jimenez MD on 9/2/2020 9/28/2020 - 11/5/2020 Radiation    Radiation OncologyTreatment Course:  Sydnie FREDI Gamble received 5040  cGy in 28 fractions to cervix via External Beam Radiation - EBRT.     11/16/2020 - 11/20/2020 Radiation    Radiation OncologyTreatment Course:  Sydnie Gamble received 1500 cGy in 3 fractions to cervix via High Dose Radiation - HDR.     Adenocarcinoma of right lung   8/12/2024 Initial Diagnosis    Adenocarcinoma of right lung     8/30/2024 Cancer Staged    Staging form: Lung, AJCC 8th Edition  - Clinical stage from 8/30/2024: Stage IA2 (cT1b, cN0, cM0) - Signed by Sejal Mckenzie MD on 8/30/2024 9/30/2024 - 10/4/2024 Radiation    Radiation OncologyTreatment Course:  Sydnie Gamble received 5000 cGy in 5 fractions to right lung via Stereotactic Radiation Therapy - SRT.     6/2/2025 -  Chemotherapy    OP LUNG Pembrolizumab 200 mg     6/2/2025 -  Chemotherapy    OP SUPPORTIVE Denosumab (Xgeva) Q42D       Past Surgical History:   Procedure Laterality Date    BRONCHOSCOPY      BRONCHOSCOPY WITH ION ROBOTIC ASSIST N/A 08/06/2024    Procedure: BRONCHOSCOPY NAVIGATION WITH ENDOBRONCHIAL ULTRASOUND AND ION ROBOT;  Surgeon: Toni Mcclelland MD;  Location:  [facility];  Service: Robotics - Pulmonary;  Laterality: N/A;    COLONOSCOPY      HYSTEROSCOPY      LUNG BIOPSY      LYMPH NODE BIOPSY      MOHS SURGERY  03/24/2025    Groin    SUBTOTAL HYSTERECTOMY  1987?    ovary/fallopian tube removed    TOTAL ABDOMINAL HYSTERECTOMY PELVIC NODE DISSECTION N/A 08/18/2020    Procedure: TOTAL RADICAL HYSTERECTOMY PELVIC NODE DISSECTION;  Surgeon: Laura Jimenez MD;  Location:  [facility];  Service: Gynecology Oncology;  Laterality: N/A;    TUBAL ABDOMINAL LIGATION      right with ovarian dissection        Family History: family history includes Anxiety disorder in her mother; Asthma in her mother; COPD in her mother; Cancer in her father, maternal aunt, maternal aunt, maternal aunt, maternal aunt, maternal grandmother, and mother; Depression in her mother; Early death in her father; Emphysema in her mother; Heart attack  in her maternal grandfather; Heart disease in her maternal grandfather; Hypertension in her mother; Melanoma in her mother; Mental illness in her mother; Uterine cancer in her mother.     Social History:  reports that she quit smoking about 6 years ago. Her smoking use included cigarettes. She started smoking about 53 years ago. She has a 67.5 pack-year smoking history. She has been exposed to tobacco smoke. She has never used smokeless tobacco. She reports that she does not drink alcohol and does not use drugs.  Social History     Social History Narrative    Not on file       Medications:  Available home medication information reviewed.  Diclofenac Sodium, Fluticasone-Umeclidin-Vilant, albuterol sulfate HFA, apixaban, atorvastatin, clonazePAM, cyclobenzaprine, docusate sodium, famotidine, levothyroxine, ondansetron, oxyCODONE-acetaminophen, sertraline, traZODone, and zolpidem    No Known Allergies    Objective  Objective     Vital Signs:   Temp:  [97.3 °F (36.3 °C)-97.8 °F (36.6 °C)] 97.8 °F (36.6 °C)  Heart Rate:  [] 96  Resp:  [16-24] 20  BP: ()/(58-80) 105/74  Flow (L/min) (Oxygen Therapy):  [3.5-4] 3.5       Physical Exam   Constitutional: Awake, alert  Eyes: PERRLA, sclerae anicteric, no conjunctival injection  HENT: NCAT, mucous membranes moist  Neck: Supple, no thyromegaly, no lymphadenopathy, trachea midline  Respiratory: Clear to auscultation bilaterally, nonlabored respirations 3L 99, CT to right chest,   Cardiovascular: RRR, no murmurs, rubs, or gallops, palpable pedal pulses bilaterally  Gastrointestinal: Positive bowel sounds, soft, nontender, nondistended  Musculoskeletal: No bilateral ankle edema, no clubbing or cyanosis to extremities  Psychiatric: Appropriate affect, cooperative  Neurologic: Oriented x 2-3, strength symmetric in all extremities, , speech clear  Skin: No rashes, pale   \    Result Review:  I have personally reviewed the results from the time of this admission to  6/12/2025 17:46 EDT and agree with these findings:  [x]  Laboratory list / accordion  []  Microbiology  []  Radiology  []  EKG/Telemetry   []  Cardiology/Vascular   []  Pathology  []  Old records  []  Other:  Most notable findings include:       LAB RESULTS:                                  Microbiology Results (last 10 days)       ** No results found for the last 240 hours. **            CT Guided Chest Tube  Result Date: 6/12/2025  DATE OF EXAM: 6/12/2025 2:48 PM  PROCEDURE: CT GUIDED CHEST TUBE PLACEMENT-  INDICATIONS: right chest tube placement; J95.811-Postprocedural pneumothorax  DLP: 746 mGycm  TECHNIQUE:  The risks, benefits, and alternatives were discussed in detail with the patient. The patient was brought down to the CT suite and positioned supine on the CT table. Preliminary CT scan of the the chest was performed and a skin entry site was selected and marked. The area was prepped and draped utilizing standard sterile technique. 1% lidocaine was administered to the soft tissues. A small skin incision was made with an 11 blade scalpel. Under CT guidance a 5 Samoan Yueh was advanced into the right pleural space. The inner stylet was removed and an 035 wire was advanced coaxially. Over the wire a 10 Samoan all-purpose drainage catheter was advanced with the pigtail formed and locked within the pleural space the catheter was sutured to the skin and attached to a Pleur-evac. A sterile dressing was then applied.  The patient tolerated the procedure well and there were no immediate complications.      Impression: Successful CT-guided 10 Samoan right sided chest tube placement.   6/12/2025 4:13 PM by Jose Hope MD on Workstation: [id number]      XR Chest 1 View  Result Date: 6/12/2025  XR CHEST 1 VW Date of Exam: 6/12/2025 1:45 PM EDT Indication: s/p thoracentesis; c/o severe right neck pain Comparison: 6/12/2025 Findings: Cardiac size is similar to prior exam. Similar right-sided hydropneumothorax. Similar  right basilar opacity. No evidence of acute osseous abnormalities. Visualized upper abdomen is unremarkable.     Impression: Impression: 1.Similar right-sided hydropneumothorax. 2.Similar right basilar opacity may reflect atelectasis or infection. Electronically Signed: Clay Johnson MD  6/12/2025 2:31 PM EDT  Workstation ID: [id number]    XR Chest 1 View  Result Date: 6/12/2025  XR CHEST 1 VW Date of Exam: 6/12/2025 12:25 PM EDT Indication: s/p thoracentesis; c/o severe right neck pain Comparison: Chest radiograph 6/12/2025 Findings: Grossly stable size of the right hydropneumothorax, measuring up to 7 mm at apex with larger right subpulmonic component. Previous noted fluid extending towards the apex appears decreased. Left lung relatively clear. There is partial collapse and probable underlying atelectasis within the right lower lobe. Negative for left pneumothorax. Stable cardiomediastinal silhouette. Degenerative related osseous change.     Impression: Impression: Slightly decreased right pleural fluid component with otherwise similar appearing moderate size hydropneumothorax. Electronically Signed: Toni Argueta MD  6/12/2025 12:53 PM EDT  Workstation ID: [id number]    XR Chest 1 View  Result Date: 6/12/2025  XR CHEST 1 VW Date of Exam: 6/12/2025 11:23 AM EDT Indication: s/p thoracentesis Comparison: Chest x-ray 6/5/2025 Findings: There is a pneumothorax on the right. It is moderate in size and seen inferiorly measuring up to 2.3 cm. There is a moderate mount of pleural fluid as well. There is right lower lobe airspace opacity which is improved from previous exam. Left lung is clear. Heart size is normal.     Impression: Impression: Moderate sized right hydropneumothorax. Electronically Signed: Susan Leahy MD  6/12/2025 12:41 PM EDT  Workstation ID: [id number]    US Thoracentesis  Result Date: 6/12/2025  PROCEDURE: US THORACENTESIS-  ATTENDING: Jose Hope M.D.  CLINICAL HISTORY: Large right pleural  effusion.  TECHNIQUE:  The risk, benefits, and alternatives were described in detail and the patient was brought to the ultrasound suite and positioned seated. The skin entry site was prepped and draped utilizing standard sterile technique. 1% lidocaine was administered to the soft tissues of the right posterior thorax.   A 5 Georgian Yueh was advanced into the right pleural space.   A total of 1.4 L was removed from the right pleural space. A specimen was sent to the laboratory for analysis. The needle was removed and a sterile dressing was applied.  The patient tolerated the procedure well and there were no complications.      Impression: Successful ultrasound-guided right thoracentesis.   Attestation Signer name: Jose Hope MD I attest that I was present for the entire procedure. I reviewed the stored images and agree with the report as written.      6/12/2025 12:17 PM by Jose Hope MD on Workstation: [id number]        Results for orders placed during the hospital encounter of 05/13/25    Adult Transthoracic Echo Complete w/ Color, Spectral and Contrast if necessary per protocol    Interpretation Summary    Left ventricular ejection fraction appears to be 66 - 70%.    Normal right ventricular cavity size, wall thickness, systolic function and septal motion noted.    There is a small (<1cm) pericardial effusion adjacent to the right ventricle. There is no evidence of cardiac tamponade.    There is a left pleural effusion.      Assessment & Plan  Assessment & Plan       Recurrent pleural effusion    Acquired hypothyroidism    HLD (hyperlipidemia)    Adenocarcinoma of right lung    Recurrent Pleural Effusion  Adenocarcinoma of Right lung  -- consult oncology in am  -- consult CTS to manage CT  --consult palliative   -- pain control  -- currently under treatment with Dr. Mckenzie and he wants to hold on pleur-x drain for now. He wants her to receive Keytruda  for a month.   -- 6/12 Thoracentesis 1300 ml removed and  CT placed   -- oxygen to keep sats > 90%  -- nebs prn   -- labs pending     Hypothyroidism  -- cont home meds     HLD  -- cont statin     Generalized anxiety disorder  Depression  --cont home meds    LLE DVT  PE  -- hold eliquis   -- heparin drip while she has CT           VTE Prophylaxis:  Pharmacologic VTE prophylaxis orders are present.          CODE STATUS:    Code Status and Medical Interventions: CPR (Attempt to Resuscitate); Full Support; also talked over with  Jose Francisco   Ordered at: 06/12/25 5116     Code Status (Patient has no pulse and is not breathing):    CPR (Attempt to Resuscitate)     Medical Interventions (Patient has pulse or is breathing):    Full Support     Comments:    also talked over with  Jose Francisco     Level Of Support Discussed With:    Patient       Next of Kin (If No Surrogate)       Expected Discharge   Expected Discharge Date: 6/16/2025; Expected Discharge Time:      Nuha Shafer, APRN  06/12/25      Electronically signed by Layla Bellamy MD at 06/12/25 2229       Emergency Department Notes    No notes of this type exist for this encounter.       Oxygen Therapy (since admission)       Date/Time SpO2 Device (Oxygen Therapy) Flow (L/min) (Oxygen Therapy) Oxygen Concentration (%) ETCO2 (mmHg)    06/13/25 0800 -- nasal cannula 4 -- --    06/13/25 0729 96 nasal cannula 4 -- --    06/13/25 0604 -- nasal cannula 3.5 -- --    06/13/25 0500 92 -- -- -- --    06/13/25 0418 -- nasal cannula 3.5 -- --    06/13/25 0400 96 -- -- -- --    06/13/25 0300 97 -- -- -- --    06/13/25 0220 -- nasal cannula 3.5 -- --    06/13/25 0200 98 -- -- -- --    06/13/25 0100 98 -- -- -- --    06/13/25 0032 98 -- -- -- --    06/13/25 0005 98 nasal cannula 3.5 -- --    06/13/25 0000 99 -- -- -- --    06/12/25 2300 97 -- -- -- --    06/12/25 2205 -- nasal cannula 3.5 -- --    06/12/25 2200 96 -- -- -- --    06/12/25 2126 93 nasal cannula 3.5 -- --    06/12/25 2100 95 -- -- -- --    06/12/25  2034 -- nasal cannula 3.5 -- --    06/12/25 2006 -- nasal cannula 3.5 -- --    06/12/25 2000 95 -- -- -- --    06/12/25 1900 96 -- -- -- --    06/12/25 1600 -- nasal cannula 3.5 -- --    06/12/25 1546 98 nasal cannula 4 -- --    06/12/25 1430 94 nasal cannula 4 -- --    06/12/25 1400 92 nasal cannula 4 -- --    06/12/25 1350 -- nasal cannula 4 -- --    06/12/25 1330 96 nasal cannula 4 -- --    06/12/25 1300 94 nasal cannula 4 -- --    06/12/25 1253 -- nasal cannula 4 -- --    06/12/25 1231 94 nasal cannula 4 -- --    06/12/25 1220 94 nasal cannula 4 -- --    06/12/25 1215 95 nasal cannula 4 -- --    06/12/25 1200 94 nasal cannula 4 -- --    06/12/25 1150 92 nasal cannula 4 -- --    06/12/25 1145 88 nasal cannula 4 -- --    06/12/25 1133 92 nasal cannula 4 -- --    06/12/25 1120 93 nasal cannula 4 -- --    06/12/25 1115 96 nasal cannula 4 -- --    06/12/25 1105 98 nasal cannula 4 -- --    06/12/25 1055 95 nasal cannula 4 -- --    06/12/25 0949 -- nasal cannula 4 -- --    06/12/25 0925 99 nasal cannula 4 -- --          Facility-Administered Medications as of 6/13/2025   Medication Dose Route Frequency Provider Last Rate Last Admin    acetaminophen (TYLENOL) tablet 650 mg  650 mg Oral Q4H PRN Nuha Shafer APRN        Or    acetaminophen (TYLENOL) 160 MG/5ML oral solution 650 mg  650 mg Oral Q4H PRN Nuha Shafer APRN        Or    acetaminophen (TYLENOL) suppository 650 mg  650 mg Rectal Q4H PRN Nuha Shafer APRN        [Held by provider] apixaban (ELIQUIS) tablet 5 mg  5 mg Oral Q12H Hollis, Nuha D, APRN        arformoterol (BROVANA) nebulizer solution 15 mcg  15 mcg Nebulization BID - RT Nuha Shafer APRN   15 mcg at 06/12/25 2034    And    budesonide (PULMICORT) nebulizer solution 0.5 mg  0.5 mg Nebulization BID - RT Nuha Shafer APRN   0.5 mg at 06/12/25 2034    And    revefenacin (YUPELRI) nebulizer solution 175 mcg  175 mcg Nebulization Daily - RT Nuha Shafer,  APRN        atorvastatin (LIPITOR) tablet 10 mg  10 mg Oral Nightly Nuha Shafer APRN   10 mg at 06/12/25 2116    sennosides-docusate (PERICOLACE) 8.6-50 MG per tablet 2 tablet  2 tablet Oral BID Nuha Shafer APRN   2 tablet at 06/13/25 1223    And    polyethylene glycol (MIRALAX) packet 17 g  17 g Oral Daily PRN Nuha Shafer APRN        And    bisacodyl (DULCOLAX) EC tablet 5 mg  5 mg Oral Daily PRN Nuha Shafer APRN        And    bisacodyl (DULCOLAX) suppository 10 mg  10 mg Rectal Daily PRN Nuha Shafer APRN        bisacodyl (DULCOLAX) suppository 10 mg  10 mg Rectal Once Kaylene Reynoso APRN        Calcium Replacement - Follow Nurse / BPA Driven Protocol   Not Applicable PRN Nuha Shafer APRN        clonazePAM (KlonoPIN) tablet 1 mg  1 mg Oral BID PRN Nuha Shafer APRN        cyclobenzaprine (FLEXERIL) tablet 10 mg  10 mg Oral BID PRN Nuha Shafer APRN   10 mg at 06/13/25 0504    Diclofenac Sodium (VOLTAREN) 1 % gel 2 g  2 g Topical 4x Daily Kaylene Reynoso APRN        famotidine (PEPCID) tablet 40 mg  40 mg Oral BID PRN Nuha Shafer, DAYSI        heparin 71052 units/250 mL (100 units/mL) in 0.45 % NaCl infusion  16 Units/kg/hr Intravenous Titrated Nuha Shafer APRN 8.92 mL/hr at 06/12/25 1912 16 Units/kg/hr at 06/12/25 1912    HYDROmorphone (DILAUDID) injection 0.5 mg  0.5 mg Intravenous Q2H PRN Nuha Shafer APRN   0.5 mg at 06/13/25 1024    And    naloxone (NARCAN) injection 0.4 mg  0.4 mg Intravenous Q5 Min PRN Nuha Shafer APRN        levothyroxine (SYNTHROID, LEVOTHROID) tablet 88 mcg  88 mcg Oral Q AM Nuha Shafer APRN   88 mcg at 06/13/25 0503    [COMPLETED] lidocaine (XYLOCAINE) 1 % injection 10 mL  10 mL Subcutaneous Once Jose Hope MD   10 mL at 06/12/25 1528    lidocaine PF 1% (XYLOCAINE) injection 5 mL  5 mL Injection Once Jose Hope MD        Magnesium Standard Dose Replacement -  Follow Nurse / BPA Driven Protocol   Not Applicable PRN Nuha Shafer APRN        ondansetron ODT (ZOFRAN-ODT) disintegrating tablet 4 mg  4 mg Oral Q6H PRN Nuha Shafer APRN        Or    ondansetron (ZOFRAN) injection 4 mg  4 mg Intravenous Q6H PRN Nuha Shafer APRN        [COMPLETED] oxyCODONE-acetaminophen (PERCOCET) 5-325 MG per tablet 1 tablet  1 tablet Oral Once Jose Hope MD   1 tablet at 06/12/25 1253    oxyCODONE-acetaminophen (PERCOCET) 5-325 MG per tablet 1 tablet  1 tablet Oral Q4H PRN Nuha Shafer APRN   1 tablet at 06/13/25 1223    Pharmacy to Dose Heparin   Not Applicable Continuous PRNuha Hollins APRN        Phosphorus Replacement - Follow Nurse / BPA Driven Protocol   Not Applicable PRNuha Hollins APRN        Potassium Replacement - Follow Nurse / BPA Driven Protocol   Not Applicable PRNuha Hollins APRN        sertraline (ZOLOFT) tablet 100 mg  100 mg Oral Daily Nuha Shafer APRN   100 mg at 06/13/25 0909    sodium chloride 0.9 % flush 10 mL  10 mL Intravenous Q12H Jose Hope MD   10 mL at 06/13/25 0909    sodium chloride 0.9 % flush 10 mL  10 mL Intravenous PRN Jose Hope MD        sodium chloride 0.9 % flush 10 mL  10 mL Intravenous Q12H Nuha Shafer APRN        sodium chloride 0.9 % flush 10 mL  10 mL Intravenous PRN Nuha Shafer APRN        sodium chloride 0.9 % infusion 40 mL  40 mL Intravenous PRN Jose Hope MD        sodium chloride 0.9 % infusion 40 mL  40 mL Intravenous PRN Nuha Shafer APRN        traZODone (DESYREL) tablet 150 mg  150 mg Oral Nightly Nuha Shafer APRN   150 mg at 06/12/25 2125    zolpidem (AMBIEN) tablet 5 mg  5 mg Oral Nightly PRN Nuha Shafer APRN         Lab Results (all)       Procedure Component Value Units Date/Time    Heparin Anti-Xa [[id number]]  (Normal) Collected: 06/13/25 0733    Specimen: Blood Updated: 06/13/25 0830     Heparin  Anti-Xa (UFH) 0.55 IU/ml     CBC & Differential [[id number]]  (Abnormal) Collected: 06/13/25 0341    Specimen: Blood Updated: 06/13/25 0411    Narrative:      The following orders were created for panel order CBC & Differential.  Procedure                               Abnormality         Status                     ---------                               -----------         ------                     CBC Auto Differential[[id number]]        Abnormal            Final result                 Please view results for these tests on the individual orders.    CBC Auto Differential [[id number]]  (Abnormal) Collected: 06/13/25 0341    Specimen: Blood Updated: 06/13/25 0411     WBC 20.73 10*3/mm3      RBC 3.77 10*6/mm3      Hemoglobin 10.5 g/dL      Hematocrit 33.9 %      MCV 89.9 fL      MCH 27.9 pg      MCHC 31.0 g/dL      RDW 14.8 %      RDW-SD 47.6 fl      MPV 10.1 fL      Platelets 220 10*3/mm3      Neutrophil % 77.0 %      Lymphocyte % 5.7 %      Monocyte % 8.6 %      Eosinophil % 5.9 %      Basophil % 0.5 %      Immature Grans % 2.3 %      Neutrophils, Absolute 15.98 10*3/mm3      Lymphocytes, Absolute 1.18 10*3/mm3      Monocytes, Absolute 1.78 10*3/mm3      Eosinophils, Absolute 1.22 10*3/mm3      Basophils, Absolute 0.10 10*3/mm3      Immature Grans, Absolute 0.47 10*3/mm3      nRBC 0.1 /100 WBC     Heparin Anti-Xa [[id number]]  (Normal) Collected: 06/13/25 0114    Specimen: Blood Updated: 06/13/25 0155     Heparin Anti-Xa (UFH) 0.54 IU/ml     Comprehensive Metabolic Panel [[id number]]  (Abnormal) Collected: 06/12/25 1755    Specimen: Blood Updated: 06/12/25 1848     Glucose 117 mg/dL      BUN 21.7 mg/dL      Creatinine 0.87 mg/dL      Sodium 136 mmol/L      Potassium 5.0 mmol/L      Chloride 97 mmol/L      CO2 28.0 mmol/L      Calcium 7.8 mg/dL      Total Protein 6.1 g/dL      Albumin 2.8 g/dL      ALT (SGPT) 26 U/L      AST (SGOT) 49 U/L      Alkaline Phosphatase 455 U/L      Total Bilirubin 0.2 mg/dL      Globulin 3.3  gm/dL      Comment: Calculated Result        A/G Ratio 0.8 g/dL      BUN/Creatinine Ratio 24.9     Anion Gap 11.0 mmol/L      eGFR 73.1 mL/min/1.73     Narrative:      GFR Categories in Chronic Kidney Disease (CKD)              GFR Category          GFR (mL/min/1.73)    Interpretation  G1                    90 or greater        Normal or high (1)  G2                    60-89                Mild decrease (1)  G3a                   45-59                Mild to moderate decrease  G3b                   30-44                Moderate to severe decrease  G4                    15-29                Severe decrease  G5                    14 or less           Kidney failure    (1)In the absence of evidence of kidney disease, neither GFR category G1 or G2 fulfill the criteria for CKD.    eGFR calculation 2021 CKD-EPI creatinine equation, which does not include race as a factor    Heparin Anti-Xa [[id number]]  (Normal) Collected: 06/12/25 1755    Specimen: Blood Updated: 06/12/25 1836     Heparin Anti-Xa (UFH) 0.48 IU/ml     Protime-INR [[id number]]  (Abnormal) Collected: 06/12/25 1755    Specimen: Blood Updated: 06/12/25 1835     Protime 15.6 Seconds      INR 1.17    aPTT [[id number]]  (Abnormal) Collected: 06/12/25 1755    Specimen: Blood Updated: 06/12/25 1835     PTT 32.7 seconds     Narrative:      PTT = The equivalent PTT values for the therapeutic range of heparin levels at 0.3 to 0.5 U/ml are 60 to 70 seconds.    CBC & Differential [[id number]]  (Abnormal) Collected: 06/12/25 1755    Specimen: Blood Updated: 06/12/25 1824    Narrative:      The following orders were created for panel order CBC & Differential.  Procedure                               Abnormality         Status                     ---------                               -----------         ------                     CBC Auto Differential[[id number]]        Abnormal            Final result                 Please view results for these tests on the individual  orders.    CBC Auto Differential [[id number]]  (Abnormal) Collected: 06/12/25 1755    Specimen: Blood Updated: 06/12/25 1824     WBC 17.59 10*3/mm3      RBC 3.88 10*6/mm3      Hemoglobin 10.7 g/dL      Hematocrit 35.1 %      MCV 90.5 fL      MCH 27.6 pg      MCHC 30.5 g/dL      RDW 14.8 %      RDW-SD 47.6 fl      MPV 10.0 fL      Platelets 192 10*3/mm3      Neutrophil % 80.4 %      Lymphocyte % 4.2 %      Monocyte % 8.2 %      Eosinophil % 3.9 %      Basophil % 0.5 %      Immature Grans % 2.8 %      Neutrophils, Absolute 14.13 10*3/mm3      Lymphocytes, Absolute 0.74 10*3/mm3      Monocytes, Absolute 1.45 10*3/mm3      Eosinophils, Absolute 0.68 10*3/mm3      Basophils, Absolute 0.09 10*3/mm3      Immature Grans, Absolute 0.50 10*3/mm3      nRBC 0.0 /100 WBC           Imaging Results (All)       Procedure Component Value Units Date/Time    XR Chest 1 View [[id number]] Collected: 06/13/25 0712     Updated: 06/13/25 0726    Narrative:      XR CHEST 1 VW    Date of Exam: 6/13/2025 2:49 AM EDT    Indication: post Ct placement and thoracentesis    Comparison: AP chest x-ray 6/12/2025, 6/5/2025    Findings:  There is a new right pleural catheter. Previously seen right basilar pleural air has now been replaced with pleural fluid. No apical pneumothorax is seen. There are stable underlying right basilar airspace opacities. Left lung appears clear.   Cardiomediastinal contours are stable.      Impression:      Impression:  1.Interval placement of right pleural catheter with fluid component of right basilar hydropneumothorax.  2.Stable underlying right basilar airspace opacities, likely due to atelectasis.        Electronically Signed: Maryellen Dalton MD    6/13/2025 7:23 AM EDT    Workstation ID: [id number]    XR Chest 1 View [[id number]] Collected: 06/12/25 1429     Updated: 06/12/25 1434    Narrative:      XR CHEST 1 VW    Date of Exam: 6/12/2025 1:45 PM EDT    Indication: s/p thoracentesis; c/o severe right neck  pain    Comparison: 6/12/2025    Findings:  Cardiac size is similar to prior exam. Similar right-sided hydropneumothorax. Similar right basilar opacity. No evidence of acute osseous abnormalities. Visualized upper abdomen is unremarkable.      Impression:      Impression:  1.Similar right-sided hydropneumothorax.  2.Similar right basilar opacity may reflect atelectasis or infection.        Electronically Signed: Clay Johnson MD    6/12/2025 2:31 PM EDT    Workstation ID: [id number]    XR Chest 1 View [[id number]] Collected: 06/12/25 1251     Updated: 06/12/25 1256    Narrative:      XR CHEST 1 VW    Date of Exam: 6/12/2025 12:25 PM EDT    Indication: s/p thoracentesis; c/o severe right neck pain    Comparison: Chest radiograph 6/12/2025    Findings:  Grossly stable size of the right hydropneumothorax, measuring up to 7 mm at apex with larger right subpulmonic component. Previous noted fluid extending towards the apex appears decreased. Left lung relatively clear. There is partial collapse and   probable underlying atelectasis within the right lower lobe. Negative for left pneumothorax. Stable cardiomediastinal silhouette. Degenerative related osseous change.      Impression:      Impression:  Slightly decreased right pleural fluid component with otherwise similar appearing moderate size hydropneumothorax.      Electronically Signed: Toni Argueta MD    6/12/2025 12:53 PM EDT    Workstation ID: [id number]    XR Chest 1 View [[id number]] Collected: 06/12/25 1230     Updated: 06/12/25 1244    Narrative:      XR CHEST 1 VW    Date of Exam: 6/12/2025 11:23 AM EDT    Indication: s/p thoracentesis    Comparison: Chest x-ray 6/5/2025    Findings:  There is a pneumothorax on the right. It is moderate in size and seen inferiorly measuring up to 2.3 cm. There is a moderate mount of pleural fluid as well. There is right lower lobe airspace opacity which is improved from previous exam. Left lung is   clear. Heart size is  normal.      Impression:      Impression:  Moderate sized right hydropneumothorax.      Electronically Signed: Susan Leahy MD    2025 12:41 PM EDT    Workstation ID: [id number]    US Thoracentesis [[id number]] Collected: 25 1217    Specimen: Body Fluid Updated: 25 1221    Narrative:      PROCEDURE: US THORACENTESIS-     ATTENDING: Jose Hope M.D.     CLINICAL HISTORY: Large right pleural effusion.     TECHNIQUE:     The risk, benefits, and alternatives were described in detail and the  patient was brought to the ultrasound suite and positioned seated. The  skin entry site was prepped and draped utilizing standard sterile  technique. 1% lidocaine was administered to the soft tissues of the  right posterior thorax.        A 5 Rwandan Yueh was advanced into the right pleural space.        A total of 1.4 L was removed from the right pleural space. A specimen  was sent to the laboratory for analysis. The needle was removed and a  sterile dressing was applied.     The patient tolerated the procedure well and there were no  complications.       Impression:      Successful ultrasound-guided right thoracentesis.        Attestation  Signer name: Jose Hope MD  I attest that I was present for the entire procedure. I reviewed the  stored images and agree with the report as written.                 2025 12:17 PM by Jose Hope MD on Workstation: [id number]             ECG/EMG Results (all)       None          Operative/Procedure Notes (all)    No notes of this type exist for this encounter.          Physician Progress Notes (all)        Yolanda Plasencia MD at 25 0723              [facility] Medicine Services  PROGRESS NOTE    Patient Name: Sydnie Gamble  : 1960  MRN: [id number]    Date of Admission: 2025  Primary Care Physician: Dee Elena APRN    Subjective   Subjective     CC: Follow-up SOA    HPI: No acute events overnight, patient  did not get much sleep    Objective   Objective     Vital Signs:   Temp:  [97.3 °F (36.3 °C)-98.3 °F (36.8 °C)] 98.3 °F (36.8 °C)  Heart Rate:  [] 109  Resp:  [16-24] 16  BP: ()/(58-80) 111/63  Flow (L/min) (Oxygen Therapy):  [3.5-4] 3.5     Physical Exam:  Constitutional: Chronically ill-appearing female  HENT: NCAT, mucous membranes moist  Respiratory: Nonlabored respirations, chest tube in place, on 4 L NC  Cardiovascular: RRR, no murmurs, rubs, or gallops  Gastrointestinal: Positive bowel sounds, soft, nontender, nondistended  Musculoskeletal: No bilateral ankle edema  Psychiatric: Appropriate affect, cooperative  Neurologic: Oriented x 3, nonfocal  Skin: No rashes      Results Reviewed:  LAB RESULTS:      Lab 06/13/25  0341 06/13/25  0114 06/12/25 1755   WBC 20.73*  --  17.59*   HEMOGLOBIN 10.5*  --  10.7*   HEMATOCRIT 33.9*  --  35.1   PLATELETS 220  --  192   NEUTROS ABS 15.98*  --  14.13*   IMMATURE GRANS (ABS) 0.47*  --  0.50*   LYMPHS ABS 1.18  --  0.74   MONOS ABS 1.78*  --  1.45*   EOS ABS 1.22*  --  0.68*   MCV 89.9  --  90.5   PROTIME  --   --  15.6*   APTT  --   --  32.7*   HEPARIN ANTI-XA  --  0.54 0.48         Lab 06/12/25 1755   SODIUM 136   POTASSIUM 5.0   CHLORIDE 97*   CO2 28.0   ANION GAP 11.0   BUN 21.7   CREATININE 0.87   EGFR 73.1   GLUCOSE 117*   CALCIUM 7.8*         Lab 06/12/25 1755   TOTAL PROTEIN 6.1   ALBUMIN 2.8*   GLOBULIN 3.3   ALT (SGPT) 26   AST (SGOT) 49*   BILIRUBIN 0.2   ALK PHOS 455*         Lab 06/12/25 1755   PROTIME 15.6*   INR 1.17*                 Brief Urine Lab Results       None            Microbiology Results Abnormal       None            CT Guided Chest Tube  Result Date: 6/12/2025  DATE OF EXAM: 6/12/2025 2:48 PM  PROCEDURE: CT GUIDED CHEST TUBE PLACEMENT-  INDICATIONS: right chest tube placement; J95.811-Postprocedural pneumothorax  DLP: 746 mGycm  TECHNIQUE:  The risks, benefits, and alternatives were discussed in detail with the patient. The  patient was brought down to the CT suite and positioned supine on the CT table. Preliminary CT scan of the the chest was performed and a skin entry site was selected and marked. The area was prepped and draped utilizing standard sterile technique. 1% lidocaine was administered to the soft tissues. A small skin incision was made with an 11 blade scalpel. Under CT guidance a 5 Montserratian Yueh was advanced into the right pleural space. The inner stylet was removed and an 035 wire was advanced coaxially. Over the wire a 10 Montserratian all-purpose drainage catheter was advanced with the pigtail formed and locked within the pleural space the catheter was sutured to the skin and attached to a Pleur-evac. A sterile dressing was then applied.  The patient tolerated the procedure well and there were no immediate complications.      Impression: Successful CT-guided 10 Montserratian right sided chest tube placement.   6/12/2025 4:13 PM by Jose Hope MD on Workstation: [id number]      XR Chest 1 View  Result Date: 6/12/2025  XR CHEST 1 VW Date of Exam: 6/12/2025 1:45 PM EDT Indication: s/p thoracentesis; c/o severe right neck pain Comparison: 6/12/2025 Findings: Cardiac size is similar to prior exam. Similar right-sided hydropneumothorax. Similar right basilar opacity. No evidence of acute osseous abnormalities. Visualized upper abdomen is unremarkable.     Impression: Impression: 1.Similar right-sided hydropneumothorax. 2.Similar right basilar opacity may reflect atelectasis or infection. Electronically Signed: Clay Johnson MD  6/12/2025 2:31 PM EDT  Workstation ID: [id number]    XR Chest 1 View  Result Date: 6/12/2025  XR CHEST 1 VW Date of Exam: 6/12/2025 12:25 PM EDT Indication: s/p thoracentesis; c/o severe right neck pain Comparison: Chest radiograph 6/12/2025 Findings: Grossly stable size of the right hydropneumothorax, measuring up to 7 mm at apex with larger right subpulmonic component. Previous noted fluid extending towards the  apex appears decreased. Left lung relatively clear. There is partial collapse and probable underlying atelectasis within the right lower lobe. Negative for left pneumothorax. Stable cardiomediastinal silhouette. Degenerative related osseous change.     Impression: Impression: Slightly decreased right pleural fluid component with otherwise similar appearing moderate size hydropneumothorax. Electronically Signed: Toni Argueta MD  6/12/2025 12:53 PM EDT  Workstation ID: [id number]    XR Chest 1 View  Result Date: 6/12/2025  XR CHEST 1 VW Date of Exam: 6/12/2025 11:23 AM EDT Indication: s/p thoracentesis Comparison: Chest x-ray 6/5/2025 Findings: There is a pneumothorax on the right. It is moderate in size and seen inferiorly measuring up to 2.3 cm. There is a moderate mount of pleural fluid as well. There is right lower lobe airspace opacity which is improved from previous exam. Left lung is clear. Heart size is normal.     Impression: Impression: Moderate sized right hydropneumothorax. Electronically Signed: Susan Leahy MD  6/12/2025 12:41 PM EDT  Workstation ID: [id number]    US Thoracentesis  Result Date: 6/12/2025  PROCEDURE: US THORACENTESIS-  ATTENDING: Jose Hope M.D.  CLINICAL HISTORY: Large right pleural effusion.  TECHNIQUE:  The risk, benefits, and alternatives were described in detail and the patient was brought to the ultrasound suite and positioned seated. The skin entry site was prepped and draped utilizing standard sterile technique. 1% lidocaine was administered to the soft tissues of the right posterior thorax.   A 5 Lao Yueh was advanced into the right pleural space.   A total of 1.4 L was removed from the right pleural space. A specimen was sent to the laboratory for analysis. The needle was removed and a sterile dressing was applied.  The patient tolerated the procedure well and there were no complications.      Impression: Successful ultrasound-guided right thoracentesis.   Attestation  Signer name: Jose Hope MD I attest that I was present for the entire procedure. I reviewed the stored images and agree with the report as written.      6/12/2025 12:17 PM by Jose Hope MD on Workstation: [id number]        Results for orders placed during the hospital encounter of 05/13/25    Adult Transthoracic Echo Complete w/ Color, Spectral and Contrast if necessary per protocol    Interpretation Summary    Left ventricular ejection fraction appears to be 66 - 70%.    Normal right ventricular cavity size, wall thickness, systolic function and septal motion noted.    There is a small (<1cm) pericardial effusion adjacent to the right ventricle. There is no evidence of cardiac tamponade.    There is a left pleural effusion.      Current medications:  Scheduled Meds:[Held by provider] apixaban, 5 mg, Oral, Q12H  arformoterol, 15 mcg, Nebulization, BID - RT   And  budesonide, 0.5 mg, Nebulization, BID - RT   And  revefenacin, 175 mcg, Nebulization, Daily - RT  atorvastatin, 10 mg, Oral, Nightly  levothyroxine, 88 mcg, Oral, Q AM  lidocaine PF 1%, 5 mL, Injection, Once  senna-docusate sodium, 2 tablet, Oral, BID  sertraline, 100 mg, Oral, Daily  sodium chloride, 10 mL, Intravenous, Q12H  sodium chloride, 10 mL, Intravenous, Q12H  traZODone, 150 mg, Oral, Nightly      Continuous Infusions:heparin, 16 Units/kg/hr, Last Rate: 16 Units/kg/hr (06/12/25 1912)  Pharmacy to Dose Heparin,       PRN Meds:.  acetaminophen **OR** acetaminophen **OR** acetaminophen    senna-docusate sodium **AND** polyethylene glycol **AND** bisacodyl **AND** bisacodyl    Calcium Replacement - Follow Nurse / BPA Driven Protocol    clonazePAM    cyclobenzaprine    famotidine    HYDROmorphone **AND** naloxone    Magnesium Standard Dose Replacement - Follow Nurse / BPA Driven Protocol    ondansetron ODT **OR** ondansetron    oxyCODONE-acetaminophen    Pharmacy to Dose Heparin    Phosphorus Replacement - Follow Nurse / BPA Driven Protocol     Potassium Replacement - Follow Nurse / BPA Driven Protocol    sodium chloride    sodium chloride    sodium chloride    sodium chloride    zolpidem    Assessment & Plan   Assessment & Plan     Active Hospital Problems    Diagnosis  POA    **Recurrent pleural effusion [J90]  Yes    Adenocarcinoma of right lung [C34.91]  Yes    HLD (hyperlipidemia) [E78.5]  Yes    Acquired hypothyroidism [E03.9]  Yes      Resolved Hospital Problems   No resolved problems to display.      Brief Hospital Course to date:  Sydnie Gamble is a 65 y.o. female with PMH of Adenocarcinoma of lung, anemia, cervical cancer, depression, HLD, hypothyroidism, PE, GERD and had home oxygen. Patient had a thoracentesis with chest tube placement, admitted for further management.    This patient's problems and plans were partially entered by my partner and updated as appropriate by me 06/13/25.     Recurrent Pleural Effusion  Adenocarcinoma of Right lung  -s/p thoracentesis and chest tube placement by IR, CTS consulted to help manage this  -- currently under treatment with Dr. Mckenzie and he wants to hold on pleur-x drain for now. He wants her to receive Keytruda  for a month.   --Palliative care consulted, continue pain control  - She is currently on 4 L NC, wean as able  -Continue DuoNeb    Hypothyroidism  -- cont home meds      HLD  -- cont statin      Generalized anxiety disorder  Depression  --cont home meds     LLE DVT  PE  -- hold eliquis   -- heparin drip while she has CT     All problems listed above are new to me today    Expected Discharge Location and Transportation: TBD  Expected Discharge   Expected Discharge Date: 6/16/2025; Expected Discharge Time:      VTE Prophylaxis:  Pharmacologic VTE prophylaxis orders are present.       AM-PAC 6 Clicks Score (PT): 19 (06/12/25 2006)    CODE STATUS:   Code Status and Medical Interventions: CPR (Attempt to Resuscitate); Full Support; also talked over with  Jose Francisco   Ordered at: 06/12/25 0056      Code Status (Patient has no pulse and is not breathing):    CPR (Attempt to Resuscitate)     Medical Interventions (Patient has pulse or is breathing):    Full Support     Comments:    also talked over with  Jose Francisco     Level Of Support Discussed With:    Patient       Next of Kin (If No Surrogate)       Yolanda Plasencia MD  06/13/25        Electronically signed by Yolanda Plasencia MD at 06/13/25 0991          Consult Notes (all)        Laura Sandoval PA-C at 06/13/25 1206        Consult Orders    1. Inpatient Cardiothoracic Surgery Consult [[id number]] ordered by Nuha Shafer APRN at 06/12/25 1745                      [facility] Cardiothoracic Surgery In-Patient Consult    Name:  Sydnie Gamble  MRN Number:  [id number]  Date of Admission:  6/12/2025  Date of Consultation: 6/13/2025    Consulting Provider:    PCP: Dee Elena APRN  IP Care Team:  Patient Care Team:  Dee Elena APRN as PCP - General (Nurse Practitioner)  Cliff Montana MD as Consulting Physician (Radiation Oncology)  Toni Mcclelland MD as Consulting Physician (Pulmonary Disease)  Erik Mckeon DO as Consulting Physician (Pulmonary Disease)  Sejal Mckenzie MD as Referring Physician (Hematology and Oncology)  Rahel Johnson, YVETTE as Ambulatory  (Oncology) ([facility] [place])    Reason for Consultation: Chest tube management    History of Present Illness:    Sydnie Gamble is a 65 y.o. female with a medical history significant for metastatic adenocarcinoma of the lung, with recurrent malignant pleural effusions requiring frequent thoracentesis-on Keytruda following with Dr. Mckenzie, oncology, and palliative care clinic, recent PE/left lower extremity DVT on Eliquis at home, cervical cancer, hypothyroidism, hyperlipidemia, depression, GERD, on home oxygen.  She was admitted to internal medicine yesterday, 6/12/25, and underwent CT-guided right chest  tube placement in IR.  CT surgery is consulted for chest tube management.  She reports some continued shortness of air this morning, and mild discomfort at the chest tube insertion site.  VSS on 4L O2 via NC.  She remains on IV heparin drip while chest tube in place.    Review of Systems:  Review of Systems   Respiratory:  Positive for shortness of breath.        Hospital Problems:   Recurrent pleural effusion    Acquired hypothyroidism    HLD (hyperlipidemia)    Adenocarcinoma of right lung       Past Medical History:    Past Medical History:   Diagnosis Date    Acid reflux     Acid reflux     Adenocarcinoma of lung 08/12/2024    Anemia, chronic disease 5/13/2025    Anxiety 1976    Arthritis     Atherosclerotic cardiovascular disease 03/25/2024    Cervical cancer     Depression     Emphysema of lung     History of radiation therapy 11/20/2020    pelvis + intracavitary brachytherapy    History of radiation therapy 10/04/2024    RUL lung    Hx of radiation therapy     Hyperlipidemia     Hypothyroidism     Kidney disease     Lung cancer     Lung nodule     Ovarian cyst 1980s    Pleural effusion 5/13/2025    Pulmonary embolism 5/13/2025    Visual impairment corrected with glasses    Wears dentures     Wears glasses        Past Surgical History:    Past Surgical History:   Procedure Laterality Date    BRONCHOSCOPY      BRONCHOSCOPY WITH ION ROBOTIC ASSIST N/A 08/06/2024    Procedure: BRONCHOSCOPY NAVIGATION WITH ENDOBRONCHIAL ULTRASOUND AND ION ROBOT;  Surgeon: Toni Mcclelland MD;  Location: [facility] ENDOSCOPY;  Service: Robotics - Pulmonary;  Laterality: N/A;    COLONOSCOPY      HYSTEROSCOPY      LUNG BIOPSY      LYMPH NODE BIOPSY      MOHS SURGERY  03/24/2025    Groin    SUBTOTAL HYSTERECTOMY  1987?    ovary/fallopian tube removed    TOTAL ABDOMINAL HYSTERECTOMY PELVIC NODE DISSECTION N/A 08/18/2020    Procedure: TOTAL RADICAL HYSTERECTOMY PELVIC NODE DISSECTION;  Surgeon: Laura Jimenez MD;  Location: [facility] OR;   Service: Gynecology Oncology;  Laterality: N/A;    TUBAL ABDOMINAL LIGATION      right with ovarian dissection        Family History:    Family History   Problem Relation Age of Onset    Cancer Mother     Hypertension Mother     Mental illness Mother     Anxiety disorder Mother     Asthma Mother     COPD Mother     Depression Mother     Emphysema Mother     Uterine cancer Mother     Melanoma Mother     Cancer Father     Early death Father     Cancer Maternal Aunt     Cancer Maternal Aunt     Cancer Maternal Aunt     Cancer Maternal Aunt     Cancer Maternal Grandmother     Heart attack Maternal Grandfather     Heart disease Maternal Grandfather         Heart Attack 65 yo    Breast cancer Neg Hx     Ovarian cancer Neg Hx        Social History:    Social History     Socioeconomic History    Marital status:    Tobacco Use    Smoking status: Former     Current packs/day: 0.00     Average packs/day: 1.5 packs/day for 45.0 years (67.5 ttl pk-yrs)     Types: Cigarettes     Start date: 1972     Quit date: 3/30/2019     Years since quittin.2     Passive exposure: Past    Smokeless tobacco: Never    Tobacco comments:     quit analog cigs in march, still vapes daily   Vaping Use    Vaping status: Every Day    Substances: Nicotine   Substance and Sexual Activity    Alcohol use: No    Drug use: No    Sexual activity: Not Currently     Partners: Male       Allergies:  No Known Allergies      Physical Exam:  Vital Signs:    Temp:  [97.4 °F (36.3 °C)-98.3 °F (36.8 °C)] 98 °F (36.7 °C)  Heart Rate:  [] 103  Resp:  [16-21] 18  BP: (105-128)/(58-79) 118/76  Body mass index is 23.25 kg/m².     Physical Exam  Constitutional:       General: She is not in acute distress.     Appearance: Normal appearance.   HENT:      Head: Normocephalic and atraumatic.   Eyes:      Extraocular Movements: Extraocular movements intact.      Conjunctiva/sclera: Conjunctivae normal.   Cardiovascular:      Rate and Rhythm: Regular rhythm.       Heart sounds: S1 normal and S2 normal. No murmur heard.     Comments: 's  Pulmonary:      Effort: Pulmonary effort is normal. No respiratory distress.      Comments: Diminished at bases.  Chest tube in place serosanguineous output.  No airleak.   Neurological:      Mental Status: She is alert.       Output by Drain (mL) 06/12/25 0701 - 06/12/25 1900 06/12/25 1901 - 06/13/25 0700 06/13/25 0701 - 06/13/25 1223 Range Total   Chest Tube 1 Right Midclavicular 90 50  140     Labs/Imaging/Procedures:   Results from last 7 days   Lab Units 06/12/25  1755   SODIUM mmol/L 136   POTASSIUM mmol/L 5.0   CHLORIDE mmol/L 97*   CO2 mmol/L 28.0   BUN mg/dL 21.7   CREATININE mg/dL 0.87   CALCIUM mg/dL 7.8*   BILIRUBIN mg/dL 0.2   ALK PHOS U/L 455*   ALT (SGPT) U/L 26   AST (SGOT) U/L 49*   GLUCOSE mg/dL 117*         Results from last 7 days   Lab Units 06/13/25  0341 06/12/25  1755   WBC 10*3/mm3 20.73* 17.59*   HEMOGLOBIN g/dL 10.5* 10.7*   HEMATOCRIT % 33.9* 35.1   PLATELETS 10*3/mm3 220 192     Results from last 7 days   Lab Units 06/12/25  1755   INR  1.17*   APTT seconds 32.7*     Imaging:  XR Chest 1 View  Result Date: 6/13/2025  Impression: 1.Interval placement of right pleural catheter with fluid component of right basilar hydropneumothorax. 2.Stable underlying right basilar airspace opacities, likely due to atelectasis. Electronically Signed: Maryellen Dalton MD  6/13/2025 7:23 AM EDT  Workstation ID: [id number]    CT Guided Chest Tube  Result Date: 6/12/2025  Successful CT-guided 10 Thai right sided chest tube placement.   6/12/2025 4:13 PM by Jose Hope MD on Workstation: [id number]      XR Chest 1 View  Result Date: 6/12/2025  Impression: 1.Similar right-sided hydropneumothorax. 2.Similar right basilar opacity may reflect atelectasis or infection. Electronically Signed: Clay Johnson MD  6/12/2025 2:31 PM EDT  Workstation ID: [id number]    XR Chest 1 View  Result Date: 6/12/2025  Impression: Slightly  decreased right pleural fluid component with otherwise similar appearing moderate size hydropneumothorax. Electronically Signed: Toni Argueta MD  6/12/2025 12:53 PM EDT  Workstation ID: [id number]    XR Chest 1 View  Result Date: 6/12/2025  Impression: Moderate sized right hydropneumothorax. Electronically Signed: Susan Leahy MD  6/12/2025 12:41 PM EDT  Workstation ID: [id number]    US Thoracentesis  Result Date: 6/12/2025  Successful ultrasound-guided right thoracentesis.   Attestation Signer name: Jose Hope MD I attest that I was present for the entire procedure. I reviewed the stored images and agree with the report as written.      6/12/2025 12:17 PM by Jose Hope MD on Workstation: [id number]      Assessment:    Recurrent pleural effusion    Acquired hypothyroidism    HLD (hyperlipidemia)    Adenocarcinoma of right lung      Plan:  Continue chest tube to -20 suction  Daily chest x-ray  Per chart review, oncology is recommending Keytruda treatment for one month prior to consideration of Pleur-x catheter placement.     Laura Sandoval PA-C  12:06 EDT  06/13/25     Thank you for allowing us to participate in the care of your patient. Please do not hesitate to contact us with additional questions or concerns.       Electronically signed by Laura Sandoval PA-C at 06/13/25 1228       Kaylene Reynoso APRN at 06/13/25 1203        Consult Orders    1. Inpatient Palliative Care MD Consult [[id number]] ordered by Nuha Shafer APRN at 06/12/25 9063                 Palliative Care Initial Consult   Attending Physician: Yolanda Plasencia MD  Referring Provider: DAYSI Manzo    Reason for Referral:  assistance with clarification of goals of care and pain    Code Status:   Code Status and Medical Interventions: CPR (Attempt to Resuscitate); Full Support; also talked over with  Jose Francisco   Ordered at: 06/12/25 5527     Code Status (Patient has no pulse and is not breathing):    CPR (Attempt  "to Resuscitate)     Medical Interventions (Patient has pulse or is breathing):    Full Support     Comments:    also talked over with  Jose Francisco     Level Of Support Discussed With:    Patient       Next of Kin (If No Surrogate)      Advanced Directives: Advance Directive Status: Patient does not have advance directive   Family/Support: Jose Francisco Diaz (spouse), Dipti Mccormack (dtr)  Goals of Care: TBD.    HPI: Sydnie Gamble is a 65 y.o. female with PMH significant for adenocarcinoma of lung follows with Dr. Mckenzie, anemia, cervical cancer, depression, HLD, hypothyroidism, PE, GERD, COPD on 3-4LNC ATC. Patient presented to [facility] for thoracentesis and CT placement on 6/12 for recurrent right pleural effusion due to adenocarcinoma. Palliative Care consulted for GOC in the context of complex medical decision making and symptom management.   Follows at Palliative Clinic. Patient receiving Flexeril 10mg PO x1, Hydromorphone 0.5mg IV x3 in the last 24 hours.      Patient alert and oriented, spouse at bedside. She reports most concerning pain to right side of neck and left chest wall at CT insertion site. She has pain, \"all over\" at baseline. She reports left neck pain started a few months ago and did not have any injury noted. Patient reports generalized pain improved with Percocet and Hydromorphone, neck pain improved with Flexeril. Spouse endorses patient with poor intake and weight loss, uses a walker at home. Her LBM has been a week ago, using Miralax at home with no BM.     ROS: +pain, right neck, worse with movement, left chest wall CT insertion site, \"all over\", constant. +decreased appetite. +debility, using walker at home. +shortness of breath, currently on 4LNC.       Past Medical History:   Diagnosis Date    Acid reflux     Acid reflux     Adenocarcinoma of lung 08/12/2024    Anemia, chronic disease 5/13/2025    Anxiety 1976    Arthritis     Atherosclerotic cardiovascular disease 03/25/2024    Cervical cancer     " Depression     Emphysema of lung     History of radiation therapy 2020    pelvis + intracavitary brachytherapy    History of radiation therapy 10/04/2024    RUL lung    Hx of radiation therapy     Hyperlipidemia     Hypothyroidism     Kidney disease     Lung cancer     Lung nodule     Ovarian cyst 1980s    Pleural effusion 2025    Pulmonary embolism 2025    Visual impairment corrected with glasses    Wears dentures     Wears glasses      Past Surgical History:   Procedure Laterality Date    BRONCHOSCOPY      BRONCHOSCOPY WITH ION ROBOTIC ASSIST N/A 2024    Procedure: BRONCHOSCOPY NAVIGATION WITH ENDOBRONCHIAL ULTRASOUND AND ION ROBOT;  Surgeon: Toni Mcclelland MD;  Location:  [facility];  Service: Robotics - Pulmonary;  Laterality: N/A;    COLONOSCOPY      HYSTEROSCOPY      LUNG BIOPSY      LYMPH NODE BIOPSY      MOHS SURGERY  2025    Groin    SUBTOTAL HYSTERECTOMY  ?    ovary/fallopian tube removed    TOTAL ABDOMINAL HYSTERECTOMY PELVIC NODE DISSECTION N/A 2020    Procedure: TOTAL RADICAL HYSTERECTOMY PELVIC NODE DISSECTION;  Surgeon: Laura Jimenez MD;  Location:  [facility];  Service: Gynecology Oncology;  Laterality: N/A;    TUBAL ABDOMINAL LIGATION      right with ovarian dissection      Social History     Socioeconomic History    Marital status:    Tobacco Use    Smoking status: Former     Current packs/day: 0.00     Average packs/day: 1.5 packs/day for 45.0 years (67.5 ttl pk-yrs)     Types: Cigarettes     Start date: 1972     Quit date: 3/30/2019     Years since quittin.2     Passive exposure: Past    Smokeless tobacco: Never    Tobacco comments:     quit analog cigs in march, still vapes daily   Vaping Use    Vaping status: Every Day    Substances: Nicotine   Substance and Sexual Activity    Alcohol use: No    Drug use: No    Sexual activity: Not Currently     Partners: Male     Family History   Problem Relation Age of Onset    Cancer Mother      "Hypertension Mother     Mental illness Mother     Anxiety disorder Mother     Asthma Mother     COPD Mother     Depression Mother     Emphysema Mother     Uterine cancer Mother     Melanoma Mother     Cancer Father     Early death Father     Cancer Maternal Aunt     Cancer Maternal Aunt     Cancer Maternal Aunt     Cancer Maternal Aunt     Cancer Maternal Grandmother     Heart attack Maternal Grandfather     Heart disease Maternal Grandfather         Heart Attack 65 yo    Breast cancer Neg Hx     Ovarian cancer Neg Hx        No Known Allergies    Current medication reviewed for route, type, dose and frequency and are current per MAR at time of dictation.    Palliative Performance Scale Score:  40%    /79 (BP Location: Right arm, Patient Position: Lying)   Pulse 103   Temp 98 °F (36.7 °C) (Oral)   Resp 16   Ht 154.9 cm (60.98\")   Wt 55.8 kg (123 lb)   LMP  (LMP Unknown)   SpO2 96%   BMI 23.25 kg/m²     Intake/Output Summary (Last 24 hours) at 6/13/2025 1031  Last data filed at 6/13/2025 0505  Gross per 24 hour   Intake 240 ml   Output 140 ml   Net 100 ml       Physical Exam:    General Appearance:    Patient laying in bed, awake, alert, A/C ill appearing, frail,  cooperative, NAD   HEENT:    NC/AT, EOMI, anicteric, MMM, face relaxed   Neck:   supple, trachea midline, no JVD   Lungs:     CTA bilat, diminished in bases; respirations regular, even and unlabored; RR 16-18 on exam, 4LNC, CT dressing to left chest wall    Heart:    RRR, normal S1 and S2, no M/R/G,    Abdomen:     Normal bowel sounds, soft, nontender, nondistended   G/U:   Deferred   MSK/Extremities:   Wasting, no edema   Pulses:   Pulses palpable and equal bilaterally   Skin:   Warm, dry   Neurologic:   A/Ox3, cooperative, RICE   Psych:   Calm, appropriate         Labs:   Results from last 7 days   Lab Units 06/13/25  0341   WBC 10*3/mm3 20.73*   HEMOGLOBIN g/dL 10.5*   HEMATOCRIT % 33.9*   PLATELETS 10*3/mm3 220     Results from last 7 " days   Lab Units 06/12/25  1755   SODIUM mmol/L 136   POTASSIUM mmol/L 5.0   CHLORIDE mmol/L 97*   CO2 mmol/L 28.0   BUN mg/dL 21.7   CREATININE mg/dL 0.87   GLUCOSE mg/dL 117*   CALCIUM mg/dL 7.8*     Results from last 7 days   Lab Units 06/12/25  1755   SODIUM mmol/L 136   POTASSIUM mmol/L 5.0   CHLORIDE mmol/L 97*   CO2 mmol/L 28.0   BUN mg/dL 21.7   CREATININE mg/dL 0.87   CALCIUM mg/dL 7.8*   BILIRUBIN mg/dL 0.2   ALK PHOS U/L 455*   ALT (SGPT) U/L 26   AST (SGOT) U/L 49*   GLUCOSE mg/dL 117*     Imaging Results (Last 72 Hours)       Procedure Component Value Units Date/Time    XR Chest 1 View [[id number]] Collected: 06/13/25 0712     Updated: 06/13/25 0726    Narrative:      XR CHEST 1 VW    Date of Exam: 6/13/2025 2:49 AM EDT    Indication: post Ct placement and thoracentesis    Comparison: AP chest x-ray 6/12/2025, 6/5/2025    Findings:  There is a new right pleural catheter. Previously seen right basilar pleural air has now been replaced with pleural fluid. No apical pneumothorax is seen. There are stable underlying right basilar airspace opacities. Left lung appears clear.   Cardiomediastinal contours are stable.      Impression:      Impression:  1.Interval placement of right pleural catheter with fluid component of right basilar hydropneumothorax.  2.Stable underlying right basilar airspace opacities, likely due to atelectasis.        Electronically Signed: Maryellen Dalton MD    6/13/2025 7:23 AM EDT    Workstation ID: [id number]    XR Chest 1 View [[id number]] Collected: 06/12/25 1429     Updated: 06/12/25 1434    Narrative:      XR CHEST 1 VW    Date of Exam: 6/12/2025 1:45 PM EDT    Indication: s/p thoracentesis; c/o severe right neck pain    Comparison: 6/12/2025    Findings:  Cardiac size is similar to prior exam. Similar right-sided hydropneumothorax. Similar right basilar opacity. No evidence of acute osseous abnormalities. Visualized upper abdomen is unremarkable.      Impression:       Impression:  1.Similar right-sided hydropneumothorax.  2.Similar right basilar opacity may reflect atelectasis or infection.        Electronically Signed: Clay Johnson MD    6/12/2025 2:31 PM EDT    Workstation ID: [id number]    XR Chest 1 View [[id number]] Collected: 06/12/25 1251     Updated: 06/12/25 1256    Narrative:      XR CHEST 1 VW    Date of Exam: 6/12/2025 12:25 PM EDT    Indication: s/p thoracentesis; c/o severe right neck pain    Comparison: Chest radiograph 6/12/2025    Findings:  Grossly stable size of the right hydropneumothorax, measuring up to 7 mm at apex with larger right subpulmonic component. Previous noted fluid extending towards the apex appears decreased. Left lung relatively clear. There is partial collapse and   probable underlying atelectasis within the right lower lobe. Negative for left pneumothorax. Stable cardiomediastinal silhouette. Degenerative related osseous change.      Impression:      Impression:  Slightly decreased right pleural fluid component with otherwise similar appearing moderate size hydropneumothorax.      Electronically Signed: Toni Argueta MD    6/12/2025 12:53 PM EDT    Workstation ID: [id number]    XR Chest 1 View [[id number]] Collected: 06/12/25 1230     Updated: 06/12/25 1244    Narrative:      XR CHEST 1 VW    Date of Exam: 6/12/2025 11:23 AM EDT    Indication: s/p thoracentesis    Comparison: Chest x-ray 6/5/2025    Findings:  There is a pneumothorax on the right. It is moderate in size and seen inferiorly measuring up to 2.3 cm. There is a moderate mount of pleural fluid as well. There is right lower lobe airspace opacity which is improved from previous exam. Left lung is   clear. Heart size is normal.      Impression:      Impression:  Moderate sized right hydropneumothorax.      Electronically Signed: Susan Leahy MD    6/12/2025 12:41 PM EDT    Workstation ID: [id number]    US Thoracentesis [[id number]] Collected: 06/12/25 1217    Specimen: Body Fluid  Updated: 06/12/25 1221    Narrative:      PROCEDURE: US THORACENTESIS-     ATTENDING: Jose Hope M.D.     CLINICAL HISTORY: Large right pleural effusion.     TECHNIQUE:     The risk, benefits, and alternatives were described in detail and the  patient was brought to the ultrasound suite and positioned seated. The  skin entry site was prepped and draped utilizing standard sterile  technique. 1% lidocaine was administered to the soft tissues of the  right posterior thorax.        A 5 Slovak Yueh was advanced into the right pleural space.        A total of 1.4 L was removed from the right pleural space. A specimen  was sent to the laboratory for analysis. The needle was removed and a  sterile dressing was applied.     The patient tolerated the procedure well and there were no  complications.       Impression:      Successful ultrasound-guided right thoracentesis.        Attestation  Signer name: Jose Hope MD  I attest that I was present for the entire procedure. I reviewed the  stored images and agree with the report as written.                 6/12/2025 12:17 PM by Jose Hope MD on Workstation: [id number]                     Diagnostics: Reviewed    A:   Recurrent pleural effusion    Acquired hypothyroidism    HLD (hyperlipidemia)    Adenocarcinoma of right lung     65 yl.o. female with recurrent pleural effusion secondary to adenocarcinoma of right lung, pain, debility, decreased appetite, shortness of breath.   S/S:   Pain -MSK, neoplastic  -continue Flexeril 10mg BID prn muscle spasms  -continue Voltaren gel to neck/shoulder four times a day  -continue Percocet 5-325mg PO q 4 hours prn moderate pain  -continue Hydromorphone 0.5mg IV q 2 hours prn severe pain    2. Shortness of breath -CT in place, 4LNC in place  -nebs    3. Decreased intake -ordered Boost Plus one time to try chocolate for patient , consider scheduling TID if patient prefers    4. Constipation -LBM one week ago per patient  -continue  bowel regimen  -Bisacodyl MD once today    5. Sleeplessness -Ambien nightly prn    6. Debility -uses a walker at baseline    7.  GOC -Full Code/Full Support -reviewed with patient and spouse  -information regarding code status given to patient  -discussed LW/ACP, aware of option to complete this hospitalization  -discussed symptoms and medications as above    P: Patient know to Palliative Clinic. Reviewed symptoms and medications adjusted as above. Reviewed code status and information given regarding MV/CPR. Further consults pending today. Will continue to follow and support.     Thank you for this consult and allowing us to participate in patient's plan of care. Palliative Care Team will continue to follow patient. Please do not hesitate to contact us regarding further symptom management or goals of care needs.  Time: 60 minutes spent reviewing medical and medication records, assessing and examining patient, discussing with family, answering questions, providing some guidance about a plan and documentation of care, and coordinating care with other healthcare members, with > 50% time spent face to face.         DAYSI Sykes  6/13/2025      Electronically signed by Kaylene Reynoso APRN at 06/13/25 3182

## 2025-06-13 NOTE — PLAN OF CARE
Goal Outcome Evaluation:  Plan of Care Reviewed With: patient        Progress: improving  Outcome Evaluation: pt. remains on heparin drip at 16u/kg/hr recheck scheduled at 0800.  pt. on 3.5L NC, sinus tach on tele, chest tube in place, dressing c,d,i.  pt. complaint of neck pain at the beginning of the shift, prn dilaudid given with relief per patient.  plan of care ongoing

## 2025-06-13 NOTE — CONSULTS
Subjective     CHIEF COMPLAINT: Shortness of breath    HISTORY OF PRESENT ILLNESS:  The patient is a 65 y.o. female, referred by Yolanda Plasencia MD for metastatic right lower lobe lung adenocarcinoma.  The patient was started on palliative Keytruda single agent.  Second, 2025.  She is status post 1 dose.  She presented to outpatient IR for right thoracentesis and required chest tube placement.  She was admitted hospitalist service afterwards I was consulted to further assist in her care.      REVIEW OF SYSTEMS:  A 14 point review of systems was performed and is negative except as noted above.    Past Medical History:   Diagnosis Date    Acid reflux     Acid reflux     Adenocarcinoma of lung 08/12/2024    Anemia, chronic disease 5/13/2025    Anxiety 1976    Arthritis     Atherosclerotic cardiovascular disease 03/25/2024    Cervical cancer     Depression     Emphysema of lung     History of radiation therapy 11/20/2020    pelvis + intracavitary brachytherapy    History of radiation therapy 10/04/2024    RUL lung    Hx of radiation therapy     Hyperlipidemia     Hypothyroidism     Kidney disease     Lung cancer     Lung nodule     Ovarian cyst 1980s    Pleural effusion 5/13/2025    Pulmonary embolism 5/13/2025    Visual impairment corrected with glasses    Wears dentures     Wears glasses        No current facility-administered medications on file prior to encounter.     Current Outpatient Medications on File Prior to Encounter   Medication Sig Dispense Refill    albuterol sulfate  (90 Base) MCG/ACT inhaler Inhale 2 puffs Every 4 (Four) to 6 (Six) Hours As Needed for Wheezing or Shortness of Air.      apixaban (ELIQUIS) 5 MG tablet tablet Take 2 tablets by mouth Every 12 (Twelve) Hours for 6 days, THEN 1 tablet Every 12 (Twelve) Hours for 24 days. Indications: DVT/PE (active thrombosis) 48 tablet 5    atorvastatin (LIPITOR) 10 MG tablet TAKE 1 TABLET BY MOUTH EVERY DAY AT NIGHT 90 tablet 1    clonazePAM  (KlonoPIN) 1 MG tablet Take 1 tablet by mouth 2 (Two) Times a Day As Needed for Anxiety. 50 tablet 0    Diclofenac Sodium (VOLTAREN) 1 % gel gel Apply 1 gram topically to indicated area 4 times daily as needed for mild to moderate pain. STOP FLEXERIL 100 g 2    docusate sodium (COLACE) 100 MG capsule Take 2 capsules by mouth Daily. 60 capsule 3    famotidine (PEPCID) 20 MG tablet TAKE 1 TABLET BY MOUTH TWICE DAILY OR TAKE 2 TABLETS BY MOUTH ONCE DAILY AS NEEDED FOR HEARTBURN 180 tablet 1    Fluticasone-Umeclidin-Vilant (Trelegy Ellipta) 100-62.5-25 MCG/ACT inhaler Inhale 1 puff Daily. 90 each 3    levothyroxine (Synthroid) 88 MCG tablet Take 1 tablet by mouth Every Morning. 90 tablet 1    ondansetron (ZOFRAN) 8 MG tablet Take 1 tablet by mouth 3 (Three) Times a Day As Needed for Nausea or Vomiting. 30 tablet 5    oxyCODONE-acetaminophen (PERCOCET) 5-325 MG per tablet Take 1 tablet by mouth Every 6 (Six) Hours As Needed for Moderate Pain. 120 tablet 0    sertraline (Zoloft) 100 MG tablet Take 1 tablet by mouth Daily. 90 tablet 1    traZODone (DESYREL) 150 MG tablet TAKE 1 TABLET BY MOUTH EVERY DAY AT NIGHT 90 tablet 3    zolpidem (AMBIEN) 10 MG tablet Take 1 tablet by mouth At Night As Needed for Sleep. 30 tablet 1    [DISCONTINUED] cyclobenzaprine (FLEXERIL) 10 MG tablet Take 1 tablet by mouth 2 (Two) Times a Day As Needed for Muscle Spasms.         No Known Allergies    Past Surgical History:   Procedure Laterality Date    BRONCHOSCOPY      BRONCHOSCOPY WITH ION ROBOTIC ASSIST N/A 08/06/2024    Procedure: BRONCHOSCOPY NAVIGATION WITH ENDOBRONCHIAL ULTRASOUND AND ION ROBOT;  Surgeon: Toni Mcclelland MD;  Location: [facility] ENDOSCOPY;  Service: Robotics - Pulmonary;  Laterality: N/A;    COLONOSCOPY      HYSTEROSCOPY      LUNG BIOPSY      LYMPH NODE BIOPSY      MOHS SURGERY  03/24/2025    Groin    SUBTOTAL HYSTERECTOMY  1987?    ovary/fallopian tube removed    TOTAL ABDOMINAL HYSTERECTOMY PELVIC NODE DISSECTION N/A  2020    Procedure: TOTAL RADICAL HYSTERECTOMY PELVIC NODE DISSECTION;  Surgeon: Laura Jimenez MD;  [facility];  Service: Gynecology Oncology;  Laterality: N/A;    TUBAL ABDOMINAL LIGATION      right with ovarian dissection        OB History    Para Term  AB Living   1 1 1      SAB IAB Ectopic Molar Multiple Live Births        1      # Outcome Date GA Lbr Solis/2nd Weight Sex Type Anes PTL Lv   1 Term                Social History     Socioeconomic History    Marital status:    Tobacco Use    Smoking status: Former     Current packs/day: 0.00     Average packs/day: 1.5 packs/day for 45.0 years (67.5 ttl pk-yrs)     Types: Cigarettes     Start date: 1972     Quit date: 3/30/2019     Years since quittin.2     Passive exposure: Past    Smokeless tobacco: Never    Tobacco comments:     quit analog cigs in march, still vapes daily   Vaping Use    Vaping status: Every Day    Substances: Nicotine   Substance and Sexual Activity    Alcohol use: No    Drug use: No    Sexual activity: Not Currently     Partners: Male       Family History   Problem Relation Age of Onset    Cancer Mother     Hypertension Mother     Mental illness Mother     Anxiety disorder Mother     Asthma Mother     COPD Mother     Depression Mother     Emphysema Mother     Uterine cancer Mother     Melanoma Mother     Cancer Father     Early death Father     Cancer Maternal Aunt     Cancer Maternal Aunt     Cancer Maternal Aunt     Cancer Maternal Aunt     Cancer Maternal Grandmother     Heart attack Maternal Grandfather     Heart disease Maternal Grandfather         Heart Attack 63 yo    Breast cancer Neg Hx     Ovarian cancer Neg Hx        Objective     Vitals:    25 0400 25 0500 25 0729 25 1129   BP:  111/63 123/79 118/76   BP Location:  Right arm Right arm Right arm   Patient Position:  Lying Lying Lying   Pulse: 106 109 103    Resp:  16 16 18   Temp:  98.3 °F (36.8 °C) 98 °F (36.7 °C)  98 °F (36.7 °C)   TempSrc:  Oral Oral Oral   SpO2: 96% 92% 96%    Weight:       Height:                            ECOG Performance Status: 2 - Symptomatic, <50% confined to bed  General: well appearing female in no acute distress  Neuro/Psych: A&O x 3, gait steady, appropriate affect, strength 5/5 in all muscle groups  HEENT: sclerae anicteric, oropharynx clear  Lymphatics: no cervical, supraclavicular, or axillary adenopathy  Cardiovascular: regular rate and rhythm, no murmurs  Lungs: clear to auscultation bilaterally  Abdomen: soft, nontender, nondistended.  No palpable organomegaly  Extremities: no lower extremity edema  Skin: no rashes, lesions, bruising, or petechiae      Admission on 06/12/2025   Component Date Value Ref Range Status    Glucose 06/12/2025 117 (H)  65 - 99 mg/dL Final    BUN 06/12/2025 21.7  8.0 - 23.0 mg/dL Final    Creatinine 06/12/2025 0.87  0.57 - 1.00 mg/dL Final    Sodium 06/12/2025 136  136 - 145 mmol/L Final    Potassium 06/12/2025 5.0  3.5 - 5.2 mmol/L Final    Chloride 06/12/2025 97 (L)  98 - 107 mmol/L Final    CO2 06/12/2025 28.0  22.0 - 29.0 mmol/L Final    Calcium 06/12/2025 7.8 (L)  8.6 - 10.5 mg/dL Final    Total Protein 06/12/2025 6.1  6.0 - 8.5 g/dL Final    Albumin 06/12/2025 2.8 (L)  3.5 - 5.2 g/dL Final    ALT (SGPT) 06/12/2025 26  1 - 33 U/L Final    AST (SGOT) 06/12/2025 49 (H)  1 - 32 U/L Final    Alkaline Phosphatase 06/12/2025 455 (H)  39 - 117 U/L Final    Total Bilirubin 06/12/2025 0.2  0.0 - 1.2 mg/dL Final    Globulin 06/12/2025 3.3  gm/dL Final    Calculated Result    A/G Ratio 06/12/2025 0.8  g/dL Final    BUN/Creatinine Ratio 06/12/2025 24.9  7.0 - 25.0 Final    Anion Gap 06/12/2025 11.0  5.0 - 15.0 mmol/L Final    eGFR 06/12/2025 73.1  >60.0 mL/min/1.73 Final    Heparin Anti-Xa (UFH) 06/12/2025 0.48  0.30 - 0.70 IU/ml Final    Protime 06/12/2025 15.6 (H)  12.2 - 15.3 Seconds Final    INR 06/12/2025 1.17 (H)  0.89 - 1.12 Final    PTT 06/12/2025 32.7 (L)  60.0 -  90.0 seconds Final    WBC 06/12/2025 17.59 (H)  3.40 - 10.80 10*3/mm3 Final    RBC 06/12/2025 3.88  3.77 - 5.28 10*6/mm3 Final    Hemoglobin 06/12/2025 10.7 (L)  12.0 - 15.9 g/dL Final    Hematocrit 06/12/2025 35.1  34.0 - 46.6 % Final    MCV 06/12/2025 90.5  79.0 - 97.0 fL Final    MCH 06/12/2025 27.6  26.6 - 33.0 pg Final    MCHC 06/12/2025 30.5 (L)  31.5 - 35.7 g/dL Final    RDW 06/12/2025 14.8  12.3 - 15.4 % Final    RDW-SD 06/12/2025 47.6  37.0 - 54.0 fl Final    MPV 06/12/2025 10.0  6.0 - 12.0 fL Final    Platelets 06/12/2025 192  140 - 450 10*3/mm3 Final    Neutrophil % 06/12/2025 80.4 (H)  42.7 - 76.0 % Final    Lymphocyte % 06/12/2025 4.2 (L)  19.6 - 45.3 % Final    Monocyte % 06/12/2025 8.2  5.0 - 12.0 % Final    Eosinophil % 06/12/2025 3.9  0.3 - 6.2 % Final    Basophil % 06/12/2025 0.5  0.0 - 1.5 % Final    Immature Grans % 06/12/2025 2.8 (H)  0.0 - 0.5 % Final    Neutrophils, Absolute 06/12/2025 14.13 (H)  1.70 - 7.00 10*3/mm3 Final    Lymphocytes, Absolute 06/12/2025 0.74  0.70 - 3.10 10*3/mm3 Final    Monocytes, Absolute 06/12/2025 1.45 (H)  0.10 - 0.90 10*3/mm3 Final    Eosinophils, Absolute 06/12/2025 0.68 (H)  0.00 - 0.40 10*3/mm3 Final    Basophils, Absolute 06/12/2025 0.09  0.00 - 0.20 10*3/mm3 Final    Immature Grans, Absolute 06/12/2025 0.50 (H)  0.00 - 0.05 10*3/mm3 Final    nRBC 06/12/2025 0.0  0.0 - 0.2 /100 WBC Final    Heparin Anti-Xa (UFH) 06/13/2025 0.54  0.30 - 0.70 IU/ml Final    WBC 06/13/2025 20.73 (H)  3.40 - 10.80 10*3/mm3 Final    RBC 06/13/2025 3.77  3.77 - 5.28 10*6/mm3 Final    Hemoglobin 06/13/2025 10.5 (L)  12.0 - 15.9 g/dL Final    Hematocrit 06/13/2025 33.9 (L)  34.0 - 46.6 % Final    MCV 06/13/2025 89.9  79.0 - 97.0 fL Final    MCH 06/13/2025 27.9  26.6 - 33.0 pg Final    MCHC 06/13/2025 31.0 (L)  31.5 - 35.7 g/dL Final    RDW 06/13/2025 14.8  12.3 - 15.4 % Final    RDW-SD 06/13/2025 47.6  37.0 - 54.0 fl Final    MPV 06/13/2025 10.1  6.0 - 12.0 fL Final    Platelets  06/13/2025 220  140 - 450 10*3/mm3 Final    Neutrophil % 06/13/2025 77.0 (H)  42.7 - 76.0 % Final    Lymphocyte % 06/13/2025 5.7 (L)  19.6 - 45.3 % Final    Monocyte % 06/13/2025 8.6  5.0 - 12.0 % Final    Eosinophil % 06/13/2025 5.9  0.3 - 6.2 % Final    Basophil % 06/13/2025 0.5  0.0 - 1.5 % Final    Immature Grans % 06/13/2025 2.3 (H)  0.0 - 0.5 % Final    Neutrophils, Absolute 06/13/2025 15.98 (H)  1.70 - 7.00 10*3/mm3 Final    Lymphocytes, Absolute 06/13/2025 1.18  0.70 - 3.10 10*3/mm3 Final    Monocytes, Absolute 06/13/2025 1.78 (H)  0.10 - 0.90 10*3/mm3 Final    Eosinophils, Absolute 06/13/2025 1.22 (H)  0.00 - 0.40 10*3/mm3 Final    Basophils, Absolute 06/13/2025 0.10  0.00 - 0.20 10*3/mm3 Final    Immature Grans, Absolute 06/13/2025 0.47 (H)  0.00 - 0.05 10*3/mm3 Final    nRBC 06/13/2025 0.1  0.0 - 0.2 /100 WBC Final    Heparin Anti-Xa (UFH) 06/13/2025 0.55  0.30 - 0.70 IU/ml Final        XR Chest 1 View  Result Date: 6/13/2025  Narrative: XR CHEST 1 VW Date of Exam: 6/13/2025 2:49 AM EDT Indication: post Ct placement and thoracentesis Comparison: AP chest x-ray 6/12/2025, 6/5/2025 Findings: There is a new right pleural catheter. Previously seen right basilar pleural air has now been replaced with pleural fluid. No apical pneumothorax is seen. There are stable underlying right basilar airspace opacities. Left lung appears clear. Cardiomediastinal contours are stable.     Impression: Impression: 1.Interval placement of right pleural catheter with fluid component of right basilar hydropneumothorax. 2.Stable underlying right basilar airspace opacities, likely due to atelectasis. Electronically Signed: Maryellen Dalton MD  6/13/2025 7:23 AM EDT  Workstation ID: [id number]    CT Guided Chest Tube  Result Date: 6/12/2025  Narrative: DATE OF EXAM: 6/12/2025 2:48 PM  PROCEDURE: CT GUIDED CHEST TUBE PLACEMENT-  INDICATIONS: right chest tube placement; J95.811-Postprocedural pneumothorax  DLP: 746 mGycm  TECHNIQUE:  The  risks, benefits, and alternatives were discussed in detail with the patient. The patient was brought down to the CT suite and positioned supine on the CT table. Preliminary CT scan of the the chest was performed and a skin entry site was selected and marked. The area was prepped and draped utilizing standard sterile technique. 1% lidocaine was administered to the soft tissues. A small skin incision was made with an 11 blade scalpel. Under CT guidance a 5 Jamaican Yueh was advanced into the right pleural space. The inner stylet was removed and an 035 wire was advanced coaxially. Over the wire a 10 Jamaican all-purpose drainage catheter was advanced with the pigtail formed and locked within the pleural space the catheter was sutured to the skin and attached to a Pleur-evac. A sterile dressing was then applied.  The patient tolerated the procedure well and there were no immediate complications.      Impression: Successful CT-guided 10 Jamaican right sided chest tube placement.   6/12/2025 4:13 PM by Jose Hope MD on Workstation: [id number]      XR Chest 1 View  Result Date: 6/12/2025  Narrative: XR CHEST 1 VW Date of Exam: 6/12/2025 1:45 PM EDT Indication: s/p thoracentesis; c/o severe right neck pain Comparison: 6/12/2025 Findings: Cardiac size is similar to prior exam. Similar right-sided hydropneumothorax. Similar right basilar opacity. No evidence of acute osseous abnormalities. Visualized upper abdomen is unremarkable.     Impression: Impression: 1.Similar right-sided hydropneumothorax. 2.Similar right basilar opacity may reflect atelectasis or infection. Electronically Signed: Clay Johnson MD  6/12/2025 2:31 PM EDT  Workstation ID: [id number]    XR Chest 1 View  Result Date: 6/12/2025  Narrative: XR CHEST 1 VW Date of Exam: 6/12/2025 12:25 PM EDT Indication: s/p thoracentesis; c/o severe right neck pain Comparison: Chest radiograph 6/12/2025 Findings: Grossly stable size of the right hydropneumothorax, measuring  up to 7 mm at apex with larger right subpulmonic component. Previous noted fluid extending towards the apex appears decreased. Left lung relatively clear. There is partial collapse and probable underlying atelectasis within the right lower lobe. Negative for left pneumothorax. Stable cardiomediastinal silhouette. Degenerative related osseous change.     Impression: Impression: Slightly decreased right pleural fluid component with otherwise similar appearing moderate size hydropneumothorax. Electronically Signed: Toni Argueta MD  6/12/2025 12:53 PM EDT  Workstation ID: [id number]    XR Chest 1 View  Result Date: 6/12/2025  Narrative: XR CHEST 1 VW Date of Exam: 6/12/2025 11:23 AM EDT Indication: s/p thoracentesis Comparison: Chest x-ray 6/5/2025 Findings: There is a pneumothorax on the right. It is moderate in size and seen inferiorly measuring up to 2.3 cm. There is a moderate mount of pleural fluid as well. There is right lower lobe airspace opacity which is improved from previous exam. Left lung is clear. Heart size is normal.     Impression: Impression: Moderate sized right hydropneumothorax. Electronically Signed: Susan Leahy MD  6/12/2025 12:41 PM EDT  Workstation ID: [id number]    US Thoracentesis  Result Date: 6/12/2025  Narrative: PROCEDURE: US THORACENTESIS-  ATTENDING: Jose Hope M.D.  CLINICAL HISTORY: Large right pleural effusion.  TECHNIQUE:  The risk, benefits, and alternatives were described in detail and the patient was brought to the ultrasound suite and positioned seated. The skin entry site was prepped and draped utilizing standard sterile technique. 1% lidocaine was administered to the soft tissues of the right posterior thorax.   A 5 [place] Islander Yueh was advanced into the right pleural space.   A total of 1.4 L was removed from the right pleural space. A specimen was sent to the laboratory for analysis. The needle was removed and a sterile dressing was applied.  The patient tolerated the  procedure well and there were no complications.      Impression: Successful ultrasound-guided right thoracentesis.   Attestation Signer name: Jose Hope MD I attest that I was present for the entire procedure. I reviewed the stored images and agree with the report as written.      6/12/2025 12:17 PM by Jose Hope MD on Workstation: [id number]      XR Chest 1 View  Result Date: 6/5/2025  Narrative: XR CHEST 1 VW Date of Exam: 6/5/2025 6:41 AM EDT Indication: cough Comparison: Chest radiograph 6/5/2025 at 0422 hours Findings: There is been slight decrease in the size of the large right pleural effusion. There is persistent right pleural fluid and right basilar airspace disease. Airspace disease is most likely atelectasis with pneumonia not excluded. The left lung remains clear. There is no pneumothorax.     Impression: Impression: Slight decrease in the size of the large right pleural effusion. Electronically Signed: Rodriguez Singh MD  6/5/2025 7:00 AM EDT  Workstation ID: [id number]    XR Chest 1 View  Result Date: 6/5/2025  Narrative: XR CHEST 1 VW Date of Exam: 6/5/2025 4:18 AM EDT Indication: SOA triage protocol Comparison: Chest radiograph 5/28/2025 Findings: The heart size and pulmonary vascular markings are normal. The left lung is clear. There is a large right pleural effusion with associated right basilar atelectasis. There is no pneumothorax. The osseous structures are normal for age.     Impression: Impression: Large right pleural effusion with right basilar atelectasis. Electronically Signed: Rodriguez Singh MD  6/5/2025 4:47 AM EDT  Workstation ID: [id number]    US Thoracentesis  Result Date: 5/28/2025  Narrative: PROCEDURE: Ultrasound-guided right thoracentesis  Procedural Personnel Attending physician(s): Erik Mensah  Pre-procedure diagnosis: Right pleural effusion  Post-procedure diagnosis: Same  Complications: No immediate complications.      Impression:  Ultrasound-guided thoracentesis with  drainage of 1400 mL of serous fluid.   _______________________________________________________________   PROCEDURE SUMMARY:  - Limited thoracic ultrasound - Ultrasound-guided right thoracentesis - Additional procedure(s): None  PROCEDURE DETAILS:   Pre-procedure Consent: Informed consent for the procedure including risks, benefits and alternatives was obtained and time-out was performed prior to the procedure. Preparation: The site was prepared and draped using maximal sterile barrier technique including cutaneous antisepsis.    Limited thoracic ultrasound: Limited thoracic ultrasound was performed using a curved transducer. A safe window for thoracentesis was identified. Right hemithorax findings: Moderate to large right-sided pleural effusion   Thoracentesis  Local anesthesia was administered. The pleural space was accessed and fluid return confirmed position. The fluid was drained. The catheter was removed, and a sterile bandage was applied.  Catheter placed: 5 Kazakh catheter.  Additional Details  Equipment details: None Specimens removed: Pleural fluid Estimated blood loss (mL): Less than 10   5/28/2025 12:05 PM by Erik Mensah MD on Workstation: [id number]      XR Chest 1 View  Result Date: 5/28/2025  Narrative: XR CHEST 1 VW Date of Exam: 5/28/2025 10:19 AM EDT Indication: S/P Right Thoracentesis Comparison: 5/14/2025 chest radiograph. Ultrasound-guided thoracentesis of 5/20/2025 Findings: By history, thoracentesis earlier today removed 1.4 L of fluid from the right hemithorax. Follow-up chest radiograph shows patchy right mid and lower lung atelectasis and perhaps a small amount of remaining pleural fluid, versus discoid atelectasis. There is no evidence of pneumothorax. Left lung remains clear. Heart and vasculature appear normal in size.     Impression: Impression: Patchy right mid and lower lung atelectasis and questionable small remaining right effusion. No evidence of pneumothorax or other  significant chest disease elsewhere. Electronically Signed: Marlon Lyons MD  5/28/2025 10:49 AM EDT  Workstation ID: [id number]    NM PET/CT Skull Base to Mid Thigh  Result Date: 5/28/2025  Narrative: FDG NM PET/CT SKULL BASE TO MID THIGH Date of Exam: 5/22/2025 8:25 AM EDT Indication: restaging scans lung cancer. Comparison: 5/13/2025 and 7/11/2024 Technique: 8.1 mCi of F-18 FDG was administered intravenously. PET imaging was obtained from skull base to mid-thigh approximately 60 minutes after radiotracer injection. A low dose non contrast CT was obtained for attenuation correction and anatomic localization. Fused PET-CT and 3D MIP reconstructions were utilized for image interpretation.  Fasting blood glucose level: 113 mg/dl. Reference uptake values: Mediastinum: 2.8 SUVmax Liver: 2.7 SUVmax Normalization method: Body Weight Findings: Head and neck: No abnormal FDG activity identified. Chest: Increasing moderate to large right effusion which is loculated. Spiculated right middle lobe nodule has an SUV maximum of 4.4. There is extensive right hilar adenopathy with an SUV max 8.8. There is subcarinal and mediastinal adenopathy ranging between 8 and 5 SUV maximum. Abdomen and pelvis: At least 20 FDG-avid liver lesions are identified measuring up to 5 cm in diameter and an SUV maximum of 13.5. There is left adrenal lesion with an SUV maximum of 5. There is extensive julio hepatis and portacaval adenopathy with an SUV maximum of 8.6. There is retroperitoneal adenopathy, including left periaortic and aortocaval abnormal lymph nodes. Musculoskeletal: Diffuse osseous metastatic disease with multifocal disease throughout the cervical thoracolumbar spine, bilateral ribs, pelvis, including the left acetabulum and to a lesser extent the right acetabulum. There are bilateral femoral lesions as well. No definite pathologic fractures are identified at this time. Several of the lesions however are involving weight-bearing bones and  the patient is susceptible to pathologic fractures.     Impression: Impression: Extensive metastatic disease involving the chest, abdomen, and pelvis as well as the bones. Electronically Signed: Herb Lopes MD  5/28/2025 9:18 AM EDT  Workstation ID: [id number]    MRI Brain With & Without Contrast  Result Date: 5/16/2025  Narrative: MRI BRAIN W WO CONTRAST Date of Exam: 5/16/2025 12:12 AM EDT Indication: Metastatic lung cancer.  Comparison: 8/25/2024 Technique:  Routine multiplanar/multisequence sequence images of the brain were obtained before and after the uneventful administration of 12 mL Multihance. Findings: There is mild increased T2 and FLAIR signal in the periventricular white matter suggestive of mild chronic microvascular ischemic change. There is no diffusion restriction to suggest acute infarct. There is no evidence of acute or chronic intracranial hemorrhage. No mass effect or midline shift. No abnormal extra-axial collections. The major vascular flow voids appear intact. The basal ganglia, brainstem and cerebellum appear within normal limits. Calvarial and superficial soft tissue signal is within  normal limits. Orbits appear unremarkable. The paranasal sinuses and the mastoid air cells appear well aerated. Midline structures are intact.  There is no abnormal intracranial enhancement. There is normal enhancement within the intracranial vasculature including the dural venous sinuses.     Impression: Impression: Mild chronic microvascular ischemic change. No acute intracranial process. No evidence of intracranial metastatic disease. Electronically Signed: Rodriguez Singh MD  5/16/2025 1:15 AM EDT  Workstation ID: [id number]    US Thoracentesis  Result Date: 5/14/2025  Narrative: PROCEDURE: US THORACENTESIS-  ATTENDING: Jose Hope M.D.  CLINICAL HISTORY: Large right-sided pleural effusion.  TECHNIQUE:  The risk, benefits, and alternatives were described in detail and the patient was brought to the  ultrasound suite and positioned seated. The skin entry site was prepped and draped utilizing standard sterile technique. 1% lidocaine was applied to the soft tissues of the posterior right thorax   A 5 Lao Yueh was advanced into the right pleural space.   A total of 1150 mL was removed from the right pleural space. A specimen was sent to the laboratory for analysis. The needle was removed and a sterile dressing was applied.  The patient tolerated the procedure well and there were no complications.      Impression: Successful ultrasound-guided right thoracentesis.   Attestation Signer name: Jose Hope MD I attest that I was present for the entire procedure. I reviewed the stored images and agree with the report as written.      5/14/2025 2:32 PM by Jose Hope MD on Workstation: [id number]      XR Chest 1 View  Result Date: 5/14/2025  Narrative: XR CHEST 1 VW Date of Exam: 5/14/2025 1:15 PM EDT Indication: s/p thora Comparison: CT chest 5/13/2025, AP chest x-ray 5/13/2025 Findings: No pneumothorax is seen following thoracentesis. There is improved aeration of the right lung with persistent patchy opacities in the right midlung and right lung base. Left lung appears clear. Cardiomediastinal contours are stable.     Impression: Impression: 1.No visible pneumothorax following thoracentesis. 2.Improved aeration of the right lung with persistent patchy opacities in the right midlung and right lung base. Electronically Signed: Maryellen Dalton MD  5/14/2025 1:49 PM EDT  Workstation ID: [id number]      ASSESSMENT metastatic right lower lobe lung adenocarcinoma    PROBLEM LIST   1.  Right lower lobe lung adenocarcinoma, T1b N0 M0 stage I A2, PD-L1 85%:  A.  Present with abnormal screening scan done March 22, 2024 with persistent abnormality June 4, 2024.  B.  Whole-body PET scan done July 11, 2024 revealed mild hypermetabolic activity in the medial right lower lobe lung nodule with patchy bilateral lung infiltrates none  PET avid.  C.  Negative MRI brain done August 25, 2024.  D.  Bronchoscopy with biopsy August 6, 2024 right lower lobe positive for adenocarcinoma negative nodes and negative right upper lobe biopsy but atypical cytology.  E.  Treated with CyberKnife radiotherapy September 2024.  F.  Whole-body PET scan done May 22, 2025 revealed widely metastatic disease.  G.  Started Keytruda June 2, 2025.  2.  Malignant right pleural effusion:  A.  Status post thoracentesis done May 14, 2025 with positive cytology.  B.  Status post thoracentesis June 12, 2025 chest tube placement.  3.  Advanced COPD:  A.  PFTs done on March 25, 2024 revealed FEV1 of 1 L.  4.  Hypercholesteremia  5.  Hypothyroid  6.  Cervical squamous cell carcinoma:  A.  Status post total hysterectomy followed by adjuvant radiation August 2020.    PLAN  1.  Metastatic right lower lobe lung adenocarcinoma:  A.  Status post first dose of Keytruda June 2, 2025  B.  She will follow-up with me as scheduled in 3 weeks cycle #2.    2.  Recurrent right lower effusion:  A.  Status post another thoracentesis on June 12, 2025 with chest tube placement.  B.  The patient might require pigtail catheter insertion if she continue to have recurrent effusions.    3.  Cancer related pain:  A.  Continue opioids as needed.  B.  Appreciate palliative assistance.    Sejal Mckenzie MD    6/13/2025

## 2025-06-13 NOTE — THERAPY EVALUATION
Patient Name: Sydnie Gamble  : 1960    MRN: [id number]                              Today's Date: 2025       Admit Date: 2025    Visit Dx:     ICD-10-CM ICD-9-CM   1. Recurrent right pleural effusion  J90 511.9   2. Adenocarcinoma of right lung  C34.91 162.9     Patient Active Problem List   Diagnosis    Personal history of tobacco use, presenting hazards to health    Gastroesophageal reflux disease without esophagitis    Acquired hypothyroidism    Vitamin D deficiency    Prediabetes    COPD with asthma    ALESIA (generalized anxiety disorder)    Multiple lung nodules on CT    Hilar adenopathy    HLD (hyperlipidemia)    Cervical cancer    Atherosclerotic cardiovascular disease    Adenocarcinoma of right lung    Pulmonary embolism    Anemia, chronic disease    Recurrent pleural effusion     Past Medical History:   Diagnosis Date    Acid reflux     Acid reflux     Adenocarcinoma of lung 2024    Anemia, chronic disease 2025    Anxiety 1976    Arthritis     Atherosclerotic cardiovascular disease 2024    Cervical cancer     Depression     Emphysema of lung     History of radiation therapy 2020    pelvis + intracavitary brachytherapy    History of radiation therapy 10/04/2024    RUL lung    Hx of radiation therapy     Hyperlipidemia     Hypothyroidism     Kidney disease     Lung cancer     Lung nodule     Ovarian cyst 1980s    Pleural effusion 2025    Pulmonary embolism 2025    Visual impairment corrected with glasses    Wears dentures     Wears glasses      Past Surgical History:   Procedure Laterality Date    BRONCHOSCOPY      BRONCHOSCOPY WITH ION ROBOTIC ASSIST N/A 2024    Procedure: BRONCHOSCOPY NAVIGATION WITH ENDOBRONCHIAL ULTRASOUND AND ION ROBOT;  Surgeon: Toni Mcclelland MD;  [facility];  Service: Robotics - Pulmonary;  Laterality: N/A;    COLONOSCOPY      HYSTEROSCOPY      LUNG BIOPSY      LYMPH NODE BIOPSY      MOHS SURGERY   03/24/2025    Groin    SUBTOTAL HYSTERECTOMY  1987?    ovary/fallopian tube removed    TOTAL ABDOMINAL HYSTERECTOMY PELVIC NODE DISSECTION N/A 08/18/2020    Procedure: TOTAL RADICAL HYSTERECTOMY PELVIC NODE DISSECTION;  Surgeon: Laura Jimenez MD;  [facility];  Service: Gynecology Oncology;  Laterality: N/A;    TUBAL ABDOMINAL LIGATION      right with ovarian dissection       General Information       Row Name 06/13/25 1018          Physical Therapy Time and Intention    Document Type evaluation  -NS     Mode of Treatment individual therapy;physical therapy  -NS       Row Name 06/13/25 1018          General Information    Patient Profile Reviewed yes  -NS     Prior Level of Function independent:;all household mobility;gait;transfer;bed mobility;ADL's  pt uses a RW at baseline, has been ambulating household distances only  -NS     Existing Precautions/Restrictions fall;oxygen therapy device and L/min  chest tube to suction  -NS     Barriers to Rehab medically complex;previous functional deficit  -NS       Row Name 06/13/25 1018          Living Environment    Current Living Arrangements home  -NS     People in Home spouse  -NS       Row Name 06/13/25 1018          Home Main Entrance    Number of Stairs, Main Entrance one  -NS       Row Name 06/13/25 1018          Stairs Within Home, Primary    Number of Stairs, Within Home, Primary none  -NS       Row Name 06/13/25 1018          Cognition    Orientation Status (Cognition) oriented x 3  -NS       Row Name 06/13/25 1018          Safety Issues/Impairments Affecting Functional Mobility    Safety Issues Affecting Function (Mobility) safety precaution awareness;safety precautions follow-through/compliance;sequencing abilities  -NS     Impairments Affecting Function (Mobility) balance;endurance/activity tolerance;motor planning;strength;shortness of breath;pain  -NS               User Key  (r) = Recorded By, (t) = Taken By, (c) = Cosigned By      Initials Name  Provider Type    Khushboo Castaneda, MAICOL Physical Therapist                   Mobility       Row Name 06/13/25 1018          Bed Mobility    Bed Mobility supine-sit;sit-supine;scooting/bridging  -NS     Scooting/Bridging Gray (Bed Mobility) dependent (less than 25% patient effort);2 person assist  -NS     Supine-Sit Gray (Bed Mobility) minimum assist (75% patient effort);verbal cues  -NS     Sit-Supine Gray (Bed Mobility) contact guard  -NS     Assistive Device (Bed Mobility) bed rails;head of bed elevated;repositioning sheet  -NS     Comment, (Bed Mobility) Cues for sequencing, increased time spent sitting EOB due to pt feeling SOA then IV coming out. RN present and managing. Pt deferred further OOB actvity due to fatigue and SOA. O2 sat stable on 3-4L O2.  -NS       Row Name 06/13/25 1018          Transfers    Comment, (Transfers) Pt deferred  -NS       Row Name 06/13/25 1018          Bed-Chair Transfer    Bed-Chair Gray (Transfers) not tested  -NS       Row Name 06/13/25 1018          Gait/Stairs (Locomotion)    Gray Level (Gait) not tested  -NS     Comment, (Gait/Stairs) Pt deferred.  -NS               User Key  (r) = Recorded By, (t) = Taken By, (c) = Cosigned By      Initials Name Provider Type    Khushboo Castaneda PT Physical Therapist                   Obj/Interventions       Row Name 06/13/25 1018          Range of Motion Comprehensive    General Range of Motion bilateral lower extremity ROM WFL  -NS       Row Name 06/13/25 1018          Strength Comprehensive (MMT)    General Manual Muscle Testing (MMT) Assessment lower extremity strength deficits identified  -NS     Comment, General Manual Muscle Testing (MMT) Assessment BLE grossly 4/5  -NS       Row Name 06/13/25 1018          Balance    Balance Assessment sitting static balance;sitting dynamic balance  -NS     Static Sitting Balance contact guard  -NS     Dynamic Sitting Balance contact guard  -NS     Position,  Sitting Balance unsupported;sitting edge of bed  -NS       [facility] Name 06/13/25 1018          Sensory Assessment (Somatosensory)    Sensory Assessment (Somatosensory) LE sensation intact  -NS               User Key  (r) = Recorded By, (t) = Taken By, (c) = Cosigned By      Initials Name Provider Type    Khushboo Castaneda PT Physical Therapist                   Goals/Plan       Row Name 06/13/25 1018          Bed Mobility Goal 1 (PT)    Activity/Assistive Device (Bed Mobility Goal 1, PT) supine to sit  -NS     [place] Level/Cues Needed (Bed Mobility Goal 1, PT) standby assist  -NS     Time Frame (Bed Mobility Goal 1, PT) short term goal (STG);5 days  -NS       Row Name 06/13/25 1018          Transfer Goal 1 (PT)    Activity/Assistive Device (Transfer Goal 1, PT) sit-to-stand/stand-to-sit;bed-to-chair/chair-to-bed  -NS     [place] Level/Cues Needed (Transfer Goal 1, PT) contact guard required  -NS     Time Frame (Transfer Goal 1, PT) long term goal (LTG);10 days  -NS       Row Name 06/13/25 1018          Gait Training Goal 1 (PT)    Activity/Assistive Device (Gait Training Goal 1, PT) gait (walking locomotion);assistive device use  -NS     [place] Level (Gait Training Goal 1, PT) contact guard required  -NS     Distance (Gait Training Goal 1, PT) 15  -NS     Time Frame (Gait Training Goal 1, PT) long term goal (LTG);10 days  -NS       Row Name 06/13/25 1018          Therapy Assessment/Plan (PT)    Planned Therapy Interventions (PT) balance training;bed mobility training;gait training;home exercise program;strengthening;patient/family education;transfer training;neuromuscular re-education  -NS               User Key  (r) = Recorded By, (t) = Taken By, (c) = Cosigned By      Initials Name Provider Type    Khushboo Castaneda PT Physical Therapist                   Clinical Impression       Row Name 06/13/25 1018          Pain    Pretreatment Pain Rating 6/10  -NS     Posttreatment Pain Rating 6/10  -NS      Pain Location chest  -NS     Pain Side/Orientation --  incisional chest  -NS     Pain Management Interventions nursing notified;premedicated for activity  -NS     Response to Pain Interventions activity participation with tolerable pain  -NS     Pre/Posttreatment Pain Comment pt premedicated for activity  -NS       Row Name 06/13/25 1018          Plan of Care Review    Plan of Care Reviewed With patient;spouse  -NS     Progress no change  -NS     Outcome Evaluation Patient presents with weakness and poor endurance affecting functional mobility. She tolerated sitting EOB for several minutes but was limited by fatigue and dyspnea. O2 sat stable on 3-4L. IP PT services warranted to support return to baseline. Recommend SNF at d/c.  -NS       Row Name 06/13/25 1018          Therapy Assessment/Plan (PT)    Patient/Family Therapy Goals Statement (PT) to return to PLOF  -NS     Rehab Potential (PT) fair  -NS     Criteria for Skilled Interventions Met (PT) yes;meets criteria;skilled treatment is necessary  -NS     Therapy Frequency (PT) daily  -NS     Predicted Duration of Therapy Intervention (PT) 10 days  -NS       Row Name 06/13/25 1018          Vital Signs    Pre Systolic BP Rehab 123  -NS     Pre Treatment Diastolic BP 79  -NS     Pretreatment Heart Rate (beats/min) 116  -NS     Posttreatment Heart Rate (beats/min) 120  -NS     Pre SpO2 (%) 99  -NS     O2 Delivery Pre Treatment nasal cannula  -NS     Post SpO2 (%) 94  -NS     O2 Delivery Post Treatment nasal cannula  -NS     Pre Patient Position Supine  -NS     Intra Patient Position Sitting  -NS     Post Patient Position Supine  -NS       Row Name 06/13/25 1018          Positioning and Restraints    Pre-Treatment Position in bed  -NS     Post Treatment Position bed  -NS     In Bed notified nsg;supine;call light within reach;encouraged to call for assist;exit alarm on;with family/caregiver;with nsg  -NS               User Key  (r) = Recorded By, (t) = Taken By, (c) =  Cosigned By      Initials Name Provider Type    Khushboo Castaneda PT Physical Therapist                   Outcome Measures       Row Name 06/13/25 1018          How much help from another person do you currently need...    Turning from your back to your side while in flat bed without using bedrails? 3  -NS     Moving from lying on back to sitting on the side of a flat bed without bedrails? 3  -NS     Moving to and from a bed to a chair (including a wheelchair)? 3  -NS     Standing up from a chair using your arms (e.g., wheelchair, bedside chair)? 3  -NS     Climbing 3-5 steps with a railing? 2  -NS     To walk in hospital room? 2  -NS     AM-PAC 6 Clicks Score (PT) 16  -NS     Highest Level of Mobility Goal Stand (1 or More Minutes)-5  -NS       Row Name 06/13/25 1018          Functional Assessment    Outcome Measure Options AM-PAC 6 Clicks Basic Mobility (PT)  -NS               User Key  (r) = Recorded By, (t) = Taken By, (c) = Cosigned By      Initials Name Provider Type    Khushboo Castaneda PT Physical Therapist                                 Physical Therapy Education       Title: PT OT SLP Therapies (In Progress)       Topic: Physical Therapy (In Progress)       Point: Mobility training (Done)       Learning Progress Summary            Patient Acceptance, E, VU,NR by NS at 6/13/2025 1319                      Point: Home exercise program (Not Started)       Learner Progress:  Not documented in this visit.              Point: Body mechanics (Done)       Learning Progress Summary            Patient Acceptance, E, VU,NR by NS at 6/13/2025 1319                      Point: Precautions (Done)       Learning Progress Summary            Patient Acceptance, E, VU,NR by NS at 6/13/2025 1319                                      User Key       Initials Effective Dates Name Provider Type Discipline    NS 06/16/21 -  Khushboo Morgan PT Physical Therapist PT                  PT Recommendation and Plan  Planned Therapy  Interventions (PT): balance training, bed mobility training, gait training, home exercise program, strengthening, patient/family education, transfer training, neuromuscular re-education  Progress: no change  Outcome Evaluation: Patient presents with weakness and poor endurance affecting functional mobility. She tolerated sitting EOB for several minutes but was limited by fatigue and dyspnea. O2 sat stable on 3-4L. IP PT services warranted to support return to baseline. Recommend SNF at d/c.     Time Calculation:   PT Evaluation Complexity  History, PT Evaluation Complexity: 3 or more personal factors and/or comorbidities  Examination of Body Systems (PT Eval Complexity): total of 4 or more elements  Clinical Presentation (PT Evaluation Complexity): evolving  Clinical Decision Making (PT Evaluation Complexity): moderate complexity  Overall Complexity (PT Evaluation Complexity): moderate complexity     PT Charges       Row Name 06/13/25 1018             Time Calculation    Start Time 1018  -NS      PT Received On 06/13/25  -NS      PT Goal Re-Cert Due Date 06/23/25  -NS         Untimed Charges    PT Eval/Re-eval Minutes 48  -NS         Total Minutes    Untimed Charges Total Minutes 48  -NS       Total Minutes 48  -NS                User Key  (r) = Recorded By, (t) = Taken By, (c) = Cosigned By      Initials Name Provider Type    Khushboo Castaneda, PT Physical Therapist                  Therapy Charges for Today       Code Description Service Date Service Provider Modifiers Qty    [id number]  PT EVAL MOD COMPLEXITY 4 6/13/2025 Khushboo Morgan, PT GP 1            PT G-Codes  Outcome Measure Options: AM-PAC 6 Clicks Basic Mobility (PT)  AM-PAC 6 Clicks Score (PT): 16  PT Discharge Summary  Anticipated Discharge Disposition (PT): skilled nursing facility    Khushboo Morgan PT  6/13/2025

## 2025-06-13 NOTE — PROGRESS NOTES
Pharmacy to Dose Heparin Infusion Note    Sydnie Gamble is a 65 y.o. female receiving heparin infusion.     Therapy for (VTE/Cardiac): VTE  Patient Weight: 55.8 kg  Initial Bolus (Y/N): No  Any Bolus (Y/N): Yes     Signs or Symptoms of Bleeding: no, chest tube placed 6/12      VTE (PE/DVT)  Initial rate: 18 units/kg/hr (Max 1,500 units/hr)    Anti-Xa Bolus   Dose Infusion Hold   Time Infusion Rate Change (units/kg/hr) Repeat  Anti-Xa   < 0.11 50 units/kg  (4000 units Max) None Increase by  4 units/kg/hr 6 hours   0.11 - 0.19 25 units/kg  (2000 units Max) None Increase by  3 units/kg/hr 6 hours   0.2 - 0.29 0 None Increase by  2 units/kg/hr 6 hours   0.3 - 0.7 0 None No Change 6 hours (after 2 consecutive levels in range check qAM)   0.71 - 0.8 0 None Decrease by  1 units/kg/hr 6 hours   0.81 - 0.9 0 None Decrease by  2 units/kg/hr 6 hours   0.91 - 1 0 60 minutes Decrease by  3 units/kg/hr 6 hours   >1 0 Hold  After Anti-Xa less than 0.7 decrease previous rate by  4 units/kg/hr  Every 2 hours until Anti-Xa less than 0.7 then when infusion restarts in 6 hours     Results from last 7 days   Lab Units 06/13/25  0341 06/12/25  1755   INR   --  1.17*   HEMOGLOBIN g/dL 10.5* 10.7*   HEMATOCRIT % 33.9* 35.1   PLATELETS 10*3/mm3 220 192       Date   Time   Anti-Xa Current Rate (units/kg/hr) Bolus   (units) Rate Change   (units/kg/hr) New Rate (units/kg/hr) Repeat  Anti-Xa Comments /  Pump Check    6/12 1755 0.48 -- -- +16 +16 0100 Patient previously admitted in May and received treatment Eliquis for DVT. Eliquis filled 5/16/25 for current DVT, not a new clot. Baseline Xa was 0.48 and aptt was 32.7, I question her compliance with Eliquis. Initiated DVT Xa dosing with no initial bolus, chest tube placed 6/12.   DW RN   6/13 0114 0.54 16 -- -- 16 0800 [address] [facility]   6/13 0733 0.55 16 -- -- 16 6/14  AM labs LEWIS RN; pump verified                                                                                                                                                                                                           Marlys Davis, [facility]  6/13/2025  08:32 EDT

## 2025-06-13 NOTE — CASE MANAGEMENT/SOCIAL WORK
Discharge Planning Assessment  [facility]     Patient Name: Sydnie Gamble  MRN: [id number]  Today's Date: 6/13/2025    Admit Date: 6/12/2025    Plan: Home   Discharge Needs Assessment       Row Name 06/13/25 1021       Living Environment    People in Home spouse    Current Living Arrangements home    Primary Care Provided by self    Provides Primary Care For no one    Family Caregiver if Needed spouse    Family Caregiver Names Jose Francisco Diaz,  [phone]       Transition Planning    Patient/Family Anticipates Transition to home with family    Patient/Family Anticipated Services at Transition        Discharge Needs Assessment    Equipment Currently Used at Home oxygen;nebulizer;cane, straight;walker, rolling;wheelchair;pulse ox    Concerns to be Addressed denies needs/concerns at this time    Discharge Coordination/Progress Lives in a house in [place] with her , mostly independent with ADLs.  Has oxygen, nebulizer, cane, walker, wheelchair, and pulse ox at home.  Not current with home heatlh or outpatient therapy services.  No advanced directives.                   Discharge Plan       Row Name 06/13/25 1024       Plan    Plan Home    Patient/Family in Agreement with Plan yes    Plan Comments I spoke with Ms Gamble at bedside.  Ms Gamble resides in a house in [place] with her , and is mostly independent with ADLs.  She lives in a two story house at the address on file, and there are two steps to enter.  Her DME includes oxygen ([employer]), nebulizer, cane, walker, wheelchair, and pulse ox at home.  She is not current with any home health or outpatient therapy services.  She does not have an advanced directive.  Her insurance is [facility] with Medicare A&B as secondary, and she does have prescription coverage.  Her PCP is Linda Parnell, and she gets her prescriptions filled at [facility] in [place].  She denies any discharge needs. Family will be  able to transport home at discharge.    Final Discharge Disposition Code 01 - home or self-care                  Selected Continued Care - Episodes Includes continued care and service providers with selected services from the active episodes listed below          Selected Continued Care - Prior Encounters Includes continued care and service providers with selected services from prior encounters from 3/14/2025 to 6/13/2025      Discharged on 5/16/2025 Admission date: 5/13/2025 - Discharge disposition: Home or Self Care      Durable Medical Equipment       Service Provider Services Address Phone Fax Patient Preferred    [facility] [address] [place] [address] [place], [place] [id number] [phone] --                          Expected Discharge Date and Time       Expected Discharge Date Expected Discharge Time    Jun 16, 2025            Demographic Summary    No documentation.                  Functional Status       Row Name 06/13/25 1020       Functional Status    Usual Activity Tolerance moderate    Current Activity Tolerance moderate       Functional Status, IADL    Medications independent    Meal Preparation independent    Housekeeping assistive person    Laundry independent    Shopping assistive person       Mental Status    General Appearance WDL WDL                   Psychosocial    No documentation.                  Abuse/Neglect    No documentation.                  Legal    No documentation.                  Substance Abuse    No documentation.                  Patient Forms    No documentation.                     Laura Cruz, RN

## 2025-06-13 NOTE — CONSULTS
[facility] Cardiothoracic Surgery In-Patient Consult    Name:  Sydnie Gamble  MRN Number:  [id number]  Date of Admission:  6/12/2025  Date of Consultation: 6/13/2025    Consulting Provider:    PCP: Dee Elena APRN  IP Care Team:  Patient Care Team:  Dee Elena APRN as PCP - General (Nurse Practitioner)  Cliff Montana MD as Consulting Physician (Radiation Oncology)  Toni Mcclelland MD as Consulting Physician (Pulmonary Disease)  Erik Mckeon DO as Consulting Physician (Pulmonary Disease)  Sejal Mckenzie MD as Referring Physician (Hematology and Oncology)  Rahel Johnson RN as Ambulatory  (Oncology) ([facility])    Reason for Consultation: Chest tube management    History of Present Illness:    Sydnie Gamble is a 65 y.o. female with a medical history significant for metastatic adenocarcinoma of the lung, with recurrent malignant pleural effusions requiring frequent thoracentesis-on Keytruda following with Dr. Mckenzie, oncology, and palliative care clinic, recent PE/left lower extremity DVT on Eliquis at home, cervical cancer, hypothyroidism, hyperlipidemia, depression, GERD, on home oxygen.  She was admitted to internal medicine yesterday, 6/12/25, and underwent CT-guided right chest tube placement in IR.  CT surgery is consulted for chest tube management.  She reports some continued shortness of air this morning, and mild discomfort at the chest tube insertion site.  VSS on 4L O2 via NC.  She remains on IV heparin drip while chest tube in place.    Review of Systems:  Review of Systems   Respiratory:  Positive for shortness of breath.        Hospital Problems:   Recurrent pleural effusion    Acquired hypothyroidism    HLD (hyperlipidemia)    Adenocarcinoma of right lung       Past Medical History:    Past Medical History:   Diagnosis Date    Acid reflux     Acid reflux     Adenocarcinoma of lung 08/12/2024     Anemia, chronic disease 5/13/2025    Anxiety 1976    Arthritis     Atherosclerotic cardiovascular disease 03/25/2024    Cervical cancer     Depression     Emphysema of lung     History of radiation therapy 11/20/2020    pelvis + intracavitary brachytherapy    History of radiation therapy 10/04/2024    RUL lung    Hx of radiation therapy     Hyperlipidemia     Hypothyroidism     Kidney disease     Lung cancer     Lung nodule     Ovarian cyst 1980s    Pleural effusion 5/13/2025    Pulmonary embolism 5/13/2025    Visual impairment corrected with glasses    Wears dentures     Wears glasses        Past Surgical History:    Past Surgical History:   Procedure Laterality Date    BRONCHOSCOPY      BRONCHOSCOPY WITH ION ROBOTIC ASSIST N/A 08/06/2024    Procedure: BRONCHOSCOPY NAVIGATION WITH ENDOBRONCHIAL ULTRASOUND AND ION ROBOT;  Surgeon: Toni Mcclelland MD;  Location:  [facility];  Service: Robotics - Pulmonary;  Laterality: N/A;    COLONOSCOPY      HYSTEROSCOPY      LUNG BIOPSY      LYMPH NODE BIOPSY      MOHS SURGERY  03/24/2025    Groin    SUBTOTAL HYSTERECTOMY  1987?    ovary/fallopian tube removed    TOTAL ABDOMINAL HYSTERECTOMY PELVIC NODE DISSECTION N/A 08/18/2020    Procedure: TOTAL RADICAL HYSTERECTOMY PELVIC NODE DISSECTION;  Surgeon: Laura Jimenez MD;  Location:  [facility];  Service: Gynecology Oncology;  Laterality: N/A;    TUBAL ABDOMINAL LIGATION      right with ovarian dissection        Family History:    Family History   Problem Relation Age of Onset    Cancer Mother     Hypertension Mother     Mental illness Mother     Anxiety disorder Mother     Asthma Mother     COPD Mother     Depression Mother     Emphysema Mother     Uterine cancer Mother     Melanoma Mother     Cancer Father     Early death Father     Cancer Maternal Aunt     Cancer Maternal Aunt     Cancer Maternal Aunt     Cancer Maternal Aunt     Cancer Maternal Grandmother     Heart attack Maternal Grandfather     Heart disease  Maternal Grandfather         Heart Attack 65 yo    Breast cancer Neg Hx     Ovarian cancer Neg Hx        Social History:    Social History     Socioeconomic History    Marital status:    Tobacco Use    Smoking status: Former     Current packs/day: 0.00     Average packs/day: 1.5 packs/day for 45.0 years (67.5 ttl pk-yrs)     Types: Cigarettes     Start date: 1972     Quit date: 3/30/2019     Years since quittin.2     Passive exposure: Past    Smokeless tobacco: Never    Tobacco comments:     quit analog cigs in march, still vapes daily   Vaping Use    Vaping status: Every Day    Substances: Nicotine   Substance and Sexual Activity    Alcohol use: No    Drug use: No    Sexual activity: Not Currently     Partners: Male       Allergies:  No Known Allergies      Physical Exam:  Vital Signs:    Temp:  [97.4 °F (36.3 °C)-98.3 °F (36.8 °C)] 98 °F (36.7 °C)  Heart Rate:  [] 103  Resp:  [16-21] 18  BP: (105-128)/(58-79) 118/76  Body mass index is 23.25 kg/m².     Physical Exam  Constitutional:       General: She is not in acute distress.     Appearance: Normal appearance.   HENT:      Head: Normocephalic and atraumatic.   Eyes:      Extraocular Movements: Extraocular movements intact.      Conjunctiva/sclera: Conjunctivae normal.   Cardiovascular:      Rate and Rhythm: Regular rhythm.      Heart sounds: S1 normal and S2 normal. No murmur heard.     Comments: 's  Pulmonary:      Effort: Pulmonary effort is normal. No respiratory distress.      Comments: Diminished at bases.  Chest tube in place serosanguineous output.  No airleak.   Neurological:      Mental Status: She is alert.       Output by Drain (mL) 25 0701 - 25 1900 25 1901 - 25 0700 25 0701 - 25 1223 Range Total   Chest Tube 1 Right Midclavicular 90 50  140     Labs/Imaging/Procedures:   Results from last 7 days   Lab Units 25  1755   SODIUM mmol/L 136   POTASSIUM mmol/L 5.0   CHLORIDE mmol/L 97*    CO2 mmol/L 28.0   BUN mg/dL 21.7   CREATININE mg/dL 0.87   CALCIUM mg/dL 7.8*   BILIRUBIN mg/dL 0.2   ALK PHOS U/L 455*   ALT (SGPT) U/L 26   AST (SGOT) U/L 49*   GLUCOSE mg/dL 117*         Results from last 7 days   Lab Units 06/13/25  0341 06/12/25  1755   WBC 10*3/mm3 20.73* 17.59*   HEMOGLOBIN g/dL 10.5* 10.7*   HEMATOCRIT % 33.9* 35.1   PLATELETS 10*3/mm3 220 192     Results from last 7 days   Lab Units 06/12/25  1755   INR  1.17*   APTT seconds 32.7*     Imaging:  XR Chest 1 View  Result Date: 6/13/2025  Impression: 1.Interval placement of right pleural catheter with fluid component of right basilar hydropneumothorax. 2.Stable underlying right basilar airspace opacities, likely due to atelectasis. Electronically Signed: Maryellen Dalton MD  6/13/2025 7:23 AM EDT  Workstation ID: [id number]    CT Guided Chest Tube  Result Date: 6/12/2025  Successful CT-guided 10 Dutch right sided chest tube placement.   6/12/2025 4:13 PM by Jose Hope MD on Workstation: [id number]      XR Chest 1 View  Result Date: 6/12/2025  Impression: 1.Similar right-sided hydropneumothorax. 2.Similar right basilar opacity may reflect atelectasis or infection. Electronically Signed: Clay Johnson MD  6/12/2025 2:31 PM EDT  Workstation ID: [id number]    XR Chest 1 View  Result Date: 6/12/2025  Impression: Slightly decreased right pleural fluid component with otherwise similar appearing moderate size hydropneumothorax. Electronically Signed: Toni Argueta MD  6/12/2025 12:53 PM EDT  Workstation ID: [id number]    XR Chest 1 View  Result Date: 6/12/2025  Impression: Moderate sized right hydropneumothorax. Electronically Signed: Susan Leahy MD  6/12/2025 12:41 PM EDT  Workstation ID: [id number]    US Thoracentesis  Result Date: 6/12/2025  Successful ultrasound-guided right thoracentesis.   Attestation Signer name: Jose Hope MD I attest that I was present for the entire procedure. I reviewed the stored images and agree with the report as  written.      6/12/2025 12:17 PM by Jose Hope MD on Workstation: [id number]      Assessment:    Recurrent pleural effusion    Acquired hypothyroidism    HLD (hyperlipidemia)    Adenocarcinoma of right lung      Plan:  Continue chest tube to -20 suction  Daily chest x-ray  Per chart review, oncology is recommending Keytruda treatment for one month prior to consideration of Pleur-x catheter placement.     Laura Sandoval PA-C  12:06 EDT  06/13/25     Thank you for allowing us to participate in the care of your patient. Please do not hesitate to contact us with additional questions or concerns.

## 2025-06-14 NOTE — PROGRESS NOTES
Pharmacy to Dose Heparin Infusion Note    Sydnie Gamble is a 65 y.o. female receiving heparin infusion.     Therapy for (VTE/Cardiac): VTE  Patient Weight: 55.8 kg  Initial Bolus (Y/N): No  Any Bolus (Y/N): Yes     Signs or Symptoms of Bleeding: no, chest tube placed 6/12      VTE (PE/DVT)  Initial rate: 18 units/kg/hr (Max 1,500 units/hr)    Anti-Xa Bolus   Dose Infusion Hold   Time Infusion Rate Change (units/kg/hr) Repeat  Anti-Xa   < 0.11 50 units/kg  (4000 units Max) None Increase by  4 units/kg/hr 6 hours   0.11 - 0.19 25 units/kg  (2000 units Max) None Increase by  3 units/kg/hr 6 hours   0.2 - 0.29 0 None Increase by  2 units/kg/hr 6 hours   0.3 - 0.7 0 None No Change 6 hours (after 2 consecutive levels in range check qAM)   0.71 - 0.8 0 None Decrease by  1 units/kg/hr 6 hours   0.81 - 0.9 0 None Decrease by  2 units/kg/hr 6 hours   0.91 - 1 0 60 minutes Decrease by  3 units/kg/hr 6 hours   >1 0 Hold  After Anti-Xa less than 0.7 decrease previous rate by  4 units/kg/hr  Every 2 hours until Anti-Xa less than 0.7 then when infusion restarts in 6 hours     Results from last 7 days   Lab Units 06/14/25  0456 06/13/25  0341 06/12/25  1755   INR   --   --  1.17*   HEMOGLOBIN g/dL 10.3* 10.5* 10.7*   HEMATOCRIT % 33.5* 33.9* 35.1   PLATELETS 10*3/mm3 215 220 192       Date   Time   Anti-Xa Current Rate (units/kg/hr) Bolus   (units) Rate Change   (units/kg/hr) New Rate (units/kg/hr) Repeat  Anti-Xa Comments /  Pump Check    6/12 1755 0.48 -- -- +16 +16 0100 Patient previously admitted in May and received treatment Eliquis for DVT. Eliquis filled 5/16/25 for current DVT, not a new clot. Baseline Xa was 0.48 and aptt was 32.7, I question her compliance with Eliquis. Initiated DVT Xa dosing with no initial bolus, chest tube placed 6/12.   LEWIS RN   6/13 0114 0.54 16 -- -- 16 0800 Serjio 3241 -acb   6/13 0733 0.55 16 -- -- 16 6/14  AM labs LEWIS CRAWFORD; pump verified    6/14  0456 0.42 16 -- -- 16 0600 Dave 324                                                                                                                                                                                               Tevin Olivo, [facility]  6/14/2025  12:54 EDT

## 2025-06-14 NOTE — CONSULTS
visited patient per palliative spiritual care consult.  Spouse at bedside.  Patient's spirituality is rooted in her community activism and friendships she has through her that work and involvement.  She also has support through her daughter (lives in [place]) and her spouse.  She appreciated empathic listening.

## 2025-06-14 NOTE — PROGRESS NOTES
F/U with pt to see if able to manage with chocolate boost.  Reports received but did not try.  Will monitor and order more if she chooses.

## 2025-06-14 NOTE — PROGRESS NOTES
[facility] Medicine Services  PROGRESS NOTE    Patient Name: Sydnie Gamble  : 1960  MRN: [id number]    Date of Admission: 2025  Primary Care Physician: Dee Elena APRN    Subjective   Subjective     CC: Follow-up SOA    HPI: No acute events overnight, patient said that she rested well, has no new complaints, chest tube remains in place    Objective   Objective     Vital Signs:   Temp:  [97.8 °F (36.6 °C)-98.5 °F (36.9 °C)] 97.9 °F (36.6 °C)  Heart Rate:  [] 100  Resp:  [16-18] 18  BP: (115-137)/(66-81) 115/66  Flow (L/min) (Oxygen Therapy):  [4] 4     Physical Exam:  Constitutional: No acute distress, awake, alert  HENT: NCAT, mucous membranes moist  Respiratory: Clear to auscultation bilaterally, respiratory effort normal, chest tube in place  Cardiovascular: RRR, no murmurs, rubs, or gallops  Gastrointestinal: Positive bowel sounds, soft, nontender, nondistended  Musculoskeletal: No bilateral ankle edema  Psychiatric: Appropriate affect, cooperative  Neurologic: Oriented x 3, nonfocal    Results Reviewed:  LAB RESULTS:      Lab 25  0456 25  0733 25  0341 25  0114 25  1755   WBC 21.39*  --  20.73*  --  17.59*   HEMOGLOBIN 10.3*  --  10.5*  --  10.7*   HEMATOCRIT 33.5*  --  33.9*  --  35.1   PLATELETS 215  --  220  --  192   NEUTROS ABS 17.02*  --  15.98*  --  14.13*   IMMATURE GRANS (ABS) 0.60*  --  0.47*  --  0.50*   LYMPHS ABS 0.91  --  1.18  --  0.74   MONOS ABS 1.67*  --  1.78*  --  1.45*   EOS ABS 1.11*  --  1.22*  --  0.68*   MCV 90.1  --  89.9  --  90.5   PROTIME  --   --   --   --  15.6*   APTT  --   --   --   --  32.7*   HEPARIN ANTI-XA 0.42 0.55  --  0.54 0.48         Lab 25  0456 25  0585   SODIUM 134* 136   POTASSIUM 4.2 5.0   CHLORIDE 97* 97*   CO2 25.0 28.0   ANION GAP 12.0 11.0   BUN 23.0 21.7   CREATININE 0.85 0.87   EGFR 75.2 73.1   GLUCOSE 108* 117*   CALCIUM 7.7* 7.8*         Lab  06/12/25 1755   TOTAL PROTEIN 6.1   ALBUMIN 2.8*   GLOBULIN 3.3   ALT (SGPT) 26   AST (SGOT) 49*   BILIRUBIN 0.2   ALK PHOS 455*         Lab 06/12/25 1755   PROTIME 15.6*   INR 1.17*                 Brief Urine Lab Results       None            Microbiology Results Abnormal       None            XR Chest 1 View  Result Date: 6/13/2025  XR CHEST 1 VW Date of Exam: 6/13/2025 12:55 PM EDT Indication: Recurrent pleural effusions Comparison: AP chest x-ray 6/13/2025 timed at 4:43 a.m. Findings: Right pleural catheter remains in place. Moderate size right hydropneumothorax appears similar to numbers to today's earlier chest x-ray. Underlying right basilar airspace opacities are stable. Left lung is clear.     Impression: Impression: Moderate sized right hydropneumothorax appears similar to numbers to today's earlier chest x-ray. Right pleural catheter remains in place. Electronically Signed: Maryellen Dalton MD  6/13/2025 1:44 PM EDT  Workstation ID: [id number]    XR Chest 1 View  Result Date: 6/13/2025  XR CHEST 1 VW Date of Exam: 6/13/2025 2:49 AM EDT Indication: post Ct placement and thoracentesis Comparison: AP chest x-ray 6/12/2025, 6/5/2025 Findings: There is a new right pleural catheter. Previously seen right basilar pleural air has now been replaced with pleural fluid. No apical pneumothorax is seen. There are stable underlying right basilar airspace opacities. Left lung appears clear. Cardiomediastinal contours are stable.     Impression: Impression: 1.Interval placement of right pleural catheter with fluid component of right basilar hydropneumothorax. 2.Stable underlying right basilar airspace opacities, likely due to atelectasis. Electronically Signed: Maryellen Dalton MD  6/13/2025 7:23 AM EDT  Workstation ID: [id number]    CT Guided Chest Tube  Result Date: 6/12/2025  DATE OF EXAM: 6/12/2025 2:48 PM  PROCEDURE: CT GUIDED CHEST TUBE PLACEMENT-  INDICATIONS: right chest tube placement; J95.811-Postprocedural  pneumothorax  DLP: 746 mGycm  TECHNIQUE:  The risks, benefits, and alternatives were discussed in detail with the patient. The patient was brought down to the CT suite and positioned supine on the CT table. Preliminary CT scan of the the chest was performed and a skin entry site was selected and marked. The area was prepped and draped utilizing standard sterile technique. 1% lidocaine was administered to the soft tissues. A small skin incision was made with an 11 blade scalpel. Under CT guidance a 5 Armenian Yueh was advanced into the right pleural space. The inner stylet was removed and an 035 wire was advanced coaxially. Over the wire a 10 Armenian all-purpose drainage catheter was advanced with the pigtail formed and locked within the pleural space the catheter was sutured to the skin and attached to a Pleur-evac. A sterile dressing was then applied.  The patient tolerated the procedure well and there were no immediate complications.      Impression: Successful CT-guided 10 Armenian right sided chest tube placement.   6/12/2025 4:13 PM by Jose Hope MD on Workstation: [id number]      XR Chest 1 View  Result Date: 6/12/2025  XR CHEST 1 VW Date of Exam: 6/12/2025 1:45 PM EDT Indication: s/p thoracentesis; c/o severe right neck pain Comparison: 6/12/2025 Findings: Cardiac size is similar to prior exam. Similar right-sided hydropneumothorax. Similar right basilar opacity. No evidence of acute osseous abnormalities. Visualized upper abdomen is unremarkable.     Impression: Impression: 1.Similar right-sided hydropneumothorax. 2.Similar right basilar opacity may reflect atelectasis or infection. Electronically Signed: Clay Johnson MD  6/12/2025 2:31 PM EDT  Workstation ID: [id number]    XR Chest 1 View  Result Date: 6/12/2025  XR CHEST 1 VW Date of Exam: 6/12/2025 12:25 PM EDT Indication: s/p thoracentesis; c/o severe right neck pain Comparison: Chest radiograph 6/12/2025 Findings: Grossly stable size of the right  hydropneumothorax, measuring up to 7 mm at apex with larger right subpulmonic component. Previous noted fluid extending towards the apex appears decreased. Left lung relatively clear. There is partial collapse and probable underlying atelectasis within the right lower lobe. Negative for left pneumothorax. Stable cardiomediastinal silhouette. Degenerative related osseous change.     Impression: Impression: Slightly decreased right pleural fluid component with otherwise similar appearing moderate size hydropneumothorax. Electronically Signed: Toni Argueta MD  6/12/2025 12:53 PM EDT  Workstation ID: [id number]    XR Chest 1 View  Result Date: 6/12/2025  XR CHEST 1 VW Date of Exam: 6/12/2025 11:23 AM EDT Indication: s/p thoracentesis Comparison: Chest x-ray 6/5/2025 Findings: There is a pneumothorax on the right. It is moderate in size and seen inferiorly measuring up to 2.3 cm. There is a moderate mount of pleural fluid as well. There is right lower lobe airspace opacity which is improved from previous exam. Left lung is clear. Heart size is normal.     Impression: Impression: Moderate sized right hydropneumothorax. Electronically Signed: Susan Leahy MD  6/12/2025 12:41 PM EDT  Workstation ID: [id number]    US Thoracentesis  Result Date: 6/12/2025  PROCEDURE: US THORACENTESIS-  ATTENDING: Jose Hope M.D.  CLINICAL HISTORY: Large right pleural effusion.  TECHNIQUE:  The risk, benefits, and alternatives were described in detail and the patient was brought to the ultrasound suite and positioned seated. The skin entry site was prepped and draped utilizing standard sterile technique. 1% lidocaine was administered to the soft tissues of the right posterior thorax.   A 5 [place] Islander Yueh was advanced into the right pleural space.   A total of 1.4 L was removed from the right pleural space. A specimen was sent to the laboratory for analysis. The needle was removed and a sterile dressing was applied.  The patient tolerated the  procedure well and there were no complications.      Impression: Successful ultrasound-guided right thoracentesis.   Attestation Signer name: Jose Hope MD I attest that I was present for the entire procedure. I reviewed the stored images and agree with the report as written.      6/12/2025 12:17 PM by Jose Hope MD on Workstation: [id number]        Results for orders placed during the hospital encounter of 05/13/25    Adult Transthoracic Echo Complete w/ Color, Spectral and Contrast if necessary per protocol    Interpretation Summary    Left ventricular ejection fraction appears to be 66 - 70%.    Normal right ventricular cavity size, wall thickness, systolic function and septal motion noted.    There is a small (<1cm) pericardial effusion adjacent to the right ventricle. There is no evidence of cardiac tamponade.    There is a left pleural effusion.      Current medications:  Scheduled Meds:[Held by provider] apixaban, 5 mg, Oral, Q12H  arformoterol, 15 mcg, Nebulization, BID - RT   And  budesonide, 0.5 mg, Nebulization, BID - RT   And  revefenacin, 175 mcg, Nebulization, Daily - RT  atorvastatin, 10 mg, Oral, Nightly  bisacodyl, 10 mg, Rectal, Once  Diclofenac Sodium, 2 g, Topical, 4x Daily  levothyroxine, 88 mcg, Oral, Q AM  lidocaine PF 1%, 5 mL, Injection, Once  senna-docusate sodium, 2 tablet, Oral, BID  sertraline, 100 mg, Oral, Daily  sodium chloride, 10 mL, Intravenous, Q12H  sodium chloride, 10 mL, Intravenous, Q12H  traZODone, 150 mg, Oral, Nightly      Continuous Infusions:heparin, 16 Units/kg/hr, Last Rate: 16 Units/kg/hr (06/14/25 0031)  Pharmacy to Dose Heparin,       PRN Meds:.  acetaminophen **OR** acetaminophen **OR** acetaminophen    senna-docusate sodium **AND** polyethylene glycol **AND** bisacodyl **AND** bisacodyl    Calcium Replacement - Follow Nurse / BPA Driven Protocol    clonazePAM    cyclobenzaprine    famotidine    HYDROmorphone **AND** naloxone    Magnesium Standard Dose  Replacement - Follow Nurse / BPA Driven Protocol    ondansetron ODT **OR** ondansetron    oxyCODONE-acetaminophen    Pharmacy to Dose Heparin    Phosphorus Replacement - Follow Nurse / BPA Driven Protocol    Potassium Replacement - Follow Nurse / BPA Driven Protocol    sodium chloride    sodium chloride    sodium chloride    sodium chloride    zolpidem    Assessment & Plan   Assessment & Plan     Active Hospital Problems    Diagnosis  POA    **Recurrent pleural effusion [J90]  Yes    Adenocarcinoma of right lung [C34.91]  Yes    HLD (hyperlipidemia) [E78.5]  Yes    Acquired hypothyroidism [E03.9]  Yes      Resolved Hospital Problems   No resolved problems to display.        Brief Hospital Course to date:  Sydnie Gamble is a 65 y.o. female with PMH of Adenocarcinoma of lung, anemia, cervical cancer, depression, HLD, hypothyroidism, PE, GERD and had home oxygen. Patient had a thoracentesis with chest tube placement, admitted for further management.     Recurrent Pleural Effusion  Adenocarcinoma of Right lung  -s/p thoracentesis and chest tube placement by IR, CTS managing  -- currently under treatment with Dr. Mckenzie and he wants to hold on pleur-x drain for now. He wants her to receive Keytruda  for a month.   --Palliative care consulted, continue pain control  - She is currently on 4 L NC, wean as able  -Continue DuoNeb     Hypothyroidism  -- cont home meds      HLD  -- cont statin      Generalized anxiety disorder  Depression  --cont home meds     LLE DVT  PE  -- hold eliquis   -- heparin drip while she has CT     Expected Discharge Location and Transportation: TBD  Expected Discharge   Expected Discharge Date: 6/16/2025; Expected Discharge Time:      VTE Prophylaxis:  Pharmacologic VTE prophylaxis orders are present.     AM-PAC 6 Clicks Score (PT): 16 (06/13/25 1018)    CODE STATUS:   Code Status and Medical Interventions: CPR (Attempt to Resuscitate); Full Support; also talked over with  Jose Francisco    Ordered at: 06/12/25 1745     Code Status (Patient has no pulse and is not breathing):    CPR (Attempt to Resuscitate)     Medical Interventions (Patient has pulse or is breathing):    Full Support     Comments:    also talked over with  Jose Francisco     Level Of Support Discussed With:    Patient       Next of Kin (If No Surrogate)       Yolanda Plasencia MD  06/14/25

## 2025-06-14 NOTE — PROGRESS NOTES
CTS Progress Note        Chief Complaint: Recurrent pleural effusion      Subjective  Resting in bed.  Nasal cannula supplementation      Objective    Physical Exam:   Vital Signs   Temp:  [97.8 °F (36.6 °C)-98.5 °F (36.9 °C)] 98 °F (36.7 °C)  Heart Rate:  [] 104  Resp:  [16-20] 20  BP: (115-137)/(66-81) 131/71   GEN: NAD   CV: Increased rate but regular    RESP: Unlabored and clear to auscultation decreased at the bases      EXT: Warm without significant edema       CT Output:  Output by Drain (mL) 06/13/25 0701 - 06/13/25 1900 06/13/25 1901 - 06/14/25 0700 06/14/25 0701 - 06/14/25 0952 Range Total   Chest Tube 1 Right Midclavicular 60   60      Results     Results from last 7 days   Lab Units 06/14/25  0456   WBC 10*3/mm3 21.39*   HEMOGLOBIN g/dL 10.3*   HEMATOCRIT % 33.5*   PLATELETS 10*3/mm3 215     Results from last 7 days   Lab Units 06/14/25  0456   SODIUM mmol/L 134*   POTASSIUM mmol/L 4.2   CHLORIDE mmol/L 97*   CO2 mmol/L 25.0   BUN mg/dL 23.0   CREATININE mg/dL 0.85   GLUCOSE mg/dL 108*   CALCIUM mg/dL 7.7*     Results from last 7 days   Lab Units 06/12/25  1755   INR  1.17*   APTT seconds 32.7*       CXR: Unchanged position of right pleural catheter. Cardiomediastinal silhouette is unchanged. Moderate right pleural effusion with right basilar airspace disease and right basilar lucency suggestive of small-volume pneumothorax, unchanged compared to   yesterday's exam. Left lung appears clear. No pneumothorax. Osseous structures are unchanged.      Assessment & Plan       Recurrent pleural effusion    Acquired hypothyroidism    HLD (hyperlipidemia)    Adenocarcinoma of right lung        Plan   Continue chest tube to -20 suction, monitor and document drainage.  Repeat chest x-ray in the morning.  If minimal drainage and no change in chest x-ray he may be able to discontinue chest tube in the morning.  Daily chest x-ray  Per chart review, oncology is recommending Keytruda treatment for one month prior  to consideration of Pleur-x catheter placement.     BHUMI Conte  06/14/25  09:51 EDT

## 2025-06-14 NOTE — PROGRESS NOTES
DATE OF VISIT: 6/14/2025    Chief Complaint: Followup for recurrent metastatic right lung cancer     SUBJECTIVE: The patient is laying comfortable in bed.  She is having mild pain at the chest tube site.    REVIEW OF SYSTEMS: All the other 9 systems are reviewed by me and negative  except what is mentioned in HPI.     Past History:  Medical History: has a past medical history of Acid reflux, Acid reflux, Adenocarcinoma of lung (08/12/2024), Anemia, chronic disease (5/13/2025), Anxiety (1976), Arthritis, Atherosclerotic cardiovascular disease (03/25/2024), Cervical cancer, Depression, Emphysema of lung, History of radiation therapy (11/20/2020), History of radiation therapy (10/04/2024), radiation therapy, Hyperlipidemia, Hypothyroidism, Kidney disease, Lung cancer, Lung nodule, Ovarian cyst (1980s), Pleural effusion (5/13/2025), Pulmonary embolism (5/13/2025), Visual impairment (corrected with glasses), Wears dentures, and Wears glasses.   Surgical History: has a past surgical history that includes Tubal ligation; total abdominal hysterectomy pelvic node dissection (N/A, 08/18/2020); Lymph node biopsy; Subtotal Hysterectomy (1987?); Bronchoscopy; Lung biopsy; BRONCHOSCOPY WITH ION ROBOTIC ASSIST (N/A, 08/06/2024); Mohs surgery (03/24/2025); Hysteroscopy; and Colonoscopy.   Family History: family history includes Anxiety disorder in her mother; Asthma in her mother; COPD in her mother; Cancer in her father, maternal aunt, maternal aunt, maternal aunt, maternal aunt, maternal grandmother, and mother; Depression in her mother; Early death in her father; Emphysema in her mother; Heart attack in her maternal grandfather; Heart disease in her maternal grandfather; Hypertension in her mother; Melanoma in her mother; Mental illness in her mother; Uterine cancer in her mother.   Social History: reports that she quit smoking about 6 years ago. Her smoking use included cigarettes. She started smoking about 53 years ago. She  has a 67.5 pack-year smoking history. She has been exposed to tobacco smoke. She has never used smokeless tobacco. She reports that she does not drink alcohol and does not use drugs.    Medications Prior to Admission   Medication Sig Dispense Refill Last Dose/Taking    albuterol sulfate  (90 Base) MCG/ACT inhaler Inhale 2 puffs Every 4 (Four) to 6 (Six) Hours As Needed for Wheezing or Shortness of Air.   Taking As Needed    apixaban (ELIQUIS) 5 MG tablet tablet Take 2 tablets by mouth Every 12 (Twelve) Hours for 6 days, THEN 1 tablet Every 12 (Twelve) Hours for 24 days. Indications: DVT/PE (active thrombosis) 48 tablet 5 6/11/2025 Morning    atorvastatin (LIPITOR) 10 MG tablet TAKE 1 TABLET BY MOUTH EVERY DAY AT NIGHT 90 tablet 1 6/11/2025    clonazePAM (KlonoPIN) 1 MG tablet Take 1 tablet by mouth 2 (Two) Times a Day As Needed for Anxiety. 50 tablet 0 6/12/2025 Morning    Diclofenac Sodium (VOLTAREN) 1 % gel gel Apply 1 gram topically to indicated area 4 times daily as needed for mild to moderate pain. STOP FLEXERIL 100 g 2 Taking    docusate sodium (COLACE) 100 MG capsule Take 2 capsules by mouth Daily. 60 capsule 3 6/12/2025 Morning    famotidine (PEPCID) 20 MG tablet TAKE 1 TABLET BY MOUTH TWICE DAILY OR TAKE 2 TABLETS BY MOUTH ONCE DAILY AS NEEDED FOR HEARTBURN 180 tablet 1 Taking    Fluticasone-Umeclidin-Vilant (Trelegy Ellipta) 100-62.5-25 MCG/ACT inhaler Inhale 1 puff Daily. 90 each 3 6/12/2025 Morning    levothyroxine (Synthroid) 88 MCG tablet Take 1 tablet by mouth Every Morning. 90 tablet 1 6/11/2025    ondansetron (ZOFRAN) 8 MG tablet Take 1 tablet by mouth 3 (Three) Times a Day As Needed for Nausea or Vomiting. 30 tablet 5 Taking As Needed    oxyCODONE-acetaminophen (PERCOCET) 5-325 MG per tablet Take 1 tablet by mouth Every 6 (Six) Hours As Needed for Moderate Pain. 120 tablet 0 6/12/2025 Morning    sertraline (Zoloft) 100 MG tablet Take 1 tablet by mouth Daily. 90 tablet 1 6/11/2025     traZODone (DESYREL) 150 MG tablet TAKE 1 TABLET BY MOUTH EVERY DAY AT NIGHT 90 tablet 3 6/11/2025    zolpidem (AMBIEN) 10 MG tablet Take 1 tablet by mouth At Night As Needed for Sleep. 30 tablet 1 Past Month    cyclobenzaprine (FLEXERIL) 10 MG tablet TAKE 1 TABLET BY MOUTH 2 TIMES A DAY AS NEEDED FOR MUSCLE SPASMS. 60 tablet 3       Allergies: Patient has no known allergies.     Current Facility-Administered Medications:     acetaminophen (TYLENOL) tablet 650 mg, 650 mg, Oral, Q4H PRN **OR** acetaminophen (TYLENOL) 160 MG/5ML oral solution 650 mg, 650 mg, Oral, Q4H PRN **OR** acetaminophen (TYLENOL) suppository 650 mg, 650 mg, Rectal, Q4H PRN, Nuha Shafer, APRN    [Held by provider] apixaban (ELIQUIS) tablet 5 mg, 5 mg, Oral, Q12H, Nuha Shafer, APRN    arformoterol (BROVANA) nebulizer solution 15 mcg, 15 mcg, Nebulization, BID - RT, 15 mcg at 06/14/25 0752 **AND** budesonide (PULMICORT) nebulizer solution 0.5 mg, 0.5 mg, Nebulization, BID - RT, 0.5 mg at 06/14/25 0752 **AND** revefenacin (YUPELRI) nebulizer solution 175 mcg, 175 mcg, Nebulization, Daily - RT, Nuha Shafer, APRN    atorvastatin (LIPITOR) tablet 10 mg, 10 mg, Oral, Nightly, Nuha Shafre, APRN, 10 mg at 06/13/25 2137    sennosides-docusate (PERICOLACE) 8.6-50 MG per tablet 2 tablet, 2 tablet, Oral, BID, 2 tablet at 06/14/25 0836 **AND** polyethylene glycol (MIRALAX) packet 17 g, 17 g, Oral, Daily PRN **AND** bisacodyl (DULCOLAX) EC tablet 5 mg, 5 mg, Oral, Daily PRN **AND** bisacodyl (DULCOLAX) suppository 10 mg, 10 mg, Rectal, Daily PRN, Nuha Shafer, APRN    bisacodyl (DULCOLAX) suppository 10 mg, 10 mg, Rectal, Once, Kaylene Reynoso APRN    Calcium Replacement - Follow Nurse / BPA Driven Protocol, , Not Applicable, PRN, Nuha Shafer, DAYSI    clonazePAM (KlonoPIN) tablet 1 mg, 1 mg, Oral, BID PRN, Nuha Shafer APRN, 1 mg at 06/13/25 2138    cyclobenzaprine (FLEXERIL) tablet 10 mg, 10 mg, Oral, BID  PRN, Nuha Shafer APRN, 10 mg at 06/14/25 0840    Diclofenac Sodium (VOLTAREN) 1 % gel 2 g, 2 g, Topical, 4x Daily, Kaylene Reynoso APRN, 2 g at 06/13/25 2139    famotidine (PEPCID) tablet 40 mg, 40 mg, Oral, BID PRN, Nuha Shafer APRN    heparin 58448 units/250 mL (100 units/mL) in 0.45 % NaCl infusion, 16 Units/kg/hr, Intravenous, Titrated, Nuha Shafer APRN, Last Rate: 8.92 mL/hr at 06/14/25 0031, 16 Units/kg/hr at 06/14/25 0031    HYDROmorphone (DILAUDID) injection 0.5 mg, 0.5 mg, Intravenous, Q2H PRN, 0.5 mg at 06/14/25 0840 **AND** naloxone (NARCAN) injection 0.4 mg, 0.4 mg, Intravenous, Q5 Min PRN, Nuha Shafer APRN    levothyroxine (SYNTHROID, LEVOTHROID) tablet 88 mcg, 88 mcg, Oral, Q AM, Nuha Shafer APRN, 88 mcg at 06/14/25 0545    lidocaine PF 1% (XYLOCAINE) injection 5 mL, 5 mL, Injection, Once, Jose Hope MD    Magnesium Standard Dose Replacement - Follow Nurse / BPA Driven Protocol, , Not Applicable, PRNICKY, Nuha Shafer APRN    ondansetron ODT (ZOFRAN-ODT) disintegrating tablet 4 mg, 4 mg, Oral, Q6H PRN **OR** ondansetron (ZOFRAN) injection 4 mg, 4 mg, Intravenous, Q6H PRN, Nuha Shafer APRN    oxyCODONE-acetaminophen (PERCOCET) 5-325 MG per tablet 1 tablet, 1 tablet, Oral, Q4H PRN, Nuha Shafer APRN, 1 tablet at 06/14/25 1007    Pharmacy to Dose Heparin, , Not Applicable, Continuous PRN, Nuha Shafer APRN    Phosphorus Replacement - Follow Nurse / BPA Driven Protocol, , Not Applicable, PRN, Nuha Shafer APRN    Potassium Replacement - Follow Nurse / BPA Driven Protocol, , Not Applicable, PRN, Nuha Shafer, APRN    sertraline (ZOLOFT) tablet 100 mg, 100 mg, Oral, Daily, Nuha Shafer, DAYSI, 100 mg at 06/14/25 0836    sodium chloride 0.9 % flush 10 mL, 10 mL, Intravenous, Q12H, Jose Hope MD, 10 mL at 06/13/25 2139    sodium chloride 0.9 % flush 10 mL, 10 mL, Intravenous, PRN, Jose Hope MD     "sodium chloride 0.9 % flush 10 mL, 10 mL, Intravenous, Q12H, Nuha Shafer, APRN, 10 mL at 06/13/25 2139    sodium chloride 0.9 % flush 10 mL, 10 mL, Intravenous, PRN, Nuha Shafer, APRN    sodium chloride 0.9 % infusion 40 mL, 40 mL, Intravenous, PRN, Jose Hope MD    sodium chloride 0.9 % infusion 40 mL, 40 mL, Intravenous, PRN, Nuha Shafer, APRN    traZODone (DESYREL) tablet 150 mg, 150 mg, Oral, Nightly, Nuha Shafer, DAYSI, 150 mg at 06/13/25 2137    zolpidem (AMBIEN) tablet 5 mg, 5 mg, Oral, Nightly PRN, Nuha Shafer, APRN    PHYSICAL EXAMINATION:   /66 (BP Location: Left arm, Patient Position: Lying)   Pulse 109   Temp 97.6 °F (36.4 °C) (Oral)   Resp 20   Ht 154.9 cm (60.98\")   Wt 55.8 kg (123 lb)   LMP  (LMP Unknown)   SpO2 95%   BMI 23.25 kg/m²                ECOG Performance Status: 2 - Symptomatic, <50% confined to bed  GENERAL: Age appropriate. No acute distress.   NEURO/PSYCH: A&O x 3, strength 5/5 in all muscle groups  HEENT: Head atraumatic, normocephalic.   NECK: Supple. No JVD. No lymphadenopathy.   LUNGS: Clear to auscultation bilaterally. No wheezing. No rhonchi.   HEART: Regular rate and rhythm. S1, S2, no murmurs.   ABDOMEN: Soft, nontender, nondistended. Bowel sounds positive. No  hepatosplenomegaly.   EXTREMITIES: No clubbing, cyanosis, or edema.   SKIN: No rashes. No purpura.       Admission on 06/12/2025   Component Date Value Ref Range Status    Glucose 06/12/2025 117 (H)  65 - 99 mg/dL Final    BUN 06/12/2025 21.7  8.0 - 23.0 mg/dL Final    Creatinine 06/12/2025 0.87  0.57 - 1.00 mg/dL Final    Sodium 06/12/2025 136  136 - 145 mmol/L Final    Potassium 06/12/2025 5.0  3.5 - 5.2 mmol/L Final    Chloride 06/12/2025 97 (L)  98 - 107 mmol/L Final    CO2 06/12/2025 28.0  22.0 - 29.0 mmol/L Final    Calcium 06/12/2025 7.8 (L)  8.6 - 10.5 mg/dL Final    Total Protein 06/12/2025 6.1  6.0 - 8.5 g/dL Final    Albumin 06/12/2025 2.8 (L)  3.5 - 5.2 " g/dL Final    ALT (SGPT) 06/12/2025 26  1 - 33 U/L Final    AST (SGOT) 06/12/2025 49 (H)  1 - 32 U/L Final    Alkaline Phosphatase 06/12/2025 455 (H)  39 - 117 U/L Final    Total Bilirubin 06/12/2025 0.2  0.0 - 1.2 mg/dL Final    Globulin 06/12/2025 3.3  gm/dL Final    Calculated Result    A/G Ratio 06/12/2025 0.8  g/dL Final    BUN/Creatinine Ratio 06/12/2025 24.9  7.0 - 25.0 Final    Anion Gap 06/12/2025 11.0  5.0 - 15.0 mmol/L Final    eGFR 06/12/2025 73.1  >60.0 mL/min/1.73 Final    Heparin Anti-Xa (UFH) 06/12/2025 0.48  0.30 - 0.70 IU/ml Final    Protime 06/12/2025 15.6 (H)  12.2 - 15.3 Seconds Final    INR 06/12/2025 1.17 (H)  0.89 - 1.12 Final    PTT 06/12/2025 32.7 (L)  60.0 - 90.0 seconds Final    WBC 06/12/2025 17.59 (H)  3.40 - 10.80 10*3/mm3 Final    RBC 06/12/2025 3.88  3.77 - 5.28 10*6/mm3 Final    Hemoglobin 06/12/2025 10.7 (L)  12.0 - 15.9 g/dL Final    Hematocrit 06/12/2025 35.1  34.0 - 46.6 % Final    MCV 06/12/2025 90.5  79.0 - 97.0 fL Final    MCH 06/12/2025 27.6  26.6 - 33.0 pg Final    MCHC 06/12/2025 30.5 (L)  31.5 - 35.7 g/dL Final    RDW 06/12/2025 14.8  12.3 - 15.4 % Final    RDW-SD 06/12/2025 47.6  37.0 - 54.0 fl Final    MPV 06/12/2025 10.0  6.0 - 12.0 fL Final    Platelets 06/12/2025 192  140 - 450 10*3/mm3 Final    Neutrophil % 06/12/2025 80.4 (H)  42.7 - 76.0 % Final    Lymphocyte % 06/12/2025 4.2 (L)  19.6 - 45.3 % Final    Monocyte % 06/12/2025 8.2  5.0 - 12.0 % Final    Eosinophil % 06/12/2025 3.9  0.3 - 6.2 % Final    Basophil % 06/12/2025 0.5  0.0 - 1.5 % Final    Immature Grans % 06/12/2025 2.8 (H)  0.0 - 0.5 % Final    Neutrophils, Absolute 06/12/2025 14.13 (H)  1.70 - 7.00 10*3/mm3 Final    Lymphocytes, Absolute 06/12/2025 0.74  0.70 - 3.10 10*3/mm3 Final    Monocytes, Absolute 06/12/2025 1.45 (H)  0.10 - 0.90 10*3/mm3 Final    Eosinophils, Absolute 06/12/2025 0.68 (H)  0.00 - 0.40 10*3/mm3 Final    Basophils, Absolute 06/12/2025 0.09  0.00 - 0.20 10*3/mm3 Final    Immature  Grans, Absolute 06/12/2025 0.50 (H)  0.00 - 0.05 10*3/mm3 Final    nRBC 06/12/2025 0.0  0.0 - 0.2 /100 WBC Final    Heparin Anti-Xa (UFH) 06/13/2025 0.54  0.30 - 0.70 IU/ml Final    WBC 06/13/2025 20.73 (H)  3.40 - 10.80 10*3/mm3 Final    RBC 06/13/2025 3.77  3.77 - 5.28 10*6/mm3 Final    Hemoglobin 06/13/2025 10.5 (L)  12.0 - 15.9 g/dL Final    Hematocrit 06/13/2025 33.9 (L)  34.0 - 46.6 % Final    MCV 06/13/2025 89.9  79.0 - 97.0 fL Final    MCH 06/13/2025 27.9  26.6 - 33.0 pg Final    MCHC 06/13/2025 31.0 (L)  31.5 - 35.7 g/dL Final    RDW 06/13/2025 14.8  12.3 - 15.4 % Final    RDW-SD 06/13/2025 47.6  37.0 - 54.0 fl Final    MPV 06/13/2025 10.1  6.0 - 12.0 fL Final    Platelets 06/13/2025 220  140 - 450 10*3/mm3 Final    Neutrophil % 06/13/2025 77.0 (H)  42.7 - 76.0 % Final    Lymphocyte % 06/13/2025 5.7 (L)  19.6 - 45.3 % Final    Monocyte % 06/13/2025 8.6  5.0 - 12.0 % Final    Eosinophil % 06/13/2025 5.9  0.3 - 6.2 % Final    Basophil % 06/13/2025 0.5  0.0 - 1.5 % Final    Immature Grans % 06/13/2025 2.3 (H)  0.0 - 0.5 % Final    Neutrophils, Absolute 06/13/2025 15.98 (H)  1.70 - 7.00 10*3/mm3 Final    Lymphocytes, Absolute 06/13/2025 1.18  0.70 - 3.10 10*3/mm3 Final    Monocytes, Absolute 06/13/2025 1.78 (H)  0.10 - 0.90 10*3/mm3 Final    Eosinophils, Absolute 06/13/2025 1.22 (H)  0.00 - 0.40 10*3/mm3 Final    Basophils, Absolute 06/13/2025 0.10  0.00 - 0.20 10*3/mm3 Final    Immature Grans, Absolute 06/13/2025 0.47 (H)  0.00 - 0.05 10*3/mm3 Final    nRBC 06/13/2025 0.1  0.0 - 0.2 /100 WBC Final    Heparin Anti-Xa (UFH) 06/13/2025 0.55  0.30 - 0.70 IU/ml Final    Heparin Anti-Xa (UFH) 06/14/2025 0.42  0.30 - 0.70 IU/ml Final    Glucose 06/14/2025 108 (H)  65 - 99 mg/dL Final    BUN 06/14/2025 23.0  8.0 - 23.0 mg/dL Final    Creatinine 06/14/2025 0.85  0.57 - 1.00 mg/dL Final    Sodium 06/14/2025 134 (L)  136 - 145 mmol/L Final    Potassium 06/14/2025 4.2  3.5 - 5.2 mmol/L Final    Chloride 06/14/2025 97 (L)   98 - 107 mmol/L Final    CO2 06/14/2025 25.0  22.0 - 29.0 mmol/L Final    Calcium 06/14/2025 7.7 (L)  8.6 - 10.5 mg/dL Final    BUN/Creatinine Ratio 06/14/2025 27.1 (H)  7.0 - 25.0 Final    Anion Gap 06/14/2025 12.0  5.0 - 15.0 mmol/L Final    eGFR 06/14/2025 75.2  >60.0 mL/min/1.73 Final    WBC 06/14/2025 21.39 (H)  3.40 - 10.80 10*3/mm3 Final    RBC 06/14/2025 3.72 (L)  3.77 - 5.28 10*6/mm3 Final    Hemoglobin 06/14/2025 10.3 (L)  12.0 - 15.9 g/dL Final    Hematocrit 06/14/2025 33.5 (L)  34.0 - 46.6 % Final    MCV 06/14/2025 90.1  79.0 - 97.0 fL Final    MCH 06/14/2025 27.7  26.6 - 33.0 pg Final    MCHC 06/14/2025 30.7 (L)  31.5 - 35.7 g/dL Final    RDW 06/14/2025 15.1  12.3 - 15.4 % Final    RDW-SD 06/14/2025 48.9  37.0 - 54.0 fl Final    MPV 06/14/2025 10.7  6.0 - 12.0 fL Final    Platelets 06/14/2025 215  140 - 450 10*3/mm3 Final    Neutrophil % 06/14/2025 79.5 (H)  42.7 - 76.0 % Final    Lymphocyte % 06/14/2025 4.3 (L)  19.6 - 45.3 % Final    Monocyte % 06/14/2025 7.8  5.0 - 12.0 % Final    Eosinophil % 06/14/2025 5.2  0.3 - 6.2 % Final    Basophil % 06/14/2025 0.4  0.0 - 1.5 % Final    Immature Grans % 06/14/2025 2.8 (H)  0.0 - 0.5 % Final    Neutrophils, Absolute 06/14/2025 17.02 (H)  1.70 - 7.00 10*3/mm3 Final    Lymphocytes, Absolute 06/14/2025 0.91  0.70 - 3.10 10*3/mm3 Final    Monocytes, Absolute 06/14/2025 1.67 (H)  0.10 - 0.90 10*3/mm3 Final    Eosinophils, Absolute 06/14/2025 1.11 (H)  0.00 - 0.40 10*3/mm3 Final    Basophils, Absolute 06/14/2025 0.08  0.00 - 0.20 10*3/mm3 Final    Immature Grans, Absolute 06/14/2025 0.60 (H)  0.00 - 0.05 10*3/mm3 Final    nRBC 06/14/2025 0.1  0.0 - 0.2 /100 WBC Final       XR Chest 1 View  Result Date: 6/14/2025  Narrative: XR CHEST 1 VW Date of Exam: 6/14/2025 3:14 AM EDT Indication: Recurrent pleural effusions Comparison: Chest radiograph 6/13/2025. Findings: Unchanged position of right pleural catheter. Cardiomediastinal silhouette is unchanged. Moderate right  pleural effusion with right basilar airspace disease and right basilar lucency suggestive of small-volume pneumothorax, unchanged compared to yesterday's exam. Left lung appears clear. No pneumothorax. Osseous structures are unchanged.     Impression: Impression: No significant interval change. Electronically Signed: Franco Shaffer MD  6/14/2025 8:09 AM EDT  Workstation ID: [id number]    XR Chest 1 View  Result Date: 6/13/2025  Narrative: XR CHEST 1 VW Date of Exam: 6/13/2025 12:55 PM EDT Indication: Recurrent pleural effusions Comparison: AP chest x-ray 6/13/2025 timed at 4:43 a.m. Findings: Right pleural catheter remains in place. Moderate size right hydropneumothorax appears similar to numbers to today's earlier chest x-ray. Underlying right basilar airspace opacities are stable. Left lung is clear.     Impression: Impression: Moderate sized right hydropneumothorax appears similar to numbers to today's earlier chest x-ray. Right pleural catheter remains in place. Electronically Signed: Maryellen Dalton MD  6/13/2025 1:44 PM EDT  Workstation ID: [id number]    XR Chest 1 View  Result Date: 6/13/2025  Narrative: XR CHEST 1 VW Date of Exam: 6/13/2025 2:49 AM EDT Indication: post Ct placement and thoracentesis Comparison: [facility] chest x-ray 6/12/2025, 6/5/2025 Findings: There is a new right pleural catheter. Previously seen right basilar pleural air has now been replaced with pleural fluid. No apical pneumothorax is seen. There are stable underlying right basilar airspace opacities. Left lung appears clear. Cardiomediastinal contours are stable.     Impression: Impression: 1.Interval placement of right pleural catheter with fluid component of right basilar hydropneumothorax. 2.Stable underlying right basilar airspace opacities, likely due to atelectasis. Electronically Signed: Maryellen Dalton MD  6/13/2025 7:23 AM EDT  Workstation ID: [id number]    CT Guided Chest Tube  Result Date: 6/12/2025  Narrative: DATE OF EXAM: 6/12/2025  2:48 PM  PROCEDURE: CT GUIDED CHEST TUBE PLACEMENT-  INDICATIONS: right chest tube placement; J95.811-Postprocedural pneumothorax  DLP: 746 mGycm  TECHNIQUE:  The risks, benefits, and alternatives were discussed in detail with the patient. The patient was brought down to the CT suite and positioned supine on the CT table. Preliminary CT scan of the the chest was performed and a skin entry site was selected and marked. The area was prepped and draped utilizing standard sterile technique. 1% lidocaine was administered to the soft tissues. A small skin incision was made with an 11 blade scalpel. Under CT guidance a 5 Kyr[place]z Yueh was advanced into the right pleural space. The inner stylet was removed and an 035 wire was advanced coaxially. Over the wire a 10 Kyr[place]z all-purpose drainage catheter was advanced with the pigtail formed and locked within the pleural space the catheter was sutured to the skin and attached to a Pleur-evac. A sterile dressing was then applied.  The patient tolerated the procedure well and there were no immediate complications.      Impression: Successful CT-guided 10 Kyr[place]z right sided chest tube placement.   6/12/2025 4:13 PM by Jose Hope MD on Workstation: [id number]      XR Chest 1 View  Result Date: 6/12/2025  Narrative: XR CHEST 1 VW Date of Exam: 6/12/2025 1:45 PM EDT Indication: s/p thoracentesis; c/o severe right neck pain Comparison: 6/12/2025 Findings: Cardiac size is similar to prior exam. Similar right-sided hydropneumothorax. Similar right basilar opacity. No evidence of acute osseous abnormalities. Visualized upper abdomen is unremarkable.     Impression: Impression: 1.Similar right-sided hydropneumothorax. 2.Similar right basilar opacity may reflect atelectasis or infection. Electronically Signed: Clay Johnson MD  6/12/2025 2:31 PM EDT  Workstation ID: [id number]    XR Chest 1 View  Result Date: 6/12/2025  Narrative: XR CHEST 1 VW Date of Exam: 6/12/2025 12:25 PM EDT  Indication: s/p thoracentesis; c/o severe right neck pain Comparison: Chest radiograph 6/12/2025 Findings: Grossly stable size of the right hydropneumothorax, measuring up to 7 mm at apex with larger right subpulmonic component. Previous noted fluid extending towards the apex appears decreased. Left lung relatively clear. There is partial collapse and probable underlying atelectasis within the right lower lobe. Negative for left pneumothorax. Stable cardiomediastinal silhouette. Degenerative related osseous change.     Impression: Impression: Slightly decreased right pleural fluid component with otherwise similar appearing moderate size hydropneumothorax. Electronically Signed: Toni Argueta MD  6/12/2025 12:53 PM EDT  Workstation ID: [id number]    XR Chest 1 View  Result Date: 6/12/2025  Narrative: XR CHEST 1 VW Date of Exam: 6/12/2025 11:23 AM EDT Indication: s/p thoracentesis Comparison: Chest x-ray 6/5/2025 Findings: There is a pneumothorax on the right. It is moderate in size and seen inferiorly measuring up to 2.3 cm. There is a moderate mount of pleural fluid as well. There is right lower lobe airspace opacity which is improved from previous exam. Left lung is clear. Heart size is normal.     Impression: Impression: Moderate sized right hydropneumothorax. Electronically Signed: Susan Leahy MD  6/12/2025 12:41 PM EDT  Workstation ID: [id number]    US Thoracentesis  Result Date: 6/12/2025  Narrative: PROCEDURE: US THORACENTESIS-  ATTENDING: Jose Hope M.D.  CLINICAL HISTORY: Large right pleural effusion.  TECHNIQUE:  The risk, benefits, and alternatives were described in detail and the patient was brought to the ultrasound suite and positioned seated. The skin entry site was prepped and draped utilizing standard sterile technique. 1% lidocaine was administered to the soft tissues of the right posterior thorax.   A 5 [place]ish Yueh was advanced into the right pleural space.   A total of 1.4 L was removed from  the right pleural space. A specimen was sent to the laboratory for analysis. The needle was removed and a sterile dressing was applied.  The patient tolerated the procedure well and there were no complications.      Impression: Successful ultrasound-guided right thoracentesis.   Attestation Signer name: Jose Hope MD I attest that I was present for the entire procedure. I reviewed the stored images and agree with the report as written.      6/12/2025 12:17 PM by Jose Hope MD on Workstation: [id number]      XR Chest 1 View  Result Date: 6/5/2025  Narrative: XR CHEST 1 VW Date of Exam: 6/5/2025 6:41 AM EDT Indication: cough Comparison: Chest radiograph 6/5/2025 at 0422 hours Findings: There is been slight decrease in the size of the large right pleural effusion. There is persistent right pleural fluid and right basilar airspace disease. Airspace disease is most likely atelectasis with pneumonia not excluded. The left lung remains clear. There is no pneumothorax.     Impression: Impression: Slight decrease in the size of the large right pleural effusion. Electronically Signed: Rodriguez Singh MD  6/5/2025 7:00 AM EDT  Workstation ID: [id number]    XR Chest 1 View  Result Date: 6/5/2025  Narrative: XR CHEST 1 VW Date of Exam: 6/5/2025 4:18 AM EDT Indication: SOA triage protocol Comparison: Chest radiograph 5/28/2025 Findings: The heart size and pulmonary vascular markings are normal. The left lung is clear. There is a large right pleural effusion with associated right basilar atelectasis. There is no pneumothorax. The osseous structures are normal for age.     Impression: Impression: Large right pleural effusion with right basilar atelectasis. Electronically Signed: Rodriguez Singh MD  6/5/2025 4:47 AM EDT  Workstation ID: [id number]    US Thoracentesis  Result Date: 5/28/2025  Narrative: PROCEDURE: Ultrasound-guided right thoracentesis  Procedural Personnel Attending physician(s): Erik Mensah  Pre-procedure  diagnosis: Right pleural effusion  Post-procedure diagnosis: Same  Complications: No immediate complications.      Impression:  Ultrasound-guided thoracentesis with drainage of 1400 mL of serous fluid.   _______________________________________________________________   PROCEDURE SUMMARY:  - Limited thoracic ultrasound - Ultrasound-guided right thoracentesis - Additional procedure(s): None  PROCEDURE DETAILS:   Pre-procedure Consent: Informed consent for the procedure including risks, benefits and alternatives was obtained and time-out was performed prior to the procedure. Preparation: The site was prepared and draped using maximal sterile barrier technique including cutaneous antisepsis.    Limited thoracic ultrasound: Limited thoracic ultrasound was performed using a curved transducer. A safe window for thoracentesis was identified. Right hemithorax findings: Moderate to large right-sided pleural effusion   Thoracentesis  Local anesthesia was administered. The pleural space was accessed and fluid return confirmed position. The fluid was drained. The catheter was removed, and a sterile bandage was applied.  Catheter placed: 5 Gibraltarian catheter.  Additional Details  Equipment details: None Specimens removed: Pleural fluid Estimated blood loss (mL): Less than 10   5/28/2025 12:05 PM by Erik Mensah MD on Workstation: [id number]      XR Chest 1 View  Result Date: 5/28/2025  Narrative: XR CHEST 1 VW Date of Exam: 5/28/2025 10:19 AM EDT Indication: S/P Right Thoracentesis Comparison: 5/14/2025 chest radiograph. Ultrasound-guided thoracentesis of 5/20/2025 Findings: By history, thoracentesis earlier today removed 1.4 L of fluid from the right hemithorax. Follow-up chest radiograph shows patchy right mid and lower lung atelectasis and perhaps a small amount of remaining pleural fluid, versus discoid atelectasis. There is no evidence of pneumothorax. Left lung remains clear. Heart and vasculature appear normal in size.      Impression: Impression: Patchy right mid and lower lung atelectasis and questionable small remaining right effusion. No evidence of pneumothorax or other significant chest disease elsewhere. Electronically Signed: Marlon Lyons MD  5/28/2025 10:49 AM EDT  Workstation ID: [id number]    NM PET/CT Skull Base to Mid Thigh  Result Date: 5/28/2025  Narrative: FDG NM PET/CT SKULL BASE TO MID THIGH Date of Exam: 5/22/2025 8:25 AM EDT Indication: restaging scans lung cancer. Comparison: 5/13/2025 and 7/11/2024 Technique: 8.1 mCi of F-18 FDG was administered intravenously. PET imaging was obtained from skull base to mid-thigh approximately 60 minutes after radiotracer injection. A low dose non contrast CT was obtained for attenuation correction and anatomic localization. Fused PET-CT and 3D MIP reconstructions were utilized for image interpretation.  Fasting blood glucose level: 113 mg/dl. Reference uptake values: Mediastinum: 2.8 SUVmax Liver: 2.7 SUVmax Normalization method: Body Weight Findings: Head and neck: No abnormal FDG activity identified. Chest: Increasing moderate to large right effusion which is loculated. Spiculated right middle lobe nodule has an SUV maximum of 4.4. There is extensive right hilar adenopathy with an SUV max 8.8. There is subcarinal and mediastinal adenopathy ranging between 8 and 5 SUV maximum. Abdomen and pelvis: At least 20 FDG-avid liver lesions are identified measuring up to 5 cm in diameter and an SUV maximum of 13.5. There is left adrenal lesion with an SUV maximum of 5. There is extensive julio hepatis and portacaval adenopathy with an SUV maximum of 8.6. There is retroperitoneal adenopathy, including left periaortic and aortocaval abnormal lymph nodes. Musculoskeletal: Diffuse osseous metastatic disease with multifocal disease throughout the cervical thoracolumbar spine, bilateral ribs, pelvis, including the left acetabulum and to a lesser extent the right acetabulum. There are bilateral  femoral lesions as well. No definite pathologic fractures are identified at this time. Several of the lesions however are involving weight-bearing bones and the patient is susceptible to pathologic fractures.     Impression: Impression: Extensive metastatic disease involving the chest, abdomen, and pelvis as well as the bones. Electronically Signed: Herb Lopes MD  5/28/2025 9:18 AM EDT  Workstation ID: [id number]    MRI Brain With & Without Contrast  Result Date: 5/16/2025  Narrative: MRI BRAIN W WO CONTRAST Date of Exam: 5/16/2025 12:12 AM EDT Indication: Metastatic lung cancer.  Comparison: 8/25/2024 Technique:  Routine multiplanar/multisequence sequence images of the brain were obtained before and after the uneventful administration of 12 mL Multihance. Findings: There is mild increased T2 and FLAIR signal in the periventricular white matter suggestive of mild chronic microvascular ischemic change. There is no diffusion restriction to suggest acute infarct. There is no evidence of acute or chronic intracranial hemorrhage. No mass effect or midline shift. No abnormal extra-axial collections. The major vascular flow voids appear intact. The basal ganglia, brainstem and cerebellum appear within normal limits. Calvarial and superficial soft tissue signal is within  normal limits. Orbits appear unremarkable. The paranasal sinuses and the mastoid air cells appear well aerated. Midline structures are intact.  There is no abnormal intracranial enhancement. There is normal enhancement within the intracranial vasculature including the dural venous sinuses.     Impression: Impression: Mild chronic microvascular ischemic change. No acute intracranial process. No evidence of intracranial metastatic disease. Electronically Signed: Rodriguez Singh MD  5/16/2025 1:15 AM EDT  Workstation ID: [id number]      ASSESSMENT: The patient is a very pleasant 65 y.o. female  with recurrent metastatic right lung cancer      PLAN:  1.  Recurrent  metastatic right lung cancer: Status post first dose of Keytruda on June 2.  She will follow-up with me as scheduled next Friday with cycle #2.  2.  Recurrent right pleural effusion: Chest tube drainage is gradually decreasing.  The patient might require pigtail catheter however I am hoping her effusion might slow down significantly once systemic therapy works.      Sejal Mckenzie MD  6/14/2025

## 2025-06-15 NOTE — DISCHARGE SUMMARY
[facility] Medicine Services  DISCHARGE SUMMARY    Patient Name: Sydnie Gamble  : 1960  MRN: [id number]    Date of Admission: 2025  Date of Discharge:    Primary Care Physician: Dee Elena APRN    Consults       Date and Time Order Name Status Description    2025  5:45 PM Inpatient Cardiothoracic Surgery Consult Completed     2025  5:45 PM Inpatient Palliative Care MD Consult Completed     2025  5:45 PM Inpatient Hematology & Oncology Consult Completed     2025  7:45 AM Inpatient Hematology & Oncology Consult Completed             Hospital Course     Presenting Problem:   Recurrent right pleural effusion [J90]  Adenocarcinoma of right lung [C34.91]  Recurrent pleural effusion [J90]    Active Hospital Problems    Diagnosis  POA    **Recurrent pleural effusion [J90]  Yes    Adenocarcinoma of right lung [C34.91]  Yes    HLD (hyperlipidemia) [E78.5]  Yes    Acquired hypothyroidism [E03.9]  Yes      Resolved Hospital Problems   No resolved problems to display.          Hospital Course:  Sydnie Gamble is a 65 y.o. female       Discharge Follow Up Recommendations fo  Day of Discharge     HPI:           Otherwise ROS is negative except as mentioned in the HPI.    Vital Signs:   Temp:  [97.7 °F (36.5 °C)-98 °F (36.7 °C)] 97.7 °F (36.5 °C)  Heart Rate:  [109-113] 111  Resp:  [16-18] 18  BP: (110-144)/(68-74) 126/73     Physical Exam:      Pertinent  and/or Most Recent Results     Results from last 7 days   Lab Units 06/15/25  0446 25  0456 25  0341 25  1755   WBC 10*3/mm3 19.96* 21.39* 20.73* 17.59*   HEMOGLOBIN g/dL 9.9* 10.3* 10.5* 10.7*   HEMATOCRIT % 32.2* 33.5* 33.9* 35.1   PLATELETS 10*3/mm3 221 215 220 192   SODIUM mmol/L  --  134*  --  136   POTASSIUM mmol/L  --  4.2  --  5.0   CHLORIDE mmol/L  --  97*  --  97*   CO2 mmol/L  --  25.0  --  28.0   BUN mg/dL  --  23.0  --  21.7   CREATININE mg/dL  --  0.85  --   "0.87   GLUCOSE mg/dL  --  108*  --  117*   CALCIUM mg/dL  --  7.7*  --  7.8*     Results from last 7 days   Lab Units 06/12/25  1755   BILIRUBIN mg/dL 0.2   ALK PHOS U/L 455*   ALT (SGPT) U/L 26   AST (SGOT) U/L 49*   PROTIME Seconds 15.6*   INR  1.17*   APTT seconds 32.7*           Invalid input(s): \"TG\", \"LDLCALC\", \"LDLREALC\"        Brief Urine Lab Results       None            Microbiology Results Abnormal       None            Imaging Results (All)       Procedure Component Value Units Date/Time    XR Chest 1 View [[id number]] Collected: 06/15/25 1017     Updated: 06/15/25 1022    Narrative:      XR CHEST 1 VW    Date of Exam: 6/15/2025 2:07 AM EDT    Indication: Recurrent pleural effusions, right chest tube, follow-up    Comparison: 6/14/2025.    Findings:  There is a right basilar pigtail chest tube in place. There may be some kinking of the chest tube possibly at the entry site. I would recommend clinical correlation. There is an unchanged moderate right pleural effusion with compressive atelectasis. The   left lung and pleural space are clear. The heart size is normal. The pulmonary vascular markings are normal. There is no pneumothorax.      Impression:      Impression:  1.There is a right basilar pigtail chest tube in place. There may be some kinking of the chest tube possibly at the entry site. I would recommend clinical correlation.  2.Unchanged moderate right pleural effusion with compressive atelectasis.        Electronically Signed: Samuel Luna MD    6/15/2025 10:19 AM EDT    Workstation ID: [id number]    XR Chest 1 View [[id number]] Collected: 06/14/25 0806     Updated: 06/14/25 0812    Narrative:      XR CHEST 1 VW    Date of Exam: 6/14/2025 3:14 AM EDT    Indication: Recurrent pleural effusions    Comparison: Chest radiograph 6/13/2025.    Findings:  Unchanged position of right pleural catheter. Cardiomediastinal silhouette is unchanged. Moderate right pleural effusion with right basilar airspace " disease and right basilar lucency suggestive of small-volume pneumothorax, unchanged compared to   yesterday's exam. Left lung appears clear. No pneumothorax. Osseous structures are unchanged.      Impression:      Impression:  No significant interval change.      Electronically Signed: Franco Shaffer MD    6/14/2025 8:09 AM EDT    Workstation ID: [id number]    XR Chest 1 View [[id number]] Collected: 06/13/25 1339     Updated: 06/13/25 1347    Narrative:      XR CHEST 1 VW    Date of Exam: 6/13/2025 12:55 PM EDT    Indication: Recurrent pleural effusions    Comparison: AP chest x-ray 6/13/2025 timed at 4:43 a.m.    Findings:  Right pleural catheter remains in place. Moderate size right hydropneumothorax appears similar to numbers to today's earlier chest x-ray. Underlying right basilar airspace opacities are stable. Left lung is clear.      Impression:      Impression:  Moderate sized right hydropneumothorax appears similar to numbers to today's earlier chest x-ray. Right pleural catheter remains in place.        Electronically Signed: Maryellen Dalton MD    6/13/2025 1:44 PM EDT    Workstation ID: [id number]    XR Chest 1 View [[id number]] Collected: 06/13/25 0712     Updated: 06/13/25 0726    Narrative:      XR CHEST 1 VW    Date of Exam: 6/13/2025 2:49 AM EDT    Indication: post Ct placement and thoracentesis    Comparison: AP chest x-ray 6/12/2025, 6/5/2025    Findings:  There is a new right pleural catheter. Previously seen right basilar pleural air has now been replaced with pleural fluid. No apical pneumothorax is seen. There are stable underlying right basilar airspace opacities. Left lung appears clear.   Cardiomediastinal contours are stable.      Impression:      Impression:  1.Interval placement of right pleural catheter with fluid component of right basilar hydropneumothorax.  2.Stable underlying right basilar airspace opacities, likely due to atelectasis.        Electronically Signed: Maryellen Dalton MD     6/13/2025 7:23 AM EDT    Workstation ID: [id number]    XR Chest 1 View [[id number]] Collected: 06/12/25 1429     Updated: 06/12/25 1434    Narrative:      XR CHEST 1 VW    Date of Exam: 6/12/2025 1:45 PM EDT    Indication: s/p thoracentesis; c/o severe right neck pain    Comparison: 6/12/2025    Findings:  Cardiac size is similar to prior exam. Similar right-sided hydropneumothorax. Similar right basilar opacity. No evidence of acute osseous abnormalities. Visualized upper abdomen is unremarkable.      Impression:      Impression:  1.Similar right-sided hydropneumothorax.  2.Similar right basilar opacity may reflect atelectasis or infection.        Electronically Signed: Clay Johnson MD    6/12/2025 2:31 PM EDT    Workstation ID: [id number]    XR Chest 1 View [[id number]] Collected: 06/12/25 1251     Updated: 06/12/25 1256    Narrative:      XR CHEST 1 VW    Date of Exam: 6/12/2025 12:25 PM EDT    Indication: s/p thoracentesis; c/o severe right neck pain    Comparison: Chest radiograph 6/12/2025    Findings:  Grossly stable size of the right hydropneumothorax, measuring up to 7 mm at apex with larger right subpulmonic component. Previous noted fluid extending towards the apex appears decreased. Left lung relatively clear. There is partial collapse and   probable underlying atelectasis within the right lower lobe. Negative for left pneumothorax. Stable cardiomediastinal silhouette. Degenerative related osseous change.      Impression:      Impression:  Slightly decreased right pleural fluid component with otherwise similar appearing moderate size hydropneumothorax.      Electronically Signed: Toni Argueta MD    6/12/2025 12:53 PM EDT    Workstation ID: [id number]    XR Chest 1 View [[id number]] Collected: 06/12/25 1230     Updated: 06/12/25 1244    Narrative:      XR CHEST 1 VW    Date of Exam: 6/12/2025 11:23 AM EDT    Indication: s/p thoracentesis    Comparison: Chest x-ray 6/5/2025    Findings:  There is a  pneumothorax on the right. It is moderate in size and seen inferiorly measuring up to 2.3 cm. There is a moderate mount of pleural fluid as well. There is right lower lobe airspace opacity which is improved from previous exam. Left lung is   clear. Heart size is normal.      Impression:      Impression:  Moderate sized right hydropneumothorax.      Electronically Signed: Susan Leahy MD    6/12/2025 12:41 PM EDT    Workstation ID: [id number]    US Thoracentesis [[id number]] Collected: 06/12/25 1217    Specimen: Body Fluid Updated: 06/12/25 1221    Narrative:      PROCEDURE: US THORACENTESIS-     ATTENDING: Jose Hope M.D.     CLINICAL HISTORY: Large right pleural effusion.     TECHNIQUE:     The risk, benefits, and alternatives were described in detail and the  patient was brought to the ultrasound suite and positioned seated. The  skin entry site was prepped and draped utilizing standard sterile  technique. 1% lidocaine was administered to the soft tissues of the  right posterior thorax.        A 5 Bulgarian Yueh was advanced into the right pleural space.        A total of 1.4 L was removed from the right pleural space. A specimen  was sent to the laboratory for analysis. The needle was removed and a  sterile dressing was applied.     The patient tolerated the procedure well and there were no  complications.       Impression:      Successful ultrasound-guided right thoracentesis.        Attestation  Signer name: Jose Hope MD  I attest that I was present for the entire procedure. I reviewed the  stored images and agree with the report as written.                 6/12/2025 12:17 PM by Jose Hope MD on Workstation: [id number]               Results for orders placed during the hospital encounter of 05/13/25    Duplex Venous Lower Extremity - Bilateral CAR    Interpretation Summary    Acute left lower extremity deep vein thrombosis noted in the peroneal.      Results for orders placed during the hospital encounter  of 05/13/25    Duplex Venous Lower Extremity - Bilateral CAR    Interpretation Summary    Acute left lower extremity deep vein thrombosis noted in the peroneal.      Results for orders placed during the hospital encounter of 05/13/25    Adult Transthoracic Echo Complete w/ Color, Spectral and Contrast if necessary per protocol    Interpretation Summary    Left ventricular ejection fraction appears to be 66 - 70%.    Normal right ventricular cavity size, wall thickness, systolic function and septal motion noted.    There is a small (<1cm) pericardial effusion adjacent to the right ventricle. There is no evidence of cardiac tamponade.    There is a left pleural effusion.        Discharge Details        Discharge Medications        New Medications        Instructions Start Date   acetaminophen 325 MG tablet  Commonly known as: TYLENOL   650 mg, Oral, Every 6 Hours PRN      nystatin 100,000 unit/mL suspension  Commonly known as: MYCOSTATIN   500,000 Units, Oral, 4 Times Daily             Changes to Medications        Instructions Start Date   apixaban 5 MG tablet tablet  Commonly known as: ELIQUIS  What changed: See the new instructions.   Take 2 tablets by mouth Every 12 (Twelve) Hours for 6 days, THEN 1 tablet Every 12 (Twelve) Hours for 24 days. Indications: DVT/PE (active thrombosis)   Start Date: June 16, 2025            Continue These Medications        Instructions Start Date   albuterol sulfate  (90 Base) MCG/ACT inhaler  Commonly known as: PROVENTIL HFA;VENTOLIN HFA;PROAIR HFA   2 puffs, Every 4 to 6 Hours PRN      atorvastatin 10 MG tablet  Commonly known as: LIPITOR   10 mg, Oral, Every Night at Bedtime      clonazePAM 1 MG tablet  Commonly known as: KlonoPIN   1 mg, Oral, 2 Times Daily PRN      cyclobenzaprine 10 MG tablet  Commonly known as: FLEXERIL   10 mg, Oral, 2 Times Daily PRN      Diclofenac Sodium 1 % gel gel  Commonly known as: VOLTAREN   Apply 1 gram topically to indicated area 4 times  daily as needed for mild to moderate pain. STOP FLEXERIL      docusate sodium 100 MG capsule  Commonly known as: COLACE   200 mg, Oral, Daily      famotidine 20 MG tablet  Commonly known as: PEPCID   TAKE 1 TABLET BY MOUTH TWICE DAILY OR TAKE 2 TABLETS BY MOUTH ONCE DAILY AS NEEDED FOR HEARTBURN      levothyroxine 88 MCG tablet  Commonly known as: Synthroid   88 mcg, Oral, Every Early Morning      ondansetron 8 MG tablet  Commonly known as: ZOFRAN   8 mg, Oral, 3 Times Daily PRN      oxyCODONE-acetaminophen 5-325 MG per tablet  Commonly known as: PERCOCET   1 tablet, Oral, Every 6 Hours PRN      sertraline 100 MG tablet  Commonly known as: Zoloft   100 mg, Oral, Daily      traZODone 150 MG tablet  Commonly known as: DESYREL   150 mg, Oral, Every Night at Bedtime      Trelegy Ellipta 100-62.5-25 MCG/ACT inhaler  Generic drug: Fluticasone-Umeclidin-Vilant   1 puff, Inhalation, Daily - RT      zolpidem 10 MG tablet  Commonly known as: AMBIEN   10 mg, Oral, Nightly PRN               No Known Allergies      Discharge Disposition:  Home or Self Care    Discharge Diet:  Diet Order   Procedures    Diet: Regular/House; Fluid Consistency: Thin (IDDSI 0)         Discharge Activity:   Activity Instructions       Activity as Tolerated      Up WIth Assist                CODE STATUS:    Code Status and Medical Interventions: CPR (Attempt to Resuscitate); Full Support; also talked over with  Jose Francisco   Ordered at: 06/12/25 6074     Code Status (Patient has no pulse and is not breathing):    CPR (Attempt to Resuscitate)     Medical Interventions (Patient has pulse or is breathing):    Full Support     Comments:    also talked over with  Jose Francisco     Level Of Support Discussed With:    Patient       Next of Kin (If No Surrogate)         Future Appointments   Date Time Provider Department Center   6/23/2025  9:15 AM ACCESS AND LAB DRAW CHAIR [facility] OPI None   6/23/2025 10:00 AM Sejal Mckenzie MD [facility]    6/23/2025 10:30 AM CHAIR 10 [facility] OP [facility] OPI None   7/16/2025  1:30 PM Hiwot Samson PA-C [facility]   7/18/2025  2:30 PM Ngoc Alvarez APRN [facility]   10/2/2025  3:00 PM Jacinda De La Rosa APRN [facility] None       Additional Instructions for the Follow-ups that You Need to Schedule       Discharge Follow-up with PCP   As directed       Currently Documented PCP:    Dee Elena APRN    PCP Phone Number:    [phone]     Follow Up Details: Follow up with PCP in 1 week        Discharge Follow-up with Specified Provider: Dr Mckenzie   As directed      To: Dr Mckenzie   Follow Up Details: 06/20/25 as previously scheduled.                Time Spent on Discharge:  55 minutes    Electronically signed by DAYSI Em, 06/15/25, 1:59 PM EDT.

## 2025-06-15 NOTE — PLAN OF CARE
Goal Outcome Evaluation:              Outcome Evaluation: Pt vitals stable and on 4L nasal cannula. Heparin drip ongoing. A&Ox4. PRN pain medication given. No BM this shift. Chest tube in place with clean dry and intact dressing. Pt resting in between care. Plan of care ongoing.

## 2025-06-15 NOTE — PROGRESS NOTES
DATE OF VISIT: 6/15/2025    Chief Complaint: Followup for metastatic recurrent right lung cancer     SUBJECTIVE: The patient is laying comfortably in bed.  She is complaining of shortness of breath with minimal exertion.  Chest tube has been removed today.    REVIEW OF SYSTEMS: All the other 9 systems are reviewed by me and negative  except what is mentioned in HPI.     Past History:  Medical History: has a past medical history of Acid reflux, Acid reflux, Adenocarcinoma of lung (08/12/2024), Anemia, chronic disease (5/13/2025), Anxiety (1976), Arthritis, Atherosclerotic cardiovascular disease (03/25/2024), Cervical cancer, Depression, Emphysema of lung, History of radiation therapy (11/20/2020), History of radiation therapy (10/04/2024), radiation therapy, Hyperlipidemia, Hypothyroidism, Kidney disease, Lung cancer, Lung nodule, Ovarian cyst (1980s), Pleural effusion (5/13/2025), Pulmonary embolism (5/13/2025), Visual impairment (corrected with glasses), Wears dentures, and Wears glasses.   Surgical History: has a past surgical history that includes Tubal ligation; total abdominal hysterectomy pelvic node dissection (N/A, 08/18/2020); Lymph node biopsy; Subtotal Hysterectomy (1987?); Bronchoscopy; Lung biopsy; BRONCHOSCOPY WITH ION ROBOTIC ASSIST (N/A, 08/06/2024); Mohs surgery (03/24/2025); Hysteroscopy; and Colonoscopy.   Family History: family history includes Anxiety disorder in her mother; Asthma in her mother; COPD in her mother; Cancer in her father, maternal aunt, maternal aunt, maternal aunt, maternal aunt, maternal grandmother, and mother; Depression in her mother; Early death in her father; Emphysema in her mother; Heart attack in her maternal grandfather; Heart disease in her maternal grandfather; Hypertension in her mother; Melanoma in her mother; Mental illness in her mother; Uterine cancer in her mother.   Social History: reports that she quit smoking about 6 years ago. Her smoking use included  cigarettes. She started smoking about 53 years ago. She has a 67.5 pack-year smoking history. She has been exposed to tobacco smoke. She has never used smokeless tobacco. She reports that she does not drink alcohol and does not use drugs.    Medications Prior to Admission   Medication Sig Dispense Refill Last Dose/Taking    albuterol sulfate  (90 Base) MCG/ACT inhaler Inhale 2 puffs Every 4 (Four) to 6 (Six) Hours As Needed for Wheezing or Shortness of Air.   Taking As Needed    apixaban (ELIQUIS) 5 MG tablet tablet Take 2 tablets by mouth Every 12 (Twelve) Hours for 6 days, THEN 1 tablet Every 12 (Twelve) Hours for 24 days. Indications: DVT/PE (active thrombosis) 48 tablet 5 6/11/2025 Morning    atorvastatin (LIPITOR) 10 MG tablet TAKE 1 TABLET BY MOUTH EVERY DAY AT NIGHT 90 tablet 1 6/11/2025    clonazePAM (KlonoPIN) 1 MG tablet Take 1 tablet by mouth 2 (Two) Times a Day As Needed for Anxiety. 50 tablet 0 6/12/2025 Morning    Diclofenac Sodium (VOLTAREN) 1 % gel gel Apply 1 gram topically to indicated area 4 times daily as needed for mild to moderate pain. STOP FLEXERIL 100 g 2 Taking    docusate sodium (COLACE) 100 MG capsule Take 2 capsules by mouth Daily. 60 capsule 3 6/12/2025 Morning    famotidine (PEPCID) 20 MG tablet TAKE 1 TABLET BY MOUTH TWICE DAILY OR TAKE 2 TABLETS BY MOUTH ONCE DAILY AS NEEDED FOR HEARTBURN 180 tablet 1 Taking    Fluticasone-Umeclidin-Vilant (Trelegy Ellipta) 100-62.5-25 MCG/ACT inhaler Inhale 1 puff Daily. 90 each 3 6/12/2025 Morning    levothyroxine (Synthroid) 88 MCG tablet Take 1 tablet by mouth Every Morning. 90 tablet 1 6/11/2025    ondansetron (ZOFRAN) 8 MG tablet Take 1 tablet by mouth 3 (Three) Times a Day As Needed for Nausea or Vomiting. 30 tablet 5 Taking As Needed    oxyCODONE-acetaminophen (PERCOCET) 5-325 MG per tablet Take 1 tablet by mouth Every 6 (Six) Hours As Needed for Moderate Pain. 120 tablet 0 6/12/2025 Morning    sertraline (Zoloft) 100 MG tablet Take 1  tablet by mouth Daily. 90 tablet 1 6/11/2025    traZODone (DESYREL) 150 MG tablet TAKE 1 TABLET BY MOUTH EVERY DAY AT NIGHT 90 tablet 3 6/11/2025    zolpidem (AMBIEN) 10 MG tablet Take 1 tablet by mouth At Night As Needed for Sleep. 30 tablet 1 Past Month    cyclobenzaprine (FLEXERIL) 10 MG tablet TAKE 1 TABLET BY MOUTH 2 TIMES A DAY AS NEEDED FOR MUSCLE SPASMS. 60 tablet 3       Allergies: Patient has no known allergies.     Current Facility-Administered Medications:     acetaminophen (TYLENOL) tablet 650 mg, 650 mg, Oral, Q4H PRN **OR** acetaminophen (TYLENOL) 160 MG/5ML oral solution 650 mg, 650 mg, Oral, Q4H PRN **OR** acetaminophen (TYLENOL) suppository 650 mg, 650 mg, Rectal, Q4H PRN, Nuha Shafer, APRN    arformoterol (BROVANA) nebulizer solution 15 mcg, 15 mcg, Nebulization, BID - RT, 15 mcg at 06/14/25 2142 **AND** budesonide (PULMICORT) nebulizer solution 0.5 mg, 0.5 mg, Nebulization, BID - RT, 0.5 mg at 06/14/25 2142 **AND** revefenacin (YUPELRI) nebulizer solution 175 mcg, 175 mcg, Nebulization, Daily - RT, Nuha Shafer, APRN    atorvastatin (LIPITOR) tablet 10 mg, 10 mg, Oral, Nightly, Nuha Shafer, APRN, 10 mg at 06/14/25 2034    sennosides-docusate (PERICOLACE) 8.6-50 MG per tablet 2 tablet, 2 tablet, Oral, BID, 2 tablet at 06/15/25 0915 **AND** polyethylene glycol (MIRALAX) packet 17 g, 17 g, Oral, Daily PRN **AND** bisacodyl (DULCOLAX) EC tablet 5 mg, 5 mg, Oral, Daily PRN, 5 mg at 06/15/25 0924 **AND** bisacodyl (DULCOLAX) suppository 10 mg, 10 mg, Rectal, Daily PRN, Nuha Shafer, APRN    bisacodyl (DULCOLAX) suppository 10 mg, 10 mg, Rectal, Once, Kaylene Reynoso APRN    Calcium Replacement - Follow Nurse / BPA Driven Protocol, , Not Applicable, PRN, Nuha Shafer, DAYSI    clonazePAM (KlonoPIN) tablet 1 mg, 1 mg, Oral, BID PRN, Nuha Shafer, DAYSI, 1 mg at 06/13/25 2138    cyclobenzaprine (FLEXERIL) tablet 10 mg, 10 mg, Oral, BID PRN, Nuha Shafer,  APRN, 10 mg at 06/14/25 2235    Diclofenac Sodium (VOLTAREN) 1 % gel 2 g, 2 g, Topical, 4x Daily, Kaylene Reynoso APRN, 2 g at 06/13/25 2139    famotidine (PEPCID) tablet 40 mg, 40 mg, Oral, BID PRN, Nuha Shafer APRN    heparin 23533 units/250 mL (100 units/mL) in 0.45 % NaCl infusion, 16 Units/kg/hr, Intravenous, Titrated, Nuha Shafer APRN, Last Rate: 8.92 mL/hr at 06/15/25 0345, 16 Units/kg/hr at 06/15/25 0345    HYDROmorphone (DILAUDID) injection 0.5 mg, 0.5 mg, Intravenous, Q2H PRN, 0.5 mg at 06/15/25 1205 **AND** naloxone (NARCAN) injection 0.4 mg, 0.4 mg, Intravenous, Q5 Min PRN, Nuha Shafer APRN    levothyroxine (SYNTHROID, LEVOTHROID) tablet 88 mcg, 88 mcg, Oral, Q AM, Nuha Shafer APRN, 88 mcg at 06/15/25 0529    lidocaine PF 1% (XYLOCAINE) injection 5 mL, 5 mL, Injection, Once, Jose Hope MD    Magnesium Standard Dose Replacement - Follow Nurse / BPA Driven Protocol, , Not Applicable, PRN, Nuha Shafer APRN    nystatin (MYCOSTATIN) 100,000 unit/mL suspension 500,000 Units, 5 mL, Oral, 4x Daily, Trina Hdz APRN    ondansetron ODT (ZOFRAN-ODT) disintegrating tablet 4 mg, 4 mg, Oral, Q6H PRN **OR** ondansetron (ZOFRAN) injection 4 mg, 4 mg, Intravenous, Q6H PRN, Nuha Shafer APRN    oxyCODONE-acetaminophen (PERCOCET) 5-325 MG per tablet 1 tablet, 1 tablet, Oral, Q4H PRN, Nuha Shafer APRN, 1 tablet at 06/14/25 2304    Pharmacy to Dose Heparin, , Not Applicable, Continuous PRN, Nuha Shafer APRN    Phosphorus Replacement - Follow Nurse / BPA Driven Protocol, , Not Applicable, PRN, uNha Shafer APRN    Potassium Replacement - Follow Nurse / BPA Driven Protocol, , Not Applicable, PRN, Nuha Shafer APRN    sertraline (ZOLOFT) tablet 100 mg, 100 mg, Oral, Daily, Nuha Shafer APRN, 100 mg at 06/15/25 0915    simethicone (MYLICON) chewable tablet 80 mg, 80 mg, Oral, 4x Daily PRN, Trina Hdz APRN, 80 mg at  "06/15/25 0924    sodium chloride 0.9 % flush 10 mL, 10 mL, Intravenous, Q12H, Jose Hope MD, 10 mL at 06/14/25 2100    sodium chloride 0.9 % flush 10 mL, 10 mL, Intravenous, PRN, Jose Hope MD    sodium chloride 0.9 % flush 10 mL, 10 mL, Intravenous, Q12H, Nuha Shafer APRN, 10 mL at 06/14/25 2100    sodium chloride 0.9 % flush 10 mL, 10 mL, Intravenous, PRN, Nuha Shafer APRN    sodium chloride 0.9 % infusion 40 mL, 40 mL, Intravenous, PRN, Jose Hope MD    sodium chloride 0.9 % infusion 40 mL, 40 mL, Intravenous, PRN, Nuha Shafer APRN    traZODone (DESYREL) tablet 150 mg, 150 mg, Oral, Nightly, Nuha Shafer APRN, 150 mg at 06/14/25 2034    zolpidem (AMBIEN) tablet 5 mg, 5 mg, Oral, Nightly PRN, Nuha Shafer APRN    PHYSICAL EXAMINATION:   /73 (BP Location: Left arm, Patient Position: Lying)   Pulse 111   Temp 97.7 °F (36.5 °C) (Oral)   Resp 18   Ht 154.9 cm (60.98\")   Wt 55.8 kg (123 lb)   LMP  (LMP Unknown)   SpO2 96%   BMI 23.25 kg/m²                ECOG Performance Status: 2 - Symptomatic, <50% confined to bed  GENERAL: Age appropriate. No acute distress.   NEURO/PSYCH: A&O x 3, strength 5/5 in all muscle groups  HEENT: Head atraumatic, normocephalic.   NECK: Supple. No JVD. No lymphadenopathy.   LUNGS: Clear to auscultation bilaterally. No wheezing. No rhonchi.   HEART: Regular rate and rhythm. S1, S2, no murmurs.   ABDOMEN: Soft, nontender, nondistended. Bowel sounds positive. No  hepatosplenomegaly.   EXTREMITIES: No clubbing, cyanosis, or edema.   SKIN: No rashes. No purpura.       Admission on 06/12/2025   Component Date Value Ref Range Status    Glucose 06/12/2025 117 (H)  65 - 99 mg/dL Final    BUN 06/12/2025 21.7  8.0 - 23.0 mg/dL Final    Creatinine 06/12/2025 0.87  0.57 - 1.00 mg/dL Final    Sodium 06/12/2025 136  136 - 145 mmol/L Final    Potassium 06/12/2025 5.0  3.5 - 5.2 mmol/L Final    Chloride 06/12/2025 97 (L)  98 - 107 " mmol/L Final    CO2 06/12/2025 28.0  22.0 - 29.0 mmol/L Final    Calcium 06/12/2025 7.8 (L)  8.6 - 10.5 mg/dL Final    Total Protein 06/12/2025 6.1  6.0 - 8.5 g/dL Final    Albumin 06/12/2025 2.8 (L)  3.5 - 5.2 g/dL Final    ALT (SGPT) 06/12/2025 26  1 - 33 U/L Final    AST (SGOT) 06/12/2025 49 (H)  1 - 32 U/L Final    Alkaline Phosphatase 06/12/2025 455 (H)  39 - 117 U/L Final    Total Bilirubin 06/12/2025 0.2  0.0 - 1.2 mg/dL Final    Globulin 06/12/2025 3.3  gm/dL Final    Calculated Result    A/G Ratio 06/12/2025 0.8  g/dL Final    BUN/Creatinine Ratio 06/12/2025 24.9  7.0 - 25.0 Final    Anion Gap 06/12/2025 11.0  5.0 - 15.0 mmol/L Final    eGFR 06/12/2025 73.1  >60.0 mL/min/1.73 Final    Heparin Anti-Xa (UFH) 06/12/2025 0.48  0.30 - 0.70 IU/ml Final    Protime 06/12/2025 15.6 (H)  12.2 - 15.3 Seconds Final    INR 06/12/2025 1.17 (H)  0.89 - 1.12 Final    PTT 06/12/2025 32.7 (L)  60.0 - 90.0 seconds Final    WBC 06/12/2025 17.59 (H)  3.40 - 10.80 10*3/mm3 Final    RBC 06/12/2025 3.88  3.77 - 5.28 10*6/mm3 Final    Hemoglobin 06/12/2025 10.7 (L)  12.0 - 15.9 g/dL Final    Hematocrit 06/12/2025 35.1  34.0 - 46.6 % Final    MCV 06/12/2025 90.5  79.0 - 97.0 fL Final    MCH 06/12/2025 27.6  26.6 - 33.0 pg Final    MCHC 06/12/2025 30.5 (L)  31.5 - 35.7 g/dL Final    RDW 06/12/2025 14.8  12.3 - 15.4 % Final    RDW-SD 06/12/2025 47.6  37.0 - 54.0 fl Final    MPV 06/12/2025 10.0  6.0 - 12.0 fL Final    Platelets 06/12/2025 192  140 - 450 10*3/mm3 Final    Neutrophil % 06/12/2025 80.4 (H)  42.7 - 76.0 % Final    Lymphocyte % 06/12/2025 4.2 (L)  19.6 - 45.3 % Final    Monocyte % 06/12/2025 8.2  5.0 - 12.0 % Final    Eosinophil % 06/12/2025 3.9  0.3 - 6.2 % Final    Basophil % 06/12/2025 0.5  0.0 - 1.5 % Final    Immature Grans % 06/12/2025 2.8 (H)  0.0 - 0.5 % Final    Neutrophils, Absolute 06/12/2025 14.13 (H)  1.70 - 7.00 10*3/mm3 Final    Lymphocytes, Absolute 06/12/2025 0.74  0.70 - 3.10 10*3/mm3 Final    Monocytes,  Absolute 06/12/2025 1.45 (H)  0.10 - 0.90 10*3/mm3 Final    Eosinophils, Absolute 06/12/2025 0.68 (H)  0.00 - 0.40 10*3/mm3 Final    Basophils, Absolute 06/12/2025 0.09  0.00 - 0.20 10*3/mm3 Final    Immature Grans, Absolute 06/12/2025 0.50 (H)  0.00 - 0.05 10*3/mm3 Final    nRBC 06/12/2025 0.0  0.0 - 0.2 /100 WBC Final    Heparin Anti-Xa (UFH) 06/13/2025 0.54  0.30 - 0.70 IU/ml Final    WBC 06/13/2025 20.73 (H)  3.40 - 10.80 10*3/mm3 Final    RBC 06/13/2025 3.77  3.77 - 5.28 10*6/mm3 Final    Hemoglobin 06/13/2025 10.5 (L)  12.0 - 15.9 g/dL Final    Hematocrit 06/13/2025 33.9 (L)  34.0 - 46.6 % Final    MCV 06/13/2025 89.9  79.0 - 97.0 fL Final    MCH 06/13/2025 27.9  26.6 - 33.0 pg Final    MCHC 06/13/2025 31.0 (L)  31.5 - 35.7 g/dL Final    RDW 06/13/2025 14.8  12.3 - 15.4 % Final    RDW-SD 06/13/2025 47.6  37.0 - 54.0 fl Final    MPV 06/13/2025 10.1  6.0 - 12.0 fL Final    Platelets 06/13/2025 220  140 - 450 10*3/mm3 Final    Neutrophil % 06/13/2025 77.0 (H)  42.7 - 76.0 % Final    Lymphocyte % 06/13/2025 5.7 (L)  19.6 - 45.3 % Final    Monocyte % 06/13/2025 8.6  5.0 - 12.0 % Final    Eosinophil % 06/13/2025 5.9  0.3 - 6.2 % Final    Basophil % 06/13/2025 0.5  0.0 - 1.5 % Final    Immature Grans % 06/13/2025 2.3 (H)  0.0 - 0.5 % Final    Neutrophils, Absolute 06/13/2025 15.98 (H)  1.70 - 7.00 10*3/mm3 Final    Lymphocytes, Absolute 06/13/2025 1.18  0.70 - 3.10 10*3/mm3 Final    Monocytes, Absolute 06/13/2025 1.78 (H)  0.10 - 0.90 10*3/mm3 Final    Eosinophils, Absolute 06/13/2025 1.22 (H)  0.00 - 0.40 10*3/mm3 Final    Basophils, Absolute 06/13/2025 0.10  0.00 - 0.20 10*3/mm3 Final    Immature Grans, Absolute 06/13/2025 0.47 (H)  0.00 - 0.05 10*3/mm3 Final    nRBC 06/13/2025 0.1  0.0 - 0.2 /100 WBC Final    Heparin Anti-Xa (UFH) 06/13/2025 0.55  0.30 - 0.70 IU/ml Final    Heparin Anti-Xa (UFH) 06/14/2025 0.42  0.30 - 0.70 IU/ml Final    Glucose 06/14/2025 108 (H)  65 - 99 mg/dL Final    BUN 06/14/2025 23.0  8.0  - 23.0 mg/dL Final    Creatinine 06/14/2025 0.85  0.57 - 1.00 mg/dL Final    Sodium 06/14/2025 134 (L)  136 - 145 mmol/L Final    Potassium 06/14/2025 4.2  3.5 - 5.2 mmol/L Final    Chloride 06/14/2025 97 (L)  98 - 107 mmol/L Final    CO2 06/14/2025 25.0  22.0 - 29.0 mmol/L Final    Calcium 06/14/2025 7.7 (L)  8.6 - 10.5 mg/dL Final    BUN/Creatinine Ratio 06/14/2025 27.1 (H)  7.0 - 25.0 Final    Anion Gap 06/14/2025 12.0  5.0 - 15.0 mmol/L Final    eGFR 06/14/2025 75.2  >60.0 mL/min/1.73 Final    WBC 06/14/2025 21.39 (H)  3.40 - 10.80 10*3/mm3 Final    RBC 06/14/2025 3.72 (L)  3.77 - 5.28 10*6/mm3 Final    Hemoglobin 06/14/2025 10.3 (L)  12.0 - 15.9 g/dL Final    Hematocrit 06/14/2025 33.5 (L)  34.0 - 46.6 % Final    MCV 06/14/2025 90.1  79.0 - 97.0 fL Final    MCH 06/14/2025 27.7  26.6 - 33.0 pg Final    MCHC 06/14/2025 30.7 (L)  31.5 - 35.7 g/dL Final    RDW 06/14/2025 15.1  12.3 - 15.4 % Final    RDW-SD 06/14/2025 48.9  37.0 - 54.0 fl Final    MPV 06/14/2025 10.7  6.0 - 12.0 fL Final    Platelets 06/14/2025 215  140 - 450 10*3/mm3 Final    Neutrophil % 06/14/2025 79.5 (H)  42.7 - 76.0 % Final    Lymphocyte % 06/14/2025 4.3 (L)  19.6 - 45.3 % Final    Monocyte % 06/14/2025 7.8  5.0 - 12.0 % Final    Eosinophil % 06/14/2025 5.2  0.3 - 6.2 % Final    Basophil % 06/14/2025 0.4  0.0 - 1.5 % Final    Immature Grans % 06/14/2025 2.8 (H)  0.0 - 0.5 % Final    Neutrophils, Absolute 06/14/2025 17.02 (H)  1.70 - 7.00 10*3/mm3 Final    Lymphocytes, Absolute 06/14/2025 0.91  0.70 - 3.10 10*3/mm3 Final    Monocytes, Absolute 06/14/2025 1.67 (H)  0.10 - 0.90 10*3/mm3 Final    Eosinophils, Absolute 06/14/2025 1.11 (H)  0.00 - 0.40 10*3/mm3 Final    Basophils, Absolute 06/14/2025 0.08  0.00 - 0.20 10*3/mm3 Final    Immature Grans, Absolute 06/14/2025 0.60 (H)  0.00 - 0.05 10*3/mm3 Final    nRBC 06/14/2025 0.1  0.0 - 0.2 /100 WBC Final    Heparin Anti-Xa (UFH) 06/15/2025 0.46  0.30 - 0.70 IU/ml Final    WBC 06/15/2025 19.96 (H)   3.40 - 10.80 10*3/mm3 Final    RBC 06/15/2025 3.59 (L)  3.77 - 5.28 10*6/mm3 Final    Hemoglobin 06/15/2025 9.9 (L)  12.0 - 15.9 g/dL Final    Hematocrit 06/15/2025 32.2 (L)  34.0 - 46.6 % Final    MCV 06/15/2025 89.7  79.0 - 97.0 fL Final    MCH 06/15/2025 27.6  26.6 - 33.0 pg Final    MCHC 06/15/2025 30.7 (L)  31.5 - 35.7 g/dL Final    RDW 06/15/2025 15.2  12.3 - 15.4 % Final    RDW-SD 06/15/2025 48.6  37.0 - 54.0 fl Final    MPV 06/15/2025 10.0  6.0 - 12.0 fL Final    Platelets 06/15/2025 221  140 - 450 10*3/mm3 Final    Neutrophil % 06/15/2025 81.3 (H)  42.7 - 76.0 % Final    Lymphocyte % 06/15/2025 3.8 (L)  19.6 - 45.3 % Final    Monocyte % 06/15/2025 7.6  5.0 - 12.0 % Final    Eosinophil % 06/15/2025 4.7  0.3 - 6.2 % Final    Basophil % 06/15/2025 0.4  0.0 - 1.5 % Final    Immature Grans % 06/15/2025 2.2 (H)  0.0 - 0.5 % Final    Neutrophils, Absolute 06/15/2025 16.23 (H)  1.70 - 7.00 10*3/mm3 Final    Lymphocytes, Absolute 06/15/2025 0.76  0.70 - 3.10 10*3/mm3 Final    Monocytes, Absolute 06/15/2025 1.52 (H)  0.10 - 0.90 10*3/mm3 Final    Eosinophils, Absolute 06/15/2025 0.93 (H)  0.00 - 0.40 10*3/mm3 Final    Basophils, Absolute 06/15/2025 0.08  0.00 - 0.20 10*3/mm3 Final    Immature Grans, Absolute 06/15/2025 0.44 (H)  0.00 - 0.05 10*3/mm3 Final    nRBC 06/15/2025 0.0  0.0 - 0.2 /100 WBC Final       XR Chest 1 View  Result Date: 6/15/2025  Narrative: XR CHEST 1 VW Date of Exam: 6/15/2025 2:07 AM EDT Indication: Recurrent pleural effusions, right chest tube, follow-up Comparison: 6/14/2025. Findings: There is a right basilar pigtail chest tube in place. There may be some kinking of the chest tube possibly at the entry site. I would recommend clinical correlation. There is an unchanged moderate right pleural effusion with compressive atelectasis. The left lung and pleural space are clear. The heart size is normal. The pulmonary vascular markings are normal. There is no pneumothorax.     Impression:  Impression: 1.There is a right basilar pigtail chest tube in place. There may be some kinking of the chest tube possibly at the entry site. I would recommend clinical correlation. 2.Unchanged moderate right pleural effusion with compressive atelectasis. Electronically Signed: Samuel Luna MD  6/15/2025 10:19 AM EDT  Workstation ID: [id number]    XR Chest 1 View  Result Date: 6/14/2025  Narrative: XR CHEST 1 VW Date of Exam: 6/14/2025 3:14 AM EDT Indication: Recurrent pleural effusions Comparison: Chest radiograph 6/13/2025. Findings: Unchanged position of right pleural catheter. Cardiomediastinal silhouette is unchanged. Moderate right pleural effusion with right basilar airspace disease and right basilar lucency suggestive of small-volume pneumothorax, unchanged compared to yesterday's exam. Left lung appears clear. No pneumothorax. Osseous structures are unchanged.     Impression: Impression: No significant interval change. Electronically Signed: Franco Shaffer MD  6/14/2025 8:09 AM EDT  Workstation ID: [id number]    XR Chest 1 View  Result Date: 6/13/2025  Narrative: XR CHEST 1 VW Date of Exam: 6/13/2025 12:55 PM EDT Indication: Recurrent pleural effusions Comparison: [facility] chest x-ray 6/13/2025 timed at 4:43 a.m. Findings: Right pleural catheter remains in place. Moderate size right hydropneumothorax appears similar to numbers to today's earlier chest x-ray. Underlying right basilar airspace opacities are stable. Left lung is clear.     Impression: Impression: Moderate sized right hydropneumothorax appears similar to numbers to today's earlier chest x-ray. Right pleural catheter remains in place. Electronically Signed: Maryellen Dalton MD  6/13/2025 1:44 PM EDT  Workstation ID: [id number]    XR Chest 1 View  Result Date: 6/13/2025  Narrative: XR CHEST 1 VW Date of Exam: 6/13/2025 2:49 AM EDT Indication: post Ct placement and thoracentesis Comparison: [facility] chest x-ray 6/12/2025, 6/5/2025 Findings: There is a new right pleural  catheter. Previously seen right basilar pleural air has now been replaced with pleural fluid. No apical pneumothorax is seen. There are stable underlying right basilar airspace opacities. Left lung appears clear. Cardiomediastinal contours are stable.     Impression: Impression: 1.Interval placement of right pleural catheter with fluid component of right basilar hydropneumothorax. 2.Stable underlying right basilar airspace opacities, likely due to atelectasis. Electronically Signed: Maryellen Dalton MD  6/13/2025 7:23 AM EDT  Workstation ID: [id number]    CT Guided Chest Tube  Result Date: 6/12/2025  Narrative: DATE OF EXAM: 6/12/2025 2:48 PM  PROCEDURE: CT GUIDED CHEST TUBE PLACEMENT-  INDICATIONS: right chest tube placement; J95.811-Postprocedural pneumothorax  DLP: 746 mGycm  TECHNIQUE:  The risks, benefits, and alternatives were discussed in detail with the patient. The patient was brought down to the CT suite and positioned supine on the CT table. Preliminary CT scan of the the chest was performed and a skin entry site was selected and marked. The area was prepped and draped utilizing standard sterile technique. 1% lidocaine was administered to the soft tissues. A small skin incision was made with an 11 blade scalpel. Under CT guidance a 5 Stateless Yueh was advanced into the right pleural space. The inner stylet was removed and an 035 wire was advanced coaxially. Over the wire a 10 Stateless all-purpose drainage catheter was advanced with the pigtail formed and locked within the pleural space the catheter was sutured to the skin and attached to a Pleur-evac. A sterile dressing was then applied.  The patient tolerated the procedure well and there were no immediate complications.      Impression: Successful CT-guided 10 Stateless right sided chest tube placement.   6/12/2025 4:13 PM by Jose Hope MD on Workstation: [id number]      XR Chest 1 View  Result Date: 6/12/2025  Narrative: XR CHEST 1 VW Date of Exam: 6/12/2025  1:45 PM EDT Indication: s/p thoracentesis; c/o severe right neck pain Comparison: 6/12/2025 Findings: Cardiac size is similar to prior exam. Similar right-sided hydropneumothorax. Similar right basilar opacity. No evidence of acute osseous abnormalities. Visualized upper abdomen is unremarkable.     Impression: Impression: 1.Similar right-sided hydropneumothorax. 2.Similar right basilar opacity may reflect atelectasis or infection. Electronically Signed: Clay Johnson MD  6/12/2025 2:31 PM EDT  Workstation ID: [id number]    XR Chest 1 View  Result Date: 6/12/2025  Narrative: XR CHEST 1 VW Date of Exam: 6/12/2025 12:25 PM EDT Indication: s/p thoracentesis; c/o severe right neck pain Comparison: Chest radiograph 6/12/2025 Findings: Grossly stable size of the right hydropneumothorax, measuring up to 7 mm at apex with larger right subpulmonic component. Previous noted fluid extending towards the apex appears decreased. Left lung relatively clear. There is partial collapse and probable underlying atelectasis within the right lower lobe. Negative for left pneumothorax. Stable cardiomediastinal silhouette. Degenerative related osseous change.     Impression: Impression: Slightly decreased right pleural fluid component with otherwise similar appearing moderate size hydropneumothorax. Electronically Signed: Toni Argueta MD  6/12/2025 12:53 PM EDT  Workstation ID: [id number]    XR Chest 1 View  Result Date: 6/12/2025  Narrative: XR CHEST 1 VW Date of Exam: 6/12/2025 11:23 AM EDT Indication: s/p thoracentesis Comparison: Chest x-ray 6/5/2025 Findings: There is a pneumothorax on the right. It is moderate in size and seen inferiorly measuring up to 2.3 cm. There is a moderate mount of pleural fluid as well. There is right lower lobe airspace opacity which is improved from previous exam. Left lung is clear. Heart size is normal.     Impression: Impression: Moderate sized right hydropneumothorax. Electronically Signed: Susan  MD Armond  6/12/2025 12:41 PM EDT  Workstation ID: [id number]    US Thoracentesis  Result Date: 6/12/2025  Narrative: PROCEDURE: US THORACENTESIS-  ATTENDING: Jose Hope M.D.  CLINICAL HISTORY: Large right pleural effusion.  TECHNIQUE:  The risk, benefits, and alternatives were described in detail and the patient was brought to the ultrasound suite and positioned seated. The skin entry site was prepped and draped utilizing standard sterile technique. 1% lidocaine was administered to the soft tissues of the right posterior thorax.   A 5 [place] Yueh was advanced into the right pleural space.   A total of 1.4 L was removed from the right pleural space. A specimen was sent to the laboratory for analysis. The needle was removed and a sterile dressing was applied.  The patient tolerated the procedure well and there were no complications.      Impression: Successful ultrasound-guided right thoracentesis.   Attestation Signer name: Jose Hope MD I attest that I was present for the entire procedure. I reviewed the stored images and agree with the report as written.      6/12/2025 12:17 PM by Jose Hope MD on Workstation: [id number]      XR Chest 1 View  Result Date: 6/5/2025  Narrative: XR CHEST 1 VW Date of Exam: 6/5/2025 6:41 AM EDT Indication: cough Comparison: Chest radiograph 6/5/2025 at 0422 hours Findings: There is been slight decrease in the size of the large right pleural effusion. There is persistent right pleural fluid and right basilar airspace disease. Airspace disease is most likely atelectasis with pneumonia not excluded. The left lung remains clear. There is no pneumothorax.     Impression: Impression: Slight decrease in the size of the large right pleural effusion. Electronically Signed: Rodriguez Singh MD  6/5/2025 7:00 AM EDT  Workstation ID: [id number]    XR Chest 1 View  Result Date: 6/5/2025  Narrative: XR CHEST 1 VW Date of Exam: 6/5/2025 4:18 AM EDT Indication: SOA triage protocol Comparison:  Chest radiograph 5/28/2025 Findings: The heart size and pulmonary vascular markings are normal. The left lung is clear. There is a large right pleural effusion with associated right basilar atelectasis. There is no pneumothorax. The osseous structures are normal for age.     Impression: Impression: Large right pleural effusion with right basilar atelectasis. Electronically Signed: Rodriguez Singh MD  6/5/2025 4:47 AM EDT  Workstation ID: [id number]    US Thoracentesis  Result Date: 5/28/2025  Narrative: PROCEDURE: Ultrasound-guided right thoracentesis  Procedural Personnel Attending physician(s): Erik Mensah  Pre-procedure diagnosis: Right pleural effusion  Post-procedure diagnosis: Same  Complications: No immediate complications.      Impression:  Ultrasound-guided thoracentesis with drainage of 1400 mL of serous fluid.   _______________________________________________________________   PROCEDURE SUMMARY:  - Limited thoracic ultrasound - Ultrasound-guided right thoracentesis - Additional procedure(s): None  PROCEDURE DETAILS:   Pre-procedure Consent: Informed consent for the procedure including risks, benefits and alternatives was obtained and time-out was performed prior to the procedure. Preparation: The site was prepared and draped using maximal sterile barrier technique including cutaneous antisepsis.    Limited thoracic ultrasound: Limited thoracic ultrasound was performed using a curved transducer. A safe window for thoracentesis was identified. Right hemithorax findings: Moderate to large right-sided pleural effusion   Thoracentesis  Local anesthesia was administered. The pleural space was accessed and fluid return confirmed position. The fluid was drained. The catheter was removed, and a sterile bandage was applied.  Catheter placed: 5 Welsh catheter.  Additional Details  Equipment details: None Specimens removed: Pleural fluid Estimated blood loss (mL): Less than 10   5/28/2025 12:05 PM by Erik  MD Makenna on Workstation: [id number]      XR Chest 1 View  Result Date: 5/28/2025  Narrative: XR CHEST 1 VW Date of Exam: 5/28/2025 10:19 AM EDT Indication: S/P Right Thoracentesis Comparison: 5/14/2025 chest radiograph. Ultrasound-guided thoracentesis of 5/20/2025 Findings: By history, thoracentesis earlier today removed 1.4 L of fluid from the right hemithorax. Follow-up chest radiograph shows patchy right mid and lower lung atelectasis and perhaps a small amount of remaining pleural fluid, versus discoid atelectasis. There is no evidence of pneumothorax. Left lung remains clear. Heart and vasculature appear normal in size.     Impression: Impression: Patchy right mid and lower lung atelectasis and questionable small remaining right effusion. No evidence of pneumothorax or other significant chest disease elsewhere. Electronically Signed: Marlon Lyons MD  5/28/2025 10:49 AM EDT  Workstation ID: [id number]    NM PET/CT Skull Base to Mid Thigh  Result Date: 5/28/2025  Narrative: FDG NM PET/CT SKULL BASE TO MID THIGH Date of Exam: 5/22/2025 8:25 AM EDT Indication: restaging scans lung cancer. Comparison: 5/13/2025 and 7/11/2024 Technique: 8.1 mCi of F-18 FDG was administered intravenously. PET imaging was obtained from skull base to mid-thigh approximately 60 minutes after radiotracer injection. A low dose non contrast CT was obtained for attenuation correction and anatomic localization. Fused PET-CT and 3D MIP reconstructions were utilized for image interpretation.  Fasting blood glucose level: 113 mg/dl. Reference uptake values: Mediastinum: 2.8 SUVmax Liver: 2.7 SUVmax Normalization method: Body Weight Findings: Head and neck: No abnormal FDG activity identified. Chest: Increasing moderate to large right effusion which is loculated. Spiculated right middle lobe nodule has an SUV maximum of 4.4. There is extensive right hilar adenopathy with an SUV max 8.8. There is subcarinal and mediastinal adenopathy ranging between 8  and 5 SUV maximum. Abdomen and pelvis: At least 20 FDG-avid liver lesions are identified measuring up to 5 cm in diameter and an SUV maximum of 13.5. There is left adrenal lesion with an SUV maximum of 5. There is extensive julio hepatis and portacaval adenopathy with an SUV maximum of 8.6. There is retroperitoneal adenopathy, including left periaortic and aortocaval abnormal lymph nodes. Musculoskeletal: Diffuse osseous metastatic disease with multifocal disease throughout the cervical thoracolumbar spine, bilateral ribs, pelvis, including the left acetabulum and to a lesser extent the right acetabulum. There are bilateral femoral lesions as well. No definite pathologic fractures are identified at this time. Several of the lesions however are involving weight-bearing bones and the patient is susceptible to pathologic fractures.     Impression: Impression: Extensive metastatic disease involving the chest, abdomen, and pelvis as well as the bones. Electronically Signed: Herb Lopes MD  5/28/2025 9:18 AM EDT  Workstation ID: [id number]      ASSESSMENT: The patient is a very pleasant 65 y.o. female  with metastatic right lung cancer      PLAN:  1.  Metastatic right lung cancer: The patient is complaining of shortness of breath with minimal exertion.  This is induced by her underlying malignancy and advanced COPD.  She is status post 1 dose of Keytruda.  She is scheduled for second dose next Friday.  2.  Recurrent right pleural effusion: Chest tube was removed today.  If she has recurrent effusion we will arrange for outpatient pigtail catheter.  3.  Cancer-related pain: Continue opiates as needed.    Okay discharge patient from oncology perspective.  She will follow-up with me as scheduled next week.      Sejal Mckenzie MD  6/15/2025

## 2025-06-15 NOTE — PROGRESS NOTES
CTS Progress Note       LOS: 3 days   Patient Care Team:  Dee Elena APRN as PCP - General (Nurse Practitioner)  Cliff Montana MD as Consulting Physician (Radiation Oncology)  Toni Mcclelland MD as Consulting Physician (Pulmonary Disease)  Erik Mckeon DO as Consulting Physician (Pulmonary Disease)  Sejal Mckenzie MD as Referring Physician (Hematology and Oncology)  Rahel Johnson, YVETTE as Ambulatory  (Oncology) ([facility])    Chief Complaint: Recurrent pleural effusion    Vital Signs:  Temp:  [97.6 °F (36.4 °C)-98 °F (36.7 °C)] 97.8 °F (36.6 °C)  Heart Rate:  [] 109  Resp:  [16-20] 16  BP: (109-131)/(66-72) 128/71    Physical Exam: No distress       Results:     Results from last 7 days   Lab Units 06/15/25  0446   WBC 10*3/mm3 19.96*   HEMOGLOBIN g/dL 9.9*   HEMATOCRIT % 32.2*   PLATELETS 10*3/mm3 221     Results from last 7 days   Lab Units 06/14/25  0456   SODIUM mmol/L 134*   POTASSIUM mmol/L 4.2   CHLORIDE mmol/L 97*   CO2 mmol/L 25.0   BUN mg/dL 23.0   CREATININE mg/dL 0.85   GLUCOSE mg/dL 108*   CALCIUM mg/dL 7.7*           Imaging Results (Last 24 Hours)       Procedure Component Value Units Date/Time    XR Chest 1 View - In process [[id number]] Resulted: 06/15/25 0209     Updated: 06/15/25 0244    This result has not been signed. Information might be incomplete.      XR Chest 1 View [[id number]] Collected: 06/14/25 0806     Updated: 06/14/25 0812    Narrative:      XR CHEST 1 VW    Date of Exam: 6/14/2025 3:14 AM EDT    Indication: Recurrent pleural effusions    Comparison: Chest radiograph 6/13/2025.    Findings:  Unchanged position of right pleural catheter. Cardiomediastinal silhouette is unchanged. Moderate right pleural effusion with right basilar airspace disease and right basilar lucency suggestive of small-volume pneumothorax, unchanged compared to   yesterday's exam. Left lung appears clear. No pneumothorax. Osseous structures are  unchanged.      Impression:      Impression:  No significant interval change.      Electronically Signed: Franco Shaffer MD    6/14/2025 8:09 AM EDT    Workstation ID: [id number]            Assessment      Recurrent pleural effusion    Acquired hypothyroidism    HLD (hyperlipidemia)    Adenocarcinoma of right lung    Would like to discontinue pigtail catheter today but I have been unable to determine the amount of drainage from the chest tube.  Will discuss with nursing about charting of chest tube drainage    Plan   Will possibly discontinue pigtail catheter today when I can determine degree of drainage    Chest tube drainage confirmed and very minimal.  Chest tube discontinued without immediate complication.  CT surgery will sign off at this time.  Please reconsult if future recurrent pleural effusion occurs and need for Pleurx catheter is recommended.    Please note that portions of this note were completed with a voice recognition program. Efforts were made to edit the dictations, but occasionally words are mistranscribed.    August Andres MD  06/15/25  07:13 EDT

## 2025-06-15 NOTE — PROGRESS NOTES
[facility]  PROGRESS NOTE    Patient Name: Sydnie Gamble  : 1960  MRN: [id number]    Date of Admission: 2025  Primary Care Physician: Dee Elena APRN    Subjective   Subjective     CC: Follow-up SOA    HPI:   Pt sitting up in bed. She denies any issues today. Chest tube to the right chest wall.     Objective   Objective     Vital Signs:   Temp:  [97.7 °F (36.5 °C)-98 °F (36.7 °C)] 97.7 °F (36.5 °C)  Heart Rate:  [109-113] 111  Resp:  [16-18] 18  BP: (110-144)/(68-74) 126/73  Flow (L/min) (Oxygen Therapy):  [4] 4     Physical Exam:  Constitutional: Awake, alert, NAD  HENT: NCAT, mucous membranes moist  Respiratory: dim in bases, unlabored. 4L NC 96%  Cardiovascular: tachy, no murmurs, rubs, or gallops  Gastrointestinal: Positive bowel sounds, soft, nontender, nondistended  Musculoskeletal: No bilateral ankle edema  Psychiatric: Appropriate affect, cooperative  Neurologic: Oriented x 3, strength symmetric in all extremities, Cranial Nerves grossly intact to confrontation, speech clear  Skin: No rashes      Results Reviewed:  LAB RESULTS:      Lab 06/15/25  0446 25  0456 25  0733 25  0341 25  0114 25  1755   WBC 19.96* 21.39*  --  20.73*  --  17.59*   HEMOGLOBIN 9.9* 10.3*  --  10.5*  --  10.7*   HEMATOCRIT 32.2* 33.5*  --  33.9*  --  35.1   PLATELETS 221 215  --  220  --  192   NEUTROS ABS 16.23* 17.02*  --  15.98*  --  14.13*   IMMATURE GRANS (ABS) 0.44* 0.60*  --  0.47*  --  0.50*   LYMPHS ABS 0.76 0.91  --  1.18  --  0.74   MONOS ABS 1.52* 1.67*  --  1.78*  --  1.45*   EOS ABS 0.93* 1.11*  --  1.22*  --  0.68*   MCV 89.7 90.1  --  89.9  --  90.5   PROTIME  --   --   --   --   --  15.6*   APTT  --   --   --   --   --  32.7*   HEPARIN ANTI-XA 0.46 0.42 0.55  --  0.54 0.48         Lab 25  0456 25  1755   SODIUM 134* 136   POTASSIUM 4.2 5.0   CHLORIDE 97* 97*   CO2 25.0 28.0   ANION GAP 12.0 11.0    BUN 23.0 21.7   CREATININE 0.85 0.87   EGFR 75.2 73.1   GLUCOSE 108* 117*   CALCIUM 7.7* 7.8*         Lab 06/12/25  1755   TOTAL PROTEIN 6.1   ALBUMIN 2.8*   GLOBULIN 3.3   ALT (SGPT) 26   AST (SGOT) 49*   BILIRUBIN 0.2   ALK PHOS 455*         Lab 06/12/25  1755   PROTIME 15.6*   INR 1.17*                 Brief Urine Lab Results       None            Microbiology Results Abnormal       None            XR Chest 1 View  Result Date: 6/15/2025  XR CHEST 1 VW Date of Exam: 6/15/2025 2:07 AM EDT Indication: Recurrent pleural effusions, right chest tube, follow-up Comparison: 6/14/2025. Findings: There is a right basilar pigtail chest tube in place. There may be some kinking of the chest tube possibly at the entry site. I would recommend clinical correlation. There is an unchanged moderate right pleural effusion with compressive atelectasis. The left lung and pleural space are clear. The heart size is normal. The pulmonary vascular markings are normal. There is no pneumothorax.     Impression: Impression: 1.There is a right basilar pigtail chest tube in place. There may be some kinking of the chest tube possibly at the entry site. I would recommend clinical correlation. 2.Unchanged moderate right pleural effusion with compressive atelectasis. Electronically Signed: Samuel Luna MD  6/15/2025 10:19 AM EDT  Workstation ID: [id number]    XR Chest 1 View  Result Date: 6/14/2025  XR CHEST 1 VW Date of Exam: 6/14/2025 3:14 AM EDT Indication: Recurrent pleural effusions Comparison: Chest radiograph 6/13/2025. Findings: Unchanged position of right pleural catheter. Cardiomediastinal silhouette is unchanged. Moderate right pleural effusion with right basilar airspace disease and right basilar lucency suggestive of small-volume pneumothorax, unchanged compared to yesterday's exam. Left lung appears clear. No pneumothorax. Osseous structures are unchanged.     Impression: Impression: No significant interval change. Electronically  Signed: Franco Shaffer MD  6/14/2025 8:09 AM EDT  Workstation ID: [id number]    XR Chest 1 View  Result Date: 6/13/2025  XR CHEST 1 VW Date of Exam: 6/13/2025 12:55 PM EDT Indication: Recurrent pleural effusions Comparison: AP chest x-ray 6/13/2025 timed at 4:43 a.m. Findings: Right pleural catheter remains in place. Moderate size right hydropneumothorax appears similar to numbers to today's earlier chest x-ray. Underlying right basilar airspace opacities are stable. Left lung is clear.     Impression: Impression: Moderate sized right hydropneumothorax appears similar to numbers to today's earlier chest x-ray. Right pleural catheter remains in place. Electronically Signed: Maryellen Dalton MD  6/13/2025 1:44 PM EDT  Workstation ID: [id number]      Results for orders placed during the hospital encounter of 05/13/25    Adult Transthoracic Echo Complete w/ Color, Spectral and Contrast if necessary per protocol    Interpretation Summary    Left ventricular ejection fraction appears to be 66 - 70%.    Normal right ventricular cavity size, wall thickness, systolic function and septal motion noted.    There is a small (<1cm) pericardial effusion adjacent to the right ventricle. There is no evidence of cardiac tamponade.    There is a left pleural effusion.      Current medications:  Scheduled Meds:arformoterol, 15 mcg, Nebulization, BID - RT   And  budesonide, 0.5 mg, Nebulization, BID - RT   And  revefenacin, 175 mcg, Nebulization, Daily - RT  atorvastatin, 10 mg, Oral, Nightly  bisacodyl, 10 mg, Rectal, Once  Diclofenac Sodium, 2 g, Topical, 4x Daily  levothyroxine, 88 mcg, Oral, Q AM  lidocaine PF 1%, 5 mL, Injection, Once  nystatin, 5 mL, Oral, 4x Daily  senna-docusate sodium, 2 tablet, Oral, BID  sertraline, 100 mg, Oral, Daily  sodium chloride, 10 mL, Intravenous, Q12H  sodium chloride, 10 mL, Intravenous, Q12H  traZODone, 150 mg, Oral, Nightly      Continuous Infusions:heparin, 16 Units/kg/hr, Last Rate: 16 Units/kg/hr  (06/15/25 7757)  Pharmacy to Dose Heparin,       PRN Meds:.  acetaminophen **OR** acetaminophen **OR** acetaminophen    senna-docusate sodium **AND** polyethylene glycol **AND** bisacodyl **AND** bisacodyl    Calcium Replacement - Follow Nurse / BPA Driven Protocol    clonazePAM    cyclobenzaprine    famotidine    HYDROmorphone **AND** naloxone    Magnesium Standard Dose Replacement - Follow Nurse / BPA Driven Protocol    ondansetron ODT **OR** ondansetron    oxyCODONE-acetaminophen    Pharmacy to Dose Heparin    Phosphorus Replacement - Follow Nurse / BPA Driven Protocol    Potassium Replacement - Follow Nurse / BPA Driven Protocol    simethicone    sodium chloride    sodium chloride    sodium chloride    sodium chloride    zolpidem    Assessment & Plan   Assessment & Plan     Active Hospital Problems    Diagnosis  POA    **Recurrent pleural effusion [J90]  Yes    Adenocarcinoma of right lung [C34.91]  Yes    HLD (hyperlipidemia) [E78.5]  Yes    Acquired hypothyroidism [E03.9]  Yes      Resolved Hospital Problems   No resolved problems to display.        Brief Hospital Course to date:  Sydnie Gamble is a 65 y.o. female with PMH of Adenocarcinoma of lung, anemia, cervical cancer, depression, HLD, hypothyroidism, PE, GERD and had home oxygen. Patient had a thoracentesis with chest tube placement, admitted for further management.     Recurrent Pleural Effusion  Adenocarcinoma of Right lung  -s/p thoracentesis and chest tube placement by IR, CTS managing  -- currently under treatment with Dr. Mckenzie and he wants to hold on pleur-x drain for now. He wants her to receive Keytruda  for a month.   --Palliative care consulted, continue pain control  - She is currently on 4 L NC, wean as able  -Continue DuoNeb     Hypothyroidism  -- cont home meds      HLD  -- cont statin      Generalized anxiety disorder  Depression  --cont home meds     LLE DVT  PE  -- hold eliquis   -- heparin drip while she has CT     Expected  Discharge Location and Transportation: TBD  Expected Discharge   Expected Discharge Date: 6/16/2025; Expected Discharge Time:      VTE Prophylaxis:  Pharmacologic VTE prophylaxis orders are present.     AM-PAC 6 Clicks Score (PT): 18 (06/14/25 2000)    CODE STATUS:   Code Status and Medical Interventions: CPR (Attempt to Resuscitate); Full Support; also talked over with  Jose Francisco   Ordered at: 06/12/25 9988     Code Status (Patient has no pulse and is not breathing):    CPR (Attempt to Resuscitate)     Medical Interventions (Patient has pulse or is breathing):    Full Support     Comments:    also talked over with  Jose Francisco     Level Of Support Discussed With:    Patient       Next of Kin (If No Surrogate)       DAYSI Em  06/15/25

## 2025-06-16 NOTE — CASE MANAGEMENT/SOCIAL WORK
Continued Stay Note  [facility]     Patient Name: Sydnie Gamble  MRN: [id number]  Today's Date: 6/16/2025    Admit Date: 6/12/2025    Plan: SNF   Discharge Plan       Row Name 06/16/25 1405       Plan    Plan SNF    Patient/Family in Agreement with Plan yes    Plan Comments PT has worked with Ms Gamble over the weekend and they are now recommending skilled nursing prior to returning home.  I spoke with Ms Gamble at bedside.  At this time she is open to going to short term rehab, but wanted a list of facilities.  I have printed off a patient choice list within 20 miles of her home and given her the list.  Case management will continue to follow.                   Discharge Codes    No documentation.                 Expected Discharge Date and Time       Expected Discharge Date Expected Discharge Time    Jun 17, 2025               Laura Cruz RN

## 2025-06-16 NOTE — PROGRESS NOTES
[facility] Cardiothoracic Surgery In-Patient Progress Note     LOS: 4 days     Chief Complaint: Malignant pleural effusion    Subjective  Our service was consulted on 6/13 for chest tube management and known malignant pleural effusion requiring frequent thoracentesis.  She is followed by Dr. Mckenzie and has been on Keytruda. Her chest tube was discontinued yesterday and we have been re-consulted today regarding Pleurx catheter.     Objective  Vital Signs  Temp:  [97.7 °F (36.5 °C)-98.6 °F (37 °C)] 97.7 °F (36.5 °C)  Heart Rate:  [106-118] 107  Resp:  [18] 18  BP: (117-140)/(73-97) 140/73      Physical Exam:   General Appearance: alert, appears stated age and cooperative   Lungs: On 4 L nasal cannula saturations 94%   Heart: tachycardia     Results     Results from last 7 days   Lab Units 06/16/25  0424   WBC 10*3/mm3 18.84*   HEMOGLOBIN g/dL 10.3*   HEMATOCRIT % 34.2   PLATELETS 10*3/mm3 285     Results from last 7 days   Lab Units 06/16/25  0424   SODIUM mmol/L 131*   POTASSIUM mmol/L 4.3   CHLORIDE mmol/L 94*   CO2 mmol/L 26.0   BUN mg/dL 25.5*   CREATININE mg/dL 0.87   GLUCOSE mg/dL 117*   CALCIUM mg/dL 7.5*     Imaging Results (Last 24 Hours)       Procedure Component Value Units Date/Time    XR Chest 1 View [[id number]] Collected: 06/16/25 0746     Updated: 06/16/25 0750    Narrative:      XR CHEST 1 VW    Date of Exam: 6/16/2025 5:35 AM EDT    Indication: Recurrent pleural effusions    Comparison: 6/15/2025    Findings:  Right pleural drain has been removed and there is apparently increasing right basilar effusion. Right midlung interstitial changes are stable. There is minimally increased left basilar interstitial opacity perhaps a small focus of atelectasis. Heart and   vasculature appear to be normal in size. No pneumothorax is seen.      Impression:      Impression:  Increasing right basilar effusion following right pleural drain removal.      Electronically Signed: Marlon Lyons MD    6/16/2025 7:47 AM EDT     Workstation ID: [id number]            Assessment    Recurrent pleural effusion    Acquired hypothyroidism    HLD (hyperlipidemia)    Adenocarcinoma of right lung      Plan   Preop for right pleurx catheter tomorrow with Dr. Gross  NPO after midnight      Alice Bae, APRN  06/16/25  10:58 EDT    I have personally evaluated and examined the patient.  The above documentation has been reviewed and I agree.      MCKENNA Gross MD  Cardiothoracic Surgery

## 2025-06-16 NOTE — PAYOR COMM NOTE
"Ref# [id number]   Clinical Update    KEITH Tan, RN  Utilization Review  Phone [phone]  Fax [phone]    [address] [address]         TyeSol Jodi (65 y.o. Female)       Date of Birth   1960    Social Security Number       Address   [address] [address]    Home Phone   [phone]    MRN   [id number]       [place]   Holiness    Marital Status                               Admission Date   6/12/2025    Admission Type   Elective    Admitting Provider   Yolanda Plasencia MD    Attending Provider   Yolanda Plasencia MD    Department, Room/Bed   [facility] 6B, N643/1       Discharge Date       Discharge Disposition       Discharge Destination                                 Attending Provider: Yolanda Plasencia MD    Allergies: No Known Allergies    Isolation: None   Infection: None   Code Status: CPR    Ht: 154.9 cm (60.98\")   Wt: 55.8 kg (123 lb)    Admission Cmt: None   Principal Problem: Recurrent pleural effusion [J90]                   Active Insurance as of 6/12/2025       Primary Coverage       Payor Plan Insurance Group Employer/Plan Group    [employer]HEM BLUE CROSS ANTHEM BLUE CROSS BLUE SHIELD PPO [id number]       Payor Plan Address Payor Plan Phone Number Payor Plan Fax Number Effective Dates    [address] [phone]  1/1/2015 - None Entered    [facility] [id number]         Subscriber Name Subscriber Birth Date Member ID       ILDEFONSO SHAHID 2/1/1967 [id number]               Secondary Coverage       Payor Plan Insurance Group Employer/Plan Group    MEDICARE MEDICARE A & B        Payor Plan Address Payor Plan Phone Number Payor Plan Fax Number Effective Dates    [address] [phone]  4/1/2023 - None Entered    [facility] [address]         Subscriber Name Subscriber Birth Date Member ID       SOL JONES 1960 [id number]                     Emergency Contacts       "  (Rel.) Home Phone Work Phone Mobile Phone    Jose Francisco Diaz (Spouse) [phone] -- [phone]    LEVI ESPITIA (Daughter) [phone] -- [phone]              Oxygen Therapy (last day)       Date/Time SpO2 Device (Oxygen Therapy) Flow (L/min) (Oxygen Therapy) Oxygen Concentration (%) ETCO2 (mmHg)    06/16/25 1400 -- nasal cannula 4 -- --    06/16/25 1208 -- nasal cannula 4 -- --    06/16/25 1158 -- nasal cannula 4 -- --    06/16/25 1120 94 -- -- -- --    06/16/25 1000 -- nasal cannula 4 -- --    06/16/25 0902 100 humidified;nasal cannula 4 -- --    06/16/25 0856 93 humidified;nasal cannula 4 -- --    06/16/25 0800 -- nasal cannula;humidified 4 -- --    06/16/25 0618 -- humidified;nasal cannula 4 -- --    06/16/25 0411 95 -- -- -- --    06/16/25 0400 -- humidified;nasal cannula 4 -- --    06/16/25 0218 -- humidified;nasal cannula 4 -- --    06/16/25 0014 -- humidified;nasal cannula 4 -- --    06/16/25 0009 97 -- -- -- --    06/15/25 2225 -- humidified;nasal cannula 4 -- --    06/15/25 2038 -- humidified;nasal cannula 4 -- --    06/15/25 2029 96 -- -- -- --    06/15/25 1920 95 humidified;nasal cannula 4 -- --    06/15/25 1719 97 nasal cannula 4 -- --    06/15/25 1600 -- nasal cannula 4 -- --    06/15/25 1510 92 -- -- -- --    06/15/25 1200 -- nasal cannula 4 -- --    06/15/25 1125 96 -- -- -- --    06/15/25 0914 92 nasal cannula 4 -- --    06/15/25 0600 -- nasal cannula 4 -- --    06/15/25 0409 97 -- -- -- --    06/15/25 0000 -- nasal cannula 4 -- --          Current Facility-Administered Medications   Medication Dose Route Frequency Provider Last Rate Last Admin    acetaminophen (TYLENOL) tablet 650 mg  650 mg Oral Q4H PRN Nuha Shafer APRN        Or    acetaminophen (TYLENOL) 160 MG/5ML oral solution 650 mg  650 mg Oral Q4H PRN Nuha Shafer APRN        Or    acetaminophen (TYLENOL) suppository 650 mg  650 mg Rectal Q4H PRN Nuha Shafer APRN        arformoterol  (BROVANA) nebulizer solution 15 mcg  15 mcg Nebulization BID - RT Nuha Shafer APRN   15 mcg at 06/16/25 0856    And    budesonide (PULMICORT) nebulizer solution 0.5 mg  0.5 mg Nebulization BID - RT Nuha Shafer APRN   0.5 mg at 06/16/25 0856    And    revefenacin (YUPELRI) nebulizer solution 175 mcg  175 mcg Nebulization Daily - RT Nuha Shafer APRN   175 mcg at 06/16/25 0856    atorvastatin (LIPITOR) tablet 10 mg  10 mg Oral Nightly Nuha Shafer APRN   10 mg at 06/15/25 2040    sennosides-docusate (PERICOLACE) 8.6-50 MG per tablet 2 tablet  2 tablet Oral BID Nuha Shafer APRN   2 tablet at 06/16/25 0905    And    polyethylene glycol (MIRALAX) packet 17 g  17 g Oral Daily PRN Nuha Shafer APRNICKY        And    bisacodyl (DULCOLAX) EC tablet 5 mg  5 mg Oral Daily PRN Nuha Shafer APRN   5 mg at 06/15/25 0924    And    bisacodyl (DULCOLAX) suppository 10 mg  10 mg Rectal Daily PRN Nuha Shafer APRN        bisacodyl (DULCOLAX) suppository 10 mg  10 mg Rectal Once Kaylene Reynoso APRN        Calcium Replacement - Follow Nurse / BPA Driven Protocol   Not Applicable PRN Nuha Shafer, APRNICKY        clonazePAM (KlonoPIN) tablet 1 mg  1 mg Oral BID PRN Nuha Shafer APRN   1 mg at 06/15/25 1558    cyclobenzaprine (FLEXERIL) tablet 10 mg  10 mg Oral BID PRN Nuha Shafer APRN   10 mg at 06/14/25 2235    Diclofenac Sodium (VOLTAREN) 1 % gel 2 g  2 g Topical 4x Daily Kaylene Reynoso APRN   2 g at 06/16/25 1242    famotidine (PEPCID) tablet 40 mg  40 mg Oral BID PRN Nuha Shafer, APRN        heparin 67763 units/250 mL (100 units/mL) in 0.45 % NaCl infusion  16 Units/kg/hr Intravenous Titrated Nuha Shafer APRN 8.92 mL/hr at 06/16/25 0656 16 Units/kg/hr at 06/16/25 0656    HYDROmorphone (DILAUDID) injection 0.5 mg  0.5 mg Intravenous Q2H PRN Nuha Shafer APRN   0.5 mg at 06/16/25 1158    And    naloxone (NARCAN) injection 0.4  mg  0.4 mg Intravenous Q5 Min PRN Nuha Shafer APRN        ipratropium-albuterol (DUO-NEB) nebulizer solution 3 mL  3 mL Nebulization Q4H PRN Trina Hdz APRN   3 mL at 06/15/25 1719    levothyroxine (SYNTHROID, LEVOTHROID) tablet 88 mcg  88 mcg Oral Q AM Nuha Shafer APRN   88 mcg at 06/16/25 0613    lidocaine PF 1% (XYLOCAINE) injection 5 mL  5 mL Injection Once Jose Hope MD        Magnesium Standard Dose Replacement - Follow Nurse / BPA Driven Protocol   Not Applicable PRN Nuha Shafer APRN        nystatin (MYCOSTATIN) 100,000 unit/mL suspension 500,000 Units  5 mL Oral 4x Daily Trina Hdz APRN   500,000 Units at 06/16/25 1242    ondansetron ODT (ZOFRAN-ODT) disintegrating tablet 4 mg  4 mg Oral Q6H PRN Nuha Shafer APRN        Or    ondansetron (ZOFRAN) injection 4 mg  4 mg Intravenous Q6H PRN Nuha Shafer APRN        oxyCODONE-acetaminophen (PERCOCET) 5-325 MG per tablet 1 tablet  1 tablet Oral Q4H PRN Nuha Shafer APRN   1 tablet at 06/16/25 1028    Pharmacy to Dose Heparin   Not Applicable Continuous PRN Nuha Shafer APRN        Phosphorus Replacement - Follow Nurse / BPA Driven Protocol   Not Applicable PRN Nuha Shafer APRN        Potassium Replacement - Follow Nurse / BPA Driven Protocol   Not Applicable PRN Nuha Shafer APRN        sertraline (ZOLOFT) tablet 100 mg  100 mg Oral Daily Nuha Shafer APRN   100 mg at 06/16/25 0904    simethicone (MYLICON) chewable tablet 80 mg  80 mg Oral 4x Daily PRN Trina Hdz APRN   80 mg at 06/15/25 2040    sodium chloride 0.9 % flush 10 mL  10 mL Intravenous Q12H Jose Hope MD   10 mL at 06/16/25 0905    sodium chloride 0.9 % flush 10 mL  10 mL Intravenous PRN Jose Hope MD        sodium chloride 0.9 % flush 10 mL  10 mL Intravenous Q12H Nuha Shafer APRN   10 mL at 06/16/25 0905    sodium chloride 0.9 % flush 10 mL  10 mL Intravenous PRN  Nuha Shafer APRN        sodium chloride 0.9 % infusion 40 mL  40 mL Intravenous PRN Jose Hope MD        sodium chloride 0.9 % infusion 40 mL  40 mL Intravenous PRN Nuha Shafer APRN        traZODone (DESYREL) tablet 150 mg  150 mg Oral Nightly Nuha Shafer APRN   150 mg at 06/15/25 2040    zolpidem (AMBIEN) tablet 5 mg  5 mg Oral Nightly PRN Nuha Shafer, DAYSI         Lab Results (last 72 hours)       Procedure Component Value Units Date/Time    CBC & Differential [[id number]]  (Abnormal) Collected: 06/16/25 0424    Specimen: Blood Updated: 06/16/25 0513    Narrative:      The following orders were created for panel order CBC & Differential.  Procedure                               Abnormality         Status                     ---------                               -----------         ------                     CBC Auto Differential[[id number]]        Abnormal            Final result                 Please view results for these tests on the individual orders.    CBC Auto Differential [[id number]]  (Abnormal) Collected: 06/16/25 0424    Specimen: Blood Updated: 06/16/25 0513     WBC 18.84 10*3/mm3      RBC 3.74 10*6/mm3      Hemoglobin 10.3 g/dL      Hematocrit 34.2 %      MCV 91.4 fL      MCH 27.5 pg      MCHC 30.1 g/dL      RDW 15.5 %      RDW-SD 50.4 fl      MPV 10.7 fL      Platelets 285 10*3/mm3      Neutrophil % 77.6 %      Lymphocyte % 4.2 %      Monocyte % 8.0 %      Eosinophil % 5.7 %      Basophil % 0.5 %      Immature Grans % 4.0 %      Neutrophils, Absolute 14.61 10*3/mm3      Lymphocytes, Absolute 0.80 10*3/mm3      Monocytes, Absolute 1.50 10*3/mm3      Eosinophils, Absolute 1.08 10*3/mm3      Basophils, Absolute 0.09 10*3/mm3      Immature Grans, Absolute 0.76 10*3/mm3      nRBC 0.2 /100 WBC     Basic Metabolic Panel [[id number]]  (Abnormal) Collected: 06/16/25 0424    Specimen: Blood Updated: 06/16/25 0507     Glucose 117 mg/dL      BUN 25.5 mg/dL      Creatinine  0.87 mg/dL      Sodium 131 mmol/L      Potassium 4.3 mmol/L      Chloride 94 mmol/L      CO2 26.0 mmol/L      Calcium 7.5 mg/dL      BUN/Creatinine Ratio 29.3     Anion Gap 11.0 mmol/L      eGFR 73.1 mL/min/1.73     Narrative:      GFR Categories in Chronic Kidney Disease (CKD)              GFR Category          GFR (mL/min/1.73)    Interpretation  G1                    90 or greater        Normal or high (1)  G2                    60-89                Mild decrease (1)  G3a                   45-59                Mild to moderate decrease  G3b                   30-44                Moderate to severe decrease  G4                    15-29                Severe decrease  G5                    14 or less           Kidney failure    (1)In the absence of evidence of kidney disease, neither GFR category G1 or G2 fulfill the criteria for CKD.    eGFR calculation 2021 CKD-EPI creatinine equation, which does not include race as a factor    Heparin Anti-Xa [[id number]]  (Normal) Collected: 06/16/25 0424    Specimen: Blood Updated: 06/16/25 0506     Heparin Anti-Xa (UFH) 0.44 IU/ml     Heparin Anti-Xa [[id number]]  (Normal) Collected: 06/15/25 0446    Specimen: Blood Updated: 06/15/25 0526     Heparin Anti-Xa (UFH) 0.46 IU/ml     CBC & Differential [[id number]]  (Abnormal) Collected: 06/15/25 0446    Specimen: Blood Updated: 06/15/25 0505    Narrative:      The following orders were created for panel order CBC & Differential.  Procedure                               Abnormality         Status                     ---------                               -----------         ------                     CBC Auto Differential[[id number]]        Abnormal            Final result                 Please view results for these tests on the individual orders.    CBC Auto Differential [[id number]]  (Abnormal) Collected: 06/15/25 0446    Specimen: Blood Updated: 06/15/25 0505     WBC 19.96 10*3/mm3      RBC 3.59 10*6/mm3      Hemoglobin 9.9 g/dL       Hematocrit 32.2 %      MCV 89.7 fL      MCH 27.6 pg      MCHC 30.7 g/dL      RDW 15.2 %      RDW-SD 48.6 fl      MPV 10.0 fL      Platelets 221 10*3/mm3      Neutrophil % 81.3 %      Lymphocyte % 3.8 %      Monocyte % 7.6 %      Eosinophil % 4.7 %      Basophil % 0.4 %      Immature Grans % 2.2 %      Neutrophils, Absolute 16.23 10*3/mm3      Lymphocytes, Absolute 0.76 10*3/mm3      Monocytes, Absolute 1.52 10*3/mm3      Eosinophils, Absolute 0.93 10*3/mm3      Basophils, Absolute 0.08 10*3/mm3      Immature Grans, Absolute 0.44 10*3/mm3      nRBC 0.0 /100 WBC     Basic Metabolic Panel [[id number]]  (Abnormal) Collected: 06/14/25 0456    Specimen: Blood Updated: 06/14/25 0613     Glucose 108 mg/dL      BUN 23.0 mg/dL      Creatinine 0.85 mg/dL      Sodium 134 mmol/L      Potassium 4.2 mmol/L      Chloride 97 mmol/L      CO2 25.0 mmol/L      Calcium 7.7 mg/dL      BUN/Creatinine Ratio 27.1     Anion Gap 12.0 mmol/L      eGFR 75.2 mL/min/1.73     Narrative:      GFR Categories in Chronic Kidney Disease (CKD)              GFR Category          GFR (mL/min/1.73)    Interpretation  G1                    90 or greater        Normal or high (1)  G2                    60-89                Mild decrease (1)  G3a                   45-59                Mild to moderate decrease  G3b                   30-44                Moderate to severe decrease  G4                    15-29                Severe decrease  G5                    14 or less           Kidney failure    (1)In the absence of evidence of kidney disease, neither GFR category G1 or G2 fulfill the criteria for CKD.    eGFR calculation 2021 CKD-EPI creatinine equation, which does not include race as a factor    Heparin Anti-Xa [[id number]]  (Normal) Collected: 06/14/25 0456    Specimen: Blood Updated: 06/14/25 0604     Heparin Anti-Xa (UFH) 0.42 IU/ml     CBC & Differential [[id number]]  (Abnormal) Collected: 06/14/25 0456    Specimen: Blood Updated: 06/14/25 0600     Narrative:      The following orders were created for panel order CBC & Differential.  Procedure                               Abnormality         Status                     ---------                               -----------         ------                     CBC Auto Differential[[id number]]        Abnormal            Final result                 Please view results for these tests on the individual orders.    CBC Auto Differential [[id number]]  (Abnormal) Collected: 06/14/25 0456    Specimen: Blood Updated: 06/14/25 0600     WBC 21.39 10*3/mm3      RBC 3.72 10*6/mm3      Hemoglobin 10.3 g/dL      Hematocrit 33.5 %      MCV 90.1 fL      MCH 27.7 pg      MCHC 30.7 g/dL      RDW 15.1 %      RDW-SD 48.9 fl      MPV 10.7 fL      Platelets 215 10*3/mm3      Neutrophil % 79.5 %      Lymphocyte % 4.3 %      Monocyte % 7.8 %      Eosinophil % 5.2 %      Basophil % 0.4 %      Immature Grans % 2.8 %      Neutrophils, Absolute 17.02 10*3/mm3      Lymphocytes, Absolute 0.91 10*3/mm3      Monocytes, Absolute 1.67 10*3/mm3      Eosinophils, Absolute 1.11 10*3/mm3      Basophils, Absolute 0.08 10*3/mm3      Immature Grans, Absolute 0.60 10*3/mm3      nRBC 0.1 /100 WBC           Imaging Results (Last 72 Hours)       Procedure Component Value Units Date/Time    XR Chest 1 View [[id number]] Collected: 06/16/25 0746     Updated: 06/16/25 0750    Narrative:      XR CHEST 1 VW    Date of Exam: 6/16/2025 5:35 AM EDT    Indication: Recurrent pleural effusions    Comparison: 6/15/2025    Findings:  Right pleural drain has been removed and there is apparently increasing right basilar effusion. Right midlung interstitial changes are stable. There is minimally increased left basilar interstitial opacity perhaps a small focus of atelectasis. Heart and   vasculature appear to be normal in size. No pneumothorax is seen.      Impression:      Impression:  Increasing right basilar effusion following right pleural drain removal.      Electronically  Signed: Marlon Lyons MD    6/16/2025 7:47 AM EDT    Workstation ID: [id number]    XR Chest 1 View [[id number]] Collected: 06/15/25 1017     Updated: 06/15/25 1022    Narrative:      XR CHEST 1 VW    Date of Exam: 6/15/2025 2:07 AM EDT    Indication: Recurrent pleural effusions, right chest tube, follow-up    Comparison: 6/14/2025.    Findings:  There is a right basilar pigtail chest tube in place. There may be some kinking of the chest tube possibly at the entry site. I would recommend clinical correlation. There is an unchanged moderate right pleural effusion with compressive atelectasis. The   left lung and pleural space are clear. The heart size is normal. The pulmonary vascular markings are normal. There is no pneumothorax.      Impression:      Impression:  1.There is a right basilar pigtail chest tube in place. There may be some kinking of the chest tube possibly at the entry site. I would recommend clinical correlation.  2.Unchanged moderate right pleural effusion with compressive atelectasis.        Electronically Signed: Samuel Luna MD    6/15/2025 10:19 AM EDT    Workstation ID: [id number]    XR Chest 1 View [[id number]] Collected: 06/14/25 0806     Updated: 06/14/25 0812    Narrative:      XR CHEST 1 VW    Date of Exam: 6/14/2025 3:14 AM EDT    Indication: Recurrent pleural effusions    Comparison: Chest radiograph 6/13/2025.    Findings:  Unchanged position of right pleural catheter. Cardiomediastinal silhouette is unchanged. Moderate right pleural effusion with right basilar airspace disease and right basilar lucency suggestive of small-volume pneumothorax, unchanged compared to   yesterday's exam. Left lung appears clear. No pneumothorax. Osseous structures are unchanged.      Impression:      Impression:  No significant interval change.      Electronically Signed: Franco Shaffer MD    6/14/2025 8:09 AM EDT    Workstation ID: [id number]          ECG/EMG Results (last 72 hours)       ** No results found for the  last 72 hours. **             Physician Progress Notes (last 72 hours)        Nuha Shafer APRN at 25 1155              [facility] Medicine Services  PROGRESS NOTE    Patient Name: Sydnie Gamble  : 1960  MRN: [id number]    Date of Admission: 2025  Primary Care Physician: Dee Elena APRN    Subjective   Subjective     CC:  F/u SOA     HPI:  Patient is resting in bed with  at bedside. She has felt more soa today. Very dyspneic when I saw her after she had got up to [facility]. Reached out to Dr Mckenzie and will plan on pleurx drain while she is here.       Objective   Objective     Vital Signs:   Temp:  [97.7 °F (36.5 °C)-98.6 °F (37 °C)] 97.8 °F (36.6 °C)  Heart Rate:  [106-118] 113  Resp:  [18] 18  BP: (104-140)/(66-97) 104/66  Flow (L/min) (Oxygen Therapy):  [4] 4     Physical Exam:  Constitutional: No acute distress, awake, alert  HENT: NCAT, mucous membranes moist  Respiratory: decreased in right lung , respiratory effort normal 4L   Cardiovascular: RRR, no murmurs, rubs, or gallops  Gastrointestinal: Positive bowel sounds, soft, nontender, nondistended  Musculoskeletal: No bilateral ankle edema  Psychiatric: Appropriate affect, cooperative  Neurologic: Oriented x 2-3, strength symmetric in all extremities, , speech clear  Skin: No rashes,pale , right mid chest dressing       Results Reviewed:  LAB RESULTS:      Lab 25  0424 06/15/25  0446 25  0456 25  0733 25  0341 25  0114 25  1755   WBC 18.84* 19.96* 21.39*  --  20.73*  --  17.59*   HEMOGLOBIN 10.3* 9.9* 10.3*  --  10.5*  --  10.7*   HEMATOCRIT 34.2 32.2* 33.5*  --  33.9*  --  35.1   PLATELETS 285 221 215  --  220  --  192   NEUTROS ABS 14.61* 16.23* 17.02*  --  15.98*  --  14.13*   IMMATURE GRANS (ABS) 0.76* 0.44* 0.60*  --  0.47*  --  0.50*   LYMPHS ABS 0.80 0.76 0.91  --  1.18  --  0.74   MONOS ABS 1.50* 1.52* 1.67*  --  1.78*  --  1.45*   EOS ABS  1.08* 0.93* 1.11*  --  1.22*  --  0.68*   MCV 91.4 89.7 90.1  --  89.9  --  90.5   PROTIME  --   --   --   --   --   --  15.6*   APTT  --   --   --   --   --   --  32.7*   HEPARIN ANTI-XA 0.44 0.46 0.42 0.55  --  0.54 0.48         Lab 06/16/25  0424 06/14/25  0456 06/12/25  1755   SODIUM 131* 134* 136   POTASSIUM 4.3 4.2 5.0   CHLORIDE 94* 97* 97*   CO2 26.0 25.0 28.0   ANION GAP 11.0 12.0 11.0   BUN 25.5* 23.0 21.7   CREATININE 0.87 0.85 0.87   EGFR 73.1 75.2 73.1   GLUCOSE 117* 108* 117*   CALCIUM 7.5* 7.7* 7.8*         Lab 06/12/25  1755   TOTAL PROTEIN 6.1   ALBUMIN 2.8*   GLOBULIN 3.3   ALT (SGPT) 26   AST (SGOT) 49*   BILIRUBIN 0.2   ALK PHOS 455*         Lab 06/12/25  1755   PROTIME 15.6*   INR 1.17*                 Brief Urine Lab Results       None            Microbiology Results Abnormal       None            XR Chest 1 View  Result Date: 6/16/2025  XR CHEST 1 VW Date of Exam: 6/16/2025 5:35 AM EDT Indication: Recurrent pleural effusions Comparison: 6/15/2025 Findings: Right pleural drain has been removed and there is apparently increasing right basilar effusion. Right midlung interstitial changes are stable. There is minimally increased left basilar interstitial opacity perhaps a small focus of atelectasis. Heart and vasculature appear to be normal in size. No pneumothorax is seen.     Impression: Impression: Increasing right basilar effusion following right pleural drain removal. Electronically Signed: Marlon Lyons MD  6/16/2025 7:47 AM EDT  Workstation ID: [id number]    XR Chest 1 View  Result Date: 6/15/2025  XR CHEST 1 VW Date of Exam: 6/15/2025 2:07 AM EDT Indication: Recurrent pleural effusions, right chest tube, follow-up Comparison: 6/14/2025. Findings: There is a right basilar pigtail chest tube in place. There may be some kinking of the chest tube possibly at the entry site. I would recommend clinical correlation. There is an unchanged moderate right pleural effusion with compressive atelectasis.  The left lung and pleural space are clear. The heart size is normal. The pulmonary vascular markings are normal. There is no pneumothorax.     Impression: Impression: 1.There is a right basilar pigtail chest tube in place. There may be some kinking of the chest tube possibly at the entry site. I would recommend clinical correlation. 2.Unchanged moderate right pleural effusion with compressive atelectasis. Electronically Signed: Samuel Luna MD  6/15/2025 10:19 AM EDT  Workstation ID: [id number]      Results for orders placed during the hospital encounter of 05/13/25    Adult Transthoracic Echo Complete w/ Color, Spectral and Contrast if necessary per protocol    Interpretation Summary    Left ventricular ejection fraction appears to be 66 - 70%.    Normal right ventricular cavity size, wall thickness, systolic function and septal motion noted.    There is a small (<1cm) pericardial effusion adjacent to the right ventricle. There is no evidence of cardiac tamponade.    There is a left pleural effusion.      Current medications:  Scheduled Meds:arformoterol, 15 mcg, Nebulization, BID - RT   And  budesonide, 0.5 mg, Nebulization, BID - RT   And  revefenacin, 175 mcg, Nebulization, Daily - RT  atorvastatin, 10 mg, Oral, Nightly  bisacodyl, 10 mg, Rectal, Once  Diclofenac Sodium, 2 g, Topical, 4x Daily  levothyroxine, 88 mcg, Oral, Q AM  lidocaine PF 1%, 5 mL, Injection, Once  nystatin, 5 mL, Oral, 4x Daily  senna-docusate sodium, 2 tablet, Oral, BID  sertraline, 100 mg, Oral, Daily  sodium chloride, 10 mL, Intravenous, Q12H  sodium chloride, 10 mL, Intravenous, Q12H  traZODone, 150 mg, Oral, Nightly      Continuous Infusions:heparin, 16 Units/kg/hr, Last Rate: 16 Units/kg/hr (06/16/25 0656)  Pharmacy to Dose Heparin,       PRN Meds:.  acetaminophen **OR** acetaminophen **OR** acetaminophen    senna-docusate sodium **AND** polyethylene glycol **AND** bisacodyl **AND** bisacodyl    Calcium Replacement - Follow Nurse / BPA  Driven Protocol    clonazePAM    cyclobenzaprine    famotidine    HYDROmorphone **AND** naloxone    ipratropium-albuterol    Magnesium Standard Dose Replacement - Follow Nurse / BPA Driven Protocol    ondansetron ODT **OR** ondansetron    oxyCODONE-acetaminophen    Pharmacy to Dose Heparin    Phosphorus Replacement - Follow Nurse / BPA Driven Protocol    Potassium Replacement - Follow Nurse / BPA Driven Protocol    simethicone    sodium chloride    sodium chloride    sodium chloride    sodium chloride    zolpidem    Assessment & Plan   Assessment & Plan     Active Hospital Problems    Diagnosis  POA    **Recurrent pleural effusion [J90]  Yes    Adenocarcinoma of right lung [C34.91]  Yes    HLD (hyperlipidemia) [E78.5]  Yes    Acquired hypothyroidism [E03.9]  Yes      Resolved Hospital Problems   No resolved problems to display.        Brief Hospital Course to date:  Sydnie Gamble is a 65 y.o. female  with PMH of Adenocarcinoma of lung, anemia, cervical cancer, depression, HLD, hypothyroidism, PE, GERD and had home oxygen. Patient had a thoracentesis with chest tube placement, admitted for further management.    Plan was partially entered by my partner and I have reviewed and updated as appropriate on 6/16/25     Recurrent Pleural Effusion  Adenocarcinoma of Right lung  -s/p thoracentesis and chest tube placement by IR, CTS managing, dc'd on 6/15  -- currently under treatment with Dr. Mckenzie and he wants to hold on pleur-x drain for now. He wants her to receive Keytruda  for a month.   --Palliative care consulted, continue pain control  - She is currently on 4 L NC, wean as able  -Continue DuoNeb  -- CXR on 6/16 showed increasing right pleural effusion   -- talked with  he is ok with Pleurx drain being placed while admitted. CTS reconsulted and will plan for 6/17     Hypothyroidism  -- cont home meds      HLD  -- cont statin      Generalized anxiety disorder  Depression  --cont home meds     LLE  DVT  PE  -- hold eliquis   -- heparin drip until pleurx drain placed     Expected Discharge Location and Transportation: home   Expected Discharge   Expected Discharge Date: 6/15/2025; Expected Discharge Time:  3:00 PM     VTE Prophylaxis:  Pharmacologic VTE prophylaxis orders are present.         AM-PAC 6 Clicks Score (PT): 18 (06/15/25 2038)    CODE STATUS:   Code Status and Medical Interventions: CPR (Attempt to Resuscitate); Full Support; also talked over with  Jose Francisco   Ordered at: 06/12/25 2276     Code Status (Patient has no pulse and is not breathing):    CPR (Attempt to Resuscitate)     Medical Interventions (Patient has pulse or is breathing):    Full Support     Comments:    also talked over with  Jose Francisco     Level Of Support Discussed With:    Patient       Next of Kin (If No Surrogate)       DAYSI López  06/16/25        Electronically signed by Nuha Shafer APRN at 06/16/25 1306       Alice Bae APRN at 06/16/25 1058          [facility] Cardiothoracic Surgery In-Patient Progress Note     LOS: 4 days     Chief Complaint: Malignant pleural effusion    Subjective  Our service was consulted on 6/13 for chest tube management and known malignant pleural effusion requiring frequent thoracentesis.  She is followed by Dr. Mckenzie and has been on Keytruda. Her chest tube was discontinued yesterday and we have been re-consulted today regarding Pleurx catheter.     Objective  Vital Signs  Temp:  [97.7 °F (36.5 °C)-98.6 °F (37 °C)] 97.7 °F (36.5 °C)  Heart Rate:  [106-118] 107  Resp:  [18] 18  BP: (117-140)/(73-97) 140/73      Physical Exam:   General Appearance: alert, appears stated age and cooperative   Lungs: On 4 L nasal cannula saturations 94%   Heart: tachycardia     Results     Results from last 7 days   Lab Units 06/16/25  0424   WBC 10*3/mm3 18.84*   HEMOGLOBIN g/dL 10.3*   HEMATOCRIT % 34.2   PLATELETS 10*3/mm3 285     Results from last 7 days   Lab Units  06/16/25  0424   SODIUM mmol/L 131*   POTASSIUM mmol/L 4.3   CHLORIDE mmol/L 94*   CO2 mmol/L 26.0   BUN mg/dL 25.5*   CREATININE mg/dL 0.87   GLUCOSE mg/dL 117*   CALCIUM mg/dL 7.5*     Imaging Results (Last 24 Hours)       Procedure Component Value Units Date/Time    XR Chest 1 View [[id number]] Collected: 06/16/25 0746     Updated: 06/16/25 0750    Narrative:      XR CHEST 1 VW    Date of Exam: 6/16/2025 5:35 AM EDT    Indication: Recurrent pleural effusions    Comparison: 6/15/2025    Findings:  Right pleural drain has been removed and there is apparently increasing right basilar effusion. Right midlung interstitial changes are stable. There is minimally increased left basilar interstitial opacity perhaps a small focus of atelectasis. Heart and   vasculature appear to be normal in size. No pneumothorax is seen.      Impression:      Impression:  Increasing right basilar effusion following right pleural drain removal.      Electronically Signed: Marlon Lyons MD    6/16/2025 7:47 AM EDT    Workstation ID: [id number]            Assessment    Recurrent pleural effusion    Acquired hypothyroidism    HLD (hyperlipidemia)    Adenocarcinoma of right lung      Plan   Preop for right pleurx catheter tomorrow with Dr. Gross  NPO after midnight      DAYSI Alvarado  06/16/25  10:58 EDT    Electronically signed by Alice Bae APRN at 06/16/25 1200       Sejal Mckenzie MD at 06/15/25 1354          DATE OF VISIT: 6/15/2025    Chief Complaint: Followup for metastatic recurrent right lung cancer     SUBJECTIVE: The patient is laying comfortably in bed.  She is complaining of shortness of breath with minimal exertion.  Chest tube has been removed today.    REVIEW OF SYSTEMS: All the other 9 systems are reviewed by me and negative  except what is mentioned in HPI.     Past History:  Medical History: has a past medical history of Acid reflux, Acid reflux, Adenocarcinoma of lung (08/12/2024), Anemia, chronic disease  (5/13/2025), Anxiety (1976), Arthritis, Atherosclerotic cardiovascular disease (03/25/2024), Cervical cancer, Depression, Emphysema of lung, History of radiation therapy (11/20/2020), History of radiation therapy (10/04/2024), radiation therapy, Hyperlipidemia, Hypothyroidism, Kidney disease, Lung cancer, Lung nodule, Ovarian cyst (1980s), Pleural effusion (5/13/2025), Pulmonary embolism (5/13/2025), Visual impairment (corrected with glasses), Wears dentures, and Wears glasses.   Surgical History: has a past surgical history that includes Tubal ligation; total abdominal hysterectomy pelvic node dissection (N/A, 08/18/2020); Lymph node biopsy; Subtotal Hysterectomy (1987?); Bronchoscopy; Lung biopsy; BRONCHOSCOPY WITH ION ROBOTIC ASSIST (N/A, 08/06/2024); Mohs surgery (03/24/2025); Hysteroscopy; and Colonoscopy.   Family History: family history includes Anxiety disorder in her mother; Asthma in her mother; COPD in her mother; Cancer in her father, maternal aunt, maternal aunt, maternal aunt, maternal aunt, maternal grandmother, and mother; Depression in her mother; Early death in her father; Emphysema in her mother; Heart attack in her maternal grandfather; Heart disease in her maternal grandfather; Hypertension in her mother; Melanoma in her mother; Mental illness in her mother; Uterine cancer in her mother.   Social History: reports that she quit smoking about 6 years ago. Her smoking use included cigarettes. She started smoking about 53 years ago. She has a 67.5 pack-year smoking history. She has been exposed to tobacco smoke. She has never used smokeless tobacco. She reports that she does not drink alcohol and does not use drugs.    Medications Prior to Admission   Medication Sig Dispense Refill Last Dose/Taking    albuterol sulfate  (90 Base) MCG/ACT inhaler Inhale 2 puffs Every 4 (Four) to 6 (Six) Hours As Needed for Wheezing or Shortness of Air.   Taking As Needed    apixaban (ELIQUIS) 5 MG tablet  tablet Take 2 tablets by mouth Every 12 (Twelve) Hours for 6 days, THEN 1 tablet Every 12 (Twelve) Hours for 24 days. Indications: DVT/PE (active thrombosis) 48 tablet 5 6/11/2025 Morning    atorvastatin (LIPITOR) 10 MG tablet TAKE 1 TABLET BY MOUTH EVERY DAY AT NIGHT 90 tablet 1 6/11/2025    clonazePAM (KlonoPIN) 1 MG tablet Take 1 tablet by mouth 2 (Two) Times a Day As Needed for Anxiety. 50 tablet 0 6/12/2025 Morning    Diclofenac Sodium (VOLTAREN) 1 % gel gel Apply 1 gram topically to indicated area 4 times daily as needed for mild to moderate pain. STOP FLEXERIL 100 g 2 Taking    docusate sodium (COLACE) 100 MG capsule Take 2 capsules by mouth Daily. 60 capsule 3 6/12/2025 Morning    famotidine (PEPCID) 20 MG tablet TAKE 1 TABLET BY MOUTH TWICE DAILY OR TAKE 2 TABLETS BY MOUTH ONCE DAILY AS NEEDED FOR HEARTBURN 180 tablet 1 Taking    Fluticasone-Umeclidin-Vilant (Trelegy Ellipta) 100-62.5-25 MCG/ACT inhaler Inhale 1 puff Daily. 90 each 3 6/12/2025 Morning    levothyroxine (Synthroid) 88 MCG tablet Take 1 tablet by mouth Every Morning. 90 tablet 1 6/11/2025    ondansetron (ZOFRAN) 8 MG tablet Take 1 tablet by mouth 3 (Three) Times a Day As Needed for Nausea or Vomiting. 30 tablet 5 Taking As Needed    oxyCODONE-acetaminophen (PERCOCET) 5-325 MG per tablet Take 1 tablet by mouth Every 6 (Six) Hours As Needed for Moderate Pain. 120 tablet 0 6/12/2025 Morning    sertraline (Zoloft) 100 MG tablet Take 1 tablet by mouth Daily. 90 tablet 1 6/11/2025    traZODone (DESYREL) 150 MG tablet TAKE 1 TABLET BY MOUTH EVERY DAY AT NIGHT 90 tablet 3 6/11/2025    zolpidem (AMBIEN) 10 MG tablet Take 1 tablet by mouth At Night As Needed for Sleep. 30 tablet 1 Past Month    cyclobenzaprine (FLEXERIL) 10 MG tablet TAKE 1 TABLET BY MOUTH 2 TIMES A DAY AS NEEDED FOR MUSCLE SPASMS. 60 tablet 3       Allergies: Patient has no known allergies.     Current Facility-Administered Medications:     acetaminophen (TYLENOL) tablet 650 mg, 650  mg, Oral, Q4H PRN **OR** acetaminophen (TYLENOL) 160 MG/5ML oral solution 650 mg, 650 mg, Oral, Q4H PRN **OR** acetaminophen (TYLENOL) suppository 650 mg, 650 mg, Rectal, Q4H PRN, Nuha Shafer, DAYSI    arformoterol (BROVANA) nebulizer solution 15 mcg, 15 mcg, Nebulization, BID - RT, 15 mcg at 06/14/25 2142 **AND** budesonide (PULMICORT) nebulizer solution 0.5 mg, 0.5 mg, Nebulization, BID - RT, 0.5 mg at 06/14/25 2142 **AND** revefenacin (YUPELRI) nebulizer solution 175 mcg, 175 mcg, Nebulization, Daily - RT, Nuha Shafer APRN    atorvastatin (LIPITOR) tablet 10 mg, 10 mg, Oral, Nightly, Nuha Shafer APRNICKY, 10 mg at 06/14/25 2034    sennosides-docusate (PERICOLACE) 8.6-50 MG per tablet 2 tablet, 2 tablet, Oral, BID, 2 tablet at 06/15/25 0915 **AND** polyethylene glycol (MIRALAX) packet 17 g, 17 g, Oral, Daily PRN **AND** bisacodyl (DULCOLAX) EC tablet 5 mg, 5 mg, Oral, Daily PRN, 5 mg at 06/15/25 0924 **AND** bisacodyl (DULCOLAX) suppository 10 mg, 10 mg, Rectal, Daily PRN, Nuha Shafer APRN    bisacodyl (DULCOLAX) suppository 10 mg, 10 mg, Rectal, Once, Kaylene Reynoso APRN    Calcium Replacement - Follow Nurse / BPA Driven Protocol, , Not Applicable, PRN, Nuha Shafer APRN    clonazePAM (KlonoPIN) tablet 1 mg, 1 mg, Oral, BID PRN, Nuha Shafer APRN, 1 mg at 06/13/25 2138    cyclobenzaprine (FLEXERIL) tablet 10 mg, 10 mg, Oral, BID PRN, Nuha Shafer, APRNICKY, 10 mg at 06/14/25 2235    Diclofenac Sodium (VOLTAREN) 1 % gel 2 g, 2 g, Topical, 4x Daily, Kaylene Reynoso APRN, 2 g at 06/13/25 2139    famotidine (PEPCID) tablet 40 mg, 40 mg, Oral, BID PRN, Nuha Shafer, DAYSI    heparin 35438 units/250 mL (100 units/mL) in 0.45 % NaCl infusion, 16 Units/kg/hr, Intravenous, Titrated, Nuha Shafer, APRN, Last Rate: 8.92 mL/hr at 06/15/25 0345, 16 Units/kg/hr at 06/15/25 0345    HYDROmorphone (DILAUDID) injection 0.5 mg, 0.5 mg, Intravenous, Q2H PRN, 0.5 mg at  06/15/25 1205 **AND** naloxone (NARCAN) injection 0.4 mg, 0.4 mg, Intravenous, Q5 Min PRN, Nuha Shafer APRN    levothyroxine (SYNTHROID, LEVOTHROID) tablet 88 mcg, 88 mcg, Oral, Q AM, Nuha Shafer APRN, 88 mcg at 06/15/25 0529    lidocaine PF 1% (XYLOCAINE) injection 5 mL, 5 mL, Injection, Once, Jose Hope MD    Magnesium Standard Dose Replacement - Follow Nurse / BPA Driven Protocol, , Not Applicable, PRN, Nuha Shafer APRN    nystatin (MYCOSTATIN) 100,000 unit/mL suspension 500,000 Units, 5 mL, Oral, 4x Daily, Trina Hdz APRN    ondansetron ODT (ZOFRAN-ODT) disintegrating tablet 4 mg, 4 mg, Oral, Q6H PRN **OR** ondansetron (ZOFRAN) injection 4 mg, 4 mg, Intravenous, Q6H PRN, Nuha Shafer APRN    oxyCODONE-acetaminophen (PERCOCET) 5-325 MG per tablet 1 tablet, 1 tablet, Oral, Q4H PRN, Nuha Shafer APRN, 1 tablet at 06/14/25 2304    Pharmacy to Dose Heparin, , Not Applicable, Continuous PRN, Nuha Shafer APRN    Phosphorus Replacement - Follow Nurse / BPA Driven Protocol, , Not Applicable, PRN, Nuha Shafer APRN    Potassium Replacement - Follow Nurse / BPA Driven Protocol, , Not Applicable, PRN, Nuha Shafer APRN    sertraline (ZOLOFT) tablet 100 mg, 100 mg, Oral, Daily, Nuha Shafer APRN, 100 mg at 06/15/25 0915    simethicone (MYLICON) chewable tablet 80 mg, 80 mg, Oral, 4x Daily PRN, Trina Hdz APRN, 80 mg at 06/15/25 0924    sodium chloride 0.9 % flush 10 mL, 10 mL, Intravenous, Q12H, Jose Hope MD, 10 mL at 06/14/25 2100    sodium chloride 0.9 % flush 10 mL, 10 mL, Intravenous, PRN, Jose Hope MD    sodium chloride 0.9 % flush 10 mL, 10 mL, Intravenous, Q12H, Nuha Shafer, APRN, 10 mL at 06/14/25 2100    sodium chloride 0.9 % flush 10 mL, 10 mL, Intravenous, PRN, Nuha Shafer, APRN    sodium chloride 0.9 % infusion 40 mL, 40 mL, Intravenous, PRN, Jose Hope MD    sodium chloride 0.9 %  "infusion 40 mL, 40 mL, Intravenous, PRN, Nuha Shafer, APRN    traZODone (DESYREL) tablet 150 mg, 150 mg, Oral, Nightly, Nuha Shafer APRN, 150 mg at 06/14/25 2034    zolpidem (AMBIEN) tablet 5 mg, 5 mg, Oral, Nightly PRN, Nuha Shafer, APRN    PHYSICAL EXAMINATION:   /73 (BP Location: Left arm, Patient Position: Lying)   Pulse 111   Temp 97.7 °F (36.5 °C) (Oral)   Resp 18   Ht 154.9 cm (60.98\")   Wt 55.8 kg (123 lb)   LMP  (LMP Unknown)   SpO2 96%   BMI 23.25 kg/m²                ECOG Performance Status: 2 - Symptomatic, <50% confined to bed  GENERAL: Age appropriate. No acute distress.   NEURO/PSYCH: A&O x 3, strength 5/5 in all muscle groups  HEENT: Head atraumatic, normocephalic.   NECK: Supple. No JVD. No lymphadenopathy.   LUNGS: Clear to auscultation bilaterally. No wheezing. No rhonchi.   HEART: Regular rate and rhythm. S1, S2, no murmurs.   ABDOMEN: Soft, nontender, nondistended. Bowel sounds positive. No  hepatosplenomegaly.   EXTREMITIES: No clubbing, cyanosis, or edema.   SKIN: No rashes. No purpura.       Admission on 06/12/2025   Component Date Value Ref Range Status    Glucose 06/12/2025 117 (H)  65 - 99 mg/dL Final    BUN 06/12/2025 21.7  8.0 - 23.0 mg/dL Final    Creatinine 06/12/2025 0.87  0.57 - 1.00 mg/dL Final    Sodium 06/12/2025 136  136 - 145 mmol/L Final    Potassium 06/12/2025 5.0  3.5 - 5.2 mmol/L Final    Chloride 06/12/2025 97 (L)  98 - 107 mmol/L Final    CO2 06/12/2025 28.0  22.0 - 29.0 mmol/L Final    Calcium 06/12/2025 7.8 (L)  8.6 - 10.5 mg/dL Final    Total Protein 06/12/2025 6.1  6.0 - 8.5 g/dL Final    Albumin 06/12/2025 2.8 (L)  3.5 - 5.2 g/dL Final    ALT (SGPT) 06/12/2025 26  1 - 33 U/L Final    AST (SGOT) 06/12/2025 49 (H)  1 - 32 U/L Final    Alkaline Phosphatase 06/12/2025 455 (H)  39 - 117 U/L Final    Total Bilirubin 06/12/2025 0.2  0.0 - 1.2 mg/dL Final    Globulin 06/12/2025 3.3  gm/dL Final    Calculated Result    A/G Ratio " 06/12/2025 0.8  g/dL Final    BUN/Creatinine Ratio 06/12/2025 24.9  7.0 - 25.0 Final    Anion Gap 06/12/2025 11.0  5.0 - 15.0 mmol/L Final    eGFR 06/12/2025 73.1  >60.0 mL/min/1.73 Final    Heparin Anti-Xa (UFH) 06/12/2025 0.48  0.30 - 0.70 IU/ml Final    Protime 06/12/2025 15.6 (H)  12.2 - 15.3 Seconds Final    INR 06/12/2025 1.17 (H)  0.89 - 1.12 Final    PTT 06/12/2025 32.7 (L)  60.0 - 90.0 seconds Final    WBC 06/12/2025 17.59 (H)  3.40 - 10.80 10*3/mm3 Final    RBC 06/12/2025 3.88  3.77 - 5.28 10*6/mm3 Final    Hemoglobin 06/12/2025 10.7 (L)  12.0 - 15.9 g/dL Final    Hematocrit 06/12/2025 35.1  34.0 - 46.6 % Final    MCV 06/12/2025 90.5  79.0 - 97.0 fL Final    MCH 06/12/2025 27.6  26.6 - 33.0 pg Final    MCHC 06/12/2025 30.5 (L)  31.5 - 35.7 g/dL Final    RDW 06/12/2025 14.8  12.3 - 15.4 % Final    RDW-SD 06/12/2025 47.6  37.0 - 54.0 fl Final    MPV 06/12/2025 10.0  6.0 - 12.0 fL Final    Platelets 06/12/2025 192  140 - 450 10*3/mm3 Final    Neutrophil % 06/12/2025 80.4 (H)  42.7 - 76.0 % Final    Lymphocyte % 06/12/2025 4.2 (L)  19.6 - 45.3 % Final    Monocyte % 06/12/2025 8.2  5.0 - 12.0 % Final    Eosinophil % 06/12/2025 3.9  0.3 - 6.2 % Final    Basophil % 06/12/2025 0.5  0.0 - 1.5 % Final    Immature Grans % 06/12/2025 2.8 (H)  0.0 - 0.5 % Final    Neutrophils, Absolute 06/12/2025 14.13 (H)  1.70 - 7.00 10*3/mm3 Final    Lymphocytes, Absolute 06/12/2025 0.74  0.70 - 3.10 10*3/mm3 Final    Monocytes, Absolute 06/12/2025 1.45 (H)  0.10 - 0.90 10*3/mm3 Final    Eosinophils, Absolute 06/12/2025 0.68 (H)  0.00 - 0.40 10*3/mm3 Final    Basophils, Absolute 06/12/2025 0.09  0.00 - 0.20 10*3/mm3 Final    Immature Grans, Absolute 06/12/2025 0.50 (H)  0.00 - 0.05 10*3/mm3 Final    nRBC 06/12/2025 0.0  0.0 - 0.2 /100 WBC Final    Heparin Anti-Xa (UFH) 06/13/2025 0.54  0.30 - 0.70 IU/ml Final    WBC 06/13/2025 20.73 (H)  3.40 - 10.80 10*3/mm3 Final    RBC 06/13/2025 3.77  3.77 - 5.28 10*6/mm3 Final    Hemoglobin  06/13/2025 10.5 (L)  12.0 - 15.9 g/dL Final    Hematocrit 06/13/2025 33.9 (L)  34.0 - 46.6 % Final    MCV 06/13/2025 89.9  79.0 - 97.0 fL Final    MCH 06/13/2025 27.9  26.6 - 33.0 pg Final    MCHC 06/13/2025 31.0 (L)  31.5 - 35.7 g/dL Final    RDW 06/13/2025 14.8  12.3 - 15.4 % Final    RDW-SD 06/13/2025 47.6  37.0 - 54.0 fl Final    MPV 06/13/2025 10.1  6.0 - 12.0 fL Final    Platelets 06/13/2025 220  140 - 450 10*3/mm3 Final    Neutrophil % 06/13/2025 77.0 (H)  42.7 - 76.0 % Final    Lymphocyte % 06/13/2025 5.7 (L)  19.6 - 45.3 % Final    Monocyte % 06/13/2025 8.6  5.0 - 12.0 % Final    Eosinophil % 06/13/2025 5.9  0.3 - 6.2 % Final    Basophil % 06/13/2025 0.5  0.0 - 1.5 % Final    Immature Grans % 06/13/2025 2.3 (H)  0.0 - 0.5 % Final    Neutrophils, Absolute 06/13/2025 15.98 (H)  1.70 - 7.00 10*3/mm3 Final    Lymphocytes, Absolute 06/13/2025 1.18  0.70 - 3.10 10*3/mm3 Final    Monocytes, Absolute 06/13/2025 1.78 (H)  0.10 - 0.90 10*3/mm3 Final    Eosinophils, Absolute 06/13/2025 1.22 (H)  0.00 - 0.40 10*3/mm3 Final    Basophils, Absolute 06/13/2025 0.10  0.00 - 0.20 10*3/mm3 Final    Immature Grans, Absolute 06/13/2025 0.47 (H)  0.00 - 0.05 10*3/mm3 Final    nRBC 06/13/2025 0.1  0.0 - 0.2 /100 WBC Final    Heparin Anti-Xa (UFH) 06/13/2025 0.55  0.30 - 0.70 IU/ml Final    Heparin Anti-Xa (UFH) 06/14/2025 0.42  0.30 - 0.70 IU/ml Final    Glucose 06/14/2025 108 (H)  65 - 99 mg/dL Final    BUN 06/14/2025 23.0  8.0 - 23.0 mg/dL Final    Creatinine 06/14/2025 0.85  0.57 - 1.00 mg/dL Final    Sodium 06/14/2025 134 (L)  136 - 145 mmol/L Final    Potassium 06/14/2025 4.2  3.5 - 5.2 mmol/L Final    Chloride 06/14/2025 97 (L)  98 - 107 mmol/L Final    CO2 06/14/2025 25.0  22.0 - 29.0 mmol/L Final    Calcium 06/14/2025 7.7 (L)  8.6 - 10.5 mg/dL Final    BUN/Creatinine Ratio 06/14/2025 27.1 (H)  7.0 - 25.0 Final    Anion Gap 06/14/2025 12.0  5.0 - 15.0 mmol/L Final    eGFR 06/14/2025 75.2  >60.0 mL/min/1.73 Final    WBC  06/14/2025 21.39 (H)  3.40 - 10.80 10*3/mm3 Final    RBC 06/14/2025 3.72 (L)  3.77 - 5.28 10*6/mm3 Final    Hemoglobin 06/14/2025 10.3 (L)  12.0 - 15.9 g/dL Final    Hematocrit 06/14/2025 33.5 (L)  34.0 - 46.6 % Final    MCV 06/14/2025 90.1  79.0 - 97.0 fL Final    MCH 06/14/2025 27.7  26.6 - 33.0 pg Final    MCHC 06/14/2025 30.7 (L)  31.5 - 35.7 g/dL Final    RDW 06/14/2025 15.1  12.3 - 15.4 % Final    RDW-SD 06/14/2025 48.9  37.0 - 54.0 fl Final    MPV 06/14/2025 10.7  6.0 - 12.0 fL Final    Platelets 06/14/2025 215  140 - 450 10*3/mm3 Final    Neutrophil % 06/14/2025 79.5 (H)  42.7 - 76.0 % Final    Lymphocyte % 06/14/2025 4.3 (L)  19.6 - 45.3 % Final    Monocyte % 06/14/2025 7.8  5.0 - 12.0 % Final    Eosinophil % 06/14/2025 5.2  0.3 - 6.2 % Final    Basophil % 06/14/2025 0.4  0.0 - 1.5 % Final    Immature Grans % 06/14/2025 2.8 (H)  0.0 - 0.5 % Final    Neutrophils, Absolute 06/14/2025 17.02 (H)  1.70 - 7.00 10*3/mm3 Final    Lymphocytes, Absolute 06/14/2025 0.91  0.70 - 3.10 10*3/mm3 Final    Monocytes, Absolute 06/14/2025 1.67 (H)  0.10 - 0.90 10*3/mm3 Final    Eosinophils, Absolute 06/14/2025 1.11 (H)  0.00 - 0.40 10*3/mm3 Final    Basophils, Absolute 06/14/2025 0.08  0.00 - 0.20 10*3/mm3 Final    Immature Grans, Absolute 06/14/2025 0.60 (H)  0.00 - 0.05 10*3/mm3 Final    nRBC 06/14/2025 0.1  0.0 - 0.2 /100 WBC Final    Heparin Anti-Xa (UFH) 06/15/2025 0.46  0.30 - 0.70 IU/ml Final    WBC 06/15/2025 19.96 (H)  3.40 - 10.80 10*3/mm3 Final    RBC 06/15/2025 3.59 (L)  3.77 - 5.28 10*6/mm3 Final    Hemoglobin 06/15/2025 9.9 (L)  12.0 - 15.9 g/dL Final    Hematocrit 06/15/2025 32.2 (L)  34.0 - 46.6 % Final    MCV 06/15/2025 89.7  79.0 - 97.0 fL Final    MCH 06/15/2025 27.6  26.6 - 33.0 pg Final    MCHC 06/15/2025 30.7 (L)  31.5 - 35.7 g/dL Final    RDW 06/15/2025 15.2  12.3 - 15.4 % Final    RDW-SD 06/15/2025 48.6  37.0 - 54.0 fl Final    MPV 06/15/2025 10.0  6.0 - 12.0 fL Final    Platelets 06/15/2025 221  140 -  450 10*3/mm3 Final    Neutrophil % 06/15/2025 81.3 (H)  42.7 - 76.0 % Final    Lymphocyte % 06/15/2025 3.8 (L)  19.6 - 45.3 % Final    Monocyte % 06/15/2025 7.6  5.0 - 12.0 % Final    Eosinophil % 06/15/2025 4.7  0.3 - 6.2 % Final    Basophil % 06/15/2025 0.4  0.0 - 1.5 % Final    Immature Grans % 06/15/2025 2.2 (H)  0.0 - 0.5 % Final    Neutrophils, Absolute 06/15/2025 16.23 (H)  1.70 - 7.00 10*3/mm3 Final    Lymphocytes, Absolute 06/15/2025 0.76  0.70 - 3.10 10*3/mm3 Final    Monocytes, Absolute 06/15/2025 1.52 (H)  0.10 - 0.90 10*3/mm3 Final    Eosinophils, Absolute 06/15/2025 0.93 (H)  0.00 - 0.40 10*3/mm3 Final    Basophils, Absolute 06/15/2025 0.08  0.00 - 0.20 10*3/mm3 Final    Immature Grans, Absolute 06/15/2025 0.44 (H)  0.00 - 0.05 10*3/mm3 Final    nRBC 06/15/2025 0.0  0.0 - 0.2 /100 WBC Final       XR Chest 1 View  Result Date: 6/15/2025  Narrative: XR CHEST 1 VW Date of Exam: 6/15/2025 2:07 AM EDT Indication: Recurrent pleural effusions, right chest tube, follow-up Comparison: 6/14/2025. Findings: There is a right basilar pigtail chest tube in place. There may be some kinking of the chest tube possibly at the entry site. I would recommend clinical correlation. There is an unchanged moderate right pleural effusion with compressive atelectasis. The left lung and pleural space are clear. The heart size is normal. The pulmonary vascular markings are normal. There is no pneumothorax.     Impression: Impression: 1.There is a right basilar pigtail chest tube in place. There may be some kinking of the chest tube possibly at the entry site. I would recommend clinical correlation. 2.Unchanged moderate right pleural effusion with compressive atelectasis. Electronically Signed: Samuel Luna MD  6/15/2025 10:19 AM EDT  Workstation ID: [id number]    XR Chest 1 View  Result Date: 6/14/2025  Narrative: XR CHEST 1 VW Date of Exam: 6/14/2025 3:14 AM EDT Indication: Recurrent pleural effusions Comparison: Chest radiograph  6/13/2025. Findings: Unchanged position of right pleural catheter. Cardiomediastinal silhouette is unchanged. Moderate right pleural effusion with right basilar airspace disease and right basilar lucency suggestive of small-volume pneumothorax, unchanged compared to yesterday's exam. Left lung appears clear. No pneumothorax. Osseous structures are unchanged.     Impression: Impression: No significant interval change. Electronically Signed: Franco Shaffer MD  6/14/2025 8:09 AM EDT  Workstation ID: [id number]    XR Chest 1 View  Result Date: 6/13/2025  Narrative: XR CHEST 1 VW Date of Exam: 6/13/2025 12:55 PM EDT Indication: Recurrent pleural effusions Comparison: [facility] chest x-ray 6/13/2025 timed at 4:43 a.m. Findings: Right pleural catheter remains in place. Moderate size right hydropneumothorax appears similar to numbers to today's earlier chest x-ray. Underlying right basilar airspace opacities are stable. Left lung is clear.     Impression: Impression: Moderate sized right hydropneumothorax appears similar to numbers to today's earlier chest x-ray. Right pleural catheter remains in place. Electronically Signed: Maryellen Dalton MD  6/13/2025 1:44 PM EDT  Workstation ID: [id number]    XR Chest 1 View  Result Date: 6/13/2025  Narrative: XR CHEST 1 VW Date of Exam: 6/13/2025 2:49 AM EDT Indication: post Ct placement and thoracentesis Comparison: [facility] chest x-ray 6/12/2025, 6/5/2025 Findings: There is a new right pleural catheter. Previously seen right basilar pleural air has now been replaced with pleural fluid. No apical pneumothorax is seen. There are stable underlying right basilar airspace opacities. Left lung appears clear. Cardiomediastinal contours are stable.     Impression: Impression: 1.Interval placement of right pleural catheter with fluid component of right basilar hydropneumothorax. 2.Stable underlying right basilar airspace opacities, likely due to atelectasis. Electronically Signed: Maryellen Dalton MD  6/13/2025  7:23 AM EDT  Workstation ID: [id number]    CT Guided Chest Tube  Result Date: 6/12/2025  Narrative: DATE OF EXAM: 6/12/2025 2:48 PM  PROCEDURE: CT GUIDED CHEST TUBE PLACEMENT-  INDICATIONS: right chest tube placement; J95.811-Postprocedural pneumothorax  DLP: 746 mGycm  TECHNIQUE:  The risks, benefits, and alternatives were discussed in detail with the patient. The patient was brought down to the CT suite and positioned supine on the CT table. Preliminary CT scan of the the chest was performed and a skin entry site was selected and marked. The area was prepped and draped utilizing standard sterile technique. 1% lidocaine was administered to the soft tissues. A small skin incision was made with an 11 blade scalpel. Under CT guidance a 5 Swiss Yueh was advanced into the right pleural space. The inner stylet was removed and an 035 wire was advanced coaxially. Over the wire a 10 Swiss all-purpose drainage catheter was advanced with the pigtail formed and locked within the pleural space the catheter was sutured to the skin and attached to a Pleur-evac. A sterile dressing was then applied.  The patient tolerated the procedure well and there were no immediate complications.      Impression: Successful CT-guided 10 Swiss right sided chest tube placement.   6/12/2025 4:13 PM by Jose Hope MD on Workstation: [id number]      XR Chest 1 View  Result Date: 6/12/2025  Narrative: XR CHEST 1 VW Date of Exam: 6/12/2025 1:45 PM EDT Indication: s/p thoracentesis; c/o severe right neck pain Comparison: 6/12/2025 Findings: Cardiac size is similar to prior exam. Similar right-sided hydropneumothorax. Similar right basilar opacity. No evidence of acute osseous abnormalities. Visualized upper abdomen is unremarkable.     Impression: Impression: 1.Similar right-sided hydropneumothorax. 2.Similar right basilar opacity may reflect atelectasis or infection. Electronically Signed: Clay Johnson MD  6/12/2025 2:31 PM EDT  Workstation ID:  [id number]    XR Chest 1 View  Result Date: 6/12/2025  Narrative: XR CHEST 1 VW Date of Exam: 6/12/2025 12:25 PM EDT Indication: s/p thoracentesis; c/o severe right neck pain Comparison: Chest radiograph 6/12/2025 Findings: Grossly stable size of the right hydropneumothorax, measuring up to 7 mm at apex with larger right subpulmonic component. Previous noted fluid extending towards the apex appears decreased. Left lung relatively clear. There is partial collapse and probable underlying atelectasis within the right lower lobe. Negative for left pneumothorax. Stable cardiomediastinal silhouette. Degenerative related osseous change.     Impression: Impression: Slightly decreased right pleural fluid component with otherwise similar appearing moderate size hydropneumothorax. Electronically Signed: Toni Argueta MD  6/12/2025 12:53 PM EDT  Workstation ID: [id number]    XR Chest 1 View  Result Date: 6/12/2025  Narrative: XR CHEST 1 VW Date of Exam: 6/12/2025 11:23 AM EDT Indication: s/p thoracentesis Comparison: Chest x-ray 6/5/2025 Findings: There is a pneumothorax on the right. It is moderate in size and seen inferiorly measuring up to 2.3 cm. There is a moderate mount of pleural fluid as well. There is right lower lobe airspace opacity which is improved from previous exam. Left lung is clear. Heart size is normal.     Impression: Impression: Moderate sized right hydropneumothorax. Electronically Signed: Susan Leahy MD  6/12/2025 12:41 PM EDT  Workstation ID: [id number]    US Thoracentesis  Result Date: 6/12/2025  Narrative: PROCEDURE: US THORACENTESIS-  ATTENDING: Jose Hope M.D.  CLINICAL HISTORY: Large right pleural effusion.  TECHNIQUE:  The risk, benefits, and alternatives were described in detail and the patient was brought to the ultrasound suite and positioned seated. The skin entry site was prepped and draped utilizing standard sterile technique. 1% lidocaine was administered to the soft tissues of the right  posterior thorax.   A 5 Zimbabwean Yueh was advanced into the right pleural space.   A total of 1.4 L was removed from the right pleural space. A specimen was sent to the laboratory for analysis. The needle was removed and a sterile dressing was applied.  The patient tolerated the procedure well and there were no complications.      Impression: Successful ultrasound-guided right thoracentesis.   Attestation Signer name: Jose Hope MD I attest that I was present for the entire procedure. I reviewed the stored images and agree with the report as written.      6/12/2025 12:17 PM by Jose Hope MD on Workstation: [id number]      XR Chest 1 View  Result Date: 6/5/2025  Narrative: XR CHEST 1 VW Date of Exam: 6/5/2025 6:41 AM EDT Indication: cough Comparison: Chest radiograph 6/5/2025 at 0422 hours Findings: There is been slight decrease in the size of the large right pleural effusion. There is persistent right pleural fluid and right basilar airspace disease. Airspace disease is most likely atelectasis with pneumonia not excluded. The left lung remains clear. There is no pneumothorax.     Impression: Impression: Slight decrease in the size of the large right pleural effusion. Electronically Signed: Rodriguez Singh MD  6/5/2025 7:00 AM EDT  Workstation ID: [id number]    XR Chest 1 View  Result Date: 6/5/2025  Narrative: XR CHEST 1 VW Date of Exam: 6/5/2025 4:18 AM EDT Indication: SOA triage protocol Comparison: Chest radiograph 5/28/2025 Findings: The heart size and pulmonary vascular markings are normal. The left lung is clear. There is a large right pleural effusion with associated right basilar atelectasis. There is no pneumothorax. The osseous structures are normal for age.     Impression: Impression: Large right pleural effusion with right basilar atelectasis. Electronically Signed: Rodriguez Singh MD  6/5/2025 4:47 AM EDT  Workstation ID: [id number]    US Thoracentesis  Result Date: 5/28/2025  Narrative: PROCEDURE:  Ultrasound-guided right thoracentesis  Procedural Personnel Attending physician(s): Erik Mensah  Pre-procedure diagnosis: Right pleural effusion  Post-procedure diagnosis: Same  Complications: No immediate complications.      Impression:  Ultrasound-guided thoracentesis with drainage of 1400 mL of serous fluid.   _______________________________________________________________   PROCEDURE SUMMARY:  - Limited thoracic ultrasound - Ultrasound-guided right thoracentesis - Additional procedure(s): None  PROCEDURE DETAILS:   Pre-procedure Consent: Informed consent for the procedure including risks, benefits and alternatives was obtained and time-out was performed prior to the procedure. Preparation: The site was prepared and draped using maximal sterile barrier technique including cutaneous antisepsis.    Limited thoracic ultrasound: Limited thoracic ultrasound was performed using a curved transducer. A safe window for thoracentesis was identified. Right hemithorax findings: Moderate to large right-sided pleural effusion   Thoracentesis  Local anesthesia was administered. The pleural space was accessed and fluid return confirmed position. The fluid was drained. The catheter was removed, and a sterile bandage was applied.  Catheter placed: 5 Welsh catheter.  Additional Details  Equipment details: None Specimens removed: Pleural fluid Estimated blood loss (mL): Less than 10   5/28/2025 12:05 PM by Erik Mensah MD on Workstation: [id number]      XR Chest 1 View  Result Date: 5/28/2025  Narrative: XR CHEST 1 VW Date of Exam: 5/28/2025 10:19 AM EDT Indication: S/P Right Thoracentesis Comparison: 5/14/2025 chest radiograph. Ultrasound-guided thoracentesis of 5/20/2025 Findings: By history, thoracentesis earlier today removed 1.4 L of fluid from the right hemithorax. Follow-up chest radiograph shows patchy right mid and lower lung atelectasis and perhaps a small amount of remaining pleural fluid, versus discoid  atelectasis. There is no evidence of pneumothorax. Left lung remains clear. Heart and vasculature appear normal in size.     Impression: Impression: Patchy right mid and lower lung atelectasis and questionable small remaining right effusion. No evidence of pneumothorax or other significant chest disease elsewhere. Electronically Signed: aMrlon Lyons MD  5/28/2025 10:49 AM EDT  Workstation ID: [id number]    NM PET/CT Skull Base to Mid Thigh  Result Date: 5/28/2025  Narrative: FDG NM PET/CT SKULL BASE TO MID THIGH Date of Exam: 5/22/2025 8:25 AM EDT Indication: restaging scans lung cancer. Comparison: 5/13/2025 and 7/11/2024 Technique: 8.1 mCi of F-18 FDG was administered intravenously. PET imaging was obtained from skull base to mid-thigh approximately 60 minutes after radiotracer injection. A low dose non contrast CT was obtained for attenuation correction and anatomic localization. Fused PET-CT and 3D MIP reconstructions were utilized for image interpretation.  Fasting blood glucose level: 113 mg/dl. Reference uptake values: Mediastinum: 2.8 SUVmax Liver: 2.7 SUVmax Normalization method: Body Weight Findings: Head and neck: No abnormal FDG activity identified. Chest: Increasing moderate to large right effusion which is loculated. Spiculated right middle lobe nodule has an SUV maximum of 4.4. There is extensive right hilar adenopathy with an SUV max 8.8. There is subcarinal and mediastinal adenopathy ranging between 8 and 5 SUV maximum. Abdomen and pelvis: At least 20 FDG-avid liver lesions are identified measuring up to 5 cm in diameter and an SUV maximum of 13.5. There is left adrenal lesion with an SUV maximum of 5. There is extensive ujlio hepatis and portacaval adenopathy with an SUV maximum of 8.6. There is retroperitoneal adenopathy, including left periaortic and aortocaval abnormal lymph nodes. Musculoskeletal: Diffuse osseous metastatic disease with multifocal disease throughout the cervical thoracolumbar  spine, bilateral ribs, pelvis, including the left acetabulum and to a lesser extent the right acetabulum. There are bilateral femoral lesions as well. No definite pathologic fractures are identified at this time. Several of the lesions however are involving weight-bearing bones and the patient is susceptible to pathologic fractures.     Impression: Impression: Extensive metastatic disease involving the chest, abdomen, and pelvis as well as the bones. Electronically Signed: Herb Lopes MD  2025 9:18 AM EDT  Workstation ID: [id number]      ASSESSMENT: The patient is a very pleasant 65 y.o. female  with metastatic right lung cancer      PLAN:  1.  Metastatic right lung cancer: The patient is complaining of shortness of breath with minimal exertion.  This is induced by her underlying malignancy and advanced COPD.  She is status post 1 dose of Keytruda.  She is scheduled for second dose next Friday.  2.  Recurrent right pleural effusion: Chest tube was removed today.  If she has recurrent effusion we will arrange for outpatient pigtail catheter.  3.  Cancer-related pain: Continue opiates as needed.    Okay discharge patient from oncology perspective.  She will follow-up with me as scheduled next week.      Sejal Mckenzie MD  6/15/2025      Electronically signed by Sejal Mckenzie MD at 06/15/25 1356       Trina Hdz APRN at 06/15/25 1219              [facility] Medicine Services  PROGRESS NOTE    Patient Name: Sydnie Gamble  : 1960  MRN: [id number]    Date of Admission: 2025  Primary Care Physician: Dee Elena APRN    Subjective   Subjective     CC: Follow-up SOA    HPI:   Pt sitting up in bed. She denies any issues today. Chest tube to the right chest wall.     Objective   Objective     Vital Signs:   Temp:  [97.7 °F (36.5 °C)-98 °F (36.7 °C)] 97.7 °F (36.5 °C)  Heart Rate:  [109-113] 111  Resp:  [16-18] 18  BP: (110-144)/(68-74) 126/73  Flow (L/min)  (Oxygen Therapy):  [4] 4     Physical Exam:  Constitutional: Awake, alert, NAD  HENT: NCAT, mucous membranes moist  Respiratory: dim in bases, unlabored. 4L NC 96%  Cardiovascular: tachy, no murmurs, rubs, or gallops  Gastrointestinal: Positive bowel sounds, soft, nontender, nondistended  Musculoskeletal: No bilateral ankle edema  Psychiatric: Appropriate affect, cooperative  Neurologic: Oriented x 3, strength symmetric in all extremities, Cranial Nerves grossly intact to confrontation, speech clear  Skin: No rashes      Results Reviewed:  LAB RESULTS:      Lab 06/15/25  0446 06/14/25  0456 06/13/25  0733 06/13/25  0341 06/13/25  0114 06/12/25  1755   WBC 19.96* 21.39*  --  20.73*  --  17.59*   HEMOGLOBIN 9.9* 10.3*  --  10.5*  --  10.7*   HEMATOCRIT 32.2* 33.5*  --  33.9*  --  35.1   PLATELETS 221 215  --  220  --  192   NEUTROS ABS 16.23* 17.02*  --  15.98*  --  14.13*   IMMATURE GRANS (ABS) 0.44* 0.60*  --  0.47*  --  0.50*   LYMPHS ABS 0.76 0.91  --  1.18  --  0.74   MONOS ABS 1.52* 1.67*  --  1.78*  --  1.45*   EOS ABS 0.93* 1.11*  --  1.22*  --  0.68*   MCV 89.7 90.1  --  89.9  --  90.5   PROTIME  --   --   --   --   --  15.6*   APTT  --   --   --   --   --  32.7*   HEPARIN ANTI-XA 0.46 0.42 0.55  --  0.54 0.48         Lab 06/14/25  0456 06/12/25  1755   SODIUM 134* 136   POTASSIUM 4.2 5.0   CHLORIDE 97* 97*   CO2 25.0 28.0   ANION GAP 12.0 11.0   BUN 23.0 21.7   CREATININE 0.85 0.87   EGFR 75.2 73.1   GLUCOSE 108* 117*   CALCIUM 7.7* 7.8*         Lab 06/12/25  1755   TOTAL PROTEIN 6.1   ALBUMIN 2.8*   GLOBULIN 3.3   ALT (SGPT) 26   AST (SGOT) 49*   BILIRUBIN 0.2   ALK PHOS 455*         Lab 06/12/25 1755   PROTIME 15.6*   INR 1.17*                 Brief Urine Lab Results       None            Microbiology Results Abnormal       None            XR Chest 1 View  Result Date: 6/15/2025  XR CHEST 1 VW Date of Exam: 6/15/2025 2:07 AM EDT Indication: Recurrent pleural effusions, right chest tube, follow-up  Comparison: 6/14/2025. Findings: There is a right basilar pigtail chest tube in place. There may be some kinking of the chest tube possibly at the entry site. I would recommend clinical correlation. There is an unchanged moderate right pleural effusion with compressive atelectasis. The left lung and pleural space are clear. The heart size is normal. The pulmonary vascular markings are normal. There is no pneumothorax.     Impression: Impression: 1.There is a right basilar pigtail chest tube in place. There may be some kinking of the chest tube possibly at the entry site. I would recommend clinical correlation. 2.Unchanged moderate right pleural effusion with compressive atelectasis. Electronically Signed: Samuel Luna MD  6/15/2025 10:19 AM EDT  Workstation ID: [id number]    XR Chest 1 View  Result Date: 6/14/2025  XR CHEST 1 VW Date of Exam: 6/14/2025 3:14 AM EDT Indication: Recurrent pleural effusions Comparison: Chest radiograph 6/13/2025. Findings: Unchanged position of right pleural catheter. Cardiomediastinal silhouette is unchanged. Moderate right pleural effusion with right basilar airspace disease and right basilar lucency suggestive of small-volume pneumothorax, unchanged compared to yesterday's exam. Left lung appears clear. No pneumothorax. Osseous structures are unchanged.     Impression: Impression: No significant interval change. Electronically Signed: Franco Shaffer MD  6/14/2025 8:09 AM EDT  Workstation ID: [id number]    XR Chest 1 View  Result Date: 6/13/2025  XR CHEST 1 VW Date of Exam: 6/13/2025 12:55 PM EDT Indication: Recurrent pleural effusions Comparison: AP chest x-ray 6/13/2025 timed at 4:43 a.m. Findings: Right pleural catheter remains in place. Moderate size right hydropneumothorax appears similar to numbers to today's earlier chest x-ray. Underlying right basilar airspace opacities are stable. Left lung is clear.     Impression: Impression: Moderate sized right hydropneumothorax appears  similar to numbers to today's earlier chest x-ray. Right pleural catheter remains in place. Electronically Signed: Maryellen Dalton MD  6/13/2025 1:44 PM EDT  Workstation ID: [id number]      Results for orders placed during the hospital encounter of 05/13/25    Adult Transthoracic Echo Complete w/ Color, Spectral and Contrast if necessary per protocol    Interpretation Summary    Left ventricular ejection fraction appears to be 66 - 70%.    Normal right ventricular cavity size, wall thickness, systolic function and septal motion noted.    There is a small (<1cm) pericardial effusion adjacent to the right ventricle. There is no evidence of cardiac tamponade.    There is a left pleural effusion.      Current medications:  Scheduled Meds:arformoterol, 15 mcg, Nebulization, BID - RT   And  budesonide, 0.5 mg, Nebulization, BID - RT   And  revefenacin, 175 mcg, Nebulization, Daily - RT  atorvastatin, 10 mg, Oral, Nightly  bisacodyl, 10 mg, Rectal, Once  Diclofenac Sodium, 2 g, Topical, 4x Daily  levothyroxine, 88 mcg, Oral, Q AM  lidocaine PF 1%, 5 mL, Injection, Once  nystatin, 5 mL, Oral, 4x Daily  senna-docusate sodium, 2 tablet, Oral, BID  sertraline, 100 mg, Oral, Daily  sodium chloride, 10 mL, Intravenous, Q12H  sodium chloride, 10 mL, Intravenous, Q12H  traZODone, 150 mg, Oral, Nightly      Continuous Infusions:heparin, 16 Units/kg/hr, Last Rate: 16 Units/kg/hr (06/15/25 0345)  Pharmacy to Dose Heparin,       PRN Meds:.  acetaminophen **OR** acetaminophen **OR** acetaminophen    senna-docusate sodium **AND** polyethylene glycol **AND** bisacodyl **AND** bisacodyl    Calcium Replacement - Follow Nurse / BPA Driven Protocol    clonazePAM    cyclobenzaprine    famotidine    HYDROmorphone **AND** naloxone    Magnesium Standard Dose Replacement - Follow Nurse / BPA Driven Protocol    ondansetron ODT **OR** ondansetron    oxyCODONE-acetaminophen    Pharmacy to Dose Heparin    Phosphorus Replacement - Follow Nurse / BPA  Driven Protocol    Potassium Replacement - Follow Nurse / BPA Driven Protocol    simethicone    sodium chloride    sodium chloride    sodium chloride    sodium chloride    zolpidem    Assessment & Plan   Assessment & Plan     Active Hospital Problems    Diagnosis  POA    **Recurrent pleural effusion [J90]  Yes    Adenocarcinoma of right lung [C34.91]  Yes    HLD (hyperlipidemia) [E78.5]  Yes    Acquired hypothyroidism [E03.9]  Yes      Resolved Hospital Problems   No resolved problems to display.        Brief Hospital Course to date:  Sydnie Gamble is a 65 y.o. female with PMH of Adenocarcinoma of lung, anemia, cervical cancer, depression, HLD, hypothyroidism, PE, GERD and had home oxygen. Patient had a thoracentesis with chest tube placement, admitted for further management.     Recurrent Pleural Effusion  Adenocarcinoma of Right lung  -s/p thoracentesis and chest tube placement by IR, CTS managing  -- currently under treatment with Dr. Mckenzie and he wants to hold on pleur-x drain for now. He wants her to receive Keytruda  for a month.   --Palliative care consulted, continue pain control  - She is currently on 4 L NC, wean as able  -Continue DuoNeb     Hypothyroidism  -- cont home meds      HLD  -- cont statin      Generalized anxiety disorder  Depression  --cont home meds     LLE DVT  PE  -- hold eliquis   -- heparin drip while she has CT     Expected Discharge Location and Transportation: D  Expected Discharge   Expected Discharge Date: 6/16/2025; Expected Discharge Time:      VTE Prophylaxis:  Pharmacologic VTE prophylaxis orders are present.     AM-PAC 6 Clicks Score (PT): 18 (06/14/25 2000)    CODE STATUS:   Code Status and Medical Interventions: CPR (Attempt to Resuscitate); Full Support; also talked over with  Jose Francisco   Ordered at: 06/12/25 0533     Code Status (Patient has no pulse and is not breathing):    CPR (Attempt to Resuscitate)     Medical Interventions (Patient has pulse or is  breathing):    Full Support     Comments:    also talked over with  Jose Francisco     Level Of Support Discussed With:    Patient       Next of Kin (If No Surrogate)       DAYSI Em  06/15/25        Electronically signed by Trina Hdz APRN at 06/15/25 1223       Mansoor Cain PA at 06/15/25 0713          CTS Progress Note       LOS: 3 days   Patient Care Team:  Dee Elena APRN as PCP - General (Nurse Practitioner)  Cliff Montana MD as Consulting Physician (Radiation Oncology)  Toni Mcclelland MD as Consulting Physician (Pulmonary Disease)  Erik Mckeon DO as Consulting Physician (Pulmonary Disease)  Sejal Mckenzie MD as Referring Physician (Hematology and Oncology)  Rahel Johnson RN as Ambulatory  (Oncology) ([facility])    Chief Complaint: Recurrent pleural effusion    Vital Signs:  Temp:  [97.6 °F (36.4 °C)-98 °F (36.7 °C)] 97.8 °F (36.6 °C)  Heart Rate:  [] 109  Resp:  [16-20] 16  BP: (109-131)/(66-72) 128/71    Physical Exam: No distress       Results:     Results from last 7 days   Lab Units 06/15/25  0446   WBC 10*3/mm3 19.96*   HEMOGLOBIN g/dL 9.9*   HEMATOCRIT % 32.2*   PLATELETS 10*3/mm3 221     Results from last 7 days   Lab Units 06/14/25  0456   SODIUM mmol/L 134*   POTASSIUM mmol/L 4.2   CHLORIDE mmol/L 97*   CO2 mmol/L 25.0   BUN mg/dL 23.0   CREATININE mg/dL 0.85   GLUCOSE mg/dL 108*   CALCIUM mg/dL 7.7*           Imaging Results (Last 24 Hours)       Procedure Component Value Units Date/Time    XR Chest 1 View - In process [[id number]] Resulted: 06/15/25 0209     Updated: 06/15/25 0244    This result has not been signed. Information might be incomplete.      XR Chest 1 View [[id number]] Collected: 06/14/25 0806     Updated: 06/14/25 0812    Narrative:      XR CHEST 1 VW    Date of Exam: 6/14/2025 3:14 AM EDT    Indication: Recurrent pleural effusions    Comparison: Chest radiograph  6/13/2025.    Findings:  Unchanged position of right pleural catheter. Cardiomediastinal silhouette is unchanged. Moderate right pleural effusion with right basilar airspace disease and right basilar lucency suggestive of small-volume pneumothorax, unchanged compared to   yesterday's exam. Left lung appears clear. No pneumothorax. Osseous structures are unchanged.      Impression:      Impression:  No significant interval change.      Electronically Signed: Franco Shaffer MD    6/14/2025 8:09 AM EDT    Workstation ID: [id number]            Assessment      Recurrent pleural effusion    Acquired hypothyroidism    HLD (hyperlipidemia)    Adenocarcinoma of right lung    Would like to discontinue pigtail catheter today but I have been unable to determine the amount of drainage from the chest tube.  Will discuss with nursing about charting of chest tube drainage    Plan   Will possibly discontinue pigtail catheter today when I can determine degree of drainage    Chest tube drainage confirmed and very minimal.  Chest tube discontinued without immediate complication.  CT surgery will sign off at this time.  Please reconsult if future recurrent pleural effusion occurs and need for Pleurx catheter is recommended.    Please note that portions of this note were completed with a voice recognition program. Efforts were made to edit the dictations, but occasionally words are mistranscribed.    August Andres MD  06/15/25  07:13 EDT        Electronically signed by Mansoor Cain PA at 06/15/25 1342       Mare Mcclelland MD at 06/14/25 1318          F/U with pt to see if able to manage with chocolate boost.  Reports received but did not try.  Will monitor and order more if she chooses.    Electronically signed by Mare Mcclelland MD at 06/14/25 1319       Sejal Mckenzie MD at 06/14/25 1235          DATE OF VISIT: 6/14/2025    Chief Complaint: Followup for recurrent metastatic right lung cancer     SUBJECTIVE: The  patient is laying comfortable in bed.  She is having mild pain at the chest tube site.    REVIEW OF SYSTEMS: All the other 9 systems are reviewed by me and negative  except what is mentioned in HPI.     Past History:  Medical History: has a past medical history of Acid reflux, Acid reflux, Adenocarcinoma of lung (08/12/2024), Anemia, chronic disease (5/13/2025), Anxiety (1976), Arthritis, Atherosclerotic cardiovascular disease (03/25/2024), Cervical cancer, Depression, Emphysema of lung, History of radiation therapy (11/20/2020), History of radiation therapy (10/04/2024), radiation therapy, Hyperlipidemia, Hypothyroidism, Kidney disease, Lung cancer, Lung nodule, Ovarian cyst (1980s), Pleural effusion (5/13/2025), Pulmonary embolism (5/13/2025), Visual impairment (corrected with glasses), Wears dentures, and Wears glasses.   Surgical History: has a past surgical history that includes Tubal ligation; total abdominal hysterectomy pelvic node dissection (N/A, 08/18/2020); Lymph node biopsy; Subtotal Hysterectomy (1987?); Bronchoscopy; Lung biopsy; BRONCHOSCOPY WITH ION ROBOTIC ASSIST (N/A, 08/06/2024); Mohs surgery (03/24/2025); Hysteroscopy; and Colonoscopy.   Family History: family history includes Anxiety disorder in her mother; Asthma in her mother; COPD in her mother; Cancer in her father, maternal aunt, maternal aunt, maternal aunt, maternal aunt, maternal grandmother, and mother; Depression in her mother; Early death in her father; Emphysema in her mother; Heart attack in her maternal grandfather; Heart disease in her maternal grandfather; Hypertension in her mother; Melanoma in her mother; Mental illness in her mother; Uterine cancer in her mother.   Social History: reports that she quit smoking about 6 years ago. Her smoking use included cigarettes. She started smoking about 53 years ago. She has a 67.5 pack-year smoking history. She has been exposed to tobacco smoke. She has never used smokeless tobacco. She  reports that she does not drink alcohol and does not use drugs.    Medications Prior to Admission   Medication Sig Dispense Refill Last Dose/Taking    albuterol sulfate  (90 Base) MCG/ACT inhaler Inhale 2 puffs Every 4 (Four) to 6 (Six) Hours As Needed for Wheezing or Shortness of Air.   Taking As Needed    apixaban (ELIQUIS) 5 MG tablet tablet Take 2 tablets by mouth Every 12 (Twelve) Hours for 6 days, THEN 1 tablet Every 12 (Twelve) Hours for 24 days. Indications: DVT/PE (active thrombosis) 48 tablet 5 6/11/2025 Morning    atorvastatin (LIPITOR) 10 MG tablet TAKE 1 TABLET BY MOUTH EVERY DAY AT NIGHT 90 tablet 1 6/11/2025    clonazePAM (KlonoPIN) 1 MG tablet Take 1 tablet by mouth 2 (Two) Times a Day As Needed for Anxiety. 50 tablet 0 6/12/2025 Morning    Diclofenac Sodium (VOLTAREN) 1 % gel gel Apply 1 gram topically to indicated area 4 times daily as needed for mild to moderate pain. STOP FLEXERIL 100 g 2 Taking    docusate sodium (COLACE) 100 MG capsule Take 2 capsules by mouth Daily. 60 capsule 3 6/12/2025 Morning    famotidine (PEPCID) 20 MG tablet TAKE 1 TABLET BY MOUTH TWICE DAILY OR TAKE 2 TABLETS BY MOUTH ONCE DAILY AS NEEDED FOR HEARTBURN 180 tablet 1 Taking    Fluticasone-Umeclidin-Vilant (Trelegy Ellipta) 100-62.5-25 MCG/ACT inhaler Inhale 1 puff Daily. 90 each 3 6/12/2025 Morning    levothyroxine (Synthroid) 88 MCG tablet Take 1 tablet by mouth Every Morning. 90 tablet 1 6/11/2025    ondansetron (ZOFRAN) 8 MG tablet Take 1 tablet by mouth 3 (Three) Times a Day As Needed for Nausea or Vomiting. 30 tablet 5 Taking As Needed    oxyCODONE-acetaminophen (PERCOCET) 5-325 MG per tablet Take 1 tablet by mouth Every 6 (Six) Hours As Needed for Moderate Pain. 120 tablet 0 6/12/2025 Morning    sertraline (Zoloft) 100 MG tablet Take 1 tablet by mouth Daily. 90 tablet 1 6/11/2025    traZODone (DESYREL) 150 MG tablet TAKE 1 TABLET BY MOUTH EVERY DAY AT NIGHT 90 tablet 3 6/11/2025    zolpidem (AMBIEN) 10 MG  tablet Take 1 tablet by mouth At Night As Needed for Sleep. 30 tablet 1 Past Month    cyclobenzaprine (FLEXERIL) 10 MG tablet TAKE 1 TABLET BY MOUTH 2 TIMES A DAY AS NEEDED FOR MUSCLE SPASMS. 60 tablet 3       Allergies: Patient has no known allergies.     Current Facility-Administered Medications:     acetaminophen (TYLENOL) tablet 650 mg, 650 mg, Oral, Q4H PRN **OR** acetaminophen (TYLENOL) 160 MG/5ML oral solution 650 mg, 650 mg, Oral, Q4H PRN **OR** acetaminophen (TYLENOL) suppository 650 mg, 650 mg, Rectal, Q4H PRN, Nuha Shafer, DAYSI    [Held by provider] apixaban (ELIQUIS) tablet 5 mg, 5 mg, Oral, Q12H, Nuha Shafer, APRN    arformoterol (BROVANA) nebulizer solution 15 mcg, 15 mcg, Nebulization, BID - RT, 15 mcg at 06/14/25 0752 **AND** budesonide (PULMICORT) nebulizer solution 0.5 mg, 0.5 mg, Nebulization, BID - RT, 0.5 mg at 06/14/25 0752 **AND** revefenacin (YUPELRI) nebulizer solution 175 mcg, 175 mcg, Nebulization, Daily - RT, Nuha Shafer, APRN    atorvastatin (LIPITOR) tablet 10 mg, 10 mg, Oral, Nightly, Nuha Shafer, APRN, 10 mg at 06/13/25 2137    sennosides-docusate (PERICOLACE) 8.6-50 MG per tablet 2 tablet, 2 tablet, Oral, BID, 2 tablet at 06/14/25 0836 **AND** polyethylene glycol (MIRALAX) packet 17 g, 17 g, Oral, Daily PRN **AND** bisacodyl (DULCOLAX) EC tablet 5 mg, 5 mg, Oral, Daily PRN **AND** bisacodyl (DULCOLAX) suppository 10 mg, 10 mg, Rectal, Daily PRN, Nuha Shafer, APRN    bisacodyl (DULCOLAX) suppository 10 mg, 10 mg, Rectal, Once, Kaylene Reynoso APRN    Calcium Replacement - Follow Nurse / BPA Driven Protocol, , Not Applicable, PRN, Nuha Shafer, APRN    clonazePAM (KlonoPIN) tablet 1 mg, 1 mg, Oral, BID PRN, Nuha Shafer APRN, 1 mg at 06/13/25 2138    cyclobenzaprine (FLEXERIL) tablet 10 mg, 10 mg, Oral, BID PRN, Nuha Shafer, DAYSI, 10 mg at 06/14/25 0840    Diclofenac Sodium (VOLTAREN) 1 % gel 2 g, 2 g, Topical, 4x Daily,  Kaylene Reynoso APRN, 2 g at 06/13/25 2139    famotidine (PEPCID) tablet 40 mg, 40 mg, Oral, BID PRN, Nuha Shafer APRN    heparin 95340 units/250 mL (100 units/mL) in 0.45 % NaCl infusion, 16 Units/kg/hr, Intravenous, Titrated, Nuha Shafer APRN, Last Rate: 8.92 mL/hr at 06/14/25 0031, 16 Units/kg/hr at 06/14/25 0031    HYDROmorphone (DILAUDID) injection 0.5 mg, 0.5 mg, Intravenous, Q2H PRN, 0.5 mg at 06/14/25 0840 **AND** naloxone (NARCAN) injection 0.4 mg, 0.4 mg, Intravenous, Q5 Min PRN, Nuha Shafer APRN    levothyroxine (SYNTHROID, LEVOTHROID) tablet 88 mcg, 88 mcg, Oral, Q AM, Nuha Shafer APRN, 88 mcg at 06/14/25 0545    lidocaine PF 1% (XYLOCAINE) injection 5 mL, 5 mL, Injection, Once, Jose Hope MD    Magnesium Standard Dose Replacement - Follow Nurse / BPA Driven Protocol, , Not Applicable, PRN, Nuha Shafer APRN    ondansetron ODT (ZOFRAN-ODT) disintegrating tablet 4 mg, 4 mg, Oral, Q6H PRN **OR** ondansetron (ZOFRAN) injection 4 mg, 4 mg, Intravenous, Q6H PRN, Nuha Shafer APRN    oxyCODONE-acetaminophen (PERCOCET) 5-325 MG per tablet 1 tablet, 1 tablet, Oral, Q4H PRN, Nuha Shafer APRN, 1 tablet at 06/14/25 1007    Pharmacy to Dose Heparin, , Not Applicable, Continuous PRN, Nuha Shafer APRN    Phosphorus Replacement - Follow Nurse / BPA Driven Protocol, , Not Applicable, PRN, Nuha Shafer APRN    Potassium Replacement - Follow Nurse / BPA Driven Protocol, , Not Applicable, PRN, Nuha Shafer APRN    sertraline (ZOLOFT) tablet 100 mg, 100 mg, Oral, Daily, Nuha Shafer APRN, 100 mg at 06/14/25 0836    sodium chloride 0.9 % flush 10 mL, 10 mL, Intravenous, Q12H, Jose Hope MD, 10 mL at 06/13/25 2139    sodium chloride 0.9 % flush 10 mL, 10 mL, Intravenous, PRN, Jose Hope MD    sodium chloride 0.9 % flush 10 mL, 10 mL, Intravenous, Q12H, Nuha Shafer, APRN, 10 mL at 06/13/25 2139    sodium  "chloride 0.9 % flush 10 mL, 10 mL, Intravenous, PRN, Nuha Shafer, APRN    sodium chloride 0.9 % infusion 40 mL, 40 mL, Intravenous, PRN, Jose oHpe MD    sodium chloride 0.9 % infusion 40 mL, 40 mL, Intravenous, PRN, Nuha Shafer, APRN    traZODone (DESYREL) tablet 150 mg, 150 mg, Oral, Nightly, Nuha Shafer, DAYSI, 150 mg at 06/13/25 2137    zolpidem (AMBIEN) tablet 5 mg, 5 mg, Oral, Nightly PRN, Nuha Shafer, APRN    PHYSICAL EXAMINATION:   /66 (BP Location: Left arm, Patient Position: Lying)   Pulse 109   Temp 97.6 °F (36.4 °C) (Oral)   Resp 20   Ht 154.9 cm (60.98\")   Wt 55.8 kg (123 lb)   LMP  (LMP Unknown)   SpO2 95%   BMI 23.25 kg/m²                ECOG Performance Status: 2 - Symptomatic, <50% confined to bed  GENERAL: Age appropriate. No acute distress.   NEURO/PSYCH: A&O x 3, strength 5/5 in all muscle groups  HEENT: Head atraumatic, normocephalic.   NECK: Supple. No JVD. No lymphadenopathy.   LUNGS: Clear to auscultation bilaterally. No wheezing. No rhonchi.   HEART: Regular rate and rhythm. S1, S2, no murmurs.   ABDOMEN: Soft, nontender, nondistended. Bowel sounds positive. No  hepatosplenomegaly.   EXTREMITIES: No clubbing, cyanosis, or edema.   SKIN: No rashes. No purpura.       Admission on 06/12/2025   Component Date Value Ref Range Status    Glucose 06/12/2025 117 (H)  65 - 99 mg/dL Final    BUN 06/12/2025 21.7  8.0 - 23.0 mg/dL Final    Creatinine 06/12/2025 0.87  0.57 - 1.00 mg/dL Final    Sodium 06/12/2025 136  136 - 145 mmol/L Final    Potassium 06/12/2025 5.0  3.5 - 5.2 mmol/L Final    Chloride 06/12/2025 97 (L)  98 - 107 mmol/L Final    CO2 06/12/2025 28.0  22.0 - 29.0 mmol/L Final    Calcium 06/12/2025 7.8 (L)  8.6 - 10.5 mg/dL Final    Total Protein 06/12/2025 6.1  6.0 - 8.5 g/dL Final    Albumin 06/12/2025 2.8 (L)  3.5 - 5.2 g/dL Final    ALT (SGPT) 06/12/2025 26  1 - 33 U/L Final    AST (SGOT) 06/12/2025 49 (H)  1 - 32 U/L Final    Alkaline " Phosphatase 06/12/2025 455 (H)  39 - 117 U/L Final    Total Bilirubin 06/12/2025 0.2  0.0 - 1.2 mg/dL Final    Globulin 06/12/2025 3.3  gm/dL Final    Calculated Result    A/G Ratio 06/12/2025 0.8  g/dL Final    BUN/Creatinine Ratio 06/12/2025 24.9  7.0 - 25.0 Final    Anion Gap 06/12/2025 11.0  5.0 - 15.0 mmol/L Final    eGFR 06/12/2025 73.1  >60.0 mL/min/1.73 Final    Heparin Anti-Xa (UFH) 06/12/2025 0.48  0.30 - 0.70 IU/ml Final    Protime 06/12/2025 15.6 (H)  12.2 - 15.3 Seconds Final    INR 06/12/2025 1.17 (H)  0.89 - 1.12 Final    PTT 06/12/2025 32.7 (L)  60.0 - 90.0 seconds Final    WBC 06/12/2025 17.59 (H)  3.40 - 10.80 10*3/mm3 Final    RBC 06/12/2025 3.88  3.77 - 5.28 10*6/mm3 Final    Hemoglobin 06/12/2025 10.7 (L)  12.0 - 15.9 g/dL Final    Hematocrit 06/12/2025 35.1  34.0 - 46.6 % Final    MCV 06/12/2025 90.5  79.0 - 97.0 fL Final    MCH 06/12/2025 27.6  26.6 - 33.0 pg Final    MCHC 06/12/2025 30.5 (L)  31.5 - 35.7 g/dL Final    RDW 06/12/2025 14.8  12.3 - 15.4 % Final    RDW-SD 06/12/2025 47.6  37.0 - 54.0 fl Final    MPV 06/12/2025 10.0  6.0 - 12.0 fL Final    Platelets 06/12/2025 192  140 - 450 10*3/mm3 Final    Neutrophil % 06/12/2025 80.4 (H)  42.7 - 76.0 % Final    Lymphocyte % 06/12/2025 4.2 (L)  19.6 - 45.3 % Final    Monocyte % 06/12/2025 8.2  5.0 - 12.0 % Final    Eosinophil % 06/12/2025 3.9  0.3 - 6.2 % Final    Basophil % 06/12/2025 0.5  0.0 - 1.5 % Final    Immature Grans % 06/12/2025 2.8 (H)  0.0 - 0.5 % Final    Neutrophils, Absolute 06/12/2025 14.13 (H)  1.70 - 7.00 10*3/mm3 Final    Lymphocytes, Absolute 06/12/2025 0.74  0.70 - 3.10 10*3/mm3 Final    Monocytes, Absolute 06/12/2025 1.45 (H)  0.10 - 0.90 10*3/mm3 Final    Eosinophils, Absolute 06/12/2025 0.68 (H)  0.00 - 0.40 10*3/mm3 Final    Basophils, Absolute 06/12/2025 0.09  0.00 - 0.20 10*3/mm3 Final    Immature Grans, Absolute 06/12/2025 0.50 (H)  0.00 - 0.05 10*3/mm3 Final    nRBC 06/12/2025 0.0  0.0 - 0.2 /100 WBC Final     Heparin Anti-Xa (UFH) 06/13/2025 0.54  0.30 - 0.70 IU/ml Final    WBC 06/13/2025 20.73 (H)  3.40 - 10.80 10*3/mm3 Final    RBC 06/13/2025 3.77  3.77 - 5.28 10*6/mm3 Final    Hemoglobin 06/13/2025 10.5 (L)  12.0 - 15.9 g/dL Final    Hematocrit 06/13/2025 33.9 (L)  34.0 - 46.6 % Final    MCV 06/13/2025 89.9  79.0 - 97.0 fL Final    MCH 06/13/2025 27.9  26.6 - 33.0 pg Final    MCHC 06/13/2025 31.0 (L)  31.5 - 35.7 g/dL Final    RDW 06/13/2025 14.8  12.3 - 15.4 % Final    RDW-SD 06/13/2025 47.6  37.0 - 54.0 fl Final    MPV 06/13/2025 10.1  6.0 - 12.0 fL Final    Platelets 06/13/2025 220  140 - 450 10*3/mm3 Final    Neutrophil % 06/13/2025 77.0 (H)  42.7 - 76.0 % Final    Lymphocyte % 06/13/2025 5.7 (L)  19.6 - 45.3 % Final    Monocyte % 06/13/2025 8.6  5.0 - 12.0 % Final    Eosinophil % 06/13/2025 5.9  0.3 - 6.2 % Final    Basophil % 06/13/2025 0.5  0.0 - 1.5 % Final    Immature Grans % 06/13/2025 2.3 (H)  0.0 - 0.5 % Final    Neutrophils, Absolute 06/13/2025 15.98 (H)  1.70 - 7.00 10*3/mm3 Final    Lymphocytes, Absolute 06/13/2025 1.18  0.70 - 3.10 10*3/mm3 Final    Monocytes, Absolute 06/13/2025 1.78 (H)  0.10 - 0.90 10*3/mm3 Final    Eosinophils, Absolute 06/13/2025 1.22 (H)  0.00 - 0.40 10*3/mm3 Final    Basophils, Absolute 06/13/2025 0.10  0.00 - 0.20 10*3/mm3 Final    Immature Grans, Absolute 06/13/2025 0.47 (H)  0.00 - 0.05 10*3/mm3 Final    nRBC 06/13/2025 0.1  0.0 - 0.2 /100 WBC Final    Heparin Anti-Xa (UFH) 06/13/2025 0.55  0.30 - 0.70 IU/ml Final    Heparin Anti-Xa (UFH) 06/14/2025 0.42  0.30 - 0.70 IU/ml Final    Glucose 06/14/2025 108 (H)  65 - 99 mg/dL Final    BUN 06/14/2025 23.0  8.0 - 23.0 mg/dL Final    Creatinine 06/14/2025 0.85  0.57 - 1.00 mg/dL Final    Sodium 06/14/2025 134 (L)  136 - 145 mmol/L Final    Potassium 06/14/2025 4.2  3.5 - 5.2 mmol/L Final    Chloride 06/14/2025 97 (L)  98 - 107 mmol/L Final    CO2 06/14/2025 25.0  22.0 - 29.0 mmol/L Final    Calcium 06/14/2025 7.7 (L)  8.6 - 10.5  mg/dL Final    BUN/Creatinine Ratio 06/14/2025 27.1 (H)  7.0 - 25.0 Final    Anion Gap 06/14/2025 12.0  5.0 - 15.0 mmol/L Final    eGFR 06/14/2025 75.2  >60.0 mL/min/1.73 Final    WBC 06/14/2025 21.39 (H)  3.40 - 10.80 10*3/mm3 Final    RBC 06/14/2025 3.72 (L)  3.77 - 5.28 10*6/mm3 Final    Hemoglobin 06/14/2025 10.3 (L)  12.0 - 15.9 g/dL Final    Hematocrit 06/14/2025 33.5 (L)  34.0 - 46.6 % Final    MCV 06/14/2025 90.1  79.0 - 97.0 fL Final    MCH 06/14/2025 27.7  26.6 - 33.0 pg Final    MCHC 06/14/2025 30.7 (L)  31.5 - 35.7 g/dL Final    RDW 06/14/2025 15.1  12.3 - 15.4 % Final    RDW-SD 06/14/2025 48.9  37.0 - 54.0 fl Final    MPV 06/14/2025 10.7  6.0 - 12.0 fL Final    Platelets 06/14/2025 215  140 - 450 10*3/mm3 Final    Neutrophil % 06/14/2025 79.5 (H)  42.7 - 76.0 % Final    Lymphocyte % 06/14/2025 4.3 (L)  19.6 - 45.3 % Final    Monocyte % 06/14/2025 7.8  5.0 - 12.0 % Final    Eosinophil % 06/14/2025 5.2  0.3 - 6.2 % Final    Basophil % 06/14/2025 0.4  0.0 - 1.5 % Final    Immature Grans % 06/14/2025 2.8 (H)  0.0 - 0.5 % Final    Neutrophils, Absolute 06/14/2025 17.02 (H)  1.70 - 7.00 10*3/mm3 Final    Lymphocytes, Absolute 06/14/2025 0.91  0.70 - 3.10 10*3/mm3 Final    Monocytes, Absolute 06/14/2025 1.67 (H)  0.10 - 0.90 10*3/mm3 Final    Eosinophils, Absolute 06/14/2025 1.11 (H)  0.00 - 0.40 10*3/mm3 Final    Basophils, Absolute 06/14/2025 0.08  0.00 - 0.20 10*3/mm3 Final    Immature Grans, Absolute 06/14/2025 0.60 (H)  0.00 - 0.05 10*3/mm3 Final    nRBC 06/14/2025 0.1  0.0 - 0.2 /100 WBC Final       XR Chest 1 View  Result Date: 6/14/2025  Narrative: XR CHEST 1 VW Date of Exam: 6/14/2025 3:14 AM EDT Indication: Recurrent pleural effusions Comparison: Chest radiograph 6/13/2025. Findings: Unchanged position of right pleural catheter. Cardiomediastinal silhouette is unchanged. Moderate right pleural effusion with right basilar airspace disease and right basilar lucency suggestive of small-volume  pneumothorax, unchanged compared to yesterday's exam. Left lung appears clear. No pneumothorax. Osseous structures are unchanged.     Impression: Impression: No significant interval change. Electronically Signed: Franco Shaffer MD  6/14/2025 8:09 AM EDT  Workstation ID: [id number]    XR Chest 1 View  Result Date: 6/13/2025  Narrative: XR CHEST 1 VW Date of Exam: 6/13/2025 12:55 PM EDT Indication: Recurrent pleural effusions Comparison: AP chest x-ray 6/13/2025 timed at 4:43 a.m. Findings: Right pleural catheter remains in place. Moderate size right hydropneumothorax appears similar to numbers to today's earlier chest x-ray. Underlying right basilar airspace opacities are stable. Left lung is clear.     Impression: Impression: Moderate sized right hydropneumothorax appears similar to numbers to today's earlier chest x-ray. Right pleural catheter remains in place. Electronically Signed: Maryellen Dalton MD  6/13/2025 1:44 PM EDT  Workstation ID: [id number]    XR Chest 1 View  Result Date: 6/13/2025  Narrative: XR CHEST 1 VW Date of Exam: 6/13/2025 2:49 AM EDT Indication: post Ct placement and thoracentesis Comparison: AP chest x-ray 6/12/2025, 6/5/2025 Findings: There is a new right pleural catheter. Previously seen right basilar pleural air has now been replaced with pleural fluid. No apical pneumothorax is seen. There are stable underlying right basilar airspace opacities. Left lung appears clear. Cardiomediastinal contours are stable.     Impression: Impression: 1.Interval placement of right pleural catheter with fluid component of right basilar hydropneumothorax. 2.Stable underlying right basilar airspace opacities, likely due to atelectasis. Electronically Signed: Maryellen Dalton MD  6/13/2025 7:23 AM EDT  Workstation ID: [id number]    CT Guided Chest Tube  Result Date: 6/12/2025  Narrative: DATE OF EXAM: 6/12/2025 2:48 PM  PROCEDURE: CT GUIDED CHEST TUBE PLACEMENT-  INDICATIONS: right chest tube placement;  J95.811-Postprocedural pneumothorax  DLP: 746 mGycm  TECHNIQUE:  The risks, benefits, and alternatives were discussed in detail with the patient. The patient was brought down to the CT suite and positioned supine on the CT table. Preliminary CT scan of the the chest was performed and a skin entry site was selected and marked. The area was prepped and draped utilizing standard sterile technique. 1% lidocaine was administered to the soft tissues. A small skin incision was made with an 11 blade scalpel. Under CT guidance a 5 [place] Yueh was advanced into the right pleural space. The inner stylet was removed and an 035 wire was advanced coaxially. Over the wire a 10 [place] Irish all-purpose drainage catheter was advanced with the pigtail formed and locked within the pleural space the catheter was sutured to the skin and attached to a Pleur-evac. A sterile dressing was then applied.  The patient tolerated the procedure well and there were no immediate complications.      Impression: Successful CT-guided 10 [place] Irish right sided chest tube placement.   6/12/2025 4:13 PM by Jose Hope MD on Workstation: [id number]      XR Chest 1 View  Result Date: 6/12/2025  Narrative: XR CHEST 1 VW Date of Exam: 6/12/2025 1:45 PM EDT Indication: s/p thoracentesis; c/o severe right neck pain Comparison: 6/12/2025 Findings: Cardiac size is similar to prior exam. Similar right-sided hydropneumothorax. Similar right basilar opacity. No evidence of acute osseous abnormalities. Visualized upper abdomen is unremarkable.     Impression: Impression: 1.Similar right-sided hydropneumothorax. 2.Similar right basilar opacity may reflect atelectasis or infection. Electronically Signed: Clay Johnson MD  6/12/2025 2:31 PM EDT  Workstation ID: [id number]    XR Chest 1 View  Result Date: 6/12/2025  Narrative: XR CHEST 1 VW Date of Exam: 6/12/2025 12:25 PM EDT Indication: s/p thoracentesis; c/o severe right neck pain Comparison: Chest radiograph 6/12/2025  Findings: Grossly stable size of the right hydropneumothorax, measuring up to 7 mm at apex with larger right subpulmonic component. Previous noted fluid extending towards the apex appears decreased. Left lung relatively clear. There is partial collapse and probable underlying atelectasis within the right lower lobe. Negative for left pneumothorax. Stable cardiomediastinal silhouette. Degenerative related osseous change.     Impression: Impression: Slightly decreased right pleural fluid component with otherwise similar appearing moderate size hydropneumothorax. Electronically Signed: Toni Argueta MD  6/12/2025 12:53 PM EDT  Workstation ID: [id number]    XR Chest 1 View  Result Date: 6/12/2025  Narrative: XR CHEST 1 VW Date of Exam: 6/12/2025 11:23 AM EDT Indication: s/p thoracentesis Comparison: Chest x-ray 6/5/2025 Findings: There is a pneumothorax on the right. It is moderate in size and seen inferiorly measuring up to 2.3 cm. There is a moderate mount of pleural fluid as well. There is right lower lobe airspace opacity which is improved from previous exam. Left lung is clear. Heart size is normal.     Impression: Impression: Moderate sized right hydropneumothorax. Electronically Signed: Susan Leahy MD  6/12/2025 12:41 PM EDT  Workstation ID: [id number]    US Thoracentesis  Result Date: 6/12/2025  Narrative: PROCEDURE: US THORACENTESIS-  ATTENDING: Jose Hope M.D.  CLINICAL HISTORY: Large right pleural effusion.  TECHNIQUE:  The risk, benefits, and alternatives were described in detail and the patient was brought to the ultrasound suite and positioned seated. The skin entry site was prepped and draped utilizing standard sterile technique. 1% lidocaine was administered to the soft tissues of the right posterior thorax.   A 5 [place]ican Yueh was advanced into the right pleural space.   A total of 1.4 L was removed from the right pleural space. A specimen was sent to the laboratory for analysis. The needle was  removed and a sterile dressing was applied.  The patient tolerated the procedure well and there were no complications.      Impression: Successful ultrasound-guided right thoracentesis.   Attestation Signer name: Jose Hope MD I attest that I was present for the entire procedure. I reviewed the stored images and agree with the report as written.      6/12/2025 12:17 PM by Jose Hope MD on Workstation: [id number]      XR Chest 1 View  Result Date: 6/5/2025  Narrative: XR CHEST 1 VW Date of Exam: 6/5/2025 6:41 AM EDT Indication: cough Comparison: Chest radiograph 6/5/2025 at 0422 hours Findings: There is been slight decrease in the size of the large right pleural effusion. There is persistent right pleural fluid and right basilar airspace disease. Airspace disease is most likely atelectasis with pneumonia not excluded. The left lung remains clear. There is no pneumothorax.     Impression: Impression: Slight decrease in the size of the large right pleural effusion. Electronically Signed: Rodriguez Singh MD  6/5/2025 7:00 AM EDT  Workstation ID: [id number]    XR Chest 1 View  Result Date: 6/5/2025  Narrative: XR CHEST 1 VW Date of Exam: 6/5/2025 4:18 AM EDT Indication: SOA triage protocol Comparison: Chest radiograph 5/28/2025 Findings: The heart size and pulmonary vascular markings are normal. The left lung is clear. There is a large right pleural effusion with associated right basilar atelectasis. There is no pneumothorax. The osseous structures are normal for age.     Impression: Impression: Large right pleural effusion with right basilar atelectasis. Electronically Signed: Rodriguez Singh MD  6/5/2025 4:47 AM EDT  Workstation ID: [id number]    US Thoracentesis  Result Date: 5/28/2025  Narrative: PROCEDURE: Ultrasound-guided right thoracentesis  Procedural Personnel Attending physician(s): Erik Mensah  Pre-procedure diagnosis: Right pleural effusion  Post-procedure diagnosis: Same  Complications: No immediate  complications.      Impression:  Ultrasound-guided thoracentesis with drainage of 1400 mL of serous fluid.   _______________________________________________________________   PROCEDURE SUMMARY:  - Limited thoracic ultrasound - Ultrasound-guided right thoracentesis - Additional procedure(s): None  PROCEDURE DETAILS:   Pre-procedure Consent: Informed consent for the procedure including risks, benefits and alternatives was obtained and time-out was performed prior to the procedure. Preparation: The site was prepared and draped using maximal sterile barrier technique including cutaneous antisepsis.    Limited thoracic ultrasound: Limited thoracic ultrasound was performed using a curved transducer. A safe window for thoracentesis was identified. Right hemithorax findings: Moderate to large right-sided pleural effusion   Thoracentesis  Local anesthesia was administered. The pleural space was accessed and fluid return confirmed position. The fluid was drained. The catheter was removed, and a sterile bandage was applied.  Catheter placed: 5 Greek catheter.  Additional Details  Equipment details: None Specimens removed: Pleural fluid Estimated blood loss (mL): Less than 10   5/28/2025 12:05 PM by Erik Mensah MD on Workstation: [id number]      XR Chest 1 View  Result Date: 5/28/2025  Narrative: XR CHEST 1 VW Date of Exam: 5/28/2025 10:19 AM EDT Indication: S/P Right Thoracentesis Comparison: 5/14/2025 chest radiograph. Ultrasound-guided thoracentesis of 5/20/2025 Findings: By history, thoracentesis earlier today removed 1.4 L of fluid from the right hemithorax. Follow-up chest radiograph shows patchy right mid and lower lung atelectasis and perhaps a small amount of remaining pleural fluid, versus discoid atelectasis. There is no evidence of pneumothorax. Left lung remains clear. Heart and vasculature appear normal in size.     Impression: Impression: Patchy right mid and lower lung atelectasis and questionable small  remaining right effusion. No evidence of pneumothorax or other significant chest disease elsewhere. Electronically Signed: Marlon Lyons MD  5/28/2025 10:49 AM EDT  Workstation ID: [id number]    NM PET/CT Skull Base to Mid Thigh  Result Date: 5/28/2025  Narrative: FDG NM PET/CT SKULL BASE TO MID THIGH Date of Exam: 5/22/2025 8:25 AM EDT Indication: restaging scans lung cancer. Comparison: 5/13/2025 and 7/11/2024 Technique: 8.1 mCi of F-18 FDG was administered intravenously. PET imaging was obtained from skull base to mid-thigh approximately 60 minutes after radiotracer injection. A low dose non contrast CT was obtained for attenuation correction and anatomic localization. Fused PET-CT and 3D MIP reconstructions were utilized for image interpretation.  Fasting blood glucose level: 113 mg/dl. Reference uptake values: Mediastinum: 2.8 SUVmax Liver: 2.7 SUVmax Normalization method: Body Weight Findings: Head and neck: No abnormal FDG activity identified. Chest: Increasing moderate to large right effusion which is loculated. Spiculated right middle lobe nodule has an SUV maximum of 4.4. There is extensive right hilar adenopathy with an SUV max 8.8. There is subcarinal and mediastinal adenopathy ranging between 8 and 5 SUV maximum. Abdomen and pelvis: At least 20 FDG-avid liver lesions are identified measuring up to 5 cm in diameter and an SUV maximum of 13.5. There is left adrenal lesion with an SUV maximum of 5. There is extensive julio hepatis and portacaval adenopathy with an SUV maximum of 8.6. There is retroperitoneal adenopathy, including left periaortic and aortocaval abnormal lymph nodes. Musculoskeletal: Diffuse osseous metastatic disease with multifocal disease throughout the cervical thoracolumbar spine, bilateral ribs, pelvis, including the left acetabulum and to a lesser extent the right acetabulum. There are bilateral femoral lesions as well. No definite pathologic fractures are identified at this time. Several  of the lesions however are involving weight-bearing bones and the patient is susceptible to pathologic fractures.     Impression: Impression: Extensive metastatic disease involving the chest, abdomen, and pelvis as well as the bones. Electronically Signed: Herb Lopes MD  5/28/2025 9:18 AM EDT  Workstation ID: [id number]    MRI Brain With & Without Contrast  Result Date: 5/16/2025  Narrative: MRI BRAIN W WO CONTRAST Date of Exam: 5/16/2025 12:12 AM EDT Indication: Metastatic lung cancer.  Comparison: 8/25/2024 Technique:  Routine multiplanar/multisequence sequence images of the brain were obtained before and after the uneventful administration of 12 mL Multihance. Findings: There is mild increased T2 and FLAIR signal in the periventricular white matter suggestive of mild chronic microvascular ischemic change. There is no diffusion restriction to suggest acute infarct. There is no evidence of acute or chronic intracranial hemorrhage. No mass effect or midline shift. No abnormal extra-axial collections. The major vascular flow voids appear intact. The basal ganglia, brainstem and cerebellum appear within normal limits. Calvarial and superficial soft tissue signal is within  normal limits. Orbits appear unremarkable. The paranasal sinuses and the mastoid air cells appear well aerated. Midline structures are intact.  There is no abnormal intracranial enhancement. There is normal enhancement within the intracranial vasculature including the dural venous sinuses.     Impression: Impression: Mild chronic microvascular ischemic change. No acute intracranial process. No evidence of intracranial metastatic disease. Electronically Signed: Rodriguez Singh MD  5/16/2025 1:15 AM EDT  Workstation ID: [id number]      ASSESSMENT: The patient is a very pleasant 65 y.o. female  with recurrent metastatic right lung cancer      PLAN:  1.  Recurrent metastatic right lung cancer: Status post first dose of Keytruda on June 2.  She will  follow-up with me as scheduled next Friday with cycle #2.  2.  Recurrent right pleural effusion: Chest tube drainage is gradually decreasing.  The patient might require pigtail catheter however I am hoping her effusion might slow down significantly once systemic therapy works.      Sejal Mckenzie MD  6/14/2025      Electronically signed by Sejal Mckenzie MD at 06/14/25 1236       August Andres MD at 06/14/25 0971            CTS Progress Note        Chief Complaint: Recurrent pleural effusion      Subjective  Resting in bed.  Nasal cannula supplementation      Objective    Physical Exam:   Vital Signs   Temp:  [97.8 °F (36.6 °C)-98.5 °F (36.9 °C)] 98 °F (36.7 °C)  Heart Rate:  [] 104  Resp:  [16-20] 20  BP: (115-137)/(66-81) 131/71   GEN: NAD   CV: Increased rate but regular    RESP: Unlabored and clear to auscultation decreased at the bases      EXT: Warm without significant edema       CT Output:  Output by Drain (mL) 06/13/25 0701 - 06/13/25 1900 06/13/25 1901 - 06/14/25 0700 06/14/25 0701 - 06/14/25 0952 Range Total   Chest Tube 1 Right Midclavicular 60   60      Results     Results from last 7 days   Lab Units 06/14/25  0456   WBC 10*3/mm3 21.39*   HEMOGLOBIN g/dL 10.3*   HEMATOCRIT % 33.5*   PLATELETS 10*3/mm3 215     Results from last 7 days   Lab Units 06/14/25  0456   SODIUM mmol/L 134*   POTASSIUM mmol/L 4.2   CHLORIDE mmol/L 97*   CO2 mmol/L 25.0   BUN mg/dL 23.0   CREATININE mg/dL 0.85   GLUCOSE mg/dL 108*   CALCIUM mg/dL 7.7*     Results from last 7 days   Lab Units 06/12/25  1755   INR  1.17*   APTT seconds 32.7*       CXR: Unchanged position of right pleural catheter. Cardiomediastinal silhouette is unchanged. Moderate right pleural effusion with right basilar airspace disease and right basilar lucency suggestive of small-volume pneumothorax, unchanged compared to   yesterday's exam. Left lung appears clear. No pneumothorax. Osseous structures are unchanged.      Assessment & Plan        Recurrent pleural effusion    Acquired hypothyroidism    HLD (hyperlipidemia)    Adenocarcinoma of right lung        Plan   Continue chest tube to -20 suction, monitor and document drainage.  Repeat chest x-ray in the morning.  If minimal drainage and no change in chest x-ray he may be able to discontinue chest tube in the morning.  Daily chest x-ray  Per chart review, oncology is recommending Keytruda treatment for one month prior to consideration of Pleur-x catheter placement.     BHUMI Conte  25  09:51 EDT                    Electronically signed by August Andres MD at 06/15/25 0529       Yolanda Plasencia MD at 25 0627              [facility] Medicine Services  PROGRESS NOTE    Patient Name: Sydnie Gamble  : 1960  MRN: [id number]    Date of Admission: 2025  Primary Care Physician: Dee Elena APRN    Subjective   Subjective     CC: Follow-up SOA    HPI: No acute events overnight, patient said that she rested well, has no new complaints, chest tube remains in place    Objective   Objective     Vital Signs:   Temp:  [97.8 °F (36.6 °C)-98.5 °F (36.9 °C)] 97.9 °F (36.6 °C)  Heart Rate:  [] 100  Resp:  [16-18] 18  BP: (115-137)/(66-81) 115/66  Flow (L/min) (Oxygen Therapy):  [4] 4     Physical Exam:  Constitutional: No acute distress, awake, alert  HENT: NCAT, mucous membranes moist  Respiratory: Clear to auscultation bilaterally, respiratory effort normal, chest tube in place  Cardiovascular: RRR, no murmurs, rubs, or gallops  Gastrointestinal: Positive bowel sounds, soft, nontender, nondistended  Musculoskeletal: No bilateral ankle edema  Psychiatric: Appropriate affect, cooperative  Neurologic: Oriented x 3, nonfocal    Results Reviewed:  LAB RESULTS:      Lab 25  0456 25  0733 25  0341 25  0114 25  1755   WBC 21.39*  --  20.73*  --  17.59*   HEMOGLOBIN 10.3*  --  10.5*  --  10.7*   HEMATOCRIT  33.5*  --  33.9*  --  35.1   PLATELETS 215  --  220  --  192   NEUTROS ABS 17.02*  --  15.98*  --  14.13*   IMMATURE GRANS (ABS) 0.60*  --  0.47*  --  0.50*   LYMPHS ABS 0.91  --  1.18  --  0.74   MONOS ABS 1.67*  --  1.78*  --  1.45*   EOS ABS 1.11*  --  1.22*  --  0.68*   MCV 90.1  --  89.9  --  90.5   PROTIME  --   --   --   --  15.6*   APTT  --   --   --   --  32.7*   HEPARIN ANTI-XA 0.42 0.55  --  0.54 0.48         Lab 06/14/25  0456 06/12/25  1755   SODIUM 134* 136   POTASSIUM 4.2 5.0   CHLORIDE 97* 97*   CO2 25.0 28.0   ANION GAP 12.0 11.0   BUN 23.0 21.7   CREATININE 0.85 0.87   EGFR 75.2 73.1   GLUCOSE 108* 117*   CALCIUM 7.7* 7.8*         Lab 06/12/25  1755   TOTAL PROTEIN 6.1   ALBUMIN 2.8*   GLOBULIN 3.3   ALT (SGPT) 26   AST (SGOT) 49*   BILIRUBIN 0.2   ALK PHOS 455*         Lab 06/12/25  1755   PROTIME 15.6*   INR 1.17*                 Brief Urine Lab Results       None            Microbiology Results Abnormal       None            XR Chest 1 View  Result Date: 6/13/2025  XR CHEST 1 VW Date of Exam: 6/13/2025 12:55 PM EDT Indication: Recurrent pleural effusions Comparison: AP chest x-ray 6/13/2025 timed at 4:43 a.m. Findings: Right pleural catheter remains in place. Moderate size right hydropneumothorax appears similar to numbers to today's earlier chest x-ray. Underlying right basilar airspace opacities are stable. Left lung is clear.     Impression: Impression: Moderate sized right hydropneumothorax appears similar to numbers to today's earlier chest x-ray. Right pleural catheter remains in place. Electronically Signed: Maryellen Dalton MD  6/13/2025 1:44 PM EDT  Workstation ID: [id number]    XR Chest 1 View  Result Date: 6/13/2025  XR CHEST 1 VW Date of Exam: 6/13/2025 2:49 AM EDT Indication: post Ct placement and thoracentesis Comparison: AP chest x-ray 6/12/2025, 6/5/2025 Findings: There is a new right pleural catheter. Previously seen right basilar pleural air has now been replaced with pleural  fluid. No apical pneumothorax is seen. There are stable underlying right basilar airspace opacities. Left lung appears clear. Cardiomediastinal contours are stable.     Impression: Impression: 1.Interval placement of right pleural catheter with fluid component of right basilar hydropneumothorax. 2.Stable underlying right basilar airspace opacities, likely due to atelectasis. Electronically Signed: Maryellen Dalton MD  6/13/2025 7:23 AM EDT  Workstation ID: [id number]    CT Guided Chest Tube  Result Date: 6/12/2025  DATE OF EXAM: 6/12/2025 2:48 PM  PROCEDURE: CT GUIDED CHEST TUBE PLACEMENT-  INDICATIONS: right chest tube placement; J95.811-Postprocedural pneumothorax  DLP: 746 mGycm  TECHNIQUE:  The risks, benefits, and alternatives were discussed in detail with the patient. The patient was brought down to the CT suite and positioned supine on the CT table. Preliminary CT scan of the the chest was performed and a skin entry site was selected and marked. The area was prepped and draped utilizing standard sterile technique. 1% lidocaine was administered to the soft tissues. A small skin incision was made with an 11 blade scalpel. Under CT guidance a 5 Br[place]ian Yueh was advanced into the right pleural space. The inner stylet was removed and an 035 wire was advanced coaxially. Over the wire a 10 [place]ian all-purpose drainage catheter was advanced with the pigtail formed and locked within the pleural space the catheter was sutured to the skin and attached to a Pleur-evac. A sterile dressing was then applied.  The patient tolerated the procedure well and there were no immediate complications.      Impression: Successful CT-guided 10 [place]ian right sided chest tube placement.   6/12/2025 4:13 PM by Jose Hope MD on Workstation: [id number]      XR Chest 1 View  Result Date: 6/12/2025  XR CHEST 1 VW Date of Exam: 6/12/2025 1:45 PM EDT Indication: s/p thoracentesis; c/o severe right neck pain Comparison: 6/12/2025 Findings: Cardiac  size is similar to prior exam. Similar right-sided hydropneumothorax. Similar right basilar opacity. No evidence of acute osseous abnormalities. Visualized upper abdomen is unremarkable.     Impression: Impression: 1.Similar right-sided hydropneumothorax. 2.Similar right basilar opacity may reflect atelectasis or infection. Electronically Signed: Clay Johnson MD  6/12/2025 2:31 PM EDT  Workstation ID: [id number]    XR Chest 1 View  Result Date: 6/12/2025  XR CHEST 1 VW Date of Exam: 6/12/2025 12:25 PM EDT Indication: s/p thoracentesis; c/o severe right neck pain Comparison: Chest radiograph 6/12/2025 Findings: Grossly stable size of the right hydropneumothorax, measuring up to 7 mm at apex with larger right subpulmonic component. Previous noted fluid extending towards the apex appears decreased. Left lung relatively clear. There is partial collapse and probable underlying atelectasis within the right lower lobe. Negative for left pneumothorax. Stable cardiomediastinal silhouette. Degenerative related osseous change.     Impression: Impression: Slightly decreased right pleural fluid component with otherwise similar appearing moderate size hydropneumothorax. Electronically Signed: Toni Argueta MD  6/12/2025 12:53 PM EDT  Workstation ID: [id number]    XR Chest 1 View  Result Date: 6/12/2025  XR CHEST 1 VW Date of Exam: 6/12/2025 11:23 AM EDT Indication: s/p thoracentesis Comparison: Chest x-ray 6/5/2025 Findings: There is a pneumothorax on the right. It is moderate in size and seen inferiorly measuring up to 2.3 cm. There is a moderate mount of pleural fluid as well. There is right lower lobe airspace opacity which is improved from previous exam. Left lung is clear. Heart size is normal.     Impression: Impression: Moderate sized right hydropneumothorax. Electronically Signed: Susan Leahy MD  6/12/2025 12:41 PM EDT  Workstation ID: [id number]    US Thoracentesis  Result Date: 6/12/2025  PROCEDURE: US THORACENTESIS-   ATTENDING: Jose Hope M.D.  CLINICAL HISTORY: Large right pleural effusion.  TECHNIQUE:  The risk, benefits, and alternatives were described in detail and the patient was brought to the ultrasound suite and positioned seated. The skin entry site was prepped and draped utilizing standard sterile technique. 1% lidocaine was administered to the soft tissues of the right posterior thorax.   A 5 Ma[place] Yueh was advanced into the right pleural space.   A total of 1.4 L was removed from the right pleural space. A specimen was sent to the laboratory for analysis. The needle was removed and a sterile dressing was applied.  The patient tolerated the procedure well and there were no complications.      Impression: Successful ultrasound-guided right thoracentesis.   Attestation Signer name: Jose Hope MD I attest that I was present for the entire procedure. I reviewed the stored images and agree with the report as written.      6/12/2025 12:17 PM by Jose Hope MD on Workstation: [id number]        Results for orders placed during the hospital encounter of 05/13/25    Adult Transthoracic Echo Complete w/ Color, Spectral and Contrast if necessary per protocol    Interpretation Summary    Left ventricular ejection fraction appears to be 66 - 70%.    Normal right ventricular cavity size, wall thickness, systolic function and septal motion noted.    There is a small (<1cm) pericardial effusion adjacent to the right ventricle. There is no evidence of cardiac tamponade.    There is a left pleural effusion.      Current medications:  Scheduled Meds:[Held by provider] apixaban, 5 mg, Oral, Q12H  arformoterol, 15 mcg, Nebulization, BID - RT   And  budesonide, 0.5 mg, Nebulization, BID - RT   And  revefenacin, 175 mcg, Nebulization, Daily - RT  atorvastatin, 10 mg, Oral, Nightly  bisacodyl, 10 mg, Rectal, Once  Diclofenac Sodium, 2 g, Topical, 4x Daily  levothyroxine, 88 mcg, Oral, Q AM  lidocaine PF 1%, 5 mL, Injection,  Once  senna-docusate sodium, 2 tablet, Oral, BID  sertraline, 100 mg, Oral, Daily  sodium chloride, 10 mL, Intravenous, Q12H  sodium chloride, 10 mL, Intravenous, Q12H  traZODone, 150 mg, Oral, Nightly      Continuous Infusions:heparin, 16 Units/kg/hr, Last Rate: 16 Units/kg/hr (06/14/25 0031)  Pharmacy to Dose Heparin,       PRN Meds:.  acetaminophen **OR** acetaminophen **OR** acetaminophen    senna-docusate sodium **AND** polyethylene glycol **AND** bisacodyl **AND** bisacodyl    Calcium Replacement - Follow Nurse / BPA Driven Protocol    clonazePAM    cyclobenzaprine    famotidine    HYDROmorphone **AND** naloxone    Magnesium Standard Dose Replacement - Follow Nurse / BPA Driven Protocol    ondansetron ODT **OR** ondansetron    oxyCODONE-acetaminophen    Pharmacy to Dose Heparin    Phosphorus Replacement - Follow Nurse / BPA Driven Protocol    Potassium Replacement - Follow Nurse / BPA Driven Protocol    sodium chloride    sodium chloride    sodium chloride    sodium chloride    zolpidem    Assessment & Plan   Assessment & Plan     Active Hospital Problems    Diagnosis  POA    **Recurrent pleural effusion [J90]  Yes    Adenocarcinoma of right lung [C34.91]  Yes    HLD (hyperlipidemia) [E78.5]  Yes    Acquired hypothyroidism [E03.9]  Yes      Resolved Hospital Problems   No resolved problems to display.        Brief Hospital Course to date:  Sydnie Gamble is a 65 y.o. female with PMH of Adenocarcinoma of lung, anemia, cervical cancer, depression, HLD, hypothyroidism, PE, GERD and had home oxygen. Patient had a thoracentesis with chest tube placement, admitted for further management.     Recurrent Pleural Effusion  Adenocarcinoma of Right lung  -s/p thoracentesis and chest tube placement by IR, CTS managing  -- currently under treatment with Dr. Mckenzie and he wants to hold on pleur-x drain for now. He wants her to receive Keytruda  for a month.   --Palliative care consulted, continue pain control  - She  is currently on 4 L NC, wean as able  -Continue DuoNeb     Hypothyroidism  -- cont home meds      HLD  -- cont statin      Generalized anxiety disorder  Depression  --cont home meds     LLE DVT  PE  -- hold eliquis   -- heparin drip while she has CT     Expected Discharge Location and Transportation: TBD  Expected Discharge   Expected Discharge Date: 6/16/2025; Expected Discharge Time:      VTE Prophylaxis:  Pharmacologic VTE prophylaxis orders are present.     AM-PAC 6 Clicks Score (PT): 16 (06/13/25 1018)    CODE STATUS:   Code Status and Medical Interventions: CPR (Attempt to Resuscitate); Full Support; also talked over with  Jose Francicso   Ordered at: 06/12/25 6520     Code Status (Patient has no pulse and is not breathing):    CPR (Attempt to Resuscitate)     Medical Interventions (Patient has pulse or is breathing):    Full Support     Comments:    also talked over with  Jose Francisco     Level Of Support Discussed With:    Patient       Next of Kin (If No Surrogate)       Yolanda Plasencia MD  06/14/25        Electronically signed by Yolanda Plasencia MD at 06/14/25 1021          Consult Notes (last 72 hours)        Abiola Monte at 06/14/25 1239        Consult Orders    1. Spiritual Care Consult [[id number]] ordered by Mare Mcclelland MD at 06/13/25 0731                  visited patient per palliative spiritual care consult.  Spouse at bedside.  Patient's spirituality is rooted in her community activism and friendships she has through her that work and involvement.  She also has support through her daughter (lives in [place]) and her spouse.  She appreciated empathic listening.    Electronically signed by Abiola Monte at 06/14/25 7155

## 2025-06-16 NOTE — PROGRESS NOTES
DATE OF VISIT: 6/16/2025    Chief Complaint: Followup for metastatic right lung cancer     SUBJECTIVE: The patient is laying comfortable in bed.  Her  is at the bedside.  Denied any fever or chills.  She is sleepy after the procedure.  She did have Pleurx catheter inserted earlier on this morning.    REVIEW OF SYSTEMS: All the other 9 systems are reviewed by me and negative  except what is mentioned in HPI.     Past History:  Medical History: has a past medical history of Acid reflux, Acid reflux, Adenocarcinoma of lung (08/12/2024), Anemia, chronic disease (5/13/2025), Anxiety (1976), Arthritis, Atherosclerotic cardiovascular disease (03/25/2024), Cervical cancer, Depression, Emphysema of lung, History of radiation therapy (11/20/2020), History of radiation therapy (10/04/2024), radiation therapy, Hyperlipidemia, Hypothyroidism, Kidney disease, Lung cancer, Lung nodule, Ovarian cyst (1980s), Pleural effusion (5/13/2025), Pulmonary embolism (5/13/2025), Visual impairment (corrected with glasses), Wears dentures, and Wears glasses.   Surgical History: has a past surgical history that includes Tubal ligation; total abdominal hysterectomy pelvic node dissection (N/A, 08/18/2020); Lymph node biopsy; Subtotal Hysterectomy (1987?); Bronchoscopy; Lung biopsy; BRONCHOSCOPY WITH ION ROBOTIC ASSIST (N/A, 08/06/2024); Mohs surgery (03/24/2025); Hysteroscopy; and Colonoscopy.   Family History: family history includes Anxiety disorder in her mother; Asthma in her mother; COPD in her mother; Cancer in her father, maternal aunt, maternal aunt, maternal aunt, maternal aunt, maternal grandmother, and mother; Depression in her mother; Early death in her father; Emphysema in her mother; Heart attack in her maternal grandfather; Heart disease in her maternal grandfather; Hypertension in her mother; Melanoma in her mother; Mental illness in her mother; Uterine cancer in her mother.   Social History: reports that she quit smoking  about 6 years ago. Her smoking use included cigarettes. She started smoking about 53 years ago. She has a 67.5 pack-year smoking history. She has been exposed to tobacco smoke. She has never used smokeless tobacco. She reports that she does not drink alcohol and does not use drugs.    Medications Prior to Admission   Medication Sig Dispense Refill Last Dose/Taking    albuterol sulfate  (90 Base) MCG/ACT inhaler Inhale 2 puffs Every 4 (Four) to 6 (Six) Hours As Needed for Wheezing or Shortness of Air.   Taking As Needed    atorvastatin (LIPITOR) 10 MG tablet TAKE 1 TABLET BY MOUTH EVERY DAY AT NIGHT 90 tablet 1 6/11/2025    clonazePAM (KlonoPIN) 1 MG tablet Take 1 tablet by mouth 2 (Two) Times a Day As Needed for Anxiety. 50 tablet 0 6/12/2025 Morning    Diclofenac Sodium (VOLTAREN) 1 % gel gel Apply 1 gram topically to indicated area 4 times daily as needed for mild to moderate pain. STOP FLEXERIL 100 g 2 Taking    docusate sodium (COLACE) 100 MG capsule Take 2 capsules by mouth Daily. 60 capsule 3 6/12/2025 Morning    famotidine (PEPCID) 20 MG tablet TAKE 1 TABLET BY MOUTH TWICE DAILY OR TAKE 2 TABLETS BY MOUTH ONCE DAILY AS NEEDED FOR HEARTBURN 180 tablet 1 Taking    Fluticasone-Umeclidin-Vilant (Trelegy Ellipta) 100-62.5-25 MCG/ACT inhaler Inhale 1 puff Daily. 90 each 3 6/12/2025 Morning    levothyroxine (Synthroid) 88 MCG tablet Take 1 tablet by mouth Every Morning. 90 tablet 1 6/11/2025    ondansetron (ZOFRAN) 8 MG tablet Take 1 tablet by mouth 3 (Three) Times a Day As Needed for Nausea or Vomiting. 30 tablet 5 Taking As Needed    oxyCODONE-acetaminophen (PERCOCET) 5-325 MG per tablet Take 1 tablet by mouth Every 6 (Six) Hours As Needed for Moderate Pain. 120 tablet 0 6/12/2025 Morning    sertraline (Zoloft) 100 MG tablet Take 1 tablet by mouth Daily. 90 tablet 1 6/11/2025    traZODone (DESYREL) 150 MG tablet TAKE 1 TABLET BY MOUTH EVERY DAY AT NIGHT 90 tablet 3 6/11/2025    zolpidem (AMBIEN) 10 MG  tablet Take 1 tablet by mouth At Night As Needed for Sleep. 30 tablet 1 Past Month    [DISCONTINUED] apixaban (ELIQUIS) 5 MG tablet tablet Take 2 tablets by mouth Every 12 (Twelve) Hours for 6 days, THEN 1 tablet Every 12 (Twelve) Hours for 24 days. Indications: DVT/PE (active thrombosis) 48 tablet 5 6/11/2025 Morning    cyclobenzaprine (FLEXERIL) 10 MG tablet TAKE 1 TABLET BY MOUTH 2 TIMES A DAY AS NEEDED FOR MUSCLE SPASMS. 60 tablet 3       Allergies: Patient has no known allergies.     Current Facility-Administered Medications:     acetaminophen (TYLENOL) tablet 650 mg, 650 mg, Oral, Q4H PRN **OR** acetaminophen (TYLENOL) 160 MG/5ML oral solution 650 mg, 650 mg, Oral, Q4H PRN **OR** acetaminophen (TYLENOL) suppository 650 mg, 650 mg, Rectal, Q4H PRN, Nuha Shafer, APRN    arformoterol (BROVANA) nebulizer solution 15 mcg, 15 mcg, Nebulization, BID - RT, 15 mcg at 06/16/25 0856 **AND** budesonide (PULMICORT) nebulizer solution 0.5 mg, 0.5 mg, Nebulization, BID - RT, 0.5 mg at 06/16/25 0856 **AND** revefenacin (YUPELRI) nebulizer solution 175 mcg, 175 mcg, Nebulization, Daily - RT, Nuha Shafer, APRN, 175 mcg at 06/16/25 0856    atorvastatin (LIPITOR) tablet 10 mg, 10 mg, Oral, Nightly, Nuha Shafer, APRN, 10 mg at 06/15/25 2040    sennosides-docusate (PERICOLACE) 8.6-50 MG per tablet 2 tablet, 2 tablet, Oral, BID, 2 tablet at 06/16/25 0905 **AND** polyethylene glycol (MIRALAX) packet 17 g, 17 g, Oral, Daily PRN **AND** bisacodyl (DULCOLAX) EC tablet 5 mg, 5 mg, Oral, Daily PRN, 5 mg at 06/15/25 0924 **AND** bisacodyl (DULCOLAX) suppository 10 mg, 10 mg, Rectal, Daily PRN, Nuha Shafer, APRN    bisacodyl (DULCOLAX) suppository 10 mg, 10 mg, Rectal, Once, Kaylene Reynoso APRN    Calcium Replacement - Follow Nurse / BPA Driven Protocol, , Not Applicable, PRN, Nuha Shafer, APRN    clonazePAM (KlonoPIN) tablet 1 mg, 1 mg, Oral, BID PRN, Nuha Shafer, DAYSI, 1 mg at 06/15/25  1558    cyclobenzaprine (FLEXERIL) tablet 10 mg, 10 mg, Oral, BID PRN, Nuha Shafer, DAYSI, 10 mg at 06/14/25 2235    Diclofenac Sodium (VOLTAREN) 1 % gel 2 g, 2 g, Topical, 4x Daily, Kaylene Reynoso APRN, 2 g at 06/16/25 1242    famotidine (PEPCID) tablet 40 mg, 40 mg, Oral, BID PRN, Nuha Shafer, DAYSI    heparin 75246 units/250 mL (100 units/mL) in 0.45 % NaCl infusion, 16 Units/kg/hr, Intravenous, Titrated, Nuha Shafer APRN, Last Rate: 8.92 mL/hr at 06/16/25 0656, 16 Units/kg/hr at 06/16/25 0656    HYDROmorphone (DILAUDID) injection 0.5 mg, 0.5 mg, Intravenous, Q2H PRN, 0.5 mg at 06/16/25 1158 **AND** naloxone (NARCAN) injection 0.4 mg, 0.4 mg, Intravenous, Q5 Min PRN, Nuha Shafer, DAYSI    ipratropium-albuterol (DUO-NEB) nebulizer solution 3 mL, 3 mL, Nebulization, Q4H PRN, Trina Hdz APRN, 3 mL at 06/15/25 1719    levothyroxine (SYNTHROID, LEVOTHROID) tablet 88 mcg, 88 mcg, Oral, Q AM, Nuha Shafer APRN, 88 mcg at 06/16/25 0613    lidocaine PF 1% (XYLOCAINE) injection 5 mL, 5 mL, Injection, Once, Jose Hope MD    Magnesium Standard Dose Replacement - Follow Nurse / BPA Driven Protocol, , Not Applicable, PRN, Nuha Shafer, DAYSI    nystatin (MYCOSTATIN) 100,000 unit/mL suspension 500,000 Units, 5 mL, Oral, 4x Daily, Trina Hdz APRN, 500,000 Units at 06/16/25 1242    ondansetron ODT (ZOFRAN-ODT) disintegrating tablet 4 mg, 4 mg, Oral, Q6H PRN **OR** ondansetron (ZOFRAN) injection 4 mg, 4 mg, Intravenous, Q6H PRN, Nuha Shafer, DAYSI    oxyCODONE-acetaminophen (PERCOCET) 5-325 MG per tablet 1 tablet, 1 tablet, Oral, Q4H PRN, Nuha Shafer APRN, 1 tablet at 06/16/25 1028    Pharmacy to Dose Heparin, , Not Applicable, Continuous PRN, Nuha Shafer, DAYSI    Phosphorus Replacement - Follow Nurse / BPA Driven Protocol, , Not Applicable, PRN, Nuha Shafer APRN    Potassium Replacement - Follow Nurse / BPA Driven Protocol, , Not  "Applicable, PRN, Nuha Shafer APRN    sertraline (ZOLOFT) tablet 100 mg, 100 mg, Oral, Daily, Nuha Shafer APRN, 100 mg at 06/16/25 0904    simethicone (MYLICON) chewable tablet 80 mg, 80 mg, Oral, 4x Daily PRN, Trina Hdz APRN, 80 mg at 06/15/25 2040    sodium chloride 0.9 % flush 10 mL, 10 mL, Intravenous, Q12H, Jose Hope MD, 10 mL at 06/16/25 0905    sodium chloride 0.9 % flush 10 mL, 10 mL, Intravenous, PRN, Jose Hope MD    sodium chloride 0.9 % flush 10 mL, 10 mL, Intravenous, Q12H, Nuha Shafer APRN, 10 mL at 06/16/25 0905    sodium chloride 0.9 % flush 10 mL, 10 mL, Intravenous, PRN, Nuha Shafer APRN    sodium chloride 0.9 % infusion 40 mL, 40 mL, Intravenous, PRN, Jose Hope MD    sodium chloride 0.9 % infusion 40 mL, 40 mL, Intravenous, PRN, Nuha Shafer APRNICKY    traZODone (DESYREL) tablet 150 mg, 150 mg, Oral, Nightly, Nuha Shafer APRN, 150 mg at 06/15/25 2040    zolpidem (AMBIEN) tablet 5 mg, 5 mg, Oral, Nightly PRN, Nuha Shafer APRN    PHYSICAL EXAMINATION:   /69 (BP Location: Left arm, Patient Position: Lying)   Pulse 100   Temp 97.7 °F (36.5 °C) (Axillary)   Resp 18   Ht 154.9 cm (60.98\")   Wt 55.8 kg (123 lb)   LMP  (LMP Unknown)   SpO2 94%   BMI 23.25 kg/m²                ECOG Performance Status: 2 - Symptomatic, <50% confined to bed  GENERAL: Age appropriate. No acute distress.   NEURO/PSYCH: A&O x 3, strength 5/5 in all muscle groups  HEENT: Head atraumatic, normocephalic.   NECK: Supple. No JVD. No lymphadenopathy.   LUNGS: Clear to auscultation bilaterally. No wheezing. No rhonchi.   HEART: Regular rate and rhythm. S1, S2, no murmurs.   ABDOMEN: Soft, nontender, nondistended. Bowel sounds positive. No  hepatosplenomegaly.   EXTREMITIES: No clubbing, cyanosis, or edema.   SKIN: No rashes. No purpura.       Admission on 06/12/2025   Component Date Value Ref Range Status    Glucose 06/12/2025 117 (H)  " 65 - 99 mg/dL Final    BUN 06/12/2025 21.7  8.0 - 23.0 mg/dL Final    Creatinine 06/12/2025 0.87  0.57 - 1.00 mg/dL Final    Sodium 06/12/2025 136  136 - 145 mmol/L Final    Potassium 06/12/2025 5.0  3.5 - 5.2 mmol/L Final    Chloride 06/12/2025 97 (L)  98 - 107 mmol/L Final    CO2 06/12/2025 28.0  22.0 - 29.0 mmol/L Final    Calcium 06/12/2025 7.8 (L)  8.6 - 10.5 mg/dL Final    Total Protein 06/12/2025 6.1  6.0 - 8.5 g/dL Final    Albumin 06/12/2025 2.8 (L)  3.5 - 5.2 g/dL Final    ALT (SGPT) 06/12/2025 26  1 - 33 U/L Final    AST (SGOT) 06/12/2025 49 (H)  1 - 32 U/L Final    Alkaline Phosphatase 06/12/2025 455 (H)  39 - 117 U/L Final    Total Bilirubin 06/12/2025 0.2  0.0 - 1.2 mg/dL Final    Globulin 06/12/2025 3.3  gm/dL Final    Calculated Result    A/G Ratio 06/12/2025 0.8  g/dL Final    BUN/Creatinine Ratio 06/12/2025 24.9  7.0 - 25.0 Final    Anion Gap 06/12/2025 11.0  5.0 - 15.0 mmol/L Final    eGFR 06/12/2025 73.1  >60.0 mL/min/1.73 Final    Heparin Anti-Xa (UFH) 06/12/2025 0.48  0.30 - 0.70 IU/ml Final    Protime 06/12/2025 15.6 (H)  12.2 - 15.3 Seconds Final    INR 06/12/2025 1.17 (H)  0.89 - 1.12 Final    PTT 06/12/2025 32.7 (L)  60.0 - 90.0 seconds Final    WBC 06/12/2025 17.59 (H)  3.40 - 10.80 10*3/mm3 Final    RBC 06/12/2025 3.88  3.77 - 5.28 10*6/mm3 Final    Hemoglobin 06/12/2025 10.7 (L)  12.0 - 15.9 g/dL Final    Hematocrit 06/12/2025 35.1  34.0 - 46.6 % Final    MCV 06/12/2025 90.5  79.0 - 97.0 fL Final    MCH 06/12/2025 27.6  26.6 - 33.0 pg Final    MCHC 06/12/2025 30.5 (L)  31.5 - 35.7 g/dL Final    RDW 06/12/2025 14.8  12.3 - 15.4 % Final    RDW-SD 06/12/2025 47.6  37.0 - 54.0 fl Final    MPV 06/12/2025 10.0  6.0 - 12.0 fL Final    Platelets 06/12/2025 192  140 - 450 10*3/mm3 Final    Neutrophil % 06/12/2025 80.4 (H)  42.7 - 76.0 % Final    Lymphocyte % 06/12/2025 4.2 (L)  19.6 - 45.3 % Final    Monocyte % 06/12/2025 8.2  5.0 - 12.0 % Final    Eosinophil % 06/12/2025 3.9  0.3 - 6.2 % Final     Basophil % 06/12/2025 0.5  0.0 - 1.5 % Final    Immature Grans % 06/12/2025 2.8 (H)  0.0 - 0.5 % Final    Neutrophils, Absolute 06/12/2025 14.13 (H)  1.70 - 7.00 10*3/mm3 Final    Lymphocytes, Absolute 06/12/2025 0.74  0.70 - 3.10 10*3/mm3 Final    Monocytes, Absolute 06/12/2025 1.45 (H)  0.10 - 0.90 10*3/mm3 Final    Eosinophils, Absolute 06/12/2025 0.68 (H)  0.00 - 0.40 10*3/mm3 Final    Basophils, Absolute 06/12/2025 0.09  0.00 - 0.20 10*3/mm3 Final    Immature Grans, Absolute 06/12/2025 0.50 (H)  0.00 - 0.05 10*3/mm3 Final    nRBC 06/12/2025 0.0  0.0 - 0.2 /100 WBC Final    Heparin Anti-Xa (UFH) 06/13/2025 0.54  0.30 - 0.70 IU/ml Final    WBC 06/13/2025 20.73 (H)  3.40 - 10.80 10*3/mm3 Final    RBC 06/13/2025 3.77  3.77 - 5.28 10*6/mm3 Final    Hemoglobin 06/13/2025 10.5 (L)  12.0 - 15.9 g/dL Final    Hematocrit 06/13/2025 33.9 (L)  34.0 - 46.6 % Final    MCV 06/13/2025 89.9  79.0 - 97.0 fL Final    MCH 06/13/2025 27.9  26.6 - 33.0 pg Final    MCHC 06/13/2025 31.0 (L)  31.5 - 35.7 g/dL Final    RDW 06/13/2025 14.8  12.3 - 15.4 % Final    RDW-SD 06/13/2025 47.6  37.0 - 54.0 fl Final    MPV 06/13/2025 10.1  6.0 - 12.0 fL Final    Platelets 06/13/2025 220  140 - 450 10*3/mm3 Final    Neutrophil % 06/13/2025 77.0 (H)  42.7 - 76.0 % Final    Lymphocyte % 06/13/2025 5.7 (L)  19.6 - 45.3 % Final    Monocyte % 06/13/2025 8.6  5.0 - 12.0 % Final    Eosinophil % 06/13/2025 5.9  0.3 - 6.2 % Final    Basophil % 06/13/2025 0.5  0.0 - 1.5 % Final    Immature Grans % 06/13/2025 2.3 (H)  0.0 - 0.5 % Final    Neutrophils, Absolute 06/13/2025 15.98 (H)  1.70 - 7.00 10*3/mm3 Final    Lymphocytes, Absolute 06/13/2025 1.18  0.70 - 3.10 10*3/mm3 Final    Monocytes, Absolute 06/13/2025 1.78 (H)  0.10 - 0.90 10*3/mm3 Final    Eosinophils, Absolute 06/13/2025 1.22 (H)  0.00 - 0.40 10*3/mm3 Final    Basophils, Absolute 06/13/2025 0.10  0.00 - 0.20 10*3/mm3 Final    Immature Grans, Absolute 06/13/2025 0.47 (H)  0.00 - 0.05 10*3/mm3  Final    nRBC 06/13/2025 0.1  0.0 - 0.2 /100 WBC Final    Heparin Anti-Xa (UFH) 06/13/2025 0.55  0.30 - 0.70 IU/ml Final    Heparin Anti-Xa (UFH) 06/14/2025 0.42  0.30 - 0.70 IU/ml Final    Glucose 06/14/2025 108 (H)  65 - 99 mg/dL Final    BUN 06/14/2025 23.0  8.0 - 23.0 mg/dL Final    Creatinine 06/14/2025 0.85  0.57 - 1.00 mg/dL Final    Sodium 06/14/2025 134 (L)  136 - 145 mmol/L Final    Potassium 06/14/2025 4.2  3.5 - 5.2 mmol/L Final    Chloride 06/14/2025 97 (L)  98 - 107 mmol/L Final    CO2 06/14/2025 25.0  22.0 - 29.0 mmol/L Final    Calcium 06/14/2025 7.7 (L)  8.6 - 10.5 mg/dL Final    BUN/Creatinine Ratio 06/14/2025 27.1 (H)  7.0 - 25.0 Final    Anion Gap 06/14/2025 12.0  5.0 - 15.0 mmol/L Final    eGFR 06/14/2025 75.2  >60.0 mL/min/1.73 Final    WBC 06/14/2025 21.39 (H)  3.40 - 10.80 10*3/mm3 Final    RBC 06/14/2025 3.72 (L)  3.77 - 5.28 10*6/mm3 Final    Hemoglobin 06/14/2025 10.3 (L)  12.0 - 15.9 g/dL Final    Hematocrit 06/14/2025 33.5 (L)  34.0 - 46.6 % Final    MCV 06/14/2025 90.1  79.0 - 97.0 fL Final    MCH 06/14/2025 27.7  26.6 - 33.0 pg Final    MCHC 06/14/2025 30.7 (L)  31.5 - 35.7 g/dL Final    RDW 06/14/2025 15.1  12.3 - 15.4 % Final    RDW-SD 06/14/2025 48.9  37.0 - 54.0 fl Final    MPV 06/14/2025 10.7  6.0 - 12.0 fL Final    Platelets 06/14/2025 215  140 - 450 10*3/mm3 Final    Neutrophil % 06/14/2025 79.5 (H)  42.7 - 76.0 % Final    Lymphocyte % 06/14/2025 4.3 (L)  19.6 - 45.3 % Final    Monocyte % 06/14/2025 7.8  5.0 - 12.0 % Final    Eosinophil % 06/14/2025 5.2  0.3 - 6.2 % Final    Basophil % 06/14/2025 0.4  0.0 - 1.5 % Final    Immature Grans % 06/14/2025 2.8 (H)  0.0 - 0.5 % Final    Neutrophils, Absolute 06/14/2025 17.02 (H)  1.70 - 7.00 10*3/mm3 Final    Lymphocytes, Absolute 06/14/2025 0.91  0.70 - 3.10 10*3/mm3 Final    Monocytes, Absolute 06/14/2025 1.67 (H)  0.10 - 0.90 10*3/mm3 Final    Eosinophils, Absolute 06/14/2025 1.11 (H)  0.00 - 0.40 10*3/mm3 Final    Basophils,  Absolute 06/14/2025 0.08  0.00 - 0.20 10*3/mm3 Final    Immature Grans, Absolute 06/14/2025 0.60 (H)  0.00 - 0.05 10*3/mm3 Final    nRBC 06/14/2025 0.1  0.0 - 0.2 /100 WBC Final    Heparin Anti-Xa (UFH) 06/15/2025 0.46  0.30 - 0.70 IU/ml Final    WBC 06/15/2025 19.96 (H)  3.40 - 10.80 10*3/mm3 Final    RBC 06/15/2025 3.59 (L)  3.77 - 5.28 10*6/mm3 Final    Hemoglobin 06/15/2025 9.9 (L)  12.0 - 15.9 g/dL Final    Hematocrit 06/15/2025 32.2 (L)  34.0 - 46.6 % Final    MCV 06/15/2025 89.7  79.0 - 97.0 fL Final    MCH 06/15/2025 27.6  26.6 - 33.0 pg Final    MCHC 06/15/2025 30.7 (L)  31.5 - 35.7 g/dL Final    RDW 06/15/2025 15.2  12.3 - 15.4 % Final    RDW-SD 06/15/2025 48.6  37.0 - 54.0 fl Final    MPV 06/15/2025 10.0  6.0 - 12.0 fL Final    Platelets 06/15/2025 221  140 - 450 10*3/mm3 Final    Neutrophil % 06/15/2025 81.3 (H)  42.7 - 76.0 % Final    Lymphocyte % 06/15/2025 3.8 (L)  19.6 - 45.3 % Final    Monocyte % 06/15/2025 7.6  5.0 - 12.0 % Final    Eosinophil % 06/15/2025 4.7  0.3 - 6.2 % Final    Basophil % 06/15/2025 0.4  0.0 - 1.5 % Final    Immature Grans % 06/15/2025 2.2 (H)  0.0 - 0.5 % Final    Neutrophils, Absolute 06/15/2025 16.23 (H)  1.70 - 7.00 10*3/mm3 Final    Lymphocytes, Absolute 06/15/2025 0.76  0.70 - 3.10 10*3/mm3 Final    Monocytes, Absolute 06/15/2025 1.52 (H)  0.10 - 0.90 10*3/mm3 Final    Eosinophils, Absolute 06/15/2025 0.93 (H)  0.00 - 0.40 10*3/mm3 Final    Basophils, Absolute 06/15/2025 0.08  0.00 - 0.20 10*3/mm3 Final    Immature Grans, Absolute 06/15/2025 0.44 (H)  0.00 - 0.05 10*3/mm3 Final    nRBC 06/15/2025 0.0  0.0 - 0.2 /100 WBC Final    Heparin Anti-Xa (UFH) 06/16/2025 0.44  0.30 - 0.70 IU/ml Final    Glucose 06/16/2025 117 (H)  65 - 99 mg/dL Final    BUN 06/16/2025 25.5 (H)  8.0 - 23.0 mg/dL Final    Creatinine 06/16/2025 0.87  0.57 - 1.00 mg/dL Final    Sodium 06/16/2025 131 (L)  136 - 145 mmol/L Final    Potassium 06/16/2025 4.3  3.5 - 5.2 mmol/L Final    Chloride 06/16/2025  94 (L)  98 - 107 mmol/L Final    CO2 06/16/2025 26.0  22.0 - 29.0 mmol/L Final    Calcium 06/16/2025 7.5 (L)  8.6 - 10.5 mg/dL Final    BUN/Creatinine Ratio 06/16/2025 29.3 (H)  7.0 - 25.0 Final    Anion Gap 06/16/2025 11.0  5.0 - 15.0 mmol/L Final    eGFR 06/16/2025 73.1  >60.0 mL/min/1.73 Final    WBC 06/16/2025 18.84 (H)  3.40 - 10.80 10*3/mm3 Final    RBC 06/16/2025 3.74 (L)  3.77 - 5.28 10*6/mm3 Final    Hemoglobin 06/16/2025 10.3 (L)  12.0 - 15.9 g/dL Final    Hematocrit 06/16/2025 34.2  34.0 - 46.6 % Final    MCV 06/16/2025 91.4  79.0 - 97.0 fL Final    MCH 06/16/2025 27.5  26.6 - 33.0 pg Final    MCHC 06/16/2025 30.1 (L)  31.5 - 35.7 g/dL Final    RDW 06/16/2025 15.5 (H)  12.3 - 15.4 % Final    RDW-SD 06/16/2025 50.4  37.0 - 54.0 fl Final    MPV 06/16/2025 10.7  6.0 - 12.0 fL Final    Platelets 06/16/2025 285  140 - 450 10*3/mm3 Final    Neutrophil % 06/16/2025 77.6 (H)  42.7 - 76.0 % Final    Lymphocyte % 06/16/2025 4.2 (L)  19.6 - 45.3 % Final    Monocyte % 06/16/2025 8.0  5.0 - 12.0 % Final    Eosinophil % 06/16/2025 5.7  0.3 - 6.2 % Final    Basophil % 06/16/2025 0.5  0.0 - 1.5 % Final    Immature Grans % 06/16/2025 4.0 (H)  0.0 - 0.5 % Final    Neutrophils, Absolute 06/16/2025 14.61 (H)  1.70 - 7.00 10*3/mm3 Final    Lymphocytes, Absolute 06/16/2025 0.80  0.70 - 3.10 10*3/mm3 Final    Monocytes, Absolute 06/16/2025 1.50 (H)  0.10 - 0.90 10*3/mm3 Final    Eosinophils, Absolute 06/16/2025 1.08 (H)  0.00 - 0.40 10*3/mm3 Final    Basophils, Absolute 06/16/2025 0.09  0.00 - 0.20 10*3/mm3 Final    Immature Grans, Absolute 06/16/2025 0.76 (H)  0.00 - 0.05 10*3/mm3 Final    nRBC 06/16/2025 0.2  0.0 - 0.2 /100 WBC Final       XR Chest 1 View  Result Date: 6/16/2025  Narrative: XR CHEST 1 VW Date of Exam: 6/16/2025 5:35 AM EDT Indication: Recurrent pleural effusions Comparison: 6/15/2025 Findings: Right pleural drain has been removed and there is apparently increasing right basilar effusion. Right midlung  interstitial changes are stable. There is minimally increased left basilar interstitial opacity perhaps a small focus of atelectasis. Heart and vasculature appear to be normal in size. No pneumothorax is seen.     Impression: Impression: Increasing right basilar effusion following right pleural drain removal. Electronically Signed: Marlon Lyons MD  6/16/2025 7:47 AM EDT  Workstation ID: [id number]    XR Chest 1 View  Result Date: 6/15/2025  Narrative: XR CHEST 1 VW Date of Exam: 6/15/2025 2:07 AM EDT Indication: Recurrent pleural effusions, right chest tube, follow-up Comparison: 6/14/2025. Findings: There is a right basilar pigtail chest tube in place. There may be some kinking of the chest tube possibly at the entry site. I would recommend clinical correlation. There is an unchanged moderate right pleural effusion with compressive atelectasis. The left lung and pleural space are clear. The heart size is normal. The pulmonary vascular markings are normal. There is no pneumothorax.     Impression: Impression: 1.There is a right basilar pigtail chest tube in place. There may be some kinking of the chest tube possibly at the entry site. I would recommend clinical correlation. 2.Unchanged moderate right pleural effusion with compressive atelectasis. Electronically Signed: Samuel Luna MD  6/15/2025 10:19 AM EDT  Workstation ID: [id number]    XR Chest 1 View  Result Date: 6/14/2025  Narrative: XR CHEST 1 VW Date of Exam: 6/14/2025 3:14 AM EDT Indication: Recurrent pleural effusions Comparison: Chest radiograph 6/13/2025. Findings: Unchanged position of right pleural catheter. Cardiomediastinal silhouette is unchanged. Moderate right pleural effusion with right basilar airspace disease and right basilar lucency suggestive of small-volume pneumothorax, unchanged compared to yesterday's exam. Left lung appears clear. No pneumothorax. Osseous structures are unchanged.     Impression: Impression: No significant interval change.  Electronically Signed: Franco Shaffer MD  6/14/2025 8:09 AM EDT  Workstation ID: [id number]    XR Chest 1 View  Result Date: 6/13/2025  Narrative: XR CHEST 1 VW Date of Exam: 6/13/2025 12:55 PM EDT Indication: Recurrent pleural effusions Comparison: [facility] chest x-ray 6/13/2025 timed at 4:43 a.m. Findings: Right pleural catheter remains in place. Moderate size right hydropneumothorax appears similar to numbers to today's earlier chest x-ray. Underlying right basilar airspace opacities are stable. Left lung is clear.     Impression: Impression: Moderate sized right hydropneumothorax appears similar to numbers to today's earlier chest x-ray. Right pleural catheter remains in place. Electronically Signed: Maryellen Dalton MD  6/13/2025 1:44 PM EDT  Workstation ID: [id number]    XR Chest 1 View  Result Date: 6/13/2025  Narrative: XR CHEST 1 VW Date of Exam: 6/13/2025 2:49 AM EDT Indication: post Ct placement and thoracentesis Comparison: [facility] chest x-ray 6/12/2025, 6/5/2025 Findings: There is a new right pleural catheter. Previously seen right basilar pleural air has now been replaced with pleural fluid. No apical pneumothorax is seen. There are stable underlying right basilar airspace opacities. Left lung appears clear. Cardiomediastinal contours are stable.     Impression: Impression: 1.Interval placement of right pleural catheter with fluid component of right basilar hydropneumothorax. 2.Stable underlying right basilar airspace opacities, likely due to atelectasis. Electronically Signed: Maryellen Dalton MD  6/13/2025 7:23 AM EDT  Workstation ID: [id number]    CT Guided Chest Tube  Result Date: 6/12/2025  Narrative: DATE OF EXAM: 6/12/2025 2:48 PM  PROCEDURE: CT GUIDED CHEST TUBE PLACEMENT-  INDICATIONS: right chest tube placement; J95.811-Postprocedural pneumothorax  DLP: 746 mGycm  TECHNIQUE:  The risks, benefits, and alternatives were discussed in detail with the patient. The patient was brought down to the CT suite and positioned  supine on the CT table. Preliminary CT scan of the the chest was performed and a skin entry site was selected and marked. The area was prepped and draped utilizing standard sterile technique. 1% lidocaine was administered to the soft tissues. A small skin incision was made with an 11 blade scalpel. Under CT guidance a 5 Upper sorbian Yueh was advanced into the right pleural space. The inner stylet was removed and an 035 wire was advanced coaxially. Over the wire a 10 Upper sorbian all-purpose drainage catheter was advanced with the pigtail formed and locked within the pleural space the catheter was sutured to the skin and attached to a Pleur-evac. A sterile dressing was then applied.  The patient tolerated the procedure well and there were no immediate complications.      Impression: Successful CT-guided 10 Upper sorbian right sided chest tube placement.   6/12/2025 4:13 PM by Jose Hope MD on Workstation: [id number]      XR Chest 1 View  Result Date: 6/12/2025  Narrative: XR CHEST 1 VW Date of Exam: 6/12/2025 1:45 PM EDT Indication: s/p thoracentesis; c/o severe right neck pain Comparison: 6/12/2025 Findings: Cardiac size is similar to prior exam. Similar right-sided hydropneumothorax. Similar right basilar opacity. No evidence of acute osseous abnormalities. Visualized upper abdomen is unremarkable.     Impression: Impression: 1.Similar right-sided hydropneumothorax. 2.Similar right basilar opacity may reflect atelectasis or infection. Electronically Signed: Clay Johnson MD  6/12/2025 2:31 PM EDT  Workstation ID: [id number]    XR Chest 1 View  Result Date: 6/12/2025  Narrative: XR CHEST 1 VW Date of Exam: 6/12/2025 12:25 PM EDT Indication: s/p thoracentesis; c/o severe right neck pain Comparison: Chest radiograph 6/12/2025 Findings: Grossly stable size of the right hydropneumothorax, measuring up to 7 mm at apex with larger right subpulmonic component. Previous noted fluid extending towards the apex appears decreased. Left lung  relatively clear. There is partial collapse and probable underlying atelectasis within the right lower lobe. Negative for left pneumothorax. Stable cardiomediastinal silhouette. Degenerative related osseous change.     Impression: Impression: Slightly decreased right pleural fluid component with otherwise similar appearing moderate size hydropneumothorax. Electronically Signed: Toni Argueta MD  6/12/2025 12:53 PM EDT  Workstation ID: [id number]    XR Chest 1 View  Result Date: 6/12/2025  Narrative: XR CHEST 1 VW Date of Exam: 6/12/2025 11:23 AM EDT Indication: s/p thoracentesis Comparison: Chest x-ray 6/5/2025 Findings: There is a pneumothorax on the right. It is moderate in size and seen inferiorly measuring up to 2.3 cm. There is a moderate mount of pleural fluid as well. There is right lower lobe airspace opacity which is improved from previous exam. Left lung is clear. Heart size is normal.     Impression: Impression: Moderate sized right hydropneumothorax. Electronically Signed: Susan Laehy MD  6/12/2025 12:41 PM EDT  Workstation ID: [id number]    US Thoracentesis  Result Date: 6/12/2025  Narrative: PROCEDURE: US THORACENTESIS-  ATTENDING: Jose Hope M.D.  CLINICAL HISTORY: Large right pleural effusion.  TECHNIQUE:  The risk, benefits, and alternatives were described in detail and the patient was brought to the ultrasound suite and positioned seated. The skin entry site was prepped and draped utilizing standard sterile technique. 1% lidocaine was administered to the soft tissues of the right posterior thorax.   A 5 Mont[place]gr[place] Yueh was advanced into the right pleural space.   A total of 1.4 L was removed from the right pleural space. A specimen was sent to the laboratory for analysis. The needle was removed and a sterile dressing was applied.  The patient tolerated the procedure well and there were no complications.      Impression: Successful ultrasound-guided right thoracentesis.   Attestation Signer name:  Jose Hope MD I attest that I was present for the entire procedure. I reviewed the stored images and agree with the report as written.      6/12/2025 12:17 PM by Jose Hope MD on Workstation: [id number]      XR Chest 1 View  Result Date: 6/5/2025  Narrative: XR CHEST 1 VW Date of Exam: 6/5/2025 6:41 AM EDT Indication: cough Comparison: Chest radiograph 6/5/2025 at 0422 hours Findings: There is been slight decrease in the size of the large right pleural effusion. There is persistent right pleural fluid and right basilar airspace disease. Airspace disease is most likely atelectasis with pneumonia not excluded. The left lung remains clear. There is no pneumothorax.     Impression: Impression: Slight decrease in the size of the large right pleural effusion. Electronically Signed: Rodriguez Singh MD  6/5/2025 7:00 AM EDT  Workstation ID: [id number]    XR Chest 1 View  Result Date: 6/5/2025  Narrative: XR CHEST 1 VW Date of Exam: 6/5/2025 4:18 AM EDT Indication: SOA triage protocol Comparison: Chest radiograph 5/28/2025 Findings: The heart size and pulmonary vascular markings are normal. The left lung is clear. There is a large right pleural effusion with associated right basilar atelectasis. There is no pneumothorax. The osseous structures are normal for age.     Impression: Impression: Large right pleural effusion with right basilar atelectasis. Electronically Signed: Rodriguez Singh MD  6/5/2025 4:47 AM EDT  Workstation ID: [id number]    US Thoracentesis  Result Date: 5/28/2025  Narrative: PROCEDURE: Ultrasound-guided right thoracentesis  Procedural Personnel Attending physician(s): Erik Mensah  Pre-procedure diagnosis: Right pleural effusion  Post-procedure diagnosis: Same  Complications: No immediate complications.      Impression:  Ultrasound-guided thoracentesis with drainage of 1400 mL of serous fluid.   _______________________________________________________________   PROCEDURE SUMMARY:  - Limited thoracic  ultrasound - Ultrasound-guided right thoracentesis - Additional procedure(s): None  PROCEDURE DETAILS:   Pre-procedure Consent: Informed consent for the procedure including risks, benefits and alternatives was obtained and time-out was performed prior to the procedure. Preparation: The site was prepared and draped using maximal sterile barrier technique including cutaneous antisepsis.    Limited thoracic ultrasound: Limited thoracic ultrasound was performed using a curved transducer. A safe window for thoracentesis was identified. Right hemithorax findings: Moderate to large right-sided pleural effusion   Thoracentesis  Local anesthesia was administered. The pleural space was accessed and fluid return confirmed position. The fluid was drained. The catheter was removed, and a sterile bandage was applied.  Catheter placed: 5 Lithuanian catheter.  Additional Details  Equipment details: None Specimens removed: Pleural fluid Estimated blood loss (mL): Less than 10   5/28/2025 12:05 PM by Erik Mensah MD on Workstation: [id number]      XR Chest 1 View  Result Date: 5/28/2025  Narrative: XR CHEST 1 VW Date of Exam: 5/28/2025 10:19 AM EDT Indication: S/P Right Thoracentesis Comparison: 5/14/2025 chest radiograph. Ultrasound-guided thoracentesis of 5/20/2025 Findings: By history, thoracentesis earlier today removed 1.4 L of fluid from the right hemithorax. Follow-up chest radiograph shows patchy right mid and lower lung atelectasis and perhaps a small amount of remaining pleural fluid, versus discoid atelectasis. There is no evidence of pneumothorax. Left lung remains clear. Heart and vasculature appear normal in size.     Impression: Impression: Patchy right mid and lower lung atelectasis and questionable small remaining right effusion. No evidence of pneumothorax or other significant chest disease elsewhere. Electronically Signed: Marlon Lyons MD  5/28/2025 10:49 AM EDT  Workstation ID: [id number]    NM PET/CT Skull Base to  Mid Thigh  Result Date: 5/28/2025  Narrative: FDG NM PET/CT SKULL BASE TO MID THIGH Date of Exam: 5/22/2025 8:25 AM EDT Indication: restaging scans lung cancer. Comparison: 5/13/2025 and 7/11/2024 Technique: 8.1 mCi of F-18 FDG was administered intravenously. PET imaging was obtained from skull base to mid-thigh approximately 60 minutes after radiotracer injection. A low dose non contrast CT was obtained for attenuation correction and anatomic localization. Fused PET-CT and 3D MIP reconstructions were utilized for image interpretation.  Fasting blood glucose level: 113 mg/dl. Reference uptake values: Mediastinum: 2.8 SUVmax Liver: 2.7 SUVmax Normalization method: Body Weight Findings: Head and neck: No abnormal FDG activity identified. Chest: Increasing moderate to large right effusion which is loculated. Spiculated right middle lobe nodule has an SUV maximum of 4.4. There is extensive right hilar adenopathy with an SUV max 8.8. There is subcarinal and mediastinal adenopathy ranging between 8 and 5 SUV maximum. Abdomen and pelvis: At least 20 FDG-avid liver lesions are identified measuring up to 5 cm in diameter and an SUV maximum of 13.5. There is left adrenal lesion with an SUV maximum of 5. There is extensive julio hepatis and portacaval adenopathy with an SUV maximum of 8.6. There is retroperitoneal adenopathy, including left periaortic and aortocaval abnormal lymph nodes. Musculoskeletal: Diffuse osseous metastatic disease with multifocal disease throughout the cervical thoracolumbar spine, bilateral ribs, pelvis, including the left acetabulum and to a lesser extent the right acetabulum. There are bilateral femoral lesions as well. No definite pathologic fractures are identified at this time. Several of the lesions however are involving weight-bearing bones and the patient is susceptible to pathologic fractures.     Impression: Impression: Extensive metastatic disease involving the chest, abdomen, and pelvis as  well as the bones. Electronically Signed: Herb Lopes MD  5/28/2025 9:18 AM EDT  Workstation ID: [id number]      ASSESSMENT: The patient is a very pleasant 65 y.o. female  with metastatic right lung cancer      PLAN:  1.  Recurrent metastatic right lung adenocarcinoma:  A.  The patient follows me in scheduled next week for cycle 2 of Keytruda.    2.  Recurrent right pleural effusion:  A.  Status post Pleurx catheter today by Dr. Andres.    3.  Cancer related pain:  A.  Will continue opiates as needed      Sejal Mckenzie MD  6/16/2025

## 2025-06-16 NOTE — PLAN OF CARE
Goal Outcome Evaluation:  Plan of Care Reviewed With: patient, family        Progress: no change  Outcome Evaluation: Palliative RN saw pt. at 1346.  Pt. sitting up in bed on 4LNC getting IV replaced.  She stated she is to have pleurX cath placed tomorrow per Dr. Gross.  She hopes to go home tomorrow but per nursing report, pt. may be going to rehab.  Pt. denied pain at time of visit stating she had recently been medicated.  Pt. follows at palliative clinic.  Palliative to continue to follow for support and ongoing [facility].      0930 Palliative IDT meeting: MD; APRN ; MDiv; RNs   After hours, weekends and holidays, contact Palliative Provider by calling [phone].

## 2025-06-16 NOTE — PROGRESS NOTES
[facility] Medicine Services  PROGRESS NOTE    Patient Name: Sydnie Gamble  : 1960  MRN: [id number]    Date of Admission: 2025  Primary Care Physician: Dee Elena APRN    Subjective   Subjective     CC:  F/u SOA     HPI:  Patient is resting in bed with  at bedside. She has felt more soa today. Very dyspneic when I saw her after [place] had [place] [facility] [place]ached out to Dr Mckenzie and will plan on pleurx drain while she is here.       Objective   Objective     Vital Signs:   Temp:  [97.7 °F (36.5 °C)-98.6 °F (37 °C)] 97.8 °F (36.6 °C)  Heart Rate:  [106-118] 113  Resp:  [18] 18  BP: (104-140)/(66-97) 104/66  Flow (L/min) (Oxygen Therapy):  [4] 4     Physical Exam:  Constitutional: No acute distress, awake, alert  HENT: NCAT, mucous membranes moist  Respiratory: decreased in right lung , respiratory effort normal 4L   Cardiovascular: RRR, no murmurs, rubs, or gallops  Gastrointestinal: Positive bowel sounds, soft, nontender, nondistended  Musculoskeletal: No bilateral ankle edema  Psychiatric: Appropriate affect, cooperative  Neurologic: Oriented x 2-3, strength symmetric in all extremities, , speech clear  Skin: No rashes,pale , right mid chest dressing       Results Reviewed:  LAB RESULTS:      Lab 25  0424 06/15/25  0446 25  0456 25  0733 25  0341 25  0114 25  1755   WBC 18.84* 19.96* 21.39*  --  20.73*  --  17.59*   HEMOGLOBIN 10.3* 9.9* 10.3*  --  10.5*  --  10.7*   HEMATOCRIT 34.2 32.2* 33.5*  --  33.9*  --  35.1   PLATELETS 285 221 215  --  220  --  192   NEUTROS ABS 14.61* 16.23* 17.02*  --  15.98*  --  14.13*   IMMATURE GRANS (ABS) 0.76* 0.44* 0.60*  --  0.47*  --  0.50*   LYMPHS ABS 0.80 0.76 0.91  --  1.18  --  0.74   MONOS ABS 1.50* 1.52* 1.67*  --  1.78*  --  1.45*   EOS ABS 1.08* 0.93* 1.11*  --  1.22*  --  0.68*   MCV 91.4 89.7 90.1  --  89.9  --  90.5   PROTIME  --   --   --   --   --   --  15.6*   APTT   --   --   --   --   --   --  32.7*   HEPARIN ANTI-XA 0.44 0.46 0.42 0.55  --  0.54 0.48         Lab 06/16/25  0424 06/14/25  0456 06/12/25  1755   SODIUM 131* 134* 136   POTASSIUM 4.3 4.2 5.0   CHLORIDE 94* 97* 97*   CO2 26.0 25.0 28.0   ANION GAP 11.0 12.0 11.0   BUN 25.5* 23.0 21.7   CREATININE 0.87 0.85 0.87   EGFR 73.1 75.2 73.1   GLUCOSE 117* 108* 117*   CALCIUM 7.5* 7.7* 7.8*         Lab 06/12/25  1755   TOTAL PROTEIN 6.1   ALBUMIN 2.8*   GLOBULIN 3.3   ALT (SGPT) 26   AST (SGOT) 49*   BILIRUBIN 0.2   ALK PHOS 455*         Lab 06/12/25  1755   PROTIME 15.6*   INR 1.17*                 Brief Urine Lab Results       None            Microbiology Results Abnormal       None            XR Chest 1 View  Result Date: 6/16/2025  XR CHEST 1 VW Date of Exam: 6/16/2025 5:35 AM EDT Indication: Recurrent pleural effusions Comparison: 6/15/2025 Findings: Right pleural drain has been removed and there is apparently increasing right basilar effusion. Right midlung interstitial changes are stable. There is minimally increased left basilar interstitial opacity perhaps a small focus of atelectasis. Heart and vasculature appear to be normal in size. No pneumothorax is seen.     Impression: Impression: Increasing right basilar effusion following right pleural drain removal. Electronically Signed: Marlon Lyons MD  6/16/2025 7:47 AM EDT  Workstation ID: [id number]    XR Chest 1 View  Result Date: 6/15/2025  XR CHEST 1 VW Date of Exam: 6/15/2025 2:07 AM EDT Indication: Recurrent pleural effusions, right chest tube, follow-up Comparison: 6/14/2025. Findings: There is a right basilar pigtail chest tube in place. There may be some kinking of the chest tube possibly at the entry site. I would recommend clinical correlation. There is an unchanged moderate right pleural effusion with compressive atelectasis. The left lung and pleural space are clear. The heart size is normal. The pulmonary vascular markings are normal. There is no  pneumothorax.     Impression: Impression: 1.There is a right basilar pigtail chest tube in place. There may be some kinking of the chest tube possibly at the entry site. I would recommend clinical correlation. 2.Unchanged moderate right pleural effusion with compressive atelectasis. Electronically Signed: Samuel Luna MD  6/15/2025 10:19 AM EDT  Workstation ID: [id number]      Results for orders placed during the hospital encounter of 05/13/25    Adult Transthoracic Echo Complete w/ Color, Spectral and Contrast if necessary per protocol    Interpretation Summary    Left ventricular ejection fraction appears to be 66 - 70%.    Normal right ventricular cavity size, wall thickness, systolic function and septal motion noted.    There is a small (<1cm) pericardial effusion adjacent to the right ventricle. There is no evidence of cardiac tamponade.    There is a left pleural effusion.      Current medications:  Scheduled Meds:arformoterol, 15 mcg, Nebulization, BID - RT   And  budesonide, 0.5 mg, Nebulization, BID - RT   And  revefenacin, 175 mcg, Nebulization, Daily - RT  atorvastatin, 10 mg, Oral, Nightly  bisacodyl, 10 mg, Rectal, Once  Diclofenac Sodium, 2 g, Topical, 4x Daily  levothyroxine, 88 mcg, Oral, Q AM  lidocaine PF 1%, 5 mL, Injection, Once  nystatin, 5 mL, Oral, 4x Daily  senna-docusate sodium, 2 tablet, Oral, BID  sertraline, 100 mg, Oral, Daily  sodium chloride, 10 mL, Intravenous, Q12H  sodium chloride, 10 mL, Intravenous, Q12H  traZODone, 150 mg, Oral, Nightly      Continuous Infusions:heparin, 16 Units/kg/hr, Last Rate: 16 Units/kg/hr (06/16/25 0656)  Pharmacy to Dose Heparin,       PRN Meds:.  acetaminophen **OR** acetaminophen **OR** acetaminophen    senna-docusate sodium **AND** polyethylene glycol **AND** bisacodyl **AND** bisacodyl    Calcium Replacement - Follow Nurse / BPA Driven Protocol    clonazePAM    cyclobenzaprine    famotidine    HYDROmorphone **AND** naloxone    ipratropium-albuterol     Magnesium Standard Dose Replacement - Follow Nurse / BPA Driven Protocol    ondansetron ODT **OR** ondansetron    oxyCODONE-acetaminophen    Pharmacy to Dose Heparin    Phosphorus Replacement - Follow Nurse / BPA Driven Protocol    Potassium Replacement - Follow Nurse / BPA Driven Protocol    simethicone    sodium chloride    sodium chloride    sodium chloride    sodium chloride    zolpidem    Assessment & Plan   Assessment & Plan     Active Hospital Problems    Diagnosis  POA    **Recurrent pleural effusion [J90]  Yes    Adenocarcinoma of right lung [C34.91]  Yes    HLD (hyperlipidemia) [E78.5]  Yes    Acquired hypothyroidism [E03.9]  Yes      Resolved Hospital Problems   No resolved problems to display.        Brief Hospital Course to date:  Sydnie Gamble is a 65 y.o. female  with PMH of Adenocarcinoma of lung, anemia, cervical cancer, depression, HLD, hypothyroidism, PE, GERD and had home oxygen. Patient had a thoracentesis with chest tube placement, admitted for further management.    Plan was partially entered by my partner and I have reviewed and updated as appropriate on 6/16/25     Recurrent Pleural Effusion  Adenocarcinoma of Right lung  -s/p thoracentesis and chest tube placement by IR, CTS managing, dc'd on 6/15  -- currently under treatment with Dr. Mckenzie and he wants to hold on pleur-x drain for now. He wants her to receive Keytruda  for a month.   --Palliative care consulted, continue pain control  - She is currently on 4 L NC, wean as able  -Continue DuoNeb  -- CXR on 6/16 showed increasing right pleural effusion   -- talked with  he is ok with Pleurx drain being placed while admitted. CTS reconsulted and will plan for 6/17     Hypothyroidism  -- cont home meds      HLD  -- cont statin      Generalized anxiety disorder  Depression  --cont home meds     LLE DVT  PE  -- hold eliquis   -- heparin drip until pleurx drain placed     Expected Discharge Location and Transportation: home    Expected Discharge   Expected Discharge Date: 6/15/2025; Expected Discharge Time:  3:00 PM     VTE Prophylaxis:  Pharmacologic VTE prophylaxis orders are present.         AM-PAC 6 Clicks Score (PT): 18 (06/15/25 2038)    CODE STATUS:   Code Status and Medical Interventions: CPR (Attempt to Resuscitate); Full Support; also talked over with  Jose Francisco   Ordered at: 06/12/25 2868     Code Status (Patient has no pulse and is not breathing):    CPR (Attempt to Resuscitate)     Medical Interventions (Patient has pulse or is breathing):    Full Support     Comments:    also talked over with  Jose Francisco     Level Of Support Discussed With:    Patient       Next of Kin (If No Surrogate)       Nuha Shafer, DAYSI  06/16/25

## 2025-06-16 NOTE — PLAN OF CARE
Goal Outcome Evaluation:              Outcome Evaluation: PT still on 4L via nc. PT complaining of pain and prn pain meds given with some relief. No BM this shift.

## 2025-06-16 NOTE — PLAN OF CARE
Goal Outcome Evaluation:              Outcome Evaluation: VSS on 4L. NSR-tachy on monitor. complains of pain, given prn meds. NPO midnight.

## 2025-06-17 NOTE — PROGRESS NOTES
Pharmacy to Dose Heparin Infusion Note    Sydnie Gamble is a 65 y.o. female receiving heparin infusion.     Therapy for (VTE/Cardiac): VTE  Patient Weight: 55.8 kg  Initial Bolus (Y/N): No  Any Bolus (Y/N): Yes     Signs or Symptoms of Bleeding: no, chest tube placed 6/12      VTE (PE/DVT)  Initial rate: 18 units/kg/hr (Max 1,500 units/hr)    Anti-Xa Bolus   Dose Infusion Hold   Time Infusion Rate Change (units/kg/hr) Repeat  Anti-Xa   < 0.11 50 units/kg  (4000 units Max) None Increase by  4 units/kg/hr 6 hours   0.11 - 0.19 25 units/kg  (2000 units Max) None Increase by  3 units/kg/hr 6 hours   0.2 - 0.29 0 None Increase by  2 units/kg/hr 6 hours   0.3 - 0.7 0 None No Change 6 hours (after 2 consecutive levels in range check qAM)   0.71 - 0.8 0 None Decrease by  1 units/kg/hr 6 hours   0.81 - 0.9 0 None Decrease by  2 units/kg/hr 6 hours   0.91 - 1 0 60 minutes Decrease by  3 units/kg/hr 6 hours   >1 0 Hold  After Anti-Xa less than 0.7 decrease previous rate by  4 units/kg/hr  Every 2 hours until Anti-Xa less than 0.7 then when infusion restarts in 6 hours     Results from last 7 days   Lab Units 06/16/25  0424 06/15/25  0446 06/14/25  0456 06/13/25  0341 06/12/25  1755   INR   --   --   --   --  1.17*   HEMOGLOBIN g/dL 10.3* 9.9* 10.3*   < > 10.7*   HEMATOCRIT % 34.2 32.2* 33.5*   < > 35.1   PLATELETS 10*3/mm3 285 221 215   < > 192    < > = values in this interval not displayed.       Date   Time   Anti-Xa Current Rate (units/kg/hr) Bolus   (units) Rate Change   (units/kg/hr) New Rate (units/kg/hr) Repeat  Anti-Xa Comments /  Pump Check    6/12 1755 0.48 -- -- +16 +16 0100 Patient previously admitted in May and received treatment Eliquis for DVT. Eliquis filled 5/16/25 for current DVT, not a new clot. Baseline Xa was 0.48 and aptt was 32.7, I question her compliance with Eliquis. Initiated DVT Xa dosing with no initial bolus, chest tube placed 6/12.   LEWIS RN   6/13 0114 0.54 16 -- -- 16 0800 Serjio [address] [place] [address]    6/13 0733 0.55 16 -- -- 16 6/14  AM labs DW RN; pump verified    6/14  0456 0.42 16 -- -- 16 0600 Dave 3243   6/15 0446 0.46 16 -- -- 16 0600 Cherelle 3241 -acb   6/16 0424 0.44 16 -- -- 16 AM labs Jai 3241 -acb   6/17 1043 0.27 OFF -- +16 16 1800 D/w RN. S/p PleurX cath placement, will continue gtt 2/2 recent bilat PE (5/2025). Holding off on bolus 2/2 recent procedure.                                                                                                                                                             Angel Crenshaw, PharmD  6/17/2025  11:50 EDT

## 2025-06-17 NOTE — PLAN OF CARE
Goal Outcome Evaluation:           Progress: no change  Outcome Evaluation: 5L nasal canula, patient complains of pain in the abdominal area. 0.5mg of diluadid given. Pt states medication relieves pain when given. VSS. Normal sinus tachyicardia.

## 2025-06-17 NOTE — PLAN OF CARE
Goal Outcome Evaluation:  Plan of Care Reviewed With: patient        Progress: no change  Outcome Evaluation: Palliative RN saw pt. at 1132.  Pt. sitting up in bed with eyes closed s/p pleureX placement.  Pt. denied pain, nausea and shortness of breath.  Asked pt. when her last BM was and spouse at  interjected that it was yesterday.  Per chart review, pt. possibly had BM today though pt. cannot recall when her last BM was.  Pt. spouse indicated that. he expects his wife to discharge home today.  Pt. follows at palliative clinic.  Palliative care to follow for support and ongoing POC.    0930 Palliative IDT meeting: MD; APRN ; MDiv; RNs   After hours, weekends and holidays, contact Palliative Provider by calling [phone].

## 2025-06-17 NOTE — ANESTHESIA PREPROCEDURE EVALUATION
Anesthesia Evaluation     Patient summary reviewed and Nursing notes reviewed                Airway   Mallampati: II  TM distance: >3 FB  Neck ROM: full  No difficulty expected  Dental - normal exam   (+) upper dentures and lower dentures    Pulmonary - normal exam   (+) pleural effusion (left), a smoker (3/19) Former, lung cancer, COPD, asthma,  Cardiovascular - normal exam    (+) hyperlipidemia    ROS comment:   Echo 5/25: normal EF, no significant valve disease; small (<1cm) pericardial effusion adjacent to the right ventricle, no evidence of cardiac tamponade, there is a left pleural effusion      Neuro/Psych- negative ROS  GI/Hepatic/Renal/Endo    (+) GERD, renal disease- CRI, thyroid problem hypothyroidism    Musculoskeletal (-) negative ROS    Abdominal  - normal exam    Bowel sounds: normal.   Substance History - negative use     OB/GYN negative ob/gyn ROS         Other                      Anesthesia Plan    ASA 3     general and MAC     intravenous induction     Anesthetic plan, risks, benefits, and alternatives have been provided, discussed and informed consent has been obtained with: patient.    Plan discussed with CRNA.    CODE STATUS:    Code Status (Patient has no pulse and is not breathing): CPR (Attempt to Resuscitate)  Medical Interventions (Patient has pulse or is breathing): Full Support  Comments: also talked over with  Jose Francisco  Level Of Support Discussed With: Patient   Next of Kin (If No Surrogate)

## 2025-06-17 NOTE — OP NOTE
DATE OF PROCEDURE: 06/17/25     PREOPERATIVE DIAGNOSES:  1. Lung adenoCa, stage IV  2. Malignant pleural effusion  3. DVT  4. hypothyroidism  5. Hyperlipidemia  6. Respiratory failure on home O2  7. GERD  8. Depression  9. Cervical Ca     POSTOPERATIVE DIAGNOSES:    same     PROCEDURES PERFORMED:    1. Ultrasound-guided percutaneous right pleural indwelling catheter placement (Aspira 15.5Fr)    SURGEON: MCKENNA Gross MD       ASSISTANTS:    1. Kaila Jose PA-C was responsible for performing the following activities: Retraction, Suction, Suturing, and Closing and their skilled assistance was necessary for the success of this case.    Circulator: Sharon Pardo RN  Radiology Technologist: Elva Plaza RT  Scrub Person: Artur Rapp  Nursing Assistant: Aye Seo  Assistant: Kaila Jose PA-C      ANESTHESIA: MAC with local     ESTIMATED BLOOD LOSS: 1 mL.       INDICATIONS:  Mrs. Gamble is an unfortunate 65-year-old woman with stage IV lung adenocarcinoma complicated by recurrent malignant pleural effusion with trapped lung for which indwelling right pleural catheter was placed and after discussion with the patient regarding the risk, benefits, and alternatives to the proposed intervention, she verbalized understanding wish to proceed with surgery, informed sent was obtained.     FINDINGS: Successful placement of indwelling right pleural catheter with drainage of serosanguinous right pleural effusion approx 600mL    DESCRIPTION OF PROCEDURE:  The patient was taken to the operating room and laid on the table in the supine position, after achievement of an adequate level of sedation, the right chest was prepped and draped in the usual sterile fashion and the surgical pause was held in accordance with institutional policy.  After injection of local anesthetic, the right pleural space was accessed with an 18-gauge needle, confirmed by immediate return of serosanguineous fluid, and a wire was  placed into the pleural cavity, a stab incision was made over the wire and the tract was serially dilated up to accommodate the 16 [place] introducer sheath.  A counterincision was made 2 fingerbreadths inferior medial to the costal margin at the same intercostal space, the tract between the 2 was then anesthetized with local, and the catheter was passed from the counterincision to the pleural incision with a tunneler, then the catheter was placed through the peel-away introducer sheath into the pleural cavity, and the sheath was peeled away.  The catheter was then connected to suction, which drained approximately 600 mL of serosanguineous fluid, then it was connected to a Pleur-evac at -20 cm suction.  The catheter was secured in position with a pursestring silk stitch.  The pleural incision was closed with subcuticular absorbable suture and skin glue was applied, followed by sterile dressings.  The procedure was then terminated the drapes taken down.  At the conclusion of the case all sponge, needle and instrument counts are correct x 2.  The patient was then transferred to the [facility] and transported to recovery room in stable condition.    Transfusions: none     Specimen: none     Implants: Aspira 15.5Fr indwelling right pleural catheter     Drains: right pleural drain to pleurevac suction at -20cm     Complications: none      Postoperative Plan: stable to PACU

## 2025-06-17 NOTE — PLAN OF CARE
Goal Outcome Evaluation:              Outcome Evaluation: VSS on 3L. plurex drain site clean, dry and intact. 65ml output from the drain. heprain gtt at 16units/kg/hr. no signs of bleeding.

## 2025-06-17 NOTE — ANESTHESIA POSTPROCEDURE EVALUATION
Patient: Sydnie Gamble    Procedure Summary       Date: 06/17/25 Room / Location:  [facility] /  [facility]    Anesthesia Start: 0722 Anesthesia Stop:     Procedure: PLEURX CATHETER INSERTION (Right: Chest) Diagnosis:       Recurrent pleural effusion      (Recurrent pleural effusion [J90])    Surgeons: Cliff Gross MD Provider: Ramiro Carney MD    Anesthesia Type: general, MAC ASA Status: 3            Anesthesia Type: general, MAC    Vitals  Vitals Value Taken Time   BP     Temp     Pulse 101 06/17/25 08:16   Resp     SpO2 97 % 06/17/25 08:16   Vitals shown include unfiled device data.        Post Anesthesia Care and Evaluation    Patient location during evaluation: PACU  Level of consciousness: responsive to physical stimuli  Pain management: adequate    Airway patency: patent  Anesthetic complications: No anesthetic complications  PONV Status: none  Cardiovascular status: hemodynamically stable and acceptable  Respiratory status: nonlabored ventilation, acceptable and nasal cannula  Hydration status: acceptable

## 2025-06-17 NOTE — PAYOR COMM NOTE
"Ref# [id number]   Clinical Update    KEITH Tan, RN  Utilization Review  Phone [phone]  Fax [phone]    [facility]  [address] [place] [address]         Sol Jones (65 y.o. Female)       Date of Birth   1960    Social Security Number       Address   [address] [place] [address]    Home Phone   [phone]    MRN   [id number]       Gnosticist   Hindu    Marital Status                               Admission Date   6/12/2025    Admission Type   Elective    Admitting Provider   Kailyn Estevez MD    Attending Provider   Kailyn Estevez MD    Department, Room/Bed   [facility] 6B, N643/1       Discharge Date       Discharge Disposition       Discharge Destination                                 Attending Provider: Kailyn Estevez MD    Allergies: No Known Allergies    Isolation: None   Infection: None   Code Status: CPR    Ht: 154.9 cm (60.98\")   Wt: 55.8 kg (123 lb)    Admission Cmt: None   Principal Problem: Recurrent pleural effusion [J90]                   Active Insurance as of 6/12/2025       Primary Coverage       Payor Plan Insurance Group Employer/Plan Group    ANTHEM BLUE CROSS ANTHEM BLUE CROSS BLUE SHIELD PPO [id number]       Payor Plan Address Payor Plan Phone Number Payor Plan Fax Number Effective Dates    [address] [phone]  1/1/2015 - None Entered    [facility] [id number]         Subscriber Name Subscriber Birth Date Member ID       ILDEFONSO SHAHID 2/1/1967 [id number]               Secondary Coverage       Payor Plan Insurance Group Employer/Plan Group    MEDICARE MEDICARE A & B        Payor Plan Address Payor Plan Phone Number Payor Plan Fax Number Effective Dates    [address] [phone]  4/1/2023 - None Entered    [facility] [id number]         Subscriber Name Subscriber Birth Date Member ID       JONESSOL WAITE KRISTIAN 1960 [id number]                     Emergency Contacts       Contact " Person (Rel.) Home Phone Work Phone Mobile Phone    Jose Francisco Diaz (Spouse) [phone] -- [phone]    LEVI ESPITIA (Daughter) [phone] -- [phone]              Oxygen Therapy (last day)       Date/Time SpO2 Device (Oxygen Therapy) Flow (L/min) (Oxygen Therapy) Oxygen Concentration (%) ETCO2 (mmHg)    06/17/25 1400 -- nasal cannula;humidified 3 -- --    06/17/25 1200 -- nasal cannula;humidified 3 -- --    06/17/25 1115 92 -- -- -- --    06/17/25 1000 -- humidified;nasal cannula 3 -- --    06/17/25 0915 93 nasal cannula;high-flow nasal cannula 2 -- --    06/17/25 0845 95 nasal cannula with ETCO2 2 -- --    06/17/25 0830 93 nasal cannula with ETCO2 2 -- --    06/17/25 0815 96 nasal cannula with ETCO2 2 -- --    06/17/25 0814 96 nasal cannula with ETCO2 4 -- --    06/17/25 0634 98 nasal cannula 5 -- --    06/17/25 0400 -- humidified;nasal cannula 5 -- --    06/17/25 0342 96 -- -- -- --    06/17/25 0200 -- humidified;nasal cannula 5 -- --    06/17/25 0000 -- humidified;nasal cannula 5 -- --    06/16/25 2334 96 -- -- -- --    06/16/25 2200 -- humidified;nasal cannula 5 -- --    06/16/25 2102 92 humidified;nasal cannula 5 -- --    06/16/25 2050 92 nasal cannula 5 -- --    06/16/25 2046 -- humidified;nasal cannula 5 -- --    06/16/25 1800 -- nasal cannula 4 -- --    06/16/25 1600 -- nasal cannula 4 -- --    06/16/25 1400 96 nasal cannula 4 -- --    06/16/25 1208 -- nasal cannula 4 -- --    06/16/25 1158 -- nasal cannula 4 -- --    06/16/25 1120 94 -- -- -- --    06/16/25 1000 -- nasal cannula 4 -- --    06/16/25 0902 100 humidified;nasal cannula 4 -- --    06/16/25 0856 93 humidified;nasal cannula 4 -- --    06/16/25 0800 -- nasal cannula;humidified 4 -- --    06/16/25 0618 -- humidified;nasal cannula 4 -- --    06/16/25 0411 95 -- -- -- --    06/16/25 0400 -- humidified;nasal cannula 4 -- --    06/16/25 0218 -- humidified;nasal cannula 4 -- --    06/16/25 0014 -- humidified;nasal cannula 4 -- --    06/16/25  0009 97 -- -- -- --          Current Facility-Administered Medications   Medication Dose Route Frequency Provider Last Rate Last Admin    acetaminophen (TYLENOL) tablet 650 mg  650 mg Oral Q4H PRN Hamilton, Kaila, PA-C        Or    acetaminophen (TYLENOL) 160 MG/5ML oral solution 650 mg  650 mg Oral Q4H PRN Damon, Kaila, PA-C        Or    acetaminophen (TYLENOL) suppository 650 mg  650 mg Rectal Q4H PRN Hamilton, Kaila, PA-C        arformoterol (BROVANA) nebulizer solution 15 mcg  15 mcg Nebulization BID - RT Hamilton, Kaila, PA-C   15 mcg at 06/16/25 2102    And    budesonide (PULMICORT) nebulizer solution 0.5 mg  0.5 mg Nebulization BID - RT Damon, Kaila, PA-C   0.5 mg at 06/16/25 2102    And    revefenacin (YUPELRI) nebulizer solution 175 mcg  175 mcg Nebulization Daily - RT Hamilton, Kaila, PA-C   175 mcg at 06/16/25 0856    atorvastatin (LIPITOR) tablet 10 mg  10 mg Oral Nightly Hamilton, Kaila, PA-C   10 mg at 06/16/25 2045    sennosides-docusate (PERICOLACE) 8.6-50 MG per tablet 2 tablet  2 tablet Oral BID Hamilton, Kaila, PA-C   2 tablet at 06/16/25 2046    And    polyethylene glycol (MIRALAX) packet 17 g  17 g Oral Daily PRN Damon, Kaila, PA-C        And    bisacodyl (DULCOLAX) EC tablet 5 mg  5 mg Oral Daily PRN Hamilton, Kaila, PA-C   5 mg at 06/15/25 0924    And    bisacodyl (DULCOLAX) suppository 10 mg  10 mg Rectal Daily PRN Damon, Kaila, PA-C        bisacodyl (DULCOLAX) suppository 10 mg  10 mg Rectal Once Hamilton, Kaila, PA-C        Calcium Replacement - Follow Nurse / BPA Driven Protocol   Not Applicable PRN Damon, Kaila, PA-C        clonazePAM (KlonoPIN) tablet 1 mg  1 mg Oral BID PRN Kaylene Reynoso, APRN   1 mg at 06/15/25 1558    cyclobenzaprine (FLEXERIL) tablet 10 mg  10 mg Oral BID PRN Kaila Jose PA-C   10 mg at 06/14/25 2235    Diclofenac Sodium (VOLTAREN) 1 % gel 2 g  2 g Topical 4x Daily Kaila Jose PA-C   2 g at 06/17/25 1159    famotidine (PEPCID) tablet 40 mg   40 mg Oral BID PRN Kaila Jose PA-C        heparin 21480 units/250 mL (100 units/mL) in 0.45 % NaCl infusion  16 Units/kg/hr Intravenous Titrated Angel Crenshaw, PharmJAKE 8.92 mL/hr at 06/17/25 1157 16 Units/kg/hr at 06/17/25 1157    HYDROmorphone (DILAUDID) injection 0.5 mg  0.5 mg Intravenous Q2H PRN Kaylene Reynoso APRN   0.5 mg at 06/17/25 1538    And    naloxone (NARCAN) injection 0.4 mg  0.4 mg Intravenous Q5 Min PRN Kaylene Reynoso APRN        ipratropium-albuterol (DUO-NEB) nebulizer solution 3 mL  3 mL Nebulization Q4H PRN Kaila Jose PA-C   3 mL at 06/15/25 1719    [START ON 6/18/2025] lactated ringers infusion  9 mL/hr Intravenous Continuous Ramiro Carney MD        levothyroxine (SYNTHROID, LEVOTHROID) tablet 88 mcg  88 mcg Oral Q AM Kaila Jose PA-C   88 mcg at 06/17/25 0945    lidocaine PF 1% (XYLOCAINE) injection 5 mL  5 mL Injection Once Kaila Jose PA-C        Magnesium Standard Dose Replacement - Follow Nurse / BPA Driven Protocol   Not Applicable PRN Kaila Jose PA-C        mineral oil enema 1 enema  1 enema Rectal Once Kaylene Reynoso APRN        nystatin (MYCOSTATIN) 100,000 unit/mL suspension 500,000 Units  5 mL Oral 4x Daily Kaila Jose PA-C   500,000 Units at 06/17/25 1159    ondansetron ODT (ZOFRAN-ODT) disintegrating tablet 4 mg  4 mg Oral Q6H PRN Kaila Jose PA-C        Or    ondansetron (ZOFRAN) injection 4 mg  4 mg Intravenous Q6H PRN Kaila Jose PA-C        oxyCODONE-acetaminophen (PERCOCET) 5-325 MG per tablet 1 tablet  1 tablet Oral Q4H PRN Kaila Jose PA-C   1 tablet at 06/16/25 1028    Pharmacy to Dose Heparin   Not Applicable Continuous PRN Kaila Jose PA-C        Phosphorus Replacement - Follow Nurse / BPA Driven Protocol   Not Applicable PRKaila Wise PA-C        Potassium Replacement - Follow Nurse / BPA Driven Protocol   Not Applicable PRKaila Wise PA-C        sertraline (ZOLOFT) tablet 100 mg  100 mg Oral Daily  LibertyValoriei, PA-C   100 mg at 06/17/25 0945    simethicone (MYLICON) chewable tablet 80 mg  80 mg Oral 4x Daily PRN Damon, Kaila, PA-C   80 mg at 06/15/25 2040    sodium chloride 0.9 % flush 10 mL  10 mL Intravenous PRN Liberty, Kaila, PA-C        sodium chloride 0.9 % infusion 40 mL  40 mL Intravenous PRN Liberty, Kaila, PA-C        traZODone (DESYREL) tablet 150 mg  150 mg Oral Nightly Liberty, Kaila, PA-C   150 mg at 06/16/25 2046    zolpidem (AMBIEN) tablet 5 mg  5 mg Oral Nightly PRN Liberty, Kaila, PA-C         Lab Results (last 48 hours)       Procedure Component Value Units Date/Time    Potassium [[id number]]  (Normal) Collected: 06/17/25 1043    Specimen: Blood Updated: 06/17/25 1112     Potassium 4.1 mmol/L      Comment: Slight hemolysis detected by analyzer. Result may be falsely elevated.       Heparin Anti-Xa [[id number]]  (Abnormal) Collected: 06/17/25 1043    Specimen: Blood Updated: 06/17/25 1111     Heparin Anti-Xa (UFH) 0.27 IU/ml     CBC & Differential [[id number]]  (Abnormal) Collected: 06/16/25 0424    Specimen: Blood Updated: 06/16/25 0513    Narrative:      The following orders were created for panel order CBC & Differential.  Procedure                               Abnormality         Status                     ---------                               -----------         ------                     CBC Auto Differential[[id number]]        Abnormal            Final result                 Please view results for these tests on the individual orders.    CBC Auto Differential [[id number]]  (Abnormal) Collected: 06/16/25 0424    Specimen: Blood Updated: 06/16/25 0513     WBC 18.84 10*3/mm3      RBC 3.74 10*6/mm3      Hemoglobin 10.3 g/dL      Hematocrit 34.2 %      MCV 91.4 fL      MCH 27.5 pg      MCHC 30.1 g/dL      RDW 15.5 %      RDW-SD 50.4 fl      MPV 10.7 fL      Platelets 285 10*3/mm3      Neutrophil % 77.6 %      Lymphocyte % 4.2 %      Monocyte % 8.0 %      Eosinophil % 5.7 %       Basophil % 0.5 %      Immature Grans % 4.0 %      Neutrophils, Absolute 14.61 10*3/mm3      Lymphocytes, Absolute 0.80 10*3/mm3      Monocytes, Absolute 1.50 10*3/mm3      Eosinophils, Absolute 1.08 10*3/mm3      Basophils, Absolute 0.09 10*3/mm3      Immature Grans, Absolute 0.76 10*3/mm3      nRBC 0.2 /100 WBC     Basic Metabolic Panel [[id number]]  (Abnormal) Collected: 06/16/25 0424    Specimen: Blood Updated: 06/16/25 0507     Glucose 117 mg/dL      BUN 25.5 mg/dL      Creatinine 0.87 mg/dL      Sodium 131 mmol/L      Potassium 4.3 mmol/L      Chloride 94 mmol/L      CO2 26.0 mmol/L      Calcium 7.5 mg/dL      BUN/Creatinine Ratio 29.3     Anion Gap 11.0 mmol/L      eGFR 73.1 mL/min/1.73     Narrative:      GFR Categories in Chronic Kidney Disease (CKD)              GFR Category          GFR (mL/min/1.73)    Interpretation  G1                    90 or greater        Normal or high (1)  G2                    60-89                Mild decrease (1)  G3a                   45-59                Mild to moderate decrease  G3b                   30-44                Moderate to severe decrease  G4                    15-29                Severe decrease  G5                    14 or less           Kidney failure    (1)In the absence of evidence of kidney disease, neither GFR category G1 or G2 fulfill the criteria for CKD.    eGFR calculation 2021 CKD-EPI creatinine equation, which does not include race as a factor    Heparin Anti-Xa [[id number]]  (Normal) Collected: 06/16/25 0424    Specimen: Blood Updated: 06/16/25 0506     Heparin Anti-Xa (UFH) 0.44 IU/ml           Imaging Results (Last 48 Hours)       Procedure Component Value Units Date/Time    XR Chest 1 View [[id number]] Collected: 06/17/25 0904     Updated: 06/17/25 0909    Narrative:      XR CHEST 1 VW    Date of Exam: 6/17/2025 8:34 AM EDT    Indication: postop    Comparison: 6/17/2025 earlier same day    Findings:  There has been interval placement of a right  basilar thoracostomy tube, with significant decrease in amount of right pleural fluid. There is persistent fluid at the apex and a small amount of the base and minor fissure which may be loculated. No   pneumothorax identified. There is consolidation in the right mid and lower lung which may be due to residual atelectasis or infiltrate. Small amount of left pleural fluid appears unchanged. Heart size and mediastinal contour appear unchanged.      Impression:      Impression:  Interval placement of right basilar thoracostomy tube with significant decrease in amount of right pleural fluid. There is persistent fluid at the apex and base which may be loculated. No pneumothorax.        Electronically Signed: Rodriguez Napier MD    6/17/2025 9:06 AM EDT    Workstation ID: [id number]    XR Chest 1 View [[id number]] Collected: 06/17/25 0804     Updated: 06/17/25 0814    Narrative:      XR CHEST 1 VW    Date of Exam: 6/17/2025 2:45 AM EDT    Indication: Recurrent pleural effusions    Comparison: 6/16/2025    Findings:  There has been slight further increase in right pleural fluid as compared to yesterday's study. There is consolidation in the overlying right lung suggesting atelectasis or infiltrate. There is a small left pleural effusion which is slightly increased   from yesterday. No pneumothorax identified. Pulmonary vascularity appears within normal limits. Heart size appears stable.      Impression:      Impression:  1.Interval increase in bilateral pleural effusions, right greater than left.  2.Consolidation in the right lung base may be due to atelectasis or infiltrate.        Electronically Signed: Rodriguez Napier MD    6/17/2025 8:11 AM EDT    Workstation ID: [id number]    XR Chest 1 View [[id number]] Collected: 06/16/25 0746     Updated: 06/16/25 0750    Narrative:      XR CHEST 1 VW    Date of Exam: 6/16/2025 5:35 AM EDT    Indication: Recurrent pleural effusions    Comparison: 6/15/2025    Findings:  Right pleural drain has  been removed and there is apparently increasing right basilar effusion. Right midlung interstitial changes are stable. There is minimally increased left basilar interstitial opacity perhaps a small focus of atelectasis. Heart and   vasculature appear to be normal in size. No pneumothorax is seen.      Impression:      Impression:  Increasing right basilar effusion following right pleural drain removal.      Electronically Signed: Marlon Lyons MD    6/16/2025 7:47 AM EDT    Workstation ID: [id number]             Operative/Procedure Notes (last 72 hours)        Cliff Gross MD at 06/17/25 0744          DATE OF PROCEDURE: 06/17/25     PREOPERATIVE DIAGNOSES:  1. Lung adenoCa, stage IV  2. Malignant pleural effusion  3. DVT  4. hypothyroidism  5. Hyperlipidemia  6. Respiratory failure on home O2  7. GERD  8. Depression  9. Cervical Ca     POSTOPERATIVE DIAGNOSES:    same     PROCEDURES PERFORMED:    1. Ultrasound-guided percutaneous right pleural indwelling catheter placement (Aspira 15.5Fr)    SURGEON: MCKENNA Gross MD       ASSISTANTS:    1. Kaila Jose PA-C was responsible for performing the following activities: Retraction, Suction, Suturing, and Closing and their skilled assistance was necessary for the success of this case.    Circulator: Sharon Pardo, YVETTE  Radiology Technologist: Elva Plaza RT  Scrub Person: Artur Rapp  Nursing Assistant: Aye Seo  Assistant: Kaila Jose PA-C      ANESTHESIA: MAC with local     ESTIMATED BLOOD LOSS: 1 mL.       INDICATIONS:  Mrs. Gamble is an unfortunate 65-year-old woman with stage IV lung adenocarcinoma complicated by recurrent malignant pleural effusion with trapped lung for which indwelling right pleural catheter was placed and after discussion with the patient regarding the risk, benefits, and alternatives to the proposed intervention, she verbalized understanding wish to proceed with surgery, informed sent was obtained.     FINDINGS:  Successful placement of indwelling right pleural catheter with drainage of serosanguinous right pleural effusion approx 600mL    DESCRIPTION OF PROCEDURE:  The patient was taken to the operating room and laid on the table in the supine position, after achievement of an adequate level of sedation, the right chest was prepped and draped in the usual sterile fashion and the surgical pause was held in accordance with institutional policy.  After injection of local anesthetic, the right pleural space was accessed with an 18-gauge needle, confirmed by immediate return of serosanguineous fluid, and a wire was placed into the pleural cavity, a stab incision was made over the wire and the tract was serially dilated up to accommodate the 16 Gambian introducer sheath.  A counterincision was made 2 fingerbreadths inferior medial to the costal margin at the same intercostal space, the tract between the 2 was then anesthetized with local, and the catheter was passed from the counterincision to the pleural incision with a tunneler, then the catheter was placed through the peel-away introducer sheath into the pleural cavity, and the sheath was peeled away.  The catheter was then connected to suction, which drained approximately 600 mL of serosanguineous fluid, then it was connected to a Pleur-evac at -20 cm suction.  The catheter was secured in position with a pursestring silk stitch.  The pleural incision was closed with subcuticular absorbable suture and skin glue was applied, followed by sterile dressings.  The procedure was then terminated the drapes taken down.  At the conclusion of the case all sponge, needle and instrument counts are correct x 2.  The patient was then transferred to the [facility] and transported to recovery room in stable condition.    Transfusions: none     Specimen: none     Implants: Aspira 15.5Fr indwelling right pleural catheter     Drains: right pleural drain to pleurevac suction at -20cm      Complications: none      Postoperative Plan: stable to PACU    Electronically signed by Cliff Gross MD at 06/17/25 0851          Physician Progress Notes (last 72 hours)        Sejal Mckenzie MD at 06/17/25 1312          DATE OF VISIT: 6/16/2025    Chief Complaint: Followup for metastatic right lung cancer     SUBJECTIVE: The patient is laying comfortable in bed.  Her  is at the bedside.  Denied any fever or chills.  She is sleepy after the procedure.  She did have Pleurx catheter inserted earlier on this morning.    REVIEW OF SYSTEMS: All the other 9 systems are reviewed by me and negative  except what is mentioned in HPI.     Past History:  Medical History: has a past medical history of Acid reflux, Acid reflux, Adenocarcinoma of lung (08/12/2024), Anemia, chronic disease (5/13/2025), Anxiety (1976), Arthritis, Atherosclerotic cardiovascular disease (03/25/2024), Cervical cancer, Depression, Emphysema of lung, History of radiation therapy (11/20/2020), History of radiation therapy (10/04/2024), radiation therapy, Hyperlipidemia, Hypothyroidism, Kidney disease, Lung cancer, Lung nodule, Ovarian cyst (1980s), Pleural effusion (5/13/2025), Pulmonary embolism (5/13/2025), Visual impairment (corrected with glasses), Wears dentures, and Wears glasses.   Surgical History: has a past surgical history that includes Tubal ligation; total abdominal hysterectomy pelvic node dissection (N/A, 08/18/2020); Lymph node biopsy; Subtotal Hysterectomy (1987?); Bronchoscopy; Lung biopsy; BRONCHOSCOPY WITH ION ROBOTIC ASSIST (N/A, 08/06/2024); Mohs surgery (03/24/2025); Hysteroscopy; and Colonoscopy.   Family History: family history includes Anxiety disorder in her mother; Asthma in her mother; COPD in her mother; Cancer in her father, maternal aunt, maternal aunt, maternal aunt, maternal aunt, maternal grandmother, and mother; Depression in her mother; Early death in her father; Emphysema in her mother; Heart attack in  her maternal grandfather; Heart disease in her maternal grandfather; Hypertension in her mother; Melanoma in her mother; Mental illness in her mother; Uterine cancer in her mother.   Social History: reports that she quit smoking about 6 years ago. Her smoking use included cigarettes. She started smoking about 53 years ago. She has a 67.5 pack-year smoking history. She has been exposed to tobacco smoke. She has never used smokeless tobacco. She reports that she does not drink alcohol and does not use drugs.    Medications Prior to Admission   Medication Sig Dispense Refill Last Dose/Taking    albuterol sulfate  (90 Base) MCG/ACT inhaler Inhale 2 puffs Every 4 (Four) to 6 (Six) Hours As Needed for Wheezing or Shortness of Air.   Taking As Needed    atorvastatin (LIPITOR) 10 MG tablet TAKE 1 TABLET BY MOUTH EVERY DAY AT NIGHT 90 tablet 1 6/11/2025    clonazePAM (KlonoPIN) 1 MG tablet Take 1 tablet by mouth 2 (Two) Times a Day As Needed for Anxiety. 50 tablet 0 6/12/2025 Morning    Diclofenac Sodium (VOLTAREN) 1 % gel gel Apply 1 gram topically to indicated area 4 times daily as needed for mild to moderate pain. STOP FLEXERIL 100 g 2 Taking    docusate sodium (COLACE) 100 MG capsule Take 2 capsules by mouth Daily. 60 capsule 3 6/12/2025 Morning    famotidine (PEPCID) 20 MG tablet TAKE 1 TABLET BY MOUTH TWICE DAILY OR TAKE 2 TABLETS BY MOUTH ONCE DAILY AS NEEDED FOR HEARTBURN 180 tablet 1 Taking    Fluticasone-Umeclidin-Vilant (Trelegy Ellipta) 100-62.5-25 MCG/ACT inhaler Inhale 1 puff Daily. 90 each 3 6/12/2025 Morning    levothyroxine (Synthroid) 88 MCG tablet Take 1 tablet by mouth Every Morning. 90 tablet 1 6/11/2025    ondansetron (ZOFRAN) 8 MG tablet Take 1 tablet by mouth 3 (Three) Times a Day As Needed for Nausea or Vomiting. 30 tablet 5 Taking As Needed    oxyCODONE-acetaminophen (PERCOCET) 5-325 MG per tablet Take 1 tablet by mouth Every 6 (Six) Hours As Needed for Moderate Pain. 120 tablet 0 6/12/2025  Morning    sertraline (Zoloft) 100 MG tablet Take 1 tablet by mouth Daily. 90 tablet 1 6/11/2025    traZODone (DESYREL) 150 MG tablet TAKE 1 TABLET BY MOUTH EVERY DAY AT NIGHT 90 tablet 3 6/11/2025    zolpidem (AMBIEN) 10 MG tablet Take 1 tablet by mouth At Night As Needed for Sleep. 30 tablet 1 Past Month    [DISCONTINUED] apixaban (ELIQUIS) 5 MG tablet tablet Take 2 tablets by mouth Every 12 (Twelve) Hours for 6 days, THEN 1 tablet Every 12 (Twelve) Hours for 24 days. Indications: DVT/PE (active thrombosis) 48 tablet 5 6/11/2025 Morning    cyclobenzaprine (FLEXERIL) 10 MG tablet TAKE 1 TABLET BY MOUTH 2 TIMES A DAY AS NEEDED FOR MUSCLE SPASMS. 60 tablet 3       Allergies: Patient has no known allergies.     Current Facility-Administered Medications:     acetaminophen (TYLENOL) tablet 650 mg, 650 mg, Oral, Q4H PRN **OR** acetaminophen (TYLENOL) 160 MG/5ML oral solution 650 mg, 650 mg, Oral, Q4H PRN **OR** acetaminophen (TYLENOL) suppository 650 mg, 650 mg, Rectal, Q4H PRN, Nuha Shafer, APRN    arformoterol (BROVANA) nebulizer solution 15 mcg, 15 mcg, Nebulization, BID - RT, 15 mcg at 06/16/25 0856 **AND** budesonide (PULMICORT) nebulizer solution 0.5 mg, 0.5 mg, Nebulization, BID - RT, 0.5 mg at 06/16/25 0856 **AND** revefenacin (YUPELRI) nebulizer solution 175 mcg, 175 mcg, Nebulization, Daily - RT, Nuha Shafer, APRN, 175 mcg at 06/16/25 0856    atorvastatin (LIPITOR) tablet 10 mg, 10 mg, Oral, Nightly, Nuha Shafer, APRN, 10 mg at 06/15/25 2040    sennosides-docusate (PERICOLACE) 8.6-50 MG per tablet 2 tablet, 2 tablet, Oral, BID, 2 tablet at 06/16/25 0905 **AND** polyethylene glycol (MIRALAX) packet 17 g, 17 g, Oral, Daily PRN **AND** bisacodyl (DULCOLAX) EC tablet 5 mg, 5 mg, Oral, Daily PRN, 5 mg at 06/15/25 0924 **AND** bisacodyl (DULCOLAX) suppository 10 mg, 10 mg, Rectal, Daily PRN, Nuha Shafer, APRN    bisacodyl (DULCOLAX) suppository 10 mg, 10 mg, Rectal, Once, Kaylene Reynoso  FREDI, APRN    Calcium Replacement - Follow Nurse / BPA Driven Protocol, , Not Applicable, PRN, Nuha Shafer, APRN    clonazePAM (KlonoPIN) tablet 1 mg, 1 mg, Oral, BID PRN, Nuha Shafer APRN, 1 mg at 06/15/25 1558    cyclobenzaprine (FLEXERIL) tablet 10 mg, 10 mg, Oral, BID PRN, Nuha Shafer APRN, 10 mg at 06/14/25 2235    Diclofenac Sodium (VOLTAREN) 1 % gel 2 g, 2 g, Topical, 4x Daily, Kaylene Reynoso APRN, 2 g at 06/16/25 1242    famotidine (PEPCID) tablet 40 mg, 40 mg, Oral, BID PRN, Nuha Shafer APRN    heparin 06791 units/250 mL (100 units/mL) in 0.45 % NaCl infusion, 16 Units/kg/hr, Intravenous, Titrated, Nuha Shafer APRN, Last Rate: 8.92 mL/hr at 06/16/25 0656, 16 Units/kg/hr at 06/16/25 0656    HYDROmorphone (DILAUDID) injection 0.5 mg, 0.5 mg, Intravenous, Q2H PRN, 0.5 mg at 06/16/25 1158 **AND** naloxone (NARCAN) injection 0.4 mg, 0.4 mg, Intravenous, Q5 Min PRN, Nuha Shafer, DAYSI    ipratropium-albuterol (DUO-NEB) nebulizer solution 3 mL, 3 mL, Nebulization, Q4H PRN, Trina Hdz APRN, 3 mL at 06/15/25 1719    levothyroxine (SYNTHROID, LEVOTHROID) tablet 88 mcg, 88 mcg, Oral, Q AM, Nuha Shafer APRN, 88 mcg at 06/16/25 0613    lidocaine PF 1% (XYLOCAINE) injection 5 mL, 5 mL, Injection, Once, Jose Hope MD    Magnesium Standard Dose Replacement - Follow Nurse / BPA Driven Protocol, , Not Applicable, PRN, Nuha Shafer, APRNICKY    nystatin (MYCOSTATIN) 100,000 unit/mL suspension 500,000 Units, 5 mL, Oral, 4x Daily, Trina Hdz APRN, 500,000 Units at 06/16/25 1242    ondansetron ODT (ZOFRAN-ODT) disintegrating tablet 4 mg, 4 mg, Oral, Q6H PRN **OR** ondansetron (ZOFRAN) injection 4 mg, 4 mg, Intravenous, Q6H PRN, Nuha Shafer, DAYSI    oxyCODONE-acetaminophen (PERCOCET) 5-325 MG per tablet 1 tablet, 1 tablet, Oral, Q4H PRN, Nuha Shafer APRN, 1 tablet at 06/16/25 1028    Pharmacy to Dose Heparin, , Not Applicable,  "Continuous PRN, Nuha Shafer APRN    Phosphorus Replacement - Follow Nurse / BPA Driven Protocol, , Not Applicable, PRN, Nuha Shafer APRN    Potassium Replacement - Follow Nurse / BPA Driven Protocol, , Not Applicable, PRHollis SAUNDERS Shannon D, APRN    sertraline (ZOLOFT) tablet 100 mg, 100 mg, Oral, Daily, Nuha Shafer APRN, 100 mg at 06/16/25 0904    simethicone (MYLICON) chewable tablet 80 mg, 80 mg, Oral, 4x Daily PRN, Trina Hdz APRN, 80 mg at 06/15/25 2040    sodium chloride 0.9 % flush 10 mL, 10 mL, Intravenous, Q12H, Jose Hope MD, 10 mL at 06/16/25 0905    sodium chloride 0.9 % flush 10 mL, 10 mL, Intravenous, PRN, Jose Hope MD    sodium chloride 0.9 % flush 10 mL, 10 mL, Intravenous, Q12H, Nuha Shafer APRN, 10 mL at 06/16/25 0905    sodium chloride 0.9 % flush 10 mL, 10 mL, Intravenous, PRN, Nuha Shafer APRN    sodium chloride 0.9 % infusion 40 mL, 40 mL, Intravenous, PRN, Jose Hope MD    sodium chloride 0.9 % infusion 40 mL, 40 mL, Intravenous, PRN, Nuha Shafer APRN    traZODone (DESYREL) tablet 150 mg, 150 mg, Oral, Nightly, Nuha Shafer APRN, 150 mg at 06/15/25 2040    zolpidem (AMBIEN) tablet 5 mg, 5 mg, Oral, Nightly PRN, Nuha Shafer APRN    PHYSICAL EXAMINATION:   /69 (BP Location: Left arm, Patient Position: Lying)   Pulse 100   Temp 97.7 °F (36.5 °C) (Axillary)   Resp 18   Ht 154.9 cm (60.98\")   Wt 55.8 kg (123 lb)   LMP  (LMP Unknown)   SpO2 94%   BMI 23.25 kg/m²                ECOG Performance Status: 2 - Symptomatic, <50% confined to bed  GENERAL: Age appropriate. No acute distress.   NEURO/PSYCH: A&O x 3, strength 5/5 in all muscle groups  HEENT: Head atraumatic, normocephalic.   NECK: Supple. No JVD. No lymphadenopathy.   LUNGS: Clear to auscultation bilaterally. No wheezing. No rhonchi.   HEART: Regular rate and rhythm. S1, S2, no murmurs.   ABDOMEN: Soft, nontender, nondistended. " Bowel sounds positive. No  hepatosplenomegaly.   EXTREMITIES: No clubbing, cyanosis, or edema.   SKIN: No rashes. No purpura.       Admission on 06/12/2025   Component Date Value Ref Range Status    Glucose 06/12/2025 117 (H)  65 - 99 mg/dL Final    BUN 06/12/2025 21.7  8.0 - 23.0 mg/dL Final    Creatinine 06/12/2025 0.87  0.57 - 1.00 mg/dL Final    Sodium 06/12/2025 136  136 - 145 mmol/L Final    Potassium 06/12/2025 5.0  3.5 - 5.2 mmol/L Final    Chloride 06/12/2025 97 (L)  98 - 107 mmol/L Final    CO2 06/12/2025 28.0  22.0 - 29.0 mmol/L Final    Calcium 06/12/2025 7.8 (L)  8.6 - 10.5 mg/dL Final    Total Protein 06/12/2025 6.1  6.0 - 8.5 g/dL Final    Albumin 06/12/2025 2.8 (L)  3.5 - 5.2 g/dL Final    ALT (SGPT) 06/12/2025 26  1 - 33 U/L Final    AST (SGOT) 06/12/2025 49 (H)  1 - 32 U/L Final    Alkaline Phosphatase 06/12/2025 455 (H)  39 - 117 U/L Final    Total Bilirubin 06/12/2025 0.2  0.0 - 1.2 mg/dL Final    Globulin 06/12/2025 3.3  gm/dL Final    Calculated Result    A/G Ratio 06/12/2025 0.8  g/dL Final    BUN/Creatinine Ratio 06/12/2025 24.9  7.0 - 25.0 Final    Anion Gap 06/12/2025 11.0  5.0 - 15.0 mmol/L Final    eGFR 06/12/2025 73.1  >60.0 mL/min/1.73 Final    Heparin Anti-Xa (UFH) 06/12/2025 0.48  0.30 - 0.70 IU/ml Final    Protime 06/12/2025 15.6 (H)  12.2 - 15.3 Seconds Final    INR 06/12/2025 1.17 (H)  0.89 - 1.12 Final    PTT 06/12/2025 32.7 (L)  60.0 - 90.0 seconds Final    WBC 06/12/2025 17.59 (H)  3.40 - 10.80 10*3/mm3 Final    RBC 06/12/2025 3.88  3.77 - 5.28 10*6/mm3 Final    Hemoglobin 06/12/2025 10.7 (L)  12.0 - 15.9 g/dL Final    Hematocrit 06/12/2025 35.1  34.0 - 46.6 % Final    MCV 06/12/2025 90.5  79.0 - 97.0 fL Final    MCH 06/12/2025 27.6  26.6 - 33.0 pg Final    MCHC 06/12/2025 30.5 (L)  31.5 - 35.7 g/dL Final    RDW 06/12/2025 14.8  12.3 - 15.4 % Final    RDW-SD 06/12/2025 47.6  37.0 - 54.0 fl Final    MPV 06/12/2025 10.0  6.0 - 12.0 fL Final    Platelets 06/12/2025 192  140 - 450  10*3/mm3 Final    Neutrophil % 06/12/2025 80.4 (H)  42.7 - 76.0 % Final    Lymphocyte % 06/12/2025 4.2 (L)  19.6 - 45.3 % Final    Monocyte % 06/12/2025 8.2  5.0 - 12.0 % Final    Eosinophil % 06/12/2025 3.9  0.3 - 6.2 % Final    Basophil % 06/12/2025 0.5  0.0 - 1.5 % Final    Immature Grans % 06/12/2025 2.8 (H)  0.0 - 0.5 % Final    Neutrophils, Absolute 06/12/2025 14.13 (H)  1.70 - 7.00 10*3/mm3 Final    Lymphocytes, Absolute 06/12/2025 0.74  0.70 - 3.10 10*3/mm3 Final    Monocytes, Absolute 06/12/2025 1.45 (H)  0.10 - 0.90 10*3/mm3 Final    Eosinophils, Absolute 06/12/2025 0.68 (H)  0.00 - 0.40 10*3/mm3 Final    Basophils, Absolute 06/12/2025 0.09  0.00 - 0.20 10*3/mm3 Final    Immature Grans, Absolute 06/12/2025 0.50 (H)  0.00 - 0.05 10*3/mm3 Final    nRBC 06/12/2025 0.0  0.0 - 0.2 /100 WBC Final    Heparin Anti-Xa (UFH) 06/13/2025 0.54  0.30 - 0.70 IU/ml Final    WBC 06/13/2025 20.73 (H)  3.40 - 10.80 10*3/mm3 Final    RBC 06/13/2025 3.77  3.77 - 5.28 10*6/mm3 Final    Hemoglobin 06/13/2025 10.5 (L)  12.0 - 15.9 g/dL Final    Hematocrit 06/13/2025 33.9 (L)  34.0 - 46.6 % Final    MCV 06/13/2025 89.9  79.0 - 97.0 fL Final    MCH 06/13/2025 27.9  26.6 - 33.0 pg Final    MCHC 06/13/2025 31.0 (L)  31.5 - 35.7 g/dL Final    RDW 06/13/2025 14.8  12.3 - 15.4 % Final    RDW-SD 06/13/2025 47.6  37.0 - 54.0 fl Final    MPV 06/13/2025 10.1  6.0 - 12.0 fL Final    Platelets 06/13/2025 220  140 - 450 10*3/mm3 Final    Neutrophil % 06/13/2025 77.0 (H)  42.7 - 76.0 % Final    Lymphocyte % 06/13/2025 5.7 (L)  19.6 - 45.3 % Final    Monocyte % 06/13/2025 8.6  5.0 - 12.0 % Final    Eosinophil % 06/13/2025 5.9  0.3 - 6.2 % Final    Basophil % 06/13/2025 0.5  0.0 - 1.5 % Final    Immature Grans % 06/13/2025 2.3 (H)  0.0 - 0.5 % Final    Neutrophils, Absolute 06/13/2025 15.98 (H)  1.70 - 7.00 10*3/mm3 Final    Lymphocytes, Absolute 06/13/2025 1.18  0.70 - 3.10 10*3/mm3 Final    Monocytes, Absolute 06/13/2025 1.78 (H)  0.10 - 0.90  10*3/mm3 Final    Eosinophils, Absolute 06/13/2025 1.22 (H)  0.00 - 0.40 10*3/mm3 Final    Basophils, Absolute 06/13/2025 0.10  0.00 - 0.20 10*3/mm3 Final    Immature Grans, Absolute 06/13/2025 0.47 (H)  0.00 - 0.05 10*3/mm3 Final    nRBC 06/13/2025 0.1  0.0 - 0.2 /100 WBC Final    Heparin Anti-Xa (UFH) 06/13/2025 0.55  0.30 - 0.70 IU/ml Final    Heparin Anti-Xa (UFH) 06/14/2025 0.42  0.30 - 0.70 IU/ml Final    Glucose 06/14/2025 108 (H)  65 - 99 mg/dL Final    BUN 06/14/2025 23.0  8.0 - 23.0 mg/dL Final    Creatinine 06/14/2025 0.85  0.57 - 1.00 mg/dL Final    Sodium 06/14/2025 134 (L)  136 - 145 mmol/L Final    Potassium 06/14/2025 4.2  3.5 - 5.2 mmol/L Final    Chloride 06/14/2025 97 (L)  98 - 107 mmol/L Final    CO2 06/14/2025 25.0  22.0 - 29.0 mmol/L Final    Calcium 06/14/2025 7.7 (L)  8.6 - 10.5 mg/dL Final    BUN/Creatinine Ratio 06/14/2025 27.1 (H)  7.0 - 25.0 Final    Anion Gap 06/14/2025 12.0  5.0 - 15.0 mmol/L Final    eGFR 06/14/2025 75.2  >60.0 mL/min/1.73 Final    WBC 06/14/2025 21.39 (H)  3.40 - 10.80 10*3/mm3 Final    RBC 06/14/2025 3.72 (L)  3.77 - 5.28 10*6/mm3 Final    Hemoglobin 06/14/2025 10.3 (L)  12.0 - 15.9 g/dL Final    Hematocrit 06/14/2025 33.5 (L)  34.0 - 46.6 % Final    MCV 06/14/2025 90.1  79.0 - 97.0 fL Final    MCH 06/14/2025 27.7  26.6 - 33.0 pg Final    MCHC 06/14/2025 30.7 (L)  31.5 - 35.7 g/dL Final    RDW 06/14/2025 15.1  12.3 - 15.4 % Final    RDW-SD 06/14/2025 48.9  37.0 - 54.0 fl Final    MPV 06/14/2025 10.7  6.0 - 12.0 fL Final    Platelets 06/14/2025 215  140 - 450 10*3/mm3 Final    Neutrophil % 06/14/2025 79.5 (H)  42.7 - 76.0 % Final    Lymphocyte % 06/14/2025 4.3 (L)  19.6 - 45.3 % Final    Monocyte % 06/14/2025 7.8  5.0 - 12.0 % Final    Eosinophil % 06/14/2025 5.2  0.3 - 6.2 % Final    Basophil % 06/14/2025 0.4  0.0 - 1.5 % Final    Immature Grans % 06/14/2025 2.8 (H)  0.0 - 0.5 % Final    Neutrophils, Absolute 06/14/2025 17.02 (H)  1.70 - 7.00 10*3/mm3 Final     Lymphocytes, Absolute 06/14/2025 0.91  0.70 - 3.10 10*3/mm3 Final    Monocytes, Absolute 06/14/2025 1.67 (H)  0.10 - 0.90 10*3/mm3 Final    Eosinophils, Absolute 06/14/2025 1.11 (H)  0.00 - 0.40 10*3/mm3 Final    Basophils, Absolute 06/14/2025 0.08  0.00 - 0.20 10*3/mm3 Final    Immature Grans, Absolute 06/14/2025 0.60 (H)  0.00 - 0.05 10*3/mm3 Final    nRBC 06/14/2025 0.1  0.0 - 0.2 /100 WBC Final    Heparin Anti-Xa (UFH) 06/15/2025 0.46  0.30 - 0.70 IU/ml Final    WBC 06/15/2025 19.96 (H)  3.40 - 10.80 10*3/mm3 Final    RBC 06/15/2025 3.59 (L)  3.77 - 5.28 10*6/mm3 Final    Hemoglobin 06/15/2025 9.9 (L)  12.0 - 15.9 g/dL Final    Hematocrit 06/15/2025 32.2 (L)  34.0 - 46.6 % Final    MCV 06/15/2025 89.7  79.0 - 97.0 fL Final    MCH 06/15/2025 27.6  26.6 - 33.0 pg Final    MCHC 06/15/2025 30.7 (L)  31.5 - 35.7 g/dL Final    RDW 06/15/2025 15.2  12.3 - 15.4 % Final    RDW-SD 06/15/2025 48.6  37.0 - 54.0 fl Final    MPV 06/15/2025 10.0  6.0 - 12.0 fL Final    Platelets 06/15/2025 221  140 - 450 10*3/mm3 Final    Neutrophil % 06/15/2025 81.3 (H)  42.7 - 76.0 % Final    Lymphocyte % 06/15/2025 3.8 (L)  19.6 - 45.3 % Final    Monocyte % 06/15/2025 7.6  5.0 - 12.0 % Final    Eosinophil % 06/15/2025 4.7  0.3 - 6.2 % Final    Basophil % 06/15/2025 0.4  0.0 - 1.5 % Final    Immature Grans % 06/15/2025 2.2 (H)  0.0 - 0.5 % Final    Neutrophils, Absolute 06/15/2025 16.23 (H)  1.70 - 7.00 10*3/mm3 Final    Lymphocytes, Absolute 06/15/2025 0.76  0.70 - 3.10 10*3/mm3 Final    Monocytes, Absolute 06/15/2025 1.52 (H)  0.10 - 0.90 10*3/mm3 Final    Eosinophils, Absolute 06/15/2025 0.93 (H)  0.00 - 0.40 10*3/mm3 Final    Basophils, Absolute 06/15/2025 0.08  0.00 - 0.20 10*3/mm3 Final    Immature Grans, Absolute 06/15/2025 0.44 (H)  0.00 - 0.05 10*3/mm3 Final    nRBC 06/15/2025 0.0  0.0 - 0.2 /100 WBC Final    Heparin Anti-Xa (UFH) 06/16/2025 0.44  0.30 - 0.70 IU/ml Final    Glucose 06/16/2025 117 (H)  65 - 99 mg/dL Final    BUN  06/16/2025 25.5 (H)  8.0 - 23.0 mg/dL Final    Creatinine 06/16/2025 0.87  0.57 - 1.00 mg/dL Final    Sodium 06/16/2025 131 (L)  136 - 145 mmol/L Final    Potassium 06/16/2025 4.3  3.5 - 5.2 mmol/L Final    Chloride 06/16/2025 94 (L)  98 - 107 mmol/L Final    CO2 06/16/2025 26.0  22.0 - 29.0 mmol/L Final    Calcium 06/16/2025 7.5 (L)  8.6 - 10.5 mg/dL Final    BUN/Creatinine Ratio 06/16/2025 29.3 (H)  7.0 - 25.0 Final    Anion Gap 06/16/2025 11.0  5.0 - 15.0 mmol/L Final    eGFR 06/16/2025 73.1  >60.0 mL/min/1.73 Final    WBC 06/16/2025 18.84 (H)  3.40 - 10.80 10*3/mm3 Final    RBC 06/16/2025 3.74 (L)  3.77 - 5.28 10*6/mm3 Final    Hemoglobin 06/16/2025 10.3 (L)  12.0 - 15.9 g/dL Final    Hematocrit 06/16/2025 34.2  34.0 - 46.6 % Final    MCV 06/16/2025 91.4  79.0 - 97.0 fL Final    MCH 06/16/2025 27.5  26.6 - 33.0 pg Final    MCHC 06/16/2025 30.1 (L)  31.5 - 35.7 g/dL Final    RDW 06/16/2025 15.5 (H)  12.3 - 15.4 % Final    RDW-SD 06/16/2025 50.4  37.0 - 54.0 fl Final    MPV 06/16/2025 10.7  6.0 - 12.0 fL Final    Platelets 06/16/2025 285  140 - 450 10*3/mm3 Final    Neutrophil % 06/16/2025 77.6 (H)  42.7 - 76.0 % Final    Lymphocyte % 06/16/2025 4.2 (L)  19.6 - 45.3 % Final    Monocyte % 06/16/2025 8.0  5.0 - 12.0 % Final    Eosinophil % 06/16/2025 5.7  0.3 - 6.2 % Final    Basophil % 06/16/2025 0.5  0.0 - 1.5 % Final    Immature Grans % 06/16/2025 4.0 (H)  0.0 - 0.5 % Final    Neutrophils, Absolute 06/16/2025 14.61 (H)  1.70 - 7.00 10*3/mm3 Final    Lymphocytes, Absolute 06/16/2025 0.80  0.70 - 3.10 10*3/mm3 Final    Monocytes, Absolute 06/16/2025 1.50 (H)  0.10 - 0.90 10*3/mm3 Final    Eosinophils, Absolute 06/16/2025 1.08 (H)  0.00 - 0.40 10*3/mm3 Final    Basophils, Absolute 06/16/2025 0.09  0.00 - 0.20 10*3/mm3 Final    Immature Grans, Absolute 06/16/2025 0.76 (H)  0.00 - 0.05 10*3/mm3 Final    nRBC 06/16/2025 0.2  0.0 - 0.2 /100 WBC Final       XR Chest 1 View  Result Date: 6/16/2025  Narrative: XR CHEST 1  VW Date of Exam: 6/16/2025 5:35 AM EDT Indication: Recurrent pleural effusions Comparison: 6/15/2025 Findings: Right pleural drain has been removed and there is apparently increasing right basilar effusion. Right midlung interstitial changes are stable. There is minimally increased left basilar interstitial opacity perhaps a small focus of atelectasis. Heart and vasculature appear to be normal in size. No pneumothorax is seen.     Impression: Impression: Increasing right basilar effusion following right pleural drain removal. Electronically Signed: Marlon Lyons MD  6/16/2025 7:47 AM EDT  Workstation ID: [id number]    XR Chest 1 View  Result Date: 6/15/2025  Narrative: XR CHEST 1 VW Date of Exam: 6/15/2025 2:07 AM EDT Indication: Recurrent pleural effusions, right chest tube, follow-up Comparison: 6/14/2025. Findings: There is a right basilar pigtail chest tube in place. There may be some kinking of the chest tube possibly at the entry site. I would recommend clinical correlation. There is an unchanged moderate right pleural effusion with compressive atelectasis. The left lung and pleural space are clear. The heart size is normal. The pulmonary vascular markings are normal. There is no pneumothorax.     Impression: Impression: 1.There is a right basilar pigtail chest tube in place. There may be some kinking of the chest tube possibly at the entry site. I would recommend clinical correlation. 2.Unchanged moderate right pleural effusion with compressive atelectasis. Electronically Signed: Samuel Luna MD  6/15/2025 10:19 AM EDT  Workstation ID: [id number]    XR Chest 1 View  Result Date: 6/14/2025  Narrative: XR CHEST 1 VW Date of Exam: 6/14/2025 3:14 AM EDT Indication: Recurrent pleural effusions Comparison: Chest radiograph 6/13/2025. Findings: Unchanged position of right pleural catheter. Cardiomediastinal silhouette is unchanged. Moderate right pleural effusion with right basilar airspace disease and right basilar lucency  suggestive of small-volume pneumothorax, unchanged compared to yesterday's exam. Left lung appears clear. No pneumothorax. Osseous structures are unchanged.     Impression: Impression: No significant interval change. Electronically Signed: Franco Shaffer MD  6/14/2025 8:09 AM EDT  Workstation ID: [id number]    XR Chest 1 View  Result Date: 6/13/2025  Narrative: XR CHEST 1 VW Date of Exam: 6/13/2025 12:55 PM EDT Indication: Recurrent pleural effusions Comparison: AP chest x-ray 6/13/2025 timed at 4:43 a.m. Findings: Right pleural catheter remains in place. Moderate size right hydropneumothorax appears similar to numbers to today's earlier chest x-ray. Underlying right basilar airspace opacities are stable. Left lung is clear.     Impression: Impression: Moderate sized right hydropneumothorax appears similar to numbers to today's earlier chest x-ray. Right pleural catheter remains in place. Electronically Signed: Maryellen Dalton MD  6/13/2025 1:44 PM EDT  Workstation ID: [id number]    XR Chest 1 View  Result Date: 6/13/2025  Narrative: XR CHEST 1 VW Date of Exam: 6/13/2025 2:49 AM EDT Indication: post Ct placement and thoracentesis Comparison: [facility] chest x-ray 6/12/2025, 6/5/2025 Findings: There is a new right pleural catheter. Previously seen right basilar pleural air has now been replaced with pleural fluid. No apical pneumothorax is seen. There are stable underlying right basilar airspace opacities. Left lung appears clear. Cardiomediastinal contours are stable.     Impression: Impression: 1.Interval placement of right pleural catheter with fluid component of right basilar hydropneumothorax. 2.Stable underlying right basilar airspace opacities, likely due to atelectasis. Electronically Signed: Maryellen Dalton MD  6/13/2025 7:23 AM EDT  Workstation ID: [id number]    CT Guided Chest Tube  Result Date: 6/12/2025  Narrative: DATE OF EXAM: 6/12/2025 2:48 PM  PROCEDURE: CT GUIDED CHEST TUBE PLACEMENT-  INDICATIONS: right chest tube  placement; J95.811-Postprocedural pneumothorax  DLP: 746 mGycm  TECHNIQUE:  The risks, benefits, and alternatives were discussed in detail with the patient. The patient was brought down to the CT suite and positioned supine on the CT table. Preliminary CT scan of the the chest was performed and a skin entry site was selected and marked. The area was prepped and draped utilizing standard sterile technique. 1% lidocaine was administered to the soft tissues. A small skin incision was made with an 11 blade scalpel. Under CT guidance a 5 Russian Yueh was advanced into the right pleural space. The inner stylet was removed and an 035 wire was advanced coaxially. Over the wire a 10 Russian all-purpose drainage catheter was advanced with the pigtail formed and locked within the pleural space the catheter was sutured to the skin and attached to a Pleur-evac. A sterile dressing was then applied.  The patient tolerated the procedure well and there were no immediate complications.      Impression: Successful CT-guided 10 Russian right sided chest tube placement.   6/12/2025 4:13 PM by Jose Hope MD on Workstation: [id number]      XR Chest 1 View  Result Date: 6/12/2025  Narrative: XR CHEST 1 VW Date of Exam: 6/12/2025 1:45 PM EDT Indication: s/p thoracentesis; c/o severe right neck pain Comparison: 6/12/2025 Findings: Cardiac size is similar to prior exam. Similar right-sided hydropneumothorax. Similar right basilar opacity. No evidence of acute osseous abnormalities. Visualized upper abdomen is unremarkable.     Impression: Impression: 1.Similar right-sided hydropneumothorax. 2.Similar right basilar opacity may reflect atelectasis or infection. Electronically Signed: Clay Johnson MD  6/12/2025 2:31 PM EDT  Workstation ID: [id number]    XR Chest 1 View  Result Date: 6/12/2025  Narrative: XR CHEST 1 VW Date of Exam: 6/12/2025 12:25 PM EDT Indication: s/p thoracentesis; c/o severe right neck pain Comparison: Chest radiograph  6/12/2025 Findings: Grossly stable size of the right hydropneumothorax, measuring up to 7 mm at apex with larger right subpulmonic component. Previous noted fluid extending towards the apex appears decreased. Left lung relatively clear. There is partial collapse and probable underlying atelectasis within the right lower lobe. Negative for left pneumothorax. Stable cardiomediastinal silhouette. Degenerative related osseous change.     Impression: Impression: Slightly decreased right pleural fluid component with otherwise similar appearing moderate size hydropneumothorax. Electronically Signed: Toni Argueta MD  6/12/2025 12:53 PM EDT  Workstation ID: [id number]    XR Chest 1 View  Result Date: 6/12/2025  Narrative: XR CHEST 1 VW Date of Exam: 6/12/2025 11:23 AM EDT Indication: s/p thoracentesis Comparison: Chest x-ray 6/5/2025 Findings: There is a pneumothorax on the right. It is moderate in size and seen inferiorly measuring up to 2.3 cm. There is a moderate mount of pleural fluid as well. There is right lower lobe airspace opacity which is improved from previous exam. Left lung is clear. Heart size is normal.     Impression: Impression: Moderate sized right hydropneumothorax. Electronically Signed: Susan Leahy MD  6/12/2025 12:41 PM EDT  Workstation ID: [id number]    US Thoracentesis  Result Date: 6/12/2025  Narrative: PROCEDURE: US THORACENTESIS-  ATTENDING: Jose Hope M.D.  CLINICAL HISTORY: Large right pleural effusion.  TECHNIQUE:  The risk, benefits, and alternatives were described in detail and the patient was brought to the ultrasound suite and positioned seated. The skin entry site was prepped and draped utilizing standard sterile technique. 1% lidocaine was administered to the soft tissues of the right posterior thorax.   A 5 [place]ian Yueh was advanced into the right pleural space.   A total of 1.4 L was removed from the right pleural space. A specimen was sent to the laboratory for analysis. The needle  was removed and a sterile dressing was applied.  The patient tolerated the procedure well and there were no complications.      Impression: Successful ultrasound-guided right thoracentesis.   Attestation Signer name: Jose Hope MD I attest that I was present for the entire procedure. I reviewed the stored images and agree with the report as written.      6/12/2025 12:17 PM by Jose Hope MD on Workstation: [id number]      XR Chest 1 View  Result Date: 6/5/2025  Narrative: XR CHEST 1 VW Date of Exam: 6/5/2025 6:41 AM EDT Indication: cough Comparison: Chest radiograph 6/5/2025 at 0422 hours Findings: There is been slight decrease in the size of the large right pleural effusion. There is persistent right pleural fluid and right basilar airspace disease. Airspace disease is most likely atelectasis with pneumonia not excluded. The left lung remains clear. There is no pneumothorax.     Impression: Impression: Slight decrease in the size of the large right pleural effusion. Electronically Signed: Rodriguez Singh MD  6/5/2025 7:00 AM EDT  Workstation ID: [id number]    XR Chest 1 View  Result Date: 6/5/2025  Narrative: XR CHEST 1 VW Date of Exam: 6/5/2025 4:18 AM EDT Indication: SOA triage protocol Comparison: Chest radiograph 5/28/2025 Findings: The heart size and pulmonary vascular markings are normal. The left lung is clear. There is a large right pleural effusion with associated right basilar atelectasis. There is no pneumothorax. The osseous structures are normal for age.     Impression: Impression: Large right pleural effusion with right basilar atelectasis. Electronically Signed: Rodriguez Singh MD  6/5/2025 4:47 AM EDT  Workstation ID: [id number]    US Thoracentesis  Result Date: 5/28/2025  Narrative: PROCEDURE: Ultrasound-guided right thoracentesis  Procedural Personnel Attending physician(s): Erik Mensah  Pre-procedure diagnosis: Right pleural effusion  Post-procedure diagnosis: Same  Complications: No immediate  complications.      Impression:  Ultrasound-guided thoracentesis with drainage of 1400 mL of serous fluid.   _______________________________________________________________   PROCEDURE SUMMARY:  - Limited thoracic ultrasound - Ultrasound-guided right thoracentesis - Additional procedure(s): None  PROCEDURE DETAILS:   Pre-procedure Consent: Informed consent for the procedure including risks, benefits and alternatives was obtained and time-out was performed prior to the procedure. Preparation: The site was prepared and draped using maximal sterile barrier technique including cutaneous antisepsis.    Limited thoracic ultrasound: Limited thoracic ultrasound was performed using a curved transducer. A safe window for thoracentesis was identified. Right hemithorax findings: Moderate to large right-sided pleural effusion   Thoracentesis  Local anesthesia was administered. The pleural space was accessed and fluid return confirmed position. The fluid was drained. The catheter was removed, and a sterile bandage was applied.  Catheter placed: 5 Stateless catheter.  Additional Details  Equipment details: None Specimens removed: Pleural fluid Estimated blood loss (mL): Less than 10   5/28/2025 12:05 PM by Erik Mensah MD on Workstation: [id number]      XR Chest 1 View  Result Date: 5/28/2025  Narrative: XR CHEST 1 VW Date of Exam: 5/28/2025 10:19 AM EDT Indication: S/P Right Thoracentesis Comparison: 5/14/2025 chest radiograph. Ultrasound-guided thoracentesis of 5/20/2025 Findings: By history, thoracentesis earlier today removed 1.4 L of fluid from the right hemithorax. Follow-up chest radiograph shows patchy right mid and lower lung atelectasis and perhaps a small amount of remaining pleural fluid, versus discoid atelectasis. There is no evidence of pneumothorax. Left lung remains clear. Heart and vasculature appear normal in size.     Impression: Impression: Patchy right mid and lower lung atelectasis and questionable small  remaining right effusion. No evidence of pneumothorax or other significant chest disease elsewhere. Electronically Signed: Marlon Lyons MD  5/28/2025 10:49 AM EDT  Workstation ID: [id number]    NM PET/CT Skull Base to Mid Thigh  Result Date: 5/28/2025  Narrative: FDG NM PET/CT SKULL BASE TO MID THIGH Date of Exam: 5/22/2025 8:25 AM EDT Indication: restaging scans lung cancer. Comparison: 5/13/2025 and 7/11/2024 Technique: 8.1 mCi of F-18 FDG was administered intravenously. PET imaging was obtained from skull base to mid-thigh approximately 60 minutes after radiotracer injection. A low dose non contrast CT was obtained for attenuation correction and anatomic localization. Fused PET-CT and 3D MIP reconstructions were utilized for image interpretation.  Fasting blood glucose level: 113 mg/dl. Reference uptake values: Mediastinum: 2.8 SUVmax Liver: 2.7 SUVmax Normalization method: Body Weight Findings: Head and neck: No abnormal FDG activity identified. Chest: Increasing moderate to large right effusion which is loculated. Spiculated right middle lobe nodule has an SUV maximum of 4.4. There is extensive right hilar adenopathy with an SUV max 8.8. There is subcarinal and mediastinal adenopathy ranging between 8 and 5 SUV maximum. Abdomen and pelvis: At least 20 FDG-avid liver lesions are identified measuring up to 5 cm in diameter and an SUV maximum of 13.5. There is left adrenal lesion with an SUV maximum of 5. There is extensive julio hepatis and portacaval adenopathy with an SUV maximum of 8.6. There is retroperitoneal adenopathy, including left periaortic and aortocaval abnormal lymph nodes. Musculoskeletal: Diffuse osseous metastatic disease with multifocal disease throughout the cervical thoracolumbar spine, bilateral ribs, pelvis, including the left acetabulum and to a lesser extent the right acetabulum. There are bilateral femoral lesions as well. No definite pathologic fractures are identified at this time. Several  of the lesions however are involving weight-bearing bones and the patient is susceptible to pathologic fractures.     Impression: Impression: Extensive metastatic disease involving the chest, abdomen, and pelvis as well as the bones. Electronically Signed: Herb Lopes MD  2025 9:18 AM EDT  Workstation ID: [id number]      ASSESSMENT: The patient is a very pleasant 65 y.o. female  with metastatic right lung cancer      PLAN:  1.  Recurrent metastatic right lung adenocarcinoma:  A.  The patient follows me in scheduled next week for cycle 2 of Keytruda.    2.  Recurrent right pleural effusion:  A.  Status post Pleurx catheter today by Dr. Andres.    3.  Cancer related pain:  A.  Will continue opiates as needed      Sejal Mckenzie MD  2025      Electronically signed by Sejal Mckenzie MD at 25 1312       Nuha Shafer APRN at 25 1155              [facility]  PROGRESS NOTE    Patient Name: Sydnie Gamble  : 1960  MRN: [id number]    Date of Admission: 2025  Primary Care Physician: Dee Elena APRN    Subjective   Subjective     CC:  F/u SOA     HPI:  Patient is resting in bed with  at bedside. She has felt more soa today. Very dyspneic when I saw her after she had got up to [facility]. Reached out to Dr Mckenzie and will plan on pleurx drain while she is here.       Objective   Objective     Vital Signs:   Temp:  [97.7 °F (36.5 °C)-98.6 °F (37 °C)] 97.8 °F (36.6 °C)  Heart Rate:  [106-118] 113  Resp:  [18] 18  BP: (104-140)/(66-97) 104/66  Flow (L/min) (Oxygen Therapy):  [4] 4     Physical Exam:  Constitutional: No acute distress, awake, alert  HENT: NCAT, mucous membranes moist  Respiratory: decreased in right lung , respiratory effort normal 4L   Cardiovascular: RRR, no murmurs, rubs, or gallops  Gastrointestinal: Positive bowel sounds, soft, nontender, nondistended  Musculoskeletal: No bilateral ankle  edema  Psychiatric: Appropriate affect, cooperative  Neurologic: Oriented x 2-3, strength symmetric in all extremities, , speech clear  Skin: No rashes,pale , right mid chest dressing       Results Reviewed:  LAB RESULTS:      Lab 06/16/25  0424 06/15/25  0446 06/14/25  0456 06/13/25  0733 06/13/25  0341 06/13/25  0114 06/12/25  1755   WBC 18.84* 19.96* 21.39*  --  20.73*  --  17.59*   HEMOGLOBIN 10.3* 9.9* 10.3*  --  10.5*  --  10.7*   HEMATOCRIT 34.2 32.2* 33.5*  --  33.9*  --  35.1   PLATELETS 285 221 215  --  220  --  192   NEUTROS ABS 14.61* 16.23* 17.02*  --  15.98*  --  14.13*   IMMATURE GRANS (ABS) 0.76* 0.44* 0.60*  --  0.47*  --  0.50*   LYMPHS ABS 0.80 0.76 0.91  --  1.18  --  0.74   MONOS ABS 1.50* 1.52* 1.67*  --  1.78*  --  1.45*   EOS ABS 1.08* 0.93* 1.11*  --  1.22*  --  0.68*   MCV 91.4 89.7 90.1  --  89.9  --  90.5   PROTIME  --   --   --   --   --   --  15.6*   APTT  --   --   --   --   --   --  32.7*   HEPARIN ANTI-XA 0.44 0.46 0.42 0.55  --  0.54 0.48         Lab 06/16/25 0424 06/14/25  0456 06/12/25  1755   SODIUM 131* 134* 136   POTASSIUM 4.3 4.2 5.0   CHLORIDE 94* 97* 97*   CO2 26.0 25.0 28.0   ANION GAP 11.0 12.0 11.0   BUN 25.5* 23.0 21.7   CREATININE 0.87 0.85 0.87   EGFR 73.1 75.2 73.1   GLUCOSE 117* 108* 117*   CALCIUM 7.5* 7.7* 7.8*         Lab 06/12/25 1755   TOTAL PROTEIN 6.1   ALBUMIN 2.8*   GLOBULIN 3.3   ALT (SGPT) 26   AST (SGOT) 49*   BILIRUBIN 0.2   ALK PHOS 455*         Lab 06/12/25 1755   PROTIME 15.6*   INR 1.17*                 Brief Urine Lab Results       None            Microbiology Results Abnormal       None            XR Chest 1 View  Result Date: 6/16/2025  XR CHEST 1 VW Date of Exam: 6/16/2025 5:35 AM EDT Indication: Recurrent pleural effusions Comparison: 6/15/2025 Findings: Right pleural drain has been removed and there is apparently increasing right basilar effusion. Right midlung interstitial changes are stable. There is minimally increased left basilar  interstitial opacity perhaps a small focus of atelectasis. Heart and vasculature appear to be normal in size. No pneumothorax is seen.     Impression: Impression: Increasing right basilar effusion following right pleural drain removal. Electronically Signed: Marlon Lyons MD  6/16/2025 7:47 AM EDT  Workstation ID: [id number]    XR Chest 1 View  Result Date: 6/15/2025  XR CHEST 1 VW Date of Exam: 6/15/2025 2:07 AM EDT Indication: Recurrent pleural effusions, right chest tube, follow-up Comparison: 6/14/2025. Findings: There is a right basilar pigtail chest tube in place. There may be some kinking of the chest tube possibly at the entry site. I would recommend clinical correlation. There is an unchanged moderate right pleural effusion with compressive atelectasis. The left lung and pleural space are clear. The heart size is normal. The pulmonary vascular markings are normal. There is no pneumothorax.     Impression: Impression: 1.There is a right basilar pigtail chest tube in place. There may be some kinking of the chest tube possibly at the entry site. I would recommend clinical correlation. 2.Unchanged moderate right pleural effusion with compressive atelectasis. Electronically Signed: Samuel Luna MD  6/15/2025 10:19 AM EDT  Workstation ID: [id number]      Results for orders placed during the hospital encounter of 05/13/25    Adult Transthoracic Echo Complete w/ Color, Spectral and Contrast if necessary per protocol    Interpretation Summary    Left ventricular ejection fraction appears to be 66 - 70%.    Normal right ventricular cavity size, wall thickness, systolic function and septal motion noted.    There is a small (<1cm) pericardial effusion adjacent to the right ventricle. There is no evidence of cardiac tamponade.    There is a left pleural effusion.      Current medications:  Scheduled Meds:arformoterol, 15 mcg, Nebulization, BID - RT   And  budesonide, 0.5 mg, Nebulization, BID - RT   And  revefenacin, 175 mcg,  Nebulization, Daily - RT  atorvastatin, 10 mg, Oral, Nightly  bisacodyl, 10 mg, Rectal, Once  Diclofenac Sodium, 2 g, Topical, 4x Daily  levothyroxine, 88 mcg, Oral, Q AM  lidocaine PF 1%, 5 mL, Injection, Once  nystatin, 5 mL, Oral, 4x Daily  senna-docusate sodium, 2 tablet, Oral, BID  sertraline, 100 mg, Oral, Daily  sodium chloride, 10 mL, Intravenous, Q12H  sodium chloride, 10 mL, Intravenous, Q12H  traZODone, 150 mg, Oral, Nightly      Continuous Infusions:heparin, 16 Units/kg/hr, Last Rate: 16 Units/kg/hr (06/16/25 0656)  Pharmacy to Dose Heparin,       PRN Meds:.  acetaminophen **OR** acetaminophen **OR** acetaminophen    senna-docusate sodium **AND** polyethylene glycol **AND** bisacodyl **AND** bisacodyl    Calcium Replacement - Follow Nurse / BPA Driven Protocol    clonazePAM    cyclobenzaprine    famotidine    HYDROmorphone **AND** naloxone    ipratropium-albuterol    Magnesium Standard Dose Replacement - Follow Nurse / BPA Driven Protocol    ondansetron ODT **OR** ondansetron    oxyCODONE-acetaminophen    Pharmacy to Dose Heparin    Phosphorus Replacement - Follow Nurse / BPA Driven Protocol    Potassium Replacement - Follow Nurse / BPA Driven Protocol    simethicone    sodium chloride    sodium chloride    sodium chloride    sodium chloride    zolpidem    Assessment & Plan   Assessment & Plan     Active Hospital Problems    Diagnosis  POA    **Recurrent pleural effusion [J90]  Yes    Adenocarcinoma of right lung [C34.91]  Yes    HLD (hyperlipidemia) [E78.5]  Yes    Acquired hypothyroidism [E03.9]  Yes      Resolved Hospital Problems   No resolved problems to display.        Brief Hospital Course to date:  Sydnie Gamble is a 65 y.o. female  with PMH of Adenocarcinoma of lung, anemia, cervical cancer, depression, HLD, hypothyroidism, PE, GERD and had home oxygen. Patient had a thoracentesis with chest tube placement, admitted for further management.    Plan was partially entered by my partner and  I have reviewed and updated as appropriate on 6/16/25     Recurrent Pleural Effusion  Adenocarcinoma of Right lung  -s/p thoracentesis and chest tube placement by IR, CTS managing, dc'd on 6/15  -- currently under treatment with Dr. Mckenzie and he wants to hold on pleur-x drain for now. He wants her to receive Keytruda  for a month.   --Palliative care consulted, continue pain control  - She is currently on 4 L NC, wean as able  -Continue DuoNeb  -- CXR on 6/16 showed increasing right pleural effusion   -- talked with  he is ok with Pleurx drain being placed while admitted. CTS reconsulted and will plan for 6/17     Hypothyroidism  -- cont home meds      HLD  -- cont statin      Generalized anxiety disorder  Depression  --cont home meds     LLE DVT  PE  -- hold eliquis   -- heparin drip until pleurx drain placed     Expected Discharge Location and Transportation: home   Expected Discharge   Expected Discharge Date: 6/15/2025; Expected Discharge Time:  3:00 PM     VTE Prophylaxis:  Pharmacologic VTE prophylaxis orders are present.         AM-PAC 6 Clicks Score (PT): 18 (06/15/25 2038)    CODE STATUS:   Code Status and Medical Interventions: CPR (Attempt to Resuscitate); Full Support; also talked over with  Jose Francisco   Ordered at: 06/12/25 1236     Code Status (Patient has no pulse and is not breathing):    CPR (Attempt to Resuscitate)     Medical Interventions (Patient has pulse or is breathing):    Full Support     Comments:    also talked over with  Jose Francisco     Level Of Support Discussed With:    Patient       Next of Kin (If No Surrogate)       DAYSI López  06/16/25        Electronically signed by Nuha Shafer APRN at 06/16/25 1306       Cliff Gross MD at 06/16/25 1053          [facility] Cardiothoracic Surgery In-Patient Progress Note     LOS: 4 days     Chief Complaint: Malignant pleural effusion    Subjective  Our service was consulted on 6/13 for chest tube management  and known malignant pleural effusion requiring frequent thoracentesis.  She is followed by Dr. Mckenzie and has been on Keytruda. Her chest tube was discontinued yesterday and we have been re-consulted today regarding Pleurx catheter.     Objective  Vital Signs  Temp:  [97.7 °F (36.5 °C)-98.6 °F (37 °C)] 97.7 °F (36.5 °C)  Heart Rate:  [106-118] 107  Resp:  [18] 18  BP: (117-140)/(73-97) 140/73      Physical Exam:   General Appearance: alert, appears stated age and cooperative   Lungs: On 4 L nasal cannula saturations 94%   Heart: tachycardia     Results     Results from last 7 days   Lab Units 06/16/25  0424   WBC 10*3/mm3 18.84*   HEMOGLOBIN g/dL 10.3*   HEMATOCRIT % 34.2   PLATELETS 10*3/mm3 285     Results from last 7 days   Lab Units 06/16/25  0424   SODIUM mmol/L 131*   POTASSIUM mmol/L 4.3   CHLORIDE mmol/L 94*   CO2 mmol/L 26.0   BUN mg/dL 25.5*   CREATININE mg/dL 0.87   GLUCOSE mg/dL 117*   CALCIUM mg/dL 7.5*     Imaging Results (Last 24 Hours)       Procedure Component Value Units Date/Time    XR Chest 1 View [[id number]] Collected: 06/16/25 0746     Updated: 06/16/25 0750    Narrative:      XR CHEST 1 VW    Date of Exam: 6/16/2025 5:35 AM EDT    Indication: Recurrent pleural effusions    Comparison: 6/15/2025    Findings:  Right pleural drain has been removed and there is apparently increasing right basilar effusion. Right midlung interstitial changes are stable. There is minimally increased left basilar interstitial opacity perhaps a small focus of atelectasis. Heart and   vasculature appear to be normal in size. No pneumothorax is seen.      Impression:      Impression:  Increasing right basilar effusion following right pleural drain removal.      Electronically Signed: Marlon Lyons MD    6/16/2025 7:47 AM EDT    Workstation ID: [id number]            Assessment    Recurrent pleural effusion    Acquired hypothyroidism    HLD (hyperlipidemia)    Adenocarcinoma of right lung      Plan   Preop for right pleurx  catheter tomorrow with Dr. Gross  NPO after midnight      Alice Bae, APRN  06/16/25  10:58 EDT    I have personally evaluated and examined the patient.  The above documentation has been reviewed and I agree.      MCKENNA Gross MD  Cardiothoracic Surgery    Electronically signed by Cliff Gross MD at 06/16/25 9157       Sejal Mckenzie MD at 06/15/25 9697          DATE OF VISIT: 6/15/2025    Chief Complaint: Followup for metastatic recurrent right lung cancer     SUBJECTIVE: The patient is laying comfortably in bed.  She is complaining of shortness of breath with minimal exertion.  Chest tube has been removed today.    REVIEW OF SYSTEMS: All the other 9 systems are reviewed by me and negative  except what is mentioned in HPI.     Past History:  Medical History: has a past medical history of Acid reflux, Acid reflux, Adenocarcinoma of lung (08/12/2024), Anemia, chronic disease (5/13/2025), Anxiety (1976), Arthritis, Atherosclerotic cardiovascular disease (03/25/2024), Cervical cancer, Depression, Emphysema of lung, History of radiation therapy (11/20/2020), History of radiation therapy (10/04/2024), radiation therapy, Hyperlipidemia, Hypothyroidism, Kidney disease, Lung cancer, Lung nodule, Ovarian cyst (1980s), Pleural effusion (5/13/2025), Pulmonary embolism (5/13/2025), Visual impairment (corrected with glasses), Wears dentures, and Wears glasses.   Surgical History: has a past surgical history that includes Tubal ligation; total abdominal hysterectomy pelvic node dissection (N/A, 08/18/2020); Lymph node biopsy; Subtotal Hysterectomy (1987?); Bronchoscopy; Lung biopsy; BRONCHOSCOPY WITH ION ROBOTIC ASSIST (N/A, 08/06/2024); Mohs surgery (03/24/2025); Hysteroscopy; and Colonoscopy.   Family History: family history includes Anxiety disorder in her mother; Asthma in her mother; COPD in her mother; Cancer in her father, maternal aunt, maternal aunt, maternal aunt, maternal aunt, maternal grandmother,  and mother; Depression in her mother; Early death in her father; Emphysema in her mother; Heart attack in her maternal grandfather; Heart disease in her maternal grandfather; Hypertension in her mother; Melanoma in her mother; Mental illness in her mother; Uterine cancer in her mother.   Social History: reports that she quit smoking about 6 years ago. Her smoking use included cigarettes. She started smoking about 53 years ago. She has a 67.5 pack-year smoking history. She has been exposed to tobacco smoke. She has never used smokeless tobacco. She reports that she does not drink alcohol and does not use drugs.    Medications Prior to Admission   Medication Sig Dispense Refill Last Dose/Taking    albuterol sulfate  (90 Base) MCG/ACT inhaler Inhale 2 puffs Every 4 (Four) to 6 (Six) Hours As Needed for Wheezing or Shortness of Air.   Taking As Needed    apixaban (ELIQUIS) 5 MG tablet tablet Take 2 tablets by mouth Every 12 (Twelve) Hours for 6 days, THEN 1 tablet Every 12 (Twelve) Hours for 24 days. Indications: DVT/PE (active thrombosis) 48 tablet 5 6/11/2025 Morning    atorvastatin (LIPITOR) 10 MG tablet TAKE 1 TABLET BY MOUTH EVERY DAY AT NIGHT 90 tablet 1 6/11/2025    clonazePAM (KlonoPIN) 1 MG tablet Take 1 tablet by mouth 2 (Two) Times a Day As Needed for Anxiety. 50 tablet 0 6/12/2025 Morning    Diclofenac Sodium (VOLTAREN) 1 % gel gel Apply 1 gram topically to indicated area 4 times daily as needed for mild to moderate pain. STOP FLEXERIL 100 g 2 Taking    docusate sodium (COLACE) 100 MG capsule Take 2 capsules by mouth Daily. 60 capsule 3 6/12/2025 Morning    famotidine (PEPCID) 20 MG tablet TAKE 1 TABLET BY MOUTH TWICE DAILY OR TAKE 2 TABLETS BY MOUTH ONCE DAILY AS NEEDED FOR HEARTBURN 180 tablet 1 Taking    Fluticasone-Umeclidin-Vilant (Trelegy Ellipta) 100-62.5-25 MCG/ACT inhaler Inhale 1 puff Daily. 90 each 3 6/12/2025 Morning    levothyroxine (Synthroid) 88 MCG tablet Take 1 tablet by mouth Every  Morning. 90 tablet 1 6/11/2025    ondansetron (ZOFRAN) 8 MG tablet Take 1 tablet by mouth 3 (Three) Times a Day As Needed for Nausea or Vomiting. 30 tablet 5 Taking As Needed    oxyCODONE-acetaminophen (PERCOCET) 5-325 MG per tablet Take 1 tablet by mouth Every 6 (Six) Hours As Needed for Moderate Pain. 120 tablet 0 6/12/2025 Morning    sertraline (Zoloft) 100 MG tablet Take 1 tablet by mouth Daily. 90 tablet 1 6/11/2025    traZODone (DESYREL) 150 MG tablet TAKE 1 TABLET BY MOUTH EVERY DAY AT NIGHT 90 tablet 3 6/11/2025    zolpidem (AMBIEN) 10 MG tablet Take 1 tablet by mouth At Night As Needed for Sleep. 30 tablet 1 Past Month    cyclobenzaprine (FLEXERIL) 10 MG tablet TAKE 1 TABLET BY MOUTH 2 TIMES A DAY AS NEEDED FOR MUSCLE SPASMS. 60 tablet 3       Allergies: Patient has no known allergies.     Current Facility-Administered Medications:     acetaminophen (TYLENOL) tablet 650 mg, 650 mg, Oral, Q4H PRN **OR** acetaminophen (TYLENOL) 160 MG/5ML oral solution 650 mg, 650 mg, Oral, Q4H PRN **OR** acetaminophen (TYLENOL) suppository 650 mg, 650 mg, Rectal, Q4H PRN, Nuha Shafer, APRN    arformoterol (BROVANA) nebulizer solution 15 mcg, 15 mcg, Nebulization, BID - RT, 15 mcg at 06/14/25 2142 **AND** budesonide (PULMICORT) nebulizer solution 0.5 mg, 0.5 mg, Nebulization, BID - RT, 0.5 mg at 06/14/25 2142 **AND** revefenacin (YUPELRI) nebulizer solution 175 mcg, 175 mcg, Nebulization, Daily - RT, Nuha Shafer APRN    atorvastatin (LIPITOR) tablet 10 mg, 10 mg, Oral, Nightly, Nuha Shafer, APRNICKY, 10 mg at 06/14/25 2034    sennosides-docusate (PERICOLACE) 8.6-50 MG per tablet 2 tablet, 2 tablet, Oral, BID, 2 tablet at 06/15/25 0915 **AND** polyethylene glycol (MIRALAX) packet 17 g, 17 g, Oral, Daily PRN **AND** bisacodyl (DULCOLAX) EC tablet 5 mg, 5 mg, Oral, Daily PRN, 5 mg at 06/15/25 0924 **AND** bisacodyl (DULCOLAX) suppository 10 mg, 10 mg, Rectal, Daily PRN, Nuha Shafer, APRN    bisacodyl  (DULCOLAX) suppository 10 mg, 10 mg, Rectal, Once, Kaylene Reynoso APRN    Calcium Replacement - Follow Nurse / BPA Driven Protocol, , Not Applicable, PRN, Nuha Shafer APRN    clonazePAM (KlonoPIN) tablet 1 mg, 1 mg, Oral, BID PRN, Nuha Shafer APRN, 1 mg at 06/13/25 2138    cyclobenzaprine (FLEXERIL) tablet 10 mg, 10 mg, Oral, BID PRN, Nuha Shafer APRN, 10 mg at 06/14/25 2235    Diclofenac Sodium (VOLTAREN) 1 % gel 2 g, 2 g, Topical, 4x Daily, Kaylene Reynoso APRN, 2 g at 06/13/25 2139    famotidine (PEPCID) tablet 40 mg, 40 mg, Oral, BID PRN, Nuha Shafer APRN    heparin 54837 units/250 mL (100 units/mL) in 0.45 % NaCl infusion, 16 Units/kg/hr, Intravenous, Titrated, Nuha Shafer APRN, Last Rate: 8.92 mL/hr at 06/15/25 0345, 16 Units/kg/hr at 06/15/25 0345    HYDROmorphone (DILAUDID) injection 0.5 mg, 0.5 mg, Intravenous, Q2H PRN, 0.5 mg at 06/15/25 1205 **AND** naloxone (NARCAN) injection 0.4 mg, 0.4 mg, Intravenous, Q5 Min PRN, Nuha Shafer APRN    levothyroxine (SYNTHROID, LEVOTHROID) tablet 88 mcg, 88 mcg, Oral, Q AM, Nuha Shafer APRN, 88 mcg at 06/15/25 0529    lidocaine PF 1% (XYLOCAINE) injection 5 mL, 5 mL, Injection, Once, Jose Hope MD    Magnesium Standard Dose Replacement - Follow Nurse / BPA Driven Protocol, , Not Applicable, PRN, Nuha Shafer APRN    nystatin (MYCOSTATIN) 100,000 unit/mL suspension 500,000 Units, 5 mL, Oral, 4x Daily, Trina Hdz APRN    ondansetron ODT (ZOFRAN-ODT) disintegrating tablet 4 mg, 4 mg, Oral, Q6H PRN **OR** ondansetron (ZOFRAN) injection 4 mg, 4 mg, Intravenous, Q6H PRN, Nuha Shafer, DAYSI    oxyCODONE-acetaminophen (PERCOCET) 5-325 MG per tablet 1 tablet, 1 tablet, Oral, Q4H PRN, Nuha Shafer, DAYSI, 1 tablet at 06/14/25 2304    Pharmacy to Dose Heparin, , Not Applicable, Continuous PRN, Nuha Shafer, DAYSI    Phosphorus Replacement - Follow Nurse / BPA Driven Protocol, ,  "Not Applicable, PRN, Nuha Shafer APRN    Potassium Replacement - Follow Nurse / BPA Driven Protocol, , Not Applicable, PRN, Nuha Shafer APRN    sertraline (ZOLOFT) tablet 100 mg, 100 mg, Oral, Daily, Nuha Shafer APRN, 100 mg at 06/15/25 0915    simethicone (MYLICON) chewable tablet 80 mg, 80 mg, Oral, 4x Daily PRN, Trina Hdz APRN, 80 mg at 06/15/25 0924    sodium chloride 0.9 % flush 10 mL, 10 mL, Intravenous, Q12H, Jose Hope MD, 10 mL at 06/14/25 2100    sodium chloride 0.9 % flush 10 mL, 10 mL, Intravenous, PRN, Jose Hope MD    sodium chloride 0.9 % flush 10 mL, 10 mL, Intravenous, Q12H, Nuha Shafer APRN, 10 mL at 06/14/25 2100    sodium chloride 0.9 % flush 10 mL, 10 mL, Intravenous, PRN, Nuha Shafer APRN    sodium chloride 0.9 % infusion 40 mL, 40 mL, Intravenous, PRN, Jose Hope MD    sodium chloride 0.9 % infusion 40 mL, 40 mL, Intravenous, PRN, Nuha Shafer APRN    traZODone (DESYREL) tablet 150 mg, 150 mg, Oral, Nightly, Nuha Shafer APRN, 150 mg at 06/14/25 2034    zolpidem (AMBIEN) tablet 5 mg, 5 mg, Oral, Nightly PRN, Nuha Shafer APRN    PHYSICAL EXAMINATION:   /73 (BP Location: Left arm, Patient Position: Lying)   Pulse 111   Temp 97.7 °F (36.5 °C) (Oral)   Resp 18   Ht 154.9 cm (60.98\")   Wt 55.8 kg (123 lb)   LMP  (LMP Unknown)   SpO2 96%   BMI 23.25 kg/m²                ECOG Performance Status: 2 - Symptomatic, <50% confined to bed  GENERAL: Age appropriate. No acute distress.   NEURO/PSYCH: A&O x 3, strength 5/5 in all muscle groups  HEENT: Head atraumatic, normocephalic.   NECK: Supple. No JVD. No lymphadenopathy.   LUNGS: Clear to auscultation bilaterally. No wheezing. No rhonchi.   HEART: Regular rate and rhythm. S1, S2, no murmurs.   ABDOMEN: Soft, nontender, nondistended. Bowel sounds positive. No  hepatosplenomegaly.   EXTREMITIES: No clubbing, cyanosis, or edema.   SKIN: No rashes. " No purpura.       Admission on 06/12/2025   Component Date Value Ref Range Status    Glucose 06/12/2025 117 (H)  65 - 99 mg/dL Final    BUN 06/12/2025 21.7  8.0 - 23.0 mg/dL Final    Creatinine 06/12/2025 0.87  0.57 - 1.00 mg/dL Final    Sodium 06/12/2025 136  136 - 145 mmol/L Final    Potassium 06/12/2025 5.0  3.5 - 5.2 mmol/L Final    Chloride 06/12/2025 97 (L)  98 - 107 mmol/L Final    CO2 06/12/2025 28.0  22.0 - 29.0 mmol/L Final    Calcium 06/12/2025 7.8 (L)  8.6 - 10.5 mg/dL Final    Total Protein 06/12/2025 6.1  6.0 - 8.5 g/dL Final    Albumin 06/12/2025 2.8 (L)  3.5 - 5.2 g/dL Final    ALT (SGPT) 06/12/2025 26  1 - 33 U/L Final    AST (SGOT) 06/12/2025 49 (H)  1 - 32 U/L Final    Alkaline Phosphatase 06/12/2025 455 (H)  39 - 117 U/L Final    Total Bilirubin 06/12/2025 0.2  0.0 - 1.2 mg/dL Final    Globulin 06/12/2025 3.3  gm/dL Final    Calculated Result    A/G Ratio 06/12/2025 0.8  g/dL Final    BUN/Creatinine Ratio 06/12/2025 24.9  7.0 - 25.0 Final    Anion Gap 06/12/2025 11.0  5.0 - 15.0 mmol/L Final    eGFR 06/12/2025 73.1  >60.0 mL/min/1.73 Final    Heparin Anti-Xa (UFH) 06/12/2025 0.48  0.30 - 0.70 IU/ml Final    Protime 06/12/2025 15.6 (H)  12.2 - 15.3 Seconds Final    INR 06/12/2025 1.17 (H)  0.89 - 1.12 Final    PTT 06/12/2025 32.7 (L)  60.0 - 90.0 seconds Final    WBC 06/12/2025 17.59 (H)  3.40 - 10.80 10*3/mm3 Final    RBC 06/12/2025 3.88  3.77 - 5.28 10*6/mm3 Final    Hemoglobin 06/12/2025 10.7 (L)  12.0 - 15.9 g/dL Final    Hematocrit 06/12/2025 35.1  34.0 - 46.6 % Final    MCV 06/12/2025 90.5  79.0 - 97.0 fL Final    MCH 06/12/2025 27.6  26.6 - 33.0 pg Final    MCHC 06/12/2025 30.5 (L)  31.5 - 35.7 g/dL Final    RDW 06/12/2025 14.8  12.3 - 15.4 % Final    RDW-SD 06/12/2025 47.6  37.0 - 54.0 fl Final    MPV 06/12/2025 10.0  6.0 - 12.0 fL Final    Platelets 06/12/2025 192  140 - 450 10*3/mm3 Final    Neutrophil % 06/12/2025 80.4 (H)  42.7 - 76.0 % Final    Lymphocyte % 06/12/2025 4.2 (L)  19.6 -  45.3 % Final    Monocyte % 06/12/2025 8.2  5.0 - 12.0 % Final    Eosinophil % 06/12/2025 3.9  0.3 - 6.2 % Final    Basophil % 06/12/2025 0.5  0.0 - 1.5 % Final    Immature Grans % 06/12/2025 2.8 (H)  0.0 - 0.5 % Final    Neutrophils, Absolute 06/12/2025 14.13 (H)  1.70 - 7.00 10*3/mm3 Final    Lymphocytes, Absolute 06/12/2025 0.74  0.70 - 3.10 10*3/mm3 Final    Monocytes, Absolute 06/12/2025 1.45 (H)  0.10 - 0.90 10*3/mm3 Final    Eosinophils, Absolute 06/12/2025 0.68 (H)  0.00 - 0.40 10*3/mm3 Final    Basophils, Absolute 06/12/2025 0.09  0.00 - 0.20 10*3/mm3 Final    Immature Grans, Absolute 06/12/2025 0.50 (H)  0.00 - 0.05 10*3/mm3 Final    nRBC 06/12/2025 0.0  0.0 - 0.2 /100 WBC Final    Heparin Anti-Xa (UFH) 06/13/2025 0.54  0.30 - 0.70 IU/ml Final    WBC 06/13/2025 20.73 (H)  3.40 - 10.80 10*3/mm3 Final    RBC 06/13/2025 3.77  3.77 - 5.28 10*6/mm3 Final    Hemoglobin 06/13/2025 10.5 (L)  12.0 - 15.9 g/dL Final    Hematocrit 06/13/2025 33.9 (L)  34.0 - 46.6 % Final    MCV 06/13/2025 89.9  79.0 - 97.0 fL Final    MCH 06/13/2025 27.9  26.6 - 33.0 pg Final    MCHC 06/13/2025 31.0 (L)  31.5 - 35.7 g/dL Final    RDW 06/13/2025 14.8  12.3 - 15.4 % Final    RDW-SD 06/13/2025 47.6  37.0 - 54.0 fl Final    MPV 06/13/2025 10.1  6.0 - 12.0 fL Final    Platelets 06/13/2025 220  140 - 450 10*3/mm3 Final    Neutrophil % 06/13/2025 77.0 (H)  42.7 - 76.0 % Final    Lymphocyte % 06/13/2025 5.7 (L)  19.6 - 45.3 % Final    Monocyte % 06/13/2025 8.6  5.0 - 12.0 % Final    Eosinophil % 06/13/2025 5.9  0.3 - 6.2 % Final    Basophil % 06/13/2025 0.5  0.0 - 1.5 % Final    Immature Grans % 06/13/2025 2.3 (H)  0.0 - 0.5 % Final    Neutrophils, Absolute 06/13/2025 15.98 (H)  1.70 - 7.00 10*3/mm3 Final    Lymphocytes, Absolute 06/13/2025 1.18  0.70 - 3.10 10*3/mm3 Final    Monocytes, Absolute 06/13/2025 1.78 (H)  0.10 - 0.90 10*3/mm3 Final    Eosinophils, Absolute 06/13/2025 1.22 (H)  0.00 - 0.40 10*3/mm3 Final    Basophils, Absolute  06/13/2025 0.10  0.00 - 0.20 10*3/mm3 Final    Immature Grans, Absolute 06/13/2025 0.47 (H)  0.00 - 0.05 10*3/mm3 Final    nRBC 06/13/2025 0.1  0.0 - 0.2 /100 WBC Final    Heparin Anti-Xa (UFH) 06/13/2025 0.55  0.30 - 0.70 IU/ml Final    Heparin Anti-Xa (UFH) 06/14/2025 0.42  0.30 - 0.70 IU/ml Final    Glucose 06/14/2025 108 (H)  65 - 99 mg/dL Final    BUN 06/14/2025 23.0  8.0 - 23.0 mg/dL Final    Creatinine 06/14/2025 0.85  0.57 - 1.00 mg/dL Final    Sodium 06/14/2025 134 (L)  136 - 145 mmol/L Final    Potassium 06/14/2025 4.2  3.5 - 5.2 mmol/L Final    Chloride 06/14/2025 97 (L)  98 - 107 mmol/L Final    CO2 06/14/2025 25.0  22.0 - 29.0 mmol/L Final    Calcium 06/14/2025 7.7 (L)  8.6 - 10.5 mg/dL Final    BUN/Creatinine Ratio 06/14/2025 27.1 (H)  7.0 - 25.0 Final    Anion Gap 06/14/2025 12.0  5.0 - 15.0 mmol/L Final    eGFR 06/14/2025 75.2  >60.0 mL/min/1.73 Final    WBC 06/14/2025 21.39 (H)  3.40 - 10.80 10*3/mm3 Final    RBC 06/14/2025 3.72 (L)  3.77 - 5.28 10*6/mm3 Final    Hemoglobin 06/14/2025 10.3 (L)  12.0 - 15.9 g/dL Final    Hematocrit 06/14/2025 33.5 (L)  34.0 - 46.6 % Final    MCV 06/14/2025 90.1  79.0 - 97.0 fL Final    MCH 06/14/2025 27.7  26.6 - 33.0 pg Final    MCHC 06/14/2025 30.7 (L)  31.5 - 35.7 g/dL Final    RDW 06/14/2025 15.1  12.3 - 15.4 % Final    RDW-SD 06/14/2025 48.9  37.0 - 54.0 fl Final    MPV 06/14/2025 10.7  6.0 - 12.0 fL Final    Platelets 06/14/2025 215  140 - 450 10*3/mm3 Final    Neutrophil % 06/14/2025 79.5 (H)  42.7 - 76.0 % Final    Lymphocyte % 06/14/2025 4.3 (L)  19.6 - 45.3 % Final    Monocyte % 06/14/2025 7.8  5.0 - 12.0 % Final    Eosinophil % 06/14/2025 5.2  0.3 - 6.2 % Final    Basophil % 06/14/2025 0.4  0.0 - 1.5 % Final    Immature Grans % 06/14/2025 2.8 (H)  0.0 - 0.5 % Final    Neutrophils, Absolute 06/14/2025 17.02 (H)  1.70 - 7.00 10*3/mm3 Final    Lymphocytes, Absolute 06/14/2025 0.91  0.70 - 3.10 10*3/mm3 Final    Monocytes, Absolute 06/14/2025 1.67 (H)  0.10 -  0.90 10*3/mm3 Final    Eosinophils, Absolute 06/14/2025 1.11 (H)  0.00 - 0.40 10*3/mm3 Final    Basophils, Absolute 06/14/2025 0.08  0.00 - 0.20 10*3/mm3 Final    Immature Grans, Absolute 06/14/2025 0.60 (H)  0.00 - 0.05 10*3/mm3 Final    nRBC 06/14/2025 0.1  0.0 - 0.2 /100 WBC Final    Heparin Anti-Xa (UFH) 06/15/2025 0.46  0.30 - 0.70 IU/ml Final    WBC 06/15/2025 19.96 (H)  3.40 - 10.80 10*3/mm3 Final    RBC 06/15/2025 3.59 (L)  3.77 - 5.28 10*6/mm3 Final    Hemoglobin 06/15/2025 9.9 (L)  12.0 - 15.9 g/dL Final    Hematocrit 06/15/2025 32.2 (L)  34.0 - 46.6 % Final    MCV 06/15/2025 89.7  79.0 - 97.0 fL Final    MCH 06/15/2025 27.6  26.6 - 33.0 pg Final    MCHC 06/15/2025 30.7 (L)  31.5 - 35.7 g/dL Final    RDW 06/15/2025 15.2  12.3 - 15.4 % Final    RDW-SD 06/15/2025 48.6  37.0 - 54.0 fl Final    MPV 06/15/2025 10.0  6.0 - 12.0 fL Final    Platelets 06/15/2025 221  140 - 450 10*3/mm3 Final    Neutrophil % 06/15/2025 81.3 (H)  42.7 - 76.0 % Final    Lymphocyte % 06/15/2025 3.8 (L)  19.6 - 45.3 % Final    Monocyte % 06/15/2025 7.6  5.0 - 12.0 % Final    Eosinophil % 06/15/2025 4.7  0.3 - 6.2 % Final    Basophil % 06/15/2025 0.4  0.0 - 1.5 % Final    Immature Grans % 06/15/2025 2.2 (H)  0.0 - 0.5 % Final    Neutrophils, Absolute 06/15/2025 16.23 (H)  1.70 - 7.00 10*3/mm3 Final    Lymphocytes, Absolute 06/15/2025 0.76  0.70 - 3.10 10*3/mm3 Final    Monocytes, Absolute 06/15/2025 1.52 (H)  0.10 - 0.90 10*3/mm3 Final    Eosinophils, Absolute 06/15/2025 0.93 (H)  0.00 - 0.40 10*3/mm3 Final    Basophils, Absolute 06/15/2025 0.08  0.00 - 0.20 10*3/mm3 Final    Immature Grans, Absolute 06/15/2025 0.44 (H)  0.00 - 0.05 10*3/mm3 Final    nRBC 06/15/2025 0.0  0.0 - 0.2 /100 WBC Final       XR Chest 1 View  Result Date: 6/15/2025  Narrative: XR CHEST 1 VW Date of Exam: 6/15/2025 2:07 AM EDT Indication: Recurrent pleural effusions, right chest tube, follow-up Comparison: 6/14/2025. Findings: There is a right basilar pigtail  chest tube in place. There may be some kinking of the chest tube possibly at the entry site. I would recommend clinical correlation. There is an unchanged moderate right pleural effusion with compressive atelectasis. The left lung and pleural space are clear. The heart size is normal. The pulmonary vascular markings are normal. There is no pneumothorax.     Impression: Impression: 1.There is a right basilar pigtail chest tube in place. There may be some kinking of the chest tube possibly at the entry site. I would recommend clinical correlation. 2.Unchanged moderate right pleural effusion with compressive atelectasis. Electronically Signed: Samuel Luna MD  6/15/2025 10:19 AM EDT  Workstation ID: [id number]    XR Chest 1 View  Result Date: 6/14/2025  Narrative: XR CHEST 1 VW Date of Exam: 6/14/2025 3:14 AM EDT Indication: Recurrent pleural effusions Comparison: Chest radiograph 6/13/2025. Findings: Unchanged position of right pleural catheter. Cardiomediastinal silhouette is unchanged. Moderate right pleural effusion with right basilar airspace disease and right basilar lucency suggestive of small-volume pneumothorax, unchanged compared to yesterday's exam. Left lung appears clear. No pneumothorax. Osseous structures are unchanged.     Impression: Impression: No significant interval change. Electronically Signed: Franco Shaffer MD  6/14/2025 8:09 AM EDT  Workstation ID: [id number]    XR Chest 1 View  Result Date: 6/13/2025  Narrative: XR CHEST 1 VW Date of Exam: 6/13/2025 12:55 PM EDT Indication: Recurrent pleural effusions Comparison: AP chest x-ray 6/13/2025 timed at 4:43 a.m. Findings: Right pleural catheter remains in place. Moderate size right hydropneumothorax appears similar to numbers to today's earlier chest x-ray. Underlying right basilar airspace opacities are stable. Left lung is clear.     Impression: Impression: Moderate sized right hydropneumothorax appears similar to numbers to today's earlier chest x-ray.  Right pleural catheter remains in place. Electronically Signed: Maryellen Dalton MD  6/13/2025 1:44 PM EDT  Workstation ID: [id number]    XR Chest 1 View  Result Date: 6/13/2025  Narrative: XR CHEST 1 VW Date of Exam: 6/13/2025 2:49 AM EDT Indication: post Ct placement and thoracentesis Comparison: AP chest x-ray 6/12/2025, 6/5/2025 Findings: There is a new right pleural catheter. Previously seen right basilar pleural air has now been replaced with pleural fluid. No apical pneumothorax is seen. There are stable underlying right basilar airspace opacities. Left lung appears clear. Cardiomediastinal contours are stable.     Impression: Impression: 1.Interval placement of right pleural catheter with fluid component of right basilar hydropneumothorax. 2.Stable underlying right basilar airspace opacities, likely due to atelectasis. Electronically Signed: Maryellen Dalton MD  6/13/2025 7:23 AM EDT  Workstation ID: [id number]    CT Guided Chest Tube  Result Date: 6/12/2025  Narrative: DATE OF EXAM: 6/12/2025 2:48 PM  PROCEDURE: CT GUIDED CHEST TUBE PLACEMENT-  INDICATIONS: right chest tube placement; J95.811-Postprocedural pneumothorax  DLP: 746 mGycm  TECHNIQUE:  The risks, benefits, and alternatives were discussed in detail with the patient. The patient was brought down to the CT suite and positioned supine on the CT table. Preliminary CT scan of the the chest was performed and a skin entry site was selected and marked. The area was prepped and draped utilizing standard sterile technique. 1% lidocaine was administered to the soft tissues. A small skin incision was made with an 11 blade scalpel. Under CT guidance a 5 Kinyarwanda Yueh was advanced into the right pleural space. The inner stylet was removed and an 035 wire was advanced coaxially. Over the wire a 10 Kinyarwanda all-purpose drainage catheter was advanced with the pigtail formed and locked within the pleural space the catheter was sutured to the skin and attached to a  Pleur-evac. A sterile dressing was then applied.  The patient tolerated the procedure well and there were no immediate complications.      Impression: Successful CT-guided 10 Gabonese right sided chest tube placement.   6/12/2025 4:13 PM by Jose Hope MD on Workstation: [id number]      XR Chest 1 View  Result Date: 6/12/2025  Narrative: XR CHEST 1 VW Date of Exam: 6/12/2025 1:45 PM EDT Indication: s/p thoracentesis; c/o severe right neck pain Comparison: 6/12/2025 Findings: Cardiac size is similar to prior exam. Similar right-sided hydropneumothorax. Similar right basilar opacity. No evidence of acute osseous abnormalities. Visualized upper abdomen is unremarkable.     Impression: Impression: 1.Similar right-sided hydropneumothorax. 2.Similar right basilar opacity may reflect atelectasis or infection. Electronically Signed: Clay Johnson MD  6/12/2025 2:31 PM EDT  Workstation ID: [id number]    XR Chest 1 View  Result Date: 6/12/2025  Narrative: XR CHEST 1 VW Date of Exam: 6/12/2025 12:25 PM EDT Indication: s/p thoracentesis; c/o severe right neck pain Comparison: Chest radiograph 6/12/2025 Findings: Grossly stable size of the right hydropneumothorax, measuring up to 7 mm at apex with larger right subpulmonic component. Previous noted fluid extending towards the apex appears decreased. Left lung relatively clear. There is partial collapse and probable underlying atelectasis within the right lower lobe. Negative for left pneumothorax. Stable cardiomediastinal silhouette. Degenerative related osseous change.     Impression: Impression: Slightly decreased right pleural fluid component with otherwise similar appearing moderate size hydropneumothorax. Electronically Signed: Toni Argueta MD  6/12/2025 12:53 PM EDT  Workstation ID: [id number]    XR Chest 1 View  Result Date: 6/12/2025  Narrative: XR CHEST 1 VW Date of Exam: 6/12/2025 11:23 AM EDT Indication: s/p thoracentesis Comparison: Chest x-ray 6/5/2025 Findings:  There is a pneumothorax on the right. It is moderate in size and seen inferiorly measuring up to 2.3 cm. There is a moderate mount of pleural fluid as well. There is right lower lobe airspace opacity which is improved from previous exam. Left lung is clear. Heart size is normal.     Impression: Impression: Moderate sized right hydropneumothorax. Electronically Signed: Susan Leahy MD  6/12/2025 12:41 PM EDT  Workstation ID: [id number]    US Thoracentesis  Result Date: 6/12/2025  Narrative: PROCEDURE: US THORACENTESIS-  ATTENDING: Jose Hope M.D.  CLINICAL HISTORY: Large right pleural effusion.  TECHNIQUE:  The risk, benefits, and alternatives were described in detail and the patient was brought to the ultrasound suite and positioned seated. The skin entry site was prepped and draped utilizing standard sterile technique. 1% lidocaine was administered to the soft tissues of the right posterior thorax.   A 5 Chinese Yueh was advanced into the right pleural space.   A total of 1.4 L was removed from the right pleural space. A specimen was sent to the laboratory for analysis. The needle was removed and a sterile dressing was applied.  The patient tolerated the procedure well and there were no complications.      Impression: Successful ultrasound-guided right thoracentesis.   Attestation Signer name: Jose Hope MD I attest that I was present for the entire procedure. I reviewed the stored images and agree with the report as written.      6/12/2025 12:17 PM by Jose Hope MD on Workstation: [id number]      XR Chest 1 View  Result Date: 6/5/2025  Narrative: XR CHEST 1 VW Date of Exam: 6/5/2025 6:41 AM EDT Indication: cough Comparison: Chest radiograph 6/5/2025 at 0422 hours Findings: There is been slight decrease in the size of the large right pleural effusion. There is persistent right pleural fluid and right basilar airspace disease. Airspace disease is most likely atelectasis with pneumonia not excluded. The left  lung remains clear. There is no pneumothorax.     Impression: Impression: Slight decrease in the size of the large right pleural effusion. Electronically Signed: Rodriguez Singh MD  6/5/2025 7:00 AM EDT  Workstation ID: [id number]    XR Chest 1 View  Result Date: 6/5/2025  Narrative: XR CHEST 1 VW Date of Exam: 6/5/2025 4:18 AM EDT Indication: SOA triage protocol Comparison: Chest radiograph 5/28/2025 Findings: The heart size and pulmonary vascular markings are normal. The left lung is clear. There is a large right pleural effusion with associated right basilar atelectasis. There is no pneumothorax. The osseous structures are normal for age.     Impression: Impression: Large right pleural effusion with right basilar atelectasis. Electronically Signed: Rodriguez Singh MD  6/5/2025 4:47 AM EDT  Workstation ID: [id number]    US Thoracentesis  Result Date: 5/28/2025  Narrative: PROCEDURE: Ultrasound-guided right thoracentesis  Procedural Personnel Attending physician(s): Erik Mensah  Pre-procedure diagnosis: Right pleural effusion  Post-procedure diagnosis: Same  Complications: No immediate complications.      Impression:  Ultrasound-guided thoracentesis with drainage of 1400 mL of serous fluid.   _______________________________________________________________   PROCEDURE SUMMARY:  - Limited thoracic ultrasound - Ultrasound-guided right thoracentesis - Additional procedure(s): None  PROCEDURE DETAILS:   Pre-procedure Consent: Informed consent for the procedure including risks, benefits and alternatives was obtained and time-out was performed prior to the procedure. Preparation: The site was prepared and draped using maximal sterile barrier technique including cutaneous antisepsis.    Limited thoracic ultrasound: Limited thoracic ultrasound was performed using a curved transducer. A safe window for thoracentesis was identified. Right hemithorax findings: Moderate to large right-sided pleural effusion   Thoracentesis  Local  anesthesia was administered. The pleural space was accessed and fluid return confirmed position. The fluid was drained. The catheter was removed, and a sterile bandage was applied.  Catheter placed: 5 Slovenian catheter.  Additional Details  Equipment details: None Specimens removed: Pleural fluid Estimated blood loss (mL): Less than 10   5/28/2025 12:05 PM by Erik Mensah MD on Workstation: [id number]      XR Chest 1 View  Result Date: 5/28/2025  Narrative: XR CHEST 1 VW Date of Exam: 5/28/2025 10:19 AM EDT Indication: S/P Right Thoracentesis Comparison: 5/14/2025 chest radiograph. Ultrasound-guided thoracentesis of 5/20/2025 Findings: By history, thoracentesis earlier today removed 1.4 L of fluid from the right hemithorax. Follow-up chest radiograph shows patchy right mid and lower lung atelectasis and perhaps a small amount of remaining pleural fluid, versus discoid atelectasis. There is no evidence of pneumothorax. Left lung remains clear. Heart and vasculature appear normal in size.     Impression: Impression: Patchy right mid and lower lung atelectasis and questionable small remaining right effusion. No evidence of pneumothorax or other significant chest disease elsewhere. Electronically Signed: Marlon Lyons MD  5/28/2025 10:49 AM EDT  Workstation ID: [id number]    NM PET/CT Skull Base to Mid Thigh  Result Date: 5/28/2025  Narrative: FDG NM PET/CT SKULL BASE TO MID THIGH Date of Exam: 5/22/2025 8:25 AM EDT Indication: restaging scans lung cancer. Comparison: 5/13/2025 and 7/11/2024 Technique: 8.1 mCi of F-18 FDG was administered intravenously. PET imaging was obtained from skull base to mid-thigh approximately 60 minutes after radiotracer injection. A low dose non contrast CT was obtained for attenuation correction and anatomic localization. Fused PET-CT and 3D MIP reconstructions were utilized for image interpretation.  Fasting blood glucose level: 113 mg/dl. Reference uptake values: Mediastinum: 2.8 SUVmax  Liver: 2.7 SUVmax Normalization method: Body Weight Findings: Head and neck: No abnormal FDG activity identified. Chest: Increasing moderate to large right effusion which is loculated. Spiculated right middle lobe nodule has an SUV maximum of 4.4. There is extensive right hilar adenopathy with an SUV max 8.8. There is subcarinal and mediastinal adenopathy ranging between 8 and 5 SUV maximum. Abdomen and pelvis: At least 20 FDG-avid liver lesions are identified measuring up to 5 cm in diameter and an SUV maximum of 13.5. There is left adrenal lesion with an SUV maximum of 5. There is extensive julio hepatis and portacaval adenopathy with an SUV maximum of 8.6. There is retroperitoneal adenopathy, including left periaortic and aortocaval abnormal lymph nodes. Musculoskeletal: Diffuse osseous metastatic disease with multifocal disease throughout the cervical thoracolumbar spine, bilateral ribs, pelvis, including the left acetabulum and to a lesser extent the right acetabulum. There are bilateral femoral lesions as well. No definite pathologic fractures are identified at this time. Several of the lesions however are involving weight-bearing bones and the patient is susceptible to pathologic fractures.     Impression: Impression: Extensive metastatic disease involving the chest, abdomen, and pelvis as well as the bones. Electronically Signed: Herb Lopes MD  5/28/2025 9:18 AM EDT  Workstation ID: [id number]      ASSESSMENT: The patient is a very pleasant 65 y.o. female  with metastatic right lung cancer      PLAN:  1.  Metastatic right lung cancer: The patient is complaining of shortness of breath with minimal exertion.  This is induced by her underlying malignancy and advanced COPD.  She is status post 1 dose of Keytruda.  She is scheduled for second dose next Friday.  2.  Recurrent right pleural effusion: Chest tube was removed today.  If she has recurrent effusion we will arrange for outpatient pigtail catheter.  3.   Cancer-related pain: Continue opiates as needed.    Okay discharge patient from oncology perspective.  She will follow-up with me as scheduled next week.      Sejal Mckenzie MD  6/15/2025      Electronically signed by Sejal Mckenzie MD at 06/15/25 1356       Trina Hdz APRN at 06/15/25 1219              [facility] Medicine Services  PROGRESS NOTE    Patient Name: Sydnie Gamble  : 1960  MRN: [id number]    Date of Admission: 2025  Primary Care Physician: Dee Elena APRN    Subjective   Subjective     CC: Follow-up SOA    HPI:   Pt sitting up in bed. She denies any issues today. Chest tube to the right chest wall.     Objective   Objective     Vital Signs:   Temp:  [97.7 °F (36.5 °C)-98 °F (36.7 °C)] 97.7 °F (36.5 °C)  Heart Rate:  [109-113] 111  Resp:  [16-18] 18  BP: (110-144)/(68-74) 126/73  Flow (L/min) (Oxygen Therapy):  [4] 4     Physical Exam:  Constitutional: Awake, alert, NAD  HENT: NCAT, mucous membranes moist  Respiratory: dim in bases, unlabored. 4L NC 96%  Cardiovascular: tachy, no murmurs, rubs, or gallops  Gastrointestinal: Positive bowel sounds, soft, nontender, nondistended  Musculoskeletal: No bilateral ankle edema  Psychiatric: Appropriate affect, cooperative  Neurologic: Oriented x 3, strength symmetric in all extremities, Cranial Nerves grossly intact to confrontation, speech clear  Skin: No rashes      Results Reviewed:  LAB RESULTS:      Lab 06/15/25  0446 25  0456 25  0733 25  0341 25  0114 25  1755   WBC 19.96* 21.39*  --  20.73*  --  17.59*   HEMOGLOBIN 9.9* 10.3*  --  10.5*  --  10.7*   HEMATOCRIT 32.2* 33.5*  --  33.9*  --  35.1   PLATELETS 221 215  --  220  --  192   NEUTROS ABS 16.23* 17.02*  --  15.98*  --  14.13*   IMMATURE GRANS (ABS) 0.44* 0.60*  --  0.47*  --  0.50*   LYMPHS ABS 0.76 0.91  --  1.18  --  0.74   MONOS ABS 1.52* 1.67*  --  1.78*  --  1.45*   EOS ABS 0.93* 1.11*  --  1.22*  --   0.68*   MCV 89.7 90.1  --  89.9  --  90.5   PROTIME  --   --   --   --   --  15.6*   APTT  --   --   --   --   --  32.7*   HEPARIN ANTI-XA 0.46 0.42 0.55  --  0.54 0.48         Lab 06/14/25  0456 06/12/25  1755   SODIUM 134* 136   POTASSIUM 4.2 5.0   CHLORIDE 97* 97*   CO2 25.0 28.0   ANION GAP 12.0 11.0   BUN 23.0 21.7   CREATININE 0.85 0.87   EGFR 75.2 73.1   GLUCOSE 108* 117*   CALCIUM 7.7* 7.8*         Lab 06/12/25  1755   TOTAL PROTEIN 6.1   ALBUMIN 2.8*   GLOBULIN 3.3   ALT (SGPT) 26   AST (SGOT) 49*   BILIRUBIN 0.2   ALK PHOS 455*         Lab 06/12/25  1755   PROTIME 15.6*   INR 1.17*                 Brief Urine Lab Results       None            Microbiology Results Abnormal       None            XR Chest 1 View  Result Date: 6/15/2025  XR CHEST 1 VW Date of Exam: 6/15/2025 2:07 AM EDT Indication: Recurrent pleural effusions, right chest tube, follow-up Comparison: 6/14/2025. Findings: There is a right basilar pigtail chest tube in place. There may be some kinking of the chest tube possibly at the entry site. I would recommend clinical correlation. There is an unchanged moderate right pleural effusion with compressive atelectasis. The left lung and pleural space are clear. The heart size is normal. The pulmonary vascular markings are normal. There is no pneumothorax.     Impression: Impression: 1.There is a right basilar pigtail chest tube in place. There may be some kinking of the chest tube possibly at the entry site. I would recommend clinical correlation. 2.Unchanged moderate right pleural effusion with compressive atelectasis. Electronically Signed: Samuel Luna MD  6/15/2025 10:19 AM EDT  Workstation ID: [id number]    XR Chest 1 View  Result Date: 6/14/2025  XR CHEST 1 VW Date of Exam: 6/14/2025 3:14 AM EDT Indication: Recurrent pleural effusions Comparison: Chest radiograph 6/13/2025. Findings: Unchanged position of right pleural catheter. Cardiomediastinal silhouette is unchanged. Moderate right pleural  effusion with right basilar airspace disease and right basilar lucency suggestive of small-volume pneumothorax, unchanged compared to yesterday's exam. Left lung appears clear. No pneumothorax. Osseous structures are unchanged.     Impression: Impression: No significant interval change. Electronically Signed: Franco Shaffer MD  6/14/2025 8:09 AM EDT  Workstation ID: [id number]    XR Chest 1 View  Result Date: 6/13/2025  XR CHEST 1 VW Date of Exam: 6/13/2025 12:55 PM EDT Indication: Recurrent pleural effusions Comparison: AP chest x-ray 6/13/2025 timed at 4:43 a.m. Findings: Right pleural catheter remains in place. Moderate size right hydropneumothorax appears similar to numbers to today's earlier chest x-ray. Underlying right basilar airspace opacities are stable. Left lung is clear.     Impression: Impression: Moderate sized right hydropneumothorax appears similar to numbers to today's earlier chest x-ray. Right pleural catheter remains in place. Electronically Signed: Maryellen Dalton MD  6/13/2025 1:44 PM EDT  Workstation ID: [id number]      Results for orders placed during the hospital encounter of 05/13/25    Adult Transthoracic Echo Complete w/ Color, Spectral and Contrast if necessary per protocol    Interpretation Summary    Left ventricular ejection fraction appears to be 66 - 70%.    Normal right ventricular cavity size, wall thickness, systolic function and septal motion noted.    There is a small (<1cm) pericardial effusion adjacent to the right ventricle. There is no evidence of cardiac tamponade.    There is a left pleural effusion.      Current medications:  Scheduled Meds:arformoterol, 15 mcg, Nebulization, BID - RT   And  budesonide, 0.5 mg, Nebulization, BID - RT   And  revefenacin, 175 mcg, Nebulization, Daily - RT  atorvastatin, 10 mg, Oral, Nightly  bisacodyl, 10 mg, Rectal, Once  Diclofenac Sodium, 2 g, Topical, 4x Daily  levothyroxine, 88 mcg, Oral, Q AM  lidocaine PF 1%, 5 mL, Injection,  Once  nystatin, 5 mL, Oral, 4x Daily  senna-docusate sodium, 2 tablet, Oral, BID  sertraline, 100 mg, Oral, Daily  sodium chloride, 10 mL, Intravenous, Q12H  sodium chloride, 10 mL, Intravenous, Q12H  traZODone, 150 mg, Oral, Nightly      Continuous Infusions:heparin, 16 Units/kg/hr, Last Rate: 16 Units/kg/hr (06/15/25 5315)  Pharmacy to Dose Heparin,       PRN Meds:.  acetaminophen **OR** acetaminophen **OR** acetaminophen    senna-docusate sodium **AND** polyethylene glycol **AND** bisacodyl **AND** bisacodyl    Calcium Replacement - Follow Nurse / BPA Driven Protocol    clonazePAM    cyclobenzaprine    famotidine    HYDROmorphone **AND** naloxone    Magnesium Standard Dose Replacement - Follow Nurse / BPA Driven Protocol    ondansetron ODT **OR** ondansetron    oxyCODONE-acetaminophen    Pharmacy to Dose Heparin    Phosphorus Replacement - Follow Nurse / BPA Driven Protocol    Potassium Replacement - Follow Nurse / BPA Driven Protocol    simethicone    sodium chloride    sodium chloride    sodium chloride    sodium chloride    zolpidem    Assessment & Plan   Assessment & Plan     Active Hospital Problems    Diagnosis  POA    **Recurrent pleural effusion [J90]  Yes    Adenocarcinoma of right lung [C34.91]  Yes    HLD (hyperlipidemia) [E78.5]  Yes    Acquired hypothyroidism [E03.9]  Yes      Resolved Hospital Problems   No resolved problems to display.        Brief Hospital Course to date:  Sydnie Gamble is a 65 y.o. female with PMH of Adenocarcinoma of lung, anemia, cervical cancer, depression, HLD, hypothyroidism, PE, GERD and had home oxygen. Patient had a thoracentesis with chest tube placement, admitted for further management.     Recurrent Pleural Effusion  Adenocarcinoma of Right lung  -s/p thoracentesis and chest tube placement by IR, CTS managing  -- currently under treatment with Dr. Mckenzie and he wants to hold on pleur-x drain for now. He wants her to receive Keytruda  for a month.   --Palliative  care consulted, continue pain control  - She is currently on 4 L NC, wean as able  -Continue DuoNeb     Hypothyroidism  -- cont home meds      HLD  -- cont statin      Generalized anxiety disorder  Depression  --cont home meds     LLE DVT  PE  -- hold eliquis   -- heparin drip while she has CT     Expected Discharge Location and Transportation: TBD  Expected Discharge   Expected Discharge Date: 6/16/2025; Expected Discharge Time:      VTE Prophylaxis:  Pharmacologic VTE prophylaxis orders are present.     AM-PAC 6 Clicks Score (PT): 18 (06/14/25 2000)    CODE STATUS:   Code Status and Medical Interventions: CPR (Attempt to Resuscitate); Full Support; also talked over with  Jose Francisco   Ordered at: 06/12/25 5292     Code Status (Patient has no pulse and is not breathing):    CPR (Attempt to Resuscitate)     Medical Interventions (Patient has pulse or is breathing):    Full Support     Comments:    also talked over with  Jose Francisco     Level Of Support Discussed With:    Patient       Next of Kin (If No Surrogate)       DAYSI Em  06/15/25        Electronically signed by Trina Hdz APRN at 06/15/25 1223       Mansoor Cain PA at 06/15/25 0713          CTS Progress Note       LOS: 3 days   Patient Care Team:  Dee Elena APRN as PCP - General (Nurse Practitioner)  Cliff Montana MD as Consulting Physician (Radiation Oncology)  Toni Mcclelland MD as Consulting Physician (Pulmonary Disease)  Erik Mckeon DO as Consulting Physician (Pulmonary Disease)  Sejal Mckenzie MD as Referring Physician (Hematology and Oncology)  Rahel Johnson, YVETTE as Ambulatory  (Oncology) ([facility])    Chief Complaint: Recurrent pleural effusion    Vital Signs:  Temp:  [97.6 °F (36.4 °C)-98 °F (36.7 °C)] 97.8 °F (36.6 °C)  Heart Rate:  [] 109  Resp:  [16-20] 16  BP: (109-131)/(66-72) 128/71    Physical Exam: No distress       Results:     Results from last  7 days   Lab Units 06/15/25  0446   WBC 10*3/mm3 19.96*   HEMOGLOBIN g/dL 9.9*   HEMATOCRIT % 32.2*   PLATELETS 10*3/mm3 221     Results from last 7 days   Lab Units 06/14/25  0456   SODIUM mmol/L 134*   POTASSIUM mmol/L 4.2   CHLORIDE mmol/L 97*   CO2 mmol/L 25.0   BUN mg/dL 23.0   CREATININE mg/dL 0.85   GLUCOSE mg/dL 108*   CALCIUM mg/dL 7.7*           Imaging Results (Last 24 Hours)       Procedure Component Value Units Date/Time    XR Chest 1 View - In process [[id number]] Resulted: 06/15/25 0209     Updated: 06/15/25 0244    This result has not been signed. Information might be incomplete.      XR Chest 1 View [[id number]] Collected: 06/14/25 0806     Updated: 06/14/25 0812    Narrative:      XR CHEST 1 VW    Date of Exam: 6/14/2025 3:14 AM EDT    Indication: Recurrent pleural effusions    Comparison: Chest radiograph 6/13/2025.    Findings:  Unchanged position of right pleural catheter. Cardiomediastinal silhouette is unchanged. Moderate right pleural effusion with right basilar airspace disease and right basilar lucency suggestive of small-volume pneumothorax, unchanged compared to   yesterday's exam. Left lung appears clear. No pneumothorax. Osseous structures are unchanged.      Impression:      Impression:  No significant interval change.      Electronically Signed: Franco Shaffer MD    6/14/2025 8:09 AM EDT    Workstation ID: [id number]            Assessment      Recurrent pleural effusion    Acquired hypothyroidism    HLD (hyperlipidemia)    Adenocarcinoma of right lung    Would like to discontinue pigtail catheter today but I have been unable to determine the amount of drainage from the chest tube.  Will discuss with nursing about charting of chest tube drainage    Plan   Will possibly discontinue pigtail catheter today when I can determine degree of drainage    Chest tube drainage confirmed and very minimal.  Chest tube discontinued without immediate complication.  CT surgery will sign off at this time.   Please reconsult if future recurrent pleural effusion occurs and need for Pleurx catheter is recommended.    Please note that portions of this note were completed with a voice recognition program. Efforts were made to edit the dictations, but occasionally words are mistranscribed.    August Andres MD  06/15/25  07:13 EDT        Electronically signed by Mansoor Cain PA at 06/15/25 1342       Consult Notes (last 72 hours)  Notes from 06/14/25 1601 through 06/17/25 1601   No notes of this type exist for this encounter.

## 2025-06-17 NOTE — PROGRESS NOTES
[facility] Medicine Services  PROGRESS NOTE    Patient Name: Sydnie Gamble  : 1960  MRN: [id number]    Date of Admission: 2025  Primary Care Physician: Dee Elena APRN    Subjective   Subjective     CC: dyspnea    HPI:  Patient seen s/p PleurX placement  On home 3 liters n/c,  reports before diagnosis of cancer/effusion she was on room air    They are contemplating rehab or not -she is minimally able to mobilize self so  overall is hopeful for rehab it seems. They ask how this would affect oncology treatment and we discuss    Discuss PleurX training    OK for heparin gtt restart per CT surgery post-PleurX      Objective   Objective     Vital Signs:   Temp:  [97.5 °F (36.4 °C)-98.4 °F (36.9 °C)] 97.8 °F (36.6 °C)  Heart Rate:  [100-114] 109  Resp:  [12-20] 18  BP: (108-131)/(53-76) 128/74  Flow (L/min) (Oxygen Therapy):  [2-5] 3     Physical Exam:  Constitutional: No acute distress, awake, alert older female lying in bed  Respiratory: Clear to auscultation bilaterally, respiratory effort normal on 3 liters, PleurX  Cardiovascular: RRR, no murmurs, rubs, or gallops  Gastrointestinal: Soft, nontender, nondistended  Musculoskeletal: Muscle tone decreased throughout, no joint effusions appreciated  Psychiatric: Appropriate affect, cooperative  Neurologic: Alert and oriented, speech clear  Skin: No rashes    Results Reviewed:  LAB RESULTS:      Lab 25  1735 25  1043 25  0424 06/15/25  0446 25  0456 25  0733 25  0341 25  0114 25  1755   WBC  --   --  18.84* 19.96* 21.39*  --  20.73*  --  17.59*   HEMOGLOBIN  --   --  10.3* 9.9* 10.3*  --  10.5*  --  10.7*   HEMATOCRIT  --   --  34.2 32.2* 33.5*  --  33.9*  --  35.1   PLATELETS  --   --  285 221 215  --  220  --  192   NEUTROS ABS  --   --  14.61* 16.23* 17.02*  --  15.98*  --  14.13*   IMMATURE GRANS (ABS)  --   --  0.76* 0.44* 0.60*  --  0.47*  --   0.50*   LYMPHS ABS  --   --  0.80 0.76 0.91  --  1.18  --  0.74   MONOS ABS  --   --  1.50* 1.52* 1.67*  --  1.78*  --  1.45*   EOS ABS  --   --  1.08* 0.93* 1.11*  --  1.22*  --  0.68*   MCV  --   --  91.4 89.7 90.1  --  89.9  --  90.5   PROTIME  --   --   --   --   --   --   --   --  15.6*   APTT  --   --   --   --   --   --   --   --  32.7*   HEPARIN ANTI-XA 0.30 0.27* 0.44 0.46 0.42   < >  --    < > 0.48    < > = values in this interval not displayed.         Lab 06/17/25  1043 06/16/25  0424 06/14/25  0456 06/12/25  1755   SODIUM  --  131* 134* 136   POTASSIUM 4.1 4.3 4.2 5.0   CHLORIDE  --  94* 97* 97*   CO2  --  26.0 25.0 28.0   ANION GAP  --  11.0 12.0 11.0   BUN  --  25.5* 23.0 21.7   CREATININE  --  0.87 0.85 0.87   EGFR  --  73.1 75.2 73.1   GLUCOSE  --  117* 108* 117*   CALCIUM  --  7.5* 7.7* 7.8*         Lab 06/12/25  1755   TOTAL PROTEIN 6.1   ALBUMIN 2.8*   GLOBULIN 3.3   ALT (SGPT) 26   AST (SGOT) 49*   BILIRUBIN 0.2   ALK PHOS 455*         Lab 06/12/25  1755   PROTIME 15.6*   INR 1.17*                 Brief Urine Lab Results       None            Microbiology Results Abnormal       None            XR Chest 1 View  Result Date: 6/17/2025  XR CHEST 1 VW Date of Exam: 6/17/2025 8:34 AM EDT Indication: postop Comparison: 6/17/2025 earlier same day Findings: There has been interval placement of a right basilar thoracostomy tube, with significant decrease in amount of right pleural fluid. There is persistent fluid at the apex and a small amount of the base and minor fissure which may be loculated. No pneumothorax identified. There is consolidation in the right mid and lower lung which may be due to residual atelectasis or infiltrate. Small amount of left pleural fluid appears unchanged. Heart size and mediastinal contour appear unchanged.     Impression: Impression: Interval placement of right basilar thoracostomy tube with significant decrease in amount of right pleural fluid. There is persistent fluid  at the apex and base which may be loculated. No pneumothorax. Electronically Signed: Rodriguez Napier MD  6/17/2025 9:06 AM EDT  Workstation ID: [id number]    XR Chest 1 View  Result Date: 6/17/2025  XR CHEST 1 VW Date of Exam: 6/17/2025 2:45 AM EDT Indication: Recurrent pleural effusions Comparison: 6/16/2025 Findings: There has been slight further increase in right pleural fluid as compared to yesterday's study. There is consolidation in the overlying right lung suggesting atelectasis or infiltrate. There is a small left pleural effusion which is slightly increased from yesterday. No pneumothorax identified. Pulmonary vascularity appears within normal limits. Heart size appears stable.     Impression: Impression: 1.Interval increase in bilateral pleural effusions, right greater than left. 2.Consolidation in the right lung base may be due to atelectasis or infiltrate. Electronically Signed: Rodriguez Napier MD  6/17/2025 8:11 AM EDT  Workstation ID: [id number]    XR Chest 1 View  Result Date: 6/16/2025  XR CHEST 1 VW Date of Exam: 6/16/2025 5:35 AM EDT Indication: Recurrent pleural effusions Comparison: 6/15/2025 Findings: Right pleural drain has been removed and there is apparently increasing right basilar effusion. Right midlung interstitial changes are stable. There is minimally increased left basilar interstitial opacity perhaps a small focus of atelectasis. Heart and vasculature appear to be normal in size. No pneumothorax is seen.     Impression: Impression: Increasing right basilar effusion following right pleural drain removal. Electronically Signed: Marlon Lyons MD  6/16/2025 7:47 AM EDT  Workstation ID: [id number]      Results for orders placed during the hospital encounter of 05/13/25    Adult Transthoracic Echo Complete w/ Color, Spectral and Contrast if necessary per protocol    Interpretation Summary    Left ventricular ejection fraction appears to be 66 - 70%.    Normal right ventricular cavity size, wall thickness,  systolic function and septal motion noted.    There is a small (<1cm) pericardial effusion adjacent to the right ventricle. There is no evidence of cardiac tamponade.    There is a left pleural effusion.      Current medications:  Scheduled Meds:arformoterol, 15 mcg, Nebulization, BID - RT   And  budesonide, 0.5 mg, Nebulization, BID - RT   And  revefenacin, 175 mcg, Nebulization, Daily - RT  atorvastatin, 10 mg, Oral, Nightly  bisacodyl, 10 mg, Rectal, Once  Diclofenac Sodium, 2 g, Topical, 4x Daily  levothyroxine, 88 mcg, Oral, Q AM  lidocaine PF 1%, 5 mL, Injection, Once  mineral oil, 1 enema, Rectal, Once  nystatin, 5 mL, Oral, 4x Daily  senna-docusate sodium, 2 tablet, Oral, BID  sertraline, 100 mg, Oral, Daily  traZODone, 150 mg, Oral, Nightly      Continuous Infusions:heparin, 16 Units/kg/hr, Last Rate: 16 Units/kg/hr (06/17/25 1157)  [START ON 6/18/2025] lactated ringers, 9 mL/hr  Pharmacy to Dose Heparin,       PRN Meds:.  acetaminophen **OR** acetaminophen **OR** acetaminophen    senna-docusate sodium **AND** polyethylene glycol **AND** bisacodyl **AND** bisacodyl    Calcium Replacement - Follow Nurse / BPA Driven Protocol    clonazePAM    cyclobenzaprine    famotidine    HYDROmorphone **AND** naloxone    ipratropium-albuterol    Magnesium Standard Dose Replacement - Follow Nurse / BPA Driven Protocol    ondansetron ODT **OR** ondansetron    oxyCODONE-acetaminophen    Pharmacy to Dose Heparin    Phosphorus Replacement - Follow Nurse / BPA Driven Protocol    Potassium Replacement - Follow Nurse / BPA Driven Protocol    simethicone    sodium chloride    sodium chloride    zolpidem    Assessment & Plan   Assessment & Plan     Active Hospital Problems    Diagnosis  POA    **Recurrent pleural effusion [J90]  Yes    Adenocarcinoma of right lung [C34.91]  Yes    HLD (hyperlipidemia) [E78.5]  Yes    Acquired hypothyroidism [E03.9]  Yes      Resolved Hospital Problems   No resolved problems to display.        Brief  Hospital Course to date:  Sydnie Gamble is a 65 y.o. female w recent diagnosis of lung adenocarcinoma s/p [employer] c/b malignant effusion on home 3 liters n/c since 5/2025 undergoing keytruda treatment w Dr Mckenzie who presented w worsening dyspnea and found to have worsening recurrent pleural effusion.    Lung adenocarcinoma c/b malignant effusion, now s/p PleurX  Chronic hypoxic resp failure on 3 liters n/c since diagnosis  -home 3 liters n/c from last dc  -PleurX training w family, likely drain 3x/week  -OP keytruda treatment to resume when patient amenable. If going home, next week. If delay for SNF, also OK per d/w Dr Mckenzie 6/17    Acute on chronic debility - EOB activity only currently, SNF recs and patient + spouse considering  Hypothyroidism - home meds  Anxiety/depression - home meds  Recent diagnosis DVT/PE (5/2025) - heparin gtt, resume home eliquis likely tmrw if OK by CT surgery    Expected Discharge Location and Transportation: SNF v home  Expected Discharge 6/19 (Discharge date is tentative pending patient's medical condition and is subject to change)  Expected Discharge Date: 6/19/2025; Expected Discharge Time:  3:00 PM     VTE Prophylaxis:  Pharmacologic VTE prophylaxis orders are present.         AM-PAC 6 Clicks Score (PT): 18 (06/17/25 1800)    CODE STATUS:   Code Status and Medical Interventions: CPR (Attempt to Resuscitate); Full Support; also talked over with  Jose Francisco   Ordered at: 06/12/25 2124     Code Status (Patient has no pulse and is not breathing):    CPR (Attempt to Resuscitate)     Medical Interventions (Patient has pulse or is breathing):    Full Support     Comments:    also talked over with  Jose Francisco     Level Of Support Discussed With:    Patient       Next of Kin (If No Surrogate)       Kailyn Estevez MD  06/17/25

## 2025-06-18 PROBLEM — E43 SEVERE PROTEIN-CALORIE MALNUTRITION: Status: ACTIVE | Noted: 2025-01-01

## 2025-06-18 LAB
FUNGUS WND CULT: NORMAL
MYCOBACTERIUM SPEC CULT: NORMAL
NIGHT BLUE STAIN TISS: NORMAL

## 2025-06-18 NOTE — PROGRESS NOTES
[facility] Medicine Services  PROGRESS NOTE    Patient Name: Sydnie Gamble  : 1960  MRN: [id number]    Date of Admission: 2025  Primary Care Physician: Dee Elena APRN    Subjective   Subjective     CC: dyspnea    HPI:  On 3 liters n/c this morning  Reports band of pain across thorax that comes and goes - still using IV pain meds    Now decided on SNF      Objective   Objective     Vital Signs:   Temp:  [97.5 °F (36.4 °C)-98.1 °F (36.7 °C)] 98.1 °F (36.7 °C)  Heart Rate:  [101-120] 104  Resp:  [16-20] 19  BP: (108-128)/(52-74) 126/72  Flow (L/min) (Oxygen Therapy):  [3] 3     Physical Exam:  Constitutional: No acute distress, awake, alert older female lying in bed.  bedside  Respiratory: Clear to auscultation bilaterally, respiratory effort mildly increased on 3 liters, PleurX capped  Cardiovascular: RRR, no murmurs, rubs, or gallops  Gastrointestinal: Soft, nontender, nondistended  Musculoskeletal: Muscle tone decreased throughout, no joint effusions appreciated  Psychiatric: Appropriate affect, cooperative  Neurologic: Alert and oriented, speech clear  Skin: No rashes    Results Reviewed:  LAB RESULTS:      Lab 25  0335 25  1735 25  1043 25  0424 06/15/25  0446 25  0456 25  0733 25  0341 25  0114 25  1755   WBC 18.46*  --   --  18.84* 19.96* 21.39*  --  20.73*  --  17.59*   HEMOGLOBIN 9.8*  --   --  10.3* 9.9* 10.3*  --  10.5*  --  10.7*   HEMATOCRIT 32.7*  --   --  34.2 32.2* 33.5*  --  33.9*  --  35.1   PLATELETS 217  --   --  285 221 215  --  220  --  192   NEUTROS ABS  --   --   --  14.61* 16.23* 17.02*  --  15.98*  --  14.13*   IMMATURE GRANS (ABS)  --   --   --  0.76* 0.44* 0.60*  --  0.47*  --  0.50*   LYMPHS ABS  --   --   --  0.80 0.76 0.91  --  1.18  --  0.74   MONOS ABS  --   --   --  1.50* 1.52* 1.67*  --  1.78*  --  1.45*   EOS ABS  --   --   --  1.08* 0.93* 1.11*  --  1.22*  --   0.68*   MCV 90.1  --   --  91.4 89.7 90.1  --  89.9  --  90.5   PROTIME  --   --   --   --   --   --   --   --   --  15.6*   APTT  --   --   --   --   --   --   --   --   --  32.7*   HEPARIN ANTI-XA 0.38 0.30 0.27* 0.44 0.46 0.42   < >  --    < > 0.48    < > = values in this interval not displayed.         Lab 06/18/25  0335 06/17/25  1043 06/16/25  0424 06/14/25  0456 06/12/25  1755   SODIUM 130*  --  131* 134* 136   POTASSIUM 4.5 4.1 4.3 4.2 5.0   CHLORIDE 92*  --  94* 97* 97*   CO2 21.1*  --  26.0 25.0 28.0   ANION GAP 16.9*  --  11.0 12.0 11.0   BUN 32.7*  --  25.5* 23.0 21.7   CREATININE 1.08*  --  0.87 0.85 0.87   EGFR 56.4*  --  73.1 75.2 73.1   GLUCOSE 107*  --  117* 108* 117*   CALCIUM 7.4*  --  7.5* 7.7* 7.8*         Lab 06/18/25  0335 06/12/25  1755   TOTAL PROTEIN 5.4* 6.1   ALBUMIN 2.3* 2.8*   GLOBULIN 3.1 3.3   ALT (SGPT) 21 26   AST (SGOT) 71* 49*   BILIRUBIN 0.3 0.2   ALK PHOS 493* 455*         Lab 06/12/25  1755   PROTIME 15.6*   INR 1.17*                 Brief Urine Lab Results       None            Microbiology Results Abnormal       None            XR Chest 1 View  Result Date: 6/18/2025  XR CHEST 1 VW Date of Exam: 6/18/2025 2:24 AM EDT Indication: Recurrent pleural effusions Comparison: 6/17/2025 Findings: Medium bore right pleural drain is in stable position. Heart shadow is upper normal size. Pulmonary vasculature appears normal. Right apical pleural fluid collection has decreased, now very small in size. Small area of patchy atelectasis is now seen in the right midlung. Basilar interstitial changes elsewhere appear stable. Mild left basilar discoid atelectasis is stable. No pneumothorax is seen.     Impression: Impression: Small remaining right apical pleural fluid collection. Mild new patchy right mid lung disease. No other new chest pathology is seen. Electronically Signed: Marlon Lyons MD  6/18/2025 7:16 AM EDT  Workstation ID: [id number]    XR Chest 1 View  Result Date: 6/17/2025  XR CHEST  1 VW Date of Exam: 6/17/2025 8:34 AM EDT Indication: postop Comparison: 6/17/2025 earlier same day Findings: There has been interval placement of a right basilar thoracostomy tube, with significant decrease in amount of right pleural fluid. There is persistent fluid at the apex and a small amount of the base and minor fissure which may be loculated. No pneumothorax identified. There is consolidation in the right mid and lower lung which may be due to residual atelectasis or infiltrate. Small amount of left pleural fluid appears unchanged. Heart size and mediastinal contour appear unchanged.     Impression: Impression: Interval placement of right basilar thoracostomy tube with significant decrease in amount of right pleural fluid. There is persistent fluid at the apex and base which may be loculated. No pneumothorax. Electronically Signed: Rodriguez Napier MD  6/17/2025 9:06 AM EDT  Workstation ID: [id number]    XR Chest 1 View  Result Date: 6/17/2025  XR CHEST 1 VW Date of Exam: 6/17/2025 2:45 AM EDT Indication: Recurrent pleural effusions Comparison: 6/16/2025 Findings: There has been slight further increase in right pleural fluid as compared to yesterday's study. There is consolidation in the overlying right lung suggesting atelectasis or infiltrate. There is a small left pleural effusion which is slightly increased from yesterday. No pneumothorax identified. Pulmonary vascularity appears within normal limits. Heart size appears stable.     Impression: Impression: 1.Interval increase in bilateral pleural effusions, right greater than left. 2.Consolidation in the right lung base may be due to atelectasis or infiltrate. Electronically Signed: Rodriguez Napier MD  6/17/2025 8:11 AM EDT  Workstation ID: [id number]      Results for orders placed during the hospital encounter of 05/13/25    Adult Transthoracic Echo Complete w/ Color, Spectral and Contrast if necessary per protocol    Interpretation Summary    Left ventricular  ejection fraction appears to be 66 - 70%.    Normal right ventricular cavity size, wall thickness, systolic function and septal motion noted.    There is a small (<1cm) pericardial effusion adjacent to the right ventricle. There is no evidence of cardiac tamponade.    There is a left pleural effusion.      Current medications:  Scheduled Meds:apixaban, 5 mg, Oral, Q12H  arformoterol, 15 mcg, Nebulization, BID - RT   And  budesonide, 0.5 mg, Nebulization, BID - RT   And  revefenacin, 175 mcg, Nebulization, Daily - RT  atorvastatin, 10 mg, Oral, Nightly  bisacodyl, 10 mg, Rectal, Once  dronabinol, 5 mg, Oral, BID  levothyroxine, 88 mcg, Oral, Q AM  lidocaine PF 1%, 5 mL, Injection, Once  nystatin, 5 mL, Oral, 4x Daily  senna-docusate sodium, 2 tablet, Oral, BID  sertraline, 100 mg, Oral, Daily  traZODone, 150 mg, Oral, Nightly      Continuous Infusions:lactated ringers, 9 mL/hr      PRN Meds:.  acetaminophen **OR** acetaminophen **OR** acetaminophen    senna-docusate sodium **AND** polyethylene glycol **AND** bisacodyl **AND** bisacodyl    Calcium Replacement - Follow Nurse / BPA Driven Protocol    clonazePAM    cyclobenzaprine    famotidine    HYDROmorphone **AND** naloxone    ipratropium-albuterol    Magnesium Standard Dose Replacement - Follow Nurse / BPA Driven Protocol    mineral oil    ondansetron ODT **OR** ondansetron    oxyCODONE    Phosphorus Replacement - Follow Nurse / BPA Driven Protocol    Potassium Replacement - Follow Nurse / BPA Driven Protocol    simethicone    sodium chloride    sodium chloride    zolpidem    Assessment & Plan   Assessment & Plan     Active Hospital Problems    Diagnosis  POA    **Recurrent pleural effusion [J90]  Yes    Adenocarcinoma of right lung [C34.91]  Yes    HLD (hyperlipidemia) [E78.5]  Yes    Acquired hypothyroidism [E03.9]  Yes      Resolved Hospital Problems   No resolved problems to display.        Brief Hospital Course to date:  Sydnie Gamble is a 65 y.o.  female w recent diagnosis of lung adenocarcinoma s/p CyberKnife c/b malignant effusion on home 3 liters n/c since 5/2025 undergoing keytruda treatment w Dr Mckenzie who presented w worsening dyspnea and found to have worsening recurrent pleural effusion.    Lung adenocarcinoma c/b malignant effusion, now s/p PleurX  Chronic hypoxic resp failure on 3 liters n/c since diagnosis  -home 3 liters n/c from last dc, back to this now but w significant dyspnea w activity  -repeat CXR in AM to reassess area of patchiness on right lung, read as possible PNA but atelectasis other possibility  -PleurX training w family 6/19 w drainage at that time, likely drain 3x/week  -OP keytruda treatment to resume when patient amenable. D/w Dr Mckenzie 6/17 likely dispo to SNF, will reschedule follow-up for after rehab      Pain control - need to transition to PO meds, still requiring IV pain medications    Acute on chronic debility - EOB activity only currently, SNF recs and patient + spouse now agreeable  Hypothyroidism - home meds  Anxiety/depression - home meds  Recent diagnosis DVT/PE (5/2025) -home eliquis resumed    Expected Discharge Location and Transportation: SNF  Expected Discharge 6/19 (Discharge date is tentative pending patient's medical condition and is subject to change)  Expected Discharge Date: 6/19/2025; Expected Discharge Time:  3:00 PM     VTE Prophylaxis:  Pharmacologic VTE prophylaxis orders are present.         AM-PAC 6 Clicks Score (PT): 18 (06/17/25 2030)    CODE STATUS:   Code Status and Medical Interventions: CPR (Attempt to Resuscitate); Full Support; also talked over with  Jose Francisco   Ordered at: 06/12/25 6974     Code Status (Patient has no pulse and is not breathing):    CPR (Attempt to Resuscitate)     Medical Interventions (Patient has pulse or is breathing):    Full Support     Comments:    also talked over with  Jose Francisco     Level Of Support Discussed With:    Patient       Next of Kin (If No Surrogate)        Kailyn Estevez MD  06/18/25

## 2025-06-18 NOTE — PROGRESS NOTES
Pharmacy to Dose Heparin Infusion Note    Sydnie Gamble is a 65 y.o. female receiving heparin infusion.     Therapy for (VTE/Cardiac): VTE  Patient Weight: 55.8 kg  Initial Bolus (Y/N): No  Any Bolus (Y/N): Yes     Signs or Symptoms of Bleeding: no, chest tube placed 6/12      VTE (PE/DVT)  Initial rate: 18 units/kg/hr (Max 1,500 units/hr)    Anti-Xa Bolus   Dose Infusion Hold   Time Infusion Rate Change (units/kg/hr) Repeat  Anti-Xa   < 0.11 50 units/kg  (4000 units Max) None Increase by  4 units/kg/hr 6 hours   0.11 - 0.19 25 units/kg  (2000 units Max) None Increase by  3 units/kg/hr 6 hours   0.2 - 0.29 0 None Increase by  2 units/kg/hr 6 hours   0.3 - 0.7 0 None No Change 6 hours (after 2 consecutive levels in range check qAM)   0.71 - 0.8 0 None Decrease by  1 units/kg/hr 6 hours   0.81 - 0.9 0 None Decrease by  2 units/kg/hr 6 hours   0.91 - 1 0 60 minutes Decrease by  3 units/kg/hr 6 hours   >1 0 Hold  After Anti-Xa less than 0.7 decrease previous rate by  4 units/kg/hr  Every 2 hours until Anti-Xa less than 0.7 then when infusion restarts in 6 hours     Results from last 7 days   Lab Units 06/18/25  0335 06/16/25  0424 06/15/25  0446 06/13/25  0341 06/12/25  1755   INR   --   --   --   --  1.17*   HEMOGLOBIN g/dL 9.8* 10.3* 9.9*   < > 10.7*   HEMATOCRIT % 32.7* 34.2 32.2*   < > 35.1   PLATELETS 10*3/mm3 217 285 221   < > 192    < > = values in this interval not displayed.       Date   Time   Anti-Xa Current Rate (units/kg/hr) Bolus   (units) Rate Change   (units/kg/hr) New Rate (units/kg/hr) Repeat  Anti-Xa Comments /  Pump Check    6/12 1755 0.48 -- -- +16 +16 0100 Patient previously admitted in May and received treatment Eliquis for DVT. Eliquis filled 5/16/25 for current DVT, not a new clot. Baseline Xa was 0.48 and aptt was 32.7, I question her compliance with Eliquis. Initiated DVT Xa dosing with no initial bolus, chest tube placed 6/12.   LEWIS RN   6/13 0114 0.54 16 -- -- 16 0800 Serjio [address] [place] [address]    6/13 0733 0.55 16 -- -- 16 6/14  AM labs LEWIS RN; pump verified    6/14  0456 0.42 16 -- -- 16 0600 Dave 3243   6/15 0446 0.46 16 -- -- 16 0600 Cherelle 3241 -acb   6/16 0424 0.44 16 -- -- 16 AM labs Jai 3241 -acb   6/17 1043 0.27 OFF -- +16 16 1800 D/w RN. S/p PleurX cath placement, will continue gtt 2/2 recent bilat PE (5/2025). Holding off on bolus 2/2 recent procedure.   6/17 1735 0.3 16 -- -- 16 0100 LEWIS RN   6/18 0335 0.38 16 -- -- 16 0600 D/w YVETTE Lux, PharmD  6/18/2025  04:30 EDT

## 2025-06-18 NOTE — PROGRESS NOTES
"          Clinical Nutrition Assessment     Patient Name: Sydnie Gamble  YOB: 1960  MRN: [id number]  Date of Encounter: 06/18/25 09:30 EDT  Admission date: 6/12/2025  Reason for Visit: Follow-up protocol    Assessment   Nutrition Assessment   Admission Diagnosis:  Recurrent right pleural effusion [J90]  Adenocarcinoma of right lung [C34.91]  Recurrent pleural effusion [J90]    Problem List:    Recurrent pleural effusion    Acquired hypothyroidism    HLD (hyperlipidemia)    Adenocarcinoma of right lung      PMH:   She  has a past medical history of Acid reflux, Acid reflux, Adenocarcinoma of lung (08/12/2024), Anemia, chronic disease (5/13/2025), Anxiety (1976), Arthritis, Atherosclerotic cardiovascular disease (03/25/2024), Cervical cancer, Depression, Emphysema of lung, History of radiation therapy (11/20/2020), History of radiation therapy (10/04/2024), radiation therapy, Hyperlipidemia, Hypothyroidism, Kidney disease, Lung cancer, Lung nodule, Ovarian cyst (1980s), Pleural effusion (5/13/2025), Pulmonary embolism (5/13/2025), Visual impairment (corrected with glasses), Wears dentures, and Wears glasses.    PSH:  She  has a past surgical history that includes Tubal ligation; total abdominal hysterectomy pelvic node dissection (N/A, 08/18/2020); Lymph node biopsy; Subtotal Hysterectomy (1987?); Bronchoscopy; Lung biopsy; BRONCHOSCOPY WITH ION ROBOTIC ASSIST (N/A, 08/06/2024); Mohs surgery (03/24/2025); Hysteroscopy; Colonoscopy; and Pleural Catheter Insertion (Right, 6/17/2025).    Applicable Nutrition History:       Anthropometrics     Height: Height: 154.9 cm (60.98\")  Last Filed Weight: Weight: 55.8 kg (123 lb) (06/12/25 1546)  Method: Weight Method: Bed scale  BMI: BMI (Calculated): 23.3    UBW:  Per EMR wt of 136 lbs on 4/11 25 127 lbs on 6/10 25  Weight change: weight loss of 16 lbs (  11.6%) over 6 month(s)    Significant?  Yes    Nutrition Focused Physical Exam    Date: " 6/13    Patient meets criteria for malnutrition diagnosis, see MSA note.     Subjective   Reported/Observed/Food/Nutrition Related History:     6/18  Able to obtain nutrition hx from pt and spouse at bedside. Pt w/minimal intake since admit and reduced intake PTA. Spouse states pt w/abdominal pain and feels like food sits in her stomach which has deterred pt from eating/drinking. Pt does not like Boost, offered alternative options. Pt and spouse somewhat receptive to alternative options but pt still struggling to eat.     6/13  Pt slumbering at time of visit. Wt loss consistent w severe criterion noted. Noted MD order for anni boost one time.     Current Nutrition Prescription   PO: Diet: Regular/House; Fluid Consistency: Thin (IDDSI 0)  Oral Nutrition Supplement:   Intake: 0/25% x 2 meals    Assessment & Plan   Nutrition Diagnosis   Date:  6/13            Updated:    Problem Malnutrition chronic severe    Etiology Effect dx/tx including lung CA   Signs/Symptoms Wt loss >10% x 6 months, severe muscle wasting and subcutaneous fat loss, po intake <75% EEN x >/=1 month   Status: New      Goal / Objectives:   Nutrition to support treatment and Increase intake      Nutrition Intervention      Follow treatment progress, Care plan reviewed, Encourage intake, Supplement offered/refused    Pt meets criteria for chronic malnutrition (see MSA).    Encourage po intake  Will discontinue Boost, pt doesn't like.     Low threshold for recommending nutrition support (if in line w/GOC) 2/2 prolonged poor po intake/tolerance and significant wt loss    Monitoring/Evaluation:   Per protocol, I&O, PO intake, Supplement intake, Pertinent labs, Weight, Symptoms    Yohana Duff, MS,RD,LD  Time Spent: 25 min

## 2025-06-18 NOTE — PROGRESS NOTES
[facility] Cardiothoracic Surgery In-Patient Progress Note     LOS: 6 days     POD # 1 s/p Right PleurX catheter    Subjective  Slight discomfort from PleurX site, otherwise no complaints. On 3L saturations 96%.       Objective  Vital Signs  Temp:  [97.5 °F (36.4 °C)-98 °F (36.7 °C)] 97.7 °F (36.5 °C)  Heart Rate:  [100-120] 101  Resp:  [12-18] 18  BP: (108-131)/(53-74) 108/62        Physical Exam:   General Appearance: alert, appears stated age and cooperative   Lungs: Breathing even and unlabored on 3L saturations 96%   Heart: RRR   Skin: Incision c/d/I   PleurX: 250 cc/24 hrs  Output by Drain (mL) 06/17/25 0701 - 06/17/25 1900 06/17/25 1901 - 06/18/25 0700 06/18/25 0701 - 06/18/25 0721 Range Total   Chest Tube 1 Right Midaxillary 65 185  250        Results     Results from last 7 days   Lab Units 06/18/25  0335   WBC 10*3/mm3 18.46*   HEMOGLOBIN g/dL 9.8*   HEMATOCRIT % 32.7*   PLATELETS 10*3/mm3 217     Results from last 7 days   Lab Units 06/18/25  0335   SODIUM mmol/L 130*   POTASSIUM mmol/L 4.5   CHLORIDE mmol/L 92*   CO2 mmol/L 21.1*   BUN mg/dL 32.7*   CREATININE mg/dL 1.08*   GLUCOSE mg/dL 107*   CALCIUM mg/dL 7.4*     Imaging Results (Last 24 Hours)       Procedure Component Value Units Date/Time    XR Chest 1 View [[id number]] Collected: 06/18/25 0714     Updated: 06/18/25 0720    Narrative:      XR CHEST 1 VW    Date of Exam: 6/18/2025 2:24 AM EDT    Indication: Recurrent pleural effusions    Comparison: 6/17/2025    Findings:  Medium bore right pleural drain is in stable position. Heart shadow is upper normal size. Pulmonary vasculature appears normal. Right apical pleural fluid collection has decreased, now very small in size. Small area of patchy atelectasis is now seen in   the right midlung. Basilar interstitial changes elsewhere appear stable. Mild left basilar discoid atelectasis is stable. No pneumothorax is seen.      Impression:      Impression:  Small remaining right apical pleural fluid  collection. Mild new patchy right mid lung disease. No other new chest pathology is seen.      Electronically Signed: Marlon Lyons MD    6/18/2025 7:16 AM EDT    Workstation ID: [id number]    XR Chest 1 View [[id number]] Collected: 06/17/25 0904     Updated: 06/17/25 0909    Narrative:      XR CHEST 1 VW    Date of Exam: 6/17/2025 8:34 AM EDT    Indication: postop    Comparison: 6/17/2025 earlier same day    Findings:  There has been interval placement of a right basilar thoracostomy tube, with significant decrease in amount of right pleural fluid. There is persistent fluid at the apex and a small amount of the base and minor fissure which may be loculated. No   pneumothorax identified. There is consolidation in the right mid and lower lung which may be due to residual atelectasis or infiltrate. Small amount of left pleural fluid appears unchanged. Heart size and mediastinal contour appear unchanged.      Impression:      Impression:  Interval placement of right basilar thoracostomy tube with significant decrease in amount of right pleural fluid. There is persistent fluid at the apex and base which may be loculated. No pneumothorax.        Electronically Signed: Rodriguez Napier MD    6/17/2025 9:06 AM EDT    Workstation ID: [id number]    XR Chest 1 View [[id number]] Collected: 06/17/25 0804     Updated: 06/17/25 0814    Narrative:      XR CHEST 1 VW    Date of Exam: 6/17/2025 2:45 AM EDT    Indication: Recurrent pleural effusions    Comparison: 6/16/2025    Findings:  There has been slight further increase in right pleural fluid as compared to yesterday's study. There is consolidation in the overlying right lung suggesting atelectasis or infiltrate. There is a small left pleural effusion which is slightly increased   from yesterday. No pneumothorax identified. Pulmonary vascularity appears within normal limits. Heart size appears stable.      Impression:      Impression:  1.Interval increase in bilateral pleural effusions,  right greater than left.  2.Consolidation in the right lung base may be due to atelectasis or infiltrate.        Electronically Signed: Rodriguez Napier MD    6/17/2025 8:11 AM EDT    Workstation ID: [id number]            Assessment  POD # 1 s/p Right PleurX catheter      Plan   Cap PleurX catheter  Drain every other day or as needed for shortness of breath -nursing staff to provide education to patient and family  Nothing further to add from our standpoint. We will sign off and be available as needed      DAYSI Alvarado  06/18/25  07:21 EDT

## 2025-06-18 NOTE — PLAN OF CARE
Goal Outcome Evaluation:  Plan of Care Reviewed With: patient, spouse        Progress: no change  Outcome Evaluation: Palliative RN saw pt. at 1310.  Pt. sitting up in bed on 3LNC with spouse at BS.  She endorsed 6/10 abdominal pain that she stated was like a tight band around her waist.  When asked about her most recent BM her spouse stated she had a BM about 30 min prior.  Per nursing report, pt will get drained tomorrow then every other day.  Spouse stated that the plan is for pt. to discharge to [facility].  Palliative to continue to follow for support and ongoing [facility].    [id number] Palliative IDT meeting: MD; APRN ; MDiv; RNs   After hours, weekends and holidays, contact Palliative Provider by calling [phone].

## 2025-06-18 NOTE — PROGRESS NOTES
DATE OF VISIT: 6/18/2025    Chief Complaint: Followup for metastatic right lung cancer     SUBJECTIVE: The patient is laying comfortable in bed. She is complaining of shortness of breath with minimum exertion.     REVIEW OF SYSTEMS: All the other 9 systems are reviewed by me and negative  except what is mentioned in HPI.     Past History:  Medical History: has a past medical history of Acid reflux, Acid reflux, Adenocarcinoma of lung (08/12/2024), Anemia, chronic disease (5/13/2025), Anxiety (1976), Arthritis, Atherosclerotic cardiovascular disease (03/25/2024), Cervical cancer, Depression, Emphysema of lung, History of radiation therapy (11/20/2020), History of radiation therapy (10/04/2024), radiation therapy, Hyperlipidemia, Hypothyroidism, Kidney disease, Lung cancer, Lung nodule, Ovarian cyst (1980s), Pleural effusion (5/13/2025), Pulmonary embolism (5/13/2025), Visual impairment (corrected with glasses), Wears dentures, and Wears glasses.   Surgical History: has a past surgical history that includes Tubal ligation; total abdominal hysterectomy pelvic node dissection (N/A, 08/18/2020); Lymph node biopsy; Subtotal Hysterectomy (1987?); Bronchoscopy; Lung biopsy; BRONCHOSCOPY WITH ION ROBOTIC ASSIST (N/A, 08/06/2024); Mohs surgery (03/24/2025); Hysteroscopy; Colonoscopy; and Pleural Catheter Insertion (Right, 6/17/2025).   Family History: family history includes Anxiety disorder in her mother; Asthma in her mother; COPD in her mother; Cancer in her father, maternal aunt, maternal aunt, maternal aunt, maternal aunt, maternal grandmother, and mother; Depression in her mother; Early death in her father; Emphysema in her mother; Heart attack in her maternal grandfather; Heart disease in her maternal grandfather; Hypertension in her mother; Melanoma in her mother; Mental illness in her mother; Uterine cancer in her mother.   Social History: reports that she quit smoking about 6 years ago. Her smoking use included  cigarettes. She started smoking about 53 years ago. She has a 67.5 pack-year smoking history. She has been exposed to tobacco smoke. She has never used smokeless tobacco. She reports that she does not drink alcohol and does not use drugs.    Medications Prior to Admission   Medication Sig Dispense Refill Last Dose/Taking    albuterol sulfate  (90 Base) MCG/ACT inhaler Inhale 2 puffs Every 4 (Four) to 6 (Six) Hours As Needed for Wheezing or Shortness of Air.   Taking As Needed    atorvastatin (LIPITOR) 10 MG tablet TAKE 1 TABLET BY MOUTH EVERY DAY AT NIGHT 90 tablet 1 6/11/2025    clonazePAM (KlonoPIN) 1 MG tablet Take 1 tablet by mouth 2 (Two) Times a Day As Needed for Anxiety. 50 tablet 0 6/12/2025 Morning    Diclofenac Sodium (VOLTAREN) 1 % gel gel Apply 1 gram topically to indicated area 4 times daily as needed for mild to moderate pain. STOP FLEXERIL 100 g 2 Taking    docusate sodium (COLACE) 100 MG capsule Take 2 capsules by mouth Daily. 60 capsule 3 6/12/2025 Morning    famotidine (PEPCID) 20 MG tablet TAKE 1 TABLET BY MOUTH TWICE DAILY OR TAKE 2 TABLETS BY MOUTH ONCE DAILY AS NEEDED FOR HEARTBURN 180 tablet 1 Taking    Fluticasone-Umeclidin-Vilant (Trelegy Ellipta) 100-62.5-25 MCG/ACT inhaler Inhale 1 puff Daily. 90 each 3 6/12/2025 Morning    levothyroxine (Synthroid) 88 MCG tablet Take 1 tablet by mouth Every Morning. 90 tablet 1 6/11/2025    ondansetron (ZOFRAN) 8 MG tablet Take 1 tablet by mouth 3 (Three) Times a Day As Needed for Nausea or Vomiting. 30 tablet 5 Taking As Needed    oxyCODONE-acetaminophen (PERCOCET) 5-325 MG per tablet Take 1 tablet by mouth Every 6 (Six) Hours As Needed for Moderate Pain. 120 tablet 0 6/12/2025 Morning    sertraline (Zoloft) 100 MG tablet Take 1 tablet by mouth Daily. 90 tablet 1 6/11/2025    traZODone (DESYREL) 150 MG tablet TAKE 1 TABLET BY MOUTH EVERY DAY AT NIGHT 90 tablet 3 6/11/2025    zolpidem (AMBIEN) 10 MG tablet Take 1 tablet by mouth At Night As Needed  for Sleep. 30 tablet 1 Past Month    [DISCONTINUED] apixaban (ELIQUIS) 5 MG tablet tablet Take 2 tablets by mouth Every 12 (Twelve) Hours for 6 days, THEN 1 tablet Every 12 (Twelve) Hours for 24 days. Indications: DVT/PE (active thrombosis) 48 tablet 5 6/11/2025 Morning    cyclobenzaprine (FLEXERIL) 10 MG tablet TAKE 1 TABLET BY MOUTH 2 TIMES A DAY AS NEEDED FOR MUSCLE SPASMS. 60 tablet 3       Allergies: Patient has no known allergies.     Current Facility-Administered Medications:     acetaminophen (TYLENOL) tablet 650 mg, 650 mg, Oral, Q4H PRN **OR** acetaminophen (TYLENOL) 160 MG/5ML oral solution 650 mg, 650 mg, Oral, Q4H PRN **OR** acetaminophen (TYLENOL) suppository 650 mg, 650 mg, Rectal, Q4H PRN, Kaila Jose PA-C    apixaban (ELIQUIS) tablet 5 mg, 5 mg, Oral, Q12H, Kailyn Estevez MD, 5 mg at 06/18/25 1209    arformoterol (BROVANA) nebulizer solution 15 mcg, 15 mcg, Nebulization, BID - RT, 15 mcg at 06/17/25 2003 **AND** budesonide (PULMICORT) nebulizer solution 0.5 mg, 0.5 mg, Nebulization, BID - RT, 0.5 mg at 06/17/25 2003 **AND** revefenacin (YUPELRI) nebulizer solution 175 mcg, 175 mcg, Nebulization, Daily - RT, Kaila Jose PA-C, 175 mcg at 06/16/25 0856    atorvastatin (LIPITOR) tablet 10 mg, 10 mg, Oral, Nightly, Kaila Jose PA-C, 10 mg at 06/17/25 2031    sennosides-docusate (PERICOLACE) 8.6-50 MG per tablet 2 tablet, 2 tablet, Oral, BID, 2 tablet at 06/16/25 2046 **AND** polyethylene glycol (MIRALAX) packet 17 g, 17 g, Oral, Daily PRN **AND** bisacodyl (DULCOLAX) EC tablet 5 mg, 5 mg, Oral, Daily PRN, 5 mg at 06/15/25 0903 **AND** bisacodyl (DULCOLAX) suppository 10 mg, 10 mg, Rectal, Daily PRN, Kaila Jose PA-C    Calcium Replacement - Follow Nurse / BPA Driven Protocol, , Not Applicable, PRN, Kaila Jose PA-C    clonazePAM (KlonoPIN) tablet 1 mg, 1 mg, Oral, BID PRN, Kaylene Reynoso, APRN, 1 mg at 06/15/25 2908    cyclobenzaprine (FLEXERIL) tablet 10 mg, 10 mg, Oral, BID  PRN, Kaila Jose PA-C, 10 mg at 06/14/25 2235    dronabinol (MARINOL) capsule 5 mg, 5 mg, Oral, BID, Kaylene Reynoso APRN, 5 mg at 06/18/25 1208    famotidine (PEPCID) tablet 40 mg, 40 mg, Oral, BID PRN, Kaila Jose PA-C    HYDROmorphone (DILAUDID) injection 0.5 mg, 0.5 mg, Intravenous, Q2H PRN, 0.5 mg at 06/18/25 0852 **AND** naloxone (NARCAN) injection 0.4 mg, 0.4 mg, Intravenous, Q5 Min PRN, Kaylene Reynoso APRN    ipratropium-albuterol (DUO-NEB) nebulizer solution 3 mL, 3 mL, Nebulization, Q4H PRN, Kaila Jose PA-C, 3 mL at 06/15/25 1719    lactated ringers infusion, 9 mL/hr, Intravenous, Continuous, Ramiro Carney MD    levothyroxine (SYNTHROID, LEVOTHROID) tablet 88 mcg, 88 mcg, Oral, Q AM, Kaila oJse PA-C, 88 mcg at 06/18/25 0653    lidocaine PF 1% (XYLOCAINE) injection 5 mL, 5 mL, Injection, Once, Kaila Jose PA-C    magnesium hydroxide (MILK OF MAGNESIA) suspension 10 mL, 10 mL, Oral, Once, Kailyn Estevez MD    Magnesium Standard Dose Replacement - Follow Nurse / BPA Driven Protocol, , Not Applicable, PRN, Kaila Jose PA-C    mineral oil enema 1 enema, 1 enema, Rectal, Once PRN, Kaylene Reynoso, APRN    nystatin (MYCOSTATIN) 100,000 unit/mL suspension 500,000 Units, 5 mL, Oral, 4x Daily, Kaila Jose PA-C, 500,000 Units at 06/17/25 2031    ondansetron ODT (ZOFRAN-ODT) disintegrating tablet 4 mg, 4 mg, Oral, Q6H PRN **OR** ondansetron (ZOFRAN) injection 4 mg, 4 mg, Intravenous, Q6H PRN, Kaila Jose PA-C    oxyCODONE (ROXICODONE) immediate release tablet 10 mg, 10 mg, Oral, Q4H PRN, Kaylene Reynoso APRN, 10 mg at 06/18/25 1449    Phosphorus Replacement - Follow Nurse / BPA Driven Protocol, , Not Applicable, PRN, Kaila Jose PA-C    Potassium Replacement - Follow Nurse / BPA Driven Protocol, , Not Applicable, PRN, Kaila Jose PA-C    sertraline (ZOLOFT) tablet 100 mg, 100 mg, Oral, Daily, Kaila Jose PA-C, 100 mg at 06/18/25 0852    simethicone  "(MYLICON) chewable tablet 80 mg, 80 mg, Oral, 4x Daily PRN, Damon, Kaila, PA-C, 80 mg at 06/15/25 2040    sodium chloride 0.9 % flush 10 mL, 10 mL, Intravenous, PRN, Damon, Kaila, PA-C, 10 mL at 06/18/25 0853    sodium chloride 0.9 % infusion 40 mL, 40 mL, Intravenous, PRN, [place], Kaila, PA-C    traZODone (DESYREL) tablet 150 mg, 150 mg, Oral, Nightly, [place], Kaila, PA-C, 150 mg at 06/17/25 2031    zolpidem (AMBIEN) tablet 5 mg, 5 mg, Oral, Nightly PRN, Damon, Kaila, PA-C    PHYSICAL EXAMINATION:   /83 (BP Location: Left arm, Patient Position: Lying)   Pulse 103   Temp 98.5 °F (36.9 °C) (Oral)   Resp 16   Ht 154.9 cm (60.98\")   Wt 55.8 kg (123 lb)   LMP  (LMP Unknown)   SpO2 96%   BMI 23.25 kg/m²                ECOG Performance Status: 3 - Symptomatic, >50% confined to bed  GENERAL: Age appropriate. No acute distress.   NEURO/PSYCH: A&O x 3, strength 5/5 in all muscle groups  HEENT: Head atraumatic, normocephalic.   NECK: Supple. No JVD. No lymphadenopathy.   LUNGS: Clear to auscultation bilaterally. No wheezing. No rhonchi.   HEART: Regular rate and rhythm. S1, S2, no murmurs.   ABDOMEN: Soft, nontender, nondistended. Bowel sounds positive. No  hepatosplenomegaly.   EXTREMITIES: No clubbing, cyanosis, or edema.   SKIN: No rashes. No purpura.       No results displayed because visit has over 200 results.          XR Chest 1 View  Result Date: 6/18/2025  Narrative: XR CHEST 1 VW Date of Exam: 6/18/2025 2:24 AM EDT Indication: Recurrent pleural effusions Comparison: 6/17/2025 Findings: Medium bore right pleural drain is in stable position. Heart shadow is upper normal size. Pulmonary vasculature appears normal. Right apical pleural fluid collection has decreased, now very small in size. Small area of patchy atelectasis is now seen in the right midlung. Basilar interstitial changes elsewhere appear stable. Mild left basilar discoid atelectasis is stable. No pneumothorax is seen. "     Impression: Impression: Small remaining right apical pleural fluid collection. Mild new patchy right mid lung disease. No other new chest pathology is seen. Electronically Signed: Marlon Lyons MD  6/18/2025 7:16 AM EDT  Workstation ID: [id number]    XR Chest 1 View  Result Date: 6/17/2025  Narrative: XR CHEST 1 VW Date of Exam: 6/17/2025 8:34 AM EDT Indication: postop Comparison: 6/17/2025 earlier same day Findings: There has been interval placement of a right basilar thoracostomy tube, with significant decrease in amount of right pleural fluid. There is persistent fluid at the apex and a small amount of the base and minor fissure which may be loculated. No pneumothorax identified. There is consolidation in the right mid and lower lung which may be due to residual atelectasis or infiltrate. Small amount of left pleural fluid appears unchanged. Heart size and mediastinal contour appear unchanged.     Impression: Impression: Interval placement of right basilar thoracostomy tube with significant decrease in amount of right pleural fluid. There is persistent fluid at the apex and base which may be loculated. No pneumothorax. Electronically Signed: Rodriguez Napier MD  6/17/2025 9:06 AM EDT  Workstation ID: [id number]    XR Chest 1 View  Result Date: 6/17/2025  Narrative: XR CHEST 1 VW Date of Exam: 6/17/2025 2:45 AM EDT Indication: Recurrent pleural effusions Comparison: 6/16/2025 Findings: There has been slight further increase in right pleural fluid as compared to yesterday's study. There is consolidation in the overlying right lung suggesting atelectasis or infiltrate. There is a small left pleural effusion which is slightly increased from yesterday. No pneumothorax identified. Pulmonary vascularity appears within normal limits. Heart size appears stable.     Impression: Impression: 1.Interval increase in bilateral pleural effusions, right greater than left. 2.Consolidation in the right lung base may be due to atelectasis  or infiltrate. Electronically Signed: Rodriguez Napier MD  6/17/2025 8:11 AM EDT  Workstation ID: [id number]    XR Chest 1 View  Result Date: 6/16/2025  Narrative: XR CHEST 1 VW Date of Exam: 6/16/2025 5:35 AM EDT Indication: Recurrent pleural effusions Comparison: 6/15/2025 Findings: Right pleural drain has been removed and there is apparently increasing right basilar effusion. Right midlung interstitial changes are stable. There is minimally increased left basilar interstitial opacity perhaps a small focus of atelectasis. Heart and vasculature appear to be normal in size. No pneumothorax is seen.     Impression: Impression: Increasing right basilar effusion following right pleural drain removal. Electronically Signed: Marlon Lyons MD  6/16/2025 7:47 AM EDT  Workstation ID: [id number]    XR Chest 1 View  Result Date: 6/15/2025  Narrative: XR CHEST 1 VW Date of Exam: 6/15/2025 2:07 AM EDT Indication: Recurrent pleural effusions, right chest tube, follow-up Comparison: 6/14/2025. Findings: There is a right basilar pigtail chest tube in place. There may be some kinking of the chest tube possibly at the entry site. I would recommend clinical correlation. There is an unchanged moderate right pleural effusion with compressive atelectasis. The left lung and pleural space are clear. The heart size is normal. The pulmonary vascular markings are normal. There is no pneumothorax.     Impression: Impression: 1.There is a right basilar pigtail chest tube in place. There may be some kinking of the chest tube possibly at the entry site. I would recommend clinical correlation. 2.Unchanged moderate right pleural effusion with compressive atelectasis. Electronically Signed: Samuel Luna MD  6/15/2025 10:19 AM EDT  Workstation ID: [id number]    XR Chest 1 View  Result Date: 6/14/2025  Narrative: XR CHEST 1 VW Date of Exam: 6/14/2025 3:14 AM EDT Indication: Recurrent pleural effusions Comparison: Chest radiograph 6/13/2025. Findings: Unchanged  position of right pleural catheter. Cardiomediastinal silhouette is unchanged. Moderate right pleural effusion with right basilar airspace disease and right basilar lucency suggestive of small-volume pneumothorax, unchanged compared to yesterday's exam. Left lung appears clear. No pneumothorax. Osseous structures are unchanged.     Impression: Impression: No significant interval change. Electronically Signed: Franco Shaffer MD  6/14/2025 8:09 AM EDT  Workstation ID: [id number]    XR Chest 1 View  Result Date: 6/13/2025  Narrative: XR CHEST 1 VW Date of Exam: 6/13/2025 12:55 PM EDT Indication: Recurrent pleural effusions Comparison: AP chest x-ray 6/13/2025 timed at 4:43 a.m. Findings: Right pleural catheter remains in place. Moderate size right hydropneumothorax appears similar to numbers to today's earlier chest x-ray. Underlying right basilar airspace opacities are stable. Left lung is clear.     Impression: Impression: Moderate sized right hydropneumothorax appears similar to numbers to today's earlier chest x-ray. Right pleural catheter remains in place. Electronically Signed: Maryellen Dalton MD  6/13/2025 1:44 PM EDT  Workstation ID: [id number]    XR Chest 1 View  Result Date: 6/13/2025  Narrative: XR CHEST 1 VW Date of Exam: 6/13/2025 2:49 AM EDT Indication: post Ct placement and thoracentesis Comparison: AP chest x-ray 6/12/2025, 6/5/2025 Findings: There is a new right pleural catheter. Previously seen right basilar pleural air has now been replaced with pleural fluid. No apical pneumothorax is seen. There are stable underlying right basilar airspace opacities. Left lung appears clear. Cardiomediastinal contours are stable.     Impression: Impression: 1.Interval placement of right pleural catheter with fluid component of right basilar hydropneumothorax. 2.Stable underlying right basilar airspace opacities, likely due to atelectasis. Electronically Signed: Maryellen Dalton MD  6/13/2025 7:23 AM EDT  Workstation ID:  [id number]    CT Guided Chest Tube  Result Date: 6/12/2025  Narrative: DATE OF EXAM: 6/12/2025 2:48 PM  PROCEDURE: CT GUIDED CHEST TUBE PLACEMENT-  INDICATIONS: right chest tube placement; J95.811-Postprocedural pneumothorax  DLP: 746 mGycm  TECHNIQUE:  The risks, benefits, and alternatives were discussed in detail with the patient. The patient was brought down to the CT suite and positioned supine on the CT table. Preliminary CT scan of the the chest was performed and a skin entry site was selected and marked. The area was prepped and draped utilizing standard sterile technique. 1% lidocaine was administered to the soft tissues. A small skin incision was made with an 11 blade scalpel. Under CT guidance a 5 Palauan Yueh was advanced into the right pleural space. The inner stylet was removed and an 035 wire was advanced coaxially. Over the wire a 10 Palauan all-purpose drainage catheter was advanced with the pigtail formed and locked within the pleural space the catheter was sutured to the skin and attached to a Pleur-evac. A sterile dressing was then applied.  The patient tolerated the procedure well and there were no immediate complications.      Impression: Successful CT-guided 10 Palauan right sided chest tube placement.   6/12/2025 4:13 PM by Jose Hope MD on Workstation: [id number]      XR Chest 1 View  Result Date: 6/12/2025  Narrative: XR CHEST 1 VW Date of Exam: 6/12/2025 1:45 PM EDT Indication: s/p thoracentesis; c/o severe right neck pain Comparison: 6/12/2025 Findings: Cardiac size is similar to prior exam. Similar right-sided hydropneumothorax. Similar right basilar opacity. No evidence of acute osseous abnormalities. Visualized upper abdomen is unremarkable.     Impression: Impression: 1.Similar right-sided hydropneumothorax. 2.Similar right basilar opacity may reflect atelectasis or infection. Electronically Signed: Clay Johnson MD  6/12/2025 2:31 PM EDT  Workstation ID: [id number]    XR Chest 1  View  Result Date: 6/12/2025  Narrative: XR CHEST 1 VW Date of Exam: 6/12/2025 12:25 PM EDT Indication: s/p thoracentesis; c/o severe right neck pain Comparison: Chest radiograph 6/12/2025 Findings: Grossly stable size of the right hydropneumothorax, measuring up to 7 mm at apex with larger right subpulmonic component. Previous noted fluid extending towards the apex appears decreased. Left lung relatively clear. There is partial collapse and probable underlying atelectasis within the right lower lobe. Negative for left pneumothorax. Stable cardiomediastinal silhouette. Degenerative related osseous change.     Impression: Impression: Slightly decreased right pleural fluid component with otherwise similar appearing moderate size hydropneumothorax. Electronically Signed: Toni Argueta MD  6/12/2025 12:53 PM EDT  Workstation ID: [id number]    XR Chest 1 View  Result Date: 6/12/2025  Narrative: XR CHEST 1 VW Date of Exam: 6/12/2025 11:23 AM EDT Indication: s/p thoracentesis Comparison: Chest x-ray 6/5/2025 Findings: There is a pneumothorax on the right. It is moderate in size and seen inferiorly measuring up to 2.3 cm. There is a moderate mount of pleural fluid as well. There is right lower lobe airspace opacity which is improved from previous exam. Left lung is clear. Heart size is normal.     Impression: Impression: Moderate sized right hydropneumothorax. Electronically Signed: Susan Leahy MD  6/12/2025 12:41 PM EDT  Workstation ID: [id number]    US Thoracentesis  Result Date: 6/12/2025  Narrative: PROCEDURE: US THORACENTESIS-  ATTENDING: Jose Hope M.D.  CLINICAL HISTORY: Large right pleural effusion.  TECHNIQUE:  The risk, benefits, and alternatives were described in detail and the patient was brought to the ultrasound suite and positioned seated. The skin entry site was prepped and draped utilizing standard sterile technique. 1% lidocaine was administered to the soft tissues of the right posterior thorax.   A 5  Maltese Yueh was advanced into the right pleural space.   A total of 1.4 L was removed from the right pleural space. A specimen was sent to the laboratory for analysis. The needle was removed and a sterile dressing was applied.  The patient tolerated the procedure well and there were no complications.      Impression: Successful ultrasound-guided right thoracentesis.   Attestation Signer name: Jose Hope MD I attest that I was present for the entire procedure. I reviewed the stored images and agree with the report as written.      6/12/2025 12:17 PM by Jose Hope MD on Workstation: [id number]      XR Chest 1 View  Result Date: 6/5/2025  Narrative: XR CHEST 1 VW Date of Exam: 6/5/2025 6:41 AM EDT Indication: cough Comparison: Chest radiograph 6/5/2025 at 0422 hours Findings: There is been slight decrease in the size of the large right pleural effusion. There is persistent right pleural fluid and right basilar airspace disease. Airspace disease is most likely atelectasis with pneumonia not excluded. The left lung remains clear. There is no pneumothorax.     Impression: Impression: Slight decrease in the size of the large right pleural effusion. Electronically Signed: Rodriguez Singh MD  6/5/2025 7:00 AM EDT  Workstation ID: [id number]    XR Chest 1 View  Result Date: 6/5/2025  Narrative: XR CHEST 1 VW Date of Exam: 6/5/2025 4:18 AM EDT Indication: SOA triage protocol Comparison: Chest radiograph 5/28/2025 Findings: The heart size and pulmonary vascular markings are normal. The left lung is clear. There is a large right pleural effusion with associated right basilar atelectasis. There is no pneumothorax. The osseous structures are normal for age.     Impression: Impression: Large right pleural effusion with right basilar atelectasis. Electronically Signed: Rodriguez Singh MD  6/5/2025 4:47 AM EDT  Workstation ID: [id number]    US Thoracentesis  Result Date: 5/28/2025  Narrative: PROCEDURE: Ultrasound-guided right  thoracentesis  Procedural Personnel Attending physician(s): Erik Mensah  Pre-procedure diagnosis: Right pleural effusion  Post-procedure diagnosis: Same  Complications: No immediate complications.      Impression:  Ultrasound-guided thoracentesis with drainage of 1400 mL of serous fluid.   _______________________________________________________________   PROCEDURE SUMMARY:  - Limited thoracic ultrasound - Ultrasound-guided right thoracentesis - Additional procedure(s): None  PROCEDURE DETAILS:   Pre-procedure Consent: Informed consent for the procedure including risks, benefits and alternatives was obtained and time-out was performed prior to the procedure. Preparation: The site was prepared and draped using maximal sterile barrier technique including cutaneous antisepsis.    Limited thoracic ultrasound: Limited thoracic ultrasound was performed using a curved transducer. A safe window for thoracentesis was identified. Right hemithorax findings: Moderate to large right-sided pleural effusion   Thoracentesis  Local anesthesia was administered. The pleural space was accessed and fluid return confirmed position. The fluid was drained. The catheter was removed, and a sterile bandage was applied.  Catheter placed: 5 Upper sorbian catheter.  Additional Details  Equipment details: None Specimens removed: Pleural fluid Estimated blood loss (mL): Less than 10   5/28/2025 12:05 PM by Erik Mensah MD on Workstation: [id number]      XR Chest 1 View  Result Date: 5/28/2025  Narrative: XR CHEST 1 VW Date of Exam: 5/28/2025 10:19 AM EDT Indication: S/P Right Thoracentesis Comparison: 5/14/2025 chest radiograph. Ultrasound-guided thoracentesis of 5/20/2025 Findings: By history, thoracentesis earlier today removed 1.4 L of fluid from the right hemithorax. Follow-up chest radiograph shows patchy right mid and lower lung atelectasis and perhaps a small amount of remaining pleural fluid, versus discoid atelectasis. There is no  evidence of pneumothorax. Left lung remains clear. Heart and vasculature appear normal in size.     Impression: Impression: Patchy right mid and lower lung atelectasis and questionable small remaining right effusion. No evidence of pneumothorax or other significant chest disease elsewhere. Electronically Signed: Marlon Lyons MD  5/28/2025 10:49 AM EDT  Workstation ID: [id number]    NM PET/CT Skull Base to Mid Thigh  Result Date: 5/28/2025  Narrative: FDG NM PET/CT SKULL BASE TO MID THIGH Date of Exam: 5/22/2025 8:25 AM EDT Indication: restaging scans lung cancer. Comparison: 5/13/2025 and 7/11/2024 Technique: 8.1 mCi of F-18 FDG was administered intravenously. PET imaging was obtained from skull base to mid-thigh approximately 60 minutes after radiotracer injection. A low dose non contrast CT was obtained for attenuation correction and anatomic localization. Fused PET-CT and 3D MIP reconstructions were utilized for image interpretation.  Fasting blood glucose level: 113 mg/dl. Reference uptake values: Mediastinum: 2.8 SUVmax Liver: 2.7 SUVmax Normalization method: Body Weight Findings: Head and neck: No abnormal FDG activity identified. Chest: Increasing moderate to large right effusion which is loculated. Spiculated right middle lobe nodule has an SUV maximum of 4.4. There is extensive right hilar adenopathy with an SUV max 8.8. There is subcarinal and mediastinal adenopathy ranging between 8 and 5 SUV maximum. Abdomen and pelvis: At least 20 FDG-avid liver lesions are identified measuring up to 5 cm in diameter and an SUV maximum of 13.5. There is left adrenal lesion with an SUV maximum of 5. There is extensive julio hepatis and portacaval adenopathy with an SUV maximum of 8.6. There is retroperitoneal adenopathy, including left periaortic and aortocaval abnormal lymph nodes. Musculoskeletal: Diffuse osseous metastatic disease with multifocal disease throughout the cervical thoracolumbar spine, bilateral ribs, pelvis,  including the left acetabulum and to a lesser extent the right acetabulum. There are bilateral femoral lesions as well. No definite pathologic fractures are identified at this time. Several of the lesions however are involving weight-bearing bones and the patient is susceptible to pathologic fractures.     Impression: Impression: Extensive metastatic disease involving the chest, abdomen, and pelvis as well as the bones. Electronically Signed: Herb Lopes MD  5/28/2025 9:18 AM EDT  Workstation ID: [id number]      ASSESSMENT: The patient is a very pleasant 65 y.o. female  with metastatic right lung adenocarcinoma      PLAN:  1.  Metastatic right lung adenocarcinoma: Status post 1 dose of Keytruda.  She is scheduled next week for cycle #2.  2.  Recurrent right pleural effusion: Status post Pleurx catheter done June 17, 2025 by Dr. Gross.  We will continue drainage as needed.  3.  Cancer-related pain: Continue opiates.  4.  Weakness: Patient is considering rehab placement.    Discussed in person with Dr. Estevez in person to coordinate patient's care.     Sejal Mckenzie MD  6/18/2025

## 2025-06-18 NOTE — PROGRESS NOTES
Malnutrition Severity Assessment    Patient Name:  Sydnie Gamble  YOB: 1960  MRN: [id number]  Admit Date:  6/12/2025    Patient meets criteria for : Severe Malnutrition    Comments:  Pt meets criteria for severe malnutrition in the context of chronic illness indicated by wt loss >10% x 6 months, severe muscle wasting and subcutaneous fat loss, po intake <75% EEN x >/=1 month    Malnutrition Severity Assessment  Malnutrition Type: Chronic Disease - Related Malnutrition  Malnutrition Type (Last 8 Hours)       Malnutrition Severity Assessment       Row Name 06/18/25 0933       Malnutrition Severity Assessment    Malnutrition Type Chronic Disease - Related Malnutrition      Row Name 06/18/25 0933       Insufficient Energy Intake     Insufficient Energy Intake Findings Severe    Insufficient Energy Intake  <75% of est. energy requirement for > or equal to 1 month      Row Name 06/18/25 0933       Unintentional Weight Loss     Unintentional Weight Loss Findings Severe    Unintentional Weight Loss  Weight loss greater than 10% in six months      Row Name 06/18/25 0933       Muscle Loss    Loss of Muscle Mass Findings Severe    Clavicle Bone Region Severe - protruding prominent bone    Acromion Bone Region Severe - squared shoulders, bones, and acromion process protrusion prominent    Scapular Bone Region Moderate - mild depression, bones may show slightly    Patellar Region Moderate - patella more prominent, less muscle definition around patella    Anterior Thigh Region Moderate - mild depression on inner thigh    Posterior Calf Region Moderate - some roundness, slight firmness      Row Name 06/18/25 0933       Fat Loss    Subcutaneous Fat Loss Findings Moderate    Orbital Region  Moderate -  somewhat hollowness, slightly dark circles    Upper Arm Region Moderate - some fat tissue, not ample      Row Name 06/18/25 0933       Criteria Met (Must meet criteria for severity in at least 2 of these  categories: M Wasting, Fat Loss, Fluid, Secondary Signs, Wt. Status, Intake)    Patient meets criteria for  Severe Malnutrition                    Electronically signed by:  Yohana Duff MS,RD,LD  06/18/25 09:38 EDT

## 2025-06-18 NOTE — PLAN OF CARE
Goal Outcome Evaluation:              Outcome Evaluation: VSS on 3L. oriented X 4. poor PO intake. plurex drain site clean, dry, intact. complains of pain, given prn meds.

## 2025-06-18 NOTE — CASE MANAGEMENT/SOCIAL WORK
Continued Stay Note  [facility]     Patient Name: Sydnie Gamble  MRN: [id number]  Today's Date: 6/18/2025    Admit Date: 6/12/2025    Plan: [facility]   Discharge Plan       Row Name 06/18/25 1306       Plan    Plan [facility]    Patient/Family in Agreement with Plan yes    Plan Comments I spoke with Ms Gamble and her family at bedside.  Ms Gamble now has a PleurX tube that will need to be drained every two days. At this time they are interested in pursuing short term rehab at [facility].  I have made the referral to [facility].  Case management will continue to follow.    Final Discharge Disposition Code 62 - inpatient rehab facility                   Discharge Codes    No documentation.                 Expected Discharge Date and Time       Expected Discharge Date Expected Discharge Time    Jun 19, 2025               Laura Cruz RN

## 2025-06-18 NOTE — PROGRESS NOTES
"Palliative Care Daily Progress Note     C/C: Patient sleeping at time of visit.     S: Medical record reviewed. Follow-up visit for symptom management and GOC. Events noted. Patient sleeping after Hydromorphone 0.5mg IV for pain along diaphragm. Spoke with RN regarding symptoms. Spoke with spouse at bedside. He reports she has had small hard BM's, patient does not want to take laxatives/stool softeners due to feeling urgency and unable to stool. Reviewed use of suppository and enema to assist in softening and allowing passage of stool. Patient with poor appetite. Patient reporting to RN and spouse that Percocet is not effective for pain and has been taking Hydromorphone 0.5mg IV x 4 doses in the last 24 hours.     ROS: ROS limited by sleeping.     O: Code Status:   Code Status and Medical Interventions: CPR (Attempt to Resuscitate); Full Support; also talked over with  Jose Francisco Erickson at: 06/12/25 4077     Code Status (Patient has no pulse and is not breathing):    CPR (Attempt to Resuscitate)     Medical Interventions (Patient has pulse or is breathing):    Full Support     Comments:    also talked over with  Jose Francisco     Level Of Support Discussed With:    Patient       Next of Kin (If No Surrogate)      Advanced Directives: Advance Directive Status: Patient does not have advance directive   Goals of Care: Ongoing.   Palliative Performance Scale Score: 40%     /52 (BP Location: Left arm, Patient Position: Lying)   Pulse 101   Temp 98 °F (36.7 °C) (Axillary)   Resp 20   Ht 154.9 cm (60.98\")   Wt 55.8 kg (123 lb)   LMP  (LMP Unknown)   SpO2 95%   BMI 23.25 kg/m²     Intake/Output Summary (Last 24 hours) at 6/18/2025 1025  Last data filed at 6/18/2025 0621  Gross per 24 hour   Intake --   Output 250 ml   Net -250 ml       PE:  General Appearance:    Patient laying in bed, sleeping, A/C ill appearing,    HEENT:    NC/AT, EOMI, anicteric, MMM, face relaxed   Neck:   supple, trachea midline, no " JVD   Lungs:     CTA bilaterally, diminished in bases; respirations regular,     even and unlabored; RR 16-18 on exam, 3LNC    Heart:    RRR, normal S1 and S2, no M/R/G,    Abdomen:     Normal bowel sounds, soft, nontender, nondistended   G/U:   Deferred   MSK/Extremities:   Wasting, no edema   Pulses:   Pulses palpable and equal bilaterally   Skin:   Warm, dry   Neurologic:   sleeping   Psych:   sleeping     Meds: Reviewed and changes noted    Labs:   Results from last 7 days   Lab Units 06/18/25  0335   WBC 10*3/mm3 18.46*   HEMOGLOBIN g/dL 9.8*   HEMATOCRIT % 32.7*   PLATELETS 10*3/mm3 217     Results from last 7 days   Lab Units 06/18/25  0335   SODIUM mmol/L 130*   POTASSIUM mmol/L 4.5   CHLORIDE mmol/L 92*   CO2 mmol/L 21.1*   BUN mg/dL 32.7*   CREATININE mg/dL 1.08*   GLUCOSE mg/dL 107*   CALCIUM mg/dL 7.4*     Results from last 7 days   Lab Units 06/18/25  0335   SODIUM mmol/L 130*   POTASSIUM mmol/L 4.5   CHLORIDE mmol/L 92*   CO2 mmol/L 21.1*   BUN mg/dL 32.7*   CREATININE mg/dL 1.08*   CALCIUM mg/dL 7.4*   BILIRUBIN mg/dL 0.3   ALK PHOS U/L 493*   ALT (SGPT) U/L 21   AST (SGOT) U/L 71*   GLUCOSE mg/dL 107*     Imaging Results (Last 72 Hours)       Procedure Component Value Units Date/Time    XR Chest 1 View [[id number]] Collected: 06/18/25 0714     Updated: 06/18/25 0720    Narrative:      XR CHEST 1 VW    Date of Exam: 6/18/2025 2:24 AM EDT    Indication: Recurrent pleural effusions    Comparison: 6/17/2025    Findings:  Medium bore right pleural drain is in stable position. Heart shadow is upper normal size. Pulmonary vasculature appears normal. Right apical pleural fluid collection has decreased, now very small in size. Small area of patchy atelectasis is now seen in   the right midlung. Basilar interstitial changes elsewhere appear stable. Mild left basilar discoid atelectasis is stable. No pneumothorax is seen.      Impression:      Impression:  Small remaining right apical pleural fluid  collection. Mild new patchy right mid lung disease. No other new chest pathology is seen.      Electronically Signed: Marlon Lyons MD    6/18/2025 7:16 AM EDT    Workstation ID: [id number]    XR Chest 1 View [[id number]] Collected: 06/17/25 0904     Updated: 06/17/25 0909    Narrative:      XR CHEST 1 VW    Date of Exam: 6/17/2025 8:34 AM EDT    Indication: postop    Comparison: 6/17/2025 earlier same day    Findings:  There has been interval placement of a right basilar thoracostomy tube, with significant decrease in amount of right pleural fluid. There is persistent fluid at the apex and a small amount of the base and minor fissure which may be loculated. No   pneumothorax identified. There is consolidation in the right mid and lower lung which may be due to residual atelectasis or infiltrate. Small amount of left pleural fluid appears unchanged. Heart size and mediastinal contour appear unchanged.      Impression:      Impression:  Interval placement of right basilar thoracostomy tube with significant decrease in amount of right pleural fluid. There is persistent fluid at the apex and base which may be loculated. No pneumothorax.        Electronically Signed: Rodriguez Napier MD    6/17/2025 9:06 AM EDT    Workstation ID: [id number]    XR Chest 1 View [[id number]] Collected: 06/17/25 0804     Updated: 06/17/25 0814    Narrative:      XR CHEST 1 VW    Date of Exam: 6/17/2025 2:45 AM EDT    Indication: Recurrent pleural effusions    Comparison: 6/16/2025    Findings:  There has been slight further increase in right pleural fluid as compared to yesterday's study. There is consolidation in the overlying right lung suggesting atelectasis or infiltrate. There is a small left pleural effusion which is slightly increased   from yesterday. No pneumothorax identified. Pulmonary vascularity appears within normal limits. Heart size appears stable.      Impression:      Impression:  1.Interval increase in bilateral pleural effusions,  right greater than left.  2.Consolidation in the right lung base may be due to atelectasis or infiltrate.        Electronically Signed: Rodriguez Napier MD    6/17/2025 8:11 AM EDT    Workstation ID: [id number]    XR Chest 1 View [[id number]] Collected: 06/16/25 0746     Updated: 06/16/25 0750    Narrative:      XR CHEST 1 VW    Date of Exam: 6/16/2025 5:35 AM EDT    Indication: Recurrent pleural effusions    Comparison: 6/15/2025    Findings:  Right pleural drain has been removed and there is apparently increasing right basilar effusion. Right midlung interstitial changes are stable. There is minimally increased left basilar interstitial opacity perhaps a small focus of atelectasis. Heart and   vasculature appear to be normal in size. No pneumothorax is seen.      Impression:      Impression:  Increasing right basilar effusion following right pleural drain removal.      Electronically Signed: Marlon Lyons MD    6/16/2025 7:47 AM EDT    Workstation ID: [id number]                  Diagnostics: Reviewed    A:   Recurrent pleural effusion    Acquired hypothyroidism    HLD (hyperlipidemia)    Adenocarcinoma of right lung     65 yl.o. female with recurrent pleural effusion secondary to adenocarcinoma of right lung, pain, debility, decreased appetite, shortness of breath.   S/S:   Pain -MSK, neoplastic  -continue Flexeril 10mg BID prn muscle spasms  -discontinued Voltaren gel to neck/shoulder four times a day  -discontinued Percocet 5-325mg PO q 4 hours prn moderate pain  -started Oxycodone 10mg PO q 4 hours prn moderate pain  -continue Hydromorphone 0.5mg IV q 2 hours prn severe pain     2. Shortness of breath -pleur X in place  -currently on 3LNC     3. Decreased intake -started Marinol 5mg PO BID     4. Constipation -LBM hard per spouse  -continue bowel regimen  -Mineral oil enema once prn to soften stool     5. Sleeplessness -Ambien nightly prn     6. Debility -uses a walker at baseline     7.  GOC -Full Code/Full Support  -reviewed with patient and spouse  -information regarding code status given to patient  -discussed LW/ACP, aware of option to complete this hospitalization  -discussed symptoms and medications as above    P: Follow up visit. Patient sleeping after receiving IV Hydromorphone. Reviewed symptoms and medications with spouse and RN. Patient reporting Percocet ineffective for pain and so has been requesting Hydromorphone IV as most effective. Reviewed dose equivalent Oxycodone 10mg with spouse and transitioning to this dose to determine if effective and whether will manage pain at STR. Reviewed appetite and started Marinol 10mg PO BID after reviewed with spouse. Reviewed BM and encouraged enema as stool has been hard, may improve discomfort and appetite with stooling. Will continue to follow and support.   Palliative Care Team will continue to follow patient. Please do not hesitate to contact us regarding further sx mgmt or GOC needs.  Kaylene Reynoso, APRN  6/18/2025  Time spent: 20 minutes

## 2025-06-19 NOTE — PLAN OF CARE
Goal Outcome Evaluation:  Plan of Care Reviewed With: patient, sibling (sister)        Progress: improving  Outcome Evaluation: Patient required additional time and effort to complete mobility, cues for sequencing. Patient required minAx2 to complete transfer to [facility] and minAx1 to ambulate limited distance. Patient continues to present below baseline for mobility and would continue to benefit from skilled PT to address strength, balance and activity tolerance deficits.    Anticipated Discharge Disposition (PT): skilled nursing facility

## 2025-06-19 NOTE — PROGRESS NOTES
[facility] Medicine Services  PROGRESS NOTE    Patient Name: Sydnie Gamble  : 1960  MRN: [id number]    Date of Admission: 2025  Primary Care Physician: Dee Elena APRN    Subjective   Subjective     CC: dyspnea    HPI:  On 2 liters n/c in AM  PleurX drainage will be today w  bedside    Awaiting SNF applications, etc    HR to 200 x 2 minutes this AM without symptoms per nurse charting. Review of telemetry w SVT. Patient also didn't mention event to me on interview    Last dose IV pain meds yesterday PM    Objective   Objective     Vital Signs:   Temp:  [97.7 °F (36.5 °C)-98.5 °F (36.9 °C)] 97.7 °F (36.5 °C)  Heart Rate:  [] 103  Resp:  [10-22] 22  BP: ()/(59-83) 113/62  Flow (L/min) (Oxygen Therapy):  [2-3] 3     Physical Exam:  Constitutional: No acute distress, awake, alert older female lying in bed.  bedside  Respiratory: Clear to auscultation bilaterally, respiratory effort mildly increased on 2 liters, PleurX capped  Cardiovascular: RRR, no murmurs, rubs, or gallops  Gastrointestinal: Soft, nontender, nondistended  Musculoskeletal: Muscle tone decreased throughout, no joint effusions appreciated  Psychiatric: Appropriate affect, cooperative  Neurologic: Alert and oriented, speech clear  Skin: No rashes    Results Reviewed:  LAB RESULTS:      Lab 25  0413 25  0335 25  1735 25  1043 25  0424 06/15/25  0446 25  0456 25  0733 25  0341 25  0114 25  1755   WBC 21.36* 18.46*  --   --  18.84* 19.96* 21.39*  --  20.73*  --  17.59*   HEMOGLOBIN 9.6* 9.8*  --   --  10.3* 9.9* 10.3*  --  10.5*  --  10.7*   HEMATOCRIT 30.6* 32.7*  --   --  34.2 32.2* 33.5*  --  33.9*  --  35.1   PLATELETS 174 217  --   --  285 221 215  --  220  --  192   NEUTROS ABS  --   --   --   --  14.61* 16.23* 17.02*  --  15.98*  --  14.13*   IMMATURE GRANS (ABS)  --   --   --   --  0.76* 0.44* 0.60*  --   0.47*  --  0.50*   LYMPHS ABS  --   --   --   --  0.80 0.76 0.91  --  1.18  --  0.74   MONOS ABS  --   --   --   --  1.50* 1.52* 1.67*  --  1.78*  --  1.45*   EOS ABS  --   --   --   --  1.08* 0.93* 1.11*  --  1.22*  --  0.68*   MCV 88.2 90.1  --   --  91.4 89.7 90.1  --  89.9  --  90.5   PROTIME  --   --   --   --   --   --   --   --   --   --  15.6*   APTT  --   --   --   --   --   --   --   --   --   --  32.7*   HEPARIN ANTI-XA  --  0.38 0.30 0.27* 0.44 0.46 0.42   < >  --    < > 0.48    < > = values in this interval not displayed.         Lab 06/19/25  0413 06/18/25 0335 06/17/25  1043 06/16/25  0424 06/14/25  0456 06/12/25  1755   SODIUM 128* 130*  --  131* 134* 136   POTASSIUM 4.4 4.5 4.1 4.3 4.2 5.0   CHLORIDE 92* 92*  --  94* 97* 97*   CO2 19.9* 21.1*  --  26.0 25.0 28.0   ANION GAP 16.1* 16.9*  --  11.0 12.0 11.0   BUN 43.4* 32.7*  --  25.5* 23.0 21.7   CREATININE 1.08* 1.08*  --  0.87 0.85 0.87   EGFR 56.4* 56.4*  --  73.1 75.2 73.1   GLUCOSE 111* 107*  --  117* 108* 117*   CALCIUM 7.3* 7.4*  --  7.5* 7.7* 7.8*   MAGNESIUM 3.2*  --   --   --   --   --          Lab 06/18/25  0335 06/12/25  1755   TOTAL PROTEIN 5.4* 6.1   ALBUMIN 2.3* 2.8*   GLOBULIN 3.1 3.3   ALT (SGPT) 21 26   AST (SGOT) 71* 49*   BILIRUBIN 0.3 0.2   ALK PHOS 493* 455*         Lab 06/12/25  1755   PROTIME 15.6*   INR 1.17*                 Brief Urine Lab Results       None            Microbiology Results Abnormal       None            XR Chest 1 View  Result Date: 6/19/2025  XR CHEST 1 VW Date of Exam: 6/19/2025 4:10 AM EDT Indication: Recurrent pleural effusions Comparison: 6/18/2025 Findings: Heart is normal. There is a right-sided pleurodesis catheter noted with a persistent small right pleural effusion. Areas of patchy bibasilar airspace disease not significantly changed from the prior study. Small left pleural effusion stable. Negative for  pneumothorax.     Impression: Impression: 1. Stable right-sided pleurodesis catheter with  small right pleural effusion. 2. Stable small left pleural effusion. 3. No pneumothorax. 4. Unchanged bibasilar airspace disease which may relate to atelectasis and/or pneumonia. Electronically Signed: Stuart Romero MD  6/19/2025 7:42 AM EDT  Workstation ID: [id number]    XR Chest 1 View  Result Date: 6/18/2025  XR CHEST 1 VW Date of Exam: 6/18/2025 2:24 AM EDT Indication: Recurrent pleural effusions Comparison: 6/17/2025 Findings: Medium bore right pleural drain is in stable position. Heart shadow is upper normal size. Pulmonary vasculature appears normal. Right apical pleural fluid collection has decreased, now very small in size. Small area of patchy atelectasis is now seen in the right midlung. Basilar interstitial changes elsewhere appear stable. Mild left basilar discoid atelectasis is stable. No pneumothorax is seen.     Impression: Impression: Small remaining right apical pleural fluid collection. Mild new patchy right mid lung disease. No other new chest pathology is seen. Electronically Signed: Marlon Lyons MD  6/18/2025 7:16 AM EDT  Workstation ID: [id number]      Results for orders placed during the hospital encounter of 05/13/25    Adult Transthoracic Echo Complete w/ Color, Spectral and Contrast if necessary per protocol    Interpretation Summary    Left ventricular ejection fraction appears to be 66 - 70%.    Normal right ventricular cavity size, wall thickness, systolic function and septal motion noted.    There is a small (<1cm) pericardial effusion adjacent to the right ventricle. There is no evidence of cardiac tamponade.    There is a left pleural effusion.      Current medications:  Scheduled Meds:apixaban, 5 mg, Oral, Q12H  arformoterol, 15 mcg, Nebulization, BID - RT   And  budesonide, 0.5 mg, Nebulization, BID - RT   And  revefenacin, 175 mcg, Nebulization, Daily - RT  atorvastatin, 10 mg, Oral, Nightly  dronabinol, 5 mg, Oral, BID  levothyroxine, 88 mcg, Oral, Q AM  lidocaine PF 1%, 5 mL,  Injection, Once  magnesium hydroxide, 10 mL, Oral, Once  nystatin, 5 mL, Oral, 4x Daily  senna-docusate sodium, 2 tablet, Oral, BID  sertraline, 100 mg, Oral, Daily  traZODone, 150 mg, Oral, Nightly      Continuous Infusions:     PRN Meds:.  acetaminophen **OR** acetaminophen **OR** acetaminophen    senna-docusate sodium **AND** polyethylene glycol **AND** bisacodyl **AND** bisacodyl    Calcium Replacement - Follow Nurse / BPA Driven Protocol    clonazePAM    cyclobenzaprine    famotidine    HYDROmorphone **AND** naloxone    ipratropium-albuterol    Magnesium Standard Dose Replacement - Follow Nurse / BPA Driven Protocol    mineral oil    ondansetron ODT **OR** ondansetron    oxyCODONE    Phosphorus Replacement - Follow Nurse / BPA Driven Protocol    Potassium Replacement - Follow Nurse / BPA Driven Protocol    simethicone    sodium chloride    sodium chloride    zolpidem    Assessment & Plan   Assessment & Plan     Active Hospital Problems    Diagnosis  POA    **Recurrent pleural effusion [J90]  Yes    Severe protein-calorie malnutrition [E43]  Yes    Adenocarcinoma of right lung [C34.91]  Yes    HLD (hyperlipidemia) [E78.5]  Yes    Acquired hypothyroidism [E03.9]  Yes      Resolved Hospital Problems   No resolved problems to display.        Brief Hospital Course to date:  Sydnie Gamble is a 65 y.o. female w recent diagnosis of lung adenocarcinoma s/p CyberKnife c/b malignant effusion on home 3 liters n/c since 5/2025 undergoing keytruda treatment w Dr Mckenzie who presented w worsening dyspnea and found to have worsening recurrent pleural effusion.    Lung adenocarcinoma c/b malignant effusion, now s/p PleurX  Chronic hypoxic resp failure on 3 liters n/c since diagnosis  -home 3 liters n/c from last dc, back to this now but w significant dyspnea w activity  -PleurX training w family 6/19, drain to empty 3x/week  -OP keytruda treatment to resume when patient amenable. D/w Dr Mckenzie 6/17 likely dispo to SNF,  will   reschedule follow-up for after rehab    Self-limited SVT 6/19  -patient asymptomatic on nursing review, <2 minutes  -e-lytes appropriate, no symptoms and self-limited. No significant room in BP. Monitor on tele for now and consider cardiology consult if recurrent, etc      Pain control - last IV pain med dose 6/18 PM, PO med plan per palliative    Acute on chronic debility - EOB activity only currently, SNF recs and patient + spouse now agreeable  Hypothyroidism - home meds  Anxiety/depression - home meds  Recent diagnosis DVT/PE (5/2025) -home eliquis resumed    Expected Discharge Location and Transportation: SNF  Expected Discharge 6/21 (Discharge date is tentative pending patient's medical condition and is subject to change)  Expected Discharge Date: 6/21/2025; Expected Discharge Time:  3:00 PM     VTE Prophylaxis:  Pharmacologic VTE prophylaxis orders are present.         AM-PAC 6 Clicks Score (PT): 18 (06/19/25 0315)    CODE STATUS:   Code Status and Medical Interventions: CPR (Attempt to Resuscitate); Full Support; also talked over with  Jose Francisco   Ordered at: 06/12/25 4390     Code Status (Patient has no pulse and is not breathing):    CPR (Attempt to Resuscitate)     Medical Interventions (Patient has pulse or is breathing):    Full Support     Comments:    also talked over with  Jose Francisco     Level Of Support Discussed With:    Patient       Next of Kin (If No Surrogate)       Kailyn Estevez MD  06/19/25

## 2025-06-19 NOTE — PAYOR COMM NOTE
"Lakshmi Ann RN  [facility]  Utilization Management  P:[phone]  F:[phone]    Auth # [id number]     TyeSol Jodi (65 y.o. Female)       Date of Birth   1960    Social Security Number       Address   [address] [id number]    Home Phone   [phone]    MRN   [id number]       Buddhist   Scientology    Marital Status                               Admission Date   6/12/2025    Admission Type   Elective    Admitting Provider   Kailyn Estevez MD    Attending Provider   Kailyn Estevez MD    Department, Room/Bed   [facility] 6B, N643/1       Discharge Date       Discharge Disposition       Discharge Destination                                 Attending Provider: Kailyn Estevez MD    Allergies: No Known Allergies    Isolation: None   Infection: None   Code Status: CPR    Ht: 154.9 cm (60.98\")   Wt: 55.8 kg (123 lb)    Admission Cmt: None   Principal Problem: Recurrent pleural effusion [J90]                   Active Insurance as of 6/12/2025       Primary Coverage       Payor Plan Insurance Group Employer/Plan Group    ANTH BLUE CROSS ANTH BLUE CROSS BLUE SHIELD PPO [id number]       Payor Plan Address Payor Plan Phone Number Payor Plan Fax Number Effective Dates    [address] [phone]  1/1/2015 - None Entered    [facility] [id number]         Subscriber Name Subscriber Birth Date Member ID       JOSE FRANCISCO DIAZ 2/1/1967 [id number]               Secondary Coverage       Payor Plan Insurance Group Employer/Plan Group    MEDICARE MEDICARE A & B        Payor Plan Address Payor Plan Phone Number Payor Plan Fax Number Effective Dates    [address] [phone]  4/1/2023 - None Entered    [facility] [id number]         Subscriber Name Subscriber Birth Date Member ID       JONESSOL WAITE JODI 1960 [id number]                     Emergency Contacts        (Rel.) Home Phone Work Phone Mobile Phone    Jose Francisco Diaz (Spouse) " [phone] -- [phone]    LEVI ESPITIA (Daughter) [phone] -- [phone]              Current Facility-Administered Medications   Medication Dose Route Frequency Provider Last Rate Last Admin    acetaminophen (TYLENOL) tablet 650 mg  650 mg Oral Q4H PRN [place], Kaila, PA-C        Or    acetaminophen (TYLENOL) 160 MG/5ML oral solution 650 mg  650 mg Oral Q4H PRN [place], Kaila, PA-C        Or    acetaminophen (TYLENOL) suppository 650 mg  650 mg Rectal Q4H PRN [place], Kaila, PA-C        apixaban (ELIQUIS) tablet 5 mg  5 mg Oral Q12H Kailyn Estevez MD   5 mg at 06/19/25 0852    arformoterol (BROVANA) nebulizer solution 15 mcg  15 mcg Nebulization BID - RT Damon, Kaila, PA-C   15 mcg at 06/17/25 2003    And    budesonide (PULMICORT) nebulizer solution 0.5 mg  0.5 mg Nebulization BID - RT [place], Kaila, PA-C   0.5 mg at 06/17/25 2003    And    revefenacin (YUPELRI) nebulizer solution 175 mcg  175 mcg Nebulization Daily - RT Damon, Kaila, PA-C   175 mcg at 06/16/25 0856    atorvastatin (LIPITOR) tablet 10 mg  10 mg Oral Nightly [place], Kaila, PA-C   10 mg at 06/18/25 2128    sennosides-docusate (PERICOLACE) 8.6-50 MG per tablet 2 tablet  2 tablet Oral BID [place], Kaila, PA-C   2 tablet at 06/16/25 2046    And    polyethylene glycol (MIRALAX) packet 17 g  17 g Oral Daily PRN Damon, Kaila, PA-C        And    bisacodyl (DULCOLAX) EC tablet 5 mg  5 mg Oral Daily PRN [place], Kaila, PA-C   5 mg at 06/15/25 0924    And    bisacodyl (DULCOLAX) suppository 10 mg  10 mg Rectal Daily PRN [place], Kaila, PA-C        Calcium Replacement - Follow Nurse / BPA Driven Protocol   Not Applicable PRN Damon, Kaila, PA-C        clonazePAM (KlonoPIN) tablet 1 mg  1 mg Oral BID PRN Kaylene Reynoso APRN   1 mg at 06/15/25 1558    cyclobenzaprine (FLEXERIL) tablet 10 mg  10 mg Oral BID PRN Kaila Jose PA-C   10 mg at 06/14/25 9575    dronabinol (MARINOL) capsule 5 mg  5 mg Oral BID Kaylene Reynoso,  APRN   5 mg at 06/19/25 0852    famotidine (PEPCID) tablet 40 mg  40 mg Oral BID PRN Kaila Jose PA-C        HYDROmorphone (DILAUDID) injection 0.5 mg  0.5 mg Intravenous Q2H PRN Kaylene Reynoso APRN   0.5 mg at 06/18/25 1830    And    naloxone (NARCAN) injection 0.4 mg  0.4 mg Intravenous Q5 Min PRN Kaylene Reynoso APRN        ipratropium-albuterol (DUO-NEB) nebulizer solution 3 mL  3 mL Nebulization Q4H PRN Kaila Jose PA-C   3 mL at 06/15/25 1719    levothyroxine (SYNTHROID, LEVOTHROID) tablet 88 mcg  88 mcg Oral Q AM Kaila Jose PA-C   88 mcg at 06/19/25 0630    lidocaine PF 1% (XYLOCAINE) injection 5 mL  5 mL Injection Once Kaila Jose PA-C        magnesium hydroxide (MILK OF MAGNESIA) suspension 10 mL  10 mL Oral Once Kailyn Estevez MD        Magnesium Standard Dose Replacement - Follow Nurse / BPA Driven Protocol   Not Applicable PRN Kaila Jose PA-C        mineral oil enema 1 enema  1 enema Rectal Once PRN Kaylene Reynoso APRN        nystatin (MYCOSTATIN) 100,000 unit/mL suspension 500,000 Units  5 mL Oral 4x Daily Kaila Jose PA-C   500,000 Units at 06/19/25 0631    ondansetron ODT (ZOFRAN-ODT) disintegrating tablet 4 mg  4 mg Oral Q6H PRN Kaila Jose PA-C        Or    ondansetron (ZOFRAN) injection 4 mg  4 mg Intravenous Q6H PRN Kaila Jose PA-C        oxyCODONE (ROXICODONE) immediate release tablet 10 mg  10 mg Oral Q4H PRN Kaylene Reynoso APRN   10 mg at 06/19/25 0919    Phosphorus Replacement - Follow Nurse / BPA Driven Protocol   Not Applicable PRN Kaila Jose PA-C        Potassium Replacement - Follow Nurse / BPA Driven Protocol   Not Applicable PRN Kaila Jose PA-C        sertraline (ZOLOFT) tablet 100 mg  100 mg Oral Daily Kaila Jose PA-C   100 mg at 06/19/25 0852    simethicone (MYLICON) chewable tablet 80 mg  80 mg Oral 4x Daily PRN Kaila Jose PA-C   80 mg at 06/15/25 2040    sodium chloride 0.9 % flush 10 mL  10 mL Intravenous PRN  Kaila Jose PA-C   10 mL at 06/19/25 0852    sodium chloride 0.9 % infusion 40 mL  40 mL Intravenous PRN Kaila Jose PA-C        traZODone (DESYREL) tablet 150 mg  150 mg Oral Nightly Kaila Jose PA-C   150 mg at 06/18/25 2127    zolpidem (AMBIEN) tablet 5 mg  5 mg Oral Nightly PRN Kaila Jose PA-C         Lab Results (last 48 hours)       Procedure Component Value Units Date/Time    Magnesium [[id number]]  (Abnormal) Collected: 06/19/25 0413    Specimen: Blood Updated: 06/19/25 0715     Magnesium 3.2 mg/dL     Basic Metabolic Panel [[id number]]  (Abnormal) Collected: 06/19/25 0413    Specimen: Blood Updated: 06/19/25 0456     Glucose 111 mg/dL      BUN 43.4 mg/dL      Creatinine 1.08 mg/dL      Sodium 128 mmol/L      Potassium 4.4 mmol/L      Chloride 92 mmol/L      CO2 19.9 mmol/L      Calcium 7.3 mg/dL      BUN/Creatinine Ratio 40.2     Anion Gap 16.1 mmol/L      eGFR 56.4 mL/min/1.73     Narrative:      GFR Categories in Chronic Kidney Disease (CKD)              GFR Category          GFR (mL/min/1.73)    Interpretation  G1                    90 or greater        Normal or high (1)  G2                    60-89                Mild decrease (1)  G3a                   45-59                Mild to moderate decrease  G3b                   30-44                Moderate to severe decrease  G4                    15-29                Severe decrease  G5                    14 or less           Kidney failure    (1)In the absence of evidence of kidney disease, neither GFR category G1 or G2 fulfill the criteria for CKD.    eGFR calculation 2021 CKD-EPI creatinine equation, which does not include race as a factor    CBC (No Diff) [[id number]]  (Abnormal) Collected: 06/19/25 0413    Specimen: Blood Updated: 06/19/25 0437     WBC 21.36 10*3/mm3      RBC 3.47 10*6/mm3      Hemoglobin 9.6 g/dL      Hematocrit 30.6 %      MCV 88.2 fL      MCH 27.7 pg      MCHC 31.4 g/dL      RDW 15.6 %      RDW-SD 48.7 fl      MPV  9.9 fL      Platelets 174 10*3/mm3     Heparin Anti-Xa [[id number]]  (Normal) Collected: 06/18/25 0335    Specimen: Blood Updated: 06/18/25 0427     Heparin Anti-Xa (UFH) 0.38 IU/ml     Comprehensive Metabolic Panel [[id number]]  (Abnormal) Collected: 06/18/25 0335    Specimen: Blood Updated: 06/18/25 0424     Glucose 107 mg/dL      BUN 32.7 mg/dL      Creatinine 1.08 mg/dL      Sodium 130 mmol/L      Potassium 4.5 mmol/L      Comment: Specimen hemolyzed.  Result may be falsely elevated.        Chloride 92 mmol/L      CO2 21.1 mmol/L      Calcium 7.4 mg/dL      Total Protein 5.4 g/dL      Albumin 2.3 g/dL      ALT (SGPT) 21 U/L      AST (SGOT) 71 U/L      Alkaline Phosphatase 493 U/L      Total Bilirubin 0.3 mg/dL      Globulin 3.1 gm/dL      Comment: Calculated Result        A/G Ratio 0.7 g/dL      BUN/Creatinine Ratio 30.3     Anion Gap 16.9 mmol/L      eGFR 56.4 mL/min/1.73     Narrative:      GFR Categories in Chronic Kidney Disease (CKD)              GFR Category          GFR (mL/min/1.73)    Interpretation  G1                    90 or greater        Normal or high (1)  G2                    60-89                Mild decrease (1)  G3a                   45-59                Mild to moderate decrease  G3b                   30-44                Moderate to severe decrease  G4                    15-29                Severe decrease  G5                    14 or less           Kidney failure    (1)In the absence of evidence of kidney disease, neither GFR category G1 or G2 fulfill the criteria for CKD.    eGFR calculation 2021 CKD-EPI creatinine equation, which does not include race as a factor    CBC (No Diff) [[id number]]  (Abnormal) Collected: 06/18/25 0335    Specimen: Blood Updated: 06/18/25 0407     WBC 18.46 10*3/mm3      RBC 3.63 10*6/mm3      Hemoglobin 9.8 g/dL      Hematocrit 32.7 %      MCV 90.1 fL      MCH 27.0 pg      MCHC 30.0 g/dL      RDW 15.5 %      RDW-SD 50.5 fl      MPV 10.7 fL      Platelets 217  10*3/mm3     Heparin Anti-Xa [[id number]]  (Normal) Collected: 06/17/25 1735    Specimen: Blood Updated: 06/17/25 1840     Heparin Anti-Xa (UFH) 0.30 IU/ml     Potassium [[id number]]  (Normal) Collected: 06/17/25 1043    Specimen: Blood Updated: 06/17/25 1112     Potassium 4.1 mmol/L      Comment: Slight hemolysis detected by analyzer. Result may be falsely elevated.       Heparin Anti-Xa [[id number]]  (Abnormal) Collected: 06/17/25 1043    Specimen: Blood Updated: 06/17/25 1111     Heparin Anti-Xa (UFH) 0.27 IU/ml           Imaging Results (Last 48 Hours)       Procedure Component Value Units Date/Time    XR Chest 1 View [[id number]] Collected: 06/19/25 0741     Updated: 06/19/25 0745    Narrative:      XR CHEST 1 VW    Date of Exam: 6/19/2025 4:10 AM EDT    Indication: Recurrent pleural effusions    Comparison: 6/18/2025    Findings:  Heart is normal. There is a right-sided pleurodesis catheter noted with a persistent small right pleural effusion. Areas of patchy bibasilar airspace disease not significantly changed from the prior study. Small left pleural effusion stable. Negative for   pneumothorax.      Impression:      Impression:  1. Stable right-sided pleurodesis catheter with small right pleural effusion.  2. Stable small left pleural effusion.  3. No pneumothorax.  4. Unchanged bibasilar airspace disease which may relate to atelectasis and/or pneumonia.          Electronically Signed: Stuart Romero MD    6/19/2025 7:42 AM EDT    Workstation ID: [id number]    XR Chest 1 View [[id number]] Collected: 06/18/25 0714     Updated: 06/18/25 0720    Narrative:      XR CHEST 1 VW    Date of Exam: 6/18/2025 2:24 AM EDT    Indication: Recurrent pleural effusions    Comparison: 6/17/2025    Findings:  Medium bore right pleural drain is in stable position. Heart shadow is upper normal size. Pulmonary vasculature appears normal. Right apical pleural fluid collection has decreased, now very small in size. Small area of patchy  atelectasis is now seen in   the right midlung. Basilar interstitial changes elsewhere appear stable. Mild left basilar discoid atelectasis is stable. No pneumothorax is seen.      Impression:      Impression:  Small remaining right apical pleural fluid collection. Mild new patchy right mid lung disease. No other new chest pathology is seen.      Electronically Signed: Marlon Lyons MD    6/18/2025 7:16 AM EDT    Workstation ID: [id number]               Physician Progress Notes (last 48 hours)        Sejal Mckenzie MD at 06/18/25 1641          DATE OF VISIT: 6/18/2025    Chief Complaint: Followup for metastatic right lung cancer     SUBJECTIVE: The patient is laying comfortable in bed. She is complaining of shortness of breath with minimum exertion.     REVIEW OF SYSTEMS: All the other 9 systems are reviewed by me and negative  except what is mentioned in HPI.     Past History:  Medical History: has a past medical history of Acid reflux, Acid reflux, Adenocarcinoma of lung (08/12/2024), Anemia, chronic disease (5/13/2025), Anxiety (1976), Arthritis, Atherosclerotic cardiovascular disease (03/25/2024), Cervical cancer, Depression, Emphysema of lung, History of radiation therapy (11/20/2020), History of radiation therapy (10/04/2024), radiation therapy, Hyperlipidemia, Hypothyroidism, Kidney disease, Lung cancer, Lung nodule, Ovarian cyst (1980s), Pleural effusion (5/13/2025), Pulmonary embolism (5/13/2025), Visual impairment (corrected with glasses), Wears dentures, and Wears glasses.   Surgical History: has a past surgical history that includes Tubal ligation; total abdominal hysterectomy pelvic node dissection (N/A, 08/18/2020); Lymph node biopsy; Subtotal Hysterectomy (1987?); Bronchoscopy; Lung biopsy; BRONCHOSCOPY WITH ION ROBOTIC ASSIST (N/A, 08/06/2024); Mohs surgery (03/24/2025); Hysteroscopy; Colonoscopy; and Pleural Catheter Insertion (Right, 6/17/2025).   Family History: family history includes Anxiety  disorder in her mother; Asthma in her mother; COPD in her mother; Cancer in her father, maternal aunt, maternal aunt, maternal aunt, maternal aunt, maternal grandmother, and mother; Depression in her mother; Early death in her father; Emphysema in her mother; Heart attack in her maternal grandfather; Heart disease in her maternal grandfather; Hypertension in her mother; Melanoma in her mother; Mental illness in her mother; Uterine cancer in her mother.   Social History: reports that she quit smoking about 6 years ago. Her smoking use included cigarettes. She started smoking about 53 years ago. She has a 67.5 pack-year smoking history. She has been exposed to tobacco smoke. She has never used smokeless tobacco. She reports that she does not drink alcohol and does not use drugs.    Medications Prior to Admission   Medication Sig Dispense Refill Last Dose/Taking    albuterol sulfate  (90 Base) MCG/ACT inhaler Inhale 2 puffs Every 4 (Four) to 6 (Six) Hours As Needed for Wheezing or Shortness of Air.   Taking As Needed    atorvastatin (LIPITOR) 10 MG tablet TAKE 1 TABLET BY MOUTH EVERY DAY AT NIGHT 90 tablet 1 6/11/2025    clonazePAM (KlonoPIN) 1 MG tablet Take 1 tablet by mouth 2 (Two) Times a Day As Needed for Anxiety. 50 tablet 0 6/12/2025 Morning    Diclofenac Sodium (VOLTAREN) 1 % gel gel Apply 1 gram topically to indicated area 4 times daily as needed for mild to moderate pain. STOP FLEXERIL 100 g 2 Taking    docusate sodium (COLACE) 100 MG capsule Take 2 capsules by mouth Daily. 60 capsule 3 6/12/2025 Morning    famotidine (PEPCID) 20 MG tablet TAKE 1 TABLET BY MOUTH TWICE DAILY OR TAKE 2 TABLETS BY MOUTH ONCE DAILY AS NEEDED FOR HEARTBURN 180 tablet 1 Taking    Fluticasone-Umeclidin-Vilant (Trelegy Ellipta) 100-62.5-25 MCG/ACT inhaler Inhale 1 puff Daily. 90 each 3 6/12/2025 Morning    levothyroxine (Synthroid) 88 MCG tablet Take 1 tablet by mouth Every Morning. 90 tablet 1 6/11/2025    ondansetron  (ZOFRAN) 8 MG tablet Take 1 tablet by mouth 3 (Three) Times a Day As Needed for Nausea or Vomiting. 30 tablet 5 Taking As Needed    oxyCODONE-acetaminophen (PERCOCET) 5-325 MG per tablet Take 1 tablet by mouth Every 6 (Six) Hours As Needed for Moderate Pain. 120 tablet 0 6/12/2025 Morning    sertraline (Zoloft) 100 MG tablet Take 1 tablet by mouth Daily. 90 tablet 1 6/11/2025    traZODone (DESYREL) 150 MG tablet TAKE 1 TABLET BY MOUTH EVERY DAY AT NIGHT 90 tablet 3 6/11/2025    zolpidem (AMBIEN) 10 MG tablet Take 1 tablet by mouth At Night As Needed for Sleep. 30 tablet 1 Past Month    [DISCONTINUED] apixaban (ELIQUIS) 5 MG tablet tablet Take 2 tablets by mouth Every 12 (Twelve) Hours for 6 days, THEN 1 tablet Every 12 (Twelve) Hours for 24 days. Indications: DVT/PE (active thrombosis) 48 tablet 5 6/11/2025 Morning    cyclobenzaprine (FLEXERIL) 10 MG tablet TAKE 1 TABLET BY MOUTH 2 TIMES A DAY AS NEEDED FOR MUSCLE SPASMS. 60 tablet 3       Allergies: Patient has no known allergies.     Current Facility-Administered Medications:     acetaminophen (TYLENOL) tablet 650 mg, 650 mg, Oral, Q4H PRN **OR** acetaminophen (TYLENOL) 160 MG/5ML oral solution 650 mg, 650 mg, Oral, Q4H PRN **OR** acetaminophen (TYLENOL) suppository 650 mg, 650 mg, Rectal, Q4H PRN, Kaila Jose PA-C    apixaban (ELIQUIS) tablet 5 mg, 5 mg, Oral, Q12H, Kailyn Estevez MD, 5 mg at 06/18/25 1209    arformoterol (BROVANA) nebulizer solution 15 mcg, 15 mcg, Nebulization, BID - RT, 15 mcg at 06/17/25 2003 **AND** budesonide (PULMICORT) nebulizer solution 0.5 mg, 0.5 mg, Nebulization, BID - RT, 0.5 mg at 06/17/25 2003 **AND** revefenacin (YUPELRI) nebulizer solution 175 mcg, 175 mcg, Nebulization, Daily - RT, Kaila Jose PA-C, 175 mcg at 06/16/25 0856    atorvastatin (LIPITOR) tablet 10 mg, 10 mg, Oral, Nightly, Kaila Jose PA-C, 10 mg at 06/17/25 2031    sennosides-docusate (PERICOLACE) 8.6-50 MG per tablet 2 tablet, 2 tablet, Oral, BID, 2  tablet at 06/16/25 2046 **AND** polyethylene glycol (MIRALAX) packet 17 g, 17 g, Oral, Daily PRN **AND** bisacodyl (DULCOLAX) EC tablet 5 mg, 5 mg, Oral, Daily PRN, 5 mg at 06/15/25 0924 **AND** bisacodyl (DULCOLAX) suppository 10 mg, 10 mg, Rectal, Daily PRN, [place], Kaila, PA-C    Calcium Replacement - Follow Nurse / BPA Driven Protocol, , Not Applicable, PRN, [place], Kaila, PA-C    clonazePAM (KlonoPIN) tablet 1 mg, 1 mg, Oral, BID PRN, Jace Reynosoa E, APRN, 1 mg at 06/15/25 1558    cyclobenzaprine (FLEXERIL) tablet 10 mg, 10 mg, Oral, BID PRN, [place], Kaila, PA-C, 10 mg at 06/14/25 2235    dronabinol (MARINOL) capsule 5 mg, 5 mg, Oral, BID, EdgardoJacea E, APRN, 5 mg at 06/18/25 1208    famotidine (PEPCID) tablet 40 mg, 40 mg, Oral, BID PRN, Damon, Kaila, PA-C    HYDROmorphone (DILAUDID) injection 0.5 mg, 0.5 mg, Intravenous, Q2H PRN, 0.5 mg at 06/18/25 0852 **AND** naloxone (NARCAN) injection 0.4 mg, 0.4 mg, Intravenous, Q5 Min PRN, Kaylene Reynoso E, APRN    ipratropium-albuterol (DUO-NEB) nebulizer solution 3 mL, 3 mL, Nebulization, Q4H PRN, Damon, Kaila, PA-C, 3 mL at 06/15/25 1719    lactated ringers infusion, 9 mL/hr, Intravenous, Continuous, Ramiro Carney MD    levothyroxine (SYNTHROID, LEVOTHROID) tablet 88 mcg, 88 mcg, Oral, Q AM, Damon, Kaila, PA-C, 88 mcg at 06/18/25 0653    lidocaine PF 1% (XYLOCAINE) injection 5 mL, 5 mL, Injection, Once, Kaila Jose PA-C    magnesium hydroxide (MILK OF MAGNESIA) suspension 10 mL, 10 mL, Oral, Once, Kailyn Estevez MD    Magnesium Standard Dose Replacement - Follow Nurse / BPA Driven Protocol, , Not Applicable, PRN, Kaila Jose PA-C    mineral oil enema 1 enema, 1 enema, Rectal, Once PRN, Kaylene Reynoso, APRN    nystatin (MYCOSTATIN) 100,000 unit/mL suspension 500,000 Units, 5 mL, Oral, 4x Daily, Kaila Jose PA-C, 500,000 Units at 06/17/25 2031    ondansetron ODT (ZOFRAN-ODT) disintegrating tablet 4 mg, 4 mg, Oral, Q6H PRN **OR**  "ondansetron (ZOFRAN) injection 4 mg, 4 mg, Intravenous, Q6H PRN, Damon, Kaila, PA-C    oxyCODONE (ROXICODONE) immediate release tablet 10 mg, 10 mg, Oral, Q4H PRN, Kaylene Reynoso, APRN, 10 mg at 06/18/25 1449    Phosphorus Replacement - Follow Nurse / BPA Driven Protocol, , Not Applicable, PRN, [place], Kaila, PA-C    Potassium Replacement - Follow Nurse / BPA Driven Protocol, , Not Applicable, PRN, Damon, Kaila, PA-C    sertraline (ZOLOFT) tablet 100 mg, 100 mg, Oral, Daily, [place], Kaila, PA-C, 100 mg at 06/18/25 0852    simethicone (MYLICON) chewable tablet 80 mg, 80 mg, Oral, 4x Daily PRN, [place], Kaila, PA-C, 80 mg at 06/15/25 2040    sodium chloride 0.9 % flush 10 mL, 10 mL, Intravenous, PRN, Damon, Kaila, PA-C, 10 mL at 06/18/25 0853    sodium chloride 0.9 % infusion 40 mL, 40 mL, Intravenous, PRN, Damon, Kaila, PA-C    traZODone (DESYREL) tablet 150 mg, 150 mg, Oral, Nightly, [place], Kaila, PA-C, 150 mg at 06/17/25 2031    zolpidem (AMBIEN) tablet 5 mg, 5 mg, Oral, Nightly PRN, Lindsey, Kaila, PA-C    PHYSICAL EXAMINATION:   /83 (BP Location: Left arm, Patient Position: Lying)   Pulse 103   Temp 98.5 °F (36.9 °C) (Oral)   Resp 16   Ht 154.9 cm (60.98\")   Wt 55.8 kg (123 lb)   LMP  (LMP Unknown)   SpO2 96%   BMI 23.25 kg/m²                ECOG Performance Status: 3 - Symptomatic, >50% confined to bed  GENERAL: Age appropriate. No acute distress.   NEURO/PSYCH: A&O x 3, strength 5/5 in all muscle groups  HEENT: Head atraumatic, normocephalic.   NECK: Supple. No JVD. No lymphadenopathy.   LUNGS: Clear to auscultation bilaterally. No wheezing. No rhonchi.   HEART: Regular rate and rhythm. S1, S2, no murmurs.   ABDOMEN: Soft, nontender, nondistended. Bowel sounds positive. No  hepatosplenomegaly.   EXTREMITIES: No clubbing, cyanosis, or edema.   SKIN: No rashes. No purpura.       No results displayed because visit has over 200 results.          XR Chest 1 View  Result Date: " 6/18/2025  Narrative: XR CHEST 1 VW Date of Exam: 6/18/2025 2:24 AM EDT Indication: Recurrent pleural effusions Comparison: 6/17/2025 Findings: Medium bore right pleural drain is in stable position. Heart shadow is upper normal size. Pulmonary vasculature appears normal. Right apical pleural fluid collection has decreased, now very small in size. Small area of patchy atelectasis is now seen in the right midlung. Basilar interstitial changes elsewhere appear stable. Mild left basilar discoid atelectasis is stable. No pneumothorax is seen.     Impression: Impression: Small remaining right apical pleural fluid collection. Mild new patchy right mid lung disease. No other new chest pathology is seen. Electronically Signed: Marlon Lyons MD  6/18/2025 7:16 AM EDT  Workstation ID: [id number]    XR Chest 1 View  Result Date: 6/17/2025  Narrative: XR CHEST 1 VW Date of Exam: 6/17/2025 8:34 AM EDT Indication: postop Comparison: 6/17/2025 earlier same day Findings: There has been interval placement of a right basilar thoracostomy tube, with significant decrease in amount of right pleural fluid. There is persistent fluid at the apex and a small amount of the base and minor fissure which may be loculated. No pneumothorax identified. There is consolidation in the right mid and lower lung which may be due to residual atelectasis or infiltrate. Small amount of left pleural fluid appears unchanged. Heart size and mediastinal contour appear unchanged.     Impression: Impression: Interval placement of right basilar thoracostomy tube with significant decrease in amount of right pleural fluid. There is persistent fluid at the apex and base which may be loculated. No pneumothorax. Electronically Signed: Rodriguez Napier MD  6/17/2025 9:06 AM EDT  Workstation ID: [id number]    XR Chest 1 View  Result Date: 6/17/2025  Narrative: XR CHEST 1 VW Date of Exam: 6/17/2025 2:45 AM EDT Indication: Recurrent pleural effusions Comparison: 6/16/2025 Findings:  There has been slight further increase in right pleural fluid as compared to yesterday's study. There is consolidation in the overlying right lung suggesting atelectasis or infiltrate. There is a small left pleural effusion which is slightly increased from yesterday. No pneumothorax identified. Pulmonary vascularity appears within normal limits. Heart size appears stable.     Impression: Impression: 1.Interval increase in bilateral pleural effusions, right greater than left. 2.Consolidation in the right lung base may be due to atelectasis or infiltrate. Electronically Signed: Rodriguez Napier MD  6/17/2025 8:11 AM EDT  Workstation ID: [id number]    XR Chest 1 View  Result Date: 6/16/2025  Narrative: XR CHEST 1 VW Date of Exam: 6/16/2025 5:35 AM EDT Indication: Recurrent pleural effusions Comparison: 6/15/2025 Findings: Right pleural drain has been removed and there is apparently increasing right basilar effusion. Right midlung interstitial changes are stable. There is minimally increased left basilar interstitial opacity perhaps a small focus of atelectasis. Heart and vasculature appear to be normal in size. No pneumothorax is seen.     Impression: Impression: Increasing right basilar effusion following right pleural drain removal. Electronically Signed: Marlon Lyons MD  6/16/2025 7:47 AM EDT  Workstation ID: [id number]    XR Chest 1 View  Result Date: 6/15/2025  Narrative: XR CHEST 1 VW Date of Exam: 6/15/2025 2:07 AM EDT Indication: Recurrent pleural effusions, right chest tube, follow-up Comparison: 6/14/2025. Findings: There is a right basilar pigtail chest tube in place. There may be some kinking of the chest tube possibly at the entry site. I would recommend clinical correlation. There is an unchanged moderate right pleural effusion with compressive atelectasis. The left lung and pleural space are clear. The heart size is normal. The pulmonary vascular markings are normal. There is no pneumothorax.     Impression:  Impression: 1.There is a right basilar pigtail chest tube in place. There may be some kinking of the chest tube possibly at the entry site. I would recommend clinical correlation. 2.Unchanged moderate right pleural effusion with compressive atelectasis. Electronically Signed: Samuel Luna MD  6/15/2025 10:19 AM EDT  Workstation ID: [id number]    XR Chest 1 View  Result Date: 6/14/2025  Narrative: XR CHEST 1 VW Date of Exam: 6/14/2025 3:14 AM EDT Indication: Recurrent pleural effusions Comparison: Chest radiograph 6/13/2025. Findings: Unchanged position of right pleural catheter. Cardiomediastinal silhouette is unchanged. Moderate right pleural effusion with right basilar airspace disease and right basilar lucency suggestive of small-volume pneumothorax, unchanged compared to yesterday's exam. Left lung appears clear. No pneumothorax. Osseous structures are unchanged.     Impression: Impression: No significant interval change. Electronically Signed: Franco Shaffer MD  6/14/2025 8:09 AM EDT  Workstation ID: [id number]    XR Chest 1 View  Result Date: 6/13/2025  Narrative: XR CHEST 1 VW Date of Exam: 6/13/2025 12:55 PM EDT Indication: Recurrent pleural effusions Comparison: AP chest x-ray 6/13/2025 timed at 4:43 a.m. Findings: Right pleural catheter remains in place. Moderate size right hydropneumothorax appears similar to numbers to today's earlier chest x-ray. Underlying right basilar airspace opacities are stable. Left lung is clear.     Impression: Impression: Moderate sized right hydropneumothorax appears similar to numbers to today's earlier chest x-ray. Right pleural catheter remains in place. Electronically Signed: Maryellen Dalton MD  6/13/2025 1:44 PM EDT  Workstation ID: [id number]    XR Chest 1 View  Result Date: 6/13/2025  Narrative: XR CHEST 1 VW Date of Exam: 6/13/2025 2:49 AM EDT Indication: post Ct placement and thoracentesis Comparison: AP chest x-ray 6/12/2025, 6/5/2025 Findings: There is a new right pleural  catheter. Previously seen right basilar pleural air has now been replaced with pleural fluid. No apical pneumothorax is seen. There are stable underlying right basilar airspace opacities. Left lung appears clear. Cardiomediastinal contours are stable.     Impression: Impression: 1.Interval placement of right pleural catheter with fluid component of right basilar hydropneumothorax. 2.Stable underlying right basilar airspace opacities, likely due to atelectasis. Electronically Signed: Maryellen Dalton MD  6/13/2025 7:23 AM EDT  Workstation ID: [id number]    CT Guided Chest Tube  Result Date: 6/12/2025  Narrative: DATE OF EXAM: 6/12/2025 2:48 PM  PROCEDURE: CT GUIDED CHEST TUBE PLACEMENT-  INDICATIONS: right chest tube placement; J95.811-Postprocedural pneumothorax  DLP: 746 mGycm  TECHNIQUE:  The risks, benefits, and alternatives were discussed in detail with the patient. The patient was brought down to the CT suite and positioned supine on the CT table. Preliminary CT scan of the the chest was performed and a skin entry site was selected and marked. The area was prepped and draped utilizing standard sterile technique. 1% lidocaine was administered to the soft tissues. A small skin incision was made with an 11 blade scalpel. Under CT guidance a 5 [place]ian Yueh was advanced into the right pleural space. The inner stylet was removed and an 035 wire was advanced coaxially. Over the wire a 10 [place]ian all-purpose drainage catheter was advanced with the pigtail formed and locked within the pleural space the catheter was sutured to the skin and attached to a Pleur-evac. A sterile dressing was then applied.  The patient tolerated the procedure well and there were no immediate complications.      Impression: Successful CT-guided 10 [place]ian right sided chest tube placement.   6/12/2025 4:13 PM by Jose Hope MD on Workstation: [id number]      XR Chest 1 View  Result Date: 6/12/2025  Narrative: XR CHEST 1 VW Date of Exam: 6/12/2025  1:45 PM EDT Indication: s/p thoracentesis; c/o severe right neck pain Comparison: 6/12/2025 Findings: Cardiac size is similar to prior exam. Similar right-sided hydropneumothorax. Similar right basilar opacity. No evidence of acute osseous abnormalities. Visualized upper abdomen is unremarkable.     Impression: Impression: 1.Similar right-sided hydropneumothorax. 2.Similar right basilar opacity may reflect atelectasis or infection. Electronically Signed: Clay Johnson MD  6/12/2025 2:31 PM EDT  Workstation ID: [id number]    XR Chest 1 View  Result Date: 6/12/2025  Narrative: XR CHEST 1 VW Date of Exam: 6/12/2025 12:25 PM EDT Indication: s/p thoracentesis; c/o severe right neck pain Comparison: Chest radiograph 6/12/2025 Findings: Grossly stable size of the right hydropneumothorax, measuring up to 7 mm at apex with larger right subpulmonic component. Previous noted fluid extending towards the apex appears decreased. Left lung relatively clear. There is partial collapse and probable underlying atelectasis within the right lower lobe. Negative for left pneumothorax. Stable cardiomediastinal silhouette. Degenerative related osseous change.     Impression: Impression: Slightly decreased right pleural fluid component with otherwise similar appearing moderate size hydropneumothorax. Electronically Signed: Toni Argueta MD  6/12/2025 12:53 PM EDT  Workstation ID: [id number]    XR Chest 1 View  Result Date: 6/12/2025  Narrative: XR CHEST 1 VW Date of Exam: 6/12/2025 11:23 AM EDT Indication: s/p thoracentesis Comparison: Chest x-ray 6/5/2025 Findings: There is a pneumothorax on the right. It is moderate in size and seen inferiorly measuring up to 2.3 cm. There is a moderate mount of pleural fluid as well. There is right lower lobe airspace opacity which is improved from previous exam. Left lung is clear. Heart size is normal.     Impression: Impression: Moderate sized right hydropneumothorax. Electronically Signed: Susan  MD Armond  6/12/2025 12:41 PM EDT  Workstation ID: [id number]    US Thoracentesis  Result Date: 6/12/2025  Narrative: PROCEDURE: US THORACENTESIS-  ATTENDING: Jose Hope M.D.  CLINICAL HISTORY: Large right pleural effusion.  TECHNIQUE:  The risk, benefits, and alternatives were described in detail and the patient was brought to the ultrasound suite and positioned seated. The skin entry site was prepped and draped utilizing standard sterile technique. 1% lidocaine was administered to the soft tissues of the right posterior thorax.   A 5 Citizen of [place] Yueh was advanced into the right pleural space.   A total of 1.4 L was removed from the right pleural space. A specimen was sent to the laboratory for analysis. The needle was removed and a sterile dressing was applied.  The patient tolerated the procedure well and there were no complications.      Impression: Successful ultrasound-guided right thoracentesis.   Attestation Signer name: Jose Hope MD I attest that I was present for the entire procedure. I reviewed the stored images and agree with the report as written.      6/12/2025 12:17 PM by Jose Hope MD on Workstation: [id number]      XR Chest 1 View  Result Date: 6/5/2025  Narrative: XR CHEST 1 VW Date of Exam: 6/5/2025 6:41 AM EDT Indication: cough Comparison: Chest radiograph 6/5/2025 at 0422 hours Findings: There is been slight decrease in the size of the large right pleural effusion. There is persistent right pleural fluid and right basilar airspace disease. Airspace disease is most likely atelectasis with pneumonia not excluded. The left lung remains clear. There is no pneumothorax.     Impression: Impression: Slight decrease in the size of the large right pleural effusion. Electronically Signed: Rodriguez Singh MD  6/5/2025 7:00 AM EDT  Workstation ID: [id number]    XR Chest 1 View  Result Date: 6/5/2025  Narrative: XR CHEST 1 VW Date of Exam: 6/5/2025 4:18 AM EDT Indication: SOA triage protocol Comparison:  Chest radiograph 5/28/2025 Findings: The heart size and pulmonary vascular markings are normal. The left lung is clear. There is a large right pleural effusion with associated right basilar atelectasis. There is no pneumothorax. The osseous structures are normal for age.     Impression: Impression: Large right pleural effusion with right basilar atelectasis. Electronically Signed: Rodriguez Singh MD  6/5/2025 4:47 AM EDT  Workstation ID: [id number]    US Thoracentesis  Result Date: 5/28/2025  Narrative: PROCEDURE: Ultrasound-guided right thoracentesis  Procedural Personnel Attending physician(s): Erik Mensah  Pre-procedure diagnosis: Right pleural effusion  Post-procedure diagnosis: Same  Complications: No immediate complications.      Impression:  Ultrasound-guided thoracentesis with drainage of 1400 mL of serous fluid.   _______________________________________________________________   PROCEDURE SUMMARY:  - Limited thoracic ultrasound - Ultrasound-guided right thoracentesis - Additional procedure(s): None  PROCEDURE DETAILS:   Pre-procedure Consent: Informed consent for the procedure including risks, benefits and alternatives was obtained and time-out was performed prior to the procedure. Preparation: The site was prepared and draped using maximal sterile barrier technique including cutaneous antisepsis.    Limited thoracic ultrasound: Limited thoracic ultrasound was performed using a curved transducer. A safe window for thoracentesis was identified. Right hemithorax findings: Moderate to large right-sided pleural effusion   Thoracentesis  Local anesthesia was administered. The pleural space was accessed and fluid return confirmed position. The fluid was drained. The catheter was removed, and a sterile bandage was applied.  Catheter placed: 5 Wallisian catheter.  Additional Details  Equipment details: None Specimens removed: Pleural fluid Estimated blood loss (mL): Less than 10   5/28/2025 12:05 PM by Erik  MD Makenna on Workstation: [id number]      XR Chest 1 View  Result Date: 5/28/2025  Narrative: XR CHEST 1 VW Date of Exam: 5/28/2025 10:19 AM EDT Indication: S/P Right Thoracentesis Comparison: 5/14/2025 chest radiograph. Ultrasound-guided thoracentesis of 5/20/2025 Findings: By history, thoracentesis earlier today removed 1.4 L of fluid from the right hemithorax. Follow-up chest radiograph shows patchy right mid and lower lung atelectasis and perhaps a small amount of remaining pleural fluid, versus discoid atelectasis. There is no evidence of pneumothorax. Left lung remains clear. Heart and vasculature appear normal in size.     Impression: Impression: Patchy right mid and lower lung atelectasis and questionable small remaining right effusion. No evidence of pneumothorax or other significant chest disease elsewhere. Electronically Signed: Marlon Lyons MD  5/28/2025 10:49 AM EDT  Workstation ID: [id number]    NM PET/CT Skull Base to Mid Thigh  Result Date: 5/28/2025  Narrative: FDG NM PET/CT SKULL BASE TO MID THIGH Date of Exam: 5/22/2025 8:25 AM EDT Indication: restaging scans lung cancer. Comparison: 5/13/2025 and 7/11/2024 Technique: 8.1 mCi of F-18 FDG was administered intravenously. PET imaging was obtained from skull base to mid-thigh approximately 60 minutes after radiotracer injection. A low dose non contrast CT was obtained for attenuation correction and anatomic localization. Fused PET-CT and 3D MIP reconstructions were utilized for image interpretation.  Fasting blood glucose level: 113 mg/dl. Reference uptake values: Mediastinum: 2.8 SUVmax Liver: 2.7 SUVmax Normalization method: Body Weight Findings: Head and neck: No abnormal FDG activity identified. Chest: Increasing moderate to large right effusion which is loculated. Spiculated right middle lobe nodule has an SUV maximum of 4.4. There is extensive right hilar adenopathy with an SUV max 8.8. There is subcarinal and mediastinal adenopathy ranging between 8  and 5 SUV maximum. Abdomen and pelvis: At least 20 FDG-avid liver lesions are identified measuring up to 5 cm in diameter and an SUV maximum of 13.5. There is left adrenal lesion with an SUV maximum of 5. There is extensive julio hepatis and portacaval adenopathy with an SUV maximum of 8.6. There is retroperitoneal adenopathy, including left periaortic and aortocaval abnormal lymph nodes. Musculoskeletal: Diffuse osseous metastatic disease with multifocal disease throughout the cervical thoracolumbar spine, bilateral ribs, pelvis, including the left acetabulum and to a lesser extent the right acetabulum. There are bilateral femoral lesions as well. No definite pathologic fractures are identified at this time. Several of the lesions however are involving weight-bearing bones and the patient is susceptible to pathologic fractures.     Impression: Impression: Extensive metastatic disease involving the chest, abdomen, and pelvis as well as the bones. Electronically Signed: Herb Lopes MD  2025 9:18 AM EDT  Workstation ID: [id number]      ASSESSMENT: The patient is a very pleasant 65 y.o. female  with metastatic right lung adenocarcinoma      PLAN:  1.  Metastatic right lung adenocarcinoma: Status post 1 dose of Keytruda.  She is scheduled next week for cycle #2.  2.  Recurrent right pleural effusion: Status post Pleurx catheter done 2025 by Dr. Gross.  We will continue drainage as needed.  3.  Cancer-related pain: Continue opiates.  4.  Weakness: Patient is considering rehab placement.    Discussed in person with Dr. Estevez in person to coordinate patient's care.     Sejal Mckenzie MD  2025      Electronically signed by Sejal Mckenzie MD at 25 1906       Kailyn Estevez MD at 25 [phone]              [facility] Medicine Services  PROGRESS NOTE    Patient Name: Sydnie Gamble  : 1960  MRN: [id number]    Date of Admission: 2025  Primary Care Physician:  Dee Elena APRN    Subjective   Subjective     CC: dyspnea    HPI:  On 3 liters n/c this morning  Reports band of pain across thorax that comes and goes - still using IV pain meds    Now decided on SNF      Objective   Objective     Vital Signs:   Temp:  [97.5 °F (36.4 °C)-98.1 °F (36.7 °C)] 98.1 °F (36.7 °C)  Heart Rate:  [101-120] 104  Resp:  [16-20] 19  BP: (108-128)/(52-74) 126/72  Flow (L/min) (Oxygen Therapy):  [3] 3     Physical Exam:  Constitutional: No acute distress, awake, alert older female lying in bed.  bedside  Respiratory: Clear to auscultation bilaterally, respiratory effort mildly increased on 3 liters, PleurX capped  Cardiovascular: RRR, no murmurs, rubs, or gallops  Gastrointestinal: Soft, nontender, nondistended  Musculoskeletal: Muscle tone decreased throughout, no joint effusions appreciated  Psychiatric: Appropriate affect, cooperative  Neurologic: Alert and oriented, speech clear  Skin: No rashes    Results Reviewed:  LAB RESULTS:      Lab 06/18/25  0335 06/17/25  1735 06/17/25  1043 06/16/25  0424 06/15/25  0446 06/14/25  0456 06/13/25  0733 06/13/25  0341 06/13/25  0114 06/12/25  1755   WBC 18.46*  --   --  18.84* 19.96* 21.39*  --  20.73*  --  17.59*   HEMOGLOBIN 9.8*  --   --  10.3* 9.9* 10.3*  --  10.5*  --  10.7*   HEMATOCRIT 32.7*  --   --  34.2 32.2* 33.5*  --  33.9*  --  35.1   PLATELETS 217  --   --  285 221 215  --  220  --  192   NEUTROS ABS  --   --   --  14.61* 16.23* 17.02*  --  15.98*  --  14.13*   IMMATURE GRANS (ABS)  --   --   --  0.76* 0.44* 0.60*  --  0.47*  --  0.50*   LYMPHS ABS  --   --   --  0.80 0.76 0.91  --  1.18  --  0.74   MONOS ABS  --   --   --  1.50* 1.52* 1.67*  --  1.78*  --  1.45*   EOS ABS  --   --   --  1.08* 0.93* 1.11*  --  1.22*  --  0.68*   MCV 90.1  --   --  91.4 89.7 90.1  --  89.9  --  90.5   PROTIME  --   --   --   --   --   --   --   --   --  15.6*   APTT  --   --   --   --   --   --   --   --   --  32.7*   HEPARIN ANTI-XA  0.38 0.30 0.27* 0.44 0.46 0.42   < >  --    < > 0.48    < > = values in this interval not displayed.         Lab 06/18/25  0335 06/17/25  1043 06/16/25  0424 06/14/25  0456 06/12/25  1755   SODIUM 130*  --  131* 134* 136   POTASSIUM 4.5 4.1 4.3 4.2 5.0   CHLORIDE 92*  --  94* 97* 97*   CO2 21.1*  --  26.0 25.0 28.0   ANION GAP 16.9*  --  11.0 12.0 11.0   BUN 32.7*  --  25.5* 23.0 21.7   CREATININE 1.08*  --  0.87 0.85 0.87   EGFR 56.4*  --  73.1 75.2 73.1   GLUCOSE 107*  --  117* 108* 117*   CALCIUM 7.4*  --  7.5* 7.7* 7.8*         Lab 06/18/25  0335 06/12/25  1755   TOTAL PROTEIN 5.4* 6.1   ALBUMIN 2.3* 2.8*   GLOBULIN 3.1 3.3   ALT (SGPT) 21 26   AST (SGOT) 71* 49*   BILIRUBIN 0.3 0.2   ALK PHOS 493* 455*         Lab 06/12/25  1755   PROTIME 15.6*   INR 1.17*                 Brief Urine Lab Results       None            Microbiology Results Abnormal       None            XR Chest 1 View  Result Date: 6/18/2025  XR CHEST 1 VW Date of Exam: 6/18/2025 2:24 AM EDT Indication: Recurrent pleural effusions Comparison: 6/17/2025 Findings: Medium bore right pleural drain is in stable position. Heart shadow is upper normal size. Pulmonary vasculature appears normal. Right apical pleural fluid collection has decreased, now very small in size. Small area of patchy atelectasis is now seen in the right midlung. Basilar interstitial changes elsewhere appear stable. Mild left basilar discoid atelectasis is stable. No pneumothorax is seen.     Impression: Impression: Small remaining right apical pleural fluid collection. Mild new patchy right mid lung disease. No other new chest pathology is seen. Electronically Signed: Marlon Lyons MD  6/18/2025 7:16 AM EDT  Workstation ID: [id number]    XR Chest 1 View  Result Date: 6/17/2025  XR CHEST 1 VW Date of Exam: 6/17/2025 8:34 AM EDT Indication: postop Comparison: 6/17/2025 earlier same day Findings: There has been interval placement of a right basilar thoracostomy tube, with significant  decrease in amount of right pleural fluid. There is persistent fluid at the apex and a small amount of the base and minor fissure which may be loculated. No pneumothorax identified. There is consolidation in the right mid and lower lung which may be due to residual atelectasis or infiltrate. Small amount of left pleural fluid appears unchanged. Heart size and mediastinal contour appear unchanged.     Impression: Impression: Interval placement of right basilar thoracostomy tube with significant decrease in amount of right pleural fluid. There is persistent fluid at the apex and base which may be loculated. No pneumothorax. Electronically Signed: Rodriguez Napier MD  6/17/2025 9:06 AM EDT  Workstation ID: [id number]    XR Chest 1 View  Result Date: 6/17/2025  XR CHEST 1 VW Date of Exam: 6/17/2025 2:45 AM EDT Indication: Recurrent pleural effusions Comparison: 6/16/2025 Findings: There has been slight further increase in right pleural fluid as compared to yesterday's study. There is consolidation in the overlying right lung suggesting atelectasis or infiltrate. There is a small left pleural effusion which is slightly increased from yesterday. No pneumothorax identified. Pulmonary vascularity appears within normal limits. Heart size appears stable.     Impression: Impression: 1.Interval increase in bilateral pleural effusions, right greater than left. 2.Consolidation in the right lung base may be due to atelectasis or infiltrate. Electronically Signed: Rodriguez Napier MD  6/17/2025 8:11 AM EDT  Workstation ID: [id number]      Results for orders placed during the hospital encounter of 05/13/25    Adult Transthoracic Echo Complete w/ Color, Spectral and Contrast if necessary per protocol    Interpretation Summary    Left ventricular ejection fraction appears to be 66 - 70%.    Normal right ventricular cavity size, wall thickness, systolic function and septal motion noted.    There is a small (<1cm) pericardial effusion adjacent to  the right ventricle. There is no evidence of cardiac tamponade.    There is a left pleural effusion.      Current medications:  Scheduled Meds:apixaban, 5 mg, Oral, Q12H  arformoterol, 15 mcg, Nebulization, BID - RT   And  budesonide, 0.5 mg, Nebulization, BID - RT   And  revefenacin, 175 mcg, Nebulization, Daily - RT  atorvastatin, 10 mg, Oral, Nightly  bisacodyl, 10 mg, Rectal, Once  dronabinol, 5 mg, Oral, BID  levothyroxine, 88 mcg, Oral, Q AM  lidocaine PF 1%, 5 mL, Injection, Once  nystatin, 5 mL, Oral, 4x Daily  senna-docusate sodium, 2 tablet, Oral, BID  sertraline, 100 mg, Oral, Daily  traZODone, 150 mg, Oral, Nightly      Continuous Infusions:lactated ringers, 9 mL/hr      PRN Meds:.  acetaminophen **OR** acetaminophen **OR** acetaminophen    senna-docusate sodium **AND** polyethylene glycol **AND** bisacodyl **AND** bisacodyl    Calcium Replacement - Follow Nurse / BPA Driven Protocol    clonazePAM    cyclobenzaprine    famotidine    HYDROmorphone **AND** naloxone    ipratropium-albuterol    Magnesium Standard Dose Replacement - Follow Nurse / BPA Driven Protocol    mineral oil    ondansetron ODT **OR** ondansetron    oxyCODONE    Phosphorus Replacement - Follow Nurse / BPA Driven Protocol    Potassium Replacement - Follow Nurse / BPA Driven Protocol    simethicone    sodium chloride    sodium chloride    zolpidem    Assessment & Plan   Assessment & Plan     Active Hospital Problems    Diagnosis  POA    **Recurrent pleural effusion [J90]  Yes    Adenocarcinoma of right lung [C34.91]  Yes    HLD (hyperlipidemia) [E78.5]  Yes    Acquired hypothyroidism [E03.9]  Yes      Resolved Hospital Problems   No resolved problems to display.        Brief Hospital Course to date:  Sydnie Gamble is a 65 y.o. female w recent diagnosis of lung adenocarcinoma s/p CyberKnife c/b malignant effusion on home 3 liters n/c since 5/2025 undergoing keytruda treatment w Dr Mckenzie who presented w worsening dyspnea and found  to have worsening recurrent pleural effusion.    Lung adenocarcinoma c/b malignant effusion, now s/p PleurX  Chronic hypoxic resp failure on 3 liters n/c since diagnosis  -home 3 liters n/c from last dc, back to this now but w significant dyspnea w activity  -repeat CXR in AM to reassess area of patchiness on right lung, read as possible PNA but atelectasis other possibility  -PleurX training w family 6/19 w drainage at that time, likely drain 3x/week  -OP keytruda treatment to resume when patient amenable. D/w Dr Mckenzie 6/17 likely dispo to SNF, will reschedule follow-up for after rehab      Pain control - need to transition to PO meds, still requiring IV pain medications    Acute on chronic debility - EOB activity only currently, SNF recs and patient + spouse now agreeable  Hypothyroidism - home meds  Anxiety/depression - home meds  Recent diagnosis DVT/PE (5/2025) -home eliquis resumed    Expected Discharge Location and Transportation: SNF  Expected Discharge 6/19 (Discharge date is tentative pending patient's medical condition and is subject to change)  Expected Discharge Date: 6/19/2025; Expected Discharge Time:  3:00 PM     VTE Prophylaxis:  Pharmacologic VTE prophylaxis orders are present.         AM-PAC 6 Clicks Score (PT): 18 (06/17/25 2030)    CODE STATUS:   Code Status and Medical Interventions: CPR (Attempt to Resuscitate); Full Support; also talked over with  Jose Francisco   Ordered at: 06/12/25 2956     Code Status (Patient has no pulse and is not breathing):    CPR (Attempt to Resuscitate)     Medical Interventions (Patient has pulse or is breathing):    Full Support     Comments:    also talked over with  Jose Francisco     Level Of Support Discussed With:    Patient       Next of Kin (If No Surrogate)       Kailyn Estevez MD  06/18/25        Electronically signed by Kailyn Estevez MD at 06/18/25 1229       Kaylene Reynoso APRN at 06/18/25 1024          Palliative Care Daily Progress Note     C/C: Patient  "sleeping at time of visit.     S: Medical record reviewed. Follow-up visit for symptom management and GOC. Events noted. Patient sleeping after Hydromorphone 0.5mg IV for pain along diaphragm. Spoke with RN regarding symptoms. Spoke with spouse at bedside. He reports she has had small hard BM's, patient does not want to take laxatives/stool softeners due to feeling urgency and unable to stool. Reviewed use of suppository and enema to assist in softening and allowing passage of stool. Patient with poor appetite. Patient reporting to RN and spouse that Percocet is not effective for pain and has been taking Hydromorphone 0.5mg IV x 4 doses in the last 24 hours.     ROS: ROS limited by sleeping.     O: Code Status:   Code Status and Medical Interventions: CPR (Attempt to Resuscitate); Full Support; also talked over with  Jose Francisco   Ordered at: 06/12/25 7213     Code Status (Patient has no pulse and is not breathing):    CPR (Attempt to Resuscitate)     Medical Interventions (Patient has pulse or is breathing):    Full Support     Comments:    also talked over with  Jose Francisco     Level Of Support Discussed With:    Patient       Next of Kin (If No Surrogate)      Advanced Directives: Advance Directive Status: Patient does not have advance directive   Goals of Care: Ongoing.   Palliative Performance Scale Score: 40%     /52 (BP Location: Left arm, Patient Position: Lying)   Pulse 101   Temp 98 °F (36.7 °C) (Axillary)   Resp 20   Ht 154.9 cm (60.98\")   Wt 55.8 kg (123 lb)   LMP  (LMP Unknown)   SpO2 95%   BMI 23.25 kg/m²     Intake/Output Summary (Last 24 hours) at 6/18/2025 1025  Last data filed at 6/18/2025 0621  Gross per 24 hour   Intake --   Output 250 ml   Net -250 ml       PE:  General Appearance:    Patient laying in bed, sleeping, A/C ill appearing,    HEENT:    NC/AT, EOMI, anicteric, MMM, face relaxed   Neck:   supple, trachea midline, no JVD   Lungs:     CTA bilaterally, diminished in bases; " respirations regular,     even and unlabored; RR 16-18 on exam, 3LNC    Heart:    RRR, normal S1 and S2, no M/R/G,    Abdomen:     Normal bowel sounds, soft, nontender, nondistended   G/U:   Deferred   MSK/Extremities:   Wasting, no edema   Pulses:   Pulses palpable and equal bilaterally   Skin:   Warm, dry   Neurologic:   sleeping   Psych:   sleeping     Meds: Reviewed and changes noted    Labs:   Results from last 7 days   Lab Units 06/18/25  0335   WBC 10*3/mm3 18.46*   HEMOGLOBIN g/dL 9.8*   HEMATOCRIT % 32.7*   PLATELETS 10*3/mm3 217     Results from last 7 days   Lab Units 06/18/25  0335   SODIUM mmol/L 130*   POTASSIUM mmol/L 4.5   CHLORIDE mmol/L 92*   CO2 mmol/L 21.1*   BUN mg/dL 32.7*   CREATININE mg/dL 1.08*   GLUCOSE mg/dL 107*   CALCIUM mg/dL 7.4*     Results from last 7 days   Lab Units 06/18/25  0335   SODIUM mmol/L 130*   POTASSIUM mmol/L 4.5   CHLORIDE mmol/L 92*   CO2 mmol/L 21.1*   BUN mg/dL 32.7*   CREATININE mg/dL 1.08*   CALCIUM mg/dL 7.4*   BILIRUBIN mg/dL 0.3   ALK PHOS U/L 493*   ALT (SGPT) U/L 21   AST (SGOT) U/L 71*   GLUCOSE mg/dL 107*     Imaging Results (Last 72 Hours)       Procedure Component Value Units Date/Time    XR Chest 1 View [[id number]] Collected: 06/18/25 0714     Updated: 06/18/25 0720    Narrative:      XR CHEST 1 VW    Date of Exam: 6/18/2025 2:24 AM EDT    Indication: Recurrent pleural effusions    Comparison: 6/17/2025    Findings:  Medium bore right pleural drain is in stable position. Heart shadow is upper normal size. Pulmonary vasculature appears normal. Right apical pleural fluid collection has decreased, now very small in size. Small area of patchy atelectasis is now seen in   the right midlung. Basilar interstitial changes elsewhere appear stable. Mild left basilar discoid atelectasis is stable. No pneumothorax is seen.      Impression:      Impression:  Small remaining right apical pleural fluid collection. Mild new patchy right mid lung disease. No other  new chest pathology is seen.      Electronically Signed: Marlon Lyons MD    6/18/2025 7:16 AM EDT    Workstation ID: [id number]    XR Chest 1 View [[id number]] Collected: 06/17/25 0904     Updated: 06/17/25 0909    Narrative:      XR CHEST 1 VW    Date of Exam: 6/17/2025 8:34 AM EDT    Indication: postop    Comparison: 6/17/2025 earlier same day    Findings:  There has been interval placement of a right basilar thoracostomy tube, with significant decrease in amount of right pleural fluid. There is persistent fluid at the apex and a small amount of the base and minor fissure which may be loculated. No   pneumothorax identified. There is consolidation in the right mid and lower lung which may be due to residual atelectasis or infiltrate. Small amount of left pleural fluid appears unchanged. Heart size and mediastinal contour appear unchanged.      Impression:      Impression:  Interval placement of right basilar thoracostomy tube with significant decrease in amount of right pleural fluid. There is persistent fluid at the apex and base which may be loculated. No pneumothorax.        Electronically Signed: Rodriguez Napier MD    6/17/2025 9:06 AM EDT    Workstation ID: [id number]    XR Chest 1 View [[id number]] Collected: 06/17/25 0804     Updated: 06/17/25 0814    Narrative:      XR CHEST 1 VW    Date of Exam: 6/17/2025 2:45 AM EDT    Indication: Recurrent pleural effusions    Comparison: 6/16/2025    Findings:  There has been slight further increase in right pleural fluid as compared to yesterday's study. There is consolidation in the overlying right lung suggesting atelectasis or infiltrate. There is a small left pleural effusion which is slightly increased   from yesterday. No pneumothorax identified. Pulmonary vascularity appears within normal limits. Heart size appears stable.      Impression:      Impression:  1.Interval increase in bilateral pleural effusions, right greater than left.  2.Consolidation in the right lung base  may be due to atelectasis or infiltrate.        Electronically Signed: Rodriguez Napier MD    6/17/2025 8:11 AM EDT    Workstation ID: [id number]    XR Chest 1 View [[id number]] Collected: 06/16/25 0746     Updated: 06/16/25 0750    Narrative:      XR CHEST 1 VW    Date of Exam: 6/16/2025 5:35 AM EDT    Indication: Recurrent pleural effusions    Comparison: 6/15/2025    Findings:  Right pleural drain has been removed and there is apparently increasing right basilar effusion. Right midlung interstitial changes are stable. There is minimally increased left basilar interstitial opacity perhaps a small focus of atelectasis. Heart and   vasculature appear to be normal in size. No pneumothorax is seen.      Impression:      Impression:  Increasing right basilar effusion following right pleural drain removal.      Electronically Signed: Marlon Lyons MD    6/16/2025 7:47 AM EDT    Workstation ID: [id number]                  Diagnostics: Reviewed    A:   Recurrent pleural effusion    Acquired hypothyroidism    HLD (hyperlipidemia)    Adenocarcinoma of right lung     65 yl.o. female with recurrent pleural effusion secondary to adenocarcinoma of right lung, pain, debility, decreased appetite, shortness of breath.   S/S:   Pain -MSK, neoplastic  -continue Flexeril 10mg BID prn muscle spasms  -discontinued Voltaren gel to neck/shoulder four times a day  -discontinued Percocet 5-325mg PO q 4 hours prn moderate pain  -started Oxycodone 10mg PO q 4 hours prn moderate pain  -continue Hydromorphone 0.5mg IV q 2 hours prn severe pain     2. Shortness of breath -pleur X in place  -currently on 3LNC     3. Decreased intake -started Marinol 5mg PO BID     4. Constipation -LBM hard per spouse  -continue bowel regimen  -Mineral oil enema once prn to soften stool     5. Sleeplessness -Ambien nightly prn     6. Debility -uses a walker at baseline     7.  GOC -Full Code/Full Support -reviewed with patient and spouse  -information regarding code status  given to patient  -discussed LW/ACP, aware of option to complete this hospitalization  -discussed symptoms and medications as above    P: Follow up visit. Patient sleeping after receiving IV Hydromorphone. Reviewed symptoms and medications with spouse and RN. Patient reporting Percocet ineffective for pain and so has been requesting Hydromorphone IV as most effective. Reviewed dose equivalent Oxycodone 10mg with spouse and transitioning to this dose to determine if effective and whether will manage pain at STR. Reviewed appetite and started Marinol 10mg PO BID after reviewed with spouse. Reviewed BM and encouraged enema as stool has been hard, may improve discomfort and appetite with stooling. Will continue to follow and support.   Palliative Care Team will continue to follow patient. Please do not hesitate to contact us regarding further sx mgmt or GOC needs.  DAYSI Sykes  6/18/2025  Time spent: 20 minutes      Electronically signed by Kaylene Reynoso APRN at 06/18/25 1041       Cliff Gross MD at 06/18/25 0721          [facility] Cardiothoracic Surgery In-Patient Progress Note     LOS: 6 days     POD # 1 s/p Right PleurX catheter    Subjective  Slight discomfort from PleurX site, otherwise no complaints. On 3L saturations 96%.       Objective  Vital Signs  Temp:  [97.5 °F (36.4 °C)-98 °F (36.7 °C)] 97.7 °F (36.5 °C)  Heart Rate:  [100-120] 101  Resp:  [12-18] 18  BP: (108-131)/(53-74) 108/62        Physical Exam:   General Appearance: alert, appears stated age and cooperative   Lungs: Breathing even and unlabored on 3L saturations 96%   Heart: RRR   Skin: Incision c/d/I   PleurX: 250 cc/24 hrs  Output by Drain (mL) 06/17/25 0701 - 06/17/25 1900 06/17/25 1901 - 06/18/25 0700 06/18/25 0701 - 06/18/25 0721 Range Total   Chest Tube 1 Right Midaxillary 65 185  250        Results     Results from last 7 days   Lab Units 06/18/25  0335   WBC 10*3/mm3 18.46*   HEMOGLOBIN g/dL 9.8*   HEMATOCRIT % 32.7*    PLATELETS 10*3/mm3 217     Results from last 7 days   Lab Units 06/18/25  0335   SODIUM mmol/L 130*   POTASSIUM mmol/L 4.5   CHLORIDE mmol/L 92*   CO2 mmol/L 21.1*   BUN mg/dL 32.7*   CREATININE mg/dL 1.08*   GLUCOSE mg/dL 107*   CALCIUM mg/dL 7.4*     Imaging Results (Last 24 Hours)       Procedure Component Value Units Date/Time    XR Chest 1 View [[id number]] Collected: 06/18/25 0714     Updated: 06/18/25 0720    Narrative:      XR CHEST 1 VW    Date of Exam: 6/18/2025 2:24 AM EDT    Indication: Recurrent pleural effusions    Comparison: 6/17/2025    Findings:  Medium bore right pleural drain is in stable position. Heart shadow is upper normal size. Pulmonary vasculature appears normal. Right apical pleural fluid collection has decreased, now very small in size. Small area of patchy atelectasis is now seen in   the right midlung. Basilar interstitial changes elsewhere appear stable. Mild left basilar discoid atelectasis is stable. No pneumothorax is seen.      Impression:      Impression:  Small remaining right apical pleural fluid collection. Mild new patchy right mid lung disease. No other new chest pathology is seen.      Electronically Signed: Marlon Lyons MD    6/18/2025 7:16 AM EDT    Workstation ID: [id number]    XR Chest 1 View [[id number]] Collected: 06/17/25 0904     Updated: 06/17/25 0909    Narrative:      XR CHEST 1 VW    Date of Exam: 6/17/2025 8:34 AM EDT    Indication: postop    Comparison: 6/17/2025 earlier same day    Findings:  There has been interval placement of a right basilar thoracostomy tube, with significant decrease in amount of right pleural fluid. There is persistent fluid at the apex and a small amount of the base and minor fissure which may be loculated. No   pneumothorax identified. There is consolidation in the right mid and lower lung which may be due to residual atelectasis or infiltrate. Small amount of left pleural fluid appears unchanged. Heart size and mediastinal contour appear  unchanged.      Impression:      Impression:  Interval placement of right basilar thoracostomy tube with significant decrease in amount of right pleural fluid. There is persistent fluid at the apex and base which may be loculated. No pneumothorax.        Electronically Signed: Rodriguez Napier MD    2025 9:06 AM EDT    Workstation ID: [id number]    XR Chest 1 View [[id number]] Collected: 25     Updated: 25    Narrative:      XR CHEST 1 VW    Date of Exam: 2025 2:45 AM EDT    Indication: Recurrent pleural effusions    Comparison: 2025    Findings:  There has been slight further increase in right pleural fluid as compared to yesterday's study. There is consolidation in the overlying right lung suggesting atelectasis or infiltrate. There is a small left pleural effusion which is slightly increased   from yesterday. No pneumothorax identified. Pulmonary vascularity appears within normal limits. Heart size appears stable.      Impression:      Impression:  1.Interval increase in bilateral pleural effusions, right greater than left.  2.Consolidation in the right lung base may be due to atelectasis or infiltrate.        Electronically Signed: Rodriguez Napier MD    2025 8:11 AM EDT    Workstation ID: [id number]            Assessment  POD # 1 s/p Right PleurX catheter      Plan   Cap PleurX catheter  Drain every other day or as needed for shortness of breath -nursing staff to provide education to patient and family  Nothing further to add from our standpoint. We will sign off and be available as needed      DAYSI Alvarado  25  07:21 EDT    Electronically signed by Cliff Gross MD at 25 1106       Kailyn Estevez MD at 25 1845              [facility] Medicine Services  PROGRESS NOTE    Patient Name: Sydnie Gamble  : 1960  MRN: [id number]    Date of Admission: 2025  Primary Care Physician: Dee Elena,  APRN    Subjective   Subjective     CC: dyspnea    HPI:  Patient seen s/p PleurX placement  On home 3 liters n/c,  reports before diagnosis of cancer/effusion she was on room air    They are contemplating rehab or not -she is minimally able to mobilize self so  overall is hopeful for rehab it seems. They ask how this would affect oncology treatment and we discuss    Discuss PleurX training    OK for heparin gtt restart per CT surgery post-PleurX      Objective   Objective     Vital Signs:   Temp:  [97.5 °F (36.4 °C)-98.4 °F (36.9 °C)] 97.8 °F (36.6 °C)  Heart Rate:  [100-114] 109  Resp:  [12-20] 18  BP: (108-131)/(53-76) 128/74  Flow (L/min) (Oxygen Therapy):  [2-5] 3     Physical Exam:  Constitutional: No acute distress, awake, alert older female lying in bed  Respiratory: Clear to auscultation bilaterally, respiratory effort normal on 3 liters, PleurX  Cardiovascular: RRR, no murmurs, rubs, or gallops  Gastrointestinal: Soft, nontender, nondistended  Musculoskeletal: Muscle tone decreased throughout, no joint effusions appreciated  Psychiatric: Appropriate affect, cooperative  Neurologic: Alert and oriented, speech clear  Skin: No rashes    Results Reviewed:  LAB RESULTS:      Lab 06/17/25  1735 06/17/25  1043 06/16/25  0424 06/15/25  0446 06/14/25  0456 06/13/25  0733 06/13/25  0341 06/13/25  0114 06/12/25  1755   WBC  --   --  18.84* 19.96* 21.39*  --  20.73*  --  17.59*   HEMOGLOBIN  --   --  10.3* 9.9* 10.3*  --  10.5*  --  10.7*   HEMATOCRIT  --   --  34.2 32.2* 33.5*  --  33.9*  --  35.1   PLATELETS  --   --  285 221 215  --  220  --  192   NEUTROS ABS  --   --  14.61* 16.23* 17.02*  --  15.98*  --  14.13*   IMMATURE GRANS (ABS)  --   --  0.76* 0.44* 0.60*  --  0.47*  --  0.50*   LYMPHS ABS  --   --  0.80 0.76 0.91  --  1.18  --  0.74   MONOS ABS  --   --  1.50* 1.52* 1.67*  --  1.78*  --  1.45*   EOS ABS  --   --  1.08* 0.93* 1.11*  --  1.22*  --  0.68*   MCV  --   --  91.4 89.7 90.1  --  89.9   --  90.5   PROTIME  --   --   --   --   --   --   --   --  15.6*   APTT  --   --   --   --   --   --   --   --  32.7*   HEPARIN ANTI-XA 0.30 0.27* 0.44 0.46 0.42   < >  --    < > 0.48    < > = values in this interval not displayed.         Lab 06/17/25  1043 06/16/25  0424 06/14/25  0456 06/12/25  1755   SODIUM  --  131* 134* 136   POTASSIUM 4.1 4.3 4.2 5.0   CHLORIDE  --  94* 97* 97*   CO2  --  26.0 25.0 28.0   ANION GAP  --  11.0 12.0 11.0   BUN  --  25.5* 23.0 21.7   CREATININE  --  0.87 0.85 0.87   EGFR  --  73.1 75.2 73.1   GLUCOSE  --  117* 108* 117*   CALCIUM  --  7.5* 7.7* 7.8*         Lab 06/12/25 1755   TOTAL PROTEIN 6.1   ALBUMIN 2.8*   GLOBULIN 3.3   ALT (SGPT) 26   AST (SGOT) 49*   BILIRUBIN 0.2   ALK PHOS 455*         Lab 06/12/25 1755   PROTIME 15.6*   INR 1.17*                 Brief Urine Lab Results       None            Microbiology Results Abnormal       None            XR Chest 1 View  Result Date: 6/17/2025  XR CHEST 1 VW Date of Exam: 6/17/2025 8:34 AM EDT Indication: postop Comparison: 6/17/2025 earlier same day Findings: There has been interval placement of a right basilar thoracostomy tube, with significant decrease in amount of right pleural fluid. There is persistent fluid at the apex and a small amount of the base and minor fissure which may be loculated. No pneumothorax identified. There is consolidation in the right mid and lower lung which may be due to residual atelectasis or infiltrate. Small amount of left pleural fluid appears unchanged. Heart size and mediastinal contour appear unchanged.     Impression: Impression: Interval placement of right basilar thoracostomy tube with significant decrease in amount of right pleural fluid. There is persistent fluid at the apex and base which may be loculated. No pneumothorax. Electronically Signed: Rodriguez Napier MD  6/17/2025 9:06 AM EDT  Workstation ID: [id number]    XR Chest 1 View  Result Date: 6/17/2025  XR CHEST 1 VW Date of Exam:  6/17/2025 2:45 AM EDT Indication: Recurrent pleural effusions Comparison: 6/16/2025 Findings: There has been slight further increase in right pleural fluid as compared to yesterday's study. There is consolidation in the overlying right lung suggesting atelectasis or infiltrate. There is a small left pleural effusion which is slightly increased from yesterday. No pneumothorax identified. Pulmonary vascularity appears within normal limits. Heart size appears stable.     Impression: Impression: 1.Interval increase in bilateral pleural effusions, right greater than left. 2.Consolidation in the right lung base may be due to atelectasis or infiltrate. Electronically Signed: Rodriguez Napier MD  6/17/2025 8:11 AM EDT  Workstation ID: [id number]    XR Chest 1 View  Result Date: 6/16/2025  XR CHEST 1 VW Date of Exam: 6/16/2025 5:35 AM EDT Indication: Recurrent pleural effusions Comparison: 6/15/2025 Findings: Right pleural drain has been removed and there is apparently increasing right basilar effusion. Right midlung interstitial changes are stable. There is minimally increased left basilar interstitial opacity perhaps a small focus of atelectasis. Heart and vasculature appear to be normal in size. No pneumothorax is seen.     Impression: Impression: Increasing right basilar effusion following right pleural drain removal. Electronically Signed: Marlon Lyons MD  6/16/2025 7:47 AM EDT  Workstation ID: [id number]      Results for orders placed during the hospital encounter of 05/13/25    Adult Transthoracic Echo Complete w/ Color, Spectral and Contrast if necessary per protocol    Interpretation Summary    Left ventricular ejection fraction appears to be 66 - 70%.    Normal right ventricular cavity size, wall thickness, systolic function and septal motion noted.    There is a small (<1cm) pericardial effusion adjacent to the right ventricle. There is no evidence of cardiac tamponade.    There is a left pleural effusion.      Current  medications:  Scheduled Meds:arformoterol, 15 mcg, Nebulization, BID - RT   And  budesonide, 0.5 mg, Nebulization, BID - RT   And  revefenacin, 175 mcg, Nebulization, Daily - RT  atorvastatin, 10 mg, Oral, Nightly  bisacodyl, 10 mg, Rectal, Once  Diclofenac Sodium, 2 g, Topical, 4x Daily  levothyroxine, 88 mcg, Oral, Q AM  lidocaine PF 1%, 5 mL, Injection, Once  mineral oil, 1 enema, Rectal, Once  nystatin, 5 mL, Oral, 4x Daily  senna-docusate sodium, 2 tablet, Oral, BID  sertraline, 100 mg, Oral, Daily  traZODone, 150 mg, Oral, Nightly      Continuous Infusions:heparin, 16 Units/kg/hr, Last Rate: 16 Units/kg/hr (06/17/25 1157)  [START ON 6/18/2025] lactated ringers, 9 mL/hr  Pharmacy to Dose Heparin,       PRN Meds:.  acetaminophen **OR** acetaminophen **OR** acetaminophen    senna-docusate sodium **AND** polyethylene glycol **AND** bisacodyl **AND** bisacodyl    Calcium Replacement - Follow Nurse / BPA Driven Protocol    clonazePAM    cyclobenzaprine    famotidine    HYDROmorphone **AND** naloxone    ipratropium-albuterol    Magnesium Standard Dose Replacement - Follow Nurse / BPA Driven Protocol    ondansetron ODT **OR** ondansetron    oxyCODONE-acetaminophen    Pharmacy to Dose Heparin    Phosphorus Replacement - Follow Nurse / BPA Driven Protocol    Potassium Replacement - Follow Nurse / BPA Driven Protocol    simethicone    sodium chloride    sodium chloride    zolpidem    Assessment & Plan   Assessment & Plan     Active Hospital Problems    Diagnosis  POA    **Recurrent pleural effusion [J90]  Yes    Adenocarcinoma of right lung [C34.91]  Yes    HLD (hyperlipidemia) [E78.5]  Yes    Acquired hypothyroidism [E03.9]  Yes      Resolved Hospital Problems   No resolved problems to display.        Brief Hospital Course to date:  Sydnie Gamble is a 65 y.o. female w recent diagnosis of lung adenocarcinoma s/p CyberKnife c/b malignant effusion on home 3 liters n/c since 5/2025 undergoing keytruda treatment w  Dr Mckenzie who presented w worsening dyspnea and found to have worsening recurrent pleural effusion.    Lung adenocarcinoma c/b malignant effusion, now s/p PleurX  Chronic hypoxic resp failure on 3 liters n/c since diagnosis  -home 3 liters n/c from last dc  -PleurX training w family, likely drain 3x/week  -OP keytruda treatment to resume when patient amenable. If going home, next week. If delay for SNF, also OK per d/w Dr Mckenzie 6/17    Acute on chronic debility - EOB activity only currently, SNF recs and patient + spouse considering  Hypothyroidism - home meds  Anxiety/depression - home meds  Recent diagnosis DVT/PE (5/2025) - heparin gtt, resume home eliquis likely tmrw if OK by CT surgery    Expected Discharge Location and Transportation: SNF v home  Expected Discharge 6/19 (Discharge date is tentative pending patient's medical condition and is subject to change)  Expected Discharge Date: 6/19/2025; Expected Discharge Time:  3:00 PM     VTE Prophylaxis:  Pharmacologic VTE prophylaxis orders are present.         AM-PAC 6 Clicks Score (PT): 18 (06/17/25 1800)    CODE STATUS:   Code Status and Medical Interventions: CPR (Attempt to Resuscitate); Full Support; also talked over with  Jose Francisco   Ordered at: 06/12/25 3112     Code Status (Patient has no pulse and is not breathing):    CPR (Attempt to Resuscitate)     Medical Interventions (Patient has pulse or is breathing):    Full Support     Comments:    also talked over with  Jose Francisco     Level Of Support Discussed With:    Patient       Next of Kin (If No Surrogate)       Kailyn Estevez MD  06/17/25        Electronically signed by Kailyn Estevez MD at 06/17/25 1376       Sejal Mckenzie MD at 06/17/25 1312          DATE OF VISIT: 6/16/2025    Chief Complaint: Followup for metastatic right lung cancer     SUBJECTIVE: The patient is laying comfortable in bed.  Her  is at the bedside.  Denied any fever or chills.  She is sleepy after the procedure.  She did  have Pleurx catheter inserted earlier on this morning.    REVIEW OF SYSTEMS: All the other 9 systems are reviewed by me and negative  except what is mentioned in HPI.     Past History:  Medical History: has a past medical history of Acid reflux, Acid reflux, Adenocarcinoma of lung (08/12/2024), Anemia, chronic disease (5/13/2025), Anxiety (1976), Arthritis, Atherosclerotic cardiovascular disease (03/25/2024), Cervical cancer, Depression, Emphysema of lung, History of radiation therapy (11/20/2020), History of radiation therapy (10/04/2024), radiation therapy, Hyperlipidemia, Hypothyroidism, Kidney disease, Lung cancer, Lung nodule, Ovarian cyst (1980s), Pleural effusion (5/13/2025), Pulmonary embolism (5/13/2025), Visual impairment (corrected with glasses), Wears dentures, and Wears glasses.   Surgical History: has a past surgical history that includes Tubal ligation; total abdominal hysterectomy pelvic node dissection (N/A, 08/18/2020); Lymph node biopsy; Subtotal Hysterectomy (1987?); Bronchoscopy; Lung biopsy; BRONCHOSCOPY WITH ION ROBOTIC ASSIST (N/A, 08/06/2024); Mohs surgery (03/24/2025); Hysteroscopy; and Colonoscopy.   Family History: family history includes Anxiety disorder in her mother; Asthma in her mother; COPD in her mother; Cancer in her father, maternal aunt, maternal aunt, maternal aunt, maternal aunt, maternal grandmother, and mother; Depression in her mother; Early death in her father; Emphysema in her mother; Heart attack in her maternal grandfather; Heart disease in her maternal grandfather; Hypertension in her mother; Melanoma in her mother; Mental illness in her mother; Uterine cancer in her mother.   Social History: reports that she quit smoking about 6 years ago. Her smoking use included cigarettes. She started smoking about 53 years ago. She has a 67.5 pack-year smoking history. She has been exposed to tobacco smoke. She has never used smokeless tobacco. She reports that she does not drink  alcohol and does not use drugs.    Medications Prior to Admission   Medication Sig Dispense Refill Last Dose/Taking    albuterol sulfate  (90 Base) MCG/ACT inhaler Inhale 2 puffs Every 4 (Four) to 6 (Six) Hours As Needed for Wheezing or Shortness of Air.   Taking As Needed    atorvastatin (LIPITOR) 10 MG tablet TAKE 1 TABLET BY MOUTH EVERY DAY AT NIGHT 90 tablet 1 6/11/2025    clonazePAM (KlonoPIN) 1 MG tablet Take 1 tablet by mouth 2 (Two) Times a Day As Needed for Anxiety. 50 tablet 0 6/12/2025 Morning    Diclofenac Sodium (VOLTAREN) 1 % gel gel Apply 1 gram topically to indicated area 4 times daily as needed for mild to moderate pain. STOP FLEXERIL 100 g 2 Taking    docusate sodium (COLACE) 100 MG capsule Take 2 capsules by mouth Daily. 60 capsule 3 6/12/2025 Morning    famotidine (PEPCID) 20 MG tablet TAKE 1 TABLET BY MOUTH TWICE DAILY OR TAKE 2 TABLETS BY MOUTH ONCE DAILY AS NEEDED FOR HEARTBURN 180 tablet 1 Taking    Fluticasone-Umeclidin-Vilant (Trelegy Ellipta) 100-62.5-25 MCG/ACT inhaler Inhale 1 puff Daily. 90 each 3 6/12/2025 Morning    levothyroxine (Synthroid) 88 MCG tablet Take 1 tablet by mouth Every Morning. 90 tablet 1 6/11/2025    ondansetron (ZOFRAN) 8 MG tablet Take 1 tablet by mouth 3 (Three) Times a Day As Needed for Nausea or Vomiting. 30 tablet 5 Taking As Needed    oxyCODONE-acetaminophen (PERCOCET) 5-325 MG per tablet Take 1 tablet by mouth Every 6 (Six) Hours As Needed for Moderate Pain. 120 tablet 0 6/12/2025 Morning    sertraline (Zoloft) 100 MG tablet Take 1 tablet by mouth Daily. 90 tablet 1 6/11/2025    traZODone (DESYREL) 150 MG tablet TAKE 1 TABLET BY MOUTH EVERY DAY AT NIGHT 90 tablet 3 6/11/2025    zolpidem (AMBIEN) 10 MG tablet Take 1 tablet by mouth At Night As Needed for Sleep. 30 tablet 1 Past Month    [DISCONTINUED] apixaban (ELIQUIS) 5 MG tablet tablet Take 2 tablets by mouth Every 12 (Twelve) Hours for 6 days, THEN 1 tablet Every 12 (Twelve) Hours for 24 days.  Indications: DVT/PE (active thrombosis) 48 tablet 5 6/11/2025 Morning    cyclobenzaprine (FLEXERIL) 10 MG tablet TAKE 1 TABLET BY MOUTH 2 TIMES A DAY AS NEEDED FOR MUSCLE SPASMS. 60 tablet 3       Allergies: Patient has no known allergies.     Current Facility-Administered Medications:     acetaminophen (TYLENOL) tablet 650 mg, 650 mg, Oral, Q4H PRN **OR** acetaminophen (TYLENOL) 160 MG/5ML oral solution 650 mg, 650 mg, Oral, Q4H PRN **OR** acetaminophen (TYLENOL) suppository 650 mg, 650 mg, Rectal, Q4H PRN, Nuha Shafer APRN    arformoterol (BROVANA) nebulizer solution 15 mcg, 15 mcg, Nebulization, BID - RT, 15 mcg at 06/16/25 0856 **AND** budesonide (PULMICORT) nebulizer solution 0.5 mg, 0.5 mg, Nebulization, BID - RT, 0.5 mg at 06/16/25 0856 **AND** revefenacin (YUPELRI) nebulizer solution 175 mcg, 175 mcg, Nebulization, Daily - RT, Nuha Shafer APRN, 175 mcg at 06/16/25 0856    atorvastatin (LIPITOR) tablet 10 mg, 10 mg, Oral, Nightly, Nuha Shafer APRN, 10 mg at 06/15/25 2040    sennosides-docusate (PERICOLACE) 8.6-50 MG per tablet 2 tablet, 2 tablet, Oral, BID, 2 tablet at 06/16/25 0905 **AND** polyethylene glycol (MIRALAX) packet 17 g, 17 g, Oral, Daily PRN **AND** bisacodyl (DULCOLAX) EC tablet 5 mg, 5 mg, Oral, Daily PRN, 5 mg at 06/15/25 0924 **AND** bisacodyl (DULCOLAX) suppository 10 mg, 10 mg, Rectal, Daily PRN, Nuha Shafer APRN    bisacodyl (DULCOLAX) suppository 10 mg, 10 mg, Rectal, Once, Kaylene Reynoso APRN    Calcium Replacement - Follow Nurse / BPA Driven Protocol, , Not Applicable, PRN, Hollis, Nuha D, APRN    clonazePAM (KlonoPIN) tablet 1 mg, 1 mg, Oral, BID PRN, Nuha Shafer APRN, 1 mg at 06/15/25 1558    cyclobenzaprine (FLEXERIL) tablet 10 mg, 10 mg, Oral, BID PRN, Nuha Shafer APRN, 10 mg at 06/14/25 2235    Diclofenac Sodium (VOLTAREN) 1 % gel 2 g, 2 g, Topical, 4x Daily, Kaylene Reynoso APRN, 2 g at 06/16/25 1242    famotidine  (PEPCID) tablet 40 mg, 40 mg, Oral, BID PRN, Nuha Shafer, DAYSI    heparin 88529 units/250 mL (100 units/mL) in 0.45 % NaCl infusion, 16 Units/kg/hr, Intravenous, Titrated, Nuha Shafer, DAYSI, Last Rate: 8.92 mL/hr at 06/16/25 0656, 16 Units/kg/hr at 06/16/25 0656    HYDROmorphone (DILAUDID) injection 0.5 mg, 0.5 mg, Intravenous, Q2H PRN, 0.5 mg at 06/16/25 1158 **AND** naloxone (NARCAN) injection 0.4 mg, 0.4 mg, Intravenous, Q5 Min PRN, Nuha Shafer APRN    ipratropium-albuterol (DUO-NEB) nebulizer solution 3 mL, 3 mL, Nebulization, Q4H PRN, Trina Hdz APRN, 3 mL at 06/15/25 1719    levothyroxine (SYNTHROID, LEVOTHROID) tablet 88 mcg, 88 mcg, Oral, Q AM, Nuha Shafer APRN, 88 mcg at 06/16/25 0613    lidocaine PF 1% (XYLOCAINE) injection 5 mL, 5 mL, Injection, Once, Jose Hope MD    Magnesium Standard Dose Replacement - Follow Nurse / BPA Driven Protocol, , Not Applicable, PRN, Nuha Shafer APRN    nystatin (MYCOSTATIN) 100,000 unit/mL suspension 500,000 Units, 5 mL, Oral, 4x Daily, Trina Hdz APRN, 500,000 Units at 06/16/25 1242    ondansetron ODT (ZOFRAN-ODT) disintegrating tablet 4 mg, 4 mg, Oral, Q6H PRN **OR** ondansetron (ZOFRAN) injection 4 mg, 4 mg, Intravenous, Q6H PRN, Nuha Shafer APRN    oxyCODONE-acetaminophen (PERCOCET) 5-325 MG per tablet 1 tablet, 1 tablet, Oral, Q4H PRN, Nuha Shafer APRN, 1 tablet at 06/16/25 1028    Pharmacy to Dose Heparin, , Not Applicable, Continuous PRN, Nuha Sahfer APRN    Phosphorus Replacement - Follow Nurse / BPA Driven Protocol, , Not Applicable, PRN, Nuha Shafer, APRN    Potassium Replacement - Follow Nurse / BPA Driven Protocol, , Not Applicable, PRNICKY, Nuha Shafer, APRN    sertraline (ZOLOFT) tablet 100 mg, 100 mg, Oral, Daily, Nuha Shafer, DAYSI, 100 mg at 06/16/25 0904    simethicone (MYLICON) chewable tablet 80 mg, 80 mg, Oral, 4x Daily PRN, Trina Hdz,  "APRN, 80 mg at 06/15/25 2040    sodium chloride 0.9 % flush 10 mL, 10 mL, Intravenous, Q12H, Jose Hope MD, 10 mL at 06/16/25 0905    sodium chloride 0.9 % flush 10 mL, 10 mL, Intravenous, PRN, Jose Hope MD    sodium chloride 0.9 % flush 10 mL, 10 mL, Intravenous, Q12H, Nuha Shafer APRN, 10 mL at 06/16/25 0905    sodium chloride 0.9 % flush 10 mL, 10 mL, Intravenous, PRN, Nuha Shafer APRN    sodium chloride 0.9 % infusion 40 mL, 40 mL, Intravenous, PRN, Jose Hope MD    sodium chloride 0.9 % infusion 40 mL, 40 mL, Intravenous, PRN, Nuha Shafer APRN    traZODone (DESYREL) tablet 150 mg, 150 mg, Oral, Nightly, Nuha Shafer APRN, 150 mg at 06/15/25 2040    zolpidem (AMBIEN) tablet 5 mg, 5 mg, Oral, Nightly PRN, Nuha Shafer APRN    PHYSICAL EXAMINATION:   /69 (BP Location: Left arm, Patient Position: Lying)   Pulse 100   Temp 97.7 °F (36.5 °C) (Axillary)   Resp 18   Ht 154.9 cm (60.98\")   Wt 55.8 kg (123 lb)   LMP  (LMP Unknown)   SpO2 94%   BMI 23.25 kg/m²                ECOG Performance Status: 2 - Symptomatic, <50% confined to bed  GENERAL: Age appropriate. No acute distress.   NEURO/PSYCH: A&O x 3, strength 5/5 in all muscle groups  HEENT: Head atraumatic, normocephalic.   NECK: Supple. No JVD. No lymphadenopathy.   LUNGS: Clear to auscultation bilaterally. No wheezing. No rhonchi.   HEART: Regular rate and rhythm. S1, S2, no murmurs.   ABDOMEN: Soft, nontender, nondistended. Bowel sounds positive. No  hepatosplenomegaly.   EXTREMITIES: No clubbing, cyanosis, or edema.   SKIN: No rashes. No purpura.       Admission on 06/12/2025   Component Date Value Ref Range Status    Glucose 06/12/2025 117 (H)  65 - 99 mg/dL Final    BUN 06/12/2025 21.7  8.0 - 23.0 mg/dL Final    Creatinine 06/12/2025 0.87  0.57 - 1.00 mg/dL Final    Sodium 06/12/2025 136  136 - 145 mmol/L Final    Potassium 06/12/2025 5.0  3.5 - 5.2 mmol/L Final    Chloride 06/12/2025 " 97 (L)  98 - 107 mmol/L Final    CO2 06/12/2025 28.0  22.0 - 29.0 mmol/L Final    Calcium 06/12/2025 7.8 (L)  8.6 - 10.5 mg/dL Final    Total Protein 06/12/2025 6.1  6.0 - 8.5 g/dL Final    Albumin 06/12/2025 2.8 (L)  3.5 - 5.2 g/dL Final    ALT (SGPT) 06/12/2025 26  1 - 33 U/L Final    AST (SGOT) 06/12/2025 49 (H)  1 - 32 U/L Final    Alkaline Phosphatase 06/12/2025 455 (H)  39 - 117 U/L Final    Total Bilirubin 06/12/2025 0.2  0.0 - 1.2 mg/dL Final    Globulin 06/12/2025 3.3  gm/dL Final    Calculated Result    A/G Ratio 06/12/2025 0.8  g/dL Final    BUN/Creatinine Ratio 06/12/2025 24.9  7.0 - 25.0 Final    Anion Gap 06/12/2025 11.0  5.0 - 15.0 mmol/L Final    eGFR 06/12/2025 73.1  >60.0 mL/min/1.73 Final    Heparin Anti-Xa (UFH) 06/12/2025 0.48  0.30 - 0.70 IU/ml Final    Protime 06/12/2025 15.6 (H)  12.2 - 15.3 Seconds Final    INR 06/12/2025 1.17 (H)  0.89 - 1.12 Final    PTT 06/12/2025 32.7 (L)  60.0 - 90.0 seconds Final    WBC 06/12/2025 17.59 (H)  3.40 - 10.80 10*3/mm3 Final    RBC 06/12/2025 3.88  3.77 - 5.28 10*6/mm3 Final    Hemoglobin 06/12/2025 10.7 (L)  12.0 - 15.9 g/dL Final    Hematocrit 06/12/2025 35.1  34.0 - 46.6 % Final    MCV 06/12/2025 90.5  79.0 - 97.0 fL Final    MCH 06/12/2025 27.6  26.6 - 33.0 pg Final    MCHC 06/12/2025 30.5 (L)  31.5 - 35.7 g/dL Final    RDW 06/12/2025 14.8  12.3 - 15.4 % Final    RDW-SD 06/12/2025 47.6  37.0 - 54.0 fl Final    MPV 06/12/2025 10.0  6.0 - 12.0 fL Final    Platelets 06/12/2025 192  140 - 450 10*3/mm3 Final    Neutrophil % 06/12/2025 80.4 (H)  42.7 - 76.0 % Final    Lymphocyte % 06/12/2025 4.2 (L)  19.6 - 45.3 % Final    Monocyte % 06/12/2025 8.2  5.0 - 12.0 % Final    Eosinophil % 06/12/2025 3.9  0.3 - 6.2 % Final    Basophil % 06/12/2025 0.5  0.0 - 1.5 % Final    Immature Grans % 06/12/2025 2.8 (H)  0.0 - 0.5 % Final    Neutrophils, Absolute 06/12/2025 14.13 (H)  1.70 - 7.00 10*3/mm3 Final    Lymphocytes, Absolute 06/12/2025 0.74  0.70 - 3.10 10*3/mm3  Final    Monocytes, Absolute 06/12/2025 1.45 (H)  0.10 - 0.90 10*3/mm3 Final    Eosinophils, Absolute 06/12/2025 0.68 (H)  0.00 - 0.40 10*3/mm3 Final    Basophils, Absolute 06/12/2025 0.09  0.00 - 0.20 10*3/mm3 Final    Immature Grans, Absolute 06/12/2025 0.50 (H)  0.00 - 0.05 10*3/mm3 Final    nRBC 06/12/2025 0.0  0.0 - 0.2 /100 WBC Final    Heparin Anti-Xa (UFH) 06/13/2025 0.54  0.30 - 0.70 IU/ml Final    WBC 06/13/2025 20.73 (H)  3.40 - 10.80 10*3/mm3 Final    RBC 06/13/2025 3.77  3.77 - 5.28 10*6/mm3 Final    Hemoglobin 06/13/2025 10.5 (L)  12.0 - 15.9 g/dL Final    Hematocrit 06/13/2025 33.9 (L)  34.0 - 46.6 % Final    MCV 06/13/2025 89.9  79.0 - 97.0 fL Final    MCH 06/13/2025 27.9  26.6 - 33.0 pg Final    MCHC 06/13/2025 31.0 (L)  31.5 - 35.7 g/dL Final    RDW 06/13/2025 14.8  12.3 - 15.4 % Final    RDW-SD 06/13/2025 47.6  37.0 - 54.0 fl Final    MPV 06/13/2025 10.1  6.0 - 12.0 fL Final    Platelets 06/13/2025 220  140 - 450 10*3/mm3 Final    Neutrophil % 06/13/2025 77.0 (H)  42.7 - 76.0 % Final    Lymphocyte % 06/13/2025 5.7 (L)  19.6 - 45.3 % Final    Monocyte % 06/13/2025 8.6  5.0 - 12.0 % Final    Eosinophil % 06/13/2025 5.9  0.3 - 6.2 % Final    Basophil % 06/13/2025 0.5  0.0 - 1.5 % Final    Immature Grans % 06/13/2025 2.3 (H)  0.0 - 0.5 % Final    Neutrophils, Absolute 06/13/2025 15.98 (H)  1.70 - 7.00 10*3/mm3 Final    Lymphocytes, Absolute 06/13/2025 1.18  0.70 - 3.10 10*3/mm3 Final    Monocytes, Absolute 06/13/2025 1.78 (H)  0.10 - 0.90 10*3/mm3 Final    Eosinophils, Absolute 06/13/2025 1.22 (H)  0.00 - 0.40 10*3/mm3 Final    Basophils, Absolute 06/13/2025 0.10  0.00 - 0.20 10*3/mm3 Final    Immature Grans, Absolute 06/13/2025 0.47 (H)  0.00 - 0.05 10*3/mm3 Final    nRBC 06/13/2025 0.1  0.0 - 0.2 /100 WBC Final    Heparin Anti-Xa (UFH) 06/13/2025 0.55  0.30 - 0.70 IU/ml Final    Heparin Anti-Xa (UFH) 06/14/2025 0.42  0.30 - 0.70 IU/ml Final    Glucose 06/14/2025 108 (H)  65 - 99 mg/dL Final    BUN  06/14/2025 23.0  8.0 - 23.0 mg/dL Final    Creatinine 06/14/2025 0.85  0.57 - 1.00 mg/dL Final    Sodium 06/14/2025 134 (L)  136 - 145 mmol/L Final    Potassium 06/14/2025 4.2  3.5 - 5.2 mmol/L Final    Chloride 06/14/2025 97 (L)  98 - 107 mmol/L Final    CO2 06/14/2025 25.0  22.0 - 29.0 mmol/L Final    Calcium 06/14/2025 7.7 (L)  8.6 - 10.5 mg/dL Final    BUN/Creatinine Ratio 06/14/2025 27.1 (H)  7.0 - 25.0 Final    Anion Gap 06/14/2025 12.0  5.0 - 15.0 mmol/L Final    eGFR 06/14/2025 75.2  >60.0 mL/min/1.73 Final    WBC 06/14/2025 21.39 (H)  3.40 - 10.80 10*3/mm3 Final    RBC 06/14/2025 3.72 (L)  3.77 - 5.28 10*6/mm3 Final    Hemoglobin 06/14/2025 10.3 (L)  12.0 - 15.9 g/dL Final    Hematocrit 06/14/2025 33.5 (L)  34.0 - 46.6 % Final    MCV 06/14/2025 90.1  79.0 - 97.0 fL Final    MCH 06/14/2025 27.7  26.6 - 33.0 pg Final    MCHC 06/14/2025 30.7 (L)  31.5 - 35.7 g/dL Final    RDW 06/14/2025 15.1  12.3 - 15.4 % Final    RDW-SD 06/14/2025 48.9  37.0 - 54.0 fl Final    MPV 06/14/2025 10.7  6.0 - 12.0 fL Final    Platelets 06/14/2025 215  140 - 450 10*3/mm3 Final    Neutrophil % 06/14/2025 79.5 (H)  42.7 - 76.0 % Final    Lymphocyte % 06/14/2025 4.3 (L)  19.6 - 45.3 % Final    Monocyte % 06/14/2025 7.8  5.0 - 12.0 % Final    Eosinophil % 06/14/2025 5.2  0.3 - 6.2 % Final    Basophil % 06/14/2025 0.4  0.0 - 1.5 % Final    Immature Grans % 06/14/2025 2.8 (H)  0.0 - 0.5 % Final    Neutrophils, Absolute 06/14/2025 17.02 (H)  1.70 - 7.00 10*3/mm3 Final    Lymphocytes, Absolute 06/14/2025 0.91  0.70 - 3.10 10*3/mm3 Final    Monocytes, Absolute 06/14/2025 1.67 (H)  0.10 - 0.90 10*3/mm3 Final    Eosinophils, Absolute 06/14/2025 1.11 (H)  0.00 - 0.40 10*3/mm3 Final    Basophils, Absolute 06/14/2025 0.08  0.00 - 0.20 10*3/mm3 Final    Immature Grans, Absolute 06/14/2025 0.60 (H)  0.00 - 0.05 10*3/mm3 Final    nRBC 06/14/2025 0.1  0.0 - 0.2 /100 WBC Final    Heparin Anti-Xa (UFH) 06/15/2025 0.46  0.30 - 0.70 IU/ml Final    WBC  06/15/2025 19.96 (H)  3.40 - 10.80 10*3/mm3 Final    RBC 06/15/2025 3.59 (L)  3.77 - 5.28 10*6/mm3 Final    Hemoglobin 06/15/2025 9.9 (L)  12.0 - 15.9 g/dL Final    Hematocrit 06/15/2025 32.2 (L)  34.0 - 46.6 % Final    MCV 06/15/2025 89.7  79.0 - 97.0 fL Final    MCH 06/15/2025 27.6  26.6 - 33.0 pg Final    MCHC 06/15/2025 30.7 (L)  31.5 - 35.7 g/dL Final    RDW 06/15/2025 15.2  12.3 - 15.4 % Final    RDW-SD 06/15/2025 48.6  37.0 - 54.0 fl Final    MPV 06/15/2025 10.0  6.0 - 12.0 fL Final    Platelets 06/15/2025 221  140 - 450 10*3/mm3 Final    Neutrophil % 06/15/2025 81.3 (H)  42.7 - 76.0 % Final    Lymphocyte % 06/15/2025 3.8 (L)  19.6 - 45.3 % Final    Monocyte % 06/15/2025 7.6  5.0 - 12.0 % Final    Eosinophil % 06/15/2025 4.7  0.3 - 6.2 % Final    Basophil % 06/15/2025 0.4  0.0 - 1.5 % Final    Immature Grans % 06/15/2025 2.2 (H)  0.0 - 0.5 % Final    Neutrophils, Absolute 06/15/2025 16.23 (H)  1.70 - 7.00 10*3/mm3 Final    Lymphocytes, Absolute 06/15/2025 0.76  0.70 - 3.10 10*3/mm3 Final    Monocytes, Absolute 06/15/2025 1.52 (H)  0.10 - 0.90 10*3/mm3 Final    Eosinophils, Absolute 06/15/2025 0.93 (H)  0.00 - 0.40 10*3/mm3 Final    Basophils, Absolute 06/15/2025 0.08  0.00 - 0.20 10*3/mm3 Final    Immature Grans, Absolute 06/15/2025 0.44 (H)  0.00 - 0.05 10*3/mm3 Final    nRBC 06/15/2025 0.0  0.0 - 0.2 /100 WBC Final    Heparin Anti-Xa (UFH) 06/16/2025 0.44  0.30 - 0.70 IU/ml Final    Glucose 06/16/2025 117 (H)  65 - 99 mg/dL Final    BUN 06/16/2025 25.5 (H)  8.0 - 23.0 mg/dL Final    Creatinine 06/16/2025 0.87  0.57 - 1.00 mg/dL Final    Sodium 06/16/2025 131 (L)  136 - 145 mmol/L Final    Potassium 06/16/2025 4.3  3.5 - 5.2 mmol/L Final    Chloride 06/16/2025 94 (L)  98 - 107 mmol/L Final    CO2 06/16/2025 26.0  22.0 - 29.0 mmol/L Final    Calcium 06/16/2025 7.5 (L)  8.6 - 10.5 mg/dL Final    BUN/Creatinine Ratio 06/16/2025 29.3 (H)  7.0 - 25.0 Final    Anion Gap 06/16/2025 11.0  5.0 - 15.0 mmol/L Final     eGFR 06/16/2025 73.1  >60.0 mL/min/1.73 Final    WBC 06/16/2025 18.84 (H)  3.40 - 10.80 10*3/mm3 Final    RBC 06/16/2025 3.74 (L)  3.77 - 5.28 10*6/mm3 Final    Hemoglobin 06/16/2025 10.3 (L)  12.0 - 15.9 g/dL Final    Hematocrit 06/16/2025 34.2  34.0 - 46.6 % Final    MCV 06/16/2025 91.4  79.0 - 97.0 fL Final    MCH 06/16/2025 27.5  26.6 - 33.0 pg Final    MCHC 06/16/2025 30.1 (L)  31.5 - 35.7 g/dL Final    RDW 06/16/2025 15.5 (H)  12.3 - 15.4 % Final    RDW-SD 06/16/2025 50.4  37.0 - 54.0 fl Final    MPV 06/16/2025 10.7  6.0 - 12.0 fL Final    Platelets 06/16/2025 285  140 - 450 10*3/mm3 Final    Neutrophil % 06/16/2025 77.6 (H)  42.7 - 76.0 % Final    Lymphocyte % 06/16/2025 4.2 (L)  19.6 - 45.3 % Final    Monocyte % 06/16/2025 8.0  5.0 - 12.0 % Final    Eosinophil % 06/16/2025 5.7  0.3 - 6.2 % Final    Basophil % 06/16/2025 0.5  0.0 - 1.5 % Final    Immature Grans % 06/16/2025 4.0 (H)  0.0 - 0.5 % Final    Neutrophils, Absolute 06/16/2025 14.61 (H)  1.70 - 7.00 10*3/mm3 Final    Lymphocytes, Absolute 06/16/2025 0.80  0.70 - 3.10 10*3/mm3 Final    Monocytes, Absolute 06/16/2025 1.50 (H)  0.10 - 0.90 10*3/mm3 Final    Eosinophils, Absolute 06/16/2025 1.08 (H)  0.00 - 0.40 10*3/mm3 Final    Basophils, Absolute 06/16/2025 0.09  0.00 - 0.20 10*3/mm3 Final    Immature Grans, Absolute 06/16/2025 0.76 (H)  0.00 - 0.05 10*3/mm3 Final    nRBC 06/16/2025 0.2  0.0 - 0.2 /100 WBC Final       XR Chest 1 View  Result Date: 6/16/2025  Narrative: XR CHEST 1 VW Date of Exam: 6/16/2025 5:35 AM EDT Indication: Recurrent pleural effusions Comparison: 6/15/2025 Findings: Right pleural drain has been removed and there is apparently increasing right basilar effusion. Right midlung interstitial changes are stable. There is minimally increased left basilar interstitial opacity perhaps a small focus of atelectasis. Heart and vasculature appear to be normal in size. No pneumothorax is seen.     Impression: Impression: Increasing right  basilar effusion following right pleural drain removal. Electronically Signed: Marlon Lyons MD  6/16/2025 7:47 AM EDT  Workstation ID: [id number]    XR Chest 1 View  Result Date: 6/15/2025  Narrative: XR CHEST 1 VW Date of Exam: 6/15/2025 2:07 AM EDT Indication: Recurrent pleural effusions, right chest tube, follow-up Comparison: 6/14/2025. Findings: There is a right basilar pigtail chest tube in place. There may be some kinking of the chest tube possibly at the entry site. I would recommend clinical correlation. There is an unchanged moderate right pleural effusion with compressive atelectasis. The left lung and pleural space are clear. The heart size is normal. The pulmonary vascular markings are normal. There is no pneumothorax.     Impression: Impression: 1.There is a right basilar pigtail chest tube in place. There may be some kinking of the chest tube possibly at the entry site. I would recommend clinical correlation. 2.Unchanged moderate right pleural effusion with compressive atelectasis. Electronically Signed: Samuel Luna MD  6/15/2025 10:19 AM EDT  Workstation ID: [id number]    XR Chest 1 View  Result Date: 6/14/2025  Narrative: XR CHEST 1 VW Date of Exam: 6/14/2025 3:14 AM EDT Indication: Recurrent pleural effusions Comparison: Chest radiograph 6/13/2025. Findings: Unchanged position of right pleural catheter. Cardiomediastinal silhouette is unchanged. Moderate right pleural effusion with right basilar airspace disease and right basilar lucency suggestive of small-volume pneumothorax, unchanged compared to yesterday's exam. Left lung appears clear. No pneumothorax. Osseous structures are unchanged.     Impression: Impression: No significant interval change. Electronically Signed: Franco Shaffer MD  6/14/2025 8:09 AM EDT  Workstation ID: [id number]    XR Chest 1 View  Result Date: 6/13/2025  Narrative: XR CHEST 1 VW Date of Exam: 6/13/2025 12:55 PM EDT Indication: Recurrent pleural effusions Comparison: [facility] chest  x-ray 6/13/2025 timed at 4:43 a.m. Findings: Right pleural catheter remains in place. Moderate size right hydropneumothorax appears similar to numbers to today's earlier chest x-ray. Underlying right basilar airspace opacities are stable. Left lung is clear.     Impression: Impression: Moderate sized right hydropneumothorax appears similar to numbers to today's earlier chest x-ray. Right pleural catheter remains in place. Electronically Signed: Maryellen Dalton MD  6/13/2025 1:44 PM EDT  Workstation ID: [id number]    XR Chest 1 View  Result Date: 6/13/2025  Narrative: XR CHEST 1 VW Date of Exam: 6/13/2025 2:49 AM EDT Indication: post Ct placement and thoracentesis Comparison: [facility] chest x-ray 6/12/2025, 6/5/2025 Findings: There is a new right pleural catheter. Previously seen right basilar pleural air has now been replaced with pleural fluid. No apical pneumothorax is seen. There are stable underlying right basilar airspace opacities. Left lung appears clear. Cardiomediastinal contours are stable.     Impression: Impression: 1.Interval placement of right pleural catheter with fluid component of right basilar hydropneumothorax. 2.Stable underlying right basilar airspace opacities, likely due to atelectasis. Electronically Signed: Maryellen Dalton MD  6/13/2025 7:23 AM EDT  Workstation ID: [id number]    CT Guided Chest Tube  Result Date: 6/12/2025  Narrative: DATE OF EXAM: 6/12/2025 2:48 PM  PROCEDURE: CT GUIDED CHEST TUBE PLACEMENT-  INDICATIONS: right chest tube placement; J95.811-Postprocedural pneumothorax  DLP: 746 mGycm  TECHNIQUE:  The risks, benefits, and alternatives were discussed in detail with the patient. The patient was brought down to the CT suite and positioned supine on the CT table. Preliminary CT scan of the the chest was performed and a skin entry site was selected and marked. The area was prepped and draped utilizing standard sterile technique. 1% lidocaine was administered to the soft tissues. A small skin  incision was made with an 11 blade scalpel. Under CT guidance a 5 [place] Yueh was advanced into the right pleural space. The inner stylet was removed and an 035 wire was advanced coaxially. Over the wire a 10 [place] all-purpose drainage catheter was advanced with the pigtail formed and locked within the pleural space the catheter was sutured to the skin and attached to a Pleur-evac. A sterile dressing was then applied.  The patient tolerated the procedure well and there were no immediate complications.      Impression: Successful CT-guided 10 [place] right sided chest tube placement.   6/12/2025 4:13 PM by Jose Hope MD on Workstation: [id number]      XR Chest 1 View  Result Date: 6/12/2025  Narrative: XR CHEST 1 VW Date of Exam: 6/12/2025 1:45 PM EDT Indication: s/p thoracentesis; c/o severe right neck pain Comparison: 6/12/2025 Findings: Cardiac size is similar to prior exam. Similar right-sided hydropneumothorax. Similar right basilar opacity. No evidence of acute osseous abnormalities. Visualized upper abdomen is unremarkable.     Impression: Impression: 1.Similar right-sided hydropneumothorax. 2.Similar right basilar opacity may reflect atelectasis or infection. Electronically Signed: Clay Johnson MD  6/12/2025 2:31 PM EDT  Workstation ID: [id number]    XR Chest 1 View  Result Date: 6/12/2025  Narrative: XR CHEST 1 VW Date of Exam: 6/12/2025 12:25 PM EDT Indication: s/p thoracentesis; c/o severe right neck pain Comparison: Chest radiograph 6/12/2025 Findings: Grossly stable size of the right hydropneumothorax, measuring up to 7 mm at apex with larger right subpulmonic component. Previous noted fluid extending towards the apex appears decreased. Left lung relatively clear. There is partial collapse and probable underlying atelectasis within the right lower lobe. Negative for left pneumothorax. Stable cardiomediastinal silhouette. Degenerative related osseous change.     Impression: Impression: Slightly  decreased right pleural fluid component with otherwise similar appearing moderate size hydropneumothorax. Electronically Signed: Toni Argueta MD  6/12/2025 12:53 PM EDT  Workstation ID: [id number]    XR Chest 1 View  Result Date: 6/12/2025  Narrative: XR CHEST 1 VW Date of Exam: 6/12/2025 11:23 AM EDT Indication: s/p thoracentesis Comparison: Chest x-ray 6/5/2025 Findings: There is a pneumothorax on the right. It is moderate in size and seen inferiorly measuring up to 2.3 cm. There is a moderate mount of pleural fluid as well. There is right lower lobe airspace opacity which is improved from previous exam. Left lung is clear. Heart size is normal.     Impression: Impression: Moderate sized right hydropneumothorax. Electronically Signed: Susan Leahy MD  6/12/2025 12:41 PM EDT  Workstation ID: [id number]    US Thoracentesis  Result Date: 6/12/2025  Narrative: PROCEDURE: US THORACENTESIS-  ATTENDING: Jose Hope M.D.  CLINICAL HISTORY: Large right pleural effusion.  TECHNIQUE:  The risk, benefits, and alternatives were described in detail and the patient was brought to the ultrasound suite and positioned seated. The skin entry site was prepped and draped utilizing standard sterile technique. 1% lidocaine was administered to the soft tissues of the right posterior thorax.   A 5 Greek Yueh was advanced into the right pleural space.   A total of 1.4 L was removed from the right pleural space. A specimen was sent to the laboratory for analysis. The needle was removed and a sterile dressing was applied.  The patient tolerated the procedure well and there were no complications.      Impression: Successful ultrasound-guided right thoracentesis.   Attestation Signer name: Jose Hope MD I attest that I was present for the entire procedure. I reviewed the stored images and agree with the report as written.      6/12/2025 12:17 PM by Jose Hope MD on Workstation: [id number]      XR Chest 1 View  Result Date:  6/5/2025  Narrative: XR CHEST 1 VW Date of Exam: 6/5/2025 6:41 AM EDT Indication: cough Comparison: Chest radiograph 6/5/2025 at 0422 hours Findings: There is been slight decrease in the size of the large right pleural effusion. There is persistent right pleural fluid and right basilar airspace disease. Airspace disease is most likely atelectasis with pneumonia not excluded. The left lung remains clear. There is no pneumothorax.     Impression: Impression: Slight decrease in the size of the large right pleural effusion. Electronically Signed: Rodriguez Singh MD  6/5/2025 7:00 AM EDT  Workstation ID: [id number]    XR Chest 1 View  Result Date: 6/5/2025  Narrative: XR CHEST 1 VW Date of Exam: 6/5/2025 4:18 AM EDT Indication: SOA triage protocol Comparison: Chest radiograph 5/28/2025 Findings: The heart size and pulmonary vascular markings are normal. The left lung is clear. There is a large right pleural effusion with associated right basilar atelectasis. There is no pneumothorax. The osseous structures are normal for age.     Impression: Impression: Large right pleural effusion with right basilar atelectasis. Electronically Signed: Rodriguez Singh MD  6/5/2025 4:47 AM EDT  Workstation ID: [id number]    US Thoracentesis  Result Date: 5/28/2025  Narrative: PROCEDURE: Ultrasound-guided right thoracentesis  Procedural Personnel Attending physician(s): Erik Mensah  Pre-procedure diagnosis: Right pleural effusion  Post-procedure diagnosis: Same  Complications: No immediate complications.      Impression:  Ultrasound-guided thoracentesis with drainage of 1400 mL of serous fluid.   _______________________________________________________________   PROCEDURE SUMMARY:  - Limited thoracic ultrasound - Ultrasound-guided right thoracentesis - Additional procedure(s): None  PROCEDURE DETAILS:   Pre-procedure Consent: Informed consent for the procedure including risks, benefits and alternatives was obtained and time-out was performed  prior to the procedure. Preparation: The site was prepared and draped using maximal sterile barrier technique including cutaneous antisepsis.    Limited thoracic ultrasound: Limited thoracic ultrasound was performed using a curved transducer. A safe window for thoracentesis was identified. Right hemithorax findings: Moderate to large right-sided pleural effusion   Thoracentesis  Local anesthesia was administered. The pleural space was accessed and fluid return confirmed position. The fluid was drained. The catheter was removed, and a sterile bandage was applied.  Catheter placed: 5 [place] catheter.  Additional Details  Equipment details: None Specimens removed: Pleural fluid Estimated blood loss (mL): Less than 10   5/28/2025 12:05 PM by Erik Mensah MD on Workstation: [id number]      XR Chest 1 View  Result Date: 5/28/2025  Narrative: XR CHEST 1 VW Date of Exam: 5/28/2025 10:19 AM EDT Indication: S/P Right Thoracentesis Comparison: 5/14/2025 chest radiograph. Ultrasound-guided thoracentesis of 5/20/2025 Findings: By history, thoracentesis earlier today removed 1.4 L of fluid from the right hemithorax. Follow-up chest radiograph shows patchy right mid and lower lung atelectasis and perhaps a small amount of remaining pleural fluid, versus discoid atelectasis. There is no evidence of pneumothorax. Left lung remains clear. Heart and vasculature appear normal in size.     Impression: Impression: Patchy right mid and lower lung atelectasis and questionable small remaining right effusion. No evidence of pneumothorax or other significant chest disease elsewhere. Electronically Signed: Marlon Lyons MD  5/28/2025 10:49 AM EDT  Workstation ID: [id number]    NM PET/CT Skull Base to Mid Thigh  Result Date: 5/28/2025  Narrative: FDG NM PET/CT SKULL BASE TO MID THIGH Date of Exam: 5/22/2025 8:25 AM EDT Indication: restaging scans lung cancer. Comparison: 5/13/2025 and 7/11/2024 Technique: 8.1 mCi of F-18 FDG was administered  intravenously. PET imaging was obtained from skull base to mid-thigh approximately 60 minutes after radiotracer injection. A low dose non contrast CT was obtained for attenuation correction and anatomic localization. Fused PET-CT and 3D MIP reconstructions were utilized for image interpretation.  Fasting blood glucose level: 113 mg/dl. Reference uptake values: Mediastinum: 2.8 SUVmax Liver: 2.7 SUVmax Normalization method: Body Weight Findings: Head and neck: No abnormal FDG activity identified. Chest: Increasing moderate to large right effusion which is loculated. Spiculated right middle lobe nodule has an SUV maximum of 4.4. There is extensive right hilar adenopathy with an SUV max 8.8. There is subcarinal and mediastinal adenopathy ranging between 8 and 5 SUV maximum. Abdomen and pelvis: At least 20 FDG-avid liver lesions are identified measuring up to 5 cm in diameter and an SUV maximum of 13.5. There is left adrenal lesion with an SUV maximum of 5. There is extensive julio hepatis and portacaval adenopathy with an SUV maximum of 8.6. There is retroperitoneal adenopathy, including left periaortic and aortocaval abnormal lymph nodes. Musculoskeletal: Diffuse osseous metastatic disease with multifocal disease throughout the cervical thoracolumbar spine, bilateral ribs, pelvis, including the left acetabulum and to a lesser extent the right acetabulum. There are bilateral femoral lesions as well. No definite pathologic fractures are identified at this time. Several of the lesions however are involving weight-bearing bones and the patient is susceptible to pathologic fractures.     Impression: Impression: Extensive metastatic disease involving the chest, abdomen, and pelvis as well as the bones. Electronically Signed: Herb Lopes MD  5/28/2025 9:18 AM EDT  Workstation ID: [id number]      ASSESSMENT: The patient is a very pleasant 65 y.o. female  with metastatic right lung cancer      PLAN:  1.  Recurrent metastatic  right lung adenocarcinoma:  A.  The patient follows me in scheduled next week for cycle 2 of Keytruda.    2.  Recurrent right pleural effusion:  A.  Status post Pleurx catheter today by Dr. Andres.    3.  Cancer related pain:  A.  Will continue opiates as needed      Sejal Mckenzie MD  6/16/2025      Electronically signed by Sejal Mckenzie MD at 06/17/25 [id number]

## 2025-06-19 NOTE — SIGNIFICANT NOTE
At approximately 0638, After finishing a bed change for patient, HR went to 212.  STAT EKG obtained, showed sinus tach.  Patient had converted before EKG caught it.  Patient denied chest pain when asked.  No resp distress noted.  Space lab strips printed and placed in chart.  Notified DAYSI Collins.  Stat Mg ordered.  K+ was 4.1, BP at time was 91/63.

## 2025-06-19 NOTE — PLAN OF CARE
Goal Outcome Evaluation:  Plan of Care Reviewed With: patient        Progress: no change  Outcome Evaluation: Palliative RN stopped to visit pt. at 1143.  Pt. appeared to be asleep and did not demonstrate any observable signs of discomfort.  She did not wake to voice.  Per nursing report, she medicated pt. earlier for abdominal discomfort that she described as a tightness.  Pt. to be drained to day then every other day.  Hopeful for rehab at discharge.  Pt. was up to BS commode earlier.  Palliative care to follow for support and ongoing [facility].    2720 Palliative IDT meeting: MD; APRN ; MDiv; RNs; LCSW   After hours, weekends and holidays, contact Palliative Provider by calling [phone].

## 2025-06-19 NOTE — PLAN OF CARE
Problem: Adult Inpatient Plan of Care  Goal: Plan of Care Review  Outcome: Progressing  Flowsheets (Taken 6/19/2025 2623)  Progress: no change  Outcome Evaluation: Patient had 100ml of sanguineous drainage removed via PleurX drain this shift and tolerated well. Patient slept off and on throughout the shift. No acute events were noted. ST on tele, weaned to 1LNC, and VSS. Will continue with POC.  Plan of Care Reviewed With:   patient   spouse   Goal Outcome Evaluation:  Plan of Care Reviewed With: patient, spouse        Progress: no change  Outcome Evaluation: Patient had 100ml of sanguineous drainage removed via PleurX drain this shift and tolerated well. Patient slept off and on throughout the shift. No acute events were noted. ST on tele, weaned to 1LNC, and VSS. Will continue with POC.

## 2025-06-19 NOTE — THERAPY EVALUATION
Patient Name: Sydnie Gamble  : 1960    MRN: [id number]                              Today's Date: 2025       Admit Date: 2025    Visit Dx:     ICD-10-CM ICD-9-CM   1. Recurrent pleural effusion  J90 511.9   2. Recurrent right pleural effusion  J90 511.9   3. Adenocarcinoma of right lung  C34.91 162.9     Patient Active Problem List   Diagnosis    Personal history of tobacco use, presenting hazards to health    Gastroesophageal reflux disease without esophagitis    Acquired hypothyroidism    Vitamin D deficiency    Prediabetes    COPD with asthma    ALESIA (generalized anxiety disorder)    Multiple lung nodules on CT    Hilar adenopathy    HLD (hyperlipidemia)    Cervical cancer    Atherosclerotic cardiovascular disease    Adenocarcinoma of right lung    Pulmonary embolism    Anemia, chronic disease    Recurrent pleural effusion    Severe protein-calorie malnutrition     Past Medical History:   Diagnosis Date    Acid reflux     Acid reflux     Adenocarcinoma of lung 2024    Anemia, chronic disease 2025    Anxiety 1976    Arthritis     Atherosclerotic cardiovascular disease 2024    Cervical cancer     Depression     Emphysema of lung     History of radiation therapy 2020    pelvis + intracavitary brachytherapy    History of radiation therapy 10/04/2024    RUL lung    Hx of radiation therapy     Hyperlipidemia     Hypothyroidism     Kidney disease     Lung cancer     Lung nodule     Ovarian cyst 1980s    Pleural effusion 2025    Pulmonary embolism 2025    Visual impairment corrected with glasses    Wears dentures     Wears glasses      Past Surgical History:   Procedure Laterality Date    BRONCHOSCOPY      BRONCHOSCOPY WITH ION ROBOTIC ASSIST N/A 2024    Procedure: BRONCHOSCOPY NAVIGATION WITH ENDOBRONCHIAL ULTRASOUND AND ION ROBOT;  Surgeon: Toni Mcclelland MD;  [facility];  Service: Robotics - Pulmonary;  Laterality: N/A;    COLONOSCOPY       HYSTEROSCOPY      LUNG BIOPSY      LYMPH NODE BIOPSY      MOHS SURGERY  03/24/2025    Groin    PLEURAL CATHETER INSERTION Right 6/17/2025    Procedure: PLEURX CATHETER INSERTION;  Surgeon: Cliff Gross MD;  Location: [facility] OR;  Service: Cardiothoracic;  Laterality: Right;    SUBTOTAL HYSTERECTOMY  1987?    ovary/fallopian tube removed    TOTAL ABDOMINAL HYSTERECTOMY PELVIC NODE DISSECTION N/A 08/18/2020    Procedure: TOTAL RADICAL HYSTERECTOMY PELVIC NODE DISSECTION;  Surgeon: Laura Jimenez MD;  Location: [facility] OR;  Service: Gynecology Oncology;  Laterality: N/A;    TUBAL ABDOMINAL LIGATION      right with ovarian dissection       General Information       Row Name 06/19/25 1508          OT Time and Intention    Document Type evaluation  -KF     Mode of Treatment occupational therapy  -       Row Name 06/19/25 1508          General Information    Patient Profile Reviewed yes  -KF     Prior Level of Function independent:;all household mobility;transfer;bed mobility;ADL's  [place] with RWx for household distance, limited community ambulation  -KF     Existing Precautions/Restrictions fall;oxygen therapy device and L/min;other (see comments)  pleurx; LUE weakness, R sided gaze preference  -KF     Barriers to Rehab medically complex;previous functional deficit;cognitive status  -       Row Name 06/19/25 1508          Living Environment    Current Living Arrangements home  -KF     People in Home spouse  -       Row Name 06/19/25 1508          Home Main Entrance    Number of Stairs, Main Entrance one  -KF     Stair Railings, Main Entrance none  -       Row Name 06/19/25 1508          Stairs Within Home, Primary    Number of Stairs, Within Home, Primary none  -       Row Name 06/19/25 1508          Cognition    Orientation Status (Cognition) oriented x 3;other (see comments)  increased time needed  -       Row Name 06/19/25 1508          Safety Issues/Impairments Affecting Functional  Mobility    Safety Issues Affecting Function (Mobility) awareness of need for assistance;insight into deficits/self-awareness;judgment;problem-solving;positioning of assistive device;safety precaution awareness;safety precautions follow-through/compliance;sequencing abilities  -KF     Impairments Affecting Function (Mobility) balance;endurance/activity tolerance;motor planning;strength;shortness of breath;pain;cognition;coordination;postural/trunk control;range of motion (ROM)  -KF     Cognitive Impairments, Mobility Safety/Performance attention;awareness, need for assistance;insight into deficits/self-awareness;judgment;problem-solving/reasoning;safety precaution awareness;safety precaution follow-through;sequencing abilities  -KF               User Key  (r) = Recorded By, (t) = Taken By, (c) = Cosigned By      Initials Name Provider Type    KF Kamila Medellin OT Occupational Therapist                     Mobility/ADL's       Row Name 06/19/25 1512          Bed Mobility    Bed Mobility supine-sit  -KF     Supine-Sit [place] (Bed Mobility) moderate assist (50% patient effort);2 person assist;verbal cues;nonverbal cues (demo/gesture)  -KF     Assistive Device (Bed Mobility) bed rails;head of bed elevated;repositioning sheet  -KF     Comment, (Bed Mobility) Cues for task intiation and sequence with increased time and effort needed throughout. Pt initially demo'd a posterior lean requiring min/modA x1, but pt was able to progress to CGA.  -       Row Name 06/19/25 1512          Transfers    Transfers sit-stand transfer;stand-sit transfer;toilet transfer;bed-chair transfer  -       Row Name 06/19/25 1512          Bed-Chair Transfer    Bed-Chair [place] (Transfers) minimum assist (75% patient effort);2 person assist;verbal cues  -KF     Assistive Device (Bed-Chair Transfers) walker, front-wheeled  -KF     Comment, (Bed-Chair Transfer) Pt took 3-4 steps from the BSC to the chair.  -       Row Name  06/19/25 1512          Sit-Stand Transfer    Sit-Stand [place] (Transfers) minimum assist (75% patient effort);2 person assist;verbal cues  -KF     Assistive Device (Sit-Stand Transfers) walker, front-wheeled  -KF       Row Name 06/19/25 1512          Stand-Sit Transfer    Stand-Sit [place] (Transfers) minimum assist (75% patient effort);2 person assist;verbal cues  -KF     Assistive Device (Stand-Sit Transfers) walker, front-wheeled  -KF       Row Name 06/19/25 1512          Toilet Transfer    Type (Toilet Transfer) stand pivot/stand step  -KF     [place] Level (Toilet Transfer) minimum assist (75% patient effort);2 person assist;verbal cues  -KF     Assistive Device (Toilet Transfer) walker, front-wheeled;commode, bedside without drop arms  -KF     Comment, (Toilet Transfer) Pt took 2-3 steps from the EOB to the BSC.  -       Row Name 06/19/25 1512          Functional Mobility    Functional Mobility- Comment Additional mobility limited by pt fatigue and feeling SOA. Pt's O2 remained >90% on 2L NC.  -       Row Name 06/19/25 1512          Activities of Daily Living    BADL Assessment/Intervention toileting;grooming  -       Row Name 06/19/25 1512          Toileting Assessment/Training    [place] Level (Toileting) adjust/manage clothing;perform perineal hygiene;moderate assist (50% patient effort)  -     Assistive Devices (Toileting) commode, bedside without drop arms  -       Row Name 06/19/25 1512          Grooming Assessment/Training    [place] Level (Grooming) wash face, hands;verbal cues;nonverbal cues (demo/gesture)  -KF     Position (Grooming) unsupported sitting  -KF     Comment, (Grooming) Pt needing step by steps cues for sequence to wash hands with wash cloth.  -KF               User Key  (r) = Recorded By, (t) = Taken By, (c) = Cosigned By      Initials Name Provider Type    Kamila Kenney OT Occupational Therapist                   Obj/Interventions       Row Name  06/19/25 1516          Sensory Assessment (Somatosensory)    Sensory Assessment (Somatosensory) UE sensation intact  -KF       Row Name 06/19/25 1516          Vision Assessment/Intervention    Visual Impairment/Limitations corrective lenses full-time  -KF     Vision Assessment Comment Pt states she has R eye floaters at baseline, but pt states they may be worse.  -KF       Row Name 06/19/25 1516          Range of Motion Comprehensive    General Range of Motion bilateral upper extremity ROM WFL  -KF     Comment, General Range of Motion BUE AAROM WFL  -KF       Row Name 06/19/25 1516          Strength Comprehensive (MMT)    General Manual Muscle Testing (MMT) Assessment upper extremity strength deficits identified  -KF     Comment, General Manual Muscle Testing (MMT) Assessment LUE 2-/5, RUE 3+/5  -KF       Row Name 06/19/25 1516          Motor Skills    Motor Skills functional endurance  -KF     Functional Endurance below baseline  -KF       [facility] Name 06/19/25 1516          Balance    Balance Assessment sitting static balance;sitting dynamic balance;sit to stand dynamic balance;standing static balance;standing dynamic balance  -KF     Static Sitting Balance contact guard;minimal assist  -KF     Dynamic Sitting Balance minimal assist;moderate assist  -KF     Position, Sitting Balance supported;sitting edge of bed  -KF     Sit to Stand Dynamic Balance minimal assist;2-person assist  -KF     Static Standing Balance minimal assist;1-person assist  -KF     Dynamic Standing Balance minimal assist;2-person assist  -KF     Position/Device Used, Standing Balance supported;walker, front-wheeled  -KF     Balance Interventions sitting;standing;sit to stand;supported;static;dynamic;occupation based/functional task  -KF               User Key  (r) = Recorded By, (t) = Taken By, (c) = Cosigned By      Initials Name Provider Type    KF Kamila Medellin OT Occupational Therapist                   Goals/Plan       Row Name 06/19/25  1526          Transfer Goal 1 (OT)    Activity/Assistive Device (Transfer Goal 1, OT) commode;bed-to-chair/chair-to-bed  -KF     [place] Level/Cues Needed (Transfer Goal 1, OT) standby assist  -KF     Time Frame (Transfer Goal 1, OT) long term goal (LTG);10 days  -KF     Progress/Outcome (Transfer Goal 1, OT) goal ongoing  -KF       Row Name 06/19/25 1526          Toileting Goal 1 (OT)    Activity/Device (Toileting Goal 1, OT) adjust/manage clothing;perform perineal hygiene  -KF     [place] Level/Cues Needed (Toileting Goal 1, OT) standby assist  -KF     Time Frame (Toileting Goal 1, OT) short term goal (STG);5 days  -KF     Progress/Outcome (Toileting Goal 1, OT) goal ongoing  -KF       Row Name 06/19/25 1526          Grooming Goal 1 (OT)    Activity/Device (Grooming Goal 1, OT) hair care;oral care;wash face, hands  -KF     [place] (Grooming Goal 1, OT) standby assist  -KF     Time Frame (Grooming Goal 1, OT) long term goal (LTG);10 days  -KF     Progress/Outcome (Grooming Goal 1, OT) goal ongoing  -KF       Row Name 06/19/25 1526          Therapy Assessment/Plan (OT)    Planned Therapy Interventions (OT) activity tolerance training;adaptive equipment training;BADL retraining;functional balance retraining;occupation/activity based interventions;patient/caregiver education/training;ROM/therapeutic exercise;strengthening exercise;transfer/mobility retraining  -KF               User Key  (r) = Recorded By, (t) = Taken By, (c) = Cosigned By      Initials Name Provider Type    KF Kamila Medellin, OT Occupational Therapist                   Clinical Impression       Row Name 06/19/25 1523          Pain Assessment    Pretreatment Pain Rating 0/10 - no pain  -KF     Posttreatment Pain Rating 0/10 - no pain  -KF       Row Name 06/19/25 1523          Plan of Care Review    Plan of Care Reviewed With patient;sibling  -KF     Progress no change  -KF     Outcome Evaluation OT evaluation completed. The pt presents  below baseline with generalized weakness (LUE > RUE), decreased activity tolerance, balance deficits, delayed processing, and decreased safety awareness warranting continued IP OT services. The pt was assisted in transfer from the EOB > BSC > chair using a RWx with Penny x2. Pt able to take a 2-3 steps during each transfer. Additional mobility limited by pt fatigue and feeling SOA, although the pt's SpO2 remained >90% on 2L NC. Recommend a d/c to SNF for best outcome.  -KF       Row Name 06/19/25 1523          Therapy Assessment/Plan (OT)    Patient/Family Therapy Goal Statement (OT) Return home  -KF     Rehab Potential (OT) fair  -KF     Criteria for Skilled Therapeutic Interventions Met (OT) yes;skilled treatment is necessary  -KF     Therapy Frequency (OT) daily  -KF     Predicted Duration of Therapy Intervention (OT) 7 days  -KF       Row Name 06/19/25 1523          Therapy Plan Review/Discharge Plan (OT)    Anticipated Discharge Disposition (OT) skilled nursing facility  -       Row Name 06/19/25 1523          Vital Signs    Pre SpO2 (%) 92  2L  -KF     O2 Delivery Pre Treatment nasal cannula  -KF     Post SpO2 (%) 94  -KF     O2 Delivery Post Treatment nasal cannula  -KF     Pre Patient Position Supine  -KF     Intra Patient Position Standing  -KF     Post Patient Position Sitting  -KF       Row Name 06/19/25 1523          Positioning and Restraints    Pre-Treatment Position in bed  -KF     Post Treatment Position chair  -KF     In Chair notified nsg;reclined;call light within reach;encouraged to call for assist;exit alarm on;with family/caregiver;waffle cushion;legs elevated  -KF               User Key  (r) = Recorded By, (t) = Taken By, (c) = Cosigned By      Initials Name Provider Type    Kamila Kenney, DESIREE Occupational Therapist                   Outcome Measures       Row Name 06/19/25 1527          How much help from another is currently needed...    Putting on and taking off regular lower body  clothing? 2  -KF     Bathing (including washing, rinsing, and drying) 2  -KF     Toileting (which includes using toilet bed pan or urinal) 3  -KF     Putting on and taking off regular upper body clothing 3  -KF     Taking care of personal grooming (such as brushing teeth) 3  -KF     Eating meals 2  -KF     AM-PAC 6 Clicks Score (OT) 15  -KF       Row Name 06/19/25 0851          How much help from another person do you currently need...    Turning from your back to your side while in flat bed without using bedrails? 3  -ROSA     Moving from lying on back to sitting on the side of a flat bed without bedrails? 3  -ROSA     Moving to and from a bed to a chair (including a wheelchair)? 3  -ROSA     Standing up from a chair using your arms (e.g., wheelchair, bedside chair)? 3  -ROSA     Climbing 3-5 steps with a railing? 3  -ROSA     To walk in hospital room? 3  -ROSA     AM-PAC 6 Clicks Score (PT) 18  -ROSA     Highest Level of Mobility Goal Walk 10 Steps or More-6  -ROSA       Row Name 06/19/25 1527          Functional Assessment    Outcome Measure Options AM-PAC 6 Clicks Daily Activity (OT)  -KF               User Key  (r) = Recorded By, (t) = Taken By, (c) = Cosigned By      Initials Name Provider Type    Yossi Tang, RN Registered Nurse    Kamila Kenney OT Occupational Therapist                    Occupational Therapy Education       Title: PT OT SLP Therapies (In Progress)       Topic: Occupational Therapy (In Progress)       Point: ADL training (In Progress)       Learning Progress Summary            Patient Acceptance, E,TB, NR by KF at 6/19/2025 1439                      Point: Precautions (In Progress)       Learning Progress Summary            Patient Acceptance, E,TB, NR by KF at 6/19/2025 1439                      Point: Body mechanics (In Progress)       Learning Progress Summary            Patient Acceptance, E,TB, NR by  at 6/19/2025 1439                                      User Key       Initials  Effective Dates Name Provider Type Discipline     08/09/23 -  Kamila Medellin, DESIREE Occupational Therapist OT                  OT Recommendation and Plan  Planned Therapy Interventions (OT): activity tolerance training, adaptive equipment training, BADL retraining, functional balance retraining, occupation/activity based interventions, patient/caregiver education/training, ROM/therapeutic exercise, strengthening exercise, transfer/mobility retraining  Therapy Frequency (OT): daily  Plan of Care Review  Plan of Care Reviewed With: patient, sibling  Progress: no change  Outcome Evaluation: OT evaluation completed. The pt presents below baseline with generalized weakness (LUE > RUE), decreased activity tolerance, balance deficits, delayed processing, and decreased safety awareness warranting continued IP OT services. The pt was assisted in transfer from the EOB > BSC > chair using a RWx with Penny x2. Pt able to take a 2-3 steps during each transfer. Additional mobility limited by pt fatigue and feeling SOA, although the pt's SpO2 remained >90% on 2L NC. Recommend a d/c to SNF for best outcome.     Time Calculation:   Evaluation Complexity (OT)  Review Occupational Profile/Medical/Therapy History Complexity: expanded/moderate complexity  Assessment, Occupational Performance/Identification of Deficit Complexity: 3-5 performance deficits  Clinical Decision Making Complexity (OT): detailed assessment/moderate complexity  Overall Complexity of Evaluation (OT): moderate complexity     Time Calculation- OT       Row Name 06/19/25 1527             Time Calculation- OT    OT Start Time 1439  -KF      OT Received On 06/19/25  -KF      OT Goal Re-Cert Due Date 06/29/25  -KF         Untimed Charges    OT Eval/Re-eval Minutes 50  -KF         Total Minutes    Untimed Charges Total Minutes 50  -KF       Total Minutes 50  -KF                User Key  (r) = Recorded By, (t) = Taken By, (c) = Cosigned By      Initials Name Provider Type     KF Kamila Medellin OT Occupational Therapist                  Therapy Charges for Today       Code Description Service Date Service Provider Modifiers Qty    [id number] HC OT EVAL MOD COMPLEXITY 4 6/19/2025 Kamila Medellin OT GO 1                 Kamila Medellin OT  6/19/2025

## 2025-06-19 NOTE — THERAPY TREATMENT NOTE
Patient Name: Sydnie Gamble  : 1960    MRN: [id number]                              Today's Date: 2025       Admit Date: 2025    Visit Dx:     ICD-10-CM ICD-9-CM   1. Recurrent pleural effusion  J90 511.9   2. Recurrent right pleural effusion  J90 511.9   3. Adenocarcinoma of right lung  C34.91 162.9     Patient Active Problem List   Diagnosis    Personal history of tobacco use, presenting hazards to health    Gastroesophageal reflux disease without esophagitis    Acquired hypothyroidism    Vitamin D deficiency    Prediabetes    COPD with asthma    ALESIA (generalized anxiety disorder)    Multiple lung nodules on CT    Hilar adenopathy    HLD (hyperlipidemia)    Cervical cancer    Atherosclerotic cardiovascular disease    Adenocarcinoma of right lung    Pulmonary embolism    Anemia, chronic disease    Recurrent pleural effusion    Severe protein-calorie malnutrition     Past Medical History:   Diagnosis Date    Acid reflux     Acid reflux     Adenocarcinoma of lung 2024    Anemia, chronic disease 2025    Anxiety 1976    Arthritis     Atherosclerotic cardiovascular disease 2024    Cervical cancer     Depression     Emphysema of lung     History of radiation therapy 2020    pelvis + intracavitary brachytherapy    History of radiation therapy 10/04/2024    RUL lung    Hx of radiation therapy     Hyperlipidemia     Hypothyroidism     Kidney disease     Lung cancer     Lung nodule     Ovarian cyst 1980s    Pleural effusion 2025    Pulmonary embolism 2025    Visual impairment corrected with glasses    Wears dentures     Wears glasses      Past Surgical History:   Procedure Laterality Date    BRONCHOSCOPY      BRONCHOSCOPY WITH ION ROBOTIC ASSIST N/A 2024    Procedure: BRONCHOSCOPY NAVIGATION WITH ENDOBRONCHIAL ULTRASOUND AND ION ROBOT;  Surgeon: Toni Mcclelland MD;  Location: [facility] ENDOSCOPY;  Service: Robotics - Pulmonary;  Laterality: N/A;    COLONOSCOPY       HYSTEROSCOPY      LUNG BIOPSY      LYMPH NODE BIOPSY      MOHS SURGERY  03/24/2025    Groin    PLEURAL CATHETER INSERTION Right 6/17/2025    Procedure: PLEURX CATHETER INSERTION;  Surgeon: Cliff Gross MD;  Location: [facility] OR;  Service: Cardiothoracic;  Laterality: Right;    SUBTOTAL HYSTERECTOMY  1987?    ovary/fallopian tube removed    TOTAL ABDOMINAL HYSTERECTOMY PELVIC NODE DISSECTION N/A 08/18/2020    Procedure: TOTAL RADICAL HYSTERECTOMY PELVIC NODE DISSECTION;  Surgeon: Laura Jimenez MD;  Location: [facility] OR;  Service: Gynecology Oncology;  Laterality: N/A;    TUBAL ABDOMINAL LIGATION      right with ovarian dissection       General Information       Row Name 06/19/25 1523          Physical Therapy Time and Intention    Document Type therapy note (daily note)  -     Mode of Treatment physical therapy  -       Row Name 06/19/25 1523          General Information    Patient Profile Reviewed yes  -ML     Existing Precautions/Restrictions fall;oxygen therapy device and L/min;other (see comments)  pleurx; LUE weakness, R sided gaze preference  -     Barriers to Rehab medically complex;previous functional deficit;cognitive status  -ML       Row Name 06/19/25 1523          Cognition    Orientation Status (Cognition) oriented x 3;other (see comments)  increased time needed  -       Row Name 06/19/25 1523          Safety Issues/Impairments Affecting Functional Mobility    Safety Issues Affecting Function (Mobility) awareness of need for assistance;insight into deficits/self-awareness;judgment;positioning of assistive device;problem-solving;safety precaution awareness;safety precautions follow-through/compliance;sequencing abilities  -ML     Impairments Affecting Function (Mobility) balance;endurance/activity tolerance;motor planning;strength;shortness of breath;pain;cognition;coordination;postural/trunk control;range of motion (ROM)  -ML     Cognitive Impairments, Mobility  Safety/Performance attention;awareness, need for assistance;insight into deficits/self-awareness;judgment;problem-solving/reasoning;safety precaution awareness;safety precaution follow-through;sequencing abilities  -ML               User Key  (r) = Recorded By, (t) = Taken By, (c) = Cosigned By      Initials Name Provider Type    ML Danae Bhakta Physical Therapist                   Mobility       Row Name 06/19/25 1524          Bed Mobility    Bed Mobility supine-sit  -ML     Supine-Sit [place] (Bed Mobility) moderate assist (50% patient effort);2 person assist;verbal cues;nonverbal cues (demo/gesture)  -ML     Assistive Device (Bed Mobility) bed rails;head of bed elevated;repositioning sheet  -ML     Comment, (Bed Mobility) Patient required cues for task initiation and sequecning, increased time and effort required to complete mobility. Patient initially demonstrates posterior lean required min-modA, progressed to CGA.  -ML       Row Name 06/19/25 1524          Bed-Chair Transfer    Bed-Chair [place] (Transfers) minimum assist (75% patient effort);2 person assist;verbal cues  -ML     Assistive Device (Bed-Chair Transfers) walker, front-wheeled  -ML       Row Name 06/19/25 1524          Sit-Stand Transfer    Sit-Stand [place] (Transfers) minimum assist (75% patient effort);2 person assist;verbal cues  -ML     Assistive Device (Sit-Stand Transfers) walker, front-wheeled  -ML       Row Name 06/19/25 1524          Gait/Stairs (Locomotion)    [place] Level (Gait) verbal cues;minimum assist (75% patient effort);1 person assist;1 person to manage equipment  -ML     Assistive Device (Gait) walker, front-wheeled  -ML     Distance in Feet (Gait) 4  -ML     Deviations/Abnormal Patterns (Gait) bilateral deviations;base of support, narrow;rhonda decreased;gait speed decreased;stride length decreased  -ML     Bilateral Gait Deviations forward flexed posture;heel strike decreased  -ML               User Key   (r) = Recorded By, (t) = Taken By, (c) = Cosigned By      Initials Name Provider Type    Danae Suazo Physical Therapist                   Obj/Interventions       Row Name 06/19/25 1526          Balance    Balance Assessment sitting static balance;sitting dynamic balance;standing static balance;standing dynamic balance  -ML     Static Sitting Balance contact guard;minimal assist  -ML     Dynamic Sitting Balance minimal assist;moderate assist  -ML     Position, Sitting Balance supported;sitting edge of bed  -ML     Static Standing Balance minimal assist;1-person assist  -ML     Dynamic Standing Balance minimal assist;2-person assist  -ML     Position/Device Used, Standing Balance supported;walker, front-wheeled  -ML     Balance Interventions sitting;standing;sit to stand;supported;occupation based/functional task  -ML               User Key  (r) = Recorded By, (t) = Taken By, (c) = Cosigned By      Initials Name Provider Type    Danae Suazo Physical Therapist                   Goals/Plan    No documentation.                  Clinical Impression       Row Name 06/19/25 1527          Pain    Pretreatment Pain Rating 0/10 - no pain  -ML     Posttreatment Pain Rating 0/10 - no pain  -ML       Row Name 06/19/25 1527          Plan of Care Review    Plan of Care Reviewed With patient;sibling  sister  -ML     Progress improving  -ML     Outcome Evaluation Patient required additional time and effort to complete mobility, cues for sequencing. [place] [facility] to ambulate limited distance. Jeanna continues to present below baseline for mobility and would continue to benefit from skilled PT to address strength, balance and activity tolerance deficits.  -ML       Row Name 06/19/25 1527          Vital Signs    Pre SpO2 (%) 92  -ML     O2 Delivery Pre Treatment nasal cannula  -ML     Post SpO2 (%) 94  -ML     O2 Delivery Post Treatment nasal cannula  -ML     Pre Patient Position  Supine  -ML     Intra Patient Position Standing  -ML     Post Patient Position Sitting  -ML       Row Name 06/19/25 1527          Positioning and Restraints    Pre-Treatment Position in bed  -ML     Post Treatment Position chair  -ML     In Chair notified nsg;reclined;call light within reach;encouraged to call for assist;exit alarm on;waffle cushion;with family/caregiver;legs elevated  -ML               User Key  (r) = Recorded By, (t) = Taken By, (c) = Cosigned By      Initials Name Provider Type    Danae Suazo Physical Therapist                   Outcome Measures       Row Name 06/19/25 1530 06/19/25 0851       How much help from another person do you currently need...    Turning from your back to your side while in flat bed without using bedrails? 3  -ML 3  -ROSA    Moving from lying on back to sitting on the side of a flat bed without bedrails? 2  -ML 3  -ROSA    Moving to and from a bed to a chair (including a wheelchair)? 3  -ML 3  -ROSA    Standing up from a chair using your arms (e.g., wheelchair, bedside chair)? 3  -ML 3  -ROSA    Climbing 3-5 steps with a railing? 1  -ML 3  -ROSA    To walk in hospital room? 3  -ML 3  -ROSA    AM-PAC 6 Clicks Score (PT) 15  -ML 18  -ROSA    Highest Level of Mobility Goal Move to Chair/Commode-4  -ML Walk 10 Steps or More-6  -ROSA      Row Name 06/19/25 1530 06/19/25 1527       Functional Assessment    Outcome Measure Options AM-PAC 6 Clicks Basic Mobility (PT)  -ML AM-PAC 6 Clicks Daily Activity (OT)  -KF              User Key  (r) = Recorded By, (t) = Taken By, (c) = Cosigned By      Initials Name Provider Type    Danae Suazo Physical Therapist    Ysosi Tang, RN Registered Nurse    Kamila Kenney OT Occupational Therapist                                 Physical Therapy Education       Title: PT OT SLP Therapies (In Progress)       Topic: Physical Therapy (In Progress)       Point: Mobility training (Done)       Learning Progress Summary            Patient  Acceptance, E, VU,NR by ML at 6/19/2025 1531    Acceptance, E, VU,NR by NS at 6/13/2025 1319                      Point: Home exercise program (Not Started)       Learner Progress:  Not documented in this visit.              Point: Body mechanics (Done)       Learning Progress Summary            Patient Acceptance, E, VU,NR by ML at 6/19/2025 1531    Acceptance, E, VU,NR by NS at 6/13/2025 1319                      Point: Precautions (Done)       Learning Progress Summary            Patient Acceptance, E, VU,NR by ML at 6/19/2025 1531    Acceptance, E, VU,NR by NS at 6/13/2025 1319                                      User Key       Initials Effective Dates Name Provider Type Discipline    NS 06/16/21 -  Khushboo Morgan, PT Physical Therapist PT     04/22/21 -  Danae Bhakta Physical Therapist PT                  PT Recommendation and Plan     Progress: improving  Outcome Evaluation: Patient required additional time and effort to complete mobility, cues for sequencing. Patient required minAx2 to complete [place] [facility] and minAx1 to ambulate limited distance. Jeanna continues to present below baseline for mobility and would continue to benefit from skilled PT to address strength, balance and activity tolerance deficits.     Time Calculation:         PT Charges       Row Name 06/19/25 1531             Time Calculation    Start Time 1440  -ML      PT Received On 06/19/25  -ML         Timed Charges    18402 - PT Therapeutic Activity Minutes 25  -ML         Total Minutes    Timed Charges Total Minutes 25  -ML       Total Minutes 25  -ML                User Key  (r) = Recorded By, (t) = Taken By, (c) = Cosigned By      Initials Name Provider Type     Danae Bhakta Physical Therapist                  Therapy Charges for Today       Code Description Service Date Service Provider Modifiers Qty    [id number]  PT THERAPEUTIC ACT EA 15 MIN 6/19/2025 Danae Bhakta GP 2            PT G-Codes  Outcome Measure Options: AM-PAC  6 Clicks Basic Mobility (PT)  AM-PAC 6 Clicks Score (PT): 15  AM-PAC 6 Clicks Score (OT): 15  PT Discharge Summary  Anticipated Discharge Disposition (PT): skilled nursing facility    Danae Yony  6/19/2025

## 2025-06-19 NOTE — PLAN OF CARE
Goal Outcome Evaluation:  Plan of Care Reviewed With: patient           Outcome Evaluation: ST on monitor.  Oriented x 4.  No acute overnight events.  Plurex drain will be used starting dayshift.  PRN meds given for abdominal pain.  Patient sleeping between care, appears comfortable.  Continue POC.

## 2025-06-19 NOTE — PLAN OF CARE
Goal Outcome Evaluation:  Plan of Care Reviewed With: patient, sibling        Progress: no change  Outcome Evaluation: OT evaluation completed. The pt presents below baseline with generalized weakness (LUE > RUE), decreased activity tolerance, balance deficits, delayed processing, and decreased safety awareness warranting continued IP OT services. The pt was assisted in transfer from the EOB > BSC > chair using a RWx with Penny x2. Pt able to take a 2-3 steps during each transfer. Additional mobility limited by pt fatigue and feeling SOA, although the pt's SpO2 remained >90% on 2L NC. Recommend a d/c to SNF for best outcome.    Anticipated Discharge Disposition (OT): skilled nursing facility

## 2025-06-20 NOTE — H&P
CRITICAL CARE ADMISSION NOTE     Patient's name Sydnie Gamble MRN: [id number] : 1960-65 y.o.-female  Admission date 2025  Length of stay 8    REASON FOR CONSULTATION / MAIN COMPLAINT  No chief complaint on file.     HISTORY OF MAIN COMPLAINT    This is a very zach 65-year-old female with past medical history of adenocarcinoma of the lung, anemia, atrial fibrillation, cervical cancer, depression, hyperlipidemia and pulmonary embolism as well as acid reflux and COPD on home O2 who presents to the hospital status post Pleurx placement.  ICU was consulted for A-fib with RVR.  Patient's history of adenocarcinoma of the lung is being managed currently by hematology oncology with Keytruda.  Earlier today, patient became hypotensive with maps in the 50s with her heart rate at 170-180.  Patient was given a low-dose amiodarone gtt.,Patient was also given 500 mcg of digoxin and was requested to be admitted to the ICU.  The patient was evaluated at bedside, she is complaining of palpitations and chest tightness. Chest imaging was done on the patient earlier today which showed right pleural catheter, small bilateral pleural effusions and patchy bilateral opacities.      Historically, patient had a CT PET done in May 2025 which showed extensive metastatic disease involving the chest, abdomen and pelvis as well as metastatic disease to the bone.  At that time, there were no metastasis to the brain.  Currently, patient is confused and only oriented to herself.    PAST MEDICAL HISTORY:  Past Medical History:   Diagnosis Date   • Acid reflux    • Acid reflux    • Adenocarcinoma of lung 2024   • Anemia, chronic disease 2025   • Anxiety    • Arthritis    • Atherosclerotic cardiovascular disease 2024   • Cervical cancer    • Depression    • Emphysema of lung    • History of radiation therapy 2020    pelvis + intracavitary brachytherapy   • History of radiation therapy 10/04/2024     RUL lung   • Hx of radiation therapy    • Hyperlipidemia    • Hypothyroidism    • Kidney disease    • Lung cancer    • Lung nodule    • Ovarian cyst 1980s   • Pleural effusion 5/13/2025   • Pulmonary embolism 5/13/2025   • Visual impairment corrected with glasses   • Wears dentures    • Wears glasses        PAST SURGICAL HISTORY:  Past Surgical History:   Procedure Laterality Date   • BRONCHOSCOPY     • BRONCHOSCOPY WITH ION ROBOTIC ASSIST N/A 08/06/2024    Procedure: BRONCHOSCOPY NAVIGATION WITH ENDOBRONCHIAL ULTRASOUND AND ION ROBOT;  Surgeon: Toni Mcclelland MD;  Location:  [facility];  Service: Robotics - Pulmonary;  Laterality: N/A;   • COLONOSCOPY     • HYSTEROSCOPY     • LUNG BIOPSY     • LYMPH NODE BIOPSY     • MOHS SURGERY  03/24/2025    Groin   • PLEURAL CATHETER INSERTION Right 6/17/2025    Procedure: PLEURX CATHETER INSERTION;  Surgeon: Cliff Gross MD;  Location:  [facility]BR[facility] OR;  Service: Cardiothoracic;  Laterality: Right;   • SUBTOTAL HYSTERECTOMY  1987?    ovary/fallopian tube removed   • TOTAL ABDOMINAL HYSTERECTOMY PELVIC NODE DISSECTION N/A 08/18/2020    Procedure: TOTAL RADICAL HYSTERECTOMY PELVIC NODE DISSECTION;  Surgeon: Laura Jimenez MD;  Location:  [facility] OR;  Service: Gynecology Oncology;  Laterality: N/A;   • TUBAL ABDOMINAL LIGATION      right with ovarian dissection        FAMILY HISTORY:  Family History   Problem Relation Age of Onset   • Cancer Mother    • Hypertension Mother    • Mental illness Mother    • Anxiety disorder Mother    • Asthma Mother    • COPD Mother    • Depression Mother    • Emphysema Mother    • Uterine cancer Mother    • Melanoma Mother    • Cancer Father    • Early death Father    • Cancer Maternal Aunt    • Cancer Maternal Aunt    • Cancer Maternal Aunt    • Cancer Maternal Aunt    • Cancer Maternal Grandmother    • Heart attack Maternal Grandfather    • Heart disease Maternal Grandfather         Heart Attack 63 yo   • Breast cancer Neg Hx    •  Ovarian cancer Neg Hx        SOCIAL HISTORY:  Social History     Tobacco Use   • Smoking status: Former     Current packs/day: 0.00     Average packs/day: 1.5 packs/day for 45.0 years (67.5 ttl pk-yrs)     Types: Cigarettes     Start date: 1972     Quit date: 3/30/2019     Years since quittin.2     Passive exposure: Past   • Smokeless tobacco: Never   • Tobacco comments:     quit analog cigs in march, still vapes daily   Vaping Use   • Vaping status: Every Day   • Substances: Nicotine   Substance Use Topics   • Alcohol use: No   • Drug use: No       ALLERGIES:  No Known Allergies    HOME MEDS INCLUDE  Current Outpatient Medications   Medication Instructions   • acetaminophen (TYLENOL) 650 mg, Oral, Every 6 Hours PRN   • albuterol sulfate  (90 Base) MCG/ACT inhaler 2 puffs, Every 4 to 6 Hours PRN   • apixaban (ELIQUIS) 5 MG tablet tablet Take 2 tablets by mouth Every 12 (Twelve) Hours for 6 days, THEN 1 tablet Every 12 (Twelve) Hours for 24 days. Indications: DVT/PE (active thrombosis)   • atorvastatin (LIPITOR) 10 mg, Oral, Every Night at Bedtime   • clonazePAM (KLONOPIN) 1 mg, Oral, 2 Times Daily PRN   • cyclobenzaprine (FLEXERIL) 10 mg, Oral, 2 Times Daily PRN   • Diclofenac Sodium (VOLTAREN) 1 % gel gel Apply 1 gram topically to indicated area 4 times daily as needed for mild to moderate pain. STOP FLEXERIL   • docusate sodium (COLACE) 200 mg, Oral, Daily   • famotidine (PEPCID) 20 MG tablet TAKE 1 TABLET BY MOUTH TWICE DAILY OR TAKE 2 TABLETS BY MOUTH ONCE DAILY AS NEEDED FOR HEARTBURN   • Fluticasone-Umeclidin-Vilant (Trelegy Ellipta) 100-62.5-25 MCG/ACT inhaler 1 puff, Inhalation, Daily - RT   • levothyroxine (SYNTHROID) 88 mcg, Oral, Every Early Morning   • nystatin (MYCOSTATIN) 500,000 Units, Oral, 4 Times Daily   • ondansetron (ZOFRAN) 8 mg, Oral, 3 Times Daily PRN   • oxyCODONE-acetaminophen (PERCOCET) 5-325 MG per tablet 1 tablet, Oral, Every 6 Hours PRN   • sertraline (ZOLOFT) 100 mg,  "Oral, Daily   • traZODone (DESYREL) 150 mg, Oral, Every Night at Bedtime   • zolpidem (AMBIEN) 10 mg, Oral, Nightly PRN       SCHEDULED MEDS:  amiodarone, 150 mg, Intravenous, Once  apixaban, 5 mg, Oral, Q12H  arformoterol, 15 mcg, Nebulization, BID - RT   And  budesonide, 0.5 mg, Nebulization, BID - RT   And  revefenacin, 175 mcg, Nebulization, Daily - RT  atorvastatin, 10 mg, Oral, Nightly  dronabinol, 5 mg, Oral, BID  levothyroxine, 88 mcg, Oral, Q AM  senna-docusate sodium, 2 tablet, Oral, BID  sertraline, 100 mg, Oral, Daily  traZODone, 150 mg, Oral, Nightly         CONTINUOUS INFUSIONS:  amiodarone, 1 mg/min, Last Rate: 1 mg/min (06/20/25 1231)   Followed by  amiodarone, 0.5 mg/min  phenylephrine, 0.5-3 mcg/kg/min  sodium chloride, 75 mL/hr, Last Rate: 75 mL/hr (06/20/25 1130)         REVIEW OF SYSTEMS:    Constitutional - neg for fever, chills, night sweats, weight loss.  HEENT - neg for facial swelling, nasal congestion, ear discharge, neg for for discharge and redness.  Respiratory - neg for cough and shortness of breath.  Cardiovascular - neg for chest pain.  Gastrointestinal - neg for nausea, vomiting, abdominal pain and diarrhea.  Genitourinary - neg for dysuria and hematuria.  Musculoskeletal - neg for joint swelling, muscle pains.  Cutaneous - neg for rash.  Neurological - neg for syncope, weakness, gait problems.  Hematological - neg bruising/easy bleeding  Psychiatric/Behavioral - neg anxiety    PHYSICAL EXAMINATION:  BP (!) 86/63   Pulse (!) 131   Temp 97.6 °F (36.4 °C)   Resp 18   Ht 154.9 cm (60.98\")   Wt 55.8 kg (123 lb)   LMP  (LMP Unknown)   SpO2 96%   BMI 23.25 kg/m²     Physical Exam  Constitutional:       Appearance: Normal appearance.   HENT:      Head: Normocephalic and atraumatic.      Mouth/Throat:      Mouth: Mucous membranes are moist.   Eyes:      Extraocular Movements: Extraocular movements intact.      Conjunctiva/sclera: Conjunctivae normal.      Pupils: Pupils are equal, " round, and reactive to light.   Cardiovascular:      Rate and Rhythm: Tachycardia present. Rhythm irregular.      Pulses: Normal pulses.      Heart sounds: Normal heart sounds.   Pulmonary:      Effort: Pulmonary effort is normal.      Breath sounds: Decreased air movement present. Decreased breath sounds, rhonchi and rales present.   Abdominal:      General: Abdomen is flat. Bowel sounds are normal.      Palpations: Abdomen is soft.   Musculoskeletal:      Right lower leg: No edema.      Left lower leg: No edema.   Neurological:      Mental Status: She is alert.           DATA REVIEW:  1. Medical chart reviewed in detail  2. I reviewed the actual EKG rhythm strips and Pulse Oximetry data on the monitors  3. I reviewed today's lab values and the report of the most recent Chest X ray.  4. In addition, I have independently reviewed the actual image of the most recent chest Xray    Hematology:  Results from last 7 days   Lab Units 06/20/25 0410 06/19/25 0413 06/18/25  0335   WBC 10*3/mm3 24.17* 21.36* 18.46*   HEMOGLOBIN g/dL 8.9* 9.6* 9.8*   HEMATOCRIT % 28.7* 30.6* 32.7*   MCV fL 88.3 88.2 90.1   PLATELETS 10*3/mm3 145 174 217     Chemistry:  Estimated Creatinine Clearance: 45.4 mL/min (A) (by C-G formula based on SCr of 1.06 mg/dL (H)).  Results from last 7 days   Lab Units 06/20/25 0410 06/19/25  0413   SODIUM mmol/L 124* 128*   POTASSIUM mmol/L 4.0 4.4   CHLORIDE mmol/L 90* 92*   CO2 mmol/L 20.0* 19.9*   BUN mg/dL 52.4* 43.4*   CREATININE mg/dL 1.06* 1.08*   GLUCOSE mg/dL 106* 111*     Results from last 7 days   Lab Units 06/20/25  0410 06/19/25  0413   CALCIUM mg/dL 7.4* 7.3*   MAGNESIUM mg/dL  --  3.2*     Hepatic Panel:  Results from last 7 days   Lab Units 06/18/25  0335   ALBUMIN g/dL 2.3*   TOTAL PROTEIN g/dL 5.4*   BILIRUBIN mg/dL 0.3   AST (SGOT) U/L 71*   ALT (SGPT) U/L 21   ALK PHOS U/L 493*     Coagulation Labs:       Cardiac Labs:      Biomarkers:      U/A          Arterial Blood Gases:     "  Microbiology:  No results found for: \"BLOODCX\", \"CULTURES\", \"THROATCX\", \"URINECX\", \"STOOLCX\", \"WOUNDCX\"    Images:  XR Chest 1 View  Result Date: 6/20/2025  Impression: Overall stable appearance of the chest with right pleural catheter, small bilateral pleural effusions, and patchy bilateral opacities, which may be due to atelectasis and/or pneumonia. Electronically Signed: Maryellen Dalton MD  6/20/2025 11:22 AM EDT  Workstation ID: [id number]    XR Chest 1 View  Result Date: 6/19/2025  Impression: 1. Stable right-sided pleurodesis catheter with small right pleural effusion. 2. Stable small left pleural effusion. 3. No pneumothorax. 4. Unchanged bibasilar airspace disease which may relate to atelectasis and/or pneumonia. Electronically Signed: Stuart Romero MD  6/19/2025 7:42 AM EDT  Workstation ID: [id number]      Echo:  Results for orders placed during the hospital encounter of 05/13/25    Adult Transthoracic Echo Complete w/ Color, Spectral and Contrast if necessary per protocol    Interpretation Summary  •  Left ventricular ejection fraction appears to be 66 - 70%.  •  Normal right ventricular cavity size, wall thickness, systolic function and septal motion noted.  •  There is a small (<1cm) pericardial effusion adjacent to the right ventricle. There is no evidence of cardiac tamponade.  •  There is a left pleural effusion.        ASSESSMENT & PLAN     Recurrent pleural effusion    Acquired hypothyroidism    HLD (hyperlipidemia)    Adenocarcinoma of right lung    Severe protein-calorie malnutrition       *Atrial fibrillation with rapid ventricular rate  *Stage IV adenocarcinoma of the lung  *Atrial fibrillation  *COPD on 2 L of home O2  *Recurrent pleural effusions status post Pleurx drain placement on the right side  *Pulmonary embolism history  *Hyperlipidemia    Plan:  Will admit the patient to the ICU  Will administer 150 amiodarone bolus and continue rate to run at 1 mg/min  Patient is currently on Marinol for " lack of appetite  Daughter is concerned with the patient's sudden confusion, she is concerned the patient intestines to the brain.  I do not feel that it is necessary at this time to do a MRI of the head this will not change current management  Continue pleural catheter drainage.  Cardiology requested patient be admitted for cardioversion however at this time, patient is now in sinus rhythm  Plan is to transfer patient out in the next 24 to 48 hours from the ICU if she remains in sinus rhythm.  Recommend initiation of oral amiodarone, will defer to cardiology as they are on the case        Bowel regimen:  Diet: NPO Diet NPO Type: Sips with Meds  GI ppx:   DVT PPX: VTE Prophylaxis:  Pharmacologic VTE prophylaxis orders are present.       Advance Directives: Code Status (Patient has no pulse and is not breathing): CPR (Attempt to Resuscitate)  Medical Interventions (Patient has pulse or is breathing): Full Support  Comments: also talked over with  Jose Francisco  Level Of Support Discussed With: Patient   Next of Kin (If No Surrogate)       Dispo: ICU    I have spent a total of 58 critical care minutes in the management of this patient, apart from any time taken to perform necessary procedures. Critical care time includes time reviewing chart, images, report of images, rounding with nurse, respiratory therapist and pharmacist, and discussions with available family at bedside.    Signed: Davis Candelaria MD  12:32 EDT 6/20/2025

## 2025-06-20 NOTE — PROGRESS NOTES
Called daughter, Dipti, at 1622 to update on patient's condition and code status. Emotional support provided throughout discussion.

## 2025-06-20 NOTE — CONSULTS
[facility] Cardiology  Consultation H&P    Patient: Sydnie Gamble  1960  [phone]  There is no work phone number on file.      PCP:  Dee Elena APRN   Treatment Team:   Attending Provider: Kailyn Estevez MD  Consulting Physician: Mare Mcclelland MD  Consulting Physician: Sejal Mckenzie MD  Consulting Physician: Hever Scott III, MD  Cardiologist: Hever Scott III, MD  Admitting Provider: Kailyn Estevez MD   6/12/2025      DATE OF CONSULTATION: 6/20/2025 11:50 EDT     REASON FOR CONSULTATION: Paroxysmal atrial fibrillation with RVR      ASSESSMENT/PLAN:  Paroxysmal atrial fibrillation with RVR: [id number] equal to 3, continue anticoagulation with Eliquis 5 mg p.o. twice daily.  Status post digoxin 500 mcg IV x 1.  1 L normal saline.  Currently on amiodarone infusion at 0.5 mg/min.  Persistent marginal hemodynamics.  Recommending transitioning to ICU and initiating phenylephrine drip with plan for ECV this afternoon if pharmacologic cardioversion does not occur over the next 3 to 4 hours, we will tentatively schedule for 1500.    May 2025  TTE    Left ventricular ejection fraction appears to be 66 - 70%.    Normal right ventricular cavity size, wall thickness, systolic function and septal motion noted.    There is a small (<1cm) pericardial effusion adjacent to the right ventricle. There is no evidence of cardiac tamponade.    There is a left pleural effusion.  Left lower extremity venous duplex: Acute left peroneal DVT    History of Present Illness   65-year-old female with recent diagnosis of lung adenocarcinoma status post CyberKnife with known malignant effusion.  On 3 L home O2.  Presented with worsening dyspnea secondary to symptomatic pleural effusion requiring placement of Pleurx catheter.  Recent left lower extremity DVT, has been on anticoagulation without significant interruption since May 15.  Developed A-fib with RVR this morning  with marginal hemodynamics.  Reporting mild chest discomfort since onset of arrhythmia.  Otherwise she is fairly unaware of the underlying rhythm change.    OBJECTIVE:  Vitals:    06/20/25 0800 06/20/25 0825 06/20/25 0850 06/20/25 1000   BP:  99/74  (!) 85/55   BP Location:  Left arm     Patient Position:  Lying     Pulse: 100 101 101 (!) 180   Resp:  10 18    Temp:  97.8 °F (36.6 °C)     TempSrc:  Oral     SpO2: 98% 100% 95% 95%   Weight:       Height:         I/O last 3 completed shifts:  In: -   Out: 100 [Chest Tube:100]  No intake/output data recorded.  Intake & Output (last 3 days)         06/17 0701 06/18 0700 06/18 0701 06/19 0700 06/19 0701 06/20 0700 06/20 0701  06/21 0700    P.O.        Total Intake(mL/kg)        Urine (mL/kg/hr)   0 (0)     Stool 0  0     Chest Tube 250  100     Total Output 250  100     Net -250  -100             Urine Unmeasured Occurrence  1 x 3 x     Stool Unmeasured Occurrence 1 x 3 x 2 x              PHYSICAL EXAMINATION:    General Appearance:    Alert, cooperative, no distress, appears stated age.  Frail   Head:    Normocephalic, without obvious abnormality, atraumatic   Eyes:    PERRL, conjunctivae/corneas clear, EOMs intact, fundi     benign, both eyes   Ears:    Normal TMs and external ear canals, both ears   Nose:   Nares normal, septum midline, mucosa normal, no drainage    or sinus tenderness   Throat:   Lips, mucosa, and tongue normal; teeth and gums normal   Neck:   Supple, symmetrical, trachea midline, no adenopathy;     thyroid:  no enlargement/tenderness/nodules; no carotid    bruit or JVD   Back:     Symmetric, no curvature, ROM normal, no CVA tenderness   Lungs:   Diminished to right base, respirations unlabored   Chest Wall:    No tenderness or deformity    Heart:  Irregular, tacky, S1 and S2 normal, no murmur, rub   or gallop, normal carotid impulse bilaterally without bruit.   Abdomen:     Soft, nontender, bowel sounds active all four quadrants,     no masses,  no organomegaly   Extremities:   Extremities normal, atraumatic, no cyanosis or edema   Pulses:   2+ and symmetric all extremities   Skin:   Skin color, texture, turgor normal, no rashes or lesions   Lymph nodes:   Cervical, supraclavicular, and axillary nodes normal   Neurologic:   CNII-XII intact, normal strength, sensation and reflexes     throughout     PROBLEM LIST:    Recurrent pleural effusion    Acquired hypothyroidism    HLD (hyperlipidemia)    Adenocarcinoma of right lung    Severe protein-calorie malnutrition      Past Medical History:   Diagnosis Date    Acid reflux     Acid reflux     Adenocarcinoma of lung 08/12/2024    Anemia, chronic disease 5/13/2025    Anxiety 1976    Arthritis     Atherosclerotic cardiovascular disease 03/25/2024    Cervical cancer     Depression     Emphysema of lung     History of radiation therapy 11/20/2020    pelvis + intracavitary brachytherapy    History of radiation therapy 10/04/2024    RUL lung    Hx of radiation therapy     Hyperlipidemia     Hypothyroidism     Kidney disease     Lung cancer     Lung nodule     Ovarian cyst 1980s    Pleural effusion 5/13/2025    Pulmonary embolism 5/13/2025    Visual impairment corrected with glasses    Wears dentures     Wears glasses      Past Surgical History:   Procedure Laterality Date    BRONCHOSCOPY      BRONCHOSCOPY WITH ION ROBOTIC ASSIST N/A 08/06/2024    Procedure: BRONCHOSCOPY NAVIGATION WITH ENDOBRONCHIAL ULTRASOUND AND ION ROBOT;  Surgeon: Toni Mcclelland MD;  Location:  [facility];  Service: Robotics - Pulmonary;  Laterality: N/A;    COLONOSCOPY      HYSTEROSCOPY      LUNG BIOPSY      LYMPH NODE BIOPSY      MOHS SURGERY  03/24/2025    Groin    PLEURAL CATHETER INSERTION Right 6/17/2025    Procedure: PLEURX CATHETER INSERTION;  Surgeon: Cliff Gross MD;  Location:  [facility];  Service: Cardiothoracic;  Laterality: Right;    SUBTOTAL HYSTERECTOMY  1987?    ovary/fallopian tube removed    TOTAL ABDOMINAL  HYSTERECTOMY PELVIC NODE DISSECTION N/A 08/18/2020    Procedure: TOTAL RADICAL HYSTERECTOMY PELVIC NODE DISSECTION;  Surgeon: Laura Jimenez MD;  Location: [facility];  Service: Gynecology Oncology;  Laterality: N/A;    TUBAL ABDOMINAL LIGATION      right with ovarian dissection        Allergies  No Known Allergies    Current Medications    Current Facility-Administered Medications:     acetaminophen (TYLENOL) tablet 650 mg, 650 mg, Oral, Q4H PRN **OR** acetaminophen (TYLENOL) 160 MG/5ML oral solution 650 mg, 650 mg, Oral, Q4H PRN **OR** acetaminophen (TYLENOL) suppository 650 mg, 650 mg, Rectal, Q4H PRN, Kaila Jose PA-C    amiodarone 360 mg in 200 mL D5W infusion, 0.5 mg/min, Intravenous, Continuous, Last Rate: 16.67 mL/hr at 06/20/25 0955, 0.5 mg/min at 06/20/25 0955 **FOLLOWED BY** amiodarone 360 mg in 200 mL D5W infusion, 0.5 mg/min, Intravenous, Continuous, Kailyn Estevez MD    apixaban (ELIQUIS) tablet 5 mg, 5 mg, Oral, Q12H, Kailyn Estevez MD, 5 mg at 06/20/25 0828    arformoterol (BROVANA) nebulizer solution 15 mcg, 15 mcg, Nebulization, BID - RT, 15 mcg at 06/20/25 0850 **AND** budesonide (PULMICORT) nebulizer solution 0.5 mg, 0.5 mg, Nebulization, BID - RT, 0.5 mg at 06/20/25 0850 **AND** revefenacin (YUPELRI) nebulizer solution 175 mcg, 175 mcg, Nebulization, Daily - RT, Kaila Jose PA-C, 175 mcg at 06/20/25 0850    atorvastatin (LIPITOR) tablet 10 mg, 10 mg, Oral, Nightly, Kaila Jose PA-C, 10 mg at 06/19/25 2127    sennosides-docusate (PERICOLACE) 8.6-50 MG per tablet 2 tablet, 2 tablet, Oral, BID, 2 tablet at 06/16/25 2046 **AND** polyethylene glycol (MIRALAX) packet 17 g, 17 g, Oral, Daily PRN **AND** bisacodyl (DULCOLAX) EC tablet 5 mg, 5 mg, Oral, Daily PRN, 5 mg at 06/15/25 0924 **AND** bisacodyl (DULCOLAX) suppository 10 mg, 10 mg, Rectal, Daily PRN, Kaila Jose PA-C    Calcium Replacement - Follow Nurse / BPA Driven Protocol, , Not Applicable, PRN, Kaila Jose PA-C     cyclobenzaprine (FLEXERIL) tablet 10 mg, 10 mg, Oral, BID PRN, Kaila Jose PA-C, 10 mg at 06/14/25 2235    dronabinol (MARINOL) capsule 5 mg, 5 mg, Oral, BID, Kaylene Reynoso, APRN, 5 mg at 06/20/25 0828    famotidine (PEPCID) tablet 40 mg, 40 mg, Oral, BID PRN, Kaila Jose PA-C    HYDROmorphone (DILAUDID) injection 0.5 mg, 0.5 mg, Intravenous, Q2H PRN, 0.5 mg at 06/20/25 0836 **AND** naloxone (NARCAN) injection 0.4 mg, 0.4 mg, Intravenous, Q5 Min PRN, Kaylene Reynoso, APRN    ipratropium-albuterol (DUO-NEB) nebulizer solution 3 mL, 3 mL, Nebulization, Q4H PRN, Kaila Jose PA-C, 3 mL at 06/15/25 1719    levothyroxine (SYNTHROID, LEVOTHROID) tablet 88 mcg, 88 mcg, Oral, Q AM, Kaila Jose PA-C, 88 mcg at 06/20/25 0653    lidocaine PF 1% (XYLOCAINE) injection 5 mL, 5 mL, Injection, Once, Kaila Jose PA-C    magnesium hydroxide (MILK OF MAGNESIA) suspension 10 mL, 10 mL, Oral, Once, Kailyn Estevez MD    Magnesium Standard Dose Replacement - Follow Nurse / BPA Driven Protocol, , Not Applicable, PRN, Kaila Jose PA-C    mineral oil enema 1 enema, 1 enema, Rectal, Once PRN, Kaylene Reynoso E, APRN    nystatin (MYCOSTATIN) 100,000 unit/mL suspension 500,000 Units, 5 mL, Oral, 4x Daily, Kaila Jose PA-C, 500,000 Units at 06/20/25 0828    ondansetron ODT (ZOFRAN-ODT) disintegrating tablet 4 mg, 4 mg, Oral, Q6H PRN **OR** ondansetron (ZOFRAN) injection 4 mg, 4 mg, Intravenous, Q6H PRN, Kaila Jose PA-C    oxyCODONE (ROXICODONE) immediate release tablet 5 mg, 5 mg, Oral, Q4H PRN, Kaylene Reynoso APRN    Phosphorus Replacement - Follow Nurse / BPA Driven Protocol, , Not Applicable, PRN, Kaila Jose PA-C    Potassium Replacement - Follow Nurse / BPA Driven Protocol, , Not Applicable, PRN, Kaila Jose PA-C    sertraline (ZOLOFT) tablet 100 mg, 100 mg, Oral, Daily, Kaila Jose PA-C, 100 mg at 06/20/25 0828    simethicone (MYLICON) chewable tablet 80 mg, 80 mg, Oral, 4x Daily  PRN, Ryan, Kaila, PA-C, 80 mg at 06/15/25 2040    sodium chloride 0.9 % flush 10 mL, 10 mL, Intravenous, PRN, Damon, Kaila, PA-C, 10 mL at 25 0852    sodium chloride 0.9 % infusion 40 mL, 40 mL, Intravenous, PRN, Ryan, Kaila, PA-C    sodium chloride 0.9 % infusion, 75 mL/hr, Intravenous, Continuous, Kailyn Estevez MD, Last Rate: 75 mL/hr at 25 1130, 75 mL/hr at 25 1130    traZODone (DESYREL) tablet 150 mg, 150 mg, Oral, Nightly, Ryan, Kaila, PA-C, 150 mg at 25 2127  amiodarone, 0.5 mg/min, Last Rate: 0.5 mg/min (25 0955)   Followed by  amiodarone, 0.5 mg/min  sodium chloride, 75 mL/hr, Last Rate: 75 mL/hr (25 1130)            ROS  All systems were reviewed and negative except for:  Constitution:  positive for fatigue  Respiratory: positive for  shortness of air  Cardiovascular: positive for  chest pressure / pain, at rest and irregular pulse      SOCIAL HX  Social History     Socioeconomic History    Marital status:    Tobacco Use    Smoking status: Former     Current packs/day: 0.00     Average packs/day: 1.5 packs/day for 45.0 years (67.5 ttl pk-yrs)     Types: Cigarettes     Start date: 1972     Quit date: 3/30/2019     Years since quittin.2     Passive exposure: Past    Smokeless tobacco: Never    Tobacco comments:     quit analog cigs in march, still vapes daily   Vaping Use    Vaping status: Every Day    Substances: Nicotine   Substance and Sexual Activity    Alcohol use: No    Drug use: No    Sexual activity: Not Currently     Partners: Male       FAMILY HX  Family History   Problem Relation Age of Onset    Cancer Mother     Hypertension Mother     Mental illness Mother     Anxiety disorder Mother     Asthma Mother     COPD Mother     Depression Mother     Emphysema Mother     Uterine cancer Mother     Melanoma Mother     Cancer Father     Early death Father     Cancer Maternal Aunt     Cancer Maternal Aunt     Cancer Maternal Aunt     Cancer  Maternal Aunt     Cancer Maternal Grandmother     Heart attack Maternal Grandfather     Heart disease Maternal Grandfather         Heart Attack 65 yo    Breast cancer Neg Hx     Ovarian cancer Neg Hx          Diagnostic Data:  Lab Results (last 24 hours)       Procedure Component Value Units Date/Time    Hepatic Function Panel [[id number]] Collected: 06/20/25 0410    Specimen: Blood Updated: 06/20/25 1110    Magnesium [[id number]] Collected: 06/20/25 0410    Specimen: Blood Updated: 06/20/25 0937    Basic Metabolic Panel [[id number]]  (Abnormal) Collected: 06/20/25 0410    Specimen: Blood Updated: 06/20/25 0454     Glucose 106 mg/dL      BUN 52.4 mg/dL      Creatinine 1.06 mg/dL      Sodium 124 mmol/L      Potassium 4.0 mmol/L      Chloride 90 mmol/L      CO2 20.0 mmol/L      Calcium 7.4 mg/dL      BUN/Creatinine Ratio 49.4     Anion Gap 14.0 mmol/L      eGFR 57.7 mL/min/1.73     Narrative:      GFR Categories in Chronic Kidney Disease (CKD)              GFR Category          GFR (mL/min/1.73)    Interpretation  G1                    90 or greater        Normal or high (1)  G2                    60-89                Mild decrease (1)  G3a                   45-59                Mild to moderate decrease  G3b                   30-44                Moderate to severe decrease  G4                    15-29                Severe decrease  G5                    14 or less           Kidney failure    (1)In the absence of evidence of kidney disease, neither GFR category G1 or G2 fulfill the criteria for CKD.    eGFR calculation 2021 CKD-EPI creatinine equation, which does not include race as a factor    CBC (No Diff) [[id number]]  (Abnormal) Collected: 06/20/25 0410    Specimen: Blood Updated: 06/20/25 0427     WBC 24.17 10*3/mm3      RBC 3.25 10*6/mm3      Hemoglobin 8.9 g/dL      Hematocrit 28.7 %      MCV 88.3 fL      MCH 27.4 pg      MCHC 31.0 g/dL      RDW 15.9 %      RDW-SD 49.4 fl      MPV 9.9 fL      Platelets 145  10*3/mm3           ECG/EMG Results (last 24 hours)       Procedure Component Value Units Date/Time    ECG 12 Lead Rhythm Change [[id number]] Collected: 06/19/25 0642     Updated: 06/19/25 1555     QT Interval 348 ms      QTC Interval 455 ms     Narrative:      Test Reason : Rhythm Change  Blood Pressure :   */*   mmHG  Vent. Rate : 103 BPM     Atrial Rate : 103 BPM     P-R Int : 112 ms          QRS Dur :  74 ms      QT Int : 348 ms       P-R-T Axes :  50 -16  43 degrees    QTcB Int : 455 ms    Sinus tachycardia  Otherwise normal ECG  When compared with ECG of 05-Jun-2025 05:32,  Non-specific change in ST segment in Inferior leads  Confirmed by SELENA COVINGTON MD ([id number]) on 6/19/2025 3:55:30 PM    Referred By:            Confirmed By: SELENA COVINGTON MD    ECG 12 Lead Rhythm Change [[id number]] Collected: 06/20/25 0913     Updated: 06/20/25 1033     QT Interval 236 ms      QTC Interval 418 ms     Narrative:      Test Reason : Rhythm Change  Blood Pressure :   */*   mmHG  Vent. Rate : 189 BPM     Atrial Rate :   * BPM     P-R Int :   * ms          QRS Dur :  84 ms      QT Int : 236 ms       P-R-T Axes :   * -31 146 degrees    QTcB Int : 418 ms    Atrial fibrillation with rapid ventricular response  Left axis deviation  ST & T wave abnormality, consider inferior ischemia  Abnormal ECG  When compared with ECG of 19-Jun-2025 06:42,  Atrial fibrillation has replaced Sinus rhythm  Vent. rate has increased by  86 bpm  ST now depressed in Anterolateral leads  T wave inversion now evident in Lateral leads    Referred By:            Confirmed By:                  Recurrent pleural effusion    Acquired hypothyroidism    HLD (hyperlipidemia)    Adenocarcinoma of right lung    Severe protein-calorie malnutrition          Hever Scott III, MD   11:50 EDT 6/20/2025

## 2025-06-20 NOTE — ACP (ADVANCE CARE PLANNING)
Patient not oriented to self, location, or year. MOST form on chart not valid as never signed by physician. Daughter reports patient's wishes since that time are DNR/DNI. Spouse in agreement with electing DNR/DNI due to patient's condition and poor prognosis.

## 2025-06-20 NOTE — PROGRESS NOTES
[facility] Medicine Services  PROGRESS NOTE    Patient Name: Sydnie Gamble  : 1960  MRN: [id number]    Date of Admission: 2025  Primary Care Physician: Dee Elena APRN    Subjective   Subjective     CC: dyspnea    HPI:  More confused yesterday per therapy report  She has some asterixis on my exam - see prior h/o hepatic steatosis but no cirrhosis. Having regular BM's.  Today she persists in being drowsy, pain meds decreased yesterday    Converted to Afib w RVR in AM, transferred to ICU. See significant note from day    Objective   Objective     Vital Signs:   Temp:  [96.8 °F (36 °C)-97.9 °F (36.6 °C)] 97.8 °F (36.6 °C)  Heart Rate:  [] 180  Resp:  [10-22] 18  BP: ()/(49-87) 85/55  Flow (L/min) (Oxygen Therapy):  [1-3] 1     Physical Exam:  Constitutional: No acute distress, awake, alert older female lying in bed.  bedside  Respiratory: Clear to auscultation bilaterally, respiratory effort mildly increased on 2 liters, PleurX capped  Cardiovascular: IRIR tachy to 180's, no murmurs, rubs, or gallops  Gastrointestinal: Soft, nontender, nondistended  Musculoskeletal: Muscle tone decreased throughout, no joint effusions appreciated  Psychiatric: Drowsy  Neurologic: Drowsy, asterixis present  Skin: No rashes    Results Reviewed:  LAB RESULTS:      Lab 25  0410 25  0413 25  0335 25  1735 25  1043 25  0424 06/15/25  0446 25  0456   WBC 24.17* 21.36* 18.46*  --   --  18.84* 19.96* 21.39*   HEMOGLOBIN 8.9* 9.6* 9.8*  --   --  10.3* 9.9* 10.3*   HEMATOCRIT 28.7* 30.6* 32.7*  --   --  34.2 32.2* 33.5*   PLATELETS 145 174 217  --   --  285 221 215   NEUTROS ABS  --   --   --   --   --  14.61* 16.23* 17.02*   IMMATURE GRANS (ABS)  --   --   --   --   --  0.76* 0.44* 0.60*   LYMPHS ABS  --   --   --   --   --  0.80 0.76 0.91   MONOS ABS  --   --   --   --   --  1.50* 1.52* 1.67*   EOS ABS  --   --   --   --    --  1.08* 0.93* 1.11*   MCV 88.3 88.2 90.1  --   --  91.4 89.7 90.1   HEPARIN ANTI-XA  --   --  0.38 0.30 0.27* 0.44 0.46 0.42         Lab 06/20/25  0410 06/19/25  0413 06/18/25  0335 06/17/25  1043 06/16/25  0424 06/14/25  0456   SODIUM 124* 128* 130*  --  131* 134*   POTASSIUM 4.0 4.4 4.5 4.1 4.3 4.2   CHLORIDE 90* 92* 92*  --  94* 97*   CO2 20.0* 19.9* 21.1*  --  26.0 25.0   ANION GAP 14.0 16.1* 16.9*  --  11.0 12.0   BUN 52.4* 43.4* 32.7*  --  25.5* 23.0   CREATININE 1.06* 1.08* 1.08*  --  0.87 0.85   EGFR 57.7* 56.4* 56.4*  --  73.1 75.2   GLUCOSE 106* 111* 107*  --  117* 108*   CALCIUM 7.4* 7.3* 7.4*  --  7.5* 7.7*   MAGNESIUM  --  3.2*  --   --   --   --          Lab 06/18/25 0335   TOTAL PROTEIN 5.4*   ALBUMIN 2.3*   GLOBULIN 3.1   ALT (SGPT) 21   AST (SGOT) 71*   BILIRUBIN 0.3   ALK PHOS 493*                       Brief Urine Lab Results       None            Microbiology Results Abnormal       None            XR Chest 1 View  Result Date: 6/20/2025  XR CHEST 1 VW Date of Exam: 6/20/2025 10:36 AM EDT Indication: leukocytosis, review possible PNA Comparison: AP chest x-ray 6/19/2025, AP chest x-ray 6/18/2025, CT chest 5/13/2025 Findings: Right pleural catheter appears stable. There is stable blunting of each lateral costophrenic angle. Pleural fluid at the apex has increased compared with yesterday's chest x-ray, likely due to positioning (semierect versus upright). Airspace opacities in  the mid to lower right lung and in the left lung base appear stable. Cardiomediastinal contours are stable.     Impression: Impression: Overall stable appearance of the chest with right pleural catheter, small bilateral pleural effusions, and patchy bilateral opacities, which may be due to atelectasis and/or pneumonia. Electronically Signed: Maryellen Dalton MD  6/20/2025 11:22 AM EDT  Workstation ID: [id number]    XR Chest 1 View  Result Date: 6/19/2025  XR CHEST 1 VW Date of Exam: 6/19/2025 4:10 AM EDT Indication: Recurrent  pleural effusions Comparison: 6/18/2025 Findings: Heart is normal. There is a right-sided pleurodesis catheter noted with a persistent small right pleural effusion. Areas of patchy bibasilar airspace disease not significantly changed from the prior study. Small left pleural effusion stable. Negative for  pneumothorax.     Impression: Impression: 1. Stable right-sided pleurodesis catheter with small right pleural effusion. 2. Stable small left pleural effusion. 3. No pneumothorax. 4. Unchanged bibasilar airspace disease which may relate to atelectasis and/or pneumonia. Electronically Signed: Stuart Romero MD  6/19/2025 7:42 AM EDT  Workstation ID: [id number]      Results for orders placed during the hospital encounter of 05/13/25    Adult Transthoracic Echo Complete w/ Color, Spectral and Contrast if necessary per protocol    Interpretation Summary    Left ventricular ejection fraction appears to be 66 - 70%.    Normal right ventricular cavity size, wall thickness, systolic function and septal motion noted.    There is a small (<1cm) pericardial effusion adjacent to the right ventricle. There is no evidence of cardiac tamponade.    There is a left pleural effusion.      Current medications:  Scheduled Meds:apixaban, 5 mg, Oral, Q12H  arformoterol, 15 mcg, Nebulization, BID - RT   And  budesonide, 0.5 mg, Nebulization, BID - RT   And  revefenacin, 175 mcg, Nebulization, Daily - RT  atorvastatin, 10 mg, Oral, Nightly  dronabinol, 5 mg, Oral, BID  levothyroxine, 88 mcg, Oral, Q AM  lidocaine PF 1%, 5 mL, Injection, Once  magnesium hydroxide, 10 mL, Oral, Once  nystatin, 5 mL, Oral, 4x Daily  senna-docusate sodium, 2 tablet, Oral, BID  sertraline, 100 mg, Oral, Daily  traZODone, 150 mg, Oral, Nightly      Continuous Infusions:amiodarone, 0.5 mg/min, Last Rate: 0.5 mg/min (06/20/25 0955)   Followed by  amiodarone, 0.5 mg/min  sodium chloride, 75 mL/hr, Last Rate: 75 mL/hr (06/20/25 1130)        PRN Meds:.  acetaminophen  **OR** acetaminophen **OR** acetaminophen    senna-docusate sodium **AND** polyethylene glycol **AND** bisacodyl **AND** bisacodyl    Calcium Replacement - Follow Nurse / BPA Driven Protocol    cyclobenzaprine    famotidine    HYDROmorphone **AND** naloxone    ipratropium-albuterol    Magnesium Standard Dose Replacement - Follow Nurse / BPA Driven Protocol    mineral oil    ondansetron ODT **OR** ondansetron    oxyCODONE    Phosphorus Replacement - Follow Nurse / BPA Driven Protocol    Potassium Replacement - Follow Nurse / BPA Driven Protocol    simethicone    sodium chloride    sodium chloride    Assessment & Plan   Assessment & Plan     Active Hospital Problems    Diagnosis  POA    **Recurrent pleural effusion [J90]  Yes    Severe protein-calorie malnutrition [E43]  Yes    Adenocarcinoma of right lung [C34.91]  Yes    HLD (hyperlipidemia) [E78.5]  Yes    Acquired hypothyroidism [E03.9]  Yes      Resolved Hospital Problems   No resolved problems to display.        Brief Hospital Course to date:  Sydnie Gamble is a 65 y.o. female w recent diagnosis of lung adenocarcinoma s/p [employer] c/b malignant effusion on home 3 liters n/c since 5/2025 undergoing keytruda treatment w Dr Mckenzie who presented w worsening dyspnea and found to have worsening recurrent pleural effusion.  PleurX was placed w assistance of CT surgery with plan for SNF discharge. Patient with poor functional status, activity limited by debility and dyspnea both. GOC conversations also ongoing in setting of severe cancer and deteriorating functional status  On 6/20, patient went into acute Afib w RVR w average rate 180's, SBP 80-90's.  S/p digoxin, on amiodarone gtt, transferred to ICU on 6/20. Likely cardioversion this PM    Acute Afib w RVR w hypotension  -ICU transfer  -s/p digoxin x1, amiodarone gtt ongoing  -was on uninterrupted anticoagulation, heparin gtt angus-procedurally from PleurX: poss cardioversion this PM  -new Afib diagnosis.  Has recent ECHO and TSH already    Hyponatremia, likely hypovolemic  -poor intake, worsened  -started IVF before transfer w more frequent Na checks    Worsening leukocytosis  -CXR w atelectasis v PNA, no other source identified to now. Consideration of abx, now per ICU team    Lung adenocarcinoma c/b malignant effusion, now s/p PleurX  Chronic hypoxic resp failure on 3 liters n/c since diagnosis  -home 3 liters n/c from last dc, back to this now but w significant dyspnea w activity  -PleurX, drain to empty 3x/week. Last drained on 6/19  -OP keytruda treatment to resume when patient amenable. D/w Dr Mckenzie 6/17 likely dispo to SNF, will   reschedule follow-up for after rehab      Pain control - last IV pain med dose 6/18 PM, PO med plan per palliative  Acute on chronic debility - EOB activity only currently, SNF recs and patient + spouse now agreeable  Hypothyroidism - home meds  Anxiety/depression - home meds  Recent diagnosis DVT/PE (5/2025) -home eliquis resumed    Overall, compounding health issues in patient with advanced lung cancer. Poor functional status. Updated palliative 6/20 as well as to ICU transfer and worsening. Has been full code to now      VTE Prophylaxis:  Pharmacologic VTE prophylaxis orders are present.         AM-PAC 6 Clicks Score (PT): 13 (06/20/25 8938)    CODE STATUS:   Code Status and Medical Interventions: CPR (Attempt to Resuscitate); Full Support; also talked over with  Jose Francisco   Ordered at: 06/12/25 0080     Code Status (Patient has no pulse and is not breathing):    CPR (Attempt to Resuscitate)     Medical Interventions (Patient has pulse or is breathing):    Full Support     Comments:    also talked over with  Jose Francisco     Level Of Support Discussed With:    Patient       Next of Kin (If No Surrogate)       Kailyn Estevez MD  06/20/25    Total time spent: Time Spent: Time Spent: >60 minutes  Time spent includes time reviewing chart, face-to-face time, counseling  patient/family/caregiver, ordering medications/tests/procedures, communicating with other health care professionals, documenting clinical information in the electronic health record, and coordination of care.   Significant bedside time in stabilization of patient on 6/20. See significant note

## 2025-06-20 NOTE — PROGRESS NOTES
DATE OF VISIT: 6/20/2025    Chief Complaint: Followup for metastatic right lower lobe lung cancer     SUBJECTIVE: The patient is laying comfortable in bed.  Her sister is at the bedside.  She is complaining of abdominal discomfort.    REVIEW OF SYSTEMS: All the other 9 systems are reviewed by me and negative  except what is mentioned in HPI.     Past History:  Medical History: has a past medical history of Acid reflux, Acid reflux, Adenocarcinoma of lung (08/12/2024), Anemia, chronic disease (5/13/2025), Anxiety (1976), Arthritis, Atherosclerotic cardiovascular disease (03/25/2024), Cervical cancer, Depression, Emphysema of lung, History of radiation therapy (11/20/2020), History of radiation therapy (10/04/2024), radiation therapy, Hyperlipidemia, Hypothyroidism, Kidney disease, Lung cancer, Lung nodule, Ovarian cyst (1980s), Pleural effusion (5/13/2025), Pulmonary embolism (5/13/2025), Visual impairment (corrected with glasses), Wears dentures, and Wears glasses.   Surgical History: has a past surgical history that includes Tubal ligation; total abdominal hysterectomy pelvic node dissection (N/A, 08/18/2020); Lymph node biopsy; Subtotal Hysterectomy (1987?); Bronchoscopy; Lung biopsy; BRONCHOSCOPY WITH ION ROBOTIC ASSIST (N/A, 08/06/2024); Mohs surgery (03/24/2025); Hysteroscopy; Colonoscopy; and Pleural Catheter Insertion (Right, 6/17/2025).   Family History: family history includes Anxiety disorder in her mother; Asthma in her mother; COPD in her mother; Cancer in her father, maternal aunt, maternal aunt, maternal aunt, maternal aunt, maternal grandmother, and mother; Depression in her mother; Early death in her father; Emphysema in her mother; Heart attack in her maternal grandfather; Heart disease in her maternal grandfather; Hypertension in her mother; Melanoma in her mother; Mental illness in her mother; Uterine cancer in her mother.   Social History: reports that she quit smoking about 6 years ago. Her  smoking use included cigarettes. She started smoking about 53 years ago. She has a 67.5 pack-year smoking history. She has been exposed to tobacco smoke. She has never used smokeless tobacco. She reports that she does not drink alcohol and does not use drugs.    Medications Prior to Admission   Medication Sig Dispense Refill Last Dose/Taking    albuterol sulfate  (90 Base) MCG/ACT inhaler Inhale 2 puffs Every 4 (Four) to 6 (Six) Hours As Needed for Wheezing or Shortness of Air.   Taking As Needed    atorvastatin (LIPITOR) 10 MG tablet TAKE 1 TABLET BY MOUTH EVERY DAY AT NIGHT 90 tablet 1 6/11/2025    clonazePAM (KlonoPIN) 1 MG tablet Take 1 tablet by mouth 2 (Two) Times a Day As Needed for Anxiety. 50 tablet 0 6/12/2025 Morning    Diclofenac Sodium (VOLTAREN) 1 % gel gel Apply 1 gram topically to indicated area 4 times daily as needed for mild to moderate pain. STOP FLEXERIL 100 g 2 Taking    docusate sodium (COLACE) 100 MG capsule Take 2 capsules by mouth Daily. 60 capsule 3 6/12/2025 Morning    famotidine (PEPCID) 20 MG tablet TAKE 1 TABLET BY MOUTH TWICE DAILY OR TAKE 2 TABLETS BY MOUTH ONCE DAILY AS NEEDED FOR HEARTBURN 180 tablet 1 Taking    Fluticasone-Umeclidin-Vilant (Trelegy Ellipta) 100-62.5-25 MCG/ACT inhaler Inhale 1 puff Daily. 90 each 3 6/12/2025 Morning    levothyroxine (Synthroid) 88 MCG tablet Take 1 tablet by mouth Every Morning. 90 tablet 1 6/11/2025    ondansetron (ZOFRAN) 8 MG tablet Take 1 tablet by mouth 3 (Three) Times a Day As Needed for Nausea or Vomiting. 30 tablet 5 Taking As Needed    oxyCODONE-acetaminophen (PERCOCET) 5-325 MG per tablet Take 1 tablet by mouth Every 6 (Six) Hours As Needed for Moderate Pain. 120 tablet 0 6/12/2025 Morning    sertraline (Zoloft) 100 MG tablet Take 1 tablet by mouth Daily. 90 tablet 1 6/11/2025    traZODone (DESYREL) 150 MG tablet TAKE 1 TABLET BY MOUTH EVERY DAY AT NIGHT 90 tablet 3 6/11/2025    zolpidem (AMBIEN) 10 MG tablet Take 1 tablet by  mouth At Night As Needed for Sleep. 30 tablet 1 Past Month    [DISCONTINUED] apixaban (ELIQUIS) 5 MG tablet tablet Take 2 tablets by mouth Every 12 (Twelve) Hours for 6 days, THEN 1 tablet Every 12 (Twelve) Hours for 24 days. Indications: DVT/PE (active thrombosis) 48 tablet 5 6/11/2025 Morning    cyclobenzaprine (FLEXERIL) 10 MG tablet TAKE 1 TABLET BY MOUTH 2 TIMES A DAY AS NEEDED FOR MUSCLE SPASMS. 60 tablet 3       Allergies: Patient has no known allergies.     Current Facility-Administered Medications:     acetaminophen (TYLENOL) tablet 650 mg, 650 mg, Oral, Q4H PRN **OR** acetaminophen (TYLENOL) 160 MG/5ML oral solution 650 mg, 650 mg, Oral, Q4H PRN **OR** acetaminophen (TYLENOL) suppository 650 mg, 650 mg, Rectal, Q4H PRN, Kaila Jose PA-C    amiodarone 150 mg in 100 mL D5W (loading dose), 150 mg, Intravenous, Once, Davis Candelaria MD, Currently Infusing at 06/20/25 1228    amiodarone 360 mg in 200 mL D5W infusion, 1 mg/min, Intravenous, Continuous, Last Rate: 33.3 mL/hr at 06/20/25 1231, 1 mg/min at 06/20/25 1231 **FOLLOWED BY** amiodarone 360 mg in 200 mL D5W infusion, 0.5 mg/min, Intravenous, Continuous, Davis Candelaria MD    apixaban (ELIQUIS) tablet 5 mg, 5 mg, Oral, Q12H, Kailyn Estevez MD, 5 mg at 06/20/25 0828    arformoterol (BROVANA) nebulizer solution 15 mcg, 15 mcg, Nebulization, BID - RT, 15 mcg at 06/20/25 0850 **AND** budesonide (PULMICORT) nebulizer solution 0.5 mg, 0.5 mg, Nebulization, BID - RT, 0.5 mg at 06/20/25 0850 **AND** revefenacin (YUPELRI) nebulizer solution 175 mcg, 175 mcg, Nebulization, Daily - RT, Kaila Jose PA-C, 175 mcg at 06/20/25 0850    atorvastatin (LIPITOR) tablet 10 mg, 10 mg, Oral, Nightly, Kaila Jose PA-C, 10 mg at 06/19/25 2127    sennosides-docusate (PERICOLACE) 8.6-50 MG per tablet 2 tablet, 2 tablet, Oral, BID, 2 tablet at 06/16/25 2046 **AND** polyethylene glycol (MIRALAX) packet 17 g, 17 g, Oral, Daily PRN **AND** bisacodyl (DULCOLAX)  EC tablet 5 mg, 5 mg, Oral, Daily PRN, 5 mg at 06/15/25 0924 **AND** bisacodyl (DULCOLAX) suppository 10 mg, 10 mg, Rectal, Daily PRN, [place], Kaila, PA-C    Calcium Replacement - Follow Nurse / BPA Driven Protocol, , Not Applicable, PRN, [place], Kaila, PA-C    cyclobenzaprine (FLEXERIL) tablet 10 mg, 10 mg, Oral, BID PRN, [place], Kaila, PA-C, 10 mg at 06/14/25 2235    dronabinol (MARINOL) capsule 5 mg, 5 mg, Oral, BID, Kaylene Reynoso, APRN, 5 mg at 06/20/25 0828    famotidine (PEPCID) tablet 40 mg, 40 mg, Oral, BID PRN, [place], Kaila, PA-C    HYDROmorphone (DILAUDID) injection 0.5 mg, 0.5 mg, Intravenous, Q2H PRN, 0.5 mg at 06/20/25 0836 **AND** naloxone (NARCAN) injection 0.4 mg, 0.4 mg, Intravenous, Q5 Min PRN, Kaylene Reynoso, APRN    ipratropium-albuterol (DUO-NEB) nebulizer solution 3 mL, 3 mL, Nebulization, Q4H PRN, [place], Kaila, PA-C, 3 mL at 06/15/25 1719    levothyroxine (SYNTHROID, LEVOTHROID) tablet 88 mcg, 88 mcg, Oral, Q AM, [place], Kaila, PA-C, 88 mcg at 06/20/25 0653    Magnesium Standard Dose Replacement - Follow Nurse / BPA Driven Protocol, , Not Applicable, PRN, [place], Kaila, PA-C    mineral oil enema 1 enema, 1 enema, Rectal, Once PRN, Kaylene Reynoso, APRN    ondansetron ODT (ZOFRAN-ODT) disintegrating tablet 4 mg, 4 mg, Oral, Q6H PRN **OR** ondansetron (ZOFRAN) injection 4 mg, 4 mg, Intravenous, Q6H PRN, [place]on, Kaila, PA-C    oxyCODONE (ROXICODONE) immediate release tablet 5 mg, 5 mg, Oral, Q4H PRN, Kaylene Reynoso APRN    phenylephrine (PAIGE-SYNEPHRINE) 50 mg in sodium chloride 0.9 % 250 mL infusion, 0.5-3 mcg/kg/min, Intravenous, Titrated, Davis Candelaria MD    Phosphorus Replacement - Follow Nurse / BPA Driven Protocol, , Not Applicable, PRN, Kaila Jose PA-C    Potassium Replacement - Follow Nurse / BPA Driven Protocol, , Not Applicable, PRN, Kaila Jose PA-C    sertraline (ZOLOFT) tablet 100 mg, 100 mg, Oral, Daily, Kaila Jose PA-C, 100 mg at  "06/20/25 0828    simethicone (MYLICON) chewable tablet 80 mg, 80 mg, Oral, 4x Daily PRN, [place], Kaila, PA-C, 80 mg at 06/15/25 2040    sodium chloride 0.9 % flush 10 mL, 10 mL, Intravenous, PRN, [place], Kaila, PA-C, 10 mL at 06/19/25 0852    sodium chloride 0.9 % infusion 40 mL, 40 mL, Intravenous, PRN, [place], Kaila, PA-C    sodium chloride 0.9 % infusion, 75 mL/hr, Intravenous, Continuous, Kailyn Estevez MD, Last Rate: 75 mL/hr at 06/20/25 1130, 75 mL/hr at 06/20/25 1130    traZODone (DESYREL) tablet 150 mg, 150 mg, Oral, Nightly, [place], Kaila, PA-C, 150 mg at 06/19/25 2127    PHYSICAL EXAMINATION:   BP (!) 89/52   Pulse 89   Temp 97.6 °F (36.4 °C)   Resp 18   Ht 154.9 cm (60.98\")   Wt 55.8 kg (123 lb)   LMP  (LMP Unknown)   SpO2 98%   BMI 23.25 kg/m²                ECOG Performance Status: 3 - Symptomatic, >50% confined to bed  GENERAL: Age appropriate. No acute distress.   NEURO/PSYCH: A&O x 3, strength 5/5 in all muscle groups  HEENT: Head atraumatic, normocephalic.   NECK: Supple. No JVD. No lymphadenopathy.   LUNGS: Clear to auscultation bilaterally. No wheezing. No rhonchi.   HEART: Regular rate and rhythm. S1, S2, no murmurs.   ABDOMEN: Soft, nontender, nondistended. Bowel sounds positive. No  hepatosplenomegaly.   EXTREMITIES: No clubbing, cyanosis, or edema.   SKIN: No rashes. No purpura.       No results displayed because visit has over 200 results.          XR Chest 1 View  Result Date: 6/20/2025  Narrative: XR CHEST 1 VW Date of Exam: 6/20/2025 10:36 AM EDT Indication: leukocytosis, review possible PNA Comparison: [facility] chest x-ray 6/19/2025, [facility] chest x-ray 6/18/2025, CT chest 5/13/2025 Findings: Right pleural catheter appears stable. There is stable blunting of each lateral costophrenic angle. Pleural fluid at the apex has increased compared with yesterday's chest x-ray, likely due to positioning (semierect versus upright). Airspace opacities in  the mid to lower right lung and in the " left lung base appear stable. Cardiomediastinal contours are stable.     Impression: Impression: Overall stable appearance of the chest with right pleural catheter, small bilateral pleural effusions, and patchy bilateral opacities, which may be due to atelectasis and/or pneumonia. Electronically Signed: Maryellen Dalton MD  6/20/2025 11:22 AM EDT  Workstation ID: [id number]    XR Chest 1 View  Result Date: 6/19/2025  Narrative: XR CHEST 1 VW Date of Exam: 6/19/2025 4:10 AM EDT Indication: Recurrent pleural effusions Comparison: 6/18/2025 Findings: Heart is normal. There is a right-sided pleurodesis catheter noted with a persistent small right pleural effusion. Areas of patchy bibasilar airspace disease not significantly changed from the prior study. Small left pleural effusion stable. Negative for  pneumothorax.     Impression: Impression: 1. Stable right-sided pleurodesis catheter with small right pleural effusion. 2. Stable small left pleural effusion. 3. No pneumothorax. 4. Unchanged bibasilar airspace disease which may relate to atelectasis and/or pneumonia. Electronically Signed: Stuart Romero MD  6/19/2025 7:42 AM EDT  Workstation ID: [id number]    XR Chest 1 View  Result Date: 6/18/2025  Narrative: XR CHEST 1 VW Date of Exam: 6/18/2025 2:24 AM EDT Indication: Recurrent pleural effusions Comparison: 6/17/2025 Findings: Medium bore right pleural drain is in stable position. Heart shadow is upper normal size. Pulmonary vasculature appears normal. Right apical pleural fluid collection has decreased, now very small in size. Small area of patchy atelectasis is now seen in the right midlung. Basilar interstitial changes elsewhere appear stable. Mild left basilar discoid atelectasis is stable. No pneumothorax is seen.     Impression: Impression: Small remaining right apical pleural fluid collection. Mild new patchy right mid lung disease. No other new chest pathology is seen. Electronically Signed: Marlon Lyons MD  6/18/2025  7:16 AM EDT  Workstation ID: [id number]    XR Chest 1 View  Result Date: 6/17/2025  Narrative: XR CHEST 1 VW Date of Exam: 6/17/2025 8:34 AM EDT Indication: postop Comparison: 6/17/2025 earlier same day Findings: There has been interval placement of a right basilar thoracostomy tube, with significant decrease in amount of right pleural fluid. There is persistent fluid at the apex and a small amount of the base and minor fissure which may be loculated. No pneumothorax identified. There is consolidation in the right mid and lower lung which may be due to residual atelectasis or infiltrate. Small amount of left pleural fluid appears unchanged. Heart size and mediastinal contour appear unchanged.     Impression: Impression: Interval placement of right basilar thoracostomy tube with significant decrease in amount of right pleural fluid. There is persistent fluid at the apex and base which may be loculated. No pneumothorax. Electronically Signed: Rodriguez Napier MD  6/17/2025 9:06 AM EDT  Workstation ID: [id number]    XR Chest 1 View  Result Date: 6/17/2025  Narrative: XR CHEST 1 VW Date of Exam: 6/17/2025 2:45 AM EDT Indication: Recurrent pleural effusions Comparison: 6/16/2025 Findings: There has been slight further increase in right pleural fluid as compared to yesterday's study. There is consolidation in the overlying right lung suggesting atelectasis or infiltrate. There is a small left pleural effusion which is slightly increased from yesterday. No pneumothorax identified. Pulmonary vascularity appears within normal limits. Heart size appears stable.     Impression: Impression: 1.Interval increase in bilateral pleural effusions, right greater than left. 2.Consolidation in the right lung base may be due to atelectasis or infiltrate. Electronically Signed: Rodriguez Napier MD  6/17/2025 8:11 AM EDT  Workstation ID: [id number]    XR Chest 1 View  Result Date: 6/16/2025  Narrative: XR CHEST 1 VW Date of Exam: 6/16/2025 5:35 AM EDT  Indication: Recurrent pleural effusions Comparison: 6/15/2025 Findings: Right pleural drain has been removed and there is apparently increasing right basilar effusion. Right midlung interstitial changes are stable. There is minimally increased left basilar interstitial opacity perhaps a small focus of atelectasis. Heart and vasculature appear to be normal in size. No pneumothorax is seen.     Impression: Impression: Increasing right basilar effusion following right pleural drain removal. Electronically Signed: Marlon Lyons MD  6/16/2025 7:47 AM EDT  Workstation ID: [id number]    XR Chest 1 View  Result Date: 6/15/2025  Narrative: XR CHEST 1 VW Date of Exam: 6/15/2025 2:07 AM EDT Indication: Recurrent pleural effusions, right chest tube, follow-up Comparison: 6/14/2025. Findings: There is a right basilar pigtail chest tube in place. There may be some kinking of the chest tube possibly at the entry site. I would recommend clinical correlation. There is an unchanged moderate right pleural effusion with compressive atelectasis. The left lung and pleural space are clear. The heart size is normal. The pulmonary vascular markings are normal. There is no pneumothorax.     Impression: Impression: 1.There is a right basilar pigtail chest tube in place. There may be some kinking of the chest tube possibly at the entry site. I would recommend clinical correlation. 2.Unchanged moderate right pleural effusion with compressive atelectasis. Electronically Signed: Samuel Luna MD  6/15/2025 10:19 AM EDT  Workstation ID: [id number]    XR Chest 1 View  Result Date: 6/14/2025  Narrative: XR CHEST 1 VW Date of Exam: 6/14/2025 3:14 AM EDT Indication: Recurrent pleural effusions Comparison: Chest radiograph 6/13/2025. Findings: Unchanged position of right pleural catheter. Cardiomediastinal silhouette is unchanged. Moderate right pleural effusion with right basilar airspace disease and right basilar lucency suggestive of small-volume  pneumothorax, unchanged compared to yesterday's exam. Left lung appears clear. No pneumothorax. Osseous structures are unchanged.     Impression: Impression: No significant interval change. Electronically Signed: Franco Shaffer MD  6/14/2025 8:09 AM EDT  Workstation ID: [id number]    XR Chest 1 View  Result Date: 6/13/2025  Narrative: XR CHEST 1 VW Date of Exam: 6/13/2025 12:55 PM EDT Indication: Recurrent pleural effusions Comparison: [facility] chest x-ray 6/13/2025 timed at 4:43 a.m. Findings: Right pleural catheter remains in place. Moderate size right hydropneumothorax appears similar to numbers to today's earlier chest x-ray. Underlying right basilar airspace opacities are stable. Left lung is clear.     Impression: Impression: Moderate sized right hydropneumothorax appears similar to numbers to today's earlier chest x-ray. Right pleural catheter remains in place. Electronically Signed: Maryellen Dalton MD  6/13/2025 1:44 PM EDT  Workstation ID: [id number]    XR Chest 1 View  Result Date: 6/13/2025  Narrative: XR CHEST 1 VW Date of Exam: 6/13/2025 2:49 AM EDT Indication: post Ct placement and thoracentesis Comparison: [facility] chest x-ray 6/12/2025, 6/5/2025 Findings: There is a new right pleural catheter. Previously seen right basilar pleural air has now been replaced with pleural fluid. No apical pneumothorax is seen. There are stable underlying right basilar airspace opacities. Left lung appears clear. Cardiomediastinal contours are stable.     Impression: Impression: 1.Interval placement of right pleural catheter with fluid component of right basilar hydropneumothorax. 2.Stable underlying right basilar airspace opacities, likely due to atelectasis. Electronically Signed: Maryellen Dalton MD  6/13/2025 7:23 AM EDT  Workstation ID: [id number]    CT Guided Chest Tube  Result Date: 6/12/2025  Narrative: DATE OF EXAM: 6/12/2025 2:48 PM  PROCEDURE: CT GUIDED CHEST TUBE PLACEMENT-  INDICATIONS: right chest tube placement;  J95.811-Postprocedural pneumothorax  DLP: 746 mGycm  TECHNIQUE:  The risks, benefits, and alternatives were discussed in detail with the patient. The patient was brought down to the CT suite and positioned supine on the CT table. Preliminary CT scan of the the chest was performed and a skin entry site was selected and marked. The area was prepped and draped utilizing standard sterile technique. 1% lidocaine was administered to the soft tissues. A small skin incision was made with an 11 blade scalpel. Under CT guidance a 5 Finnish Yueh was advanced into the right pleural space. The inner stylet was removed and an 035 wire was advanced coaxially. Over the wire a 10 Finnish all-purpose drainage catheter was advanced with the pigtail formed and locked within the pleural space the catheter was sutured to the skin and attached to a Pleur-evac. A sterile dressing was then applied.  The patient tolerated the procedure well and there were no immediate complications.      Impression: Successful CT-guided 10 Finnish right sided chest tube placement.   6/12/2025 4:13 PM by Jose Hope MD on Workstation: [id number]      XR Chest 1 View  Result Date: 6/12/2025  Narrative: XR CHEST 1 VW Date of Exam: 6/12/2025 1:45 PM EDT Indication: s/p thoracentesis; c/o severe right neck pain Comparison: 6/12/2025 Findings: Cardiac size is similar to prior exam. Similar right-sided hydropneumothorax. Similar right basilar opacity. No evidence of acute osseous abnormalities. Visualized upper abdomen is unremarkable.     Impression: Impression: 1.Similar right-sided hydropneumothorax. 2.Similar right basilar opacity may reflect atelectasis or infection. Electronically Signed: Clay Johnson MD  6/12/2025 2:31 PM EDT  Workstation ID: [id number]    XR Chest 1 View  Result Date: 6/12/2025  Narrative: XR CHEST 1 VW Date of Exam: 6/12/2025 12:25 PM EDT Indication: s/p thoracentesis; c/o severe right neck pain Comparison: Chest radiograph 6/12/2025  Findings: Grossly stable size of the right hydropneumothorax, measuring up to 7 mm at apex with larger right subpulmonic component. Previous noted fluid extending towards the apex appears decreased. Left lung relatively clear. There is partial collapse and probable underlying atelectasis within the right lower lobe. Negative for left pneumothorax. Stable cardiomediastinal silhouette. Degenerative related osseous change.     Impression: Impression: Slightly decreased right pleural fluid component with otherwise similar appearing moderate size hydropneumothorax. Electronically Signed: Toni Argueta MD  6/12/2025 12:53 PM EDT  Workstation ID: [id number]    XR Chest 1 View  Result Date: 6/12/2025  Narrative: XR CHEST 1 VW Date of Exam: 6/12/2025 11:23 AM EDT Indication: s/p thoracentesis Comparison: Chest x-ray 6/5/2025 Findings: There is a pneumothorax on the right. It is moderate in size and seen inferiorly measuring up to 2.3 cm. There is a moderate mount of pleural fluid as well. There is right lower lobe airspace opacity which is improved from previous exam. Left lung is clear. Heart size is normal.     Impression: Impression: Moderate sized right hydropneumothorax. Electronically Signed: Susan Leahy MD  6/12/2025 12:41 PM EDT  Workstation ID: [id number]    US Thoracentesis  Result Date: 6/12/2025  Narrative: PROCEDURE: US THORACENTESIS-  ATTENDING: Jose Hope M.D.  CLINICAL HISTORY: Large right pleural effusion.  TECHNIQUE:  The risk, benefits, and alternatives were described in detail and the patient was brought to the ultrasound suite and positioned seated. The skin entry site was prepped and draped utilizing standard sterile technique. 1% lidocaine was administered to the soft tissues of the right posterior thorax.   A 5 [place] Yueh was advanced into the right pleural space.   A total of 1.4 L was removed from the right pleural space. A specimen was sent to the laboratory for analysis. The needle was  removed and a sterile dressing was applied.  The patient tolerated the procedure well and there were no complications.      Impression: Successful ultrasound-guided right thoracentesis.   Attestation Signer name: Jose Hope MD I attest that I was present for the entire procedure. I reviewed the stored images and agree with the report as written.      6/12/2025 12:17 PM by Jose Hope MD on Workstation: [id number]      XR Chest 1 View  Result Date: 6/5/2025  Narrative: XR CHEST 1 VW Date of Exam: 6/5/2025 6:41 AM EDT Indication: cough Comparison: Chest radiograph 6/5/2025 at 0422 hours Findings: There is been slight decrease in the size of the large right pleural effusion. There is persistent right pleural fluid and right basilar airspace disease. Airspace disease is most likely atelectasis with pneumonia not excluded. The left lung remains clear. There is no pneumothorax.     Impression: Impression: Slight decrease in the size of the large right pleural effusion. Electronically Signed: Rodriguez Singh MD  6/5/2025 7:00 AM EDT  Workstation ID: [id number]    XR Chest 1 View  Result Date: 6/5/2025  Narrative: XR CHEST 1 VW Date of Exam: 6/5/2025 4:18 AM EDT Indication: SOA triage protocol Comparison: Chest radiograph 5/28/2025 Findings: The heart size and pulmonary vascular markings are normal. The left lung is clear. There is a large right pleural effusion with associated right basilar atelectasis. There is no pneumothorax. The osseous structures are normal for age.     Impression: Impression: Large right pleural effusion with right basilar atelectasis. Electronically Signed: Rodriguez Singh MD  6/5/2025 4:47 AM EDT  Workstation ID: [id number]    US Thoracentesis  Result Date: 5/28/2025  Narrative: PROCEDURE: Ultrasound-guided right thoracentesis  Procedural Personnel Attending physician(s): Erik Mensah  Pre-procedure diagnosis: Right pleural effusion  Post-procedure diagnosis: Same  Complications: No immediate  complications.      Impression:  Ultrasound-guided thoracentesis with drainage of 1400 mL of serous fluid.   _______________________________________________________________   PROCEDURE SUMMARY:  - Limited thoracic ultrasound - Ultrasound-guided right thoracentesis - Additional procedure(s): None  PROCEDURE DETAILS:   Pre-procedure Consent: Informed consent for the procedure including risks, benefits and alternatives was obtained and time-out was performed prior to the procedure. Preparation: The site was prepared and draped using maximal sterile barrier technique including cutaneous antisepsis.    Limited thoracic ultrasound: Limited thoracic ultrasound was performed using a curved transducer. A safe window for thoracentesis was identified. Right hemithorax findings: Moderate to large right-sided pleural effusion   Thoracentesis  Local anesthesia was administered. The pleural space was accessed and fluid return confirmed position. The fluid was drained. The catheter was removed, and a sterile bandage was applied.  Catheter placed: 5 Armenian catheter.  Additional Details  Equipment details: None Specimens removed: Pleural fluid Estimated blood loss (mL): Less than 10   5/28/2025 12:05 PM by Erik Mensah MD on Workstation: [id number]      XR Chest 1 View  Result Date: 5/28/2025  Narrative: XR CHEST 1 VW Date of Exam: 5/28/2025 10:19 AM EDT Indication: S/P Right Thoracentesis Comparison: 5/14/2025 chest radiograph. Ultrasound-guided thoracentesis of 5/20/2025 Findings: By history, thoracentesis earlier today removed 1.4 L of fluid from the right hemithorax. Follow-up chest radiograph shows patchy right mid and lower lung atelectasis and perhaps a small amount of remaining pleural fluid, versus discoid atelectasis. There is no evidence of pneumothorax. Left lung remains clear. Heart and vasculature appear normal in size.     Impression: Impression: Patchy right mid and lower lung atelectasis and questionable small  remaining right effusion. No evidence of pneumothorax or other significant chest disease elsewhere. Electronically Signed: Marlon Lyons MD  5/28/2025 10:49 AM EDT  Workstation ID: [id number]    NM PET/CT Skull Base to Mid Thigh  Result Date: 5/28/2025  Narrative: FDG NM PET/CT SKULL BASE TO MID THIGH Date of Exam: 5/22/2025 8:25 AM EDT Indication: restaging scans lung cancer. Comparison: 5/13/2025 and 7/11/2024 Technique: 8.1 mCi of F-18 FDG was administered intravenously. PET imaging was obtained from skull base to mid-thigh approximately 60 minutes after radiotracer injection. A low dose non contrast CT was obtained for attenuation correction and anatomic localization. Fused PET-CT and 3D MIP reconstructions were utilized for image interpretation.  Fasting blood glucose level: 113 mg/dl. Reference uptake values: Mediastinum: 2.8 SUVmax Liver: 2.7 SUVmax Normalization method: Body Weight Findings: Head and neck: No abnormal FDG activity identified. Chest: Increasing moderate to large right effusion which is loculated. Spiculated right middle lobe nodule has an SUV maximum of 4.4. There is extensive right hilar adenopathy with an SUV max 8.8. There is subcarinal and mediastinal adenopathy ranging between 8 and 5 SUV maximum. Abdomen and pelvis: At least 20 FDG-avid liver lesions are identified measuring up to 5 cm in diameter and an SUV maximum of 13.5. There is left adrenal lesion with an SUV maximum of 5. There is extensive julio hepatis and portacaval adenopathy with an SUV maximum of 8.6. There is retroperitoneal adenopathy, including left periaortic and aortocaval abnormal lymph nodes. Musculoskeletal: Diffuse osseous metastatic disease with multifocal disease throughout the cervical thoracolumbar spine, bilateral ribs, pelvis, including the left acetabulum and to a lesser extent the right acetabulum. There are bilateral femoral lesions as well. No definite pathologic fractures are identified at this time. Several  of the lesions however are involving weight-bearing bones and the patient is susceptible to pathologic fractures.     Impression: Impression: Extensive metastatic disease involving the chest, abdomen, and pelvis as well as the bones. Electronically Signed: Herb Lopes MD  5/28/2025 9:18 AM EDT  Workstation ID: [id number]      ASSESSMENT: The patient is a very pleasant 65 y.o. female  with metastatic right lower lobe lung cancer      PLAN:  1.  Metastatic right lower lobe lung cancer: Status post 1 dose of Keytruda.  She is scheduled for cycle #2 next week June 27, 2025.  I explained to the patient given her worsening performance status advanced age disease and multiple comorbidities it might be reasonable to consider hospice care.  The patient would like to think more about it and let us know.  2.  Recurrent right pleural effusion: Status post breath catheter placement.  Will continue drainage every other day as needed.  3.  Atrial fibrillation: Patient was started amiodarone drip with anticoagulation.      Sejal Mckenzie MD  6/20/2025

## 2025-06-20 NOTE — CASE MANAGEMENT/SOCIAL WORK
Continued Stay Note  [facility]     Patient Name: Sydnie Gamble  MRN: [id number]  Today's Date: 6/20/2025    Admit Date: 6/12/2025    Plan: To be determined   Discharge Plan       Row Name 06/20/25 1155       Plan    Plan To be determined    Plan Comments Discussed Ms Gamble in MDR.  Ms Gamble has unfortunately gone into atrial fibrillation with rapid ventricular response, and her blood pressure is low.  She will be transferred to the ICU.  Family previously wanted Ms Gamble to do rehab at [facility], and Deena is following.  Case management will also continue to follow.    Final Discharge Disposition Code 62 - inpatient rehab facility                   Discharge Codes    No documentation.                 Expected Discharge Date and Time       Expected Discharge Date Expected Discharge Time    Jun 21, 2025               Laura Cruz RN

## 2025-06-20 NOTE — ACP (ADVANCE CARE PLANNING)
Review of MOST document in EMR with Palliative NP TENA TAVAREZ.  MOST document scanned to medical record 08/07/24 is unsigned by physician and invalid.  In discussion with TENA TAVAREZ, family states patient wishes and priorities for medical care have changed.  Family agrees with changes to Code Status in line with patient priorities and wishes.

## 2025-06-20 NOTE — PLAN OF CARE
Problem: Adult Inpatient Plan of Care  Goal: Plan of Care Review  Outcome: Not Progressing  Flowsheets (Taken 6/20/2025 1819)  Outcome Evaluation: Pt transferred from floor at approx 1215. Arrived in Afib RVR- HR 170s and hypotensive with MAP >65. Oriented to self. Intensivist at bedside. Amio bolus administered. Pt converted to NSR at approx 1235- EKG obtained. MAP remains >65. Amio gtt currently infusing at 0.5mg/min and NS at 75ml/hr. Q8 Na serial checks- 127 at 1600. Aspira drain present- dressing C/D/I, to be drained tomorrow. 100ml UOP and x1 large unmeasured occurence. x2 BMs. x1 PRN dilaudid administered. Minimal PO intake. Pt spouse and family updated at bedside. Pt's daughter updated via phone call by charge RN and Palliative APRN.  Plan of Care Reviewed With:   patient   spouse   child   sibling

## 2025-06-20 NOTE — PLAN OF CARE
Goal Outcome Evaluation:  Plan of Care Reviewed With: patient        Progress: no change  Outcome Evaluation: Patient ST on monitor.  2.5L Humidified nasal cannula.  Patient still not eating.  Marinol started on 18th BID with no change in appetite.  Continue with POC.

## 2025-06-20 NOTE — SIGNIFICANT NOTE
Converted to Afib w RVR this AM w rates sustaining 170-190's, SBP low 90's, MAP's 70's. Patient with some dizziness but otherwise largely asymptomatic from this change  Similar episode <2 minutes that self-resolved 6/19 on telemetry without clinical change.    2nd pIV obtained  Give 1 liter bolus total (500 + 500)  D/w Dr Scott, cardiology, for assistance:   -IV digoxin 500 mcg x1  -start amiodarone gtt at 0.5/hr  -keep NPO as on eliquis without interruption (heparin gtt around time of PleurX) in case of need for cardioversion    No prior h/o Afib. Many recent health issues + poor intake/hyponatremia likely triggers.  On anticoagulation for recent DVT/PE (start 5/2025)    Monitoring closely. Updated patient + family  Will update palliative team as well. Unfortunately adds to her overall challenging medical situation.    __________________________________    Dr Scott presented bedside and requests ICU transfer for pressor availability. Possible cardioversion this afternoon. D/w ICU physician who accepted.

## 2025-06-20 NOTE — PROGRESS NOTES
"Palliative Care Daily Progress Note     C/C: Patient awakens easily.     S: Medical record reviewed. Follow-up visit for GOC and symptom management. Events noted. Patient awakens easily, confused and drowsy throughout day. Spoke with spouse in charles regarding condition and code status. Daughter reported patient previously discussed not wanting aggressive measures such as CPR and MV.     ROS: Denies pain, shortness of breath. ROS limited by AMS/condition.     O: Code Status:   Code Status and Medical Interventions: No CPR (Do Not Attempt to Resuscitate); Limited Support; No intubation (DNI)   Ordered at: 06/20/25 1443     Code Status (Patient has no pulse and is not breathing):    No CPR (Do Not Attempt to Resuscitate)     Medical Interventions (Patient has pulse or is breathing):    Limited Support     Medical Intervention Limits:    No intubation (DNI)     Level Of Support Discussed With:    Next of Kin (If No Surrogate)      Advanced Directives: Advance Directive Status: Patient does not have advance directive   Goals of Care: Ongoing.   Palliative Performance Scale Score: 30%    BP 95/50   Pulse 96   Temp 97 °F (36.1 °C) (Axillary)   Resp 18   Ht 154.9 cm (60.98\")   Wt 55.8 kg (123 lb)   LMP  (LMP Unknown)   SpO2 97%   BMI 23.25 kg/m²   No intake or output data in the 24 hours ending 06/20/25 1507    PE:  General Appearance:    Patient laying in bed, awakens easily, drowsiness, A/C ill appearing, frail,  cooperative, NAD   HEENT:    NC/AT, EOMI, anicteric, MMM, face relaxed   Neck:   supple, trachea midline, no JVD   Lungs:     CTA bilat, diminished in bases; respirations regular, even and unlabored; RR 16-18 on exam, 4LNC, CT dressing to left chest wall    Heart:    RRR, normal S1 and S2, no M/R/G, HR 96   Abdomen:     Normal bowel sounds, soft, nontender, nondistended   G/U:   Deferred   MSK/Extremities:   Wasting, no edema   Pulses:   Pulses palpable and equal bilaterally   Skin:   Warm, dry, pale "   Neurologic:   Drowsy, oriented x1, cooperative, RICE   Psych:   Calm, appropriate     Meds: Reviewed and changes noted    Labs:   Results from last 7 days   Lab Units 06/20/25  0410   WBC 10*3/mm3 24.17*   HEMOGLOBIN g/dL 8.9*   HEMATOCRIT % 28.7*   PLATELETS 10*3/mm3 145     Results from last 7 days   Lab Units 06/20/25  0410   SODIUM mmol/L 124*   POTASSIUM mmol/L 4.0   CHLORIDE mmol/L 90*   CO2 mmol/L 20.0*   BUN mg/dL 52.4*   CREATININE mg/dL 1.06*   GLUCOSE mg/dL 106*   CALCIUM mg/dL 7.4*     Results from last 7 days   Lab Units 06/20/25  0410   SODIUM mmol/L 124*   POTASSIUM mmol/L 4.0   CHLORIDE mmol/L 90*   CO2 mmol/L 20.0*   BUN mg/dL 52.4*   CREATININE mg/dL 1.06*   CALCIUM mg/dL 7.4*   BILIRUBIN mg/dL 0.5   ALK PHOS U/L 541*   ALT (SGPT) U/L 45*   AST (SGOT) U/L 181*   GLUCOSE mg/dL 106*     Imaging Results (Last 72 Hours)       Procedure Component Value Units Date/Time    XR Chest 1 View [[id number]] Collected: 06/20/25 1118     Updated: 06/20/25 1125    Narrative:      XR CHEST 1 VW    Date of Exam: 6/20/2025 10:36 AM EDT    Indication: leukocytosis, review possible PNA    Comparison: AP chest x-ray 6/19/2025, AP chest x-ray 6/18/2025, CT chest 5/13/2025    Findings:  Right pleural catheter appears stable. There is stable blunting of each lateral costophrenic angle. Pleural fluid at the apex has increased compared with yesterday's chest x-ray, likely due to positioning (semierect versus upright). Airspace opacities in   the mid to lower right lung and in the left lung base appear stable. Cardiomediastinal contours are stable.      Impression:      Impression:  Overall stable appearance of the chest with right pleural catheter, small bilateral pleural effusions, and patchy bilateral opacities, which may be due to atelectasis and/or pneumonia.          Electronically Signed: Maryellen Datlon MD    6/20/2025 11:22 AM EDT    Workstation ID: [id number]    XR Chest 1 View [[id number]] Collected: 06/19/25 0741      Updated: 06/19/25 0745    Narrative:      XR CHEST 1 VW    Date of Exam: 6/19/2025 4:10 AM EDT    Indication: Recurrent pleural effusions    Comparison: 6/18/2025    Findings:  Heart is normal. There is a right-sided pleurodesis catheter noted with a persistent small right pleural effusion. Areas of patchy bibasilar airspace disease not significantly changed from the prior study. Small left pleural effusion stable. Negative for   pneumothorax.      Impression:      Impression:  1. Stable right-sided pleurodesis catheter with small right pleural effusion.  2. Stable small left pleural effusion.  3. No pneumothorax.  4. Unchanged bibasilar airspace disease which may relate to atelectasis and/or pneumonia.          Electronically Signed: Stuart Romero MD    6/19/2025 7:42 AM EDT    Workstation ID: [id number]    XR Chest 1 View [[id number]] Collected: 06/18/25 0714     Updated: 06/18/25 0720    Narrative:      XR CHEST 1 VW    Date of Exam: 6/18/2025 2:24 AM EDT    Indication: Recurrent pleural effusions    Comparison: 6/17/2025    Findings:  Medium bore right pleural drain is in stable position. Heart shadow is upper normal size. Pulmonary vasculature appears normal. Right apical pleural fluid collection has decreased, now very small in size. Small area of patchy atelectasis is now seen in   the right midlung. Basilar interstitial changes elsewhere appear stable. Mild left basilar discoid atelectasis is stable. No pneumothorax is seen.      Impression:      Impression:  Small remaining right apical pleural fluid collection. Mild new patchy right mid lung disease. No other new chest pathology is seen.      Electronically Signed: Marlon Lyons MD    6/18/2025 7:16 AM EDT    Workstation ID: [id number]                  Diagnostics: Reviewed    A:   Recurrent pleural effusion    Acquired hypothyroidism    HLD (hyperlipidemia)    Adenocarcinoma of right lung    Severe protein-calorie malnutrition     65 y.o. female with recurrent  pleural effusion secondary to adenocarcinoma of right lung, pain, debility, decreased appetite, shortness of breath.   S/S:   Pain -MSK, neoplastic  -continue Flexeril 10mg BID prn muscle spasms  -oxycodone 5mg PO q 4 hours prn moderate pain  -hydromorphone 0.5mg IV r0hqvis prn severe pain     2. Shortness of breath -pleur-x in place, 4LNC in place  -nebs     3. Decreased intake -reviewed patient's decline     4. Constipation -continue bowel regimen     5. Sleeplessness -Ambien nightly prn     6. Debility -uses a walker at baseline     7.  GOC -DNR/DNI-reviewed with patient and spouse  -information regarding code status given to patient  -discussed LW/ACP, aware of option to complete this hospitalization  -discussed symptoms and medications as above  6/20: Patient transferred to ICU due to A-fib with RVR. Reviewed code status and patient electing DNR/DNI in light of patient's poor prognosis. Reviewed options for comfort measures with hospice services, no change of plan at this time.    P: Follow up visit. Patient confused. Reviewed condition with spouse and he elects DNR/DNI. Reviewed further options for comfort measures with hospice services, no change of plan at this time.   Palliative Care Team will continue to follow patient. Please do not hesitate to contact us regarding further sx mgmt or GOC needs.  Kaylene Reynoso, APRN  6/20/2025  Time spent: 25 minutes

## 2025-06-20 NOTE — PLAN OF CARE
Goal Outcome Evaluation:  Plan of Care Reviewed With: patient, spouse        Progress: no change  Outcome Evaluation: Palliative RN saw pt at 1309.  Transferred from 6B to ICU d/t sustained afib with RVR.  Pt. sitting up in bed on 4LNC on amio and cinthia gtts with spouse at BS.  She denied pain, nausea and shortness of breath though she appeared somewhat soa at rest.  Palliative care to continue to follow along for support and ongoing GOC.    [id number] Palliative IDT meeting: MD; APRN ; MDiv; RNs; LCSW After hours, weekends and holidays, contact Palliative Provider by calling [phone].

## 2025-06-21 NOTE — PROGRESS NOTES
Intensive Care Follow-up      LOS: 9 days     Ms. Sydnie Gamble, 65 y.o. female is followed for: Recurrent pleural effusion     Subjective - Interval History     65-year-old female with past medical history of adenocarcinoma of the lung, anemia, atrial fibrillation, cervical cancer, depression, hyperlipidemia and pulmonary embolism as well as acid reflux and COPD on home O2 who presents to the hospital status post Pleurx placement.  ICU was consulted for A-fib with RVR.  Patient's history of adenocarcinoma of the lung is being managed currently by hematology oncology with Keytruda.  Earlier today, patient became hypotensive with maps in the 50s with her heart rate at 170-180.  Patient was given a low-dose amiodarone gtt.,Patient was also given 500 mcg of digoxin and was requested to be admitted to the ICU.  The patient was evaluated at bedside, she is complaining of palpitations and chest tightness. Chest imaging was done on the patient earlier today which showed right pleural catheter, small bilateral pleural effusions and patchy bilateral opacities.       Historically, patient had a CT PET done in May 2025 which showed extensive metastatic disease involving the chest, abdomen and pelvis as well as metastatic disease to the bone.  At that time, there were no metastasis to the brain.  Currently, patient is confused and only oriented to herself.    Interval history    Oxygenating adequately on nasal cannula oxygen  On amiodarone  Has converted to normal sinus rhythm  Plans are to drain chest tube today    The patient's relevant past medical, surgical and social history were reviewed and updated in Epic as appropriate.     Objective     Infusions:  amiodarone, 0.5 mg/min, Last Rate: 0.5 mg/min (06/20/25 2320)  phenylephrine, 0.5-3 mcg/kg/min, Last Rate: Stopped (06/20/25 8777)  sodium chloride, 75 mL/hr, Last Rate: 75 mL/hr (06/21/25 0150)      Medications:  amiodarone, 150 mg, Intravenous, Once  apixaban, 5  mg, Oral, Q12H  arformoterol, 15 mcg, Nebulization, BID - RT   And  budesonide, 0.5 mg, Nebulization, BID - RT   And  revefenacin, 175 mcg, Nebulization, Daily - RT  atorvastatin, 10 mg, Oral, Nightly  dronabinol, 5 mg, Oral, BID  levothyroxine, 88 mcg, Oral, Q AM  mupirocin, 1 Application, Each Nare, BID  senna-docusate sodium, 2 tablet, Oral, BID  sertraline, 100 mg, Oral, Daily  traZODone, 150 mg, Oral, Nightly      Intake/Output         06/20/25 0700 - 06/21/25 0659    Intake (ml) 2112.1    Output (ml) 350    Net (ml) 1762.1          Vital Sign Min/Max for last 24 hours  Temp  Min: 97 °F (36.1 °C)  Max: 98.1 °F (36.7 °C)   BP  Min: 78/45  Max: 126/60   Pulse  Min: 77  Max: 180   Resp  Min: 14  Max: 18   SpO2  Min: 84 %  Max: 99 %   Flow (L/min) (Oxygen Therapy)  Min: 1  Max: 4        Physical Exam:   GENERAL: Awake, no overt distress   HEENT: Sclera nonicteric   LUNGS: Decreased breath sounds in the right compared to the left   HEART: Regular rhythm   GI: Soft   EXTREMITIES: Trace edema   NEURO/PSYCH: Awake and alert    Results from last 7 days   Lab Units 06/21/25  0501 06/20/25  0410 06/19/25  0413   WBC 10*3/mm3 22.70* 24.17* 21.36*   HEMOGLOBIN g/dL 8.4* 8.9* 9.6*   PLATELETS 10*3/mm3 150 145 174     Results from last 7 days   Lab Units 06/21/25  0501 06/21/25  0031 06/20/25  1620 06/20/25  0410 06/19/25  0413   SODIUM mmol/L 129* 129* 127* 124* 128*   POTASSIUM mmol/L 4.1  --   --  4.0 4.4   CO2 mmol/L 20.0*  --   --  20.0* 19.9*   BUN mg/dL 43.9*  --   --  52.4* 43.4*   CREATININE mg/dL 0.97  --   --  1.06* 1.08*   MAGNESIUM mg/dL  --   --   --  2.7* 3.2*   GLUCOSE mg/dL 134*  --   --  106* 111*     Estimated Creatinine Clearance: 49.6 mL/min (by C-G formula based on SCr of 0.97 mg/dL).              Lab Results   Component Value Date    LACTATE 1.6 06/05/2025          Images: Most recent chest x-ray reveals right basilar atelectasis/effusion/infiltrate    I reviewed the patient's results and images.      Impression      Active Hospital Problems    Diagnosis     **Recurrent pleural effusion (Aspire pleural drain)     Severe protein-calorie malnutrition     Adenocarcinoma of right lung     HLD (hyperlipidemia)     Acquired hypothyroidism             Plan        Drain aspire catheter today  Amiodarone being converted over to p.o.  Discontinue IV fluids  Follow-up on sodium level  Stable for transfer to the floor  ?  Possible transition to hospice care     Plan of care and goals reviewed with Multidisciplinary Team and Antibiotic Stewardship rounds   I discussed the patient's findings and my recommendations with patient and nursing staff      High level of risk due to:  decision regarding escalation of level of hospital care and decision for Do Not Attempt to Resuscitate. and 1 acute illness with threat to life or bodily function     MCKENNA Hdz MD  Pulmonary and Critical Care Medicine

## 2025-06-21 NOTE — PROGRESS NOTES
"[facility] Cardiology  [facility] Progress Note     LOS: 9 days   Patient Care Team:  Dee Elena APRN as PCP - General (Nurse Practitioner)  Cliff Montana MD as Consulting Physician (Radiation Oncology)  Toni Mcclelland MD as Consulting Physician (Pulmonary Disease)  Erik Mckeon DO as Consulting Physician (Pulmonary Disease)  Sejal Mckenzie MD as Referring Physician (Hematology and Oncology)  Rahel Johnson, YVETTE as Ambulatory  (Oncology) ([facility])  PCP:  Dee Elena APRN    Chief Complaint: Paroxysmal A-fib with RVR    SUBJECTIVE: Resting comfortably in bed, converted to sinus rhythm on amiodarone infusion.      Review of Systems:   All systems have been reviewed and are negative with the exception of those mentioned above.      OBJECTIVE:    Vital Sign Min/Max for last 24 hours  Temp  Min: 97 °F (36.1 °C)  Max: 98.1 °F (36.7 °C)   BP  Min: 78/45  Max: 126/60   Pulse  Min: 77  Max: 180   Resp  Min: 14  Max: 18   SpO2  Min: 84 %  Max: 99 %   No data recorded   No data recorded     Flowsheet Rows      Flowsheet Row First Filed Value   Admission Height 154.9 cm (60.98\") Documented at 06/12/2025 1546   Admission Weight 57.2 kg (126 lb) Documented at 06/12/2025 0925            Telemetry: Sinus rhythm      Intake/Output Summary (Last 24 hours) at 6/21/2025 0924  Last data filed at 6/21/2025 0600  Gross per 24 hour   Intake 2112.11 ml   Output 350 ml   Net 1762.11 ml     Intake & Output (last 3 days)         06/18 0701  06/19 0700 06/19 0701  06/20 0700 06/20 0701  06/21 0700 06/21 0701  06/22 0700    P.O.   350     I.V. (mL/kg)   1762.1 (31.6)     Total Intake(mL/kg)   2112.1 (37.9)     Urine (mL/kg/hr)  0 (0) 350 (0.3)     Stool  0 0     Chest Tube  100      Total Output  100 350     Net  -100 +1762.1             Urine Unmeasured Occurrence 1 x 3 x 1 x     Stool Unmeasured Occurrence 3 x 2 x 2 x              " Physical Exam:    General Appearance:    Alert, cooperative, no distress, appears stated age   Neck:   Supple, symmetrical, trachea midline.   Lungs:     Clear to auscultation bilaterally, respirations unlabored   Chest Wall:    No tenderness or deformity    Heart:    Regular rate and rhythm, S1 and S2 normal, no murmur, rub   or gallop, normal carotid impulse bilaterally without bruit.   Extremities:   Extremities normal, atraumatic, no cyanosis or edema   Pulses:   2+ and symmetric all extremities   Skin:   Skin color, texture, turgor normal, no rashes or lesions      LABS/DIAGNOSTIC DATA:  Results from last 7 days   Lab Units 06/21/25  0501 06/20/25  0410 06/19/25  0413   WBC 10*3/mm3 22.70* 24.17* 21.36*   HEMOGLOBIN g/dL 8.4* 8.9* 9.6*   HEMATOCRIT % 27.0* 28.7* 30.6*   PLATELETS 10*3/mm3 150 145 174     Lab Results   Lab Value Date/Time    TROPONINT 14 (H) 06/05/2025 0504    TROPONINT 15 (H) 05/13/2025 0642    TROPONINT 9 05/13/2025 0510    TROPONINT 8 04/12/2025 1246         Results from last 7 days   Lab Units 06/21/25  0501 06/21/25  0031 06/20/25  1620 06/20/25  0410 06/19/25  0413 06/18/25  0335   SODIUM mmol/L 129* 129* 127* 124* 128* 130*   POTASSIUM mmol/L 4.1  --   --  4.0 4.4 4.5   CHLORIDE mmol/L 95*  --   --  90* 92* 92*   CO2 mmol/L 20.0*  --   --  20.0* 19.9* 21.1*   BUN mg/dL 43.9*  --   --  52.4* 43.4* 32.7*   CREATININE mg/dL 0.97  --   --  1.06* 1.08* 1.08*   CALCIUM mg/dL 6.5*  --   --  7.4* 7.3* 7.4*   BILIRUBIN mg/dL 0.6  --   --  0.5  --  0.3   ALK PHOS U/L 560*  --   --  541*  --  493*   ALT (SGPT) U/L 51*  --   --  45*  --  21   AST (SGOT) U/L 133*  --   --  181*  --  71*   GLUCOSE mg/dL 134*  --   --  106* 111* 107*                       Medication Review:   amiodarone, 200 mg, Oral, BID With Meals  apixaban, 5 mg, Oral, Q12H  arformoterol, 15 mcg, Nebulization, BID - RT   And  budesonide, 0.5 mg, Nebulization, BID - RT   And  revefenacin, 175 mcg, Nebulization, Daily -  RT  atorvastatin, 10 mg, Oral, Nightly  dronabinol, 5 mg, Oral, BID  levothyroxine, 88 mcg, Oral, Q AM  mupirocin, 1 Application, Each Nare, BID  senna-docusate sodium, 2 tablet, Oral, BID  sertraline, 100 mg, Oral, Daily  traZODone, 150 mg, Oral, Nightly       sodium chloride, 75 mL/hr, Last Rate: 75 mL/hr (06/21/25 0150)         ASSESSMENT/PLAN:    Recurrent pleural effusion (Aspire pleural drain)    Acquired hypothyroidism    HLD (hyperlipidemia)    Adenocarcinoma of right lung    Severe protein-calorie malnutrition        Transitioning to oral amiodarone, 200 mg p.o. twice daily  Continue Eliquis 5 mg p.o. twice daily for stroke prophylaxis.  Stable for transfer to telemetry floor.  Hospice consult pending today.            Hever Scott III, MD   06/21/25  09:24 EDT

## 2025-06-21 NOTE — PLAN OF CARE
Goal Outcome Evaluation:  Plan of Care Reviewed With: patient        Progress: no change     Pt remains on Amio @ 0.5 for rate control. MAP remains >65.  Decreased UOP, bladder scan performed, pt able to urinate after educating.  250ml UOP  Pt afebrile   Con't NS at 75ml/hr.   Q8 Na serial checks- 129 @ 0000 and 0500  Aspira drain present- dressing C/D/I, to be drained 6/21   1 PRN dilaudid administered for generalized pain per pt  Pt c/o throat soreness when drinking, unable to visualize throat, spoke to APRN. Viscous lidocaine ordered PRN  Pt more lethargic throughout the night  Pushed PO hydration when at bedside  Remains on 2L NC, increased work of breathing noted at 0400 assessment. Pt denies wanting/needing a duo neb treatment

## 2025-06-22 NOTE — PROGRESS NOTES
"[facility] Cardiology  [facility] Progress Note     LOS: 10 days   Patient Care Team:  Dee Elena APRN as PCP - General (Nurse Practitioner)  Cliff Montana MD as Consulting Physician (Radiation Oncology)  Toni Mcclelland MD as Consulting Physician (Pulmonary Disease)  Erik Mckeon DO as Consulting Physician (Pulmonary Disease)  Sejal Mckenzie MD as Referring Physician (Hematology and Oncology)  Rahel Johnson, YVETTE as Ambulatory  (Oncology) ([facility])  PCP:  Dee Elena APRN    Chief Complaint: Paroxysmal A-fib with RVR    SUBJECTIVE: Resting comfortably in bed, maintaining sinus rhythm.    Review of Systems:   All systems have been reviewed and are negative with the exception of those mentioned above.      OBJECTIVE:    Vital Sign Min/Max for last 24 hours  Temp  Min: 96.9 °F (36.1 °C)  Max: 97.9 °F (36.6 °C)   BP  Min: 99/45  Max: 145/61   Pulse  Min: 78  Max: 95   Resp  Min: 14  Max: 22   SpO2  Min: 82 %  Max: 100 %   No data recorded   No data recorded     Flowsheet Rows      Flowsheet Row First Filed Value   Admission Height 154.9 cm (60.98\") Documented at 06/12/2025 1546   Admission Weight 57.2 kg (126 lb) Documented at 06/12/2025 0925            Telemetry: Sinus rhythm      Intake/Output Summary (Last 24 hours) at 6/22/2025 0859  Last data filed at 6/22/2025 0430  Gross per 24 hour   Intake 480 ml   Output 650 ml   Net -170 ml     Intake & Output (last 3 days)         06/19 0701  06/20 0700 06/20 0701  06/21 0700 06/21 0701  06/22 0700 06/22 0701  06/23 0700    P.O.  350 480     I.V. (mL/kg)  1762.1 (31.6)      Total Intake(mL/kg)  2112.1 (37.9) 480 (8.6)     Urine (mL/kg/hr) 0 (0) 350 (0.3) 450 (0.3)     Stool 0 0 0     Chest Tube 100  200     Total Output 100 350 650     Net -100 +1762.1 -170             Urine Unmeasured Occurrence 3 x 1 x 1 x     Stool Unmeasured Occurrence 2 x 2 x 2 x              " Physical Exam:    General Appearance:    Alert, cooperative, no distress, appears stated age   Neck:   Supple, symmetrical, trachea midline.   Lungs:     Clear to auscultation bilaterally, respirations unlabored   Chest Wall:    No tenderness or deformity    Heart:    Regular rate and rhythm, S1 and S2 normal, no murmur, rub   or gallop, normal carotid impulse bilaterally without bruit.   Extremities:   Extremities normal, atraumatic, no cyanosis or edema   Pulses:   2+ and symmetric all extremities   Skin:   Skin color, texture, turgor normal, no rashes or lesions      LABS/DIAGNOSTIC DATA:  Results from last 7 days   Lab Units 06/21/25  0501 06/20/25  0410 06/19/25  0413   WBC 10*3/mm3 22.70* 24.17* 21.36*   HEMOGLOBIN g/dL 8.4* 8.9* 9.6*   HEMATOCRIT % 27.0* 28.7* 30.6*   PLATELETS 10*3/mm3 150 145 174     Lab Results   Lab Value Date/Time    TROPONINT 14 (H) 06/05/2025 0504    TROPONINT 15 (H) 05/13/2025 0642    TROPONINT 9 05/13/2025 0510    TROPONINT 8 04/12/2025 1246         Results from last 7 days   Lab Units 06/21/25  0501 06/21/25  0031 06/20/25  1620 06/20/25  0410 06/19/25  0413 06/18/25  0335   SODIUM mmol/L 129* 129* 127* 124* 128* 130*   POTASSIUM mmol/L 4.1  --   --  4.0 4.4 4.5   CHLORIDE mmol/L 95*  --   --  90* 92* 92*   CO2 mmol/L 20.0*  --   --  20.0* 19.9* 21.1*   BUN mg/dL 43.9*  --   --  52.4* 43.4* 32.7*   CREATININE mg/dL 0.97  --   --  1.06* 1.08* 1.08*   CALCIUM mg/dL 6.5*  --   --  7.4* 7.3* 7.4*   BILIRUBIN mg/dL 0.6  --   --  0.5  --  0.3   ALK PHOS U/L 560*  --   --  541*  --  493*   ALT (SGPT) U/L 51*  --   --  45*  --  21   AST (SGOT) U/L 133*  --   --  181*  --  71*   GLUCOSE mg/dL 134*  --   --  106* 111* 107*                       Medication Review:   amiodarone, 200 mg, Oral, BID With Meals  apixaban, 5 mg, Oral, Q12H  arformoterol, 15 mcg, Nebulization, BID - RT   And  budesonide, 0.5 mg, Nebulization, BID - RT   And  revefenacin, 175 mcg, Nebulization, Daily -  RT  atorvastatin, 10 mg, Oral, Nightly  dronabinol, 5 mg, Oral, BID  levothyroxine, 88 mcg, Oral, Q AM  mupirocin, 1 Application, Each Nare, BID  senna-docusate sodium, 2 tablet, Oral, BID  sertraline, 100 mg, Oral, Daily  traZODone, 150 mg, Oral, Nightly               ASSESSMENT/PLAN:    Recurrent pleural effusion (Aspire pleural drain)    Acquired hypothyroidism    HLD (hyperlipidemia)    Adenocarcinoma of right lung    Severe protein-calorie malnutrition        Continue amiodarone 200 mg p.o. twice daily  Continue Eliquis 5 mg p.o. twice daily for stroke prophylaxis.  Awaiting transfer to telemetry floor.              Hever Scott III, MD   06/22/25  08:59 EDT

## 2025-06-22 NOTE — PROGRESS NOTES
Intensive Care Follow-up      LOS: 10 days     Ms. Sydnie Gamble, 65 y.o. female is followed for: Recurrent pleural effusion     Subjective - Interval History     65-year-old female with past medical history of adenocarcinoma of the lung, anemia, atrial fibrillation, cervical cancer, depression, hyperlipidemia and pulmonary embolism as well as acid reflux and COPD on home O2 who presents to the hospital status post Pleurx placement.  ICU was consulted for A-fib with RVR.  Patient's history of adenocarcinoma of the lung is being managed currently by hematology oncology with Keytruda.  Earlier today, patient became hypotensive with maps in the 50s with her heart rate at 170-180.  Patient was given a low-dose amiodarone gtt.,Patient was also given 500 mcg of digoxin and was requested to be admitted to the ICU.  The patient was evaluated at bedside, she is complaining of palpitations and chest tightness. Chest imaging was done on the patient earlier today which showed right pleural catheter, small bilateral pleural effusions and patchy bilateral opacities.       Historically, patient had a CT PET done in May 2025 which showed extensive metastatic disease involving the chest, abdomen and pelvis as well as metastatic disease to the bone.  At that time, there were no metastasis to the brain.  Currently, patient is confused and only oriented to herself.     Interval history    About 250 mL drained out her aspire pleural drain yesterday  On no continuous infusions  Oxygenating adequately on 3 L/min via nasal cannula    The patient's relevant past medical, surgical and social history were reviewed and updated in Epic as appropriate.     Objective     Infusions:     Medications:  amiodarone, 200 mg, Oral, BID With Meals  apixaban, 5 mg, Oral, Q12H  arformoterol, 15 mcg, Nebulization, BID - RT   And  budesonide, 0.5 mg, Nebulization, BID - RT   And  revefenacin, 175 mcg, Nebulization, Daily - RT  atorvastatin, 10 mg,  Oral, Nightly  dronabinol, 5 mg, Oral, BID  levothyroxine, 88 mcg, Oral, Q AM  mupirocin, 1 Application, Each Nare, BID  senna-docusate sodium, 2 tablet, Oral, BID  sertraline, 100 mg, Oral, Daily  traZODone, 150 mg, Oral, Nightly      Intake/Output         06/21/25 0700 - 06/22/25 0659    Intake (ml) 480    Output (ml) 650    Net (ml) -170          Vital Sign Min/Max for last 24 hours  Temp  Min: 96.9 °F (36.1 °C)  Max: 97.9 °F (36.6 °C)   BP  Min: 99/45  Max: 145/61   Pulse  Min: 78  Max: 95   Resp  Min: 14  Max: 22   SpO2  Min: 82 %  Max: 100 %   Flow (L/min) (Oxygen Therapy)  Min: 1  Max: 6        Physical Exam:   GENERAL: Awake, no distress   HEENT: No adenopathy   LUNGS: Scattered rhonchi, no wheezes   HEART: Irregular rhythm   GI: Soft   EXTREMITIES: Trace edema   NEURO/PSYCH: Awake    Results from last 7 days   Lab Units 06/21/25  0501 06/20/25  0410 06/19/25  0413   WBC 10*3/mm3 22.70* 24.17* 21.36*   HEMOGLOBIN g/dL 8.4* 8.9* 9.6*   PLATELETS 10*3/mm3 150 145 174     Results from last 7 days   Lab Units 06/21/25  0501 06/21/25  0031 06/20/25  1620 06/20/25  0410 06/19/25  0413   SODIUM mmol/L 129* 129* 127* 124* 128*   POTASSIUM mmol/L 4.1  --   --  4.0 4.4   CO2 mmol/L 20.0*  --   --  20.0* 19.9*   BUN mg/dL 43.9*  --   --  52.4* 43.4*   CREATININE mg/dL 0.97  --   --  1.06* 1.08*   MAGNESIUM mg/dL  --   --   --  2.7* 3.2*   GLUCOSE mg/dL 134*  --   --  106* 111*     Estimated Creatinine Clearance: 49.6 mL/min (by C-G formula based on SCr of 0.97 mg/dL).              Lab Results   Component Value Date    LACTATE 1.6 06/05/2025             Impression      Active Hospital Problems    Diagnosis     **Recurrent pleural effusion (Aspire pleural drain)     Severe protein-calorie malnutrition     Adenocarcinoma of right lung     HLD (hyperlipidemia)     Acquired hypothyroidism             Plan        On transfer to the floor  Periodically drain aspire pleural tube  Palliative care working with the patient and  family on goals of care with possible hospice referral   Plan of care and goals reviewed with Multidisciplinary Team and Antibiotic Stewardship rounds   I discussed the patient's findings and my recommendations with patient and nursing staff        MCKENNA Hdz MD  Pulmonary and Critical Care Medicine

## 2025-06-22 NOTE — PLAN OF CARE
Goal Outcome Evaluation:  Plan of Care Reviewed With: patient, spouse, child        Progress: declining        * decreased/absent lung sounds noted in RLL  Increased restlessness overnight  O2 titrated to 3L for pt comfort  Encouraged PO fluids when at bedside  Decreased UOP  Dilaudid given x3 for pain and pain related anxiety/SOB  Pt afebrile  Duoneb x1 given per pt request  Aspira drain present- dressing C/D/I  1 BM overnight

## 2025-06-22 NOTE — PLAN OF CARE
Goal Outcome Evaluation:    Progress: no change     Transferred to  6/22. Pt alert to self and place. On 5L NC humidified-- baseline 3-4L per spouse. Takes pills whole. Plurx drained q other day/ PRN (drained 6/21). Purewick in place. Poor intake. Resting comfortably. Morphine gtt @ 2ml/hr.

## 2025-06-23 NOTE — DISCHARGE SUMMARY
Death Summary    Patient name: Sydnie Gamble  CSN: [id number]  MRN: [id number]  : 1960  Today's date: 2025     Date of Admission: 2025  Date and Time of Death:  2025 @ 0343    Admitting Physician:  Dr. Layla Bellamy    Primary Care Provider: Dee Elena, APRN    Consultations:    Oncology- Dr. Mckenzie   Cardiothoracic Surgery-Dr. Andres    Palliative Care- Kaylene Reynoso, APRN   Cardiology-Dr. Scott    Admission Diagnosis: recurrent pleural effusion     Diagnoses at the Time of Death:     Recurrent pleural effusion (Aspire pleural drain)    Acquired hypothyroidism    HLD (hyperlipidemia)    Adenocarcinoma of right lung    Severe protein-calorie malnutrition      Procedures:   Right Pleurx Catheter insertion       History of Present Illness:  Ms. Sydnie Gamble was a 65-year-old female with past medical history of adenocarcinoma of the lung, anemia, atrial fibrillation, cervical cancer, depression, adenocarcinoma of the lung is being managed currently by hematology oncology with Keytruda, hyperlipidemia and pulmonary embolism as well as acid reflux and COPD on home O2 who presents to the hospital on  s/p thoracentesis (1300 mL removed) with right pigtail chest tube placed per IR.  Will hold Eliquis and initiate on heparin drip due to recent pulmonary embolism.       Hospital Course:  On , oncology, cardiothoracic surgery and palliative care were all consulted.  At that time patient was to continue being a full code with full interventions.  Oncology recommended that patient continue treatment with Keytruda for 1 month prior to Pleurx catheter placement.  Right chest tube had minimal output and was removed on 6/15 per cardiothoracic surgery who then signed off.  Repeat CXR on  showed reaccumulation of the right pleural effusion.  Cardiothoracic surgery was then reconsulted for right Pleurx catheter placement considering patient  continues to have reaccumulation of the right pleural effusion.  On  right Pleurx catheter was placed.  Pleurx catheter to be drained every other day per cardiothoracic surgery who signed off on .  Early morning of , patient had an episode of SVT with a heart rate in the 200s that lasted about 2 minutes.  She was able to convert on her own and was asymptomatic.  On , patient converted to A-fib with RVR with rates in the 170s and 190s.  Cardiology was consulted. She received 1 L of IVF, IV digoxin x 1 and is initiated on amiodarone drip.  She became hypotensive requiring transferred to the ICU for closer monitoring.  Palliative care discussed with patient and family goals of care and they elected to change her CODE STATUS to no CPR with no intubation due to patient's current condition and poor prognosis.  On , continued goals of care discussions were had with the family.  They decided to transition her to no CPR with comfort measures only to focus more on symptom management rather than aggressive treatment.  She was transferred out of ICU to the hospice floor the same day.  Ultimately the patient  on  at 3:43 AM surrounded by family.    Luz Marina Paul, MSN, APRN, AGACNP-BC  Pulmonary and Critical Care Medicine   25 04:17 EDT    Please note that portions of this note were completed with a voice recognition program.

## 2025-06-23 NOTE — NURSING NOTE
Exam confirms with auscultation zero audible heart tones and zero audible respirations. Ms.Karen Jodi Gamble was pronounced dead at 0404.  MD notified by Patient's RN.    Joi Aponte RN  Clinical House Supervisor  6/23/2025 06:53 EDT

## 2025-06-24 NOTE — PAYOR COMM NOTE
"Ref# [id number]   Discharge Summary    KEIHT Tan, RN  Utilization Review  Phone [phone]  Fax [phone]    [facility]  [address] [place] [address]         Sol Jones (Dcsd. Female)       Date of Birth   1960    Social Security Number       Address   [address] [place] [address]    Home Phone   [phone]    MRN   [id number]       Restorationism   Islam    Marital Status                               Admission Date   2025    Admission Type   Elective    Admitting Provider   Davis Candelaria MD    Attending Provider       Department, Room/Bed   [facility] 5B, N531/1       Discharge Date   2025    Discharge Disposition       Discharge Destination                                 Attending Provider: (none)   Allergies: No Known Allergies    Isolation: None   Infection: None   Code Status: Prior    Ht: 154.9 cm (60.98\")   Wt: 55.8 kg (123 lb)    Admission Cmt: None   Principal Problem: Recurrent pleural effusion (Aspire pleural drain) [J90]                   Active Insurance as of 2025       Primary Coverage       Payor Plan Insurance Group Employer/Plan Group    [employer]HEM BLUE CROSS ANTHEM BLUE CROSS BLUE SHIELD PPO [id number]       Payor Plan Address Payor Plan Phone Number Payor Plan Fax Number Effective Dates    [address] [phone]  2015 - None Entered    [facility] [id number]         Subscriber Name Subscriber Birth Date Member ID       ILDEFONSO SHAHID 1967 [id number]               Secondary Coverage       Payor Plan Insurance Group Employer/Plan Group    MEDICARE MEDICARE A & B        Payor Plan Address Payor Plan Phone Number Payor Plan Fax Number Effective Dates    [address] [phone]  2023 - None Entered    [facility] [address]         Subscriber Name Subscriber Birth Date Member ID       SOL JONES 1960 [id number]                     Emergency " Contacts        (Rel.) Home Phone Work Phone Mobile Phone    Jose Francisco Diaz (Spouse) [phone] -- [phone]    LEVI ESPITIA (Daughter) [phone] -- [phone]                 Discharge Summary        Luz Marina Paul, APRN at 25 [phone]       Attestation signed by Hever Hdz MD at 25 2394      I have reviewed this documentation and agree.                      Death Summary    Patient name: Sydnie Gamble  CSN: [id number]  MRN: [id number]  : 1960  Today's date: 2025     Date of Admission: 2025  Date and Time of Death:  2025 @ 0343    Admitting Physician:  Dr. Layla Bellamy    Primary Care Provider: Dee Elena APRN    Consultations:    Oncology- Dr. Mckenzie   Cardiothoracic Surgery-Dr. Andres    Palliative Care- Kaylene Reynoso, DAYSI   Cardiology-Dr. Scott    Admission Diagnosis: recurrent pleural effusion     Diagnoses at the Time of Death:     Recurrent pleural effusion (Aspire pleural drain)    Acquired hypothyroidism    HLD (hyperlipidemia)    Adenocarcinoma of right lung    Severe protein-calorie malnutrition      Procedures:   Right Pleurx Catheter insertion       History of Present Illness:  Ms. Sydnie Gamble was a 65-year-old female with past medical history of adenocarcinoma of the lung, anemia, atrial fibrillation, cervical cancer, depression, adenocarcinoma of the lung is being managed currently by hematology oncology with Keytruda, hyperlipidemia and pulmonary embolism as well as acid reflux and COPD on home O2 who presents to the hospital on  s/p thoracentesis (1300 mL removed) with right pigtail chest tube placed per IR.  Will hold Eliquis and initiate on heparin drip due to recent pulmonary embolism.       Hospital Course:  On , oncology, cardiothoracic surgery and palliative care were all consulted.  At that time patient was to continue being a full code with full  interventions.  Oncology recommended that patient continue treatment with Keytruda for 1 month prior to Pleurx catheter placement.  Right chest tube had minimal output and was removed on 6/15 per cardiothoracic surgery who then signed off.  Repeat CXR on  showed reaccumulation of the right pleural effusion.  Cardiothoracic surgery was then reconsulted for right Pleurx catheter placement considering patient continues to have reaccumulation of the right pleural effusion.  On  right Pleurx catheter was placed.  Pleurx catheter to be drained every other day per cardiothoracic surgery who signed off on .  Early morning of , patient had an episode of SVT with a heart rate in the 200s that lasted about 2 minutes.  She was able to convert on her own and was asymptomatic.  On , patient converted to A-fib with RVR with rates in the 170s and 190s.  Cardiology was consulted. She received 1 L of IVF, IV digoxin x 1 and is initiated on amiodarone drip.  She became hypotensive requiring transferred to the ICU for closer monitoring.  Palliative care discussed with patient and family goals of care and they elected to change her CODE STATUS to no CPR with no intubation due to patient's current condition and poor prognosis.  On , continued goals of care discussions were had with the family.  They decided to transition her to no CPR with comfort measures only to focus more on symptom management rather than aggressive treatment.  She was transferred out of ICU to the hospice floor the same day.  Ultimately the patient  on  at 3:43 AM surrounded by family.    Luz Marina Paul, MSN, APRN, AGACNP-BC  Pulmonary and Critical Care Medicine   25 04:17 EDT    Please note that portions of this note were completed with a voice recognition program.      Electronically signed by Hever Hdz MD at 25 5168       Discharge Order (From admission, onward)       Start     Ordered    06/15/25 4619   Discharge patient  Once,   Status:  Canceled        Expected Discharge Date: 06/15/25   Expected Discharge Time: Afternoon   Discharge Disposition: Home or Self Care   Physician of Record for Attribution - Please select from Treatment Team: ALEKSANDER LAWSON [[id number]]   Review needed by CMO to determine Physician of Record: No      Question Answer Comment   Physician of Record for Attribution - Please select from Treatment Team ALEKSANDER LAWSON    Review needed by CMO to determine Physician of Record No        06/15/25 1353

## 2025-06-25 LAB
FUNGUS WND CULT: NORMAL
MYCOBACTERIUM SPEC CULT: NORMAL
NIGHT BLUE STAIN TISS: NORMAL

## (undated) DEVICE — CANNULA,OXY,ADULT,SUPERSOFT,W/7'TUB,UC: Brand: MEDLINE

## (undated) DEVICE — PTCH SENSR ILLUMISITE ADHS

## (undated) DEVICE — SUT MNCRYL PLS ANTIB UD 4/0 PS2 18IN

## (undated) DEVICE — STRAP,POSITIONING,KNEE/BODY,FOAM,4X60": Brand: MEDLINE

## (undated) DEVICE — 3M™ STERI-DRAPE™ INSTRUMENT POUCH 1018: Brand: STERI-DRAPE™

## (undated) DEVICE — SUT PDS LP 1 TP1 96IN VIO PDP880GA

## (undated) DEVICE — SUT MONOCRYL PLS ANTIB UND 3/0  PS1 27IN

## (undated) DEVICE — ANTIBACTERIAL UNDYED BRAIDED (POLYGLACTIN 910), SYNTHETIC ABSORBABLE SUTURE: Brand: COATED VICRYL

## (undated) DEVICE — SOL IRR NACL 0.9PCT BT 1000ML

## (undated) DEVICE — BOWL UTIL STRL 32OZ

## (undated) DEVICE — STPCK 4WY ON/OFF VLV M/COLAR FIT 45PSI STRL

## (undated) DEVICE — DRAPE,UNDERBUTTOCKS,STERILE: Brand: MEDLINE

## (undated) DEVICE — GLV SURG DERMASSURE GRN LF PF SZ 6.5

## (undated) DEVICE — HARMONIC HD 1000I SHEARS, 20CM SHAFT LENGTH: Brand: HARMONIC

## (undated) DEVICE — TUBING, SUCTION, 1/4" X 10', STRAIGHT: Brand: MEDLINE

## (undated) DEVICE — TRAP FLD MINIVAC MEGADYNE 100ML

## (undated) DEVICE — SINGLE USE ASPIRATION NEEDLE: Brand: SINGLE USE ASPIRATION NEEDLE

## (undated) DEVICE — SPNG GZ WOVN 4X4IN 12PLY 10/BX STRL

## (undated) DEVICE — PREMIUM DRY TRAY LF: Brand: MEDLINE INDUSTRIES, INC.

## (undated) DEVICE — ST NDL BRONCH ASP VIZISHOT 2 FLX 19GA

## (undated) DEVICE — SINGLE USE BIOPSY VALVE MAJ-210: Brand: SINGLE USE BIOPSY VALVE (STERILE)

## (undated) DEVICE — LUER-LOK 360°: Brand: CONNECTA, LUER-LOK

## (undated) DEVICE — Device: Brand: BALLOON

## (undated) DEVICE — CONNECTOR,STRAIGHT,F/02 SUPPLY TUBING: Brand: MEDLINE

## (undated) DEVICE — SUT SILK 2/0 SH 30IN K833H

## (undated) DEVICE — GLV SURG SENSICARE PI MIC PF SZ6.5 LF STRL

## (undated) DEVICE — LEX BASIC NO DRAPE: Brand: MEDLINE INDUSTRIES, INC.

## (undated) DEVICE — TRAP,MUCUS SPECIMEN,40CC: Brand: MEDLINE

## (undated) DEVICE — SYR SLP TP 10ML DISP

## (undated) DEVICE — ST EXT MICROBORE FIX M LL 38IN

## (undated) DEVICE — CVR HNDL LIGHT RIGID

## (undated) DEVICE — SAFELINER SUCTION CANISTER 1000CC: Brand: DEROYAL

## (undated) DEVICE — DRAPE, LAVH, STERILE: Brand: MEDLINE

## (undated) DEVICE — KT CATH DRN PLEURL PLEURX/SAFE/T SIL 15.5F 66CM 4BT/1000ML

## (undated) DEVICE — PK MINOR SPLT 10

## (undated) DEVICE — LUBE GEL ENDOGLIDE 1.1OZ

## (undated) DEVICE — ST LINER SAFECAP GRN RED CP STRL

## (undated) DEVICE — SUT VIC 12X27 D8116 BX/12

## (undated) DEVICE — SUT VICYL PLS CTD ANTIB BR 1 27IN VIL

## (undated) DEVICE — SPNG LAP PREWSH SFTPK 18X18IN STRL PK/5

## (undated) DEVICE — FRCP BX RADJAW4 PULM WO NDL STD1.8X2 100

## (undated) DEVICE — SINGLE USE SUCTION VALVE MAJ-209: Brand: SINGLE USE SUCTION VALVE (STERILE)

## (undated) DEVICE — GLV SURG BIOGELULTRATOUCH POLYISPRN PF LF SZ7 STRL

## (undated) DEVICE — SYRINGE, LUER LOCK, 5ML: Brand: MEDLINE

## (undated) DEVICE — SPNG VERSALON 4X4 4PLY NONSTRL LF BG/200

## (undated) DEVICE — KT PROC ENDOBRONC ILLUMISITE LOCAT/GUIDE EXT/WORK/CH 180DEG

## (undated) DEVICE — Device

## (undated) DEVICE — SKIN AFFIX SURG ADHESIVE 72/CS 0.55ML: Brand: MEDLINE

## (undated) DEVICE — DISPOSABLE CYTOLOGY BRUSH: Brand: DISPOSABLE CYTOLOGY BRUSH

## (undated) DEVICE — SYR LUER SLPTP 50ML

## (undated) DEVICE — ADAPT SWVL FIBROPTIC BRONCH

## (undated) DEVICE — SYR SLPTP 20CC

## (undated) DEVICE — ANTIBACTERIAL VIOLET BRAIDED (POLYGLACTIN 910), SYNTHETIC ABSORBABLE SUTURE: Brand: COATED VICRYL